# Patient Record
Sex: MALE | Race: WHITE | NOT HISPANIC OR LATINO | URBAN - METROPOLITAN AREA
[De-identification: names, ages, dates, MRNs, and addresses within clinical notes are randomized per-mention and may not be internally consistent; named-entity substitution may affect disease eponyms.]

---

## 2014-10-28 RX ORDER — INSULIN GLARGINE 100 [IU]/ML
25 INJECTION, SOLUTION SUBCUTANEOUS
Qty: 0 | Refills: 0 | COMMUNITY
Start: 2014-10-28

## 2014-10-28 RX ORDER — INSULIN GLARGINE 100 [IU]/ML
35 INJECTION, SOLUTION SUBCUTANEOUS
Qty: 0 | Refills: 0 | COMMUNITY
Start: 2014-10-28

## 2017-01-09 ENCOUNTER — EMERGENCY (EMERGENCY)
Facility: HOSPITAL | Age: 77
LOS: 1 days | Discharge: HOME | End: 2017-01-09
Attending: EMERGENCY MEDICINE | Admitting: EMERGENCY MEDICINE
Payer: MEDICARE

## 2017-01-09 VITALS
DIASTOLIC BLOOD PRESSURE: 63 MMHG | TEMPERATURE: 98 F | SYSTOLIC BLOOD PRESSURE: 152 MMHG | OXYGEN SATURATION: 97 % | RESPIRATION RATE: 18 BRPM | HEART RATE: 74 BPM

## 2017-01-09 DIAGNOSIS — R07.2 PRECORDIAL PAIN: ICD-10-CM

## 2017-01-09 DIAGNOSIS — Z96.611 PRESENCE OF RIGHT ARTIFICIAL SHOULDER JOINT: Chronic | ICD-10-CM

## 2017-01-09 LAB
ALBUMIN SERPL ELPH-MCNC: 3.9 G/DL — SIGNIFICANT CHANGE UP (ref 3.3–5)
ALP SERPL-CCNC: 92 U/L — SIGNIFICANT CHANGE UP (ref 40–120)
ALT FLD-CCNC: 21 U/L RC — SIGNIFICANT CHANGE UP (ref 10–45)
ANION GAP SERPL CALC-SCNC: 12 MMOL/L — SIGNIFICANT CHANGE UP (ref 5–17)
APTT BLD: 31.5 SEC — SIGNIFICANT CHANGE UP (ref 27.5–37.4)
AST SERPL-CCNC: 16 U/L — SIGNIFICANT CHANGE UP (ref 10–40)
BASOPHILS # BLD AUTO: 0 K/UL — SIGNIFICANT CHANGE UP (ref 0–0.2)
BASOPHILS NFR BLD AUTO: 0.3 % — SIGNIFICANT CHANGE UP (ref 0–2)
BILIRUB SERPL-MCNC: 0.3 MG/DL — SIGNIFICANT CHANGE UP (ref 0.2–1.2)
BUN SERPL-MCNC: 28 MG/DL — HIGH (ref 7–23)
CALCIUM SERPL-MCNC: 9.5 MG/DL — SIGNIFICANT CHANGE UP (ref 8.4–10.5)
CHLORIDE SERPL-SCNC: 98 MMOL/L — SIGNIFICANT CHANGE UP (ref 96–108)
CK MB BLD-MCNC: 2.8 % — SIGNIFICANT CHANGE UP (ref 0–3.5)
CK MB CFR SERPL CALC: 3.1 NG/ML — SIGNIFICANT CHANGE UP (ref 0–6.7)
CK SERPL-CCNC: 110 U/L — SIGNIFICANT CHANGE UP (ref 30–200)
CO2 SERPL-SCNC: 26 MMOL/L — SIGNIFICANT CHANGE UP (ref 22–31)
CREAT SERPL-MCNC: 1.43 MG/DL — HIGH (ref 0.5–1.3)
EOSINOPHIL # BLD AUTO: 0.3 K/UL — SIGNIFICANT CHANGE UP (ref 0–0.5)
EOSINOPHIL NFR BLD AUTO: 4.4 % — SIGNIFICANT CHANGE UP (ref 0–6)
GLUCOSE SERPL-MCNC: 354 MG/DL — HIGH (ref 70–99)
HCT VFR BLD CALC: 34.5 % — LOW (ref 39–50)
HGB BLD-MCNC: 12.2 G/DL — LOW (ref 13–17)
INR BLD: 1.14 RATIO — SIGNIFICANT CHANGE UP (ref 0.88–1.16)
LYMPHOCYTES # BLD AUTO: 1.2 K/UL — SIGNIFICANT CHANGE UP (ref 1–3.3)
LYMPHOCYTES # BLD AUTO: 18.2 % — SIGNIFICANT CHANGE UP (ref 13–44)
MCHC RBC-ENTMCNC: 32.1 PG — SIGNIFICANT CHANGE UP (ref 27–34)
MCHC RBC-ENTMCNC: 35.3 GM/DL — SIGNIFICANT CHANGE UP (ref 32–36)
MCV RBC AUTO: 90.8 FL — SIGNIFICANT CHANGE UP (ref 80–100)
MONOCYTES # BLD AUTO: 0.6 K/UL — SIGNIFICANT CHANGE UP (ref 0–0.9)
MONOCYTES NFR BLD AUTO: 9 % — SIGNIFICANT CHANGE UP (ref 2–14)
NEUTROPHILS # BLD AUTO: 4.6 K/UL — SIGNIFICANT CHANGE UP (ref 1.8–7.4)
NEUTROPHILS NFR BLD AUTO: 68.1 % — SIGNIFICANT CHANGE UP (ref 43–77)
NT-PROBNP SERPL-SCNC: 421 PG/ML — HIGH (ref 0–300)
PLATELET # BLD AUTO: 129 K/UL — LOW (ref 150–400)
POTASSIUM SERPL-MCNC: 4.7 MMOL/L — SIGNIFICANT CHANGE UP (ref 3.5–5.3)
POTASSIUM SERPL-SCNC: 4.7 MMOL/L — SIGNIFICANT CHANGE UP (ref 3.5–5.3)
PROT SERPL-MCNC: 6.7 G/DL — SIGNIFICANT CHANGE UP (ref 6–8.3)
PROTHROM AB SERPL-ACNC: 12.3 SEC — SIGNIFICANT CHANGE UP (ref 10–13.1)
RBC # BLD: 3.8 M/UL — LOW (ref 4.2–5.8)
RBC # FLD: 12.8 % — SIGNIFICANT CHANGE UP (ref 10.3–14.5)
SODIUM SERPL-SCNC: 136 MMOL/L — SIGNIFICANT CHANGE UP (ref 135–145)
TROPONIN T SERPL-MCNC: <0.01 NG/ML — SIGNIFICANT CHANGE UP (ref 0–0.06)
WBC # BLD: 6.8 K/UL — SIGNIFICANT CHANGE UP (ref 3.8–10.5)
WBC # FLD AUTO: 6.8 K/UL — SIGNIFICANT CHANGE UP (ref 3.8–10.5)

## 2017-01-09 RX ORDER — SODIUM CHLORIDE 9 MG/ML
3 INJECTION INTRAMUSCULAR; INTRAVENOUS; SUBCUTANEOUS ONCE
Qty: 0 | Refills: 0 | Status: COMPLETED | OUTPATIENT
Start: 2017-01-09 | End: 2017-01-09

## 2017-01-09 RX ORDER — ASPIRIN/CALCIUM CARB/MAGNESIUM 324 MG
81 TABLET ORAL ONCE
Qty: 0 | Refills: 0 | Status: COMPLETED | OUTPATIENT
Start: 2017-01-09 | End: 2017-01-09

## 2017-01-09 RX ORDER — SODIUM CHLORIDE 9 MG/ML
1000 INJECTION INTRAMUSCULAR; INTRAVENOUS; SUBCUTANEOUS ONCE
Qty: 0 | Refills: 0 | Status: COMPLETED | OUTPATIENT
Start: 2017-01-09 | End: 2017-01-09

## 2017-01-09 RX ADMIN — SODIUM CHLORIDE 1000 MILLILITER(S): 9 INJECTION INTRAMUSCULAR; INTRAVENOUS; SUBCUTANEOUS at 22:38

## 2017-01-09 RX ADMIN — SODIUM CHLORIDE 3 MILLILITER(S): 9 INJECTION INTRAMUSCULAR; INTRAVENOUS; SUBCUTANEOUS at 22:38

## 2017-01-09 NOTE — ED ADULT NURSE NOTE - PMH
Angina pectoris associated with type 2 diabetes mellitus    Anxiety    Arthritis    CAD (Coronary Artery Disease)    CHF (congestive heart failure)  as per medical records Pt denies t PST 08/23/2016  Chronic Gout    CKD (chronic kidney disease) stage 3, GFR 30-59 ml/min    Depression    Diverticula, Colon    Erectile dysfunction    HTN (Hypertension)    Hypercholesteremia    MI (myocardial infarction)  x2- 2013/2014  Renal Stone    Type 2 diabetes, uncontrolled, with renal manifestation

## 2017-01-09 NOTE — ED PROVIDER NOTE - MEDICAL DECISION MAKING DETAILS
Germán: Patient with chest pain radiating to the arm, history of cad and stents. will get labs, ekg, cxr, admit for acs eval.

## 2017-01-09 NOTE — ED ADULT NURSE NOTE - CHPI ED SYMPTOMS NEG
no left arm pain/no nausea/no vomiting/no cough/no syncope/no shortness of breath/no palpitations/no chills/no weakness/no fever
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
## 2017-01-09 NOTE — ED PROVIDER NOTE - OBJECTIVE STATEMENT
76yoM pmh CABG, CAD (stent x 17, plavix), CKD, CHF, HTN, HLD, DM2, gout comes ED c/o of worsening 10/10 sharp substernal cp radaiting to r arm at rest, and worse with excetion. Pt reports no sob/palp, no f/c, no n/v/d, took asa 325 x3 this am.  In ED pt reports continued cp (7/10).     last stent ~6 moths ago  pmd/cards - ismael garcia

## 2017-01-10 DIAGNOSIS — R07.9 CHEST PAIN, UNSPECIFIED: ICD-10-CM

## 2017-01-10 DIAGNOSIS — Z29.9 ENCOUNTER FOR PROPHYLACTIC MEASURES, UNSPECIFIED: ICD-10-CM

## 2017-01-10 DIAGNOSIS — E11.22 TYPE 2 DIABETES MELLITUS WITH DIABETIC CHRONIC KIDNEY DISEASE: ICD-10-CM

## 2017-01-10 DIAGNOSIS — N18.3 CHRONIC KIDNEY DISEASE, STAGE 3 (MODERATE): ICD-10-CM

## 2017-01-10 DIAGNOSIS — E78.00 PURE HYPERCHOLESTEROLEMIA, UNSPECIFIED: ICD-10-CM

## 2017-01-10 DIAGNOSIS — I10 ESSENTIAL (PRIMARY) HYPERTENSION: ICD-10-CM

## 2017-01-10 LAB — TROPONIN T SERPL-MCNC: <0.01 NG/ML — SIGNIFICANT CHANGE UP (ref 0–0.06)

## 2017-01-10 RX ORDER — DEXTROSE 50 % IN WATER 50 %
12.5 SYRINGE (ML) INTRAVENOUS ONCE
Qty: 0 | Refills: 0 | Status: DISCONTINUED | OUTPATIENT
Start: 2017-01-10 | End: 2017-01-11

## 2017-01-10 RX ORDER — INSULIN GLARGINE 100 [IU]/ML
26 INJECTION, SOLUTION SUBCUTANEOUS AT BEDTIME
Qty: 0 | Refills: 0 | Status: DISCONTINUED | OUTPATIENT
Start: 2017-01-10 | End: 2017-01-11

## 2017-01-10 RX ORDER — ASPIRIN/CALCIUM CARB/MAGNESIUM 324 MG
325 TABLET ORAL DAILY
Qty: 0 | Refills: 0 | Status: DISCONTINUED | OUTPATIENT
Start: 2017-01-10 | End: 2017-01-11

## 2017-01-10 RX ORDER — ATORVASTATIN CALCIUM 80 MG/1
80 TABLET, FILM COATED ORAL AT BEDTIME
Qty: 0 | Refills: 0 | Status: DISCONTINUED | OUTPATIENT
Start: 2017-01-10 | End: 2017-01-11

## 2017-01-10 RX ORDER — CLOPIDOGREL BISULFATE 75 MG/1
75 TABLET, FILM COATED ORAL DAILY
Qty: 0 | Refills: 0 | Status: DISCONTINUED | OUTPATIENT
Start: 2017-01-10 | End: 2017-01-11

## 2017-01-10 RX ORDER — DOCUSATE SODIUM 100 MG
100 CAPSULE ORAL THREE TIMES A DAY
Qty: 0 | Refills: 0 | Status: DISCONTINUED | OUTPATIENT
Start: 2017-01-10 | End: 2017-01-11

## 2017-01-10 RX ORDER — DEXTROSE 50 % IN WATER 50 %
1 SYRINGE (ML) INTRAVENOUS ONCE
Qty: 0 | Refills: 0 | Status: DISCONTINUED | OUTPATIENT
Start: 2017-01-10 | End: 2017-01-11

## 2017-01-10 RX ORDER — SODIUM CHLORIDE 9 MG/ML
1000 INJECTION, SOLUTION INTRAVENOUS
Qty: 0 | Refills: 0 | Status: DISCONTINUED | OUTPATIENT
Start: 2017-01-10 | End: 2017-01-11

## 2017-01-10 RX ORDER — AMLODIPINE BESYLATE 2.5 MG/1
10 TABLET ORAL DAILY
Qty: 0 | Refills: 0 | Status: DISCONTINUED | OUTPATIENT
Start: 2017-01-10 | End: 2017-01-11

## 2017-01-10 RX ORDER — GLUCAGON INJECTION, SOLUTION 0.5 MG/.1ML
1 INJECTION, SOLUTION SUBCUTANEOUS ONCE
Qty: 0 | Refills: 0 | Status: DISCONTINUED | OUTPATIENT
Start: 2017-01-10 | End: 2017-01-11

## 2017-01-10 RX ORDER — ALLOPURINOL 300 MG
300 TABLET ORAL DAILY
Qty: 0 | Refills: 0 | Status: DISCONTINUED | OUTPATIENT
Start: 2017-01-10 | End: 2017-01-11

## 2017-01-10 RX ORDER — DEXTROSE 50 % IN WATER 50 %
25 SYRINGE (ML) INTRAVENOUS ONCE
Qty: 0 | Refills: 0 | Status: DISCONTINUED | OUTPATIENT
Start: 2017-01-10 | End: 2017-01-11

## 2017-01-10 RX ORDER — VALSARTAN 80 MG/1
80 TABLET ORAL DAILY
Qty: 0 | Refills: 0 | Status: DISCONTINUED | OUTPATIENT
Start: 2017-01-10 | End: 2017-01-11

## 2017-01-10 RX ORDER — ONDANSETRON 8 MG/1
4 TABLET, FILM COATED ORAL EVERY 6 HOURS
Qty: 0 | Refills: 0 | Status: DISCONTINUED | OUTPATIENT
Start: 2017-01-10 | End: 2017-01-11

## 2017-01-10 RX ORDER — CARVEDILOL PHOSPHATE 80 MG/1
25 CAPSULE, EXTENDED RELEASE ORAL EVERY 12 HOURS
Qty: 0 | Refills: 0 | Status: DISCONTINUED | OUTPATIENT
Start: 2017-01-10 | End: 2017-01-11

## 2017-01-10 RX ORDER — RANOLAZINE 500 MG/1
500 TABLET, FILM COATED, EXTENDED RELEASE ORAL
Qty: 0 | Refills: 0 | Status: DISCONTINUED | OUTPATIENT
Start: 2017-01-10 | End: 2017-01-11

## 2017-01-10 RX ORDER — HEPARIN SODIUM 5000 [USP'U]/ML
5000 INJECTION INTRAVENOUS; SUBCUTANEOUS EVERY 8 HOURS
Qty: 0 | Refills: 0 | Status: DISCONTINUED | OUTPATIENT
Start: 2017-01-10 | End: 2017-01-11

## 2017-01-10 RX ORDER — INSULIN LISPRO 100/ML
VIAL (ML) SUBCUTANEOUS AT BEDTIME
Qty: 0 | Refills: 0 | Status: DISCONTINUED | OUTPATIENT
Start: 2017-01-10 | End: 2017-01-11

## 2017-01-10 RX ORDER — INSULIN LISPRO 100/ML
VIAL (ML) SUBCUTANEOUS
Qty: 0 | Refills: 0 | Status: DISCONTINUED | OUTPATIENT
Start: 2017-01-10 | End: 2017-01-11

## 2017-01-10 RX ADMIN — Medication: at 13:45

## 2017-01-10 RX ADMIN — Medication 1: at 07:05

## 2017-01-10 RX ADMIN — AMLODIPINE BESYLATE 10 MILLIGRAM(S): 2.5 TABLET ORAL at 11:40

## 2017-01-10 RX ADMIN — RANOLAZINE 500 MILLIGRAM(S): 500 TABLET, FILM COATED, EXTENDED RELEASE ORAL at 22:33

## 2017-01-10 RX ADMIN — Medication 100 MILLIGRAM(S): at 11:40

## 2017-01-10 RX ADMIN — INSULIN GLARGINE 26 UNIT(S): 100 INJECTION, SOLUTION SUBCUTANEOUS at 23:09

## 2017-01-10 RX ADMIN — Medication 325 MILLIGRAM(S): at 11:40

## 2017-01-10 RX ADMIN — HEPARIN SODIUM 5000 UNIT(S): 5000 INJECTION INTRAVENOUS; SUBCUTANEOUS at 13:44

## 2017-01-10 RX ADMIN — CLOPIDOGREL BISULFATE 75 MILLIGRAM(S): 75 TABLET, FILM COATED ORAL at 11:40

## 2017-01-10 RX ADMIN — ATORVASTATIN CALCIUM 80 MILLIGRAM(S): 80 TABLET, FILM COATED ORAL at 22:33

## 2017-01-10 RX ADMIN — CARVEDILOL PHOSPHATE 25 MILLIGRAM(S): 80 CAPSULE, EXTENDED RELEASE ORAL at 22:33

## 2017-01-10 RX ADMIN — Medication 300 MILLIGRAM(S): at 11:40

## 2017-01-10 RX ADMIN — HEPARIN SODIUM 5000 UNIT(S): 5000 INJECTION INTRAVENOUS; SUBCUTANEOUS at 22:32

## 2017-01-10 RX ADMIN — Medication 1: at 22:32

## 2017-01-10 NOTE — ED ADULT NURSE REASSESSMENT NOTE - NS ED NURSE REASSESS COMMENT FT1
Pt. in no acute distress, breathing unlabored on RA. NSR on CM. Pt. sitting up comfortably in stretcher. States the chest pain "comes and goes." Pending dispo.

## 2017-01-10 NOTE — H&P ADULT. - LYMPHATIC
supraclavicular R/posterior cervical L/posterior cervical R/anterior cervical R/supraclavicular L/anterior cervical L

## 2017-01-10 NOTE — H&P ADULT. - PROBLEM SELECTOR PROBLEM 2
Uncontrolled type 2 diabetes mellitus with stage 3 chronic kidney disease, with long-term current use of insulin

## 2017-01-10 NOTE — H&P ADULT. - HISTORY OF PRESENT ILLNESS
76 M w/ PMH CAD, 4v CABG 1994, CKD 3, CHF, DM2, HTN, HLD, Gout, p/w c/o left sided/substernal chest pain that started yesterday after he went out for a walk with his dog.  He had to carry his dog and walk back when he noticed the pain starting.  He went home and tried to rest w/out relief.  He then took 2 more ASA 325s (had his regular in the AM), w/out relief.  Chest pain was "squeezing" in quality, 8/10 at its worst, radiating to his Left arm w/ tingling in his fingers.  Not associated w/ sob, diaphoresis, palpitations, n/v.  Exacerbated w/ exertion, no alleviating factors. Pt states pain is "there", but appears comfortable.  He has h/o 17 stents, last in 4/16, and states "I'm due for one".

## 2017-01-10 NOTE — H&P ADULT. - FAMILY HISTORY
Father  Still living? No  Family history of CABG, Age at diagnosis: Age Unknown     Mother  Still living? Unknown  Family history of diabetes mellitus, Age at diagnosis: Age Unknown

## 2017-01-10 NOTE — H&P ADULT. - ASSESSMENT
76 M w/ PMH CAD, s/p 17 stents, 4v CABG 1994, CKD 3, CHF, DM2, HTN, HLD, Gout, p/w c/o left sided/substernal chest pain.

## 2017-01-10 NOTE — ED ADULT NURSE REASSESSMENT NOTE - NS ED NURSE REASSESS COMMENT FT1
pt resting comfortably in bed awaiting bed on floor. Pt denies pain at this time. vitals stable. will continue to reassess.

## 2017-01-10 NOTE — H&P ADULT. - NEUROLOGICAL DETAILS
responds to pain/alert and oriented x 3/responds to verbal commands/normal strength/cranial nerves intact/sensation intact

## 2017-01-10 NOTE — ED ADULT NURSE REASSESSMENT NOTE - NS ED NURSE REASSESS COMMENT FT1
Pt. in no acute distress. Breathing unlabored on RA. Skin warm pink and dry. NSR on CM. States "the chest pain is the same as before." Pt. admitted to tele, awaiting bed. Repeat lab results pending. Comfort measures in place. No N/V, SOB, back pain, arm pain.
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
## 2017-01-10 NOTE — H&P ADULT. - PROBLEM SELECTOR PLAN 1
Likely anginal, 2x trops neg  cardio cs - plan for cath  NTG prn chest pain  c/w asa, plavix, bb, statin

## 2017-01-11 ENCOUNTER — TRANSCRIPTION ENCOUNTER (OUTPATIENT)
Age: 77
End: 2017-01-11

## 2017-01-11 ENCOUNTER — MEDICATION RENEWAL (OUTPATIENT)
Age: 77
End: 2017-01-11

## 2017-01-11 VITALS — WEIGHT: 199.3 LBS

## 2017-01-11 LAB
ANION GAP SERPL CALC-SCNC: 13 MMOL/L — SIGNIFICANT CHANGE UP (ref 5–17)
BUN SERPL-MCNC: 22 MG/DL — SIGNIFICANT CHANGE UP (ref 7–23)
CALCIUM SERPL-MCNC: 9.7 MG/DL — SIGNIFICANT CHANGE UP (ref 8.4–10.5)
CHLORIDE SERPL-SCNC: 103 MMOL/L — SIGNIFICANT CHANGE UP (ref 96–108)
CO2 SERPL-SCNC: 24 MMOL/L — SIGNIFICANT CHANGE UP (ref 22–31)
CREAT ?TM UR-MCNC: 86 MG/DL — SIGNIFICANT CHANGE UP
CREAT SERPL-MCNC: 1.18 MG/DL — SIGNIFICANT CHANGE UP (ref 0.5–1.3)
GLUCOSE SERPL-MCNC: 213 MG/DL — HIGH (ref 70–99)
HCT VFR BLD CALC: 36.3 % — LOW (ref 39–50)
HGB BLD-MCNC: 12.7 G/DL — LOW (ref 13–17)
MCHC RBC-ENTMCNC: 32.1 PG — SIGNIFICANT CHANGE UP (ref 27–34)
MCHC RBC-ENTMCNC: 34.8 GM/DL — SIGNIFICANT CHANGE UP (ref 32–36)
MCV RBC AUTO: 92.3 FL — SIGNIFICANT CHANGE UP (ref 80–100)
PLATELET # BLD AUTO: 131 K/UL — LOW (ref 150–400)
POTASSIUM SERPL-MCNC: 3.9 MMOL/L — SIGNIFICANT CHANGE UP (ref 3.5–5.3)
POTASSIUM SERPL-SCNC: 3.9 MMOL/L — SIGNIFICANT CHANGE UP (ref 3.5–5.3)
PROT ?TM UR-MCNC: 115 MG/DL — HIGH (ref 0–12)
PROT/CREAT UR-RTO: 1.3 RATIO — HIGH (ref 0–0.2)
RBC # BLD: 3.94 M/UL — LOW (ref 4.2–5.8)
RBC # FLD: 13.3 % — SIGNIFICANT CHANGE UP (ref 10.3–14.5)
SODIUM SERPL-SCNC: 140 MMOL/L — SIGNIFICANT CHANGE UP (ref 135–145)
WBC # BLD: 7.5 K/UL — SIGNIFICANT CHANGE UP (ref 3.8–10.5)
WBC # FLD AUTO: 7.5 K/UL — SIGNIFICANT CHANGE UP (ref 3.8–10.5)

## 2017-01-11 PROCEDURE — 84156 ASSAY OF PROTEIN URINE: CPT

## 2017-01-11 PROCEDURE — C1769: CPT

## 2017-01-11 PROCEDURE — C1887: CPT

## 2017-01-11 PROCEDURE — C1894: CPT

## 2017-01-11 PROCEDURE — 82553 CREATINE MB FRACTION: CPT

## 2017-01-11 PROCEDURE — 93005 ELECTROCARDIOGRAM TRACING: CPT

## 2017-01-11 PROCEDURE — 85610 PROTHROMBIN TIME: CPT

## 2017-01-11 PROCEDURE — 80053 COMPREHEN METABOLIC PANEL: CPT

## 2017-01-11 PROCEDURE — 83880 ASSAY OF NATRIURETIC PEPTIDE: CPT

## 2017-01-11 PROCEDURE — 80048 BASIC METABOLIC PNL TOTAL CA: CPT

## 2017-01-11 PROCEDURE — 71046 X-RAY EXAM CHEST 2 VIEWS: CPT

## 2017-01-11 PROCEDURE — 84484 ASSAY OF TROPONIN QUANT: CPT

## 2017-01-11 PROCEDURE — 85730 THROMBOPLASTIN TIME PARTIAL: CPT

## 2017-01-11 PROCEDURE — 93010 ELECTROCARDIOGRAM REPORT: CPT

## 2017-01-11 PROCEDURE — 85027 COMPLETE CBC AUTOMATED: CPT

## 2017-01-11 PROCEDURE — 93459 L HRT ART/GRFT ANGIO: CPT

## 2017-01-11 PROCEDURE — 82550 ASSAY OF CK (CPK): CPT

## 2017-01-11 PROCEDURE — 99285 EMERGENCY DEPT VISIT HI MDM: CPT | Mod: 25

## 2017-01-11 RX ORDER — DOCUSATE SODIUM 100 MG
1 CAPSULE ORAL
Qty: 90 | Refills: 0 | OUTPATIENT
Start: 2017-01-11 | End: 2017-02-10

## 2017-01-11 RX ADMIN — CLOPIDOGREL BISULFATE 75 MILLIGRAM(S): 75 TABLET, FILM COATED ORAL at 11:58

## 2017-01-11 RX ADMIN — Medication 2: at 11:58

## 2017-01-11 RX ADMIN — AMLODIPINE BESYLATE 10 MILLIGRAM(S): 2.5 TABLET ORAL at 05:53

## 2017-01-11 RX ADMIN — Medication 325 MILLIGRAM(S): at 11:59

## 2017-01-11 RX ADMIN — CARVEDILOL PHOSPHATE 25 MILLIGRAM(S): 80 CAPSULE, EXTENDED RELEASE ORAL at 05:53

## 2017-01-11 RX ADMIN — Medication 300 MILLIGRAM(S): at 11:58

## 2017-01-11 RX ADMIN — VALSARTAN 80 MILLIGRAM(S): 80 TABLET ORAL at 05:53

## 2017-01-11 RX ADMIN — RANOLAZINE 500 MILLIGRAM(S): 500 TABLET, FILM COATED, EXTENDED RELEASE ORAL at 05:53

## 2017-01-11 RX ADMIN — Medication 2: at 07:58

## 2017-01-11 RX ADMIN — HEPARIN SODIUM 5000 UNIT(S): 5000 INJECTION INTRAVENOUS; SUBCUTANEOUS at 05:53

## 2017-01-11 NOTE — DISCHARGE NOTE ADULT - MEDICATION SUMMARY - MEDICATIONS TO STOP TAKING
I will STOP taking the medications listed below when I get home from the hospital:    NovoLOG 100 units/mL subcutaneous solution  -- 7 unit(s) subcutaneous once a day before dinner    potassium citrate  -- 20 milliequivalent(s) by mouth 2 times a day

## 2017-01-11 NOTE — DISCHARGE NOTE ADULT - PATIENT PORTAL LINK FT
“You can access the [employer] Patient Portal, offered by [facility], by registering with the following website: [email or web address]”

## 2017-01-11 NOTE — DISCHARGE NOTE ADULT - PLAN OF CARE
optimal cardiac medication management Coronary artery disease is a condition where the arteries the supply the heart muscle get clogges with fatty deposits & puts you at risk for a heart attack  Call your doctor if you have any new pain, pressure, or discomfort in the center of your chest, pain, tingling or discomfort in arms, back, neck, jaw, or stomach, shortness of breath, nausea, vomiting, burping or heartburn, sweating, cold and clammy skin, racing or abnormal heartbeat for more than 10 minutes or if they keep coming & going.  Call 911 and do not tr to get to hospital by care  You can help yourself with lefestyle changes (quitting smoking if you smoke), eat lots of fruits & vegetables & low fat dairy products, not a lot of meat & fatty foods, walk or some form of physical activity most days of the week, lose weight if you are overweight  Take your cardiac medication as prescribed to lower cholesterol, to lower blood pressure, aspirin to prevent blood clots, and diabetes control  Make sure to keep appointments with doctor for cardiac follow up care  follow up with primary care physician/cardiology within one week after discharge  take cardiac medication as directed - Ranexa has been added to regimen HgA1C this admission was not done - please follow up  Make sure you get your HgA1c checked every three months.  If you take insulin, check your blood glucose before meals and at bedtime.  It's important not to skip any meals.  Keep a log of your blood glucose results and always take it with you to your doctor appointments.  Keep a list of your current medications including injectables and over the counter medications and bring this medication list with you to all your doctor appointments.  If you have not seen your opthalmologist this year call for appointment.  Check your feet daily for redness, sores, or openings. Do not self treat. If no improvement in two days call your primary care physician for an appointment.  Low blood sugar (hypoglycemia) is a blood sugar below 70mg/dl. Check your blood sugar if you feel signs/symptoms of hypoglycemia. If your blood sugar is below 70 take 15 grams of carbohydrates (ex 4 oz of apple juice, 3-4 glucosr tablets, or 4-6 oz of regular soda) wait 15 minutes and repeat blood sugar to make sure it comes up above 70.  If your blood sugar is above 70 and you are due for a meal, have a meal.  If you are not due for a meal have a snack.  This snack helps keeps your blood sugar at a safe range.  take your Lantus as ordered  speak to your doctor about premeal humalog since it is not normal to only take before dinne Avoid taking (NSAIDs) - (ex: Ibuprofen, Advil, Celebrex, Naprosyn)  Avoid taking any nephrotoxic agents (can harm kidneys) - Intravenous contrast for diagnostic testing, combination cold medications.  Have all medications adjusted for your renal function by your Health Care Provider.  Blood pressure control is important.  Take all medication as prescribed.  your last creatinine was normal at 1.1  follow up with urology for kidney stone evaluation take Allopurinal as directed Follow up with your medical doctor to establish long term blood pressure treatment goals.  follow up blood pressure monitoring and doctor visits as directed

## 2017-01-11 NOTE — DISCHARGE NOTE ADULT - CARE PROVIDER_API CALL
Stuart Sarkar), Cardiovascular Disease; Internal Medicine  [address] [place] [address]  Phone: [phone]  Fax: [phone]    Jaxon Feldman), Urology  [address] [address]  Phone: [phone]  Fax: [phone]

## 2017-01-11 NOTE — DISCHARGE NOTE ADULT - MEDICATION SUMMARY - MEDICATIONS TO TAKE
I will START or STAY ON the medications listed below when I get home from the hospital:    aspirin 325 mg oral delayed release tablet  -- 1 tab(s) by mouth once a day  -- Indication: For Coronary artery disease    valsartan 80 mg oral capsule  -- 1 tab(s) by mouth once a day  in am  -- Indication: For Hypertension    ranolazine 500 mg oral tablet, extended release  -- 1 tab(s) by mouth 2 times a day  -- Indication: For Coronary artery disease    insulin glargine 100 units/mL subcutaneous solution  -- 26 unit(s) subcutaneous once a day in pm  -- 1/6/2016 dose reduced pre cath to 15 units  -- Indication: For diabetes    allopurinol 300 mg oral tablet  -- 1 tab(s) by mouth once a day in am  -- Indication: For gout    Lipitor 80 mg oral tablet  -- 1 tab(s) by mouth once a day in am  -- Indication: For Hypercholesteremia    clopidogrel 75 mg oral tablet  -- 1 tab(s) by mouth once a day  -- Indication: For Coronary artery disease    carvedilol 25 mg oral tablet  -- 1 tab(s) by mouth 2 times a day  -- Indication: For Coronary artery disease    amLODIPine 10 mg oral tablet  -- 1 tab(s) by mouth once a day in am  -- Indication: For Hypertension    docusate sodium 100 mg oral capsule  -- 1 cap(s) by mouth 3 times a day  -- Indication: For Constipation I will START or STAY ON the medications listed below when I get home from the hospital:    aspirin 325 mg oral delayed release tablet  -- 1 tab(s) by mouth once a day  -- Indication: For Coronary artery disease    valsartan 80 mg oral capsule  -- 1 tab(s) by mouth once a day  in am  -- Indication: For Hypertension    ranolazine 500 mg oral tablet, extended release  -- 1 tab(s) by mouth 2 times a day  -- Indication: For Coronary artery disease    insulin glargine 100 units/mL subcutaneous solution  -- 26 unit(s) subcutaneous once a day in pm  -- 1/6/2016 dose reduced pre cath to 15 units  -- Indication: For Diabetes    allopurinol 300 mg oral tablet  -- 1 tab(s) by mouth once a day in am  -- Indication: For gout    Lipitor 80 mg oral tablet  -- 1 tab(s) by mouth once a day in am  -- Indication: For Hypercholesteremia    clopidogrel 75 mg oral tablet  -- 1 tab(s) by mouth once a day  -- Indication: For Coronary artery disease    carvedilol 25 mg oral tablet  -- 1 tab(s) by mouth 2 times a day  -- Indication: For Coronary artery disease    amLODIPine 10 mg oral tablet  -- 1 tab(s) by mouth once a day in am  -- Indication: For Hypert ensifon    docusate sodium 100 mg oral capsule  -- 1 cap(s) by mouth 3 times a day  -- Indication: For Constipation

## 2017-01-11 NOTE — DISCHARGE NOTE ADULT - ADDITIONAL INSTRUCTIONS
follow up with primary care physician / cardiology Dr. Sarkar within one week after discharge  follow up with urology Dr. Torres for renal stone evaluation since creatinine 1.4 on admisssion

## 2017-01-11 NOTE — DISCHARGE NOTE ADULT - HOSPITAL COURSE
to be completed by attending physician on record pt admitted with chest pain  seen by cards  had cardiac cath    1/10 cath showed Patent graft LIMA to LAD, SVG to RCA, SVG to diag and SVG to OM 50% disease, severe distal LAD disease, medical management recommended. Ranexa 500mg bid to added.                dc home with oupt f/u

## 2017-01-11 NOTE — DISCHARGE NOTE ADULT - REASON FOR ADMISSION
chest pain - had cardiac catheterization done 1/0 & has severe distal LAD disease requiring close medication management - Ranexa added to regimen

## 2017-01-11 NOTE — DISCHARGE NOTE ADULT - CARE PROVIDERS DIRECT ADDRESSES
[email or web address]

## 2017-01-11 NOTE — DISCHARGE NOTE ADULT - CARE PLAN
Principal Discharge DX:	Angina pectoris associated with type 2 diabetes mellitus  Goal:	optimal cardiac medication management  Instructions for follow-up, activity and diet:	Coronary artery disease is a condition where the arteries the supply the heart muscle get clogges with fatty deposits & puts you at risk for a heart attack  Call your doctor if you have any new pain, pressure, or discomfort in the center of your chest, pain, tingling or discomfort in arms, back, neck, jaw, or stomach, shortness of breath, nausea, vomiting, burping or heartburn, sweating, cold and clammy skin, racing or abnormal heartbeat for more than 10 minutes or if they keep coming & going.  Call 911 and do not tr to get to hospital by care  You can help yourself with lefestyle changes (quitting smoking if you smoke), eat lots of fruits & vegetables & low fat dairy products, not a lot of meat & fatty foods, walk or some form of physical activity most days of the week, lose weight if you are overweight  Take your cardiac medication as prescribed to lower cholesterol, to lower blood pressure, aspirin to prevent blood clots, and diabetes control  Make sure to keep appointments with doctor for cardiac follow up care  follow up with primary care physician/cardiology within one week after discharge  take cardiac medication as directed - Ranexa has been added to regimen  Secondary Diagnosis:	Diabetes  Instructions for follow-up, activity and diet:	HgA1C this admission was not done - please follow up  Make sure you get your HgA1c checked every three months.  If you take insulin, check your blood glucose before meals and at bedtime.  It's important not to skip any meals.  Keep a log of your blood glucose results and always take it with you to your doctor appointments.  Keep a list of your current medications including injectables and over the counter medications and bring this medication list with you to all your doctor appointments.  If you have not seen your opthalmologist this year call for appointment.  Check your feet daily for redness, sores, or openings. Do not self treat. If no improvement in two days call your primary care physician for an appointment.  Low blood sugar (hypoglycemia) is a blood sugar below 70mg/dl. Check your blood sugar if you feel signs/symptoms of hypoglycemia. If your blood sugar is below 70 take 15 grams of carbohydrates (ex 4 oz of apple juice, 3-4 glucosr tablets, or 4-6 oz of regular soda) wait 15 minutes and repeat blood sugar to make sure it comes up above 70.  If your blood sugar is above 70 and you are due for a meal, have a meal.  If you are not due for a meal have a snack.  This snack helps keeps your blood sugar at a safe range.  take your Lantus as ordered  speak to your doctor about premeal humalog since it is not normal to only take before dinne  Secondary Diagnosis:	RUFINA (acute kidney injury)  Instructions for follow-up, activity and diet:	Avoid taking (NSAIDs) - (ex: Ibuprofen, Advil, Celebrex, Naprosyn)  Avoid taking any nephrotoxic agents (can harm kidneys) - Intravenous contrast for diagnostic testing, combination cold medications.  Have all medications adjusted for your renal function by your Health Care Provider.  Blood pressure control is important.  Take all medication as prescribed.  your last creatinine was normal at 1.1  follow up with urology for kidney stone evaluation  Secondary Diagnosis:	Chronic gout  Instructions for follow-up, activity and diet:	take Allopurinal as directed  Secondary Diagnosis:	HTN (hypertension)  Instructions for follow-up, activity and diet:	Follow up with your medical doctor to establish long term blood pressure treatment goals.  follow up blood pressure monitoring and doctor visits as directed

## 2017-01-17 ENCOUNTER — APPOINTMENT (OUTPATIENT)
Dept: CARDIOLOGY | Facility: CLINIC | Age: 77
End: 2017-01-17

## 2017-01-17 ENCOUNTER — NON-APPOINTMENT (OUTPATIENT)
Age: 77
End: 2017-01-17

## 2017-01-17 VITALS
WEIGHT: 200 LBS | BODY MASS INDEX: 28.63 KG/M2 | OXYGEN SATURATION: 97 % | DIASTOLIC BLOOD PRESSURE: 64 MMHG | HEIGHT: 70 IN | HEART RATE: 69 BPM | SYSTOLIC BLOOD PRESSURE: 110 MMHG

## 2017-01-17 DIAGNOSIS — N52.9 MALE ERECTILE DYSFUNCTION, UNSPECIFIED: ICD-10-CM

## 2017-01-17 RX ORDER — SUCRALFATE 1 G/1
1 TABLET ORAL
Qty: 90 | Refills: 0 | Status: COMPLETED | COMMUNITY
Start: 2016-12-20

## 2017-03-01 ENCOUNTER — APPOINTMENT (OUTPATIENT)
Dept: ORTHOPEDIC SURGERY | Facility: CLINIC | Age: 77
End: 2017-03-01

## 2017-03-01 DIAGNOSIS — M19.011 PRIMARY OSTEOARTHRITIS, RIGHT SHOULDER: ICD-10-CM

## 2017-05-09 ENCOUNTER — APPOINTMENT (OUTPATIENT)
Dept: CARDIOLOGY | Facility: CLINIC | Age: 77
End: 2017-05-09

## 2017-05-09 ENCOUNTER — NON-APPOINTMENT (OUTPATIENT)
Age: 77
End: 2017-05-09

## 2017-05-09 VITALS
HEART RATE: 70 BPM | WEIGHT: 195 LBS | DIASTOLIC BLOOD PRESSURE: 70 MMHG | OXYGEN SATURATION: 96 % | BODY MASS INDEX: 27.92 KG/M2 | HEIGHT: 70 IN | SYSTOLIC BLOOD PRESSURE: 116 MMHG

## 2017-05-09 DIAGNOSIS — S49.91XA UNSPECIFIED INJURY OF RIGHT SHOULDER AND UPPER ARM, INITIAL ENCOUNTER: ICD-10-CM

## 2017-05-29 ENCOUNTER — RX RENEWAL (OUTPATIENT)
Age: 77
End: 2017-05-29

## 2017-09-01 ENCOUNTER — APPOINTMENT (OUTPATIENT)
Dept: ORTHOPEDIC SURGERY | Facility: CLINIC | Age: 77
End: 2017-09-01
Payer: COMMERCIAL

## 2017-09-01 DIAGNOSIS — M25.611 STIFFNESS OF RIGHT SHOULDER, NOT ELSEWHERE CLASSIFIED: ICD-10-CM

## 2017-09-01 PROCEDURE — 73030 X-RAY EXAM OF SHOULDER: CPT | Mod: RT

## 2017-09-01 PROCEDURE — 99213 OFFICE O/P EST LOW 20 MIN: CPT

## 2017-10-11 ENCOUNTER — NON-APPOINTMENT (OUTPATIENT)
Age: 77
End: 2017-10-11

## 2017-10-11 ENCOUNTER — APPOINTMENT (OUTPATIENT)
Dept: CARDIOLOGY | Facility: CLINIC | Age: 77
End: 2017-10-11
Payer: MEDICARE

## 2017-10-11 ENCOUNTER — LABORATORY RESULT (OUTPATIENT)
Age: 77
End: 2017-10-11

## 2017-10-11 VITALS
SYSTOLIC BLOOD PRESSURE: 108 MMHG | WEIGHT: 203 LBS | BODY MASS INDEX: 29.06 KG/M2 | HEART RATE: 70 BPM | OXYGEN SATURATION: 97 % | HEIGHT: 70 IN | DIASTOLIC BLOOD PRESSURE: 65 MMHG | TEMPERATURE: 98.4 F

## 2017-10-11 DIAGNOSIS — M79.89 OTHER SPECIFIED SOFT TISSUE DISORDERS: ICD-10-CM

## 2017-10-11 PROCEDURE — 99215 OFFICE O/P EST HI 40 MIN: CPT

## 2017-10-11 PROCEDURE — 93000 ELECTROCARDIOGRAM COMPLETE: CPT

## 2017-10-11 PROCEDURE — G0008: CPT

## 2017-10-11 PROCEDURE — 90662 IIV NO PRSV INCREASED AG IM: CPT

## 2017-10-16 LAB
ALBUMIN SERPL ELPH-MCNC: 3.6 G/DL
ALP BLD-CCNC: 90 U/L
ALT SERPL-CCNC: 24 U/L
ANION GAP SERPL CALC-SCNC: 13 MMOL/L
AST SERPL-CCNC: 17 U/L
BASOPHILS # BLD AUTO: 0.02 K/UL
BASOPHILS NFR BLD AUTO: 0.3 %
BILIRUB SERPL-MCNC: 0.6 MG/DL
BUN SERPL-MCNC: 32 MG/DL
CALCIUM SERPL-MCNC: 9.7 MG/DL
CHLORIDE SERPL-SCNC: 101 MMOL/L
CHOLEST SERPL-MCNC: 126 MG/DL
CHOLEST/HDLC SERPL: 3.9 RATIO
CO2 SERPL-SCNC: 26 MMOL/L
CREAT SERPL-MCNC: 1.58 MG/DL
EOSINOPHIL # BLD AUTO: 0.22 K/UL
EOSINOPHIL NFR BLD AUTO: 3.2 %
FT4I SERPL CALC-MCNC: 7.9 INDEX
GLUCOSE SERPL-MCNC: 286 MG/DL
HBA1C MFR BLD HPLC: 9.2 %
HCT VFR BLD CALC: 34.4 %
HDLC SERPL-MCNC: 32 MG/DL
HGB BLD-MCNC: 11.7 G/DL
IMM GRANULOCYTES NFR BLD AUTO: 0.1 %
LDLC SERPL CALC-MCNC: 55 MG/DL
LYMPHOCYTES # BLD AUTO: 1.06 K/UL
LYMPHOCYTES NFR BLD AUTO: 15.6 %
MAN DIFF?: NORMAL
MCHC RBC-ENTMCNC: 32.7 PG
MCHC RBC-ENTMCNC: 34 GM/DL
MCV RBC AUTO: 96.1 FL
MONOCYTES # BLD AUTO: 0.49 K/UL
MONOCYTES NFR BLD AUTO: 7.2 %
NEUTROPHILS # BLD AUTO: 5 K/UL
NEUTROPHILS NFR BLD AUTO: 73.6 %
PLATELET # BLD AUTO: 154 K/UL
POTASSIUM SERPL-SCNC: 5.3 MMOL/L
PROT SERPL-MCNC: 6.2 G/DL
PSA FREE FLD-MCNC: 57.8
PSA FREE SERPL-MCNC: 0.59 NG/ML
PSA SERPL-MCNC: 1.02 NG/ML
RBC # BLD: 3.58 M/UL
RBC # FLD: 13.9 %
SODIUM SERPL-SCNC: 140 MMOL/L
T3RU NFR SERPL: 0.9 INDEX
T4 FREE SERPL-MCNC: 1.4 NG/DL
T4 SERPL-MCNC: 7.1 UG/DL
TRIGL SERPL-MCNC: 193 MG/DL
TSH SERPL-ACNC: 1.96 UIU/ML
WBC # FLD AUTO: 6.8 K/UL

## 2017-11-13 ENCOUNTER — INPATIENT (INPATIENT)
Facility: HOSPITAL | Age: 77
LOS: 2 days | Discharge: HOME | DRG: 274 | End: 2017-11-16
Attending: INTERNAL MEDICINE | Admitting: INTERNAL MEDICINE
Payer: MEDICARE

## 2017-11-13 VITALS
DIASTOLIC BLOOD PRESSURE: 85 MMHG | HEART RATE: 150 BPM | RESPIRATION RATE: 19 BRPM | OXYGEN SATURATION: 96 % | SYSTOLIC BLOOD PRESSURE: 125 MMHG

## 2017-11-13 DIAGNOSIS — Z96.611 PRESENCE OF RIGHT ARTIFICIAL SHOULDER JOINT: Chronic | ICD-10-CM

## 2017-11-13 DIAGNOSIS — I20.0 UNSTABLE ANGINA: ICD-10-CM

## 2017-11-13 LAB
ALBUMIN SERPL ELPH-MCNC: 4.1 G/DL — SIGNIFICANT CHANGE UP (ref 3.3–5)
ALP SERPL-CCNC: 103 U/L — SIGNIFICANT CHANGE UP (ref 40–120)
ALT FLD-CCNC: 35 U/L RC — SIGNIFICANT CHANGE UP (ref 10–45)
ANION GAP SERPL CALC-SCNC: 14 MMOL/L — SIGNIFICANT CHANGE UP (ref 5–17)
APTT BLD: 31.4 SEC — SIGNIFICANT CHANGE UP (ref 27.5–37.4)
AST SERPL-CCNC: 24 U/L — SIGNIFICANT CHANGE UP (ref 10–40)
BASE EXCESS BLDV CALC-SCNC: 2.3 MMOL/L — HIGH (ref -2–2)
BASOPHILS # BLD AUTO: 0 K/UL — SIGNIFICANT CHANGE UP (ref 0–0.2)
BASOPHILS NFR BLD AUTO: 0.2 % — SIGNIFICANT CHANGE UP (ref 0–2)
BILIRUB SERPL-MCNC: 0.7 MG/DL — SIGNIFICANT CHANGE UP (ref 0.2–1.2)
BUN SERPL-MCNC: 34 MG/DL — HIGH (ref 7–23)
CA-I SERPL-SCNC: 1.18 MMOL/L — SIGNIFICANT CHANGE UP (ref 1.12–1.3)
CALCIUM SERPL-MCNC: 9.4 MG/DL — SIGNIFICANT CHANGE UP (ref 8.4–10.5)
CHLORIDE BLDV-SCNC: 107 MMOL/L — SIGNIFICANT CHANGE UP (ref 96–108)
CHLORIDE SERPL-SCNC: 103 MMOL/L — SIGNIFICANT CHANGE UP (ref 96–108)
CK MB BLD-MCNC: 3.3 % — SIGNIFICANT CHANGE UP (ref 0–3.5)
CK MB CFR SERPL CALC: 4 NG/ML — SIGNIFICANT CHANGE UP (ref 0–6.7)
CK SERPL-CCNC: 121 U/L — SIGNIFICANT CHANGE UP (ref 30–200)
CO2 BLDV-SCNC: 27 MMOL/L — SIGNIFICANT CHANGE UP (ref 22–30)
CO2 SERPL-SCNC: 21 MMOL/L — LOW (ref 22–31)
CREAT SERPL-MCNC: 1.62 MG/DL — HIGH (ref 0.5–1.3)
EOSINOPHIL # BLD AUTO: 0.2 K/UL — SIGNIFICANT CHANGE UP (ref 0–0.5)
EOSINOPHIL NFR BLD AUTO: 3 % — SIGNIFICANT CHANGE UP (ref 0–6)
GAS PNL BLDV: 134 MMOL/L — LOW (ref 136–145)
GAS PNL BLDV: SIGNIFICANT CHANGE UP
GAS PNL BLDV: SIGNIFICANT CHANGE UP
GLUCOSE BLDC GLUCOMTR-MCNC: 236 MG/DL — HIGH (ref 70–99)
GLUCOSE BLDC GLUCOMTR-MCNC: 356 MG/DL — HIGH (ref 70–99)
GLUCOSE BLDV-MCNC: 298 MG/DL — HIGH (ref 70–99)
GLUCOSE SERPL-MCNC: 302 MG/DL — HIGH (ref 70–99)
HCO3 BLDV-SCNC: 26 MMOL/L — SIGNIFICANT CHANGE UP (ref 21–29)
HCT VFR BLD CALC: 37.5 % — LOW (ref 39–50)
HCT VFR BLDA CALC: 41 % — SIGNIFICANT CHANGE UP (ref 39–50)
HGB BLD CALC-MCNC: 13.2 G/DL — SIGNIFICANT CHANGE UP (ref 13–17)
HGB BLD-MCNC: 12.9 G/DL — LOW (ref 13–17)
INR BLD: 1.16 RATIO — SIGNIFICANT CHANGE UP (ref 0.88–1.16)
LACTATE BLDV-MCNC: 1.9 MMOL/L — SIGNIFICANT CHANGE UP (ref 0.7–2)
LYMPHOCYTES # BLD AUTO: 1.2 K/UL — SIGNIFICANT CHANGE UP (ref 1–3.3)
LYMPHOCYTES # BLD AUTO: 15.8 % — SIGNIFICANT CHANGE UP (ref 13–44)
MAGNESIUM SERPL-MCNC: 1.6 MG/DL — SIGNIFICANT CHANGE UP (ref 1.6–2.6)
MCHC RBC-ENTMCNC: 33.6 PG — SIGNIFICANT CHANGE UP (ref 27–34)
MCHC RBC-ENTMCNC: 34.4 GM/DL — SIGNIFICANT CHANGE UP (ref 32–36)
MCV RBC AUTO: 97.4 FL — SIGNIFICANT CHANGE UP (ref 80–100)
MONOCYTES # BLD AUTO: 0.7 K/UL — SIGNIFICANT CHANGE UP (ref 0–0.9)
MONOCYTES NFR BLD AUTO: 8.6 % — SIGNIFICANT CHANGE UP (ref 2–14)
NEUTROPHILS # BLD AUTO: 5.6 K/UL — SIGNIFICANT CHANGE UP (ref 1.8–7.4)
NEUTROPHILS NFR BLD AUTO: 72.4 % — SIGNIFICANT CHANGE UP (ref 43–77)
NT-PROBNP SERPL-SCNC: 448 PG/ML — HIGH (ref 0–300)
PCO2 BLDV: 37 MMHG — SIGNIFICANT CHANGE UP (ref 35–50)
PH BLDV: 7.46 — HIGH (ref 7.35–7.45)
PHOSPHATE SERPL-MCNC: 2.1 MG/DL — LOW (ref 2.5–4.5)
PLATELET # BLD AUTO: 132 K/UL — LOW (ref 150–400)
PO2 BLDV: 24 MMHG — LOW (ref 25–45)
POTASSIUM BLDV-SCNC: 4.5 MMOL/L — SIGNIFICANT CHANGE UP (ref 3.5–5)
POTASSIUM SERPL-MCNC: 4.6 MMOL/L — SIGNIFICANT CHANGE UP (ref 3.5–5.3)
POTASSIUM SERPL-SCNC: 4.6 MMOL/L — SIGNIFICANT CHANGE UP (ref 3.5–5.3)
PROT SERPL-MCNC: 6.7 G/DL — SIGNIFICANT CHANGE UP (ref 6–8.3)
PROTHROM AB SERPL-ACNC: 12.6 SEC — SIGNIFICANT CHANGE UP (ref 9.8–12.7)
RBC # BLD: 3.85 M/UL — LOW (ref 4.2–5.8)
RBC # FLD: 11.9 % — SIGNIFICANT CHANGE UP (ref 10.3–14.5)
SAO2 % BLDV: 38 % — LOW (ref 67–88)
SODIUM SERPL-SCNC: 138 MMOL/L — SIGNIFICANT CHANGE UP (ref 135–145)
TROPONIN T SERPL-MCNC: <0.01 NG/ML — SIGNIFICANT CHANGE UP (ref 0–0.06)
WBC # BLD: 7.7 K/UL — SIGNIFICANT CHANGE UP (ref 3.8–10.5)
WBC # FLD AUTO: 7.7 K/UL — SIGNIFICANT CHANGE UP (ref 3.8–10.5)

## 2017-11-13 PROCEDURE — 99291 CRITICAL CARE FIRST HOUR: CPT | Mod: 25,GC

## 2017-11-13 PROCEDURE — 93010 ELECTROCARDIOGRAM REPORT: CPT

## 2017-11-13 PROCEDURE — 71010: CPT | Mod: 26

## 2017-11-13 RX ORDER — CLOPIDOGREL BISULFATE 75 MG/1
75 TABLET, FILM COATED ORAL DAILY
Qty: 0 | Refills: 0 | Status: DISCONTINUED | OUTPATIENT
Start: 2017-11-13 | End: 2017-11-16

## 2017-11-13 RX ORDER — DEXTROSE 50 % IN WATER 50 %
1 SYRINGE (ML) INTRAVENOUS ONCE
Qty: 0 | Refills: 0 | Status: DISCONTINUED | OUTPATIENT
Start: 2017-11-13 | End: 2017-11-16

## 2017-11-13 RX ORDER — DEXTROSE 50 % IN WATER 50 %
25 SYRINGE (ML) INTRAVENOUS ONCE
Qty: 0 | Refills: 0 | Status: DISCONTINUED | OUTPATIENT
Start: 2017-11-13 | End: 2017-11-16

## 2017-11-13 RX ORDER — INSULIN LISPRO 100/ML
VIAL (ML) SUBCUTANEOUS
Qty: 0 | Refills: 0 | Status: DISCONTINUED | OUTPATIENT
Start: 2017-11-13 | End: 2017-11-16

## 2017-11-13 RX ORDER — VALSARTAN 80 MG/1
80 TABLET ORAL DAILY
Qty: 0 | Refills: 0 | Status: DISCONTINUED | OUTPATIENT
Start: 2017-11-13 | End: 2017-11-16

## 2017-11-13 RX ORDER — INSULIN LISPRO 100/ML
VIAL (ML) SUBCUTANEOUS AT BEDTIME
Qty: 0 | Refills: 0 | Status: DISCONTINUED | OUTPATIENT
Start: 2017-11-13 | End: 2017-11-16

## 2017-11-13 RX ORDER — SODIUM CHLORIDE 9 MG/ML
3 INJECTION INTRAMUSCULAR; INTRAVENOUS; SUBCUTANEOUS ONCE
Qty: 0 | Refills: 0 | Status: COMPLETED | OUTPATIENT
Start: 2017-11-13 | End: 2017-11-13

## 2017-11-13 RX ORDER — ISOSORBIDE MONONITRATE 60 MG/1
30 TABLET, EXTENDED RELEASE ORAL DAILY
Qty: 0 | Refills: 0 | Status: DISCONTINUED | OUTPATIENT
Start: 2017-11-13 | End: 2017-11-16

## 2017-11-13 RX ORDER — INSULIN LISPRO 100/ML
5 VIAL (ML) SUBCUTANEOUS
Qty: 0 | Refills: 0 | Status: DISCONTINUED | OUTPATIENT
Start: 2017-11-13 | End: 2017-11-16

## 2017-11-13 RX ORDER — CARVEDILOL PHOSPHATE 80 MG/1
25 CAPSULE, EXTENDED RELEASE ORAL EVERY 12 HOURS
Qty: 0 | Refills: 0 | Status: DISCONTINUED | OUTPATIENT
Start: 2017-11-13 | End: 2017-11-14

## 2017-11-13 RX ORDER — ASPIRIN/CALCIUM CARB/MAGNESIUM 324 MG
162 TABLET ORAL ONCE
Qty: 0 | Refills: 0 | Status: COMPLETED | OUTPATIENT
Start: 2017-11-13 | End: 2017-11-13

## 2017-11-13 RX ORDER — ADENOSINE 3 MG/ML
6 INJECTION INTRAVENOUS ONCE
Qty: 0 | Refills: 0 | Status: COMPLETED | OUTPATIENT
Start: 2017-11-13 | End: 2017-11-13

## 2017-11-13 RX ORDER — ASPIRIN/CALCIUM CARB/MAGNESIUM 324 MG
325 TABLET ORAL DAILY
Qty: 0 | Refills: 0 | Status: DISCONTINUED | OUTPATIENT
Start: 2017-11-13 | End: 2017-11-16

## 2017-11-13 RX ORDER — SODIUM CHLORIDE 9 MG/ML
1000 INJECTION, SOLUTION INTRAVENOUS
Qty: 0 | Refills: 0 | Status: DISCONTINUED | OUTPATIENT
Start: 2017-11-13 | End: 2017-11-16

## 2017-11-13 RX ORDER — RANOLAZINE 500 MG/1
500 TABLET, FILM COATED, EXTENDED RELEASE ORAL
Qty: 0 | Refills: 0 | Status: DISCONTINUED | OUTPATIENT
Start: 2017-11-13 | End: 2017-11-16

## 2017-11-13 RX ORDER — MORPHINE SULFATE 50 MG/1
4 CAPSULE, EXTENDED RELEASE ORAL ONCE
Qty: 0 | Refills: 0 | Status: DISCONTINUED | OUTPATIENT
Start: 2017-11-13 | End: 2017-11-13

## 2017-11-13 RX ORDER — ATORVASTATIN CALCIUM 80 MG/1
80 TABLET, FILM COATED ORAL AT BEDTIME
Qty: 0 | Refills: 0 | Status: DISCONTINUED | OUTPATIENT
Start: 2017-11-13 | End: 2017-11-16

## 2017-11-13 RX ORDER — INSULIN GLARGINE 100 [IU]/ML
25 INJECTION, SOLUTION SUBCUTANEOUS AT BEDTIME
Qty: 0 | Refills: 0 | Status: DISCONTINUED | OUTPATIENT
Start: 2017-11-13 | End: 2017-11-16

## 2017-11-13 RX ORDER — SODIUM CHLORIDE 9 MG/ML
1000 INJECTION INTRAMUSCULAR; INTRAVENOUS; SUBCUTANEOUS ONCE
Qty: 0 | Refills: 0 | Status: COMPLETED | OUTPATIENT
Start: 2017-11-13 | End: 2017-11-13

## 2017-11-13 RX ORDER — GLUCAGON INJECTION, SOLUTION 0.5 MG/.1ML
1 INJECTION, SOLUTION SUBCUTANEOUS ONCE
Qty: 0 | Refills: 0 | Status: DISCONTINUED | OUTPATIENT
Start: 2017-11-13 | End: 2017-11-16

## 2017-11-13 RX ORDER — DEXTROSE 50 % IN WATER 50 %
12.5 SYRINGE (ML) INTRAVENOUS ONCE
Qty: 0 | Refills: 0 | Status: DISCONTINUED | OUTPATIENT
Start: 2017-11-13 | End: 2017-11-16

## 2017-11-13 RX ORDER — HEPARIN SODIUM 5000 [USP'U]/ML
5000 INJECTION INTRAVENOUS; SUBCUTANEOUS EVERY 8 HOURS
Qty: 0 | Refills: 0 | Status: DISCONTINUED | OUTPATIENT
Start: 2017-11-13 | End: 2017-11-14

## 2017-11-13 RX ORDER — ALLOPURINOL 300 MG
300 TABLET ORAL DAILY
Qty: 0 | Refills: 0 | Status: DISCONTINUED | OUTPATIENT
Start: 2017-11-13 | End: 2017-11-16

## 2017-11-13 RX ORDER — AMLODIPINE BESYLATE 2.5 MG/1
10 TABLET ORAL DAILY
Qty: 0 | Refills: 0 | Status: DISCONTINUED | OUTPATIENT
Start: 2017-11-13 | End: 2017-11-13

## 2017-11-13 RX ADMIN — SODIUM CHLORIDE 1000 MILLILITER(S): 9 INJECTION INTRAMUSCULAR; INTRAVENOUS; SUBCUTANEOUS at 12:50

## 2017-11-13 RX ADMIN — ATORVASTATIN CALCIUM 80 MILLIGRAM(S): 80 TABLET, FILM COATED ORAL at 22:23

## 2017-11-13 RX ADMIN — ADENOSINE 6 MILLIGRAM(S): 3 INJECTION INTRAVENOUS at 12:52

## 2017-11-13 RX ADMIN — Medication 162 MILLIGRAM(S): at 14:26

## 2017-11-13 RX ADMIN — SODIUM CHLORIDE 3 MILLILITER(S): 9 INJECTION INTRAMUSCULAR; INTRAVENOUS; SUBCUTANEOUS at 14:28

## 2017-11-13 RX ADMIN — INSULIN GLARGINE 25 UNIT(S): 100 INJECTION, SOLUTION SUBCUTANEOUS at 23:07

## 2017-11-13 RX ADMIN — HEPARIN SODIUM 5000 UNIT(S): 5000 INJECTION INTRAVENOUS; SUBCUTANEOUS at 22:22

## 2017-11-13 NOTE — ED PROVIDER NOTE - CARE PLAN
Principal Discharge DX:	Unstable angina pectoris  Secondary Diagnosis:	SVT (supraventricular tachycardia)

## 2017-11-13 NOTE — H&P ADULT - ASSESSMENT
78 yo male h/o cad s/p pci and cabg, htn, dm, ckd, a/w SVT, chest pain, presyncope    pt s/p adenosine in ER    cards eval pending  tele monitor  r/o ACS  CE x3 q8  cont coreg 25 bid   echo  consider EP eval in AM    DM  check a1c  insulin as ordered    CKD  slighlty elevated from baseline(1.4)  gentle iv hydration  avoid nephrotoxins  monitor    htn  cont home bp meds  norvasc was stopped 2 weeks ago as per pt by cardiologist    dvt ppx  PT

## 2017-11-13 NOTE — ED PROVIDER NOTE - MEDICAL DECISION MAKING DETAILS
concerning for ACS with significant history, stable SVT responded to 6mg of adenosine, no ischemic EKG changes on repeat EKG. will speak to cardiologist regarding admission for further w/u. concerning for ACS with significant history, stable SVT responded to 6mg of adenosine, no ischemic EKG changes on repeat EKG. will speak to cardiologist regarding admission for further w/u.  Germán: Patient with SVT with cp. will get labs, ekg, adenosine given, patient in nsr. will admit for further eval given complicated cardiac history.

## 2017-11-13 NOTE — ED ADULT NURSE REASSESSMENT NOTE - NS ED NURSE REASSESS COMMENT FT1
Patient appears to be resting comfortably in stretcher; Patient denies chest pain, dizziness, n/v/d, SOB, numbness, tingling; a&ox3; safety and comfort measures provided; awaiting disposition; family at bedside

## 2017-11-13 NOTE — H&P ADULT - NSHPREVIEWOFSYSTEMS_GEN_ALL_CORE
Constitutional: No fever, fatigue or weight loss.  Skin: No rash.  Eyes: No recent vision problems or eye pain.  ENT: No congestion, ear pain, or sore throat.  Endocrine: No thyroid problems.  Cardiovascular: as per hpi  Respiratory: No cough, shortness of breath, congestion, or wheezing.  Gastrointestinal: No abdominal pain, nausea, vomiting, or diarrhea.  Genitourinary: No dysuria.  Musculoskeletal: No joint swelling.  Neurologic: No headache.
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
## 2017-11-13 NOTE — H&P ADULT - NSHPPHYSICALEXAM_GEN_ALL_CORE
Vital Signs Last 24 Hrs  T(C): 36.8 (13 Nov 2017 19:17), Max: 36.8 (13 Nov 2017 19:17)  T(F): 98.3 (13 Nov 2017 19:17), Max: 98.3 (13 Nov 2017 19:17)  HR: 72 (13 Nov 2017 19:17) (72 - 155)  BP: 122/70 (13 Nov 2017 19:17) (104/85 - 136/79)  BP(mean): --  RR: 19 (13 Nov 2017 19:17) (18 - 24)  SpO2: 98% (13 Nov 2017 19:17) (96% - 99%)    PHYSICAL EXAM:  GENERAL: NAD, well-developed  HEAD:  Atraumatic, Normocephalic  EYES: EOMI, PERRLA, conjunctiva and sclera clear  NECK: Supple, No JVD  CHEST/LUNG: Clear to auscultation bilaterally; No wheeze  HEART: Regular rate and rhythm; No murmurs, rubs, or gallops  ABDOMEN: Soft, Nontender, Nondistended; Bowel sounds present  EXTREMITIES:  2+ Peripheral Pulses, No clubbing, cyanosis, or edema  PSYCH: AAOx3  NEUROLOGY: non-focal  SKIN: No rashes or lesions

## 2017-11-13 NOTE — ED PROVIDER NOTE - FAMILY HISTORY
Father  Still living? Unknown  Family history of CABG, Age at diagnosis: Age Unknown  Family history of diabetes mellitus, Age at diagnosis: Age Unknown

## 2017-11-13 NOTE — ED ADULT NURSE REASSESSMENT NOTE - NS ED NURSE REASSESS COMMENT FT1
Patient appears to be resting comfortably in stretcher; Patient denies pain, dizziness, numbness, tingling, SOB; a&ox3; safety and comfort measures provided; awaiting admission per ED MD Dunlap

## 2017-11-13 NOTE — ED PROVIDER NOTE - PHYSICAL EXAMINATION
Gen: NAD, AOx3  Head: NCAT  HEENT: PERRL, oral mucosa moist, normal conjunctiva, neck supple  Lung: CTAB, no respiratory distress  CV: rrr, +systolic murmur, Normal perfusion  Abd: soft, NTND, obese  MSK: No edema, no visible deformities  Neuro: No focal neurologic deficits  Skin: No rash   Psych: normal affect

## 2017-11-13 NOTE — ED ADULT NURSE NOTE - OBJECTIVE STATEMENT
76 y/o male aox3 [place] [facility] after c/o sudden onset of chest pain. Pt took 2 81 mg asa and 2 sublingual nitroglycerin today.+extensive cardiac history. On arrival heart rate was 155 bpm. Cardiac monitor showed SVT. EKG performed. Blood pressure stable. No fever. No shortness of breath. No nausea, vomiting or dizziness. 18G iv in right ac patent, no signs of infiltration. +20g left hand placed by EMS. 76 y/o male aox3 [place] after c/o sudden onset of chest pain. Pt took 2 81 mg asa and 2 sublingual nitroglycerin today.+extensive cardiac history. On arrival heart rate was 155 bpm. Cardiac monitor showed SVT. EKG performed. Blood pressure stable. No fever. No shortness of breath. No nausea, vomiting or dizziness. + abdominal distention due to hernia, no abdominal discoloration. 18G iv in right ac patent, no signs of infiltration. +20g left hand placed by EMS. No lower leg edema. Pt is ambulatory, however at this time pt is a fall risk for safety.

## 2017-11-13 NOTE — ED PROVIDER NOTE - NS ED ROS FT
+no CP/SOB. no cough. no fever. no n/v/d/c. no abd pain. no rash. no bleeding. no urinary complaints. no weakness. no vision changes. no HA. no neck/back pain. no extremity swelling/deformity. No change in mental status.

## 2017-11-14 DIAGNOSIS — N18.3 CHRONIC KIDNEY DISEASE, STAGE 3 (MODERATE): ICD-10-CM

## 2017-11-14 DIAGNOSIS — E11.29 TYPE 2 DIABETES MELLITUS WITH OTHER DIABETIC KIDNEY COMPLICATION: ICD-10-CM

## 2017-11-14 DIAGNOSIS — I25.10 ATHEROSCLEROTIC HEART DISEASE OF NATIVE CORONARY ARTERY WITHOUT ANGINA PECTORIS: ICD-10-CM

## 2017-11-14 DIAGNOSIS — I10 ESSENTIAL (PRIMARY) HYPERTENSION: ICD-10-CM

## 2017-11-14 LAB
ALBUMIN SERPL ELPH-MCNC: 3.9 G/DL — SIGNIFICANT CHANGE UP (ref 3.3–5)
ALP SERPL-CCNC: 96 U/L — SIGNIFICANT CHANGE UP (ref 40–120)
ALT FLD-CCNC: 34 U/L RC — SIGNIFICANT CHANGE UP (ref 10–45)
ANION GAP SERPL CALC-SCNC: 13 MMOL/L — SIGNIFICANT CHANGE UP (ref 5–17)
APTT BLD: 71.6 SEC — HIGH (ref 27.5–37.4)
AST SERPL-CCNC: 34 U/L — SIGNIFICANT CHANGE UP (ref 10–40)
BILIRUB SERPL-MCNC: 0.6 MG/DL — SIGNIFICANT CHANGE UP (ref 0.2–1.2)
BUN SERPL-MCNC: 23 MG/DL — SIGNIFICANT CHANGE UP (ref 7–23)
CALCIUM SERPL-MCNC: 9.2 MG/DL — SIGNIFICANT CHANGE UP (ref 8.4–10.5)
CHLORIDE SERPL-SCNC: 107 MMOL/L — SIGNIFICANT CHANGE UP (ref 96–108)
CK MB BLD-MCNC: 5.5 % — HIGH (ref 0–3.5)
CK MB BLD-MCNC: 7 % — HIGH (ref 0–3.5)
CK MB CFR SERPL CALC: 16.3 NG/ML — HIGH (ref 0–6.7)
CK MB CFR SERPL CALC: 18.6 NG/ML — HIGH (ref 0–6.7)
CK SERPL-CCNC: 265 U/L — HIGH (ref 30–200)
CK SERPL-CCNC: 294 U/L — HIGH (ref 30–200)
CO2 SERPL-SCNC: 23 MMOL/L — SIGNIFICANT CHANGE UP (ref 22–31)
CREAT SERPL-MCNC: 1.27 MG/DL — SIGNIFICANT CHANGE UP (ref 0.5–1.3)
GLUCOSE BLDC GLUCOMTR-MCNC: 130 MG/DL — HIGH (ref 70–99)
GLUCOSE BLDC GLUCOMTR-MCNC: 153 MG/DL — HIGH (ref 70–99)
GLUCOSE BLDC GLUCOMTR-MCNC: 155 MG/DL — HIGH (ref 70–99)
GLUCOSE BLDC GLUCOMTR-MCNC: 168 MG/DL — HIGH (ref 70–99)
GLUCOSE SERPL-MCNC: 129 MG/DL — HIGH (ref 70–99)
HBA1C BLD-MCNC: 9.3 % — HIGH (ref 4–5.6)
HCT VFR BLD CALC: 37.3 % — LOW (ref 39–50)
HCT VFR BLD CALC: 37.9 % — LOW (ref 39–50)
HGB BLD-MCNC: 12.6 G/DL — LOW (ref 13–17)
HGB BLD-MCNC: 12.9 G/DL — LOW (ref 13–17)
MAGNESIUM SERPL-MCNC: 1.8 MG/DL — SIGNIFICANT CHANGE UP (ref 1.6–2.6)
MCHC RBC-ENTMCNC: 32.8 PG — SIGNIFICANT CHANGE UP (ref 27–34)
MCHC RBC-ENTMCNC: 33.2 GM/DL — SIGNIFICANT CHANGE UP (ref 32–36)
MCHC RBC-ENTMCNC: 34.1 PG — HIGH (ref 27–34)
MCHC RBC-ENTMCNC: 34.6 GM/DL — SIGNIFICANT CHANGE UP (ref 32–36)
MCV RBC AUTO: 98.6 FL — SIGNIFICANT CHANGE UP (ref 80–100)
MCV RBC AUTO: 98.7 FL — SIGNIFICANT CHANGE UP (ref 80–100)
PHOSPHATE SERPL-MCNC: 3.2 MG/DL — SIGNIFICANT CHANGE UP (ref 2.5–4.5)
PLATELET # BLD AUTO: 114 K/UL — LOW (ref 150–400)
PLATELET # BLD AUTO: 117 K/UL — LOW (ref 150–400)
POTASSIUM SERPL-MCNC: 3.9 MMOL/L — SIGNIFICANT CHANGE UP (ref 3.5–5.3)
POTASSIUM SERPL-SCNC: 3.9 MMOL/L — SIGNIFICANT CHANGE UP (ref 3.5–5.3)
PROT SERPL-MCNC: 6.3 G/DL — SIGNIFICANT CHANGE UP (ref 6–8.3)
RBC # BLD: 3.78 M/UL — LOW (ref 4.2–5.8)
RBC # BLD: 3.84 M/UL — LOW (ref 4.2–5.8)
RBC # FLD: 11.9 % — SIGNIFICANT CHANGE UP (ref 10.3–14.5)
RBC # FLD: 11.9 % — SIGNIFICANT CHANGE UP (ref 10.3–14.5)
SODIUM SERPL-SCNC: 143 MMOL/L — SIGNIFICANT CHANGE UP (ref 135–145)
TROPONIN T SERPL-MCNC: 0.24 NG/ML — HIGH (ref 0–0.06)
TROPONIN T SERPL-MCNC: 0.27 NG/ML — HIGH (ref 0–0.06)
WBC # BLD: 5.6 K/UL — SIGNIFICANT CHANGE UP (ref 3.8–10.5)
WBC # BLD: 5.8 K/UL — SIGNIFICANT CHANGE UP (ref 3.8–10.5)
WBC # FLD AUTO: 5.6 K/UL — SIGNIFICANT CHANGE UP (ref 3.8–10.5)
WBC # FLD AUTO: 5.8 K/UL — SIGNIFICANT CHANGE UP (ref 3.8–10.5)

## 2017-11-14 PROCEDURE — 93010 ELECTROCARDIOGRAM REPORT: CPT

## 2017-11-14 PROCEDURE — 93455 CORONARY ART/GRFT ANGIO S&I: CPT | Mod: 26

## 2017-11-14 PROCEDURE — 99223 1ST HOSP IP/OBS HIGH 75: CPT

## 2017-11-14 PROCEDURE — 99223 1ST HOSP IP/OBS HIGH 75: CPT | Mod: GC

## 2017-11-14 PROCEDURE — 93306 TTE W/DOPPLER COMPLETE: CPT | Mod: 26

## 2017-11-14 RX ORDER — HEPARIN SODIUM 5000 [USP'U]/ML
5000 INJECTION INTRAVENOUS; SUBCUTANEOUS EVERY 8 HOURS
Qty: 0 | Refills: 0 | Status: DISCONTINUED | OUTPATIENT
Start: 2017-11-14 | End: 2017-11-16

## 2017-11-14 RX ORDER — HEPARIN SODIUM 5000 [USP'U]/ML
5600 INJECTION INTRAVENOUS; SUBCUTANEOUS EVERY 6 HOURS
Qty: 0 | Refills: 0 | Status: DISCONTINUED | OUTPATIENT
Start: 2017-11-14 | End: 2017-11-14

## 2017-11-14 RX ORDER — HEPARIN SODIUM 5000 [USP'U]/ML
INJECTION INTRAVENOUS; SUBCUTANEOUS
Qty: 25000 | Refills: 0 | Status: DISCONTINUED | OUTPATIENT
Start: 2017-11-14 | End: 2017-11-14

## 2017-11-14 RX ORDER — CARVEDILOL PHOSPHATE 80 MG/1
25 CAPSULE, EXTENDED RELEASE ORAL ONCE
Qty: 0 | Refills: 0 | Status: COMPLETED | OUTPATIENT
Start: 2017-11-14 | End: 2017-11-14

## 2017-11-14 RX ADMIN — HEPARIN SODIUM 1000 UNIT(S)/HR: 5000 INJECTION INTRAVENOUS; SUBCUTANEOUS at 01:56

## 2017-11-14 RX ADMIN — Medication 5 UNIT(S): at 09:11

## 2017-11-14 RX ADMIN — HEPARIN SODIUM 1000 UNIT(S)/HR: 5000 INJECTION INTRAVENOUS; SUBCUTANEOUS at 10:09

## 2017-11-14 RX ADMIN — RANOLAZINE 500 MILLIGRAM(S): 500 TABLET, FILM COATED, EXTENDED RELEASE ORAL at 05:38

## 2017-11-14 RX ADMIN — ISOSORBIDE MONONITRATE 30 MILLIGRAM(S): 60 TABLET, EXTENDED RELEASE ORAL at 10:16

## 2017-11-14 RX ADMIN — RANOLAZINE 500 MILLIGRAM(S): 500 TABLET, FILM COATED, EXTENDED RELEASE ORAL at 17:47

## 2017-11-14 RX ADMIN — VALSARTAN 80 MILLIGRAM(S): 80 TABLET ORAL at 05:38

## 2017-11-14 RX ADMIN — CLOPIDOGREL BISULFATE 75 MILLIGRAM(S): 75 TABLET, FILM COATED ORAL at 10:15

## 2017-11-14 RX ADMIN — CARVEDILOL PHOSPHATE 25 MILLIGRAM(S): 80 CAPSULE, EXTENDED RELEASE ORAL at 05:38

## 2017-11-14 RX ADMIN — CARVEDILOL PHOSPHATE 25 MILLIGRAM(S): 80 CAPSULE, EXTENDED RELEASE ORAL at 17:46

## 2017-11-14 RX ADMIN — INSULIN GLARGINE 25 UNIT(S): 100 INJECTION, SOLUTION SUBCUTANEOUS at 21:45

## 2017-11-14 RX ADMIN — ATORVASTATIN CALCIUM 80 MILLIGRAM(S): 80 TABLET, FILM COATED ORAL at 21:45

## 2017-11-14 RX ADMIN — Medication 1: at 18:12

## 2017-11-14 RX ADMIN — Medication 5 UNIT(S): at 18:12

## 2017-11-14 RX ADMIN — Medication 5 UNIT(S): at 13:42

## 2017-11-14 RX ADMIN — HEPARIN SODIUM 5000 UNIT(S): 5000 INJECTION INTRAVENOUS; SUBCUTANEOUS at 21:45

## 2017-11-14 RX ADMIN — Medication 1: at 09:10

## 2017-11-14 RX ADMIN — Medication 300 MILLIGRAM(S): at 10:17

## 2017-11-14 RX ADMIN — Medication 1: at 13:44

## 2017-11-14 RX ADMIN — Medication 325 MILLIGRAM(S): at 10:16

## 2017-11-14 NOTE — PROGRESS NOTE ADULT - ASSESSMENT
76 yo male h/o cad s/p pci and cabg, htn, dm, ckd, a/w SVT, chest pain, presyncope  pt s/p adenosine in ER    pt with NSTEMI given +CE  cards eval noted  s/p cath with no intervention  EP f/u  possible ablation in am  echo pending  npo after midnight  tele monitor    DM  uncontrolled a1c 9.3  insulin as ordered  endo f/u    CKD  slighlty elevated from baseline(1.4)  gentle iv hydration  avoid nephrotoxins  monitor    htn  cont home bp meds  norvasc was stopped 2 weeks ago as per pt by cardiologist    dvt ppx  PT
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
## 2017-11-14 NOTE — CONSULT NOTE ADULT - ASSESSMENT
77 year-old gentleman with known CAD status post CABG, multiple PCI, now presents with sudden onset chest pain and presyncope in the setting of SVT (short RP tachycardia) that responded to adenosine.  Positive cardiac biomarkers overnight consistent with NSTEMI.    Plan for left heart cath today.  Check TTE.  EP evaluation requested.    Continue heparin gtt for ACS.  Continue DAPT, high intensity statin, beta-blocker, ARB
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
## 2017-11-14 NOTE — CONSULT NOTE ADULT - ASSESSMENT
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    -- proceed with [facility]  -- TTE  -- continue coreg  -- continue tele  -- replete K>4, Mg>2  -- ACS treatment as per cardiology    Will discuss with Dr. 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    -- proceed with [facility]  -- TTE  -- continue coreg  -- continue tele  -- replete K>4, Mg>2  -- ACS treatment as per cardiology    Will discuss with Dr. 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    -- NPO p MN tonight  -- hold coreg tomorrow  -- proceed with [facility]  -- TTE  -- continue tele  -- replete K>4, Mg>2  -- ACS treatment as per cardiology    Will discuss with Dr. Malcolm Sutton MD
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
## 2017-11-14 NOTE — CONSULT NOTE ADULT - ASSESSMENT
Assessment  DMT2: 77y Male with DM T2 with hyperglycemia on insulin, blood sugars improving , no hypoglycemia,  eating meals,  non compliant with low carb diet.  HTN: Controlled, On med.  HLD:  On Tx.  CKD: Stable  CAD: On medications, monitored.

## 2017-11-15 ENCOUNTER — TRANSCRIPTION ENCOUNTER (OUTPATIENT)
Age: 77
End: 2017-11-15

## 2017-11-15 LAB
ANION GAP SERPL CALC-SCNC: 12 MMOL/L — SIGNIFICANT CHANGE UP (ref 5–17)
BUN SERPL-MCNC: 22 MG/DL — SIGNIFICANT CHANGE UP (ref 7–23)
CALCIUM SERPL-MCNC: 9.4 MG/DL — SIGNIFICANT CHANGE UP (ref 8.4–10.5)
CHLORIDE SERPL-SCNC: 108 MMOL/L — SIGNIFICANT CHANGE UP (ref 96–108)
CO2 SERPL-SCNC: 23 MMOL/L — SIGNIFICANT CHANGE UP (ref 22–31)
CREAT SERPL-MCNC: 1.21 MG/DL — SIGNIFICANT CHANGE UP (ref 0.5–1.3)
GLUCOSE BLDC GLUCOMTR-MCNC: 104 MG/DL — HIGH (ref 70–99)
GLUCOSE BLDC GLUCOMTR-MCNC: 125 MG/DL — HIGH (ref 70–99)
GLUCOSE BLDC GLUCOMTR-MCNC: 127 MG/DL — HIGH (ref 70–99)
GLUCOSE BLDC GLUCOMTR-MCNC: 151 MG/DL — HIGH (ref 70–99)
GLUCOSE SERPL-MCNC: 98 MG/DL — SIGNIFICANT CHANGE UP (ref 70–99)
HCT VFR BLD CALC: 37 % — LOW (ref 39–50)
HGB BLD-MCNC: 13 G/DL — SIGNIFICANT CHANGE UP (ref 13–17)
MCHC RBC-ENTMCNC: 34.5 PG — HIGH (ref 27–34)
MCHC RBC-ENTMCNC: 35.2 GM/DL — SIGNIFICANT CHANGE UP (ref 32–36)
MCV RBC AUTO: 98.1 FL — SIGNIFICANT CHANGE UP (ref 80–100)
PLATELET # BLD AUTO: 117 K/UL — LOW (ref 150–400)
POTASSIUM SERPL-MCNC: 3.6 MMOL/L — SIGNIFICANT CHANGE UP (ref 3.5–5.3)
POTASSIUM SERPL-SCNC: 3.6 MMOL/L — SIGNIFICANT CHANGE UP (ref 3.5–5.3)
RBC # BLD: 3.77 M/UL — LOW (ref 4.2–5.8)
RBC # FLD: 11.8 % — SIGNIFICANT CHANGE UP (ref 10.3–14.5)
SODIUM SERPL-SCNC: 143 MMOL/L — SIGNIFICANT CHANGE UP (ref 135–145)
WBC # BLD: 5.8 K/UL — SIGNIFICANT CHANGE UP (ref 3.8–10.5)
WBC # FLD AUTO: 5.8 K/UL — SIGNIFICANT CHANGE UP (ref 3.8–10.5)

## 2017-11-15 PROCEDURE — 93653 COMPRE EP EVAL TX SVT: CPT

## 2017-11-15 PROCEDURE — 99233 SBSQ HOSP IP/OBS HIGH 50: CPT

## 2017-11-15 PROCEDURE — 93623 PRGRMD STIMJ&PACG IV RX NFS: CPT | Mod: 26

## 2017-11-15 PROCEDURE — 93621 COMP EP EVL L PAC&REC C SINS: CPT | Mod: 26

## 2017-11-15 PROCEDURE — 93613 INTRACARDIAC EPHYS 3D MAPG: CPT

## 2017-11-15 RX ORDER — POTASSIUM CHLORIDE 20 MEQ
20 PACKET (EA) ORAL ONCE
Qty: 0 | Refills: 0 | Status: COMPLETED | OUTPATIENT
Start: 2017-11-15 | End: 2017-11-15

## 2017-11-15 RX ADMIN — CLOPIDOGREL BISULFATE 75 MILLIGRAM(S): 75 TABLET, FILM COATED ORAL at 09:56

## 2017-11-15 RX ADMIN — Medication 20 MILLIEQUIVALENT(S): at 09:56

## 2017-11-15 RX ADMIN — HEPARIN SODIUM 5000 UNIT(S): 5000 INJECTION INTRAVENOUS; SUBCUTANEOUS at 21:57

## 2017-11-15 RX ADMIN — RANOLAZINE 500 MILLIGRAM(S): 500 TABLET, FILM COATED, EXTENDED RELEASE ORAL at 21:58

## 2017-11-15 RX ADMIN — ATORVASTATIN CALCIUM 80 MILLIGRAM(S): 80 TABLET, FILM COATED ORAL at 21:57

## 2017-11-15 RX ADMIN — HEPARIN SODIUM 5000 UNIT(S): 5000 INJECTION INTRAVENOUS; SUBCUTANEOUS at 05:58

## 2017-11-15 RX ADMIN — Medication 325 MILLIGRAM(S): at 09:56

## 2017-11-15 RX ADMIN — Medication 300 MILLIGRAM(S): at 09:56

## 2017-11-15 RX ADMIN — ISOSORBIDE MONONITRATE 30 MILLIGRAM(S): 60 TABLET, EXTENDED RELEASE ORAL at 09:56

## 2017-11-15 RX ADMIN — INSULIN GLARGINE 25 UNIT(S): 100 INJECTION, SOLUTION SUBCUTANEOUS at 21:57

## 2017-11-15 RX ADMIN — RANOLAZINE 500 MILLIGRAM(S): 500 TABLET, FILM COATED, EXTENDED RELEASE ORAL at 05:58

## 2017-11-15 RX ADMIN — VALSARTAN 80 MILLIGRAM(S): 80 TABLET ORAL at 05:57

## 2017-11-15 NOTE — PROGRESS NOTE ADULT - ASSESSMENT
77 year-old gentleman with known CAD status post CABG, multiple PCI, now presents with sudden onset chest pain and presyncope in the setting of SVT (short RP tachycardia) that responded to adenosine.  Positive cardiac biomarkers overnight consistent with NSTEMI.  Left heart cath without new coronary disease to explain arrhythmia.    EPS and ablation today.    Continue DAPT, high intensity statin, beta-blocker, ARB

## 2017-11-15 NOTE — PROGRESS NOTE ADULT - ASSESSMENT
78 yo male h/o cad s/p pci and cabg, htn, dm, ckd, a/w SVT, chest pain, presyncope  pt s/p adenosine in ER    pt with NSTEMI given +CE  cards f/u  s/p cath with no intervention  EP f/u  EPS/ablation today  echo noted  npo after midnight  tele monitor    DM  uncontrolled a1c 9.3  insulin as ordered  endo f/u    CKD  slighlty elevated from baseline(1.4)  gentle iv hydration  avoid nephrotoxins  monitor    htn  cont home bp meds  norvasc was stopped 2 weeks ago as per pt by cardiologist    dvt ppx  PT 78 yo male h/o cad s/p pci and cabg, htn, dm, ckd, a/w SVT, chest pain, presyncope  pt s/p adenosine in ER    pt with NSTEMI given +CE  cards f/u  s/p cath with no intervention  EP f/u  EPS/ablation today  echo noted  tele monitor    DM  uncontrolled a1c 9.3  insulin as ordered  endo f/u    CKD  slighlty elevated from baseline(1.4)  gentle iv hydration  avoid nephrotoxins  monitor    htn  cont home bp meds  norvasc was stopped 2 weeks ago as per pt by cardiologist    dvt ppx  PT

## 2017-11-15 NOTE — DISCHARGE NOTE ADULT - HOSPITAL COURSE
76 yo male h/o cad s/p pci and cabg, htn, dm, ckd, admited for CP and presyncope.  Pt was found to be in SVT in the EE responded well to adenosine. Ep consulted , recommended Ablation.  Pt had cardaic cath done on 11/14 minor luminal irregularities. 76 yo male h/o cad s/p pci and cabg, htn, dm, ckd, admited for CP and presyncope.  Pt was found to be in SVT in the EE responded well to adenosine. Ep consulted , recommended Ablation.  Pt underwent cardiac cath on 11/14 revealing minor luminal irregularities. 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 Cleared by EP, resumed home coreg, repleted electrolytes, and clinically stable to discharge home with Cardiology follow-up with Dr. Sarkar within the next 1-2 weeks.

## 2017-11-15 NOTE — DISCHARGE NOTE ADULT - MEDICATION SUMMARY - MEDICATIONS TO TAKE
I will START or STAY ON the medications listed below when I get home from the hospital:    tylenol pm  -- 2 tab(s) by mouth once a day (at bedtime)  -- Indication: For CAD (Coronary Artery Disease)    aspirin 325 mg oral delayed release tablet  -- 1 tab(s) by mouth once a day  -- Indication: For CAD (Coronary Artery Disease)    valsartan 80 mg oral tablet  -- 1 tab(s) by mouth once a day  -- Indication: For HTN (Hypertension)    ranolazine 500 mg oral tablet, extended release  -- 1 tab(s) by mouth 2 times a day  -- Indication: For Unstable angina pectoris    nitroglycerin 0.4 mg sublingual tablet  -- 1 tab(s) under tongue every 5 minutes, As Needed  -- Indication: For Unstable angina pectoris    isosorbide mononitrate 30 mg oral tablet, extended release  -- 1 tab(s) by mouth once a day  -- Indication: For Unstable angina pectoris    Onglyza 5 mg oral tablet  -- 1 tab(s) by mouth once a day  -- Indication: For Type 2 diabetes, uncontrolled, with renal manifestation    NovoLOG 100 units/mL subcutaneous solution  -- 10 unit(s) subcutaneous once a day  -- Indication: For Type 2 diabetes, uncontrolled, with renal manifestation    insulin glargine 100 units/mL subcutaneous solution  -- 35 unit(s) subcutaneous once a day (in the evening)  -- 1/6/2016 dose reduced pre cath to 15 units  -- Indication: For Type 2 diabetes, uncontrolled, with renal manifestation    allopurinol 300 mg oral tablet  -- 1 tab(s) by mouth once a day  -- Indication: For Gout    Lipitor 80 mg oral tablet  -- 1 tab(s) by mouth once a day  -- Indication: For CAD (Coronary Artery Disease)    clopidogrel 75 mg oral tablet  -- 1 tab(s) by mouth once a day  -- Indication: For CAD (Coronary Artery Disease)    carvedilol 25 mg oral tablet  -- 1 tab(s) by mouth 2 times a day  -- Indication: For SVT (supraventricular tachycardia)    potassium citrate 10 mEq oral tablet, extended release  -- 2 tab(s) by mouth 2 times a day  -- Indication: For Prophylactic measure    Centrum Silver Men's oral tablet  -- 1 tab(s) by mouth once a day  -- Indication: For Prophylactic measure

## 2017-11-15 NOTE — DISCHARGE NOTE ADULT - PATIENT PORTAL LINK FT
“You can access the [facility] Patient Portal, offered by [facility], by registering with the following website: [email or web address]”

## 2017-11-15 NOTE — DISCHARGE NOTE ADULT - SECONDARY DIAGNOSIS.
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

## 2017-11-15 NOTE — DISCHARGE NOTE ADULT - CARE PROVIDER_API CALL
Stuart Sarkar), Cardiovascular Disease; Internal Medicine  [address] [address]  Phone: [phone]  Fax: [phone]

## 2017-11-15 NOTE — DISCHARGE NOTE ADULT - PLAN OF CARE
To prevent recurrence -May resume your rate control medication Coreg  -Follow-up with your Cardiologist-Dr. Sarkar within 1 week HOME CARE INSTRUCTIONS  Only take over-the-counter and prescription medicines as directed by your caregiver.   Stay active or increase your exercise as directed by your caregiver.   Limit strenuous activity as directed by your caregiver.   Limit heavy lifting as directed by your caregiver.  Maintain a healthy weight.  Learn about and eat heart-healthy foods.   Do not smoke.   SEEK IMMEDIATE MEDICAL CARE IF:  You experience the following symptoms:  Chest, neck, deep shoulder, or arm pain or discomfort that lasts more than a few minutes.   Chest, neck, deep shoulder, or arm pain or discomfort that goes away and comes back, repeatedly.   Heavy sweating with discomfort, without a noticeable cause.   Shortness of breath or difficulty breathing.  Angina that does not get better after a few minutes of rest or after taking sublingual nitroglycerin.  These can all be symptoms of a heart attack, which is a medical emergency! Get medical help at once. Call your local emergency service 911 immediately. Do not  drive yourself to the hospital and do not  wait to for your symptoms to go away. HgA1C this admission is 9.3  Make sure you get your HgA1c checked every three months.  Check your blood glucose before meals and at bedtime.  It's important not to skip any meals.  Keep a log of your blood glucose results and always take it with you to your doctor appointments.  Keep a list of your current medications including injectables and over the counter medications and bring this medication list with you to all your doctor appointments.  If you have not seen your ophthalmologist this year call for appointment.  Check your feet daily for redness, sores, or openings. Do not self treat. If no improvement in two days call your primary care physician for an appointment.  Low blood sugar (hypoglycemia) is a blood sugar below 70mg/dl. Check your blood sugar if you feel signs/symptoms of hypoglycemia. If your blood sugar is below 70 take 15 grams of carbohydrates (ex 4 oz of apple juice, 3-4 glucose tablets, or 4-6 oz of regular soda) wait 15 minutes and repeat blood sugar to make sure it comes up above 70.  If your blood sugar is above 70 and you are due for a meal, have a meal.  If you are not due for a meal have a snack.  This snack helps keeps your blood sugar at a safe range. -Continue HOLDING Norvasc as directed by Cardiologist 2 weeks ago. Otherwise continue other medications as directed  Low salt diet  Activity as tolerated.  Take all medication as prescribed.  Follow up with your medical doctor for routine blood pressure monitoring at your next visit.  Notify your doctor if you have any of the following symptoms:   Dizziness, Lightheadedness, Blurry vision, Headache, Chest pain, Shortness of breath Avoid taking (NSAIDs) - (ex: Ibuprofen, Advil, Celebrex, Naprosyn)  Avoid taking any nephrotoxic agents (can harm kidneys) - Intravenous contrast for diagnostic testing, combination cold medications.  Have all medications adjusted for your renal function by your Health Care Provider.  Blood pressure control is important.  Take all medication as prescribed. -Continue your Allopurinol as directed Repleted. Continue your potassium supplements and consume diet high in potassium and magnesium

## 2017-11-15 NOTE — DISCHARGE NOTE ADULT - CARE PLAN
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 Sarkar within 1 week  Secondary Diagnosis:	Unstable angina pectoris  Instructions for follow-up, activity and diet:	HOME CARE INSTRUCTIONS  Only take over-the-counter and prescription medicines as directed by your caregiver.   Stay active or increase your exercise as directed by your caregiver.   Limit strenuous activity as directed by your caregiver.   Limit heavy lifting as directed by your caregiver.  Maintain a healthy weight.  Learn about and eat heart-healthy foods.   Do not smoke.   SEEK IMMEDIATE MEDICAL CARE IF:  You experience the following symptoms:  Chest, neck, deep shoulder, or arm pain or discomfort that lasts more than a few minutes.   Chest, neck, deep shoulder, or arm pain or discomfort that goes away and comes back, repeatedly.   Heavy sweating with discomfort, without a noticeable cause.   Shortness of breath or difficulty breathing.  Angina that does not get better after a few minutes of rest or after taking sublingual nitroglycerin.  These can all be symptoms of a heart attack, which is a medical emergency! Get medical help at once. Call your local emergency service 911 immediately. Do not  drive yourself to the hospital and do not  wait to for your symptoms to go away.  Secondary Diagnosis:	Uncontrolled type 2 diabetes mellitus with chronic kidney disease, with long-term current use of insulin, unspecified CKD stage  Instructions for follow-up, activity and diet:	HgA1C this admission is 9.3  Make sure you get your HgA1c checked every three months.  Check your blood glucose before meals and at bedtime.  It's important not to skip any meals.  Keep a log of your blood glucose results and always take it with you to your doctor appointments.  Keep a list of your current medications including injectables and over the counter medications and bring this medication list with you to all your doctor appointments.  If you have not seen your ophthalmologist this year call for appointment.  Check your feet daily for redness, sores, or openings. Do not self treat. If no improvement in two days call your primary care physician for an appointment.  Low blood sugar (hypoglycemia) is a blood sugar below 70mg/dl. Check your blood sugar if you feel signs/symptoms of hypoglycemia. If your blood sugar is below 70 take 15 grams of carbohydrates (ex 4 oz of apple juice, 3-4 glucose tablets, or 4-6 oz of regular soda) wait 15 minutes and repeat blood sugar to make sure it comes up above 70.  If your blood sugar is above 70 and you are due for a meal, have a meal.  If you are not due for a meal have a snack.  This snack helps keeps your blood sugar at a safe range.  Secondary Diagnosis:	Essential hypertension  Instructions for follow-up, activity and diet:	-Continue HOLDING Norvasc as directed by Cardiologist 2 weeks ago. Otherwise continue other medications as directed  Low salt diet  Activity as tolerated.  Take all medication as prescribed.  Follow up with your medical doctor for routine blood pressure monitoring at your next visit.  Notify your doctor if you have any of the following symptoms:   Dizziness, Lightheadedness, Blurry vision, Headache, Chest pain, Shortness of breath  Secondary Diagnosis:	CKD (chronic kidney disease) stage 3, GFR 30-59 ml/min  Instructions for follow-up, activity and diet:	Avoid taking (NSAIDs) - (ex: Ibuprofen, Advil, Celebrex, Naprosyn)  Avoid taking any nephrotoxic agents (can harm kidneys) - Intravenous contrast for diagnostic testing, combination cold medications.  Have all medications adjusted for your renal function by your Health Care Provider.  Blood pressure control is important.  Take all medication as prescribed.  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 Continue your potassium supplements and consume diet high in potassium and magnesium

## 2017-11-16 VITALS
TEMPERATURE: 99 F | SYSTOLIC BLOOD PRESSURE: 121 MMHG | DIASTOLIC BLOOD PRESSURE: 75 MMHG | RESPIRATION RATE: 19 BRPM | HEART RATE: 64 BPM | OXYGEN SATURATION: 95 %

## 2017-11-16 LAB
ANION GAP SERPL CALC-SCNC: 14 MMOL/L — SIGNIFICANT CHANGE UP (ref 5–17)
ANION GAP SERPL CALC-SCNC: 16 MMOL/L — SIGNIFICANT CHANGE UP (ref 5–17)
BUN SERPL-MCNC: 17 MG/DL — SIGNIFICANT CHANGE UP (ref 7–23)
BUN SERPL-MCNC: 18 MG/DL — SIGNIFICANT CHANGE UP (ref 7–23)
CALCIUM SERPL-MCNC: 8.9 MG/DL — SIGNIFICANT CHANGE UP (ref 8.4–10.5)
CALCIUM SERPL-MCNC: 9.1 MG/DL — SIGNIFICANT CHANGE UP (ref 8.4–10.5)
CHLORIDE SERPL-SCNC: 106 MMOL/L — SIGNIFICANT CHANGE UP (ref 96–108)
CHLORIDE SERPL-SCNC: 107 MMOL/L — SIGNIFICANT CHANGE UP (ref 96–108)
CO2 SERPL-SCNC: 18 MMOL/L — LOW (ref 22–31)
CO2 SERPL-SCNC: 21 MMOL/L — LOW (ref 22–31)
CREAT SERPL-MCNC: 1.22 MG/DL — SIGNIFICANT CHANGE UP (ref 0.5–1.3)
CREAT SERPL-MCNC: 1.22 MG/DL — SIGNIFICANT CHANGE UP (ref 0.5–1.3)
GLUCOSE BLDC GLUCOMTR-MCNC: 127 MG/DL — HIGH (ref 70–99)
GLUCOSE BLDC GLUCOMTR-MCNC: 130 MG/DL — HIGH (ref 70–99)
GLUCOSE SERPL-MCNC: 140 MG/DL — HIGH (ref 70–99)
GLUCOSE SERPL-MCNC: 199 MG/DL — HIGH (ref 70–99)
HCT VFR BLD CALC: 36.9 % — LOW (ref 39–50)
HGB BLD-MCNC: 13 G/DL — SIGNIFICANT CHANGE UP (ref 13–17)
MAGNESIUM SERPL-MCNC: 1.5 MG/DL — LOW (ref 1.6–2.6)
MAGNESIUM SERPL-MCNC: 2 MG/DL — SIGNIFICANT CHANGE UP (ref 1.6–2.6)
MCHC RBC-ENTMCNC: 34.5 PG — HIGH (ref 27–34)
MCHC RBC-ENTMCNC: 35.2 GM/DL — SIGNIFICANT CHANGE UP (ref 32–36)
MCV RBC AUTO: 98.1 FL — SIGNIFICANT CHANGE UP (ref 80–100)
PLATELET # BLD AUTO: 109 K/UL — LOW (ref 150–400)
POTASSIUM SERPL-MCNC: 3.8 MMOL/L — SIGNIFICANT CHANGE UP (ref 3.5–5.3)
POTASSIUM SERPL-MCNC: 4.1 MMOL/L — SIGNIFICANT CHANGE UP (ref 3.5–5.3)
POTASSIUM SERPL-SCNC: 3.8 MMOL/L — SIGNIFICANT CHANGE UP (ref 3.5–5.3)
POTASSIUM SERPL-SCNC: 4.1 MMOL/L — SIGNIFICANT CHANGE UP (ref 3.5–5.3)
RBC # BLD: 3.76 M/UL — LOW (ref 4.2–5.8)
RBC # FLD: 11.8 % — SIGNIFICANT CHANGE UP (ref 10.3–14.5)
SODIUM SERPL-SCNC: 140 MMOL/L — SIGNIFICANT CHANGE UP (ref 135–145)
SODIUM SERPL-SCNC: 142 MMOL/L — SIGNIFICANT CHANGE UP (ref 135–145)
WBC # BLD: 6.3 K/UL — SIGNIFICANT CHANGE UP (ref 3.8–10.5)
WBC # FLD AUTO: 6.3 K/UL — SIGNIFICANT CHANGE UP (ref 3.8–10.5)

## 2017-11-16 PROCEDURE — 99233 SBSQ HOSP IP/OBS HIGH 50: CPT

## 2017-11-16 PROCEDURE — 93010 ELECTROCARDIOGRAM REPORT: CPT

## 2017-11-16 RX ORDER — POTASSIUM CHLORIDE 20 MEQ
40 PACKET (EA) ORAL ONCE
Qty: 0 | Refills: 0 | Status: COMPLETED | OUTPATIENT
Start: 2017-11-16 | End: 2017-11-16

## 2017-11-16 RX ORDER — CARVEDILOL PHOSPHATE 80 MG/1
25 CAPSULE, EXTENDED RELEASE ORAL EVERY 12 HOURS
Qty: 0 | Refills: 0 | Status: DISCONTINUED | OUTPATIENT
Start: 2017-11-16 | End: 2017-11-16

## 2017-11-16 RX ORDER — MAGNESIUM SULFATE 500 MG/ML
2 VIAL (ML) INJECTION EVERY 4 HOURS
Qty: 0 | Refills: 0 | Status: DISCONTINUED | OUTPATIENT
Start: 2017-11-16 | End: 2017-11-16

## 2017-11-16 RX ADMIN — HEPARIN SODIUM 5000 UNIT(S): 5000 INJECTION INTRAVENOUS; SUBCUTANEOUS at 13:20

## 2017-11-16 RX ADMIN — Medication 300 MILLIGRAM(S): at 09:17

## 2017-11-16 RX ADMIN — HEPARIN SODIUM 5000 UNIT(S): 5000 INJECTION INTRAVENOUS; SUBCUTANEOUS at 05:51

## 2017-11-16 RX ADMIN — ISOSORBIDE MONONITRATE 30 MILLIGRAM(S): 60 TABLET, EXTENDED RELEASE ORAL at 09:17

## 2017-11-16 RX ADMIN — Medication 325 MILLIGRAM(S): at 09:17

## 2017-11-16 RX ADMIN — Medication 40 MILLIEQUIVALENT(S): at 11:38

## 2017-11-16 RX ADMIN — Medication 50 GRAM(S): at 11:38

## 2017-11-16 RX ADMIN — Medication 5 UNIT(S): at 09:18

## 2017-11-16 RX ADMIN — CLOPIDOGREL BISULFATE 75 MILLIGRAM(S): 75 TABLET, FILM COATED ORAL at 09:17

## 2017-11-16 RX ADMIN — VALSARTAN 80 MILLIGRAM(S): 80 TABLET ORAL at 05:51

## 2017-11-16 RX ADMIN — CARVEDILOL PHOSPHATE 25 MILLIGRAM(S): 80 CAPSULE, EXTENDED RELEASE ORAL at 11:38

## 2017-11-16 RX ADMIN — RANOLAZINE 500 MILLIGRAM(S): 500 TABLET, FILM COATED, EXTENDED RELEASE ORAL at 05:51

## 2017-11-16 RX ADMIN — Medication 5 UNIT(S): at 13:20

## 2017-11-16 NOTE — PROGRESS NOTE ADULT - PROVIDER SPECIALTY LIST ADULT
Cardiology
Cardiology
Electrophysiology
Internal Medicine
Endocrinology

## 2017-11-16 NOTE — PROGRESS NOTE ADULT - ASSESSMENT
76 yo male h/o cad s/p pci and cabg, htn, dm, ckd, a/w SVT, chest pain, presyncope  pt s/p adenosine in ER    pt with NSTEMI given +CE  cards f/u  s/p cath with no intervention  EP f/u  s/p EP ablation yesterday  echo noted  tele monitor  keep k>4, Mg>2    DM  uncontrolled a1c 9.3  insulin as ordered  endo f/u    CKD  slighlty elevated from baseline(1.4)  gentle iv hydration  avoid nephrotoxins  monitor    htn  cont home bp meds  norvasc was stopped 2 weeks ago as per pt by cardiologist    dvt ppx  PT  dc planning

## 2017-11-16 NOTE — PROGRESS NOTE ADULT - ASSESSMENT
77 year-old gentleman with known CAD status post CABG, multiple PCI, now presents with sudden onset chest pain and presyncope in the setting of SVT (short RP tachycardia) that responded to adenosine.  Positive cardiac biomarkers overnight consistent with NSTEMI.  Left heart cath without new coronary disease to explain arrhythmia.  EPS and ablation yesterday.    Continue DAPT, high intensity statin, beta-blocker, ARB  Discharge planning per primary team.  Outpatient follow-up with Stuart Sarkar MD.

## 2017-11-16 NOTE — PROGRESS NOTE ADULT - ASSESSMENT
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 Given poorly tolerated AVNRT, now s/p ablation    -- can resume home coreg  -- replete K>4, Mg>2  -- no further EP needs    Will discuss with Dr. Malcolm Sutton MD
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
## 2017-11-20 ENCOUNTER — MEDICATION RENEWAL (OUTPATIENT)
Age: 77
End: 2017-11-20

## 2017-12-05 ENCOUNTER — APPOINTMENT (OUTPATIENT)
Dept: CARDIOLOGY | Facility: CLINIC | Age: 77
End: 2017-12-05
Payer: MEDICARE

## 2017-12-05 ENCOUNTER — NON-APPOINTMENT (OUTPATIENT)
Age: 77
End: 2017-12-05

## 2017-12-05 VITALS
WEIGHT: 206 LBS | SYSTOLIC BLOOD PRESSURE: 150 MMHG | OXYGEN SATURATION: 96 % | DIASTOLIC BLOOD PRESSURE: 83 MMHG | HEART RATE: 81 BPM | HEIGHT: 70 IN | BODY MASS INDEX: 29.49 KG/M2

## 2017-12-05 PROCEDURE — 99215 OFFICE O/P EST HI 40 MIN: CPT

## 2017-12-05 PROCEDURE — 93000 ELECTROCARDIOGRAM COMPLETE: CPT

## 2017-12-06 ENCOUNTER — APPOINTMENT (OUTPATIENT)
Dept: ELECTROPHYSIOLOGY | Facility: CLINIC | Age: 77
End: 2017-12-06

## 2017-12-14 ENCOUNTER — APPOINTMENT (OUTPATIENT)
Dept: CARDIOLOGY | Facility: CLINIC | Age: 77
End: 2017-12-14

## 2017-12-18 ENCOUNTER — MEDICATION RENEWAL (OUTPATIENT)
Age: 77
End: 2017-12-18

## 2017-12-19 PROCEDURE — 71045 X-RAY EXAM CHEST 1 VIEW: CPT

## 2017-12-19 PROCEDURE — 84295 ASSAY OF SERUM SODIUM: CPT

## 2017-12-19 PROCEDURE — 93455 CORONARY ART/GRFT ANGIO S&I: CPT

## 2017-12-19 PROCEDURE — 85610 PROTHROMBIN TIME: CPT

## 2017-12-19 PROCEDURE — 82435 ASSAY OF BLOOD CHLORIDE: CPT

## 2017-12-19 PROCEDURE — 83735 ASSAY OF MAGNESIUM: CPT

## 2017-12-19 PROCEDURE — 82330 ASSAY OF CALCIUM: CPT

## 2017-12-19 PROCEDURE — 93621 COMP EP EVL L PAC&REC C SINS: CPT

## 2017-12-19 PROCEDURE — 96374 THER/PROPH/DIAG INJ IV PUSH: CPT

## 2017-12-19 PROCEDURE — 80048 BASIC METABOLIC PNL TOTAL CA: CPT

## 2017-12-19 PROCEDURE — C1769: CPT

## 2017-12-19 PROCEDURE — 84132 ASSAY OF SERUM POTASSIUM: CPT

## 2017-12-19 PROCEDURE — C1894: CPT

## 2017-12-19 PROCEDURE — 83036 HEMOGLOBIN GLYCOSYLATED A1C: CPT

## 2017-12-19 PROCEDURE — C1893: CPT

## 2017-12-19 PROCEDURE — C1887: CPT

## 2017-12-19 PROCEDURE — 85027 COMPLETE CBC AUTOMATED: CPT

## 2017-12-19 PROCEDURE — 80053 COMPREHEN METABOLIC PANEL: CPT

## 2017-12-19 PROCEDURE — 93306 TTE W/DOPPLER COMPLETE: CPT

## 2017-12-19 PROCEDURE — 84484 ASSAY OF TROPONIN QUANT: CPT

## 2017-12-19 PROCEDURE — 82803 BLOOD GASES ANY COMBINATION: CPT

## 2017-12-19 PROCEDURE — 93653 COMPRE EP EVAL TX SVT: CPT

## 2017-12-19 PROCEDURE — 85730 THROMBOPLASTIN TIME PARTIAL: CPT

## 2017-12-19 PROCEDURE — 93623 PRGRMD STIMJ&PACG IV RX NFS: CPT

## 2017-12-19 PROCEDURE — 84100 ASSAY OF PHOSPHORUS: CPT

## 2017-12-19 PROCEDURE — 82550 ASSAY OF CK (CPK): CPT

## 2017-12-19 PROCEDURE — 85014 HEMATOCRIT: CPT

## 2017-12-19 PROCEDURE — 93005 ELECTROCARDIOGRAM TRACING: CPT

## 2017-12-19 PROCEDURE — 82947 ASSAY GLUCOSE BLOOD QUANT: CPT

## 2017-12-19 PROCEDURE — 83880 ASSAY OF NATRIURETIC PEPTIDE: CPT

## 2017-12-19 PROCEDURE — 82553 CREATINE MB FRACTION: CPT

## 2017-12-19 PROCEDURE — 93613 INTRACARDIAC EPHYS 3D MAPG: CPT

## 2017-12-19 PROCEDURE — 83605 ASSAY OF LACTIC ACID: CPT

## 2017-12-19 PROCEDURE — 82962 GLUCOSE BLOOD TEST: CPT

## 2017-12-19 PROCEDURE — C1730: CPT

## 2017-12-19 PROCEDURE — 99291 CRITICAL CARE FIRST HOUR: CPT | Mod: 25

## 2017-12-21 ENCOUNTER — MEDICATION RENEWAL (OUTPATIENT)
Age: 77
End: 2017-12-21

## 2017-12-27 ENCOUNTER — TRANSCRIPTION ENCOUNTER (OUTPATIENT)
Age: 77
End: 2017-12-27

## 2017-12-27 ENCOUNTER — RX RENEWAL (OUTPATIENT)
Age: 77
End: 2017-12-27

## 2018-01-05 ENCOUNTER — APPOINTMENT (OUTPATIENT)
Dept: ORTHOPEDIC SURGERY | Facility: CLINIC | Age: 78
End: 2018-01-05

## 2018-01-24 ENCOUNTER — RX RENEWAL (OUTPATIENT)
Age: 78
End: 2018-01-24

## 2018-01-31 ENCOUNTER — RX RENEWAL (OUTPATIENT)
Age: 78
End: 2018-01-31

## 2018-02-25 ENCOUNTER — RX RENEWAL (OUTPATIENT)
Age: 78
End: 2018-02-25

## 2018-02-26 ENCOUNTER — TRANSCRIPTION ENCOUNTER (OUTPATIENT)
Age: 78
End: 2018-02-26

## 2018-06-18 ENCOUNTER — RX RENEWAL (OUTPATIENT)
Age: 78
End: 2018-06-18

## 2018-07-05 ENCOUNTER — APPOINTMENT (OUTPATIENT)
Dept: CARDIOLOGY | Facility: CLINIC | Age: 78
End: 2018-07-05
Payer: MEDICARE

## 2018-07-05 ENCOUNTER — MEDICATION RENEWAL (OUTPATIENT)
Age: 78
End: 2018-07-05

## 2018-07-05 ENCOUNTER — NON-APPOINTMENT (OUTPATIENT)
Age: 78
End: 2018-07-05

## 2018-07-05 VITALS
WEIGHT: 206 LBS | HEIGHT: 70 IN | OXYGEN SATURATION: 97 % | SYSTOLIC BLOOD PRESSURE: 130 MMHG | DIASTOLIC BLOOD PRESSURE: 70 MMHG | HEART RATE: 83 BPM | BODY MASS INDEX: 29.49 KG/M2

## 2018-07-05 PROCEDURE — 99215 OFFICE O/P EST HI 40 MIN: CPT

## 2018-07-05 PROCEDURE — 93000 ELECTROCARDIOGRAM COMPLETE: CPT

## 2018-07-07 LAB
ALBUMIN SERPL ELPH-MCNC: 4 G/DL
ALP BLD-CCNC: 87 U/L
ALT SERPL-CCNC: 28 U/L
ANION GAP SERPL CALC-SCNC: 15 MMOL/L
AST SERPL-CCNC: 21 U/L
BASOPHILS # BLD AUTO: 0.02 K/UL
BASOPHILS NFR BLD AUTO: 0.3 %
BILIRUB SERPL-MCNC: 0.5 MG/DL
BUN SERPL-MCNC: 29 MG/DL
CALCIUM SERPL-MCNC: 9.9 MG/DL
CHLORIDE SERPL-SCNC: 102 MMOL/L
CHOLEST SERPL-MCNC: 134 MG/DL
CHOLEST/HDLC SERPL: 4.3 RATIO
CO2 SERPL-SCNC: 26 MMOL/L
CREAT SERPL-MCNC: 1.54 MG/DL
EOSINOPHIL # BLD AUTO: 0.26 K/UL
EOSINOPHIL NFR BLD AUTO: 3.8 %
GLUCOSE SERPL-MCNC: 282 MG/DL
HBA1C MFR BLD HPLC: 11.4 %
HCT VFR BLD CALC: 39.4 %
HDLC SERPL-MCNC: 31 MG/DL
HGB BLD-MCNC: 12.6 G/DL
IMM GRANULOCYTES NFR BLD AUTO: 0.4 %
LDLC SERPL CALC-MCNC: 62 MG/DL
LYMPHOCYTES # BLD AUTO: 1.4 K/UL
LYMPHOCYTES NFR BLD AUTO: 20.2 %
MAN DIFF?: NORMAL
MCHC RBC-ENTMCNC: 30.6 PG
MCHC RBC-ENTMCNC: 32 GM/DL
MCV RBC AUTO: 95.6 FL
MONOCYTES # BLD AUTO: 0.67 K/UL
MONOCYTES NFR BLD AUTO: 9.7 %
NEUTROPHILS # BLD AUTO: 4.55 K/UL
NEUTROPHILS NFR BLD AUTO: 65.6 %
PLATELET # BLD AUTO: 164 K/UL
POTASSIUM SERPL-SCNC: 5.2 MMOL/L
PROT SERPL-MCNC: 6.6 G/DL
RBC # BLD: 4.12 M/UL
RBC # FLD: 13.5 %
SODIUM SERPL-SCNC: 143 MMOL/L
T4 FREE SERPL-MCNC: 1.4 NG/DL
TRIGL SERPL-MCNC: 203 MG/DL
TSH SERPL-ACNC: 1.42 UIU/ML
WBC # FLD AUTO: 6.93 K/UL

## 2018-07-13 ENCOUNTER — MEDICATION RENEWAL (OUTPATIENT)
Age: 78
End: 2018-07-13

## 2018-07-16 ENCOUNTER — MEDICATION RENEWAL (OUTPATIENT)
Age: 78
End: 2018-07-16

## 2018-09-05 ENCOUNTER — APPOINTMENT (OUTPATIENT)
Dept: ORTHOPEDIC SURGERY | Facility: CLINIC | Age: 78
End: 2018-09-05
Payer: COMMERCIAL

## 2018-09-05 VITALS
SYSTOLIC BLOOD PRESSURE: 118 MMHG | HEART RATE: 79 BPM | WEIGHT: 205 LBS | DIASTOLIC BLOOD PRESSURE: 66 MMHG | BODY MASS INDEX: 29.35 KG/M2 | HEIGHT: 70 IN

## 2018-09-05 DIAGNOSIS — Z96.611 PRESENCE OF RIGHT ARTIFICIAL SHOULDER JOINT: ICD-10-CM

## 2018-09-05 PROCEDURE — 99213 OFFICE O/P EST LOW 20 MIN: CPT

## 2018-09-05 PROCEDURE — 73030 X-RAY EXAM OF SHOULDER: CPT | Mod: RT

## 2018-10-29 ENCOUNTER — RX RENEWAL (OUTPATIENT)
Age: 78
End: 2018-10-29

## 2018-12-28 ENCOUNTER — MEDICATION RENEWAL (OUTPATIENT)
Age: 78
End: 2018-12-28

## 2019-01-02 ENCOUNTER — TRANSCRIPTION ENCOUNTER (OUTPATIENT)
Age: 79
End: 2019-01-02

## 2019-01-07 ENCOUNTER — APPOINTMENT (OUTPATIENT)
Dept: CARDIOLOGY | Facility: CLINIC | Age: 79
End: 2019-01-07
Payer: MEDICARE

## 2019-01-07 ENCOUNTER — NON-APPOINTMENT (OUTPATIENT)
Age: 79
End: 2019-01-07

## 2019-01-07 VITALS
BODY MASS INDEX: 29.35 KG/M2 | SYSTOLIC BLOOD PRESSURE: 110 MMHG | HEIGHT: 70 IN | HEART RATE: 81 BPM | DIASTOLIC BLOOD PRESSURE: 60 MMHG | OXYGEN SATURATION: 97 % | WEIGHT: 205 LBS

## 2019-01-07 PROCEDURE — 93000 ELECTROCARDIOGRAM COMPLETE: CPT

## 2019-01-07 PROCEDURE — 99215 OFFICE O/P EST HI 40 MIN: CPT

## 2019-01-08 LAB
ALBUMIN SERPL ELPH-MCNC: 3.9 G/DL
ALP BLD-CCNC: 96 U/L
ALT SERPL-CCNC: 26 U/L
ANION GAP SERPL CALC-SCNC: 11 MMOL/L
AST SERPL-CCNC: 18 U/L
BASOPHILS # BLD AUTO: 0.01 K/UL
BASOPHILS NFR BLD AUTO: 0.2 %
BILIRUB SERPL-MCNC: 0.6 MG/DL
BUN SERPL-MCNC: 25 MG/DL
CALCIUM SERPL-MCNC: 9.6 MG/DL
CHLORIDE SERPL-SCNC: 96 MMOL/L
CHOLEST SERPL-MCNC: 143 MG/DL
CHOLEST/HDLC SERPL: 4.6 RATIO
CO2 SERPL-SCNC: 29 MMOL/L
CREAT SERPL-MCNC: 1.49 MG/DL
EOSINOPHIL # BLD AUTO: 0.25 K/UL
EOSINOPHIL NFR BLD AUTO: 4.1 %
GLUCOSE SERPL-MCNC: 438 MG/DL
HBA1C MFR BLD HPLC: 11.7 %
HCT VFR BLD CALC: 40.4 %
HDLC SERPL-MCNC: 31 MG/DL
HGB BLD-MCNC: 13.1 G/DL
IMM GRANULOCYTES NFR BLD AUTO: 0.5 %
LDLC SERPL CALC-MCNC: 62 MG/DL
LYMPHOCYTES # BLD AUTO: 1.19 K/UL
LYMPHOCYTES NFR BLD AUTO: 19.5 %
MAN DIFF?: NORMAL
MCHC RBC-ENTMCNC: 31.2 PG
MCHC RBC-ENTMCNC: 32.4 GM/DL
MCV RBC AUTO: 96.2 FL
MONOCYTES # BLD AUTO: 0.49 K/UL
MONOCYTES NFR BLD AUTO: 8 %
NEUTROPHILS # BLD AUTO: 4.14 K/UL
NEUTROPHILS NFR BLD AUTO: 67.7 %
PLATELET # BLD AUTO: 174 K/UL
POTASSIUM SERPL-SCNC: 5.4 MMOL/L
PROT SERPL-MCNC: 6.6 G/DL
PSA FREE FLD-MCNC: 52 %
PSA FREE SERPL-MCNC: 0.53 NG/ML
PSA SERPL-MCNC: 1.01 NG/ML
RBC # BLD: 4.2 M/UL
RBC # FLD: 13.7 %
SODIUM SERPL-SCNC: 136 MMOL/L
T4 FREE SERPL-MCNC: 1.4 NG/DL
TRIGL SERPL-MCNC: 251 MG/DL
TSH SERPL-ACNC: 2.24 UIU/ML
WBC # FLD AUTO: 6.11 K/UL

## 2019-01-11 ENCOUNTER — APPOINTMENT (OUTPATIENT)
Dept: ORTHOPEDIC SURGERY | Facility: CLINIC | Age: 79
End: 2019-01-11
Payer: COMMERCIAL

## 2019-01-11 VITALS
SYSTOLIC BLOOD PRESSURE: 112 MMHG | DIASTOLIC BLOOD PRESSURE: 68 MMHG | HEIGHT: 70 IN | BODY MASS INDEX: 29.35 KG/M2 | HEART RATE: 74 BPM | WEIGHT: 205 LBS

## 2019-01-11 DIAGNOSIS — M75.81 OTHER SHOULDER LESIONS, RIGHT SHOULDER: ICD-10-CM

## 2019-01-11 DIAGNOSIS — Z00.00 ENCOUNTER FOR GENERAL ADULT MEDICAL EXAMINATION W/OUT ABNORMAL FINDINGS: ICD-10-CM

## 2019-01-11 PROCEDURE — 73030 X-RAY EXAM OF SHOULDER: CPT | Mod: RT

## 2019-01-11 PROCEDURE — 99213 OFFICE O/P EST LOW 20 MIN: CPT

## 2019-01-11 NOTE — PHYSICAL EXAM
[Normal] : normal [FreeTextEntry2] : Pleasant male. Right shoulder: Healed surgical scar. No warmth or swelling.  Mild tenderness anteriorly. Right shoulder active forward elevation 100°. Abduction 90°. Internal rotation to posterior waistline. Right shoulder  forward elevation and rotation strength 3+/5 .Weakness with belly press. Sensation intact to light touch right upper extremity. 2+ radial pulse. Left shoulder: Skin intact. No warmth or swelling. Active motion within normal range. Intact forward elevation and rotation strength with manual muscle testing. [de-identified] : X-rays right shoulder AP, outlet and axillary views reveal a total shoulder replacement. No fractures. No dislocation.

## 2019-01-11 NOTE — HISTORY OF PRESENT ILLNESS
[All Other ROS Normal] : All other review of systems are negative except as noted [Past Hx] : past medical [All] : medication and allergy [Chills] : no chills [Fever] : no fever [FreeTextEntry1] : checkup right shoulder [FreeTextEntry2] : 78 year old male Presents today with complaints of new onset right shoulder pain past 4 weeks. No history of injury. History of a right shoulder replacement.

## 2019-01-11 NOTE — DISCUSSION/SUMMARY
[de-identified] : Recommend treatment for tendinitis with NSAIDs, rest and then some home exercises. Continue to monitor with followup in 6 weeks.

## 2019-01-16 ENCOUNTER — MEDICATION RENEWAL (OUTPATIENT)
Age: 79
End: 2019-01-16

## 2019-02-06 ENCOUNTER — MEDICATION RENEWAL (OUTPATIENT)
Age: 79
End: 2019-02-06

## 2019-02-11 ENCOUNTER — RX RENEWAL (OUTPATIENT)
Age: 79
End: 2019-02-11

## 2019-03-01 ENCOUNTER — APPOINTMENT (OUTPATIENT)
Dept: ORTHOPEDIC SURGERY | Facility: CLINIC | Age: 79
End: 2019-03-01
Payer: COMMERCIAL

## 2019-03-01 VITALS
HEART RATE: 73 BPM | HEIGHT: 70 IN | DIASTOLIC BLOOD PRESSURE: 73 MMHG | BODY MASS INDEX: 29.35 KG/M2 | WEIGHT: 205 LBS | SYSTOLIC BLOOD PRESSURE: 138 MMHG

## 2019-03-01 PROCEDURE — 99213 OFFICE O/P EST LOW 20 MIN: CPT

## 2019-03-25 ENCOUNTER — RX RENEWAL (OUTPATIENT)
Age: 79
End: 2019-03-25

## 2019-03-29 ENCOUNTER — RX RENEWAL (OUTPATIENT)
Age: 79
End: 2019-03-29

## 2019-04-16 ENCOUNTER — MEDICATION RENEWAL (OUTPATIENT)
Age: 79
End: 2019-04-16

## 2019-05-06 ENCOUNTER — INPATIENT (INPATIENT)
Facility: HOSPITAL | Age: 79
LOS: 0 days | Discharge: HOME | DRG: 247 | End: 2019-05-07
Attending: INTERNAL MEDICINE | Admitting: INTERNAL MEDICINE
Payer: MEDICARE

## 2019-05-06 ENCOUNTER — NON-APPOINTMENT (OUTPATIENT)
Age: 79
End: 2019-05-06

## 2019-05-06 ENCOUNTER — APPOINTMENT (OUTPATIENT)
Dept: CARDIOLOGY | Facility: CLINIC | Age: 79
End: 2019-05-06
Payer: MEDICARE

## 2019-05-06 ENCOUNTER — TRANSCRIPTION ENCOUNTER (OUTPATIENT)
Age: 79
End: 2019-05-06

## 2019-05-06 VITALS
HEIGHT: 70 IN | SYSTOLIC BLOOD PRESSURE: 120 MMHG | HEART RATE: 91 BPM | DIASTOLIC BLOOD PRESSURE: 66 MMHG | WEIGHT: 203 LBS | BODY MASS INDEX: 29.06 KG/M2 | OXYGEN SATURATION: 96 %

## 2019-05-06 VITALS
HEIGHT: 70 IN | WEIGHT: 203.05 LBS | TEMPERATURE: 98 F | RESPIRATION RATE: 16 BRPM | DIASTOLIC BLOOD PRESSURE: 63 MMHG | SYSTOLIC BLOOD PRESSURE: 131 MMHG | OXYGEN SATURATION: 97 % | HEART RATE: 71 BPM

## 2019-05-06 DIAGNOSIS — Z96.611 PRESENCE OF RIGHT ARTIFICIAL SHOULDER JOINT: Chronic | ICD-10-CM

## 2019-05-06 DIAGNOSIS — R07.9 CHEST PAIN, UNSPECIFIED: ICD-10-CM

## 2019-05-06 LAB
ALBUMIN SERPL ELPH-MCNC: 3.9 G/DL — SIGNIFICANT CHANGE UP (ref 3.3–5)
ALP SERPL-CCNC: 92 U/L — SIGNIFICANT CHANGE UP (ref 40–120)
ALT FLD-CCNC: 26 U/L — SIGNIFICANT CHANGE UP (ref 10–45)
ANION GAP SERPL CALC-SCNC: 12 MMOL/L — SIGNIFICANT CHANGE UP (ref 5–17)
APTT BLD: 31.7 SEC — SIGNIFICANT CHANGE UP (ref 27.5–36.3)
AST SERPL-CCNC: 17 U/L — SIGNIFICANT CHANGE UP (ref 10–40)
BASOPHILS # BLD AUTO: 0 K/UL — SIGNIFICANT CHANGE UP (ref 0–0.2)
BASOPHILS NFR BLD AUTO: 0.5 % — SIGNIFICANT CHANGE UP (ref 0–2)
BILIRUB SERPL-MCNC: 0.6 MG/DL — SIGNIFICANT CHANGE UP (ref 0.2–1.2)
BUN SERPL-MCNC: 27 MG/DL — HIGH (ref 7–23)
CALCIUM SERPL-MCNC: 9.6 MG/DL — SIGNIFICANT CHANGE UP (ref 8.4–10.5)
CHLORIDE SERPL-SCNC: 100 MMOL/L — SIGNIFICANT CHANGE UP (ref 96–108)
CO2 SERPL-SCNC: 25 MMOL/L — SIGNIFICANT CHANGE UP (ref 22–31)
CREAT SERPL-MCNC: 1.28 MG/DL — SIGNIFICANT CHANGE UP (ref 0.5–1.3)
EOSINOPHIL # BLD AUTO: 0.2 K/UL — SIGNIFICANT CHANGE UP (ref 0–0.5)
EOSINOPHIL NFR BLD AUTO: 4 % — SIGNIFICANT CHANGE UP (ref 0–6)
GLUCOSE BLDC GLUCOMTR-MCNC: 208 MG/DL — HIGH (ref 70–99)
GLUCOSE BLDC GLUCOMTR-MCNC: 222 MG/DL — HIGH (ref 70–99)
GLUCOSE BLDC GLUCOMTR-MCNC: 301 MG/DL — HIGH (ref 70–99)
GLUCOSE SERPL-MCNC: 368 MG/DL — HIGH (ref 70–99)
HCT VFR BLD CALC: 37.6 % — LOW (ref 39–50)
HGB BLD-MCNC: 12.5 G/DL — LOW (ref 13–17)
INR BLD: 1.11 RATIO — SIGNIFICANT CHANGE UP (ref 0.88–1.16)
LYMPHOCYTES # BLD AUTO: 1.1 K/UL — SIGNIFICANT CHANGE UP (ref 1–3.3)
LYMPHOCYTES # BLD AUTO: 18.1 % — SIGNIFICANT CHANGE UP (ref 13–44)
MAGNESIUM SERPL-MCNC: 1.6 MG/DL — SIGNIFICANT CHANGE UP (ref 1.6–2.6)
MCHC RBC-ENTMCNC: 31.5 PG — SIGNIFICANT CHANGE UP (ref 27–34)
MCHC RBC-ENTMCNC: 33.2 GM/DL — SIGNIFICANT CHANGE UP (ref 32–36)
MCV RBC AUTO: 95 FL — SIGNIFICANT CHANGE UP (ref 80–100)
MONOCYTES # BLD AUTO: 0.5 K/UL — SIGNIFICANT CHANGE UP (ref 0–0.9)
MONOCYTES NFR BLD AUTO: 7.6 % — SIGNIFICANT CHANGE UP (ref 2–14)
NEUTROPHILS # BLD AUTO: 4.2 K/UL — SIGNIFICANT CHANGE UP (ref 1.8–7.4)
NEUTROPHILS NFR BLD AUTO: 69.8 % — SIGNIFICANT CHANGE UP (ref 43–77)
NT-PROBNP SERPL-SCNC: 732 PG/ML — HIGH (ref 0–300)
PHOSPHATE SERPL-MCNC: 2.8 MG/DL — SIGNIFICANT CHANGE UP (ref 2.5–4.5)
PLATELET # BLD AUTO: 136 K/UL — LOW (ref 150–400)
POTASSIUM SERPL-MCNC: 4.9 MMOL/L — SIGNIFICANT CHANGE UP (ref 3.5–5.3)
POTASSIUM SERPL-SCNC: 4.9 MMOL/L — SIGNIFICANT CHANGE UP (ref 3.5–5.3)
PROT SERPL-MCNC: 6.8 G/DL — SIGNIFICANT CHANGE UP (ref 6–8.3)
PROTHROM AB SERPL-ACNC: 12.8 SEC — SIGNIFICANT CHANGE UP (ref 10–12.9)
RBC # BLD: 3.96 M/UL — LOW (ref 4.2–5.8)
RBC # FLD: 12.5 % — SIGNIFICANT CHANGE UP (ref 10.3–14.5)
SODIUM SERPL-SCNC: 137 MMOL/L — SIGNIFICANT CHANGE UP (ref 135–145)
TROPONIN T, HIGH SENSITIVITY RESULT: 28 NG/L — SIGNIFICANT CHANGE UP (ref 0–51)
WBC # BLD: 5.9 K/UL — SIGNIFICANT CHANGE UP (ref 3.8–10.5)
WBC # FLD AUTO: 5.9 K/UL — SIGNIFICANT CHANGE UP (ref 3.8–10.5)

## 2019-05-06 PROCEDURE — 71046 X-RAY EXAM CHEST 2 VIEWS: CPT | Mod: 26

## 2019-05-06 PROCEDURE — 93000 ELECTROCARDIOGRAM COMPLETE: CPT | Mod: PD

## 2019-05-06 PROCEDURE — 93010 ELECTROCARDIOGRAM REPORT: CPT

## 2019-05-06 PROCEDURE — 99285 EMERGENCY DEPT VISIT HI MDM: CPT | Mod: GC,25

## 2019-05-06 PROCEDURE — 99152 MOD SED SAME PHYS/QHP 5/>YRS: CPT | Mod: GC

## 2019-05-06 PROCEDURE — 93459 L HRT ART/GRFT ANGIO: CPT | Mod: 26,59,GC

## 2019-05-06 PROCEDURE — 99215 OFFICE O/P EST HI 40 MIN: CPT | Mod: PD

## 2019-05-06 PROCEDURE — 92937 PRQ TRLUML REVSC CAB GRF 1: CPT | Mod: LD,GC

## 2019-05-06 RX ORDER — INSULIN LISPRO 100/ML
8 VIAL (ML) SUBCUTANEOUS
Qty: 0 | Refills: 0 | Status: DISCONTINUED | OUTPATIENT
Start: 2019-05-06 | End: 2019-05-07

## 2019-05-06 RX ORDER — GLUCAGON INJECTION, SOLUTION 0.5 MG/.1ML
1 INJECTION, SOLUTION SUBCUTANEOUS ONCE
Qty: 0 | Refills: 0 | Status: DISCONTINUED | OUTPATIENT
Start: 2019-05-06 | End: 2019-05-07

## 2019-05-06 RX ORDER — POTASSIUM CITRATE MONOHYDRATE 100 %
2 POWDER (GRAM) MISCELLANEOUS
Qty: 0 | Refills: 0 | COMMUNITY

## 2019-05-06 RX ORDER — CARVEDILOL PHOSPHATE 80 MG/1
25 CAPSULE, EXTENDED RELEASE ORAL EVERY 12 HOURS
Qty: 0 | Refills: 0 | Status: DISCONTINUED | OUTPATIENT
Start: 2019-05-06 | End: 2019-05-07

## 2019-05-06 RX ORDER — DEXTROSE 50 % IN WATER 50 %
25 SYRINGE (ML) INTRAVENOUS ONCE
Qty: 0 | Refills: 0 | Status: DISCONTINUED | OUTPATIENT
Start: 2019-05-06 | End: 2019-05-07

## 2019-05-06 RX ORDER — ATORVASTATIN CALCIUM 80 MG/1
80 TABLET, FILM COATED ORAL AT BEDTIME
Qty: 0 | Refills: 0 | Status: DISCONTINUED | OUTPATIENT
Start: 2019-05-06 | End: 2019-05-07

## 2019-05-06 RX ORDER — ISOSORBIDE MONONITRATE 60 MG/1
30 TABLET, EXTENDED RELEASE ORAL DAILY
Qty: 0 | Refills: 0 | Status: DISCONTINUED | OUTPATIENT
Start: 2019-05-06 | End: 2019-05-07

## 2019-05-06 RX ORDER — VALSARTAN 80 MG/1
80 TABLET ORAL DAILY
Qty: 0 | Refills: 0 | Status: DISCONTINUED | OUTPATIENT
Start: 2019-05-06 | End: 2019-05-07

## 2019-05-06 RX ORDER — INSULIN GLARGINE 100 [IU]/ML
28 INJECTION, SOLUTION SUBCUTANEOUS AT BEDTIME
Qty: 0 | Refills: 0 | Status: DISCONTINUED | OUTPATIENT
Start: 2019-05-06 | End: 2019-05-07

## 2019-05-06 RX ORDER — RANOLAZINE 500 MG/1
500 TABLET, FILM COATED, EXTENDED RELEASE ORAL
Qty: 0 | Refills: 0 | Status: DISCONTINUED | OUTPATIENT
Start: 2019-05-06 | End: 2019-05-07

## 2019-05-06 RX ORDER — ASPIRIN/CALCIUM CARB/MAGNESIUM 324 MG
81 TABLET ORAL DAILY
Qty: 0 | Refills: 0 | Status: DISCONTINUED | OUTPATIENT
Start: 2019-05-06 | End: 2019-05-07

## 2019-05-06 RX ORDER — CLOPIDOGREL BISULFATE 75 MG/1
1 TABLET, FILM COATED ORAL
Qty: 0 | Refills: 0 | COMMUNITY

## 2019-05-06 RX ORDER — ALLOPURINOL 300 MG
300 TABLET ORAL DAILY
Qty: 0 | Refills: 0 | Status: DISCONTINUED | OUTPATIENT
Start: 2019-05-06 | End: 2019-05-07

## 2019-05-06 RX ORDER — DEXTROSE 50 % IN WATER 50 %
12.5 SYRINGE (ML) INTRAVENOUS ONCE
Qty: 0 | Refills: 0 | Status: DISCONTINUED | OUTPATIENT
Start: 2019-05-06 | End: 2019-05-07

## 2019-05-06 RX ORDER — SODIUM CHLORIDE 9 MG/ML
1000 INJECTION, SOLUTION INTRAVENOUS
Qty: 0 | Refills: 0 | Status: DISCONTINUED | OUTPATIENT
Start: 2019-05-06 | End: 2019-05-07

## 2019-05-06 RX ORDER — INSULIN LISPRO 100/ML
VIAL (ML) SUBCUTANEOUS
Qty: 0 | Refills: 0 | Status: DISCONTINUED | OUTPATIENT
Start: 2019-05-06 | End: 2019-05-07

## 2019-05-06 RX ORDER — ATORVASTATIN CALCIUM 80 MG/1
1 TABLET, FILM COATED ORAL
Qty: 0 | Refills: 0 | COMMUNITY

## 2019-05-06 RX ORDER — CARVEDILOL PHOSPHATE 80 MG/1
1 CAPSULE, EXTENDED RELEASE ORAL
Qty: 0 | Refills: 0 | COMMUNITY

## 2019-05-06 RX ORDER — DEXTROSE 50 % IN WATER 50 %
15 SYRINGE (ML) INTRAVENOUS ONCE
Qty: 0 | Refills: 0 | Status: DISCONTINUED | OUTPATIENT
Start: 2019-05-06 | End: 2019-05-07

## 2019-05-06 RX ORDER — INSULIN LISPRO 100/ML
VIAL (ML) SUBCUTANEOUS AT BEDTIME
Qty: 0 | Refills: 0 | Status: DISCONTINUED | OUTPATIENT
Start: 2019-05-06 | End: 2019-05-07

## 2019-05-06 RX ORDER — CLOPIDOGREL BISULFATE 75 MG/1
75 TABLET, FILM COATED ORAL DAILY
Qty: 0 | Refills: 0 | Status: DISCONTINUED | OUTPATIENT
Start: 2019-05-06 | End: 2019-05-07

## 2019-05-06 RX ADMIN — ISOSORBIDE MONONITRATE 30 MILLIGRAM(S): 60 TABLET, EXTENDED RELEASE ORAL at 18:29

## 2019-05-06 RX ADMIN — RANOLAZINE 500 MILLIGRAM(S): 500 TABLET, FILM COATED, EXTENDED RELEASE ORAL at 21:29

## 2019-05-06 RX ADMIN — ATORVASTATIN CALCIUM 80 MILLIGRAM(S): 80 TABLET, FILM COATED ORAL at 21:29

## 2019-05-06 RX ADMIN — Medication 4: at 18:26

## 2019-05-06 RX ADMIN — Medication 8 UNIT(S): at 18:26

## 2019-05-06 RX ADMIN — INSULIN GLARGINE 28 UNIT(S): 100 INJECTION, SOLUTION SUBCUTANEOUS at 21:29

## 2019-05-06 RX ADMIN — Medication 300 MILLIGRAM(S): at 18:29

## 2019-05-06 RX ADMIN — CARVEDILOL PHOSPHATE 25 MILLIGRAM(S): 80 CAPSULE, EXTENDED RELEASE ORAL at 18:30

## 2019-05-06 NOTE — CHART NOTE - NSCHARTNOTEFT_GEN_A_CORE
Removal of Femoral Sheath  Pulses in the right  lower extremity are palpable. The patient was placed in the supine position. The insertion site was identified and the sutures were removed per protocol.  The 6 Japanese femoral sheath was then removed. Direct pressure was applied for  20 minutes.   Monitoring of the right groin and both lower extremities including neuro-vascular checks and vital signs every 15 minutes x 4, then every 30 minutes x 2, then every 1 hour was ordered.  Complications: None

## 2019-05-06 NOTE — H&P CARDIOLOGY - HISTORY OF PRESENT ILLNESS
79yom w/ extensive cardiac history has been having intermittent chest pain and shortness of breath for the past weeks, described as a tight/squeezing pain, sometimes at rest and sometimes with exertion. No fevers, chills, coughing. No symptoms currently. Last PCI in 2017. No leg swelling or calf pain. Denies hx of VTE. Took aspirin this morning. 78 yo  male PMH CAD with stent (mLAD x1 SHAISTA), DM (A1c 11.7 Jan 2019) HLD, anxiety, depression presents today [facility] ER on 5/6/19, sent by PMD Stuart Sarkar for progressive chest pain and shortness of breath for the past weeks, described as a tight/squeezing pain, sometimes at rest and sometimes with exertion. No fevers, chills, coughing. No symptoms currently. He denies leg swelling or calf pain. Denies hx of VTE. Took aspirin this morning. Patient transferred to [facility] for cardiac angiogram. 78 yo  male PMH CAD with stent (mLAD x1 SHAISTA), DM (A1c 11.7 Jan 2019, Dr. Sundeep Gomes) HLD, anxiety, depression presents today [facility] ER on 5/6/19, sent by PMD Stuart Sarkar for progressive chest pain and shortness of breath for the past weeks, described as a tight/squeezing pain, sometimes at rest and sometimes with exertion. No fevers, chills, coughing. No symptoms currently. He denies leg swelling or calf pain. Denies hx of VTE. Took aspirin this morning. Patient transferred to [facility] for cardiac angiogram. 78 yo  male PMH CAD with stent (mLAD x1 SHAISTA), CABG 4V  ([place]) DM (A1c 11.7 Jan 2019, Dr. Sundeep Gomes- poorly controlled) HLD, anxiety, depression presents today [facility] ER on 5/6/19, sent by PMD Stuart Sarkar for progressive chest pain and shortness of breath for the past weeks, described as a tight/squeezing pain, sometimes at rest and sometimes with exertion. No fevers, chills, coughing. No symptoms currently. He denies leg swelling or calf pain. Denies hx of VTE. Took aspirin this morning. Patient transferred to [facility] for cardiac angiogram.

## 2019-05-06 NOTE — ED PROVIDER NOTE - CLINICAL SUMMARY MEDICAL DECISION MAKING FREE TEXT BOX
Intermittent chest pain concerning for unstable angina, asymptomatic currently with no acute ECG changes. Plan for cardiac cath w/ Dr. Mondragon.

## 2019-05-06 NOTE — H&P CARDIOLOGY - FAMILY HISTORY
Family history of diabetes mellitus     Father  Still living? Unknown  Family history of CABG, Age at diagnosis: Age Unknown
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
## 2019-05-06 NOTE — ED ADULT NURSE NOTE - OBJECTIVE STATEMENT
sent from PMD office today c/o several weeks of worsening chest pain/shortness of breath on exertion. Denies any cough, fever or chills. Patient reports he is unable to ambulate from bed to the door without becoming very short of breath. Patient states he takes SL NTG when he has the pain which helps the pain but the pain does return. Denies any swelling or edema. Denies any nausea or vomiting, denies any abdominal pain. Reports he has multiple episodes of loose stools over the past weeks as well. Patient reports he has been eating and drinking well at home. At rest, patient is not in any respiratory distress. Noted that sao2 is 91%-93% on room air. Patient denies needing oxygen at home. 2LC placed on patient. EKG was done in triage and patient placed on telemetry monitoring. NSR with PVC's noted on monitor. Skin is warm dry and intact.

## 2019-05-06 NOTE — ED PROVIDER NOTE - OBJECTIVE STATEMENT
79yom w/ extensive cardiac history has been having intermittent chest pain and shortness of breath for the past weeks, described as a tight/squeezing pain, sometimes at rest and sometimes with exertion. No fevers, chills, coughing. No symptoms currently. Last PCI in 2017. No leg swelling or calf pain. Denies hx of VTE. Took aspirin this morning.

## 2019-05-06 NOTE — ED PROVIDER NOTE - ATTENDING CONTRIBUTION TO CARE
Private Physician Stuart Sarkar Cards  79y male pmh CAD, MI, PTCA#17 stents last several y ago. CHF, Gout, CKD, HTN,HLD,Renal stones. DMT2, arthritis. Pt comes to ed complains of Chest pain, past several weeks, None currenlty, "Feels like someone grabbing my heart' Worse walking across room. improves with ntg/rest. Associated w shortness of breath,dizziness,rad to left arm. No palp,abd pain,nvdc,diaphroesis, Seen by pmd today and referred to ed. Private Physician Stuart Sarkar Cards  79y male pmh CAD, MI, PTCA#17 stents last several y ago. CHF, Gout, CKD, HTN,HLD,Renal stones. DMT2, arthritis. Pt comes to ed complains of Chest pain, past several weeks, None currenlty, "Feels like someone grabbing my heart' Worse walking across room. improves with ntg/rest. Associated w shortness of breath,dizziness,rad to left arm. No palp,abd pain,nvdc,diaphroesis, Seen by pmd today and referred to ed. PE WDWN male awake alert nad, normocephalic atraumatic neck supple chest clear anterior & posterior abd soft +bs no mass guarding, cv no rubs, gallops or murmurs neruo no focal defects  Tate Corbett MD, Facep

## 2019-05-06 NOTE — ED PROVIDER NOTE - PROGRESS NOTE DETAILS
Discussed with Dr Mello Mondragon for Sara Corbett MD, Facep Discussed with Dr Alanna Corbett MD, Facep

## 2019-05-07 ENCOUNTER — TRANSCRIPTION ENCOUNTER (OUTPATIENT)
Age: 79
End: 2019-05-07

## 2019-05-07 ENCOUNTER — INBOUND DOCUMENT (OUTPATIENT)
Age: 79
End: 2019-05-07

## 2019-05-07 VITALS
DIASTOLIC BLOOD PRESSURE: 72 MMHG | OXYGEN SATURATION: 99 % | RESPIRATION RATE: 18 BRPM | HEART RATE: 75 BPM | SYSTOLIC BLOOD PRESSURE: 137 MMHG | TEMPERATURE: 98 F

## 2019-05-07 LAB
ANION GAP SERPL CALC-SCNC: 13 MMOL/L — SIGNIFICANT CHANGE UP (ref 5–17)
BUN SERPL-MCNC: 23 MG/DL — SIGNIFICANT CHANGE UP (ref 7–23)
CALCIUM SERPL-MCNC: 9.3 MG/DL — SIGNIFICANT CHANGE UP (ref 8.4–10.5)
CHLORIDE SERPL-SCNC: 102 MMOL/L — SIGNIFICANT CHANGE UP (ref 96–108)
CO2 SERPL-SCNC: 23 MMOL/L — SIGNIFICANT CHANGE UP (ref 22–31)
CREAT SERPL-MCNC: 1.21 MG/DL — SIGNIFICANT CHANGE UP (ref 0.5–1.3)
GLUCOSE BLDC GLUCOMTR-MCNC: 185 MG/DL — HIGH (ref 70–99)
GLUCOSE SERPL-MCNC: 166 MG/DL — HIGH (ref 70–99)
HBA1C BLD-MCNC: 10.8 % — HIGH (ref 4–5.6)
HCT VFR BLD CALC: 35 % — LOW (ref 39–50)
HGB BLD-MCNC: 12.4 G/DL — LOW (ref 13–17)
MAGNESIUM SERPL-MCNC: 1.6 MG/DL — SIGNIFICANT CHANGE UP (ref 1.6–2.6)
MCHC RBC-ENTMCNC: 33.6 PG — SIGNIFICANT CHANGE UP (ref 27–34)
MCHC RBC-ENTMCNC: 35.3 GM/DL — SIGNIFICANT CHANGE UP (ref 32–36)
MCV RBC AUTO: 94.9 FL — SIGNIFICANT CHANGE UP (ref 80–100)
PLATELET # BLD AUTO: 135 K/UL — LOW (ref 150–400)
POTASSIUM SERPL-MCNC: 3.7 MMOL/L — SIGNIFICANT CHANGE UP (ref 3.5–5.3)
POTASSIUM SERPL-SCNC: 3.7 MMOL/L — SIGNIFICANT CHANGE UP (ref 3.5–5.3)
RBC # BLD: 3.69 M/UL — LOW (ref 4.2–5.8)
RBC # FLD: 12.4 % — SIGNIFICANT CHANGE UP (ref 10.3–14.5)
SODIUM SERPL-SCNC: 138 MMOL/L — SIGNIFICANT CHANGE UP (ref 135–145)
WBC # BLD: 6.2 K/UL — SIGNIFICANT CHANGE UP (ref 3.8–10.5)
WBC # FLD AUTO: 6.2 K/UL — SIGNIFICANT CHANGE UP (ref 3.8–10.5)

## 2019-05-07 PROCEDURE — 99238 HOSP IP/OBS DSCHRG MGMT 30/<: CPT

## 2019-05-07 PROCEDURE — C9604: CPT | Mod: LD

## 2019-05-07 PROCEDURE — 85730 THROMBOPLASTIN TIME PARTIAL: CPT

## 2019-05-07 PROCEDURE — 85027 COMPLETE CBC AUTOMATED: CPT

## 2019-05-07 PROCEDURE — C1874: CPT

## 2019-05-07 PROCEDURE — C1894: CPT

## 2019-05-07 PROCEDURE — 71046 X-RAY EXAM CHEST 2 VIEWS: CPT

## 2019-05-07 PROCEDURE — C1887: CPT

## 2019-05-07 PROCEDURE — 99153 MOD SED SAME PHYS/QHP EA: CPT

## 2019-05-07 PROCEDURE — 83036 HEMOGLOBIN GLYCOSYLATED A1C: CPT

## 2019-05-07 PROCEDURE — 84484 ASSAY OF TROPONIN QUANT: CPT

## 2019-05-07 PROCEDURE — 99285 EMERGENCY DEPT VISIT HI MDM: CPT

## 2019-05-07 PROCEDURE — 83735 ASSAY OF MAGNESIUM: CPT

## 2019-05-07 PROCEDURE — 82962 GLUCOSE BLOOD TEST: CPT

## 2019-05-07 PROCEDURE — 99152 MOD SED SAME PHYS/QHP 5/>YRS: CPT

## 2019-05-07 PROCEDURE — 83880 ASSAY OF NATRIURETIC PEPTIDE: CPT

## 2019-05-07 PROCEDURE — 80048 BASIC METABOLIC PNL TOTAL CA: CPT

## 2019-05-07 PROCEDURE — 80053 COMPREHEN METABOLIC PANEL: CPT

## 2019-05-07 PROCEDURE — C1769: CPT

## 2019-05-07 PROCEDURE — 93459 L HRT ART/GRFT ANGIO: CPT | Mod: 59

## 2019-05-07 PROCEDURE — 85610 PROTHROMBIN TIME: CPT

## 2019-05-07 PROCEDURE — 84100 ASSAY OF PHOSPHORUS: CPT

## 2019-05-07 PROCEDURE — 93005 ELECTROCARDIOGRAM TRACING: CPT

## 2019-05-07 PROCEDURE — C1725: CPT

## 2019-05-07 RX ORDER — NITROGLYCERIN 6.5 MG
1 CAPSULE, EXTENDED RELEASE ORAL
Qty: 0 | Refills: 0 | COMMUNITY

## 2019-05-07 RX ADMIN — VALSARTAN 80 MILLIGRAM(S): 80 TABLET ORAL at 05:42

## 2019-05-07 RX ADMIN — RANOLAZINE 500 MILLIGRAM(S): 500 TABLET, FILM COATED, EXTENDED RELEASE ORAL at 05:42

## 2019-05-07 RX ADMIN — Medication 2: at 07:52

## 2019-05-07 RX ADMIN — CARVEDILOL PHOSPHATE 25 MILLIGRAM(S): 80 CAPSULE, EXTENDED RELEASE ORAL at 05:41

## 2019-05-07 RX ADMIN — Medication 8 UNIT(S): at 07:54

## 2019-05-07 RX ADMIN — Medication 81 MILLIGRAM(S): at 05:41

## 2019-05-07 RX ADMIN — CLOPIDOGREL BISULFATE 75 MILLIGRAM(S): 75 TABLET, FILM COATED ORAL at 05:42

## 2019-05-07 NOTE — DISCHARGE NOTE PROVIDER - CARE PROVIDERS DIRECT ADDRESSES
,[email or web address]_Unknown

## 2019-05-07 NOTE — DISCHARGE NOTE PROVIDER - NSDCCPTREATMENT_GEN_ALL_CORE_FT
PRINCIPAL PROCEDURE  Procedure: Catheterization, heart, left  Findings and Treatment: SHAISTA x 2 SVG-diag via RFA

## 2019-05-07 NOTE — DISCHARGE NOTE NURSING/CASE MANAGEMENT/SOCIAL WORK - NSDCDPATPORTLINK_GEN_ALL_CORE
You can access the [facility] Patient Portal, offered by [facility], by registering with the following website: [email or web address]

## 2019-05-07 NOTE — DISCHARGE NOTE PROVIDER - NSDCCPCAREPLAN_GEN_ALL_CORE_FT
PRINCIPAL DISCHARGE DIAGNOSIS  Diagnosis: CAD (coronary artery disease)  Assessment and Plan of Treatment: Pt remains chest pain free and understands post cath discharge instructions   No heavy lifting, strenuous activity, bending, straining or unnecessary stair climbing  for 2 weeks. No sex for 1 week.  No driving for 2 days. You may shower 24 hours following procedure but avoid baths and swimming for 1 week. Check groin site for bleeding and/or swelling daily following procedure. Call your doctor/cardiologist immediately should it occur or if you have increased/persistent pain at the site. Follow up with your cardiologist in 1- 2 weeks. You may call [facility] at [phone] or [phone] after office hours and weekends  with any questions or concerns following your procedure. Take medications as prescribed.      SECONDARY DISCHARGE DIAGNOSES  Diagnosis: Diabetes mellitus  Assessment and Plan of Treatment: Your hemoglobin A1C will be between 7-8   Continue to follow with your primary care MD or your endocrinologist.  Follow a heart healthy diabetic diet. If you check your fingerstick glucose at home, call your MD if it is greater than 250mg/dL on 2 occasions or less than 100mg/dL on 2 occasions. Know signs of low blood sugar, such as: dizziness, shakiness, sweating, confusion, hunger, nervousness-drink 4 ounces apple juice if occurs and call your doctor. Know early signs of high blood sugar, such as: frequent urination, increased thirst, blurry vision, fatigue, headache - call your doctor if this occurs. Follow with other practitioners to care for your diabetes, such as ophthamologist and podiatrist.    Diagnosis: HLD (hyperlipidemia)  Assessment and Plan of Treatment: Your LDL cholesterol will be less than 70mg/dL   Continue with your cholesterol medications. Eat a heart healthy diet that is low in saturated fats and salt, and includes whole grains, fruits, vegetables and lean protein; exercise regularly (consult with your physician or cardiologist first); maintain a heart healthy weight. Continue to follow with your primary physician or cardiologist for treatment goals, continue medication, have liver function testing every 3 months as anti lipid medications can cause liver irritation. If you smoke - quit (A resource to help you stop smoking is the [facility] – phone number [phone].).    Diagnosis: HTN (hypertension)  Assessment and Plan of Treatment: Your blood pressure will be controlled.   Continue with your blood pressure medications; eat a heart healthy diet with low salt diet; exercise regularly (consult with your physician or cardiologist first); maintain a heart healthy weight; if you smoke - quit (A resource to help you stop smoking is the [facility] – phone number [phone].); include healthy ways to manage stress. Continue to follow with your primary care physician or cardiologist.

## 2019-05-07 NOTE — PROGRESS NOTE ADULT - SUBJECTIVE AND OBJECTIVE BOX
Patient is a 79y old  Male who presents with a chief complaint of CAD (07 May 2019 02:07) now s/p MAIA SHAISTA x 2 SVG to diag via right femoral artery access           Allergies    No Known Allergies    Intolerances        Medications:  allopurinol 300 milliGRAM(s) Oral daily  aspirin enteric coated 81 milliGRAM(s) Oral daily  atorvastatin 80 milliGRAM(s) Oral at bedtime  carvedilol 25 milliGRAM(s) Oral every 12 hours  clopidogrel Tablet 75 milliGRAM(s) Oral daily  dextrose 40% Gel 15 Gram(s) Oral once PRN  dextrose 5%. 1000 milliLiter(s) IV Continuous <Continuous>  dextrose 50% Injectable 12.5 Gram(s) IV Push once  dextrose 50% Injectable 25 Gram(s) IV Push once  dextrose 50% Injectable 25 Gram(s) IV Push once  glucagon  Injectable 1 milliGRAM(s) IntraMuscular once PRN  insulin glargine Injectable (LANTUS) 28 Unit(s) SubCutaneous at bedtime  insulin lispro (HumaLOG) corrective regimen sliding scale   SubCutaneous three times a day before meals  insulin lispro (HumaLOG) corrective regimen sliding scale   SubCutaneous at bedtime  insulin lispro Injectable (HumaLOG) 8 Unit(s) SubCutaneous three times a day before meals  isosorbide   mononitrate ER Tablet (IMDUR) 30 milliGRAM(s) Oral daily  ranolazine 500 milliGRAM(s) Oral two times a day  valsartan 80 milliGRAM(s) Oral daily      Vitals:  T(C): 36.4 (19 @ 20:55), Max: 36.7 (19 @ 13:58)  HR: 77 (19 @ 02:10) (59 - 78)  BP: 141/67 (19 @ 02:10) (118/75 - 164/80)  BP(mean): 89 (19 @ 13:04) (89 - 89)  RR: 18 (19 @ 02:10) (14 - 18)  SpO2: 98% (19 @ 02:10) (91% - 98%)  Wt(kg): --  Daily Height in cm: 177.8 (06 May 2019 13:58)    Daily Weight in k.1 (06 May 2019 13:04)  I&O's Summary    06 May 2019 07:01  -  07 May 2019 04:19  --------------------------------------------------------  IN: 0 mL / OUT: 750 mL / NET: -750 mL          Physical Exam:  Procedural Access Site: RFA access No hematoma, Non-tender to palpation, 2+ pulse, No bruit, No Ecchymosis  Respiratory: Clear to auscultation bilaterally  Skin: No rashes, No ecchymoses, No cyanosis        138  |  102  |  23  ----------------------------<  166<H>  3.7   |  23  |  1.21    Ca    9.3      07 May 2019 00:19  Phos  2.8       Mg     1.6         TPro  6.8  /  Alb  3.9  /  TBili  0.6  /  DBili  x   /  AST  17  /  ALT  26  /  AlkPhos  92      PT/INR - ( 06 May 2019 12:11 )   PT: 12.8 sec;   INR: 1.11 ratio         PTT - ( 06 May 2019 12:11 )  PTT:31.7 sec      Serum Pro-Brain Natriuretic Peptide: 732 pg/mL ( @ 12:11)      Interpretation of Telemetry:

## 2019-05-07 NOTE — PROGRESS NOTE ADULT - ASSESSMENT
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 Condition: established, stable. Plan: Discussed diagnosis in detail with patient. No treatment is required at this time. Will continue to observe condition and or symptoms. Reassured patient of current condition and treatment.
Patient is a 79y old  Male who presents with a chief complaint of CAD (07 May 2019 02:07) now s/p MAIA SHAISTA x 2 SVG to diag via right femoral artery access.  Pt tolerated the procedure well, site benign. Post-procedure discharge instructions discussed and questions addressed

## 2019-05-07 NOTE — CHART NOTE - NSCHARTNOTEFT_GEN_A_CORE
uncontrolled DMT2 with today's A1C 10.8 from 11.5 (1/2019).   Spoke to Dr. Sundeep Gomes on the phone [phone]; Pt is long standing uncontrolled DM, not able to bring A1C down to near 9 due to non compliance issues diet/medication.   Plan to discharge home with current medication (Humalog 10units before dinner with Onglyza 5mg daily, even though Dr. Gomes ordered Lantus 36 units Qhs, pt did not f/u Rx).   Pt will f/u with Dr. Gomes in 2 weeks and Dr. Sarkar for CAD. uncontrolled DMT2 with today's A1C 10.8 from 11.5 (1/2019).   Spoke to Dr. Sundeep Gomes on the phone [phone]; Pt is long standing uncontrolled DM, not able to bring A1C down to near 9 due to non compliance issues diet/medication.   Plan to discharge home with current medication (Humalog 10units before dinner with Onglyza 5mg daily, even though Dr. Gomes ordered Lantus 36 units Qhs, pt did not f/u Rx).   Pt is now agreed to take Lantus 36 units as Dr. Gomes's order.   Pt will f/u with Dr. Gomes in 2 weeks and Dr. Sarkar for CAD.

## 2019-05-07 NOTE — DISCHARGE NOTE PROVIDER - NSDCACTIVITY_GEN_ALL_CORE
Walking - Indoors allowed/No heavy lifting/straining/Walking - Outdoors allowed Stairs allowed/Walking - Indoors allowed/No heavy lifting/straining/Showering allowed/Walking - Outdoors allowed

## 2019-05-07 NOTE — DISCHARGE NOTE PROVIDER - CARE PROVIDER_API CALL
Stuart Sarkar (MD)  Cardiovascular Disease; Internal Medicine  [address]  [place] [address]  Phone: [phone]  Fax: [phone]  Follow Up Time: Stuart Sarkar)  Cardiovascular Disease; Internal Medicine  [address]  [place] [address]  Phone: [phone]  Fax: [phone]  Follow Up Time:     Sundeep Gomes)  EndocrinologyMetabDiabetes; Internal Medicine  Phone: [phone]  Fax: [phone]  Follow Up Time:

## 2019-05-07 NOTE — DISCHARGE NOTE PROVIDER - HOSPITAL COURSE
78 yo  male PMH CAD with stent (mLAD x1 SHAISTA), CABG 4V  ([place]) DM (A1c 11.7 Jan 2019, Dr. Sundeep Gomes- poorly controlled) HLD, anxiety, depression presents today [facility] ER on 5/6/19, sent by PMD Stuart Sarkar for progressive chest pain and shortness of breath for the past weeks, described as a tight/squeezing pain, sometimes at rest and sometimes with exertion. No fevers, chills, coughing. No symptoms currently. He denies leg swelling or calf pain. Denies hx of VTE. Took aspirin this morning. Patient transferred to [facility] for cardiac angiogram. Pt is now s/p C SHAISTA x 2 SVG-diag via RFA access. Pt tolerated the procedure well, site benign. Post-procedure discharge instructions discussed and questions addressed

## 2019-05-21 ENCOUNTER — APPOINTMENT (OUTPATIENT)
Dept: CARDIOLOGY | Facility: CLINIC | Age: 79
End: 2019-05-21

## 2019-05-21 VITALS
SYSTOLIC BLOOD PRESSURE: 120 MMHG | DIASTOLIC BLOOD PRESSURE: 80 MMHG | HEART RATE: 85 BPM | HEIGHT: 70 IN | WEIGHT: 203 LBS | BODY MASS INDEX: 29.06 KG/M2 | OXYGEN SATURATION: 95 %

## 2019-06-16 NOTE — H&P CARDIOLOGY - RESPIRATORY
Breath Sounds equal & clear to percussion & auscultation, no accessory muscle use
Principal Discharge DX:	Back pain  Assessment and plan of treatment:	Advance activity as tolerated.  Continue all previously prescribed medications as directed unless otherwise instructed.  Take Motrin (also sold as Advil or Ibuprofen) 400-600 mg (two or three 200 mg over the counter pills) every 8 hours as needed for moderate pain or fevers-- take with food. Take Tylenol 650mg (Two 325 mg pills) every 4-6 hours as needed for pain or fevers. Take Flexeril 5 mg every 8 hours as needed for muscle spasm -- causes drowsiness; no drinking alcohol or driving with this medication.  Follow up with your primary care physician and orthopedics (referral list provided) in 48-72 hours- bring copies of your results.  Return to the ER for worsening or persistent symptoms, including but not limited to worsening/persistent pain, numbness, weakness, lightheadedness, shortness of breath and/or ANY NEW OR CONCERNING SYMPTOMS. If you have issues obtaining follow up, please call: [phone] to obtain a doctor or specialist who takes your insurance in your area.  You may call [phone] to make an appointment with the internal medicine clinic.

## 2019-06-26 ENCOUNTER — MEDICATION RENEWAL (OUTPATIENT)
Age: 79
End: 2019-06-26

## 2019-06-26 ENCOUNTER — APPOINTMENT (OUTPATIENT)
Dept: CARDIOLOGY | Facility: CLINIC | Age: 79
End: 2019-06-26
Payer: MEDICARE

## 2019-06-26 ENCOUNTER — NON-APPOINTMENT (OUTPATIENT)
Age: 79
End: 2019-06-26

## 2019-06-26 VITALS
HEIGHT: 70 IN | DIASTOLIC BLOOD PRESSURE: 66 MMHG | SYSTOLIC BLOOD PRESSURE: 102 MMHG | OXYGEN SATURATION: 95 % | BODY MASS INDEX: 29.35 KG/M2 | HEART RATE: 80 BPM | WEIGHT: 205 LBS

## 2019-06-26 DIAGNOSIS — I20.9 ANGINA PECTORIS, UNSPECIFIED: ICD-10-CM

## 2019-06-26 PROCEDURE — 99214 OFFICE O/P EST MOD 30 MIN: CPT

## 2019-06-26 PROCEDURE — 93000 ELECTROCARDIOGRAM COMPLETE: CPT

## 2019-08-02 NOTE — H&P CARDIOLOGY - CONSTITUTIONAL
Well-developed, well nourished [Recent Wt Loss (___ Lbs)] : recent [unfilled] ~Ulb weight loss [Eye Irritation] : ~T irritation of the eyes [Postnasal Drip] : postnasal drip [Sputum] : sputum  [Dyspnea] : dyspnea [As Noted in HPI] : as noted in HPI [Snoring] : snoring [Negative] : Pulmonary Hypertension [Wheezing] : no wheezing

## 2019-08-08 ENCOUNTER — EMERGENCY (EMERGENCY)
Facility: HOSPITAL | Age: 79
LOS: 1 days | Discharge: HOME | End: 2019-08-08
Attending: EMERGENCY MEDICINE
Payer: MEDICARE

## 2019-08-08 VITALS
OXYGEN SATURATION: 97 % | SYSTOLIC BLOOD PRESSURE: 130 MMHG | HEART RATE: 83 BPM | DIASTOLIC BLOOD PRESSURE: 68 MMHG | RESPIRATION RATE: 20 BRPM

## 2019-08-08 VITALS
RESPIRATION RATE: 18 BRPM | OXYGEN SATURATION: 99 % | SYSTOLIC BLOOD PRESSURE: 120 MMHG | HEART RATE: 84 BPM | TEMPERATURE: 98 F | DIASTOLIC BLOOD PRESSURE: 70 MMHG

## 2019-08-08 DIAGNOSIS — Z96.611 PRESENCE OF RIGHT ARTIFICIAL SHOULDER JOINT: Chronic | ICD-10-CM

## 2019-08-08 PROCEDURE — 93005 ELECTROCARDIOGRAM TRACING: CPT

## 2019-08-08 PROCEDURE — 99285 EMERGENCY DEPT VISIT HI MDM: CPT

## 2019-08-08 PROCEDURE — 82962 GLUCOSE BLOOD TEST: CPT

## 2019-08-08 PROCEDURE — 99283 EMERGENCY DEPT VISIT LOW MDM: CPT

## 2019-08-08 PROCEDURE — 93010 ELECTROCARDIOGRAM REPORT: CPT

## 2019-08-08 RX ORDER — SODIUM CHLORIDE 9 MG/ML
1000 INJECTION INTRAMUSCULAR; INTRAVENOUS; SUBCUTANEOUS ONCE
Refills: 0 | Status: COMPLETED | OUTPATIENT
Start: 2019-08-08 | End: 2019-08-08

## 2019-08-08 NOTE — ED ADULT NURSE NOTE - OBJECTIVE STATEMENT
pt came in the er with c/o hyperglycemia.aaox4.no acute distress .pt refused  all blood work and cat scan dr hoover made aware .

## 2019-08-08 NOTE — ED PROVIDER NOTE - CLINICAL SUMMARY MEDICAL DECISION MAKING FREE TEXT BOX
Pt with episode of AMS, spontaneously resolved, hyperglycemia improving on triage blood glucose check. Advised CT head, labs, EKG, UA, IV fluids, and close observation. EKG showed T-wave inversion precordial leads. Pt with normal mental status and is refusing all other testing. He was informed clearly of significant risks of refusal, including but not limited to possible heat stroke, ICH, MI, serious infection, and death. Pt demonstrates appropriate decision making capacity and is deciding to sign out with family AMA.

## 2019-08-08 NOTE — ED ADULT NURSE NOTE - NSIMPLEMENTINTERV_GEN_ALL_ED
Implemented All Universal Safety Interventions:  [place] to call system. Call bell, personal items and telephone within reach. Instruct patient to call for assistance. Room bathroom lighting operational. Non-slip footwear when patient is off stretcher. Physically safe environment: no spills, clutter or unnecessary equipment. Stretcher in lowest position, wheels locked, appropriate side rails in place.

## 2019-08-08 NOTE — ED PROVIDER NOTE - OBJECTIVE STATEMENT
78 y/o M with past hx of DM (on insulin), HTN, CHF, CAD with stents, presents to the ED BIBA with family at bedside after an episode of AMS while waiting in a hot car for his family. He was sitting in the car from approximately 15:30 to 17:00. They found him with decreased responsiveness, sweating, and EMTs were called. They found hi, to have glucose of 495 as per family. Pt gradually improved and AMS normalized spontaneously over the course of about 30 minutes. Pt and his family denies any weakness, numbness, fever, nausea, vomiting, SOB, slurred speech. Pt and family deny any alcohol or drug usage.

## 2019-08-08 NOTE — ED PROVIDER NOTE - CARE PLAN
Principal Discharge DX:	Altered mental status, unspecified altered mental status type  Secondary Diagnosis:	Hyperglycemia  Secondary Diagnosis:	Abnormal EKG

## 2019-09-06 ENCOUNTER — APPOINTMENT (OUTPATIENT)
Dept: ORTHOPEDIC SURGERY | Facility: CLINIC | Age: 79
End: 2019-09-06
Payer: COMMERCIAL

## 2019-09-06 VITALS
HEIGHT: 70 IN | BODY MASS INDEX: 29.35 KG/M2 | SYSTOLIC BLOOD PRESSURE: 119 MMHG | DIASTOLIC BLOOD PRESSURE: 68 MMHG | WEIGHT: 205 LBS | HEART RATE: 77 BPM

## 2019-09-06 DIAGNOSIS — Z96.611 PRESENCE OF RIGHT ARTIFICIAL SHOULDER JOINT: ICD-10-CM

## 2019-09-06 DIAGNOSIS — M67.911 UNSPECIFIED DISORDER OF SYNOVIUM AND TENDON, RIGHT SHOULDER: ICD-10-CM

## 2019-09-06 DIAGNOSIS — R29.898 OTHER SYMPTOMS AND SIGNS INVOLVING THE MUSCULOSKELETAL SYSTEM: ICD-10-CM

## 2019-09-06 PROCEDURE — 73030 X-RAY EXAM OF SHOULDER: CPT

## 2019-09-06 PROCEDURE — 99213 OFFICE O/P EST LOW 20 MIN: CPT

## 2019-09-16 ENCOUNTER — NON-APPOINTMENT (OUTPATIENT)
Age: 79
End: 2019-09-16

## 2019-09-16 ENCOUNTER — APPOINTMENT (OUTPATIENT)
Dept: CARDIOLOGY | Facility: CLINIC | Age: 79
End: 2019-09-16
Payer: MEDICARE

## 2019-09-16 VITALS
SYSTOLIC BLOOD PRESSURE: 109 MMHG | HEIGHT: 70 IN | BODY MASS INDEX: 29.35 KG/M2 | WEIGHT: 205 LBS | DIASTOLIC BLOOD PRESSURE: 71 MMHG | HEART RATE: 86 BPM | OXYGEN SATURATION: 96 %

## 2019-09-16 DIAGNOSIS — N20.0 CALCULUS OF KIDNEY: ICD-10-CM

## 2019-09-16 DIAGNOSIS — Z87.09 PERSONAL HISTORY OF OTHER DISEASES OF THE RESPIRATORY SYSTEM: ICD-10-CM

## 2019-09-16 PROCEDURE — 99214 OFFICE O/P EST MOD 30 MIN: CPT

## 2019-09-16 PROCEDURE — 93000 ELECTROCARDIOGRAM COMPLETE: CPT

## 2019-09-25 ENCOUNTER — APPOINTMENT (OUTPATIENT)
Dept: ORTHOPEDIC SURGERY | Facility: CLINIC | Age: 79
End: 2019-09-25
Payer: MEDICARE

## 2019-09-25 VITALS
WEIGHT: 205 LBS | SYSTOLIC BLOOD PRESSURE: 113 MMHG | BODY MASS INDEX: 29.35 KG/M2 | DIASTOLIC BLOOD PRESSURE: 68 MMHG | HEART RATE: 80 BPM | HEIGHT: 70 IN

## 2019-09-25 DIAGNOSIS — S70.01XA CONTUSION OF RIGHT HIP, INITIAL ENCOUNTER: ICD-10-CM

## 2019-09-25 DIAGNOSIS — M16.11 UNILATERAL PRIMARY OSTEOARTHRITIS, RIGHT HIP: ICD-10-CM

## 2019-09-25 DIAGNOSIS — M17.11 UNILATERAL PRIMARY OSTEOARTHRITIS, RIGHT KNEE: ICD-10-CM

## 2019-09-25 PROCEDURE — 73562 X-RAY EXAM OF KNEE 3: CPT

## 2019-09-25 PROCEDURE — 72170 X-RAY EXAM OF PELVIS: CPT | Mod: RT

## 2019-09-25 PROCEDURE — 73501 X-RAY EXAM HIP UNI 1 VIEW: CPT

## 2019-09-25 PROCEDURE — 99213 OFFICE O/P EST LOW 20 MIN: CPT

## 2019-11-06 ENCOUNTER — INPATIENT (INPATIENT)
Facility: HOSPITAL | Age: 79
LOS: 1 days | Discharge: HOME | DRG: 282 | End: 2019-11-08
Attending: INTERNAL MEDICINE | Admitting: INTERNAL MEDICINE
Payer: MEDICARE

## 2019-11-06 VITALS
DIASTOLIC BLOOD PRESSURE: 84 MMHG | HEART RATE: 80 BPM | SYSTOLIC BLOOD PRESSURE: 137 MMHG | OXYGEN SATURATION: 95 % | RESPIRATION RATE: 18 BRPM

## 2019-11-06 DIAGNOSIS — N18.3 CHRONIC KIDNEY DISEASE, STAGE 3 (MODERATE): ICD-10-CM

## 2019-11-06 DIAGNOSIS — I21.4 NON-ST ELEVATION (NSTEMI) MYOCARDIAL INFARCTION: ICD-10-CM

## 2019-11-06 DIAGNOSIS — E11.29 TYPE 2 DIABETES MELLITUS WITH OTHER DIABETIC KIDNEY COMPLICATION: ICD-10-CM

## 2019-11-06 DIAGNOSIS — Z96.611 PRESENCE OF RIGHT ARTIFICIAL SHOULDER JOINT: Chronic | ICD-10-CM

## 2019-11-06 DIAGNOSIS — I10 ESSENTIAL (PRIMARY) HYPERTENSION: ICD-10-CM

## 2019-11-06 LAB
ALBUMIN SERPL ELPH-MCNC: 3.8 G/DL — SIGNIFICANT CHANGE UP (ref 3.3–5)
ALP SERPL-CCNC: 86 U/L — SIGNIFICANT CHANGE UP (ref 40–120)
ALT FLD-CCNC: 23 U/L — SIGNIFICANT CHANGE UP (ref 10–45)
ANION GAP SERPL CALC-SCNC: 13 MMOL/L — SIGNIFICANT CHANGE UP (ref 5–17)
APPEARANCE UR: CLEAR — SIGNIFICANT CHANGE UP
APTT BLD: 31.2 SEC — SIGNIFICANT CHANGE UP (ref 27.5–36.3)
APTT BLD: 52.9 SEC — HIGH (ref 27.5–36.3)
AST SERPL-CCNC: 17 U/L — SIGNIFICANT CHANGE UP (ref 10–40)
BASE EXCESS BLDV CALC-SCNC: 2.9 MMOL/L — HIGH (ref -2–2)
BASOPHILS # BLD AUTO: 0.02 K/UL — SIGNIFICANT CHANGE UP (ref 0–0.2)
BASOPHILS NFR BLD AUTO: 0.2 % — SIGNIFICANT CHANGE UP (ref 0–2)
BILIRUB SERPL-MCNC: 0.7 MG/DL — SIGNIFICANT CHANGE UP (ref 0.2–1.2)
BILIRUB UR-MCNC: NEGATIVE — SIGNIFICANT CHANGE UP
BUN SERPL-MCNC: 23 MG/DL — SIGNIFICANT CHANGE UP (ref 7–23)
CA-I SERPL-SCNC: 1.2 MMOL/L — SIGNIFICANT CHANGE UP (ref 1.12–1.3)
CALCIUM SERPL-MCNC: 9.7 MG/DL — SIGNIFICANT CHANGE UP (ref 8.4–10.5)
CHLORIDE BLDV-SCNC: 102 MMOL/L — SIGNIFICANT CHANGE UP (ref 96–108)
CHLORIDE SERPL-SCNC: 100 MMOL/L — SIGNIFICANT CHANGE UP (ref 96–108)
CK MB BLD-MCNC: 4.9 % — HIGH (ref 0–3.5)
CK MB CFR SERPL CALC: 6 NG/ML — SIGNIFICANT CHANGE UP (ref 0–6.7)
CK SERPL-CCNC: 123 U/L — SIGNIFICANT CHANGE UP (ref 30–200)
CO2 BLDV-SCNC: 29 MMOL/L — SIGNIFICANT CHANGE UP (ref 22–30)
CO2 SERPL-SCNC: 25 MMOL/L — SIGNIFICANT CHANGE UP (ref 22–31)
COLOR SPEC: SIGNIFICANT CHANGE UP
CREAT SERPL-MCNC: 1.36 MG/DL — HIGH (ref 0.5–1.3)
DIFF PNL FLD: NEGATIVE — SIGNIFICANT CHANGE UP
EOSINOPHIL # BLD AUTO: 0.16 K/UL — SIGNIFICANT CHANGE UP (ref 0–0.5)
EOSINOPHIL NFR BLD AUTO: 1.5 % — SIGNIFICANT CHANGE UP (ref 0–6)
GAS PNL BLDV: 138 MMOL/L — SIGNIFICANT CHANGE UP (ref 135–145)
GAS PNL BLDV: SIGNIFICANT CHANGE UP
GAS PNL BLDV: SIGNIFICANT CHANGE UP
GLUCOSE BLDC GLUCOMTR-MCNC: 238 MG/DL — HIGH (ref 70–99)
GLUCOSE BLDC GLUCOMTR-MCNC: 250 MG/DL — HIGH (ref 70–99)
GLUCOSE BLDV-MCNC: 320 MG/DL — HIGH (ref 70–99)
GLUCOSE SERPL-MCNC: 359 MG/DL — HIGH (ref 70–99)
GLUCOSE UR QL: ABNORMAL
HBA1C BLD-MCNC: 11.5 % — HIGH (ref 4–5.6)
HCO3 BLDV-SCNC: 28 MMOL/L — SIGNIFICANT CHANGE UP (ref 21–29)
HCT VFR BLD CALC: 34.7 % — LOW (ref 39–50)
HCT VFR BLD CALC: 36.9 % — LOW (ref 39–50)
HCT VFR BLDA CALC: 42 % — SIGNIFICANT CHANGE UP (ref 39–50)
HGB BLD CALC-MCNC: 13.5 G/DL — SIGNIFICANT CHANGE UP (ref 13–17)
HGB BLD-MCNC: 11.6 G/DL — LOW (ref 13–17)
HGB BLD-MCNC: 13 G/DL — SIGNIFICANT CHANGE UP (ref 13–17)
HOROWITZ INDEX BLDV+IHG-RTO: SIGNIFICANT CHANGE UP
IMM GRANULOCYTES NFR BLD AUTO: 0.4 % — SIGNIFICANT CHANGE UP (ref 0–1.5)
INR BLD: 1.06 RATIO — SIGNIFICANT CHANGE UP (ref 0.88–1.16)
KETONES UR-MCNC: NEGATIVE — SIGNIFICANT CHANGE UP
LACTATE BLDV-MCNC: 2.1 MMOL/L — HIGH (ref 0.7–2)
LEUKOCYTE ESTERASE UR-ACNC: NEGATIVE — SIGNIFICANT CHANGE UP
LIDOCAIN IGE QN: 51 U/L — SIGNIFICANT CHANGE UP (ref 7–60)
LYMPHOCYTES # BLD AUTO: 1.12 K/UL — SIGNIFICANT CHANGE UP (ref 1–3.3)
LYMPHOCYTES # BLD AUTO: 10.7 % — LOW (ref 13–44)
MAGNESIUM SERPL-MCNC: 1.6 MG/DL — SIGNIFICANT CHANGE UP (ref 1.6–2.6)
MCHC RBC-ENTMCNC: 31.4 PG — SIGNIFICANT CHANGE UP (ref 27–34)
MCHC RBC-ENTMCNC: 33.1 PG — SIGNIFICANT CHANGE UP (ref 27–34)
MCHC RBC-ENTMCNC: 33.4 GM/DL — SIGNIFICANT CHANGE UP (ref 32–36)
MCHC RBC-ENTMCNC: 35.2 GM/DL — SIGNIFICANT CHANGE UP (ref 32–36)
MCV RBC AUTO: 93.9 FL — SIGNIFICANT CHANGE UP (ref 80–100)
MCV RBC AUTO: 94 FL — SIGNIFICANT CHANGE UP (ref 80–100)
MONOCYTES # BLD AUTO: 0.78 K/UL — SIGNIFICANT CHANGE UP (ref 0–0.9)
MONOCYTES NFR BLD AUTO: 7.5 % — SIGNIFICANT CHANGE UP (ref 2–14)
NEUTROPHILS # BLD AUTO: 8.33 K/UL — HIGH (ref 1.8–7.4)
NEUTROPHILS NFR BLD AUTO: 79.7 % — HIGH (ref 43–77)
NITRITE UR-MCNC: NEGATIVE — SIGNIFICANT CHANGE UP
NRBC # BLD: 0 /100 WBCS — SIGNIFICANT CHANGE UP (ref 0–0)
NRBC # BLD: 0 /100 WBCS — SIGNIFICANT CHANGE UP (ref 0–0)
NT-PROBNP SERPL-SCNC: 2779 PG/ML — HIGH (ref 0–300)
PCO2 BLDV: 46 MMHG — SIGNIFICANT CHANGE UP (ref 35–50)
PH BLDV: 7.4 — SIGNIFICANT CHANGE UP (ref 7.35–7.45)
PH UR: 7 — SIGNIFICANT CHANGE UP (ref 5–8)
PLATELET # BLD AUTO: 125 K/UL — LOW (ref 150–400)
PLATELET # BLD AUTO: 126 K/UL — LOW (ref 150–400)
PO2 BLDV: 23 MMHG — LOW (ref 25–45)
POTASSIUM BLDV-SCNC: 4.6 MMOL/L — SIGNIFICANT CHANGE UP (ref 3.5–5.3)
POTASSIUM SERPL-MCNC: 5 MMOL/L — SIGNIFICANT CHANGE UP (ref 3.5–5.3)
POTASSIUM SERPL-SCNC: 5 MMOL/L — SIGNIFICANT CHANGE UP (ref 3.5–5.3)
PROT SERPL-MCNC: 6.6 G/DL — SIGNIFICANT CHANGE UP (ref 6–8.3)
PROT UR-MCNC: 100 — SIGNIFICANT CHANGE UP
PROTHROM AB SERPL-ACNC: 12.2 SEC — SIGNIFICANT CHANGE UP (ref 10–12.9)
RBC # BLD: 3.69 M/UL — LOW (ref 4.2–5.8)
RBC # BLD: 3.93 M/UL — LOW (ref 4.2–5.8)
RBC # FLD: 13.3 % — SIGNIFICANT CHANGE UP (ref 10.3–14.5)
RBC # FLD: 13.4 % — SIGNIFICANT CHANGE UP (ref 10.3–14.5)
SAO2 % BLDV: 32 % — LOW (ref 67–88)
SODIUM SERPL-SCNC: 138 MMOL/L — SIGNIFICANT CHANGE UP (ref 135–145)
SP GR SPEC: 1.02 — SIGNIFICANT CHANGE UP (ref 1.01–1.02)
TROPONIN T, HIGH SENSITIVITY RESULT: 216 NG/L — HIGH (ref 0–51)
TROPONIN T, HIGH SENSITIVITY RESULT: 225 NG/L — HIGH (ref 0–51)
TROPONIN T, HIGH SENSITIVITY RESULT: 85 NG/L — HIGH (ref 0–51)
UROBILINOGEN FLD QL: NEGATIVE — SIGNIFICANT CHANGE UP
WBC # BLD: 10.45 K/UL — SIGNIFICANT CHANGE UP (ref 3.8–10.5)
WBC # BLD: 6.9 K/UL — SIGNIFICANT CHANGE UP (ref 3.8–10.5)
WBC # FLD AUTO: 10.45 K/UL — SIGNIFICANT CHANGE UP (ref 3.8–10.5)
WBC # FLD AUTO: 6.9 K/UL — SIGNIFICANT CHANGE UP (ref 3.8–10.5)

## 2019-11-06 PROCEDURE — 93010 ELECTROCARDIOGRAM REPORT: CPT

## 2019-11-06 PROCEDURE — 71045 X-RAY EXAM CHEST 1 VIEW: CPT | Mod: 26

## 2019-11-06 PROCEDURE — 99291 CRITICAL CARE FIRST HOUR: CPT

## 2019-11-06 PROCEDURE — 99223 1ST HOSP IP/OBS HIGH 75: CPT | Mod: GC

## 2019-11-06 RX ORDER — INSULIN GLARGINE 100 [IU]/ML
25 INJECTION, SOLUTION SUBCUTANEOUS AT BEDTIME
Refills: 0 | Status: DISCONTINUED | OUTPATIENT
Start: 2019-11-06 | End: 2019-11-07

## 2019-11-06 RX ORDER — INSULIN LISPRO 100/ML
VIAL (ML) SUBCUTANEOUS AT BEDTIME
Refills: 0 | Status: DISCONTINUED | OUTPATIENT
Start: 2019-11-06 | End: 2019-11-08

## 2019-11-06 RX ORDER — INSULIN LISPRO 100/ML
VIAL (ML) SUBCUTANEOUS
Refills: 0 | Status: DISCONTINUED | OUTPATIENT
Start: 2019-11-06 | End: 2019-11-08

## 2019-11-06 RX ORDER — DEXTROSE 50 % IN WATER 50 %
25 SYRINGE (ML) INTRAVENOUS ONCE
Refills: 0 | Status: DISCONTINUED | OUTPATIENT
Start: 2019-11-06 | End: 2019-11-08

## 2019-11-06 RX ORDER — DEXTROSE 50 % IN WATER 50 %
12.5 SYRINGE (ML) INTRAVENOUS ONCE
Refills: 0 | Status: DISCONTINUED | OUTPATIENT
Start: 2019-11-06 | End: 2019-11-08

## 2019-11-06 RX ORDER — ISOSORBIDE MONONITRATE 60 MG/1
30 TABLET, EXTENDED RELEASE ORAL DAILY
Refills: 0 | Status: DISCONTINUED | OUTPATIENT
Start: 2019-11-06 | End: 2019-11-08

## 2019-11-06 RX ORDER — INSULIN LISPRO 100/ML
8 VIAL (ML) SUBCUTANEOUS
Refills: 0 | Status: DISCONTINUED | OUTPATIENT
Start: 2019-11-06 | End: 2019-11-07

## 2019-11-06 RX ORDER — INSULIN GLARGINE 100 [IU]/ML
36 INJECTION, SOLUTION SUBCUTANEOUS
Qty: 0 | Refills: 0 | DISCHARGE

## 2019-11-06 RX ORDER — HEPARIN SODIUM 5000 [USP'U]/ML
INJECTION INTRAVENOUS; SUBCUTANEOUS
Qty: 25000 | Refills: 0 | Status: DISCONTINUED | OUTPATIENT
Start: 2019-11-06 | End: 2019-11-07

## 2019-11-06 RX ORDER — CLOPIDOGREL BISULFATE 75 MG/1
75 TABLET, FILM COATED ORAL DAILY
Refills: 0 | Status: DISCONTINUED | OUTPATIENT
Start: 2019-11-07 | End: 2019-11-08

## 2019-11-06 RX ORDER — FAMOTIDINE 10 MG/ML
20 INJECTION INTRAVENOUS ONCE
Refills: 0 | Status: COMPLETED | OUTPATIENT
Start: 2019-11-06 | End: 2019-11-06

## 2019-11-06 RX ORDER — CLOPIDOGREL BISULFATE 75 MG/1
300 TABLET, FILM COATED ORAL ONCE
Refills: 0 | Status: COMPLETED | OUTPATIENT
Start: 2019-11-06 | End: 2019-11-06

## 2019-11-06 RX ORDER — HEPARIN SODIUM 5000 [USP'U]/ML
5000 INJECTION INTRAVENOUS; SUBCUTANEOUS ONCE
Refills: 0 | Status: COMPLETED | OUTPATIENT
Start: 2019-11-06 | End: 2019-11-06

## 2019-11-06 RX ORDER — ATORVASTATIN CALCIUM 80 MG/1
80 TABLET, FILM COATED ORAL AT BEDTIME
Refills: 0 | Status: DISCONTINUED | OUTPATIENT
Start: 2019-11-06 | End: 2019-11-08

## 2019-11-06 RX ORDER — GLUCAGON INJECTION, SOLUTION 0.5 MG/.1ML
1 INJECTION, SOLUTION SUBCUTANEOUS ONCE
Refills: 0 | Status: DISCONTINUED | OUTPATIENT
Start: 2019-11-06 | End: 2019-11-08

## 2019-11-06 RX ORDER — ACETAMINOPHEN 500 MG
650 TABLET ORAL ONCE
Refills: 0 | Status: COMPLETED | OUTPATIENT
Start: 2019-11-06 | End: 2019-11-06

## 2019-11-06 RX ORDER — HEPARIN SODIUM 5000 [USP'U]/ML
6000 INJECTION INTRAVENOUS; SUBCUTANEOUS EVERY 6 HOURS
Refills: 0 | Status: DISCONTINUED | OUTPATIENT
Start: 2019-11-06 | End: 2019-11-07

## 2019-11-06 RX ORDER — POTASSIUM CHLORIDE 20 MEQ
2 PACKET (EA) ORAL
Qty: 0 | Refills: 0 | DISCHARGE

## 2019-11-06 RX ORDER — DEXTROSE 50 % IN WATER 50 %
15 SYRINGE (ML) INTRAVENOUS ONCE
Refills: 0 | Status: DISCONTINUED | OUTPATIENT
Start: 2019-11-06 | End: 2019-11-08

## 2019-11-06 RX ORDER — ASPIRIN/CALCIUM CARB/MAGNESIUM 324 MG
324 TABLET ORAL ONCE
Refills: 0 | Status: DISCONTINUED | OUTPATIENT
Start: 2019-11-06 | End: 2019-11-06

## 2019-11-06 RX ORDER — RANOLAZINE 500 MG/1
500 TABLET, FILM COATED, EXTENDED RELEASE ORAL
Refills: 0 | Status: DISCONTINUED | OUTPATIENT
Start: 2019-11-06 | End: 2019-11-08

## 2019-11-06 RX ORDER — SODIUM CHLORIDE 9 MG/ML
1000 INJECTION, SOLUTION INTRAVENOUS
Refills: 0 | Status: DISCONTINUED | OUTPATIENT
Start: 2019-11-06 | End: 2019-11-08

## 2019-11-06 RX ORDER — ALLOPURINOL 300 MG
300 TABLET ORAL DAILY
Refills: 0 | Status: DISCONTINUED | OUTPATIENT
Start: 2019-11-06 | End: 2019-11-08

## 2019-11-06 RX ORDER — ASPIRIN/CALCIUM CARB/MAGNESIUM 324 MG
325 TABLET ORAL DAILY
Refills: 0 | Status: DISCONTINUED | OUTPATIENT
Start: 2019-11-06 | End: 2019-11-08

## 2019-11-06 RX ORDER — CARVEDILOL PHOSPHATE 80 MG/1
25 CAPSULE, EXTENDED RELEASE ORAL EVERY 12 HOURS
Refills: 0 | Status: DISCONTINUED | OUTPATIENT
Start: 2019-11-06 | End: 2019-11-08

## 2019-11-06 RX ADMIN — HEPARIN SODIUM 1000 UNIT(S)/HR: 5000 INJECTION INTRAVENOUS; SUBCUTANEOUS at 20:00

## 2019-11-06 RX ADMIN — Medication 30 MILLILITER(S): at 10:57

## 2019-11-06 RX ADMIN — FAMOTIDINE 20 MILLIGRAM(S): 10 INJECTION INTRAVENOUS at 10:57

## 2019-11-06 RX ADMIN — HEPARIN SODIUM 5000 UNIT(S): 5000 INJECTION INTRAVENOUS; SUBCUTANEOUS at 12:17

## 2019-11-06 RX ADMIN — ATORVASTATIN CALCIUM 80 MILLIGRAM(S): 80 TABLET, FILM COATED ORAL at 22:21

## 2019-11-06 RX ADMIN — Medication 8 UNIT(S): at 17:15

## 2019-11-06 RX ADMIN — Medication 650 MILLIGRAM(S): at 23:25

## 2019-11-06 RX ADMIN — INSULIN GLARGINE 25 UNIT(S): 100 INJECTION, SOLUTION SUBCUTANEOUS at 22:20

## 2019-11-06 RX ADMIN — CLOPIDOGREL BISULFATE 300 MILLIGRAM(S): 75 TABLET, FILM COATED ORAL at 15:00

## 2019-11-06 RX ADMIN — RANOLAZINE 500 MILLIGRAM(S): 500 TABLET, FILM COATED, EXTENDED RELEASE ORAL at 16:54

## 2019-11-06 RX ADMIN — CARVEDILOL PHOSPHATE 25 MILLIGRAM(S): 80 CAPSULE, EXTENDED RELEASE ORAL at 16:54

## 2019-11-06 RX ADMIN — Medication 2: at 17:15

## 2019-11-06 RX ADMIN — HEPARIN SODIUM 1000 UNIT(S)/HR: 5000 INJECTION INTRAVENOUS; SUBCUTANEOUS at 12:17

## 2019-11-06 NOTE — ED PROVIDER NOTE - ST/T WAVE
submillimeter ST depression and TWI V4-V6, largely unchanged from priors stable appearing submillimeter ST depressions V4-V6, largely unchanged from 1030 ECG

## 2019-11-06 NOTE — ED ADULT NURSE REASSESSMENT NOTE - NS ED NURSE REASSESS COMMENT FT1
heparin initiated per acs nomogram. actual weight acquired. Heparin infusion via 18g to left wrist. no acute distress. vss. resting comfortably in bed

## 2019-11-06 NOTE — ED PROVIDER NOTE - CLINICAL SUMMARY MEDICAL DECISION MAKING FREE TEXT BOX
Attending MD Jim: 78 y/o M with past hx of DM (on insulin), HTN, CHF, CAD presenting with 1 day of epigastric and chest "knot." Examination with mild epigastric ttp, otherwise non-localizing, EKG nondiagnostic for acute ischemic changes, however given extensive CAD history must consider atypical ACS. Patient already loaded with ASA with EMS, given SL nitro without improvement. Plan for serial EKGs and cardiac biomarkers, admission for high risk chest pain. Doubt biliary or acute bowel pathology in this patient as epigastric ttp is distractible but will reassess abdominal exam

## 2019-11-06 NOTE — CHART NOTE - NSCHARTNOTEFT_GEN_A_CORE
Received pt from ED. + NSTEMI. On heparin drip, aspirin and Plavix ( loaded ). Pt c/o slight chest discomfort , states feeling much better than AM and looking comfortable;   Troponin T, High Sensitivity Result: 216: @ 15:45. Discussed with cardiology attending.   Plan  Trend CE  [facility] in AM  continue current management: Heparin, aspirin and plavix  Discussed with attending

## 2019-11-06 NOTE — CONSULT NOTE ADULT - PROBLEM SELECTOR RECOMMENDATION 9
Will start patient on basal bolus insulin: Lantus 25u at bedtime, Humalog 8u before each meal, as well as low-scale coverage. Will continue monitoring FS, log, and FU.  Patient counseled for compliance with consistent low carb diet and exercise as tolerated outpatient.

## 2019-11-06 NOTE — H&P ADULT - NSHPPHYSICALEXAM_GEN_ALL_CORE
Vital Signs Last 24 Hrs  T(C): --  T(F): --  HR: 80 (06 Nov 2019 15:45) (80 - 89)  BP: 128/99 (06 Nov 2019 15:45) (115/74 - 137/84)  BP(mean): --  RR: 16 (06 Nov 2019 15:45) (16 - 18)  SpO2: 96% (06 Nov 2019 15:45) (95% - 97%)      PHYSICAL EXAM:  GENERAL: NAD, well-developed  HEAD:  Atraumatic, Normocephalic  EYES: EOMI, PERRLA, conjunctiva and sclera clear  NECK: Supple, No JVD  CHEST/LUNG: Clear to auscultation bilaterally; No wheeze  HEART: Regular rate and rhythm; No murmurs, rubs, or gallops  ABDOMEN: Soft, Nontender, Nondistended; Bowel sounds present  EXTREMITIES:  2+ Peripheral Pulses, No clubbing, cyanosis, or edema  PSYCH: AAOx3  NEUROLOGY: non-focal  SKIN: No rashes or lesions

## 2019-11-06 NOTE — ED PROVIDER NOTE - NS ED ROS FT
Constitutional: No fever or chills  Eyes: No visual changes, eye pain or redness  HEENT: No throat pain, ear pain, nasal pain. No nose bleeding.  CV: +chest pain -lower extremity edema  Resp: +SOB no cough  GI: +abd pain. No nausea or vomiting. No diarrhea. No constipation.   : No dysuria, hematuria.   MSK: No musculoskeletal pain  Skin: No rash  Neuro: No headache. No numbness or tingling. No weakness.

## 2019-11-06 NOTE — ED PROVIDER NOTE - PROGRESS NOTE DETAILS
Attending MD Jim: case discussed with Dr. Sarkar, recommends admission to medicine telemetry with plan for likely cardiac cath today. Will start heparin gtt now. Pain unchanged 4/10. Will repeat ECG to see if any evolution of change given active chest pain

## 2019-11-06 NOTE — H&P ADULT - HISTORY OF PRESENT ILLNESS
80 y/o M w/ hx of CAD with stent (mLAD x1 SHAISTA), CABG 4V  ([place]) DM (A1c 11.7 Jan 2019, recent NSTEMI 5/2019 s/p PCI prox SVG to D1, HLD, anxiety here for chest pain for 1 day.  PT states he was in his USOH when he developed acute onset of epigastric burning CP last night while sitting on couch.  Pt states he took an addl baby ASA and nitro SL at this time which reduced the pain significantly but did not going away.  Pt woke up in the middle of the night with addl episode of epigastric CP which he said recurred, requiring an additional nitro SL which did not help as much.  Pt called EMS and received ASA load, brought in to ED.  Pt reports similar CP on prior PCI in 5/2019.  States is only on ASA (unclear when stopped taking Plavix). Denies palpitations, orthopnea, worsening edema.    seen by cards.  for cath today
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
## 2019-11-06 NOTE — H&P ADULT - NSICDXPASTMEDICALHX_GEN_ALL_CORE_FT
PAST MEDICAL HISTORY:  Angina pectoris associated with type 2 diabetes mellitus     Anxiety     Arthritis     CAD (Coronary Artery Disease)     CHF (congestive heart failure) as per medical records Pt denies t PST 08/23/2016    Chronic Gout     CKD (chronic kidney disease) stage 3, GFR 30-59 ml/min     Depression     Diverticula, Colon     Erectile dysfunction     HTN (Hypertension)     Hypercholesteremia     MI (myocardial infarction) x2- 2013/2014    Renal Stone     Type 2 diabetes, uncontrolled, with renal manifestation

## 2019-11-06 NOTE — ED PROVIDER NOTE - CRITICAL CARE PROVIDED
documentation/additional history taking/consultation with other physicians/consult w/ pt's family directly relating to pts condition/interpretation of diagnostic studies/direct patient care (not related to procedure)

## 2019-11-06 NOTE — CONSULT NOTE ADULT - ASSESSMENT
78 y/o M w/ hx of CAD with stent (mLAD x1 SHAISTA), CABG 4V  ([place]) uncontrolled DM  recent NSTEMI 5/2019 s/p PCI prox SVG to D1, HLD, anxiety here for chest pain for 1 day. with NSTEMI now for coronary angiogram       1- CKD III   2- NSTEMI  3- pulm edema clinically  4- htn   5- proteinuria      cr still seems baseline range to slightly higher.   will stand at risk for worsening renal function with coronary angio given abn cr proteinuria w  consider iv lasix  if clinically improves with pulm edema to have gentle ivf hydration   cont coreg  cont O2  to have urine pro/cr ratio

## 2019-11-06 NOTE — PROVIDER CONTACT NOTE (OTHER) - ASSESSMENT
Patient's Lab Troponin T, High Sensitivity Result is 216. Patient is asymptomatic. Patient is Alert and Oriented times Four. Patient denies chest pain, discomfort, palpitations, shortness of breath, dyspnea on exertion, lightheadedness, dizziness, nausea and vomiting. Patient's Heart Rhythm is Normal Sinus Rhythm with some Premature Ventricular Contractions on the cardiac monitor. Patient's Vital Signs: Temperature 97.4 F Oral, Heart Rate 74, Blood Pressure 135/79, Respiratory Rate 18 and O2 Saturation 95% Room Air. Patient has an active order for Heparin Infusion Titration Use ACS Nomogram and Heparin Infusion infusing at 10 mL/Hr as ordered.
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
## 2019-11-06 NOTE — PROVIDER CONTACT NOTE (OTHER) - BACKGROUND
Patient admitted for Non-ST Elevation Myocardial Infarction, Chronic Kidney Disease, Hypertension, Diabetes Mellitus.

## 2019-11-06 NOTE — ED ADULT NURSE NOTE - NSIMPLEMENTINTERV_GEN_ALL_ED
Implemented All Universal Safety Interventions:  [place] to call system. Call bell, personal items and telephone within reach. Instruct patient to call for assistance. Room bathroom lighting operational. Non-slip footwear when patient is off stretcher. Physically safe environment: no spills, clutter or unnecessary equipment. Stretcher in lowest position, wheels locked, appropriate side rails in place.
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
## 2019-11-06 NOTE — PROVIDER CONTACT NOTE (OTHER) - ACTION/TREATMENT ORDERED:
NP aware & assessed patient & reviewed all orders, labs, history & plan. Cardiology consulted. NP aware & assessed patient & reviewed all orders, labs, history & plan. Cardiology consulted & at bedside assessing patient. NP ordered repeat Labs Cardiac Enzymes.

## 2019-11-06 NOTE — ED PROVIDER NOTE - OBJECTIVE STATEMENT
79yom pmhx of HTN HLD DM on insulin hx pf cholecystectomy hx of CAD s/p 18 stents BIB EMS for epigastric/chest knot like pain, taking asa and nitro with no relief. similar symx last time needing stents. No fever or chills. No nausea or vomiting.

## 2019-11-06 NOTE — ED PROVIDER NOTE - ATTENDING CONTRIBUTION TO CARE
Attending MD Jim:   I personally have seen and examined this patient.  Physician assistant note reviewed and agree on plan of care and except where noted.  See HPI, PE, and MDM for details.

## 2019-11-06 NOTE — H&P ADULT - ASSESSMENT
78 yo male h/o cad s/p pci, s/p cabg, DM, htn, chol, chf, ckd, a/w chest pain, r/o acs    chest pain  seen by cards  trend CE  plan for cath today  hep gtt  asa/plaivx  echo  tele    DM  insulin as per endo  check a1c    joaquin  renal consulted  hold ARB    chol  cont statin    cont other home meds    PT          Advanced care planning was discussed with patient and family.  Advanced care planning forms were reviewed and discussed.  Differential diagnosis and plan of care discussed with patient after the evaluation.   Pain assessed and judicious use of narcotics when appropriate was discussed.  Importance of Fall prevention discussed.  Counseling on Smoking and Alcohol cessation was offered when appropriate.  Counseling on Diet, exercise, and medication compliance was done. 80 yo male h/o cad s/p pci, s/p cabg, DM, htn, chol, chf, ckd, a/w chest pain, r/o acs    chest pain  NSTEMI  seen by cards  trend CE  plan for cath today  hep gtt  asa/plaivx  echo  tele    DM  insulin as per endo  check a1c    joaquin  renal consulted  hold ARB    chol  cont statin    cont other home meds    PT          Advanced care planning was discussed with patient and family.  Advanced care planning forms were reviewed and discussed.  Differential diagnosis and plan of care discussed with patient after the evaluation.   Pain assessed and judicious use of narcotics when appropriate was discussed.  Importance of Fall prevention discussed.  Counseling on Smoking and Alcohol cessation was offered when appropriate.  Counseling on Diet, exercise, and medication compliance was done.
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
## 2019-11-06 NOTE — CONSULT NOTE ADULT - ASSESSMENT
Assessment  DMT2: 79y Male with DM T2 with hyperglycemia, A1C pending, was on insulin at home, blood sugars running high, not at target, no hypoglycemic episode, patient states he has not eaten today, c/o chest pain.  CAD: NSTEMI, cardiac workup in progress, on medications, +chest pain, stable, monitored.  HTN: Controlled,  on antihypertensive medications.  CKD: Monitor labs/BMP.          Vincenzo Lujan MD  Cell: [phone]  Office: [phone]

## 2019-11-06 NOTE — ED ADULT NURSE NOTE - OBJECTIVE STATEMENT
79 year old, a+ox3 , presents with nonradiating sternal chest pain that started last night while he was watching TV. Pt took a NTG SL tab with no relief last night and took 2 NTG SL tablets this morning with no relief, called his doctor and then was advised to come to the ER. Pt is a retired [employer] and surrendered his firearm to the local police and EMS brought him here. Pt has an extensive cardiac hx. Arrives with resolved dizziness and a slight headache that is resolving. No nausea or vomiting. reports sob on exursion. Pt stated he took 325mg ASA this morning and EMS administered another 325mg ASA en route today. Lungs cta unlabored . abd soft nontender, Moves all extremities x4. no swelling in feet. skin w/d /I

## 2019-11-06 NOTE — CONSULT NOTE ADULT - ASSESSMENT
80 y/o M w/ hx of CAD with stent (mLAD x1 SHAISTA), CABG 4V  ([place]) DM (A1c 11.7 Jan 2019, recent NSTEMI 5/2019 s/p PCI prox SVG to D1, HLD, anxiety here for chest pain for 1 day, found to have NSTEMI.    #NSTEMI  -HDS, STD on EKG, trop 85  -mild hypervolemia on exam  -Harika 166  -currently CP free but uncomfortable appearing  -load plavix 600mg  -c/w asa, hep gtt  -will plan for LHC today  -post-cath TTE  -c/w high intensity statin, coreg, Imdur 30, ranolazine 500mg  -hold Valsartan given RUFINA    Will discuss w/ attending  Vamsi Boston MD  Cardiology Fellow - PGY 4  Text or Call: [phone]  For all New Consults and Questions:  [email or web address]   Login: [email or web address] 78 y/o M w/ hx of CAD with stent (mLAD x1 SHAISTA), CABG 4V  ([place]) DM (A1c 11.7 Jan 2019, recent NSTEMI 5/2019 s/p PCI prox SVG to D1, HLD, anxiety here for chest pain for 1 day, found to have NSTEMI.    #NSTEMI  -HDS, STD on EKG, trop 85  -mild hypervolemia on exam  -Harika 166  -currently CP free but uncomfortable appearing  -load plavix 600mg  -c/w asa, hep gtt  -will plan for LHC  -post-cath TTE  -c/w high intensity statin, coreg, Imdur 30, ranolazine 500mg  -hold Valsartan given RUFINA    Will discuss w/ attending  Vamsi Boston MD  Cardiology Fellow - PGY 4  Text or Call: [phone]  For all New Consults and Questions:  [email or web address]   Login: [email or web address] 80 y/o M w/ hx of CAD with stent (mLAD x1 SHAISTA), CABG 4V  ([place]) DM (A1c 11.7 Jan 2019, NSTEMI 5/2019 s/p PCI prox SVG to D1, HLD, anxiety.  Here for chest pain for 1 day, found to have NSTEMI.      REC:  #1.  CAD, N-STEMI  -Hemodynamically stable.    -mild hypervolemia on exam  -Harika score 166  -currently CP free  -load Plavix 600mg  -c/w asa, hep gtt  -will plan for LHC  -post-cath TTE  -c/w high intensity statin, coreg, Imdur 30, ranolazine 500mg  -hold Valsartan given RUFINA    #2.  HLD  - continue statin    #3.  HTN  - continue Coreg    #4.  DM  - continue usual diabetic meds.    Will discuss w/ attending    Vamsi Boston MD  Cardiology Fellow - PGY 4  Text or Call: [phone]    Tyrone Xie M.D.  Cardiology Attending, Consult Service  [phone] or beeper     For all Cardiology service contact information, go to [email or web address] and use "[email or web address]" to login.

## 2019-11-06 NOTE — ED PROVIDER NOTE - PHYSICAL EXAMINATION
Gen: AAO x 3, NAD  Skin: No rashes or lesions  HEENT: NC/AT, PERRLA, EOMI, MMM  Resp: unlabored CTAB  Cardiac: rrr s1s2, no murmurs, rubs or gallops  GI: ND, +BS, Soft, mild epigastric tenderness.   Ext: no pedal edema, FROM in all extremities  Neuro: no focal deficits

## 2019-11-07 LAB
ANION GAP SERPL CALC-SCNC: 12 MMOL/L — SIGNIFICANT CHANGE UP (ref 5–17)
APTT BLD: 48 SEC — HIGH (ref 27.5–36.3)
APTT BLD: 48.8 SEC — HIGH (ref 27.5–36.3)
BUN SERPL-MCNC: 23 MG/DL — SIGNIFICANT CHANGE UP (ref 7–23)
CALCIUM SERPL-MCNC: 9 MG/DL — SIGNIFICANT CHANGE UP (ref 8.4–10.5)
CHLORIDE SERPL-SCNC: 104 MMOL/L — SIGNIFICANT CHANGE UP (ref 96–108)
CK MB CFR SERPL CALC: 4.3 NG/ML — SIGNIFICANT CHANGE UP (ref 0–6.7)
CK SERPL-CCNC: 99 U/L — SIGNIFICANT CHANGE UP (ref 30–200)
CO2 SERPL-SCNC: 23 MMOL/L — SIGNIFICANT CHANGE UP (ref 22–31)
CREAT ?TM UR-MCNC: 97 MG/DL — SIGNIFICANT CHANGE UP
CREAT SERPL-MCNC: 1.28 MG/DL — SIGNIFICANT CHANGE UP (ref 0.5–1.3)
GLUCOSE BLDC GLUCOMTR-MCNC: 131 MG/DL — HIGH (ref 70–99)
GLUCOSE BLDC GLUCOMTR-MCNC: 185 MG/DL — HIGH (ref 70–99)
GLUCOSE BLDC GLUCOMTR-MCNC: 187 MG/DL — HIGH (ref 70–99)
GLUCOSE BLDC GLUCOMTR-MCNC: 189 MG/DL — HIGH (ref 70–99)
GLUCOSE SERPL-MCNC: 174 MG/DL — HIGH (ref 70–99)
HCT VFR BLD CALC: 34.8 % — LOW (ref 39–50)
HGB BLD-MCNC: 11.7 G/DL — LOW (ref 13–17)
MAGNESIUM SERPL-MCNC: 1.7 MG/DL — SIGNIFICANT CHANGE UP (ref 1.6–2.6)
MCHC RBC-ENTMCNC: 31.3 PG — SIGNIFICANT CHANGE UP (ref 27–34)
MCHC RBC-ENTMCNC: 33.6 GM/DL — SIGNIFICANT CHANGE UP (ref 32–36)
MCV RBC AUTO: 93 FL — SIGNIFICANT CHANGE UP (ref 80–100)
NRBC # BLD: 0 /100 WBCS — SIGNIFICANT CHANGE UP (ref 0–0)
PHOSPHATE SERPL-MCNC: 2.9 MG/DL — SIGNIFICANT CHANGE UP (ref 2.5–4.5)
PLATELET # BLD AUTO: 121 K/UL — LOW (ref 150–400)
POTASSIUM SERPL-MCNC: 3.8 MMOL/L — SIGNIFICANT CHANGE UP (ref 3.5–5.3)
POTASSIUM SERPL-SCNC: 3.8 MMOL/L — SIGNIFICANT CHANGE UP (ref 3.5–5.3)
PROT ?TM UR-MCNC: 187 MG/DL — HIGH (ref 0–12)
PROT/CREAT UR-RTO: 1.9 RATIO — HIGH (ref 0–0.2)
RBC # BLD: 3.74 M/UL — LOW (ref 4.2–5.8)
RBC # FLD: 13.2 % — SIGNIFICANT CHANGE UP (ref 10.3–14.5)
SODIUM SERPL-SCNC: 139 MMOL/L — SIGNIFICANT CHANGE UP (ref 135–145)
TROPONIN T, HIGH SENSITIVITY RESULT: 177 NG/L — HIGH (ref 0–51)
TROPONIN T, HIGH SENSITIVITY RESULT: 202 NG/L — HIGH (ref 0–51)
WBC # BLD: 6.9 K/UL — SIGNIFICANT CHANGE UP (ref 3.8–10.5)
WBC # FLD AUTO: 6.9 K/UL — SIGNIFICANT CHANGE UP (ref 3.8–10.5)

## 2019-11-07 PROCEDURE — 93306 TTE W/DOPPLER COMPLETE: CPT | Mod: 26

## 2019-11-07 PROCEDURE — 99152 MOD SED SAME PHYS/QHP 5/>YRS: CPT

## 2019-11-07 PROCEDURE — 93459 L HRT ART/GRFT ANGIO: CPT | Mod: 26

## 2019-11-07 RX ORDER — MAGNESIUM SULFATE 500 MG/ML
1 VIAL (ML) INJECTION ONCE
Refills: 0 | Status: COMPLETED | OUTPATIENT
Start: 2019-11-07 | End: 2019-11-07

## 2019-11-07 RX ORDER — POTASSIUM CHLORIDE 20 MEQ
20 PACKET (EA) ORAL ONCE
Refills: 0 | Status: COMPLETED | OUTPATIENT
Start: 2019-11-07 | End: 2019-11-07

## 2019-11-07 RX ORDER — INSULIN GLARGINE 100 [IU]/ML
30 INJECTION, SOLUTION SUBCUTANEOUS AT BEDTIME
Refills: 0 | Status: DISCONTINUED | OUTPATIENT
Start: 2019-11-07 | End: 2019-11-08

## 2019-11-07 RX ORDER — INSULIN LISPRO 100/ML
10 VIAL (ML) SUBCUTANEOUS
Refills: 0 | Status: DISCONTINUED | OUTPATIENT
Start: 2019-11-07 | End: 2019-11-08

## 2019-11-07 RX ORDER — ACETAMINOPHEN 500 MG
650 TABLET ORAL ONCE
Refills: 0 | Status: COMPLETED | OUTPATIENT
Start: 2019-11-07 | End: 2019-11-07

## 2019-11-07 RX ADMIN — Medication 650 MILLIGRAM(S): at 17:30

## 2019-11-07 RX ADMIN — Medication 650 MILLIGRAM(S): at 03:06

## 2019-11-07 RX ADMIN — Medication 100 GRAM(S): at 07:08

## 2019-11-07 RX ADMIN — ATORVASTATIN CALCIUM 80 MILLIGRAM(S): 80 TABLET, FILM COATED ORAL at 21:20

## 2019-11-07 RX ADMIN — CARVEDILOL PHOSPHATE 25 MILLIGRAM(S): 80 CAPSULE, EXTENDED RELEASE ORAL at 05:18

## 2019-11-07 RX ADMIN — CLOPIDOGREL BISULFATE 75 MILLIGRAM(S): 75 TABLET, FILM COATED ORAL at 12:09

## 2019-11-07 RX ADMIN — Medication 8 UNIT(S): at 08:56

## 2019-11-07 RX ADMIN — Medication 1: at 12:54

## 2019-11-07 RX ADMIN — RANOLAZINE 500 MILLIGRAM(S): 500 TABLET, FILM COATED, EXTENDED RELEASE ORAL at 16:39

## 2019-11-07 RX ADMIN — Medication 1: at 08:55

## 2019-11-07 RX ADMIN — INSULIN GLARGINE 30 UNIT(S): 100 INJECTION, SOLUTION SUBCUTANEOUS at 21:21

## 2019-11-07 RX ADMIN — Medication 10 UNIT(S): at 16:42

## 2019-11-07 RX ADMIN — Medication 20 MILLIEQUIVALENT(S): at 07:07

## 2019-11-07 RX ADMIN — ISOSORBIDE MONONITRATE 30 MILLIGRAM(S): 60 TABLET, EXTENDED RELEASE ORAL at 12:09

## 2019-11-07 RX ADMIN — Medication 300 MILLIGRAM(S): at 12:09

## 2019-11-07 RX ADMIN — HEPARIN SODIUM 1200 UNIT(S)/HR: 5000 INJECTION INTRAVENOUS; SUBCUTANEOUS at 06:05

## 2019-11-07 RX ADMIN — Medication 325 MILLIGRAM(S): at 12:09

## 2019-11-07 RX ADMIN — RANOLAZINE 500 MILLIGRAM(S): 500 TABLET, FILM COATED, EXTENDED RELEASE ORAL at 05:18

## 2019-11-07 RX ADMIN — CARVEDILOL PHOSPHATE 25 MILLIGRAM(S): 80 CAPSULE, EXTENDED RELEASE ORAL at 16:39

## 2019-11-07 RX ADMIN — HEPARIN SODIUM 1400 UNIT(S)/HR: 5000 INJECTION INTRAVENOUS; SUBCUTANEOUS at 12:51

## 2019-11-07 RX ADMIN — Medication 1: at 16:42

## 2019-11-07 RX ADMIN — Medication 650 MILLIGRAM(S): at 16:39

## 2019-11-07 NOTE — PROGRESS NOTE ADULT - ASSESSMENT
80 yo male h/o cad s/p pci, s/p cabg, DM, htn, chol, chf, ckd, a/w chest pain, r/o acs    chest pain  NSTEMI  seen by cards  s/p cath. no intervention. f/u official report  asa/plaivx  echo report noted with dec EF.  to obtain prior echo  may need ep eval for aicd  tele monitor    DM  insulin as per endo  a1c 11. uncontrolled     joaquin  renal consulted  hold ARB    chol  cont statin    cont other home meds    PT          Advanced care planning was discussed with patient and family.  Advanced care planning forms were reviewed and discussed.  Differential diagnosis and plan of care discussed with patient after the evaluation.   Pain assessed and judicious use of narcotics when appropriate was discussed.  Importance of Fall prevention discussed.  Counseling on Smoking and Alcohol cessation was offered when appropriate.  Counseling on Diet, exercise, and medication compliance was done.

## 2019-11-07 NOTE — CHART NOTE - NSCHARTNOTEFT_GEN_A_CORE
MEDICINE PA     EVENT SUMMARY   Notified by RN for 5 beats of WCT seen on tele. Pt seen at the  bedside. Pt asymptomatic; resting comfortably in bed at the time of the event. Pt denies CP, SOB, dyspnea, numbness, tingling, headache, palpitations, dizziness. Pt received AM dose of coreg. On tele pt is sinus 70s.    MEDICATIONS  (STANDING):  allopurinol 300 milliGRAM(s) Oral daily  aspirin enteric coated 325 milliGRAM(s) Oral daily  atorvastatin 80 milliGRAM(s) Oral at bedtime  carvedilol 25 milliGRAM(s) Oral every 12 hours  clopidogrel Tablet 75 milliGRAM(s) Oral daily  dextrose 5%. 1000 milliLiter(s) (50 mL/Hr) IV Continuous <Continuous>  dextrose 50% Injectable 12.5 Gram(s) IV Push once  dextrose 50% Injectable 25 Gram(s) IV Push once  dextrose 50% Injectable 25 Gram(s) IV Push once  heparin  Infusion.  Unit(s)/Hr (10 mL/Hr) IV Continuous <Continuous>  insulin glargine Injectable (LANTUS) 25 Unit(s) SubCutaneous at bedtime  insulin lispro (HumaLOG) corrective regimen sliding scale   SubCutaneous three times a day before meals  insulin lispro (HumaLOG) corrective regimen sliding scale   SubCutaneous at bedtime  insulin lispro Injectable (HumaLOG) 8 Unit(s) SubCutaneous three times a day before meals  isosorbide   mononitrate ER Tablet (IMDUR) 30 milliGRAM(s) Oral daily  ranolazine 500 milliGRAM(s) Oral two times a day    MEDICATIONS  (PRN):  dextrose 40% Gel 15 Gram(s) Oral once PRN Blood Glucose LESS THAN 70 milliGRAM(s)/deciLiter  glucagon  Injectable 1 milliGRAM(s) IntraMuscular once PRN Glucose <70 milliGRAM(s)/deciLiter  heparin  Injectable 6000 Unit(s) IV Push every 6 hours PRN For aPTT less than 40    ICU Vital Signs Last 24 Hrs  T(C): 36.9 (07 Nov 2019 05:02), Max: 37.1 (06 Nov 2019 20:28)  T(F): 98.5 (07 Nov 2019 05:02), Max: 98.7 (06 Nov 2019 20:28)  HR: 90 (07 Nov 2019 05:02) (74 - 90)  BP: 149/79 (07 Nov 2019 05:02) (115/74 - 149/79)  BP(mean): --  ABP: --  ABP(mean): --  RR: 18 (07 Nov 2019 05:02) (16 - 18)  SpO2: 94% (07 Nov 2019 05:02) (93% - 97%)    Comprehensive Metabolic Panel (11.06.19 @ 10:45)    Sodium, Serum: 138 mmol/L    Potassium, Serum: 5.0 mmol/L    Chloride, Serum: 100 mmol/L    Carbon Dioxide, Serum: 25 mmol/L    Anion Gap, Serum: 13 mmol/L    Blood Urea Nitrogen, Serum: 23 mg/dL    Creatinine, Serum: 1.36 mg/dL    Glucose, Serum: 359 mg/dL    Calcium, Total Serum: 9.7 mg/dL    Protein Total, Serum: 6.6 g/dL    Albumin, Serum: 3.8 g/dL    Bilirubin Total, Serum: 0.7 mg/dL    Alkaline Phosphatase, Serum: 86 U/L    Aspartate Aminotransferase (AST/SGOT): 17 U/L    Alanine Aminotransferase (ALT/SGPT): 23 U/L  Magnesium, Serum (11.06.19 @ 10:45)    Magnesium, Serum: 1.6 mg/dL    A&P  80 yo male h/o cad s/p pci, s/p cabg, DM, htn, chol, chf, ckd, a/w chest pain, r/o acs    NSVT  - c/w tele monitoring  - F/u BMP, Mg and phos sent this AM  - Keep K> 4 and Mg > 2  - c/w current regimen  - f/u with cards in AM  Will continue to monitor.    Melanie DAMON  #[id number]    ADDENDUM @ 7:30  Potassium, Serum: 3.8 mmol/L (11.07.19 @ 04:50)  Magnesium, Serum (11.07.19 @ 04:50)    Magnesium, Serum: 1.7 mg/dL    MgSo4 1 g x 1and KCL po 20 x 1 ordered. Will continue to monitor.    Melanie DAMON  #[id number]
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
## 2019-11-07 NOTE — PROGRESS NOTE ADULT - SUBJECTIVE AND OBJECTIVE BOX
Chief complaint  Patient is a 79y old  Male who presents with a chief complaint of chest pain (06 Nov 2019 22:45)   Review of systems  Patient in bed, looks comfortable, no fever, no hypoglycemia.    Labs and Fingersticks  CAPILLARY BLOOD GLUCOSE      POCT Blood Glucose.: 189 mg/dL (07 Nov 2019 12:51)  POCT Blood Glucose.: 185 mg/dL (07 Nov 2019 08:44)  POCT Blood Glucose.: 238 mg/dL (06 Nov 2019 22:19)  POCT Blood Glucose.: 250 mg/dL (06 Nov 2019 17:12)      Anion Gap, Serum: 12 (11-07 @ 04:50)  Anion Gap, Serum: 13 (11-06 @ 10:45)    Hemoglobin A1C, Whole Blood: 11.5 <H> (11-06 @ 18:16)    Calcium, Total Serum: 9.0 (11-07 @ 04:50)  Calcium, Total Serum: 9.7 (11-06 @ 10:45)  Albumin, Serum: 3.8 (11-06 @ 10:45)    Alanine Aminotransferase (ALT/SGPT): 23 (11-06 @ 10:45)  Alkaline Phosphatase, Serum: 86 (11-06 @ 10:45)  Aspartate Aminotransferase (AST/SGOT): 17 (11-06 @ 10:45)        11-07    139  |  104  |  23  ----------------------------<  174<H>  3.8   |  23  |  1.28    Ca    9.0      07 Nov 2019 04:50  Phos  2.9     11-07  Mg     1.7     11-07    TPro  6.6  /  Alb  3.8  /  TBili  0.7  /  DBili  x   /  AST  17  /  ALT  23  /  AlkPhos  86  11-06                        11.7   6.90  )-----------( 121      ( 07 Nov 2019 04:50 )             34.8     Medications  MEDICATIONS  (STANDING):  allopurinol 300 milliGRAM(s) Oral daily  aspirin enteric coated 325 milliGRAM(s) Oral daily  atorvastatin 80 milliGRAM(s) Oral at bedtime  carvedilol 25 milliGRAM(s) Oral every 12 hours  clopidogrel Tablet 75 milliGRAM(s) Oral daily  dextrose 5%. 1000 milliLiter(s) (50 mL/Hr) IV Continuous <Continuous>  dextrose 50% Injectable 12.5 Gram(s) IV Push once  dextrose 50% Injectable 25 Gram(s) IV Push once  dextrose 50% Injectable 25 Gram(s) IV Push once  heparin  Infusion.  Unit(s)/Hr (10 mL/Hr) IV Continuous <Continuous>  insulin glargine Injectable (LANTUS) 30 Unit(s) SubCutaneous at bedtime  insulin lispro (HumaLOG) corrective regimen sliding scale   SubCutaneous three times a day before meals  insulin lispro (HumaLOG) corrective regimen sliding scale   SubCutaneous at bedtime  insulin lispro Injectable (HumaLOG) 10 Unit(s) SubCutaneous three times a day before meals  isosorbide   mononitrate ER Tablet (IMDUR) 30 milliGRAM(s) Oral daily  ranolazine 500 milliGRAM(s) Oral two times a day      Physical Exam  General: Patient comfortable in bed  Vital Signs Last 12 Hrs  T(F): 97.8 (11-07-19 @ 12:06), Max: 98.5 (11-07-19 @ 05:02)  HR: 83 (11-07-19 @ 12:06) (83 - 90)  BP: 136/83 (11-07-19 @ 12:06) (136/83 - 149/79)  BP(mean): --  RR: 18 (11-07-19 @ 12:06) (18 - 18)  SpO2: 98% (11-07-19 @ 12:06) (94% - 98%)  Neck: No palpable thyroid nodules.  CVS: S1S2, No murmurs  Respiratory: No wheezing, no crepitations  GI: Abdomen soft, bowel sounds positive  Musculoskeletal:  edema lower extremities.   Skin: No skin rashes, no ecchymosis    Diagnostics    Hemoglobin A1C, Whole Blood: Routine (11-06 @ 15:01) Chief complaint  Patient is a 79y old  Male who presents with a chief complaint of chest pain (06 Nov 2019 22:45)   Review of systems  Patient in bed, looks comfortable, no fever,  no hypoglycemia.    Labs and Fingersticks  CAPILLARY BLOOD GLUCOSE      POCT Blood Glucose.: 189 mg/dL (07 Nov 2019 12:51)  POCT Blood Glucose.: 185 mg/dL (07 Nov 2019 08:44)  POCT Blood Glucose.: 238 mg/dL (06 Nov 2019 22:19)  POCT Blood Glucose.: 250 mg/dL (06 Nov 2019 17:12)      Anion Gap, Serum: 12 (11-07 @ 04:50)  Anion Gap, Serum: 13 (11-06 @ 10:45)    Hemoglobin A1C, Whole Blood: 11.5 <H> (11-06 @ 18:16)    Calcium, Total Serum: 9.0 (11-07 @ 04:50)  Calcium, Total Serum: 9.7 (11-06 @ 10:45)  Albumin, Serum: 3.8 (11-06 @ 10:45)    Alanine Aminotransferase (ALT/SGPT): 23 (11-06 @ 10:45)  Alkaline Phosphatase, Serum: 86 (11-06 @ 10:45)  Aspartate Aminotransferase (AST/SGOT): 17 (11-06 @ 10:45)        11-07    139  |  104  |  23  ----------------------------<  174<H>  3.8   |  23  |  1.28    Ca    9.0      07 Nov 2019 04:50  Phos  2.9     11-07  Mg     1.7     11-07    TPro  6.6  /  Alb  3.8  /  TBili  0.7  /  DBili  x   /  AST  17  /  ALT  23  /  AlkPhos  86  11-06                        11.7   6.90  )-----------( 121      ( 07 Nov 2019 04:50 )             34.8     Medications  MEDICATIONS  (STANDING):  allopurinol 300 milliGRAM(s) Oral daily  aspirin enteric coated 325 milliGRAM(s) Oral daily  atorvastatin 80 milliGRAM(s) Oral at bedtime  carvedilol 25 milliGRAM(s) Oral every 12 hours  clopidogrel Tablet 75 milliGRAM(s) Oral daily  dextrose 5%. 1000 milliLiter(s) (50 mL/Hr) IV Continuous <Continuous>  dextrose 50% Injectable 12.5 Gram(s) IV Push once  dextrose 50% Injectable 25 Gram(s) IV Push once  dextrose 50% Injectable 25 Gram(s) IV Push once  heparin  Infusion.  Unit(s)/Hr (10 mL/Hr) IV Continuous <Continuous>  insulin glargine Injectable (LANTUS) 30 Unit(s) SubCutaneous at bedtime  insulin lispro (HumaLOG) corrective regimen sliding scale   SubCutaneous three times a day before meals  insulin lispro (HumaLOG) corrective regimen sliding scale   SubCutaneous at bedtime  insulin lispro Injectable (HumaLOG) 10 Unit(s) SubCutaneous three times a day before meals  isosorbide   mononitrate ER Tablet (IMDUR) 30 milliGRAM(s) Oral daily  ranolazine 500 milliGRAM(s) Oral two times a day      Physical Exam  General: Patient comfortable in bed  Vital Signs Last 12 Hrs  T(F): 97.8 (11-07-19 @ 12:06), Max: 98.5 (11-07-19 @ 05:02)  HR: 83 (11-07-19 @ 12:06) (83 - 90)  BP: 136/83 (11-07-19 @ 12:06) (136/83 - 149/79)  BP(mean): --  RR: 18 (11-07-19 @ 12:06) (18 - 18)  SpO2: 98% (11-07-19 @ 12:06) (94% - 98%)  Neck: No palpable thyroid nodules.  CVS: S1S2, No murmurs  Respiratory: No wheezing, no crepitations  GI: Abdomen soft, bowel sounds positive  Musculoskeletal:  edema lower extremities.   Skin: No skin rashes, no ecchymosis    Diagnostics    Hemoglobin A1C, Whole Blood: Routine (11-06 @ 15:01)

## 2019-11-07 NOTE — PROGRESS NOTE ADULT - ASSESSMENT
80 y/o M w/ hx of CAD with stent (mLAD x1 SHAISTA), CABG 4V  ([facility]) uncontrolled DM  recent NSTEMI 5/2019 s/p PCI prox SVG to D1, HLD, anxiety here for chest pain for 1 day. with NSTEMI now for coronary angiogram       1- CKD II/III  2- NSTEMI  3-  proteinuria   4- htn         cr steady chceck cr in am    will stand at risk for worsening renal function with coronary angio given abn cr proteinuria w  sukh procedure gentle ivf hydration   cont coreg  ace I once cr steady post procedure   cont O2

## 2019-11-07 NOTE — PROGRESS NOTE ADULT - SUBJECTIVE AND OBJECTIVE BOX
[facility]   [phone]  RENAL FOLLOW UP NOTE  --------------------------------------------------------------------------------  Chief Complaint:    24 hour events/subjective:    seen earlier. no sob no cp     PAST HISTORY  --------------------------------------------------------------------------------  No significant changes to PMH, PSH, FHx, SHx, unless otherwise noted    ALLERGIES & MEDICATIONS  --------------------------------------------------------------------------------  Allergies    No Known Allergies    Intolerances      Standing Inpatient Medications  allopurinol 300 milliGRAM(s) Oral daily  aspirin enteric coated 325 milliGRAM(s) Oral daily  atorvastatin 80 milliGRAM(s) Oral at bedtime  carvedilol 25 milliGRAM(s) Oral every 12 hours  clopidogrel Tablet 75 milliGRAM(s) Oral daily  dextrose 5%. 1000 milliLiter(s) IV Continuous <Continuous>  dextrose 50% Injectable 12.5 Gram(s) IV Push once  dextrose 50% Injectable 25 Gram(s) IV Push once  dextrose 50% Injectable 25 Gram(s) IV Push once  insulin glargine Injectable (LANTUS) 30 Unit(s) SubCutaneous at bedtime  insulin lispro (HumaLOG) corrective regimen sliding scale   SubCutaneous three times a day before meals  insulin lispro (HumaLOG) corrective regimen sliding scale   SubCutaneous at bedtime  insulin lispro Injectable (HumaLOG) 10 Unit(s) SubCutaneous three times a day before meals  isosorbide   mononitrate ER Tablet (IMDUR) 30 milliGRAM(s) Oral daily  ranolazine 500 milliGRAM(s) Oral two times a day    PRN Inpatient Medications  dextrose 40% Gel 15 Gram(s) Oral once PRN  glucagon  Injectable 1 milliGRAM(s) IntraMuscular once PRN      REVIEW OF SYSTEMS  --------------------------------------------------------------------------------    Gen: denies fevers/chills,  CVS: denies chest pain/palpitations  Resp: denies SOB/Cough  GI: Denies N/V/Abd pain  : Denies dysuria/oliguria/hematuria    All other systems were reviewed and are negative, except as noted.    VITALS/PHYSICAL EXAM  --------------------------------------------------------------------------------  T(C): 36.7 (11-07-19 @ 16:15), Max: 37.1 (11-06-19 @ 20:28)  HR: 78 (11-07-19 @ 18:45) (69 - 90)  BP: 139/77 (11-07-19 @ 18:45) (133/84 - 150/84)  RR: 18 (11-07-19 @ 18:45) (18 - 18)  SpO2: 100% (11-07-19 @ 18:45) (93% - 100%)  Wt(kg): --    Weight (kg): 95.7 (11-06-19 @ 12:04)      11-06-19 @ 07:01  -  11-07-19 @ 07:00  --------------------------------------------------------  IN: 445 mL / OUT: 650 mL / NET: -205 mL    11-07-19 @ 07:01  -  11-07-19 @ 19:26  --------------------------------------------------------  IN: 840 mL / OUT: 1650 mL / NET: -810 mL      Physical Exam:  	  	Gen: Non toxic comfortable appearing   	no jvd ,   	Pulm: + crackles b/l bases no  ronchi or wheezing  	CV: RRR, S1S2; no rub  	Abd: +BS, soft, nontender/nondistended  	: No suprapubic tenderness  	UE: Warm, no cyanosis  no clubbing,  no edema  	LE: Warm, no cyanosis  no clubbing, no edema  	Neuro: alert and oriented. speech coherent   	Skin: Warm, no decrease skin turgor   	        LABS/STUDIES  --------------------------------------------------------------------------------              11.7   6.90  >-----------<  121      [11-07-19 @ 04:50]              34.8     139  |  104  |  23  ----------------------------<  174      [11-07-19 @ 04:50]  3.8   |  23  |  1.28        Ca     9.0     [11-07-19 @ 04:50]      Mg     1.7     [11-07-19 @ 04:50]      Phos  2.9     [11-07-19 @ 04:50]    TPro  6.6  /  Alb  3.8  /  TBili  0.7  /  DBili  x   /  AST  17  /  ALT  23  /  AlkPhos  86  [11-06-19 @ 10:45]    PT/INR: PT 12.2 , INR 1.06       [11-06-19 @ 10:45]  PTT: 48.8       [11-07-19 @ 12:23]    CK 99      [11-07-19 @ 00:59]    Creatinine Trend:  SCr 1.28 [11-07 @ 04:50]  SCr 1.36 [11-06 @ 10:45]              Urinalysis - [11-06-19 @ 19:11]      Color Light Yellow / Appearance Clear / SG 1.024 / pH 7.0      Gluc 1000 mg/dL / Ketone Negative  / Bili Negative / Urobili Negative       Blood Negative / Protein 100 / Leuk Est Negative / Nitrite Negative      RBC  / WBC  / Hyaline  / Gran  / Sq Epi  / Non Sq Epi  / Bacteria     Urine Creatinine 97      [11-07-19 @ 04:50]  Urine Protein 187      [11-07-19 @ 04:50]    HbA1c 11.5      [11-06-19 @ 18:16]

## 2019-11-07 NOTE — PROVIDER CONTACT NOTE (OTHER) - ASSESSMENT
Patient complaining of Head Pain rating the pain a 10/10 and describing the pain as aching and patient states that he has had this pain at home. Patient is Alert and Oriented times Four. Patient's Neurological Assessment is Within Normal Limits. No Neuro Deficits noted. Patient denies chest pain, shortness of breath, dizziness and lightheadedness. Patient's Heart Rhythm is Normal Sinus Rhythm on the cardiac monitor. Patient's Heart Rate 84, Blood Pressure 144/78, Respiratory Rate 18 and O2 Saturation 99% Room Air. Patient requesting PO Tylenol.

## 2019-11-07 NOTE — PROGRESS NOTE ADULT - ATTENDING COMMENTS
Will increase Lantus to 30u at bedtime, increase Humalog to 10u before each meal, and continue Humalog correction scale coverage.

## 2019-11-07 NOTE — PROGRESS NOTE ADULT - ASSESSMENT
Assessment  DMT2: 79y Male with DM T2 with hyperglycemia, A1C 11.5%, was on insulin at home, now on basal bolus insulin, blood sugars improving though not at target, no hypoglycemic episode, eating meals, appears comfortable.  CAD: echo today, cardiac workup in progress, on medications, +chest pain, stable, monitored.  HTN: Controlled,  on antihypertensive medications.  CKD: Monitor labs/BMP.          Vincenzo Lujan MD  Cell: [phone]  Office: [phone] Assessment  DMT2: 79y Male with DM T2 with hyperglycemia, A1C 11.5%, was on insulin at home, now on basal bolus insulin, blood sugars improving though not at target, no hypoglycemic episode,  eating meals, appears comfortable.  CAD: echo today, cardiac workup in progress, on medications, +chest pain, stable, monitored.  HTN: Controlled,  on antihypertensive medications.  CKD: Monitor labs/BMP.          Vincenzo Lujan MD  Cell: [phone]  Office: [phone]

## 2019-11-07 NOTE — PROGRESS NOTE ADULT - PROBLEM SELECTOR PLAN 1
Will increase Lantus to 30u at bedtime, increase Humalog to 10u before each meal, and continue Humalog correction scale coverage. Will continue monitoring FS, log, and FU.  Patient counseled for compliance with consistent low carb diet and exercise as tolerated outpatient. Will continue monitoring FS, log, and FU.  Patient counseled for compliance with consistent low carb diet and exercise as tolerated outpatient.

## 2019-11-07 NOTE — PROVIDER CONTACT NOTE (OTHER) - ACTION/TREATMENT ORDERED:
NP aware & assessed patient & reviewed all orders, labs, history & plan. NP ordered PO Tylenol. Patient states that his Head Pain resolved post PO Tylenol.

## 2019-11-08 ENCOUNTER — INBOUND DOCUMENT (OUTPATIENT)
Age: 79
End: 2019-11-08

## 2019-11-08 ENCOUNTER — TRANSCRIPTION ENCOUNTER (OUTPATIENT)
Age: 79
End: 2019-11-08

## 2019-11-08 VITALS
RESPIRATION RATE: 18 BRPM | DIASTOLIC BLOOD PRESSURE: 96 MMHG | SYSTOLIC BLOOD PRESSURE: 152 MMHG | OXYGEN SATURATION: 96 % | HEART RATE: 83 BPM | TEMPERATURE: 98 F

## 2019-11-08 LAB
ANION GAP SERPL CALC-SCNC: 13 MMOL/L — SIGNIFICANT CHANGE UP (ref 5–17)
BUN SERPL-MCNC: 22 MG/DL — SIGNIFICANT CHANGE UP (ref 7–23)
CALCIUM SERPL-MCNC: 9.2 MG/DL — SIGNIFICANT CHANGE UP (ref 8.4–10.5)
CHLORIDE SERPL-SCNC: 102 MMOL/L — SIGNIFICANT CHANGE UP (ref 96–108)
CO2 SERPL-SCNC: 23 MMOL/L — SIGNIFICANT CHANGE UP (ref 22–31)
CREAT SERPL-MCNC: 1.37 MG/DL — HIGH (ref 0.5–1.3)
GLUCOSE BLDC GLUCOMTR-MCNC: 123 MG/DL — HIGH (ref 70–99)
GLUCOSE SERPL-MCNC: 125 MG/DL — HIGH (ref 70–99)
HCT VFR BLD CALC: 35 % — LOW (ref 39–50)
HGB BLD-MCNC: 11.7 G/DL — LOW (ref 13–17)
MAGNESIUM SERPL-MCNC: 1.7 MG/DL — SIGNIFICANT CHANGE UP (ref 1.6–2.6)
MCHC RBC-ENTMCNC: 31.4 PG — SIGNIFICANT CHANGE UP (ref 27–34)
MCHC RBC-ENTMCNC: 33.4 GM/DL — SIGNIFICANT CHANGE UP (ref 32–36)
MCV RBC AUTO: 93.8 FL — SIGNIFICANT CHANGE UP (ref 80–100)
NRBC # BLD: 0 /100 WBCS — SIGNIFICANT CHANGE UP (ref 0–0)
PHOSPHATE SERPL-MCNC: 3.5 MG/DL — SIGNIFICANT CHANGE UP (ref 2.5–4.5)
PLATELET # BLD AUTO: 137 K/UL — LOW (ref 150–400)
POTASSIUM SERPL-MCNC: 3.9 MMOL/L — SIGNIFICANT CHANGE UP (ref 3.5–5.3)
POTASSIUM SERPL-SCNC: 3.9 MMOL/L — SIGNIFICANT CHANGE UP (ref 3.5–5.3)
RBC # BLD: 3.73 M/UL — LOW (ref 4.2–5.8)
RBC # FLD: 13.3 % — SIGNIFICANT CHANGE UP (ref 10.3–14.5)
SODIUM SERPL-SCNC: 138 MMOL/L — SIGNIFICANT CHANGE UP (ref 135–145)
WBC # BLD: 7.9 K/UL — SIGNIFICANT CHANGE UP (ref 3.8–10.5)
WBC # FLD AUTO: 7.9 K/UL — SIGNIFICANT CHANGE UP (ref 3.8–10.5)

## 2019-11-08 PROCEDURE — 99233 SBSQ HOSP IP/OBS HIGH 50: CPT

## 2019-11-08 RX ORDER — CLOPIDOGREL BISULFATE 75 MG/1
1 TABLET, FILM COATED ORAL
Qty: 30 | Refills: 11

## 2019-11-08 RX ORDER — SACUBITRIL AND VALSARTAN 24; 26 MG/1; MG/1
1 TABLET, FILM COATED ORAL
Qty: 60 | Refills: 0
Start: 2019-11-08 | End: 2019-12-07

## 2019-11-08 RX ORDER — VALSARTAN 80 MG/1
1 TABLET ORAL
Qty: 0 | Refills: 0 | DISCHARGE

## 2019-11-08 RX ADMIN — CARVEDILOL PHOSPHATE 25 MILLIGRAM(S): 80 CAPSULE, EXTENDED RELEASE ORAL at 05:31

## 2019-11-08 RX ADMIN — Medication 325 MILLIGRAM(S): at 11:20

## 2019-11-08 RX ADMIN — Medication 10 UNIT(S): at 08:48

## 2019-11-08 RX ADMIN — CLOPIDOGREL BISULFATE 75 MILLIGRAM(S): 75 TABLET, FILM COATED ORAL at 11:20

## 2019-11-08 RX ADMIN — RANOLAZINE 500 MILLIGRAM(S): 500 TABLET, FILM COATED, EXTENDED RELEASE ORAL at 05:31

## 2019-11-08 RX ADMIN — ISOSORBIDE MONONITRATE 30 MILLIGRAM(S): 60 TABLET, EXTENDED RELEASE ORAL at 11:20

## 2019-11-08 RX ADMIN — Medication 300 MILLIGRAM(S): at 11:20

## 2019-11-08 NOTE — PROGRESS NOTE ADULT - ASSESSMENT
Assessment  DMT2: 79y Male with DM T2 with hyperglycemia, A1C 11.5%, was on insulin at home, now on basal bolus insulin, increased dose yesterday, blood sugars improved and trending within acceptable range, no hypoglycemic episode, eating meals, appears comfortable.  CAD: cardiac workup in progress, on medications, +chest pain, stable, monitored.  HTN: Controlled,  on antihypertensive medications.  CKD: Monitor labs/BMP.          Vincenzo Lujan MD  Cell: [phone]  Office: [phone]

## 2019-11-08 NOTE — DISCHARGE NOTE PROVIDER - CARE PROVIDER_API CALL
Stuart Sarkar (MD)  Cardiovascular Disease; Internal Medicine  [address] [facility] [address]  [place] [address]  Phone: [phone]  Fax: [phone]  Follow Up Time: 2 weeks

## 2019-11-08 NOTE — DISCHARGE NOTE NURSING/CASE MANAGEMENT/SOCIAL WORK - PATIENT PORTAL LINK FT
You can access the FollowMyHealth Patient Portal offered by [facility] by registering at the following website: [email or web address]. By joining [facility]’s FollowMyHealth portal, you will also be able to view your health information using other applications (apps) compatible with our system.

## 2019-11-08 NOTE — PROGRESS NOTE ADULT - PROBLEM SELECTOR PLAN 1
Will continue current insulin regimen for now. Will continue monitoring FS, log, and FU.  Patient counseled for compliance with consistent low carb diet and exercise as tolerated outpatient.

## 2019-11-08 NOTE — CHART NOTE - NSCHARTNOTEFT_GEN_A_CORE
Patient is a 79y old  Male who presents with a chief complaint of chest pain (08 Nov 2019 12:49)  Pt seen by Cardiology, Dr Sarkar this morning.  No complaints at present. Pt anxious to be discharged.      Vital Signs Last 24 Hrs  T(C): 36.7 (08 Nov 2019 12:17), Max: 37.1 (07 Nov 2019 20:00)  T(F): 98.1 (08 Nov 2019 12:17), Max: 98.7 (07 Nov 2019 20:00)  HR: 83 (08 Nov 2019 12:17) (39 - 86)  BP: 152/96 (08 Nov 2019 12:17) (125/77 - 152/96)  BP(mean): --  RR: 18 (08 Nov 2019 12:17) (18 - 18)  SpO2: 96% (08 Nov 2019 12:17) (94% - 100%)      Labs:                          11.7   7.90  )-----------( 137      ( 08 Nov 2019 06:49 )             35.0     11-08    138  |  102  |  22  ----------------------------<  125<H>  3.9   |  23  |  1.37<H>    Ca    9.2      08 Nov 2019 06:49  Phos  3.5     11-08  Mg     1.7     11-08      CARDIAC MARKERS ( 07 Nov 2019 00:59 )  x     / x     / 99 U/L / x     / 4.3 ng/mL  CARDIAC MARKERS ( 06 Nov 2019 19:06 )  x     / x     / 123 U/L / x     / 6.0 ng/mL          Radiology:    Physical Exam:  General: WN/WD NAD  Neurology: A&Ox3, nonfocal, PERRIN x 4  Head:  Normocephalic, atraumatic  Respiratory: CTA B/L  CV: RRR, S1S2, no murmur  Abdominal: Soft, NT, ND no palpable mass  MSK: No edema, + peripheral pulses, FROM all 4 extremity    Discharge Note and Plan:  >Follow up with Dr. Sarkar in 2 weeks.   Reviewed medication reconciliation with Medical Attending Dr. Paz. Entresto is not covered by pt's insurance.      Itzel Khalil ANP-[facility] #[id number] Patient is a 79y old  Male who presents with a chief complaint of chest pain (08 Nov 2019 12:49)  Pt seen by Cardiology, Dr Sarkar this morning.  No complaints at present. Pt anxious to be discharged.      Vital Signs Last 24 Hrs  T(C): 36.7 (08 Nov 2019 12:17), Max: 37.1 (07 Nov 2019 20:00)  T(F): 98.1 (08 Nov 2019 12:17), Max: 98.7 (07 Nov 2019 20:00)  HR: 83 (08 Nov 2019 12:17) (39 - 86)  BP: 152/96 (08 Nov 2019 12:17) (125/77 - 152/96)  BP(mean): --  RR: 18 (08 Nov 2019 12:17) (18 - 18)  SpO2: 96% (08 Nov 2019 12:17) (94% - 100%)      Labs:                          11.7   7.90  )-----------( 137      ( 08 Nov 2019 06:49 )             35.0     11-08    138  |  102  |  22  ----------------------------<  125<H>  3.9   |  23  |  1.37<H>    Ca    9.2      08 Nov 2019 06:49  Phos  3.5     11-08  Mg     1.7     11-08      Physical Exam:  General: WN/WD NAD  Neurology: A&Ox3, nonfocal, PERRIN x 4  Head:  Normocephalic, atraumatic  Respiratory: CTA B/L  CV: RRR, S1S2, no murmur  Abdominal: Soft, NT, ND no palpable mass  MSK: No edema, + peripheral pulses, FROM all 4 extremity    Discharge Note and Plan:  >Follow up with Dr. Sarkar in 2 weeks.   Reviewed medication reconciliation with Medical Attending Dr. Paz. Entresto is not covered by pt's insurance need pre-authorization.   Spoke with Dr. Gruber covering for Dr. Sarkar.  Pt's creatinine remains elevated at 1.37.  Do not give Entresto at this time.  Pt is to follow up next week to have  repeat BMP drawn.  Continue with present medication regimen (Coreg, Imdur ER, Ranexa and hold Valsartan).   Discussed findings with Dr. Paz.  Pt verbalize understanding.     Itzel Khalil DNP.[facility]  [facility] #[phone]

## 2019-11-08 NOTE — DISCHARGE NOTE PROVIDER - NSDCCPCAREPLAN_GEN_ALL_CORE_FT
PRINCIPAL DISCHARGE DIAGNOSIS  Diagnosis: NSTEMI (non-ST elevated myocardial infarction)  Assessment and Plan of Treatment:   HOME CARE INSTRUCTIONS  For the next few days, avoid physical activities that bring on chest pain. Continue physical activities as directed.  Do not smoke.  Avoid drinking alcohol.   Only take over-the-counter or prescription medicine for pain, discomfort, or fever as directed by your caregiver.  Follow your caregiver's suggestions for further testing if your chest pain does not go away.  Keep any follow-up appointments you made. If you do not go to an appointment, you could develop lasting (chronic) problems with pain. If there is any problem keeping an appointment, you must call to reschedule.   SEEK MEDICAL CARE IF:  You think you are having problems from the medicine you are taking. Read your medicine instructions carefully.  Your chest pain does not go away, even after treatment.  You develop a rash with blisters on your chest.  SEEK IMMEDIATE MEDICAL CARE IF:  You have increased chest pain or pain that spreads to your arm, neck, jaw, back, or abdomen.   You develop shortness of breath, an increasing cough, or you are coughing up blood.  You have severe back or abdominal pain, feel nauseous, or vomit.  You develop severe weakness, fainting, or chills.  You have a fever.  THIS IS AN EMERGENCY. Do not wait to see if the pain will go away. Get medical help at once. Call your local emergency services (_____________________). Do not drive yourself to the hospital..      SECONDARY DISCHARGE DIAGNOSES  Diagnosis: Type 2 diabetes, uncontrolled, with renal manifestation  Assessment and Plan of Treatment: Type 2 diabetes, uncontrolled, with renal manifestation  HgA1C this admission.  Make sure you get your HgA1c checked every three months.  If you take oral diabetes medications, check your blood glucose two times a day.  If you take insulin, check your blood glucose before meals and at bedtime.  It's important not to skip any meals.  Keep a log of your blood glucose results and always take it with you to your doctor appointments.  Keep a list of your current medications including injectables and over the counter medications and bring this medication list with you to all your doctor appointments.  If you have not seen your ophthalmologist this year call for appointment.  Check your feet daily for redness, sores, or openings. Do not self treat. If no improvement in two days call your primary care physician for an appointment.  Low blood sugar (hypoglycemia) is a blood sugar below 70mg/dl. Check your blood sugar if you feel signs/symptoms of hypoglycemia. If your blood sugar is below 70 take 15 grams of carbohydrates (ex 4 oz of apple juice, 3-4 glucose tablets, or 4-6 oz of regular soda) wait 15 minutes and repeat blood sugar to make sure it comes up above 70.  If your blood sugar is above 70 and you are due for a meal, have a meal.  If you are not due for a meal have a snack.  This snack helps keeps your blood sugar at a safe range.      Diagnosis: CKD (chronic kidney disease) stage 3, GFR 30-59 ml/min  Assessment and Plan of Treatment: CKD (chronic kidney disease) stage 3, GFR 30-59 ml/min  Avoid taking (NSAIDs) - (ex: Ibuprofen, Advil, Celebrex, Naprosyn)  Avoid taking any nephrotoxic agents (can harm kidneys) - Intravenous contrast for diagnostic testing, combination cold medications.  Have all medications adjusted for your renal function by your Health Care Provider.  Blood pressure control is important.  Take all medication as prescribed.

## 2019-11-08 NOTE — DISCHARGE NOTE PROVIDER - NSDCMRMEDTOKEN_GEN_ALL_CORE_FT
allopurinol 300 mg oral tablet: 1 tab(s) orally once a day  Aspirin Enteric Coated 81 mg oral delayed release tablet: 1 tab(s) orally once a day MDD:1  Brilinta (ticagrelor) 90 mg oral tablet: 1 tab(s) orally 2 times a day MDD:1  carvedilol 25 mg oral tablet: 1 tab(s) orally 2 times a day  Centrum Silver Men&#x27;s oral tablet: 1 tab(s) orally once a day  Entresto 24 mg-26 mg oral tablet: 1 tab(s) orally 2 times a day MDD:2  insulin glargine 100 units/mL subcutaneous solution: 30 unit(s) subcutaneous once a day (in the evening)  isosorbide mononitrate 30 mg oral tablet, extended release: 1 tab(s) orally once a day  Lipitor 80 mg oral tablet: 1 tab(s) orally once a day  NovoLOG 100 units/mL subcutaneous solution: 10 unit(s) subcutaneous once a day  Onglyza 5 mg oral tablet: 1 tab(s) orally once a day  potassium citrate 10 mEq oral tablet, extended release: 2 tab(s) orally 2 times a day  ranolazine 500 mg oral tablet, extended release: 1 tab(s) orally 2 times a day

## 2019-11-08 NOTE — PROGRESS NOTE ADULT - SUBJECTIVE AND OBJECTIVE BOX
cardiac catheterization results reviewed with Dr. Mondragon. The patient does have worsening of a distal LAD lesion which may be the culprit vessel of the recent NSTEMI but does  not merit intervention. The results of the echocardiogram are somewhat unexpected and disturbing. The patient's ejection fraction is now 20% And he does have rales on exam although he is completely asymptomatic and otherwise fully active. I question whether the left ventricular dysfunction is a manifestation of hibernating myocardium.       Vital Signs Last 24 Hrs  T(C): 37.1 (2019 04:55), Max: 37.1 (2019 20:00)  T(F): 98.7 (2019 04:55), Max: 98.7 (2019 20:00)  HR: 74 (2019 04:55) (39 - 86)  BP: 127/65 (2019 04:55) (125/77 - 150/84)  BP(mean): --  RR: 18 (2019 04:55) (18 - 18)  SpO2: 94% (2019 04:55) (94% - 100%)  Daily     Daily Weight in k.3 (2019 04:55)  I&O's Summary    2019 07:01  -  2019 07:00  --------------------------------------------------------  IN: 960 mL / OUT: 1850 mL / NET: -890 mL        Neck: no bruits, JVD, adenopathy  Chest: crackles 1/4 up bilaterally  Cor: [id number], RR, normal S1S2 with no mrght  Abd: soft, nontender, BS+ and no HSM  Ext: w/o CCE  Pulses:2+->dp bilaterally    MEDICATIONS  (STANDING):  allopurinol 300 milliGRAM(s) Oral daily  aspirin enteric coated 325 milliGRAM(s) Oral daily  atorvastatin 80 milliGRAM(s) Oral at bedtime  carvedilol 25 milliGRAM(s) Oral every 12 hours  clopidogrel Tablet 75 milliGRAM(s) Oral daily  dextrose 5%. 1000 milliLiter(s) (50 mL/Hr) IV Continuous <Continuous>  dextrose 50% Injectable 12.5 Gram(s) IV Push once  dextrose 50% Injectable 25 Gram(s) IV Push once  dextrose 50% Injectable 25 Gram(s) IV Push once  insulin glargine Injectable (LANTUS) 30 Unit(s) SubCutaneous at bedtime  insulin lispro (HumaLOG) corrective regimen sliding scale   SubCutaneous three times a day before meals  insulin lispro (HumaLOG) corrective regimen sliding scale   SubCutaneous at bedtime  insulin lispro Injectable (HumaLOG) 10 Unit(s) SubCutaneous three times a day before meals  isosorbide   mononitrate ER Tablet (IMDUR) 30 milliGRAM(s) Oral daily  ranolazine 500 milliGRAM(s) Oral two times a day    MEDICATIONS  (PRN):  dextrose 40% Gel 15 Gram(s) Oral once PRN Blood Glucose LESS THAN 70 milliGRAM(s)/deciLiter  glucagon  Injectable 1 milliGRAM(s) IntraMuscular once PRN Glucose <70 milliGRAM(s)/deciLiter          138  |  102  |  22  ----------------------------<  125<H>  3.9   |  23  |  1.37<H>    Ca    9.2      2019 06:49  Phos  3.5       Mg     1.7         TPro  6.6  /  Alb  3.8  /  TBili  0.7  /  DBili  x   /  AST  17  /  ALT  23  /  AlkPhos  86  -                          11.7   7.90  )-----------( 137      ( 2019 06:49 )             35.0     PT/INR - ( 2019 10:45 )   PT: 12.2 sec;   INR: 1.06 ratio         PTT - ( 2019 12:23 )  PTT:48.8 sec  Urinalysis Basic - ( 2019 19:11 )    Color: Light Yellow / Appearance: Clear / S.024 / pH: x  Gluc: x / Ketone: Negative  / Bili: Negative / Urobili: Negative   Blood: x / Protein: 100 / Nitrite: Negative   Leuk Esterase: Negative / RBC: x / WBC x   Sq Epi: x / Non Sq Epi: x / Bacteria: x      HEALTH ISSUES - PROBLEM Dx:  ASHD/S/P NSTEMI-insofar as the patient's coronary disease does seem to be progressing would switch from Plavix to Brilinta and change aspirin to81 mg daily.  Left ventricular dysfunction-Add Entresto 24/26 po bid with F/U electrolytres in 1-2 weeks.  Follow up echo in 40 days.  If LVEF is still <30, will schedule patient for ICD.  HTN (Hypertension): well-controlled  Type 2 diabetes, uncontrolled, with renal manifestation: continue treatment  No objection to discharge home today.  Return visit my office 2 weeks.

## 2019-11-08 NOTE — PROGRESS NOTE ADULT - SUBJECTIVE AND OBJECTIVE BOX
Chief complaint  Patient is a 79y old  Male who presents with a chief complaint of chest pain (08 Nov 2019 11:16)   Review of systems  Patient in bed, looks comfortable, no fever, no hypoglycemia.    Labs and Fingersticks  CAPILLARY BLOOD GLUCOSE      POCT Blood Glucose.: 123 mg/dL (08 Nov 2019 08:38)  POCT Blood Glucose.: 131 mg/dL (07 Nov 2019 21:18)  POCT Blood Glucose.: 187 mg/dL (07 Nov 2019 16:32)  POCT Blood Glucose.: 189 mg/dL (07 Nov 2019 12:51)      Anion Gap, Serum: 13 (11-08 @ 06:49)  Anion Gap, Serum: 12 (11-07 @ 04:50)    Hemoglobin A1C, Whole Blood: 11.5 <H> (11-06 @ 18:16)    Calcium, Total Serum: 9.2 (11-08 @ 06:49)  Calcium, Total Serum: 9.0 (11-07 @ 04:50)          11-08    138  |  102  |  22  ----------------------------<  125<H>  3.9   |  23  |  1.37<H>    Ca    9.2      08 Nov 2019 06:49  Phos  3.5     11-08  Mg     1.7     11-08                          11.7   7.90  )-----------( 137      ( 08 Nov 2019 06:49 )             35.0     Medications  MEDICATIONS  (STANDING):  allopurinol 300 milliGRAM(s) Oral daily  aspirin enteric coated 325 milliGRAM(s) Oral daily  atorvastatin 80 milliGRAM(s) Oral at bedtime  carvedilol 25 milliGRAM(s) Oral every 12 hours  clopidogrel Tablet 75 milliGRAM(s) Oral daily  dextrose 5%. 1000 milliLiter(s) (50 mL/Hr) IV Continuous <Continuous>  dextrose 50% Injectable 12.5 Gram(s) IV Push once  dextrose 50% Injectable 25 Gram(s) IV Push once  dextrose 50% Injectable 25 Gram(s) IV Push once  insulin glargine Injectable (LANTUS) 30 Unit(s) SubCutaneous at bedtime  insulin lispro (HumaLOG) corrective regimen sliding scale   SubCutaneous three times a day before meals  insulin lispro (HumaLOG) corrective regimen sliding scale   SubCutaneous at bedtime  insulin lispro Injectable (HumaLOG) 10 Unit(s) SubCutaneous three times a day before meals  isosorbide   mononitrate ER Tablet (IMDUR) 30 milliGRAM(s) Oral daily  ranolazine 500 milliGRAM(s) Oral two times a day      Physical Exam  General: Patient comfortable in bed  Vital Signs Last 12 Hrs  T(F): 98.1 (11-08-19 @ 12:17), Max: 98.7 (11-08-19 @ 04:55)  HR: 83 (11-08-19 @ 12:17) (74 - 83)  BP: 152/96 (11-08-19 @ 12:17) (127/65 - 152/96)  BP(mean): --  RR: 18 (11-08-19 @ 12:17) (18 - 18)  SpO2: 96% (11-08-19 @ 12:17) (94% - 96%)  Neck: No palpable thyroid nodules.  CVS: S1S2, No murmurs  Respiratory: No wheezing, no crepitations  GI: Abdomen soft, bowel sounds positive  Musculoskeletal:  edema lower extremities.   Skin: No skin rashes, no ecchymosis    Diagnostics    Hemoglobin A1C, Whole Blood: Routine (11-06 @ 15:01)

## 2019-11-11 ENCOUNTER — INBOUND DOCUMENT (OUTPATIENT)
Age: 79
End: 2019-11-11

## 2019-11-12 PROCEDURE — 83036 HEMOGLOBIN GLYCOSYLATED A1C: CPT

## 2019-11-12 PROCEDURE — 80048 BASIC METABOLIC PNL TOTAL CA: CPT

## 2019-11-12 PROCEDURE — 82330 ASSAY OF CALCIUM: CPT

## 2019-11-12 PROCEDURE — C1887: CPT

## 2019-11-12 PROCEDURE — 99152 MOD SED SAME PHYS/QHP 5/>YRS: CPT

## 2019-11-12 PROCEDURE — 82565 ASSAY OF CREATININE: CPT

## 2019-11-12 PROCEDURE — 82435 ASSAY OF BLOOD CHLORIDE: CPT

## 2019-11-12 PROCEDURE — 82570 ASSAY OF URINE CREATININE: CPT

## 2019-11-12 PROCEDURE — 84156 ASSAY OF PROTEIN URINE: CPT

## 2019-11-12 PROCEDURE — C8929: CPT

## 2019-11-12 PROCEDURE — C1769: CPT

## 2019-11-12 PROCEDURE — 83880 ASSAY OF NATRIURETIC PEPTIDE: CPT

## 2019-11-12 PROCEDURE — 82550 ASSAY OF CK (CPK): CPT

## 2019-11-12 PROCEDURE — 83735 ASSAY OF MAGNESIUM: CPT

## 2019-11-12 PROCEDURE — 84295 ASSAY OF SERUM SODIUM: CPT

## 2019-11-12 PROCEDURE — 82962 GLUCOSE BLOOD TEST: CPT

## 2019-11-12 PROCEDURE — 93459 L HRT ART/GRFT ANGIO: CPT

## 2019-11-12 PROCEDURE — 80053 COMPREHEN METABOLIC PANEL: CPT

## 2019-11-12 PROCEDURE — 96374 THER/PROPH/DIAG INJ IV PUSH: CPT

## 2019-11-12 PROCEDURE — 96375 TX/PRO/DX INJ NEW DRUG ADDON: CPT

## 2019-11-12 PROCEDURE — C1894: CPT

## 2019-11-12 PROCEDURE — 85014 HEMATOCRIT: CPT

## 2019-11-12 PROCEDURE — 83690 ASSAY OF LIPASE: CPT

## 2019-11-12 PROCEDURE — 85027 COMPLETE CBC AUTOMATED: CPT

## 2019-11-12 PROCEDURE — 84100 ASSAY OF PHOSPHORUS: CPT

## 2019-11-12 PROCEDURE — 82947 ASSAY GLUCOSE BLOOD QUANT: CPT

## 2019-11-12 PROCEDURE — 82553 CREATINE MB FRACTION: CPT

## 2019-11-12 PROCEDURE — 82803 BLOOD GASES ANY COMBINATION: CPT

## 2019-11-12 PROCEDURE — 71045 X-RAY EXAM CHEST 1 VIEW: CPT

## 2019-11-12 PROCEDURE — 93005 ELECTROCARDIOGRAM TRACING: CPT

## 2019-11-12 PROCEDURE — 84484 ASSAY OF TROPONIN QUANT: CPT

## 2019-11-12 PROCEDURE — 83605 ASSAY OF LACTIC ACID: CPT

## 2019-11-12 PROCEDURE — 84132 ASSAY OF SERUM POTASSIUM: CPT

## 2019-11-12 PROCEDURE — 85610 PROTHROMBIN TIME: CPT

## 2019-11-12 PROCEDURE — 85730 THROMBOPLASTIN TIME PARTIAL: CPT

## 2019-11-12 PROCEDURE — 99285 EMERGENCY DEPT VISIT HI MDM: CPT | Mod: 25

## 2019-11-13 ENCOUNTER — NON-APPOINTMENT (OUTPATIENT)
Age: 79
End: 2019-11-13

## 2019-11-13 ENCOUNTER — APPOINTMENT (OUTPATIENT)
Dept: CARDIOLOGY | Facility: CLINIC | Age: 79
End: 2019-11-13
Payer: MEDICARE

## 2019-11-13 VITALS
OXYGEN SATURATION: 98 % | SYSTOLIC BLOOD PRESSURE: 150 MMHG | HEIGHT: 69 IN | DIASTOLIC BLOOD PRESSURE: 81 MMHG | WEIGHT: 205 LBS | HEART RATE: 85 BPM | BODY MASS INDEX: 30.36 KG/M2

## 2019-11-13 DIAGNOSIS — I22.2 SUBSEQUENT NON-ST ELEVATION (NSTEMI) MYOCARDIAL INFARCTION: ICD-10-CM

## 2019-11-13 PROCEDURE — 99215 OFFICE O/P EST HI 40 MIN: CPT

## 2019-11-13 PROCEDURE — 93000 ELECTROCARDIOGRAM COMPLETE: CPT

## 2019-11-13 NOTE — PHYSICAL EXAM
[Normal Appearance] : normal appearance [General Appearance - Well Developed] : well developed [Well Groomed] : well groomed [General Appearance - Well Nourished] : well nourished [No Deformities] : no deformities [Normal Conjunctiva] : the conjunctiva exhibited no abnormalities [General Appearance - In No Acute Distress] : no acute distress [Eyelids - No Xanthelasma] : the eyelids demonstrated no xanthelasmas [Normal Oral Mucosa] : normal oral mucosa [No Oral Pallor] : no oral pallor [Normal Jugular Venous V Waves Present] : normal jugular venous V waves present [No Oral Cyanosis] : no oral cyanosis [Normal Jugular Venous A Waves Present] : normal jugular venous A waves present [No Jugular Venous Ruby A Waves] : no jugular venous ruby A waves [Auscultation Breath Sounds / Voice Sounds] : lungs were clear to auscultation bilaterally [Exaggerated Use Of Accessory Muscles For Inspiration] : no accessory muscle use [Respiration, Rhythm And Depth] : normal respiratory rhythm and effort [Heart Sounds] : normal S1 and S2 [Heart Rate And Rhythm] : heart rate and rhythm were normal [Abdomen Soft] : soft [Murmurs] : no murmurs present [Abdomen Tenderness] : non-tender [Abdomen Mass (___ Cm)] : no abdominal mass palpated [Abnormal Walk] : normal gait [Nail Clubbing] : no clubbing of the fingernails [Gait - Sufficient For Exercise Testing] : the gait was sufficient for exercise testing [Cyanosis, Localized] : no localized cyanosis [Petechial Hemorrhages (___cm)] : no petechial hemorrhages [Skin Color & Pigmentation] : normal skin color and pigmentation [] : no rash [No Venous Stasis] : no venous stasis [Skin Lesions] : no skin lesions [No Xanthoma] : no  xanthoma was observed [Oriented To Time, Place, And Person] : oriented to person, place, and time [No Skin Ulcers] : no skin ulcer [No Anxiety] : not feeling anxious [Affect] : the affect was normal [Mood] : the mood was normal

## 2019-11-13 NOTE — REASON FOR VISIT
[FreeTextEntry1] : This is the first office visit since the patient was hospitalized at [facility] with a non-ST elevation myocardial infarction. He underwent cardiac catheterization which showed distal disease and no further stenting or angioplasty was attempted. His ejection fraction was 25%. He was advised to go home on Entresto, but his insurance would not pay for it. He has not had chest discomfort since leaving the hospital but he does get some shortness of breath. He now comes for followup.

## 2019-11-13 NOTE — REVIEW OF SYSTEMS
[see HPI] : see HPI [Dyspnea on exertion] : dyspnea during exertion [Shortness Of Breath] : shortness of breath [Negative] : Heme/Lymph [Chest Pain] : no chest pain [Palpitations] : no palpitations

## 2019-11-13 NOTE — DISCUSSION/SUMMARY
[FreeTextEntry1] : The patient was examined. His blood pressure was 150/81, and his pulse was 85. His lungs were clear to auscultation. Cardiac exam is negative for murmurs rubs or gallops. His EKG showed normal sinus rhythm, one VPC, and nonspecific ST and T-wave changes. We will try to get prior approval for the patient's Entresto. He was told in no uncertain terms to avoid salt. He was informed that he might be eligible for cardiac rehabilitation because of his recent myocardial infarction. He will return to Dr. Sarkar's care in one month or earlier if needed. He'll continue all his other medication. I renewed the patient's generic Xanax after consulting with the [place] prescription monitoring program registry .

## 2019-11-18 ENCOUNTER — INBOUND DOCUMENT (OUTPATIENT)
Age: 79
End: 2019-11-18

## 2019-11-19 ENCOUNTER — APPOINTMENT (OUTPATIENT)
Dept: CARDIOLOGY | Facility: CLINIC | Age: 79
End: 2019-11-19
Payer: MEDICARE

## 2019-11-19 ENCOUNTER — NON-APPOINTMENT (OUTPATIENT)
Age: 79
End: 2019-11-19

## 2019-11-19 VITALS
SYSTOLIC BLOOD PRESSURE: 116 MMHG | WEIGHT: 202 LBS | DIASTOLIC BLOOD PRESSURE: 62 MMHG | OXYGEN SATURATION: 95 % | HEART RATE: 92 BPM | HEIGHT: 69 IN | BODY MASS INDEX: 29.92 KG/M2

## 2019-11-19 VITALS — SYSTOLIC BLOOD PRESSURE: 110 MMHG | DIASTOLIC BLOOD PRESSURE: 50 MMHG

## 2019-11-19 VITALS — DIASTOLIC BLOOD PRESSURE: 56 MMHG | SYSTOLIC BLOOD PRESSURE: 112 MMHG

## 2019-11-19 VITALS — SYSTOLIC BLOOD PRESSURE: 90 MMHG | DIASTOLIC BLOOD PRESSURE: 64 MMHG

## 2019-11-19 DIAGNOSIS — R55 SYNCOPE AND COLLAPSE: ICD-10-CM

## 2019-11-19 PROCEDURE — 99215 OFFICE O/P EST HI 40 MIN: CPT

## 2019-11-19 PROCEDURE — 93000 ELECTROCARDIOGRAM COMPLETE: CPT

## 2019-11-19 NOTE — DISCUSSION/SUMMARY
[FreeTextEntry1] : He appears orthostatic today and this is likely the source of his symptoms, however, given his poor EF and near syncope/syncope i have outfitted him with a 24 hour holter and advised him to be evaluated for an ICD.\par Stop Entresto for now.\par Keep hydrated and avoid SLNTG.\par EP called.\par

## 2019-11-19 NOTE — PHYSICAL EXAM
[General Appearance - Well Developed] : well developed [Normal Appearance] : normal appearance [Well Groomed] : well groomed [General Appearance - Well Nourished] : well nourished [No Deformities] : no deformities [General Appearance - In No Acute Distress] : no acute distress [Normal Conjunctiva] : the conjunctiva exhibited no abnormalities [Eyelids - No Xanthelasma] : the eyelids demonstrated no xanthelasmas [Normal Oral Mucosa] : normal oral mucosa [No Oral Pallor] : no oral pallor [No Oral Cyanosis] : no oral cyanosis [Normal Jugular Venous A Waves Present] : normal jugular venous A waves present [Normal Jugular Venous V Waves Present] : normal jugular venous V waves present [No Jugular Venous Ruby A Waves] : no jugular venous ruby A waves [Respiration, Rhythm And Depth] : normal respiratory rhythm and effort [Exaggerated Use Of Accessory Muscles For Inspiration] : no accessory muscle use [Auscultation Breath Sounds / Voice Sounds] : lungs were clear to auscultation bilaterally [Heart Rate And Rhythm] : heart rate and rhythm were normal [Murmurs] : no murmurs present [Heart Sounds] : normal S1 and S2 [Abdomen Soft] : soft [Abdomen Tenderness] : non-tender [Abdomen Mass (___ Cm)] : no abdominal mass palpated [Abnormal Walk] : normal gait [Gait - Sufficient For Exercise Testing] : the gait was sufficient for exercise testing [Nail Clubbing] : no clubbing of the fingernails [Petechial Hemorrhages (___cm)] : no petechial hemorrhages [Cyanosis, Localized] : no localized cyanosis [Skin Color & Pigmentation] : normal skin color and pigmentation [No Venous Stasis] : no venous stasis [] : no rash [Skin Lesions] : no skin lesions [No Skin Ulcers] : no skin ulcer [No Xanthoma] : no  xanthoma was observed [Oriented To Time, Place, And Person] : oriented to person, place, and time [Affect] : the affect was normal [Mood] : the mood was normal [No Anxiety] : not feeling anxious

## 2019-11-19 NOTE — REASON FOR VISIT
[FreeTextEntry1] : Since starting entresto he had an episode of syncope from a seated position.\par Another 2 episodes of near syncope. Stopped entresto and feels better. One brief episode of lightheadedness but no falling.\par He is taking his other meds as before. No angina.\par \par \par \par Prior:\par This is the first office visit since the patient was hospitalized at [facility] with a non-ST elevation myocardial infarction. He underwent cardiac catheterization which showed distal disease and no further stenting or angioplasty was attempted. His ejection fraction was 25%. He was advised to go home on Entresto, but his insurance would not pay for it. He has not had chest discomfort since leaving the hospital but he does get some shortness of breath. He now comes for followup.

## 2019-11-20 ENCOUNTER — APPOINTMENT (OUTPATIENT)
Dept: ELECTROPHYSIOLOGY | Facility: CLINIC | Age: 79
End: 2019-11-20
Payer: MEDICARE

## 2019-11-20 VITALS
BODY MASS INDEX: 29.77 KG/M2 | OXYGEN SATURATION: 99 % | WEIGHT: 201 LBS | SYSTOLIC BLOOD PRESSURE: 121 MMHG | HEIGHT: 69 IN | DIASTOLIC BLOOD PRESSURE: 71 MMHG | HEART RATE: 83 BPM

## 2019-11-20 PROCEDURE — 99212 OFFICE O/P EST SF 10 MIN: CPT

## 2019-11-25 ENCOUNTER — OUTPATIENT (OUTPATIENT)
Dept: OUTPATIENT SERVICES | Facility: HOSPITAL | Age: 79
LOS: 1 days | End: 2019-11-25
Payer: MEDICARE

## 2019-11-25 VITALS
OXYGEN SATURATION: 98 % | TEMPERATURE: 98 F | SYSTOLIC BLOOD PRESSURE: 97 MMHG | RESPIRATION RATE: 16 BRPM | HEIGHT: 70 IN | DIASTOLIC BLOOD PRESSURE: 63 MMHG | WEIGHT: 203.93 LBS | HEART RATE: 74 BPM

## 2019-11-25 DIAGNOSIS — Z01.818 ENCOUNTER FOR OTHER PREPROCEDURAL EXAMINATION: ICD-10-CM

## 2019-11-25 DIAGNOSIS — Z96.611 PRESENCE OF RIGHT ARTIFICIAL SHOULDER JOINT: Chronic | ICD-10-CM

## 2019-11-25 DIAGNOSIS — I50.9 HEART FAILURE, UNSPECIFIED: ICD-10-CM

## 2019-11-25 LAB
ALBUMIN SERPL ELPH-MCNC: 4 G/DL — SIGNIFICANT CHANGE UP (ref 3.3–5)
ALP SERPL-CCNC: 82 U/L — SIGNIFICANT CHANGE UP (ref 40–120)
ALT FLD-CCNC: 25 U/L — SIGNIFICANT CHANGE UP (ref 10–45)
ANION GAP SERPL CALC-SCNC: 15 MMOL/L — SIGNIFICANT CHANGE UP (ref 5–17)
AST SERPL-CCNC: 17 U/L — SIGNIFICANT CHANGE UP (ref 10–40)
BILIRUB SERPL-MCNC: 0.6 MG/DL — SIGNIFICANT CHANGE UP (ref 0.2–1.2)
BLD GP AB SCN SERPL QL: NEGATIVE — SIGNIFICANT CHANGE UP
BUN SERPL-MCNC: 37 MG/DL — HIGH (ref 7–23)
CALCIUM SERPL-MCNC: 9.7 MG/DL — SIGNIFICANT CHANGE UP (ref 8.4–10.5)
CHLORIDE SERPL-SCNC: 98 MMOL/L — SIGNIFICANT CHANGE UP (ref 96–108)
CO2 SERPL-SCNC: 23 MMOL/L — SIGNIFICANT CHANGE UP (ref 22–31)
CREAT SERPL-MCNC: 1.54 MG/DL — HIGH (ref 0.5–1.3)
GLUCOSE SERPL-MCNC: 253 MG/DL — HIGH (ref 70–99)
HCT VFR BLD CALC: 36.8 % — LOW (ref 39–50)
HGB BLD-MCNC: 12.8 G/DL — LOW (ref 13–17)
INR BLD: 1.12 RATIO — SIGNIFICANT CHANGE UP (ref 0.88–1.16)
MCHC RBC-ENTMCNC: 31.9 PG — SIGNIFICANT CHANGE UP (ref 27–34)
MCHC RBC-ENTMCNC: 34.8 GM/DL — SIGNIFICANT CHANGE UP (ref 32–36)
MCV RBC AUTO: 91.8 FL — SIGNIFICANT CHANGE UP (ref 80–100)
NRBC # BLD: 0 /100 WBCS — SIGNIFICANT CHANGE UP (ref 0–0)
PLATELET # BLD AUTO: 145 K/UL — LOW (ref 150–400)
POTASSIUM SERPL-MCNC: 4.7 MMOL/L — SIGNIFICANT CHANGE UP (ref 3.5–5.3)
POTASSIUM SERPL-SCNC: 4.7 MMOL/L — SIGNIFICANT CHANGE UP (ref 3.5–5.3)
PROT SERPL-MCNC: 7 G/DL — SIGNIFICANT CHANGE UP (ref 6–8.3)
PROTHROM AB SERPL-ACNC: 12.8 SEC — SIGNIFICANT CHANGE UP (ref 10–12.9)
RBC # BLD: 4.01 M/UL — LOW (ref 4.2–5.8)
RBC # FLD: 13.1 % — SIGNIFICANT CHANGE UP (ref 10.3–14.5)
RH IG SCN BLD-IMP: POSITIVE — SIGNIFICANT CHANGE UP
SODIUM SERPL-SCNC: 136 MMOL/L — SIGNIFICANT CHANGE UP (ref 135–145)
WBC # BLD: 6.75 K/UL — SIGNIFICANT CHANGE UP (ref 3.8–10.5)
WBC # FLD AUTO: 6.75 K/UL — SIGNIFICANT CHANGE UP (ref 3.8–10.5)

## 2019-11-25 PROCEDURE — 93010 ELECTROCARDIOGRAM REPORT: CPT

## 2019-11-25 RX ORDER — INSULIN GLARGINE 100 [IU]/ML
30 INJECTION, SOLUTION SUBCUTANEOUS
Qty: 0 | Refills: 0 | DISCHARGE

## 2019-11-25 RX ORDER — INSULIN ASPART 100 [IU]/ML
10 INJECTION, SOLUTION SUBCUTANEOUS
Qty: 0 | Refills: 0 | DISCHARGE

## 2019-11-25 RX ORDER — ISOSORBIDE MONONITRATE 60 MG/1
1 TABLET, EXTENDED RELEASE ORAL
Qty: 0 | Refills: 0 | DISCHARGE

## 2019-11-25 RX ORDER — SAXAGLIPTIN 5 MG/1
1 TABLET, FILM COATED ORAL
Qty: 0 | Refills: 0 | DISCHARGE

## 2019-11-25 RX ORDER — VALSARTAN 80 MG/1
1 TABLET ORAL
Qty: 0 | Refills: 0 | DISCHARGE

## 2019-11-25 RX ORDER — INSULIN ASPART 100 [IU]/ML
8 INJECTION, SOLUTION SUBCUTANEOUS
Qty: 0 | Refills: 0 | DISCHARGE

## 2019-11-25 RX ORDER — CARVEDILOL PHOSPHATE 80 MG/1
1 CAPSULE, EXTENDED RELEASE ORAL
Qty: 0 | Refills: 0 | DISCHARGE

## 2019-11-25 RX ORDER — CLOPIDOGREL BISULFATE 75 MG/1
1 TABLET, FILM COATED ORAL
Qty: 0 | Refills: 0 | DISCHARGE

## 2019-11-25 NOTE — H&P CARDIOLOGY - FAMILY HISTORY
Family history of diabetes mellitus     Father  Still living? No  Family history of CABG, Age at diagnosis: Age Unknown

## 2019-11-25 NOTE — H&P CARDIOLOGY - ATTENDING COMMENTS
Patient is here for implantation of a single chamber ICD for ischemic cardiomyopathy with severe persistent LV dysfunction and syncope.

## 2019-11-25 NOTE — H&P CARDIOLOGY - HISTORY OF PRESENT ILLNESS
This is a  80 y/o  M w/ PMHX  of CAD with stent (mLAD x1 SHAISTA), CABG 4V  ([place]) DM (A1c 11.7 Jan 2019, recent NSTEMI 5/2019 s/p PCI prox SVG to D1, HLD, anxiety     < from: Cardiac Cath Lab - Adult (11.07.19 @ 14:09) >  VENTRICLES: No left ventriculogram was performed.  CORONARY VESSELS: The coronary circulation is right dominant.  LM:   --  Distal left main: There was a 99 % stenosis.  LAD:   --  Proximal LAD: There was a 100 % stenosis.  --  Mid LAD: There was a tubular 10 % stenosis at the site of a prior  stent, in-stent.  --  Distal LAD: The vessel was very small sized. Angiography showed severe  atherosclerosis.  CX:   --  Proximal circumflex: There was a 100 % stenosis.  RCA:   --  Proximal RCA: There was a 100 % stenosis.  GRAFTS:   --  Graft to the mid LAD: The graft was a saphenous vein graft  from the aorta. It was normal.  --  Graft to the 2nd diagonal: The graft was a saphenousvein graft from  the aorta. There was a discrete 30 % stenosis at the proximal anastomosis,  at the site of a prior stent, within the stented segment. There was a  tubular 20 % stenosis in the middle third of the graft, at the site of a  prior stent,within the stented segment. There was a discrete 50 %  stenosis at the distal anastomosis.  --  Graft to the 1st obtuse marginal: The graft was a saphenous vein graft  from RPDA. There was a tubular 40 % stenosis in the proximal third of the  graft, at the site of a prior stent, within the stented segment.  --  Graft to the RPDA: The graft was a saphenous vein graft from the aorta.  Graft angiography showed minor luminal irregularities.  COMPLICATIONS: There were no complications.  DIAGNOSTIC IMPRESSIONS: Patent LIMA-LAD. Patent mid LAD stent. Severe  diffuse distal LAD disease. Patent SVG to Diag, moderate anastomosis  disease. Patent [id number] with mild restenosis in the prior stent  located between RPDA and OM1  DIAGNOSTIC RECOMMENDATIONS: Medical therapy.  Prepared and signed by  Adrian Mondragon M.D.  Signed 11/10/2019 19:45:50    < end of copied text >  < from: TTE with Doppler (w/Cont) (11.07.19 @ 10:14) >  Conclusions:  1. Calcified trileaflet aortic valve with decreased  opening. Peak transaortic valve gradient equals 14 mm Hg,  mean transaortic valve gradient equals 9 mm Hg, estimated  aortic valve area equals 1.3 sqcm (by continuity equation),  aortic valve velocity time integral equals 40 cm,  consistent with moderate aortic stenosis.  2. Mildly dilated left atrium.  LA volume index = 38 cc/m2.  3. Normalleft ventricular internal dimensions and wall  thicknesses.  4. Severe left ventricular systolic dysfunction.  Endocardial visualization enhanced with intravenous  injection of Ultrasonic Enhancing Agent (Definity). Peak  left ventricular outflow tract gradient equals 1 mm Hg,  mean gradient is equal to 1 mm Hg, LVOT velocity time  integral equals 10 cm. No left ventricular thrombus.  ------------------------------------------------------------------------  Confirmed on  11/7/2019 - 12:36:53 by Prasanna Tuttle M.D.  ------------------------------------------------------------------------    < end of copied text > This is a  80 y/o  M w/ PMHX  of CAD with stent (mLAD x1 SHAISTA), CABG 4V  ([place]) DM (A1c 11.7 Jan 2019, recent NSTEMI 5/2019 s/p PCI prox SVG to D1, HLD, and anxiety. Pt now presents for ICD implant on 11/26/19 with Dr. Dmitry Mitchell .      < from: Cardiac Cath Lab - Adult (11.07.19 @ 14:09) >  VENTRICLES: No left ventriculogram was performed.  CORONARY VESSELS: The coronary circulation is right dominant.  LM:   --  Distal left main: There was a 99 % stenosis.  LAD:   --  Proximal LAD: There was a 100 % stenosis.  --  Mid LAD: There was a tubular 10 % stenosis at the site of a prior  stent, in-stent.  --  Distal LAD: The vessel was very small sized. Angiography showed severe  atherosclerosis.  CX:   --  Proximal circumflex: There was a 100 % stenosis.  RCA:   --  Proximal RCA: There was a 100 % stenosis.  GRAFTS:   --  Graft to the mid LAD: The graft was a saphenous vein graft  from the aorta. It was normal.  --  Graft to the 2nd diagonal: The graft was a saphenousvein graft from  the aorta. There was a discrete 30 % stenosis at the proximal anastomosis,  at the site of a prior stent, within the stented segment. There was a  tubular 20 % stenosis in the middle third of the graft, at the site of a  prior stent,within the stented segment. There was a discrete 50 %  stenosis at the distal anastomosis.  --  Graft to the 1st obtuse marginal: The graft was a saphenous vein graft  from RPDA. There was a tubular 40 % stenosis in the proximal third of the  graft, at the site of a prior stent, within the stented segment.  --  Graft to the RPDA: The graft was a saphenous vein graft from the aorta.  Graft angiography showed minor luminal irregularities.  COMPLICATIONS: There were no complications.  DIAGNOSTIC IMPRESSIONS: Patent LIMA-LAD. Patent mid LAD stent. Severe  diffuse distal LAD disease. Patent SVG to Diag, moderate anastomosis  disease. Patent [id number] with mild restenosis in the prior stent  located between RPDA and OM1  DIAGNOSTIC RECOMMENDATIONS: Medical therapy.  Prepared and signed by  Adrian Mondragon M.D.  Signed 11/10/2019 19:45:50    < end of copied text >  < from: TTE with Doppler (w/Cont) (11.07.19 @ 10:14) >  Conclusions:  1. Calcified trileaflet aortic valve with decreased  opening. Peak transaortic valve gradient equals 14 mm Hg,  mean transaortic valve gradient equals 9 mm Hg, estimated  aortic valve area equals 1.3 sqcm (by continuity equation),  aortic valve velocity time integral equals 40 cm,  consistent with moderate aortic stenosis.  2. Mildly dilated left atrium.  LA volume index = 38 cc/m2.  3. Normalleft ventricular internal dimensions and wall  thicknesses.  4. Severe left ventricular systolic dysfunction.  Endocardial visualization enhanced with intravenous  injection of Ultrasonic Enhancing Agent (Definity). Peak  left ventricular outflow tract gradient equals 1 mm Hg,  mean gradient is equal to 1 mm Hg, LVOT velocity time  integral equals 10 cm. No left ventricular thrombus.  ------------------------------------------------------------------------  Confirmed on  11/7/2019 - 12:36:53 by Prasanna Tuttle M.D.  ------------------------------------------------------------------------    < end of copied text > This is a  78 y/o  M w/ PMHX  of CAD with stent (mLAD x1 SHAISTA), CABG 4V  ([place]), CHF , Angina Pectoris, HTN, Hypercholesterolemia,  MI x 2 ,  hx Stents x 17 ,  DM2 uncontrolled ,  compliant with meds followed by Dr. Sundeep WALLIS,  (A1c 11.0 3 months ago), recent NSTEMI 5/2019 s/p PCI prox SVG to D1, HLD, and anxiety. PT went to his cardiologist OZ Shelby and now referred for AICD .  Pt now presents for ICD implant on 11/26/19 with Dr. Dmitry Mitchell .  Currently CP free no sob no palpitations no lightheadedness or dizziness noted.     < from: Cardiac Cath Lab - Adult (11.07.19 @ 14:09) >  VENTRICLES: No left ventriculogram was performed.  CORONARY VESSELS: The coronary circulation is right dominant.  LM:   --  Distal left main: There was a 99 % stenosis.  LAD:   --  Proximal LAD: There was a 100 % stenosis.  --  Mid LAD: There was a tubular 10 % stenosis at the site of a prior  stent, in-stent.  --  Distal LAD: The vessel was very small sized. Angiography showed severe  atherosclerosis.  CX:   --  Proximal circumflex: There was a 100 % stenosis.  RCA:   --  Proximal RCA: There was a 100 % stenosis.  GRAFTS:   --  Graft to the mid LAD: The graft was a saphenous vein graft  from the aorta. It was normal.  --  Graft to the 2nd diagonal: The graft was a saphenousvein graft from  the aorta. There was a discrete 30 % stenosis at the proximal anastomosis,  at the site of a prior stent, within the stented segment. There was a  tubular 20 % stenosis in the middle third of the graft, at the site of a  prior stent,within the stented segment. There was a discrete 50 %  stenosis at the distal anastomosis.  --  Graft to the 1st obtuse marginal: The graft was a saphenous vein graft  from RPDA. There was a tubular 40 % stenosis in the proximal third of the  graft, at the site of a prior stent, within the stented segment.  --  Graft to the RPDA: The graft was a saphenous vein graft from the aorta.  Graft angiography showed minor luminal irregularities.  COMPLICATIONS: There were no complications.  DIAGNOSTIC IMPRESSIONS: Patent LIMA-LAD. Patent mid LAD stent. Severe  diffuse distal LAD disease. Patent SVG to Diag, moderate anastomosis  disease. Patent [id number] with mild restenosis in the prior stent  located between RPDA and OM1  DIAGNOSTIC RECOMMENDATIONS: Medical therapy.  Prepared and signed by  Adrian Mondragon M.D.  Signed 11/10/2019 19:45:50    < end of copied text >  < from: TTE with Doppler (w/Cont) (11.07.19 @ 10:14) >  Conclusions:  1. Calcified trileaflet aortic valve with decreased  opening. Peak transaortic valve gradient equals 14 mm Hg,  mean transaortic valve gradient equals 9 mm Hg, estimated  aortic valve area equals 1.3 sqcm (by continuity equation),  aortic valve velocity time integral equals 40 cm,  consistent with moderate aortic stenosis.  2. Mildly dilated left atrium.  LA volume index = 38 cc/m2.  3. Normalleft ventricular internal dimensions and wall  thicknesses.  4. Severe left ventricular systolic dysfunction.  Endocardial visualization enhanced with intravenous  injection of Ultrasonic Enhancing Agent (Definity). Peak  left ventricular outflow tract gradient equals 1 mm Hg,  mean gradient is equal to 1 mm Hg, LVOT velocity time  integral equals 10 cm. No left ventricular thrombus.  ------------------------------------------------------------------------  Confirmed on  11/7/2019 - 12:36:53 by Prasanna Tuttle M.D.  ------------------------------------------------------------------------    < end of copied text > This is a  78 y/o  M w/ PMHX  of CAD with stent (mLAD x1 SHAISTA), CABG 4V  ([place]), CHF , Angina Pectoris, HTN, Hypercholesterolemia,  MI x 2 ,  hx Stents x 17 ,  DM2 uncontrolled ,  compliant with meds followed by Dr. Sundeep WALLIS,  (A1c 11.0 3 months ago), recent NSTEMI 5/2019 s/p PCI prox SVG to D1, HLD, and anxiety. PT went to his cardiologist OZ Shelby  with complaints of chest pain and sob over past several months, and now referred for AICD .  Pt now presents for ICD implant on 11/26/19 with Dr. Dmitry Mitchell .  Currently CP free no sob no palpitations no lightheadedness or dizziness noted.     < from: Cardiac Cath Lab - Adult (11.07.19 @ 14:09) >  VENTRICLES: No left ventriculogram was performed.  CORONARY VESSELS: The coronary circulation is right dominant.  LM:   --  Distal left main: There was a 99 % stenosis.  LAD:   --  Proximal LAD: There was a 100 % stenosis.  --  Mid LAD: There was a tubular 10 % stenosis at the site of a prior  stent, in-stent.  --  Distal LAD: The vessel was very small sized. Angiography showed severe  atherosclerosis.  CX:   --  Proximal circumflex: There was a 100 % stenosis.  RCA:   --  Proximal RCA: There was a 100 % stenosis.  GRAFTS:   --  Graft to the mid LAD: The graft was a saphenous vein graft  from the aorta. It was normal.  --  Graft to the 2nd diagonal: The graft was a saphenousvein graft from  the aorta. There was a discrete 30 % stenosis at the proximal anastomosis,  at the site of a prior stent, within the stented segment. There was a  tubular 20 % stenosis in the middle third of the graft, at the site of a  prior stent,within the stented segment. There was a discrete 50 %  stenosis at the distal anastomosis.  --  Graft to the 1st obtuse marginal: The graft was a saphenous vein graft  from RPDA. There was a tubular 40 % stenosis in the proximal third of the  graft, at the site of a prior stent, within the stented segment.  --  Graft to the RPDA: The graft was a saphenous vein graft from the aorta.  Graft angiography showed minor luminal irregularities.  COMPLICATIONS: There were no complications.  DIAGNOSTIC IMPRESSIONS: Patent LIMA-LAD. Patent mid LAD stent. Severe  diffuse distal LAD disease. Patent SVG to Diag, moderate anastomosis  disease. Patent [id number] with mild restenosis in the prior stent  located between RPDA and OM1  DIAGNOSTIC RECOMMENDATIONS: Medical therapy.  Prepared and signed by  Adrian Mondragon M.D.  Signed 11/10/2019 19:45:50    < end of copied text >  < from: TTE with Doppler (w/Cont) (11.07.19 @ 10:14) >  Conclusions:  1. Calcified trileaflet aortic valve with decreased  opening. Peak transaortic valve gradient equals 14 mm Hg,  mean transaortic valve gradient equals 9 mm Hg, estimated  aortic valve area equals 1.3 sqcm (by continuity equation),  aortic valve velocity time integral equals 40 cm,  consistent with moderate aortic stenosis.  2. Mildly dilated left atrium.  LA volume index = 38 cc/m2.  3. Normalleft ventricular internal dimensions and wall  thicknesses.  4. Severe left ventricular systolic dysfunction.  Endocardial visualization enhanced with intravenous  injection of Ultrasonic Enhancing Agent (Definity). Peak  left ventricular outflow tract gradient equals 1 mm Hg,  mean gradient is equal to 1 mm Hg, LVOT velocity time  integral equals 10 cm. No left ventricular thrombus.  ------------------------------------------------------------------------  Confirmed on  11/7/2019 - 12:36:53 by Prasanna Tuttle M.D.  ------------------------------------------------------------------------    < end of copied text > This is a  78 y/o  M w no known implantable devices PMHX  of CAD with stent (mLAD x1 HSAISTA), CABG 4V  ([place]), CHF , Angina Pectoris, HTN, Hypercholesterolemia,  MI x 2 ,  hx Stents x 17 , Syncope and Ischemic CM with severe LV dysfunction NYHA Class II EF 20-25% was started on Entresto and had near syncope and was stopped , DM2 uncontrolled ,  compliant with meds followed by Dr. Sundeep Gomes ENDO,  (A1c 11.0 3 months ago), recent NSTEMI 5/2019 s/p PCI prox SVG to D1, HLD, and anxiety. PT went to his cardiologist OZ Shelby  with complaints of chest pain and sob over past several months,and Syncope on Vasodilator medications and now referred for AICD .  Pt now presents for ICD implant on 11/26/19 with Dr. Dmitry Mitchell due to severe Ischemic CM and marked LV dysfunction .  Currently CP free no sob no palpitations no lightheadedness or dizziness noted.     Cardiology Dr. Lisker Dr. Gindea Ari   Endocrinology Dr. Sundeep Gomes   EPS Dr Dmitry Mitchell     < from: Cardiac Cath Lab - Adult (11.07.19 @ 14:09) >  VENTRICLES: No left ventriculogram was performed.  CORONARY VESSELS: The coronary circulation is right dominant.  LM:   --  Distal left main: There was a 99 % stenosis.  LAD:   --  Proximal LAD: There was a 100 % stenosis.  --  Mid LAD: There was a tubular 10 % stenosis at the site of a prior  stent, in-stent.  --  Distal LAD: The vessel was very small sized. Angiography showed severe  atherosclerosis.  CX:   --  Proximal circumflex: There was a 100 % stenosis.  RCA:   --  Proximal RCA: There was a 100 % stenosis.  GRAFTS:   --  Graft to the mid LAD: The graft was a saphenous vein graft  from the aorta. It was normal.  --  Graft to the 2nd diagonal: The graft was a saphenousvein graft from  the aorta. There was a discrete 30 % stenosis at the proximal anastomosis,  at the site of a prior stent, within the stented segment. There was a  tubular 20 % stenosis in the middle third of the graft, at the site of a  prior stent,within the stented segment. There was a discrete 50 %  stenosis at the distal anastomosis.  --  Graft to the 1st obtuse marginal: The graft was a saphenous vein graft  from RPDA. There was a tubular 40 % stenosis in the proximal third of the  graft, at the site of a prior stent, within the stented segment.  --  Graft to the RPDA: The graft was a saphenous vein graft from the aorta.  Graft angiography showed minor luminal irregularities.  COMPLICATIONS: There were no complications.  DIAGNOSTIC IMPRESSIONS: Patent LIMA-LAD. Patent mid LAD stent. Severe  diffuse distal LAD disease. Patent SVG to Diag, moderate anastomosis  disease. Patent [id number] with mild restenosis in the prior stent  located between RPDA and OM1  DIAGNOSTIC RECOMMENDATIONS: Medical therapy.  Prepared and signed by  Adrian Mondragon M.D.  Signed 11/10/2019 19:45:50    < end of copied text >  < from: TTE with Doppler (w/Cont) (11.07.19 @ 10:14) >  Conclusions:  1. Calcified trileaflet aortic valve with decreased  opening. Peak transaortic valve gradient equals 14 mm Hg,  mean transaortic valve gradient equals 9 mm Hg, estimated  aortic valve area equals 1.3 sqcm (by continuity equation),  aortic valve velocity time integral equals 40 cm,  consistent with moderate aortic stenosis.  2. Mildly dilated left atrium.  LA volume index = 38 cc/m2.  3. Normalleft ventricular internal dimensions and wall  thicknesses.  4. Severe left ventricular systolic dysfunction.  Endocardial visualization enhanced with intravenous  injection of Ultrasonic Enhancing Agent (Definity). Peak  left ventricular outflow tract gradient equals 1 mm Hg,  mean gradient is equal to 1 mm Hg, LVOT velocity time  integral equals 10 cm. No left ventricular thrombus.  ------------------------------------------------------------------------  Confirmed on  11/7/2019 - 12:36:53 by Prasanna Tuttle M.D.  ------------------------------------------------------------------------    < end of copied text >

## 2019-11-26 ENCOUNTER — INPATIENT (INPATIENT)
Facility: HOSPITAL | Age: 79
LOS: 0 days | Discharge: HOME | DRG: 227 | End: 2019-11-27
Attending: INTERNAL MEDICINE | Admitting: INTERNAL MEDICINE
Payer: MEDICARE

## 2019-11-26 ENCOUNTER — TRANSCRIPTION ENCOUNTER (OUTPATIENT)
Age: 79
End: 2019-11-26

## 2019-11-26 VITALS
HEART RATE: 80 BPM | WEIGHT: 203.05 LBS | DIASTOLIC BLOOD PRESSURE: 54 MMHG | SYSTOLIC BLOOD PRESSURE: 106 MMHG | OXYGEN SATURATION: 97 % | HEIGHT: 70 IN | RESPIRATION RATE: 17 BRPM

## 2019-11-26 DIAGNOSIS — Z96.611 PRESENCE OF RIGHT ARTIFICIAL SHOULDER JOINT: Chronic | ICD-10-CM

## 2019-11-26 DIAGNOSIS — I50.9 HEART FAILURE, UNSPECIFIED: ICD-10-CM

## 2019-11-26 LAB
GLUCOSE BLDC GLUCOMTR-MCNC: 210 MG/DL — HIGH (ref 70–99)
GLUCOSE BLDC GLUCOMTR-MCNC: 211 MG/DL — HIGH (ref 70–99)
GLUCOSE BLDC GLUCOMTR-MCNC: 252 MG/DL — HIGH (ref 70–99)
GLUCOSE BLDC GLUCOMTR-MCNC: 274 MG/DL — HIGH (ref 70–99)
GLUCOSE BLDC GLUCOMTR-MCNC: 296 MG/DL — HIGH (ref 70–99)

## 2019-11-26 PROCEDURE — 33249 INSJ/RPLCMT DEFIB W/LEAD(S): CPT

## 2019-11-26 PROCEDURE — 93010 ELECTROCARDIOGRAM REPORT: CPT

## 2019-11-26 PROCEDURE — 71045 X-RAY EXAM CHEST 1 VIEW: CPT | Mod: 26

## 2019-11-26 RX ORDER — GLUCAGON INJECTION, SOLUTION 0.5 MG/.1ML
1 INJECTION, SOLUTION SUBCUTANEOUS ONCE
Refills: 0 | Status: DISCONTINUED | OUTPATIENT
Start: 2019-11-26 | End: 2019-11-27

## 2019-11-26 RX ORDER — CARVEDILOL PHOSPHATE 80 MG/1
25 CAPSULE, EXTENDED RELEASE ORAL EVERY 12 HOURS
Refills: 0 | Status: DISCONTINUED | OUTPATIENT
Start: 2019-11-26 | End: 2019-11-27

## 2019-11-26 RX ORDER — INSULIN LISPRO 100/ML
VIAL (ML) SUBCUTANEOUS
Refills: 0 | Status: DISCONTINUED | OUTPATIENT
Start: 2019-11-26 | End: 2019-11-27

## 2019-11-26 RX ORDER — INSULIN LISPRO 100/ML
2 VIAL (ML) SUBCUTANEOUS
Refills: 0 | Status: DISCONTINUED | OUTPATIENT
Start: 2019-11-26 | End: 2019-11-27

## 2019-11-26 RX ORDER — DEXTROSE 50 % IN WATER 50 %
25 SYRINGE (ML) INTRAVENOUS ONCE
Refills: 0 | Status: DISCONTINUED | OUTPATIENT
Start: 2019-11-26 | End: 2019-11-27

## 2019-11-26 RX ORDER — RANOLAZINE 500 MG/1
500 TABLET, FILM COATED, EXTENDED RELEASE ORAL
Refills: 0 | Status: DISCONTINUED | OUTPATIENT
Start: 2019-11-26 | End: 2019-11-27

## 2019-11-26 RX ORDER — OXYCODONE AND ACETAMINOPHEN 5; 325 MG/1; MG/1
2 TABLET ORAL EVERY 6 HOURS
Refills: 0 | Status: DISCONTINUED | OUTPATIENT
Start: 2019-11-26 | End: 2019-11-27

## 2019-11-26 RX ORDER — INSULIN GLARGINE 100 [IU]/ML
28 INJECTION, SOLUTION SUBCUTANEOUS AT BEDTIME
Refills: 0 | Status: DISCONTINUED | OUTPATIENT
Start: 2019-11-26 | End: 2019-11-27

## 2019-11-26 RX ORDER — SODIUM CHLORIDE 9 MG/ML
1000 INJECTION, SOLUTION INTRAVENOUS
Refills: 0 | Status: DISCONTINUED | OUTPATIENT
Start: 2019-11-26 | End: 2019-11-27

## 2019-11-26 RX ORDER — ACETAMINOPHEN 500 MG
650 TABLET ORAL ONCE
Refills: 0 | Status: COMPLETED | OUTPATIENT
Start: 2019-11-26 | End: 2019-11-26

## 2019-11-26 RX ORDER — ASPIRIN/CALCIUM CARB/MAGNESIUM 324 MG
81 TABLET ORAL DAILY
Refills: 0 | Status: DISCONTINUED | OUTPATIENT
Start: 2019-11-26 | End: 2019-11-27

## 2019-11-26 RX ORDER — INFLUENZA VIRUS VACCINE 15; 15; 15; 15 UG/.5ML; UG/.5ML; UG/.5ML; UG/.5ML
0.5 SUSPENSION INTRAMUSCULAR ONCE
Refills: 0 | Status: COMPLETED | OUTPATIENT
Start: 2019-11-26 | End: 2019-11-26

## 2019-11-26 RX ORDER — DEXTROSE 50 % IN WATER 50 %
15 SYRINGE (ML) INTRAVENOUS ONCE
Refills: 0 | Status: DISCONTINUED | OUTPATIENT
Start: 2019-11-26 | End: 2019-11-27

## 2019-11-26 RX ORDER — ALLOPURINOL 300 MG
300 TABLET ORAL DAILY
Refills: 0 | Status: DISCONTINUED | OUTPATIENT
Start: 2019-11-26 | End: 2019-11-27

## 2019-11-26 RX ORDER — INSULIN LISPRO 100/ML
VIAL (ML) SUBCUTANEOUS AT BEDTIME
Refills: 0 | Status: DISCONTINUED | OUTPATIENT
Start: 2019-11-26 | End: 2019-11-27

## 2019-11-26 RX ORDER — ISOSORBIDE MONONITRATE 60 MG/1
60 TABLET, EXTENDED RELEASE ORAL DAILY
Refills: 0 | Status: DISCONTINUED | OUTPATIENT
Start: 2019-11-26 | End: 2019-11-27

## 2019-11-26 RX ORDER — DEXTROSE 50 % IN WATER 50 %
12.5 SYRINGE (ML) INTRAVENOUS ONCE
Refills: 0 | Status: DISCONTINUED | OUTPATIENT
Start: 2019-11-26 | End: 2019-11-27

## 2019-11-26 RX ORDER — TICAGRELOR 90 MG/1
90 TABLET ORAL EVERY 12 HOURS
Refills: 0 | Status: DISCONTINUED | OUTPATIENT
Start: 2019-11-27 | End: 2019-11-27

## 2019-11-26 RX ORDER — TICAGRELOR 90 MG/1
90 TABLET ORAL
Refills: 0 | Status: DISCONTINUED | OUTPATIENT
Start: 2019-11-26 | End: 2019-11-26

## 2019-11-26 RX ORDER — ATORVASTATIN CALCIUM 80 MG/1
80 TABLET, FILM COATED ORAL AT BEDTIME
Refills: 0 | Status: DISCONTINUED | OUTPATIENT
Start: 2019-11-26 | End: 2019-11-27

## 2019-11-26 RX ADMIN — Medication 2 UNIT(S): at 17:12

## 2019-11-26 RX ADMIN — ATORVASTATIN CALCIUM 80 MILLIGRAM(S): 80 TABLET, FILM COATED ORAL at 21:33

## 2019-11-26 RX ADMIN — OXYCODONE AND ACETAMINOPHEN 2 TABLET(S): 5; 325 TABLET ORAL at 11:45

## 2019-11-26 RX ADMIN — OXYCODONE AND ACETAMINOPHEN 2 TABLET(S): 5; 325 TABLET ORAL at 11:35

## 2019-11-26 RX ADMIN — INSULIN GLARGINE 28 UNIT(S): 100 INJECTION, SOLUTION SUBCUTANEOUS at 21:33

## 2019-11-26 RX ADMIN — Medication 650 MILLIGRAM(S): at 23:00

## 2019-11-26 RX ADMIN — Medication 3: at 17:12

## 2019-11-26 RX ADMIN — RANOLAZINE 500 MILLIGRAM(S): 500 TABLET, FILM COATED, EXTENDED RELEASE ORAL at 17:52

## 2019-11-26 RX ADMIN — CARVEDILOL PHOSPHATE 25 MILLIGRAM(S): 80 CAPSULE, EXTENDED RELEASE ORAL at 17:52

## 2019-11-26 RX ADMIN — Medication 3: at 10:58

## 2019-11-26 RX ADMIN — Medication 2 UNIT(S): at 10:59

## 2019-11-26 NOTE — CHART NOTE - NSCHARTNOTEFT_GEN_A_CORE
Received pt from PACU transferred into bed +hematoma to left anterior chest wall  and left pectoris chest wall   Lungs clear breath sounds noted /54 Hr 80 RR 16 O2 sats 97% MD Dmitry Mitchell made aware and present Dr. SHARON Mitchell re dressed the site pt sitting upright in bed Will resume Brillinta on 11/27/19   pt complained incisional pain 7/10 will give Percocet 2 tabs now . Will continue to monitor closely . MD Dmitry Mitchell aware Awaiting post CXR

## 2019-11-26 NOTE — DISCHARGE NOTE PROVIDER - HOSPITAL COURSE
This is a  78 y/o  M w no known implantable devices PMHX  of CAD with stent (mLAD x1 SHAISTA), CABG 4V  ([place]), CHF , Angina Pectoris, HTN, Hypercholesterolemia,  MI x 2 ,  hx Stents x 17 , Syncope and Ischemic CM with severe LV dysfunction NYHA Class II EF 20-25% was started on Entresto and had near syncope and was stopped , DM2 uncontrolled ,  compliant with meds followed by Dr. Sundeep WALLIS,  (A1c 11.0 3 months ago), recent NSTEMI 5/2019 s/p PCI prox SVG to D1, HLD, and anxiety. PT went to his cardiologist OZ Shelby  with complaints of chest pain and sob over past several months, and Syncope on Vasodilator medications and now referred for AICD .  Pt now presents for ICD implant on 11/26/19 with Dr. Dmitry Mitchell due to severe Ischemic CM and marked LV dysfunction. Currently CP free no sob no palpitations no lightheadedness or dizziness noted.         11/26/2019: s/p AICD VVI @ 40bpm LACW         Continue with ASA/Brilinta

## 2019-11-26 NOTE — DISCHARGE NOTE PROVIDER - NSDCMRMEDTOKEN_GEN_ALL_CORE_FT
allopurinol 300 mg oral tablet: 1 tab(s) orally once a day  Alogliptin 6.25 mg oral tablet: 1 tab(s) orally once a day  Aspirin Enteric Coated 81 mg oral delayed release tablet: 1 tab(s) orally once a day MDD:1  Brilinta (ticagrelor) 90 mg oral tablet: 1 tab(s) orally 2 times a day MDD:1  carvedilol 25 mg oral tablet: 1 tab(s) orally 2 times a day  Centrum Silver Men&#x27;s oral tablet: 1 tab(s) orally once a day  isosorbide mononitrate 30 mg oral tablet, extended release: 2 tab(s) orally once a day  Lantus 100 units/mL subcutaneous solution: 36 unit(s) subcutaneous once a day (at bedtime)  Lipitor 80 mg oral tablet: 1 tab(s) orally once a day  NovoLOG 100 units/mL subcutaneous solution: 10 unit(s) subcutaneous once a day (at bedtime)  potassium citrate 10 mEq oral tablet, extended release: 2 tab(s) orally 2 times a day  ranolazine 500 mg oral tablet, extended release: 1 tab(s) orally 2 times a day

## 2019-11-26 NOTE — DISCHARGE NOTE PROVIDER - NSDCFUADDINST_GEN_ALL_CORE_FT
Patient teaching done about DAPT  patient  is made aware to take  antiplatelet therapy as prescribed ( Statin and Aspirin and  Brilinta )- as they are needed to keep your stent/s OPEN  patient is aware to take them every day, do not miss or double up on any doses  these medications can only be discontinued by your cardiologist   TEACH BACK used to verify patient's understanding; pateint verbalizes understanding and gives positive feedback .   Post Device Teaching and packet given to patient   questions /answers discussed pt verbalizes understanding

## 2019-11-26 NOTE — DISCHARGE NOTE PROVIDER - CARE PROVIDER_API CALL
Dmitry Mitchell)  Cardiac Electrophysiology; Cardiovascular Disease; Internal Medicine  [address] [place] [address]  Phone: [phone]  Fax: [phone]  Follow Up Time:

## 2019-11-26 NOTE — DISCHARGE NOTE PROVIDER - NSDCFUSCHEDAPPT_GEN_ALL_CORE_FT
VERONICA DORMAN ; 12/09/2019 ; [facility] [address] VERONICA Sky ; 12/10/2019 ; [facility] [address] [facility]  VERONICA DORMAN ; 01/07/2020 ; [facility] [address] [facility]  VERONICA DORMAN ; 01/07/2020 ; [facility] [address] [facility] VERONICA DORMAN ; 12/09/2019 ; [facility] [address] VERONICA Sky ; 12/10/2019 ; [facility] [address] [facility]  VERONICA DORMAN ; 01/07/2020 ; [facility] [address] [facility]  VERONICA DORMAN ; 01/07/2020 ; [facility] [address] [facility]

## 2019-11-26 NOTE — PATIENT PROFILE ADULT - NSPROREFERSVCHOMEDIABETES_GEN_A_NUR
Called ans schedule appointment for pt on 11/01/17 with Dennys Cordova for therapy and possible psychiatry per Dr Wilkins.  
no

## 2019-11-26 NOTE — DISCHARGE NOTE PROVIDER - NSDCFUADDAPPT_GEN_ALL_CORE_FT
Follow up with PMD and Cardiologist in 1-2 weeks   12/9/19 follow up with EPS wound check at clinic at 1:00   12 /10/19 follow up with Dr. Sarkar  at 3:40

## 2019-11-26 NOTE — CHART NOTE - NSCHARTNOTEFT_GEN_A_CORE
Pt sitting upright in bed left anterior chest wall dsg intact still with Hematoma left pectoris chest wall +tenderness to touch offered Percocet pt refused at this time VSS B/P 136 /78 HR 81 RR 16 O2 sats 99%  MD SHARON Mitchell made aware no change from earlier today as per MD Mitchell no repeat CBC at this time will repeat in am . Will continue to monitor closely Report given to NP this evening ROSI Moreno . CXR PA /LAT in am Pt sitting upright in bed left anterior chest wall dsg intact still with Hematoma left pectoris chest wall +tenderness to touch  pain 2-3 out of 10 offered Percocet pt refused at this time CXR lungs clear no pneumothorax or effusion noted Lungs auscultated clear throughout no cough no sob no chest pain noted    VSS B/P 136 /78 HR 81 RR 16 O2 sats 99%  MD SHARON Mitchell made aware no change from earlier today as per MD Mitchell no repeat CBC at this time will repeat in am . Will continue to monitor closely Report given to NP this evening ROSI Moreno . CXR PA /LAT in am

## 2019-11-26 NOTE — DISCHARGE NOTE PROVIDER - NSDCCPTREATMENT_GEN_ALL_CORE_FT
PRINCIPAL PROCEDURE  Procedure: AICD implantation  Findings and Treatment: PRINCIPAL PROCEDURE  Procedure: AICD implantation  Findings and Treatment: via Left anterior chest wall VVI @40

## 2019-11-27 ENCOUNTER — TRANSCRIPTION ENCOUNTER (OUTPATIENT)
Age: 79
End: 2019-11-27

## 2019-11-27 ENCOUNTER — INBOUND DOCUMENT (OUTPATIENT)
Age: 79
End: 2019-11-27

## 2019-11-27 VITALS
DIASTOLIC BLOOD PRESSURE: 82 MMHG | RESPIRATION RATE: 17 BRPM | SYSTOLIC BLOOD PRESSURE: 138 MMHG | TEMPERATURE: 98 F | OXYGEN SATURATION: 96 % | HEART RATE: 80 BPM

## 2019-11-27 DIAGNOSIS — E78.5 HYPERLIPIDEMIA, UNSPECIFIED: ICD-10-CM

## 2019-11-27 DIAGNOSIS — I10 ESSENTIAL (PRIMARY) HYPERTENSION: ICD-10-CM

## 2019-11-27 DIAGNOSIS — E11.9 TYPE 2 DIABETES MELLITUS WITHOUT COMPLICATIONS: ICD-10-CM

## 2019-11-27 DIAGNOSIS — R55 SYNCOPE AND COLLAPSE: ICD-10-CM

## 2019-11-27 LAB
ANION GAP SERPL CALC-SCNC: 13 MMOL/L — SIGNIFICANT CHANGE UP (ref 5–17)
BUN SERPL-MCNC: 37 MG/DL — HIGH (ref 7–23)
CALCIUM SERPL-MCNC: 10.1 MG/DL — SIGNIFICANT CHANGE UP (ref 8.4–10.5)
CHLORIDE SERPL-SCNC: 101 MMOL/L — SIGNIFICANT CHANGE UP (ref 96–108)
CO2 SERPL-SCNC: 25 MMOL/L — SIGNIFICANT CHANGE UP (ref 22–31)
CREAT SERPL-MCNC: 1.57 MG/DL — HIGH (ref 0.5–1.3)
GLUCOSE BLDC GLUCOMTR-MCNC: 217 MG/DL — HIGH (ref 70–99)
GLUCOSE SERPL-MCNC: 163 MG/DL — HIGH (ref 70–99)
HCT VFR BLD CALC: 33.8 % — LOW (ref 39–50)
HGB BLD-MCNC: 11.5 G/DL — LOW (ref 13–17)
MAGNESIUM SERPL-MCNC: 1.7 MG/DL — SIGNIFICANT CHANGE UP (ref 1.6–2.6)
MCHC RBC-ENTMCNC: 32.3 PG — SIGNIFICANT CHANGE UP (ref 27–34)
MCHC RBC-ENTMCNC: 34 GM/DL — SIGNIFICANT CHANGE UP (ref 32–36)
MCV RBC AUTO: 94.9 FL — SIGNIFICANT CHANGE UP (ref 80–100)
NRBC # BLD: 0 /100 WBCS — SIGNIFICANT CHANGE UP (ref 0–0)
PLATELET # BLD AUTO: 134 K/UL — LOW (ref 150–400)
POTASSIUM SERPL-MCNC: 4.6 MMOL/L — SIGNIFICANT CHANGE UP (ref 3.5–5.3)
POTASSIUM SERPL-SCNC: 4.6 MMOL/L — SIGNIFICANT CHANGE UP (ref 3.5–5.3)
RBC # BLD: 3.56 M/UL — LOW (ref 4.2–5.8)
RBC # FLD: 13.1 % — SIGNIFICANT CHANGE UP (ref 10.3–14.5)
SODIUM SERPL-SCNC: 139 MMOL/L — SIGNIFICANT CHANGE UP (ref 135–145)
WBC # BLD: 8.32 K/UL — SIGNIFICANT CHANGE UP (ref 3.8–10.5)
WBC # FLD AUTO: 8.32 K/UL — SIGNIFICANT CHANGE UP (ref 3.8–10.5)

## 2019-11-27 PROCEDURE — C1722: CPT

## 2019-11-27 PROCEDURE — 33249 INSJ/RPLCMT DEFIB W/LEAD(S): CPT

## 2019-11-27 PROCEDURE — 85027 COMPLETE CBC AUTOMATED: CPT

## 2019-11-27 PROCEDURE — C1777: CPT

## 2019-11-27 PROCEDURE — G0463: CPT

## 2019-11-27 PROCEDURE — 83735 ASSAY OF MAGNESIUM: CPT

## 2019-11-27 PROCEDURE — 93010 ELECTROCARDIOGRAM REPORT: CPT

## 2019-11-27 PROCEDURE — C1892: CPT

## 2019-11-27 PROCEDURE — 82962 GLUCOSE BLOOD TEST: CPT

## 2019-11-27 PROCEDURE — 80048 BASIC METABOLIC PNL TOTAL CA: CPT

## 2019-11-27 PROCEDURE — 80053 COMPREHEN METABOLIC PANEL: CPT

## 2019-11-27 PROCEDURE — 71046 X-RAY EXAM CHEST 2 VIEWS: CPT | Mod: 26

## 2019-11-27 PROCEDURE — 71046 X-RAY EXAM CHEST 2 VIEWS: CPT

## 2019-11-27 PROCEDURE — 71045 X-RAY EXAM CHEST 1 VIEW: CPT

## 2019-11-27 PROCEDURE — 86900 BLOOD TYPING SEROLOGIC ABO: CPT

## 2019-11-27 PROCEDURE — 93005 ELECTROCARDIOGRAM TRACING: CPT

## 2019-11-27 PROCEDURE — 85610 PROTHROMBIN TIME: CPT

## 2019-11-27 PROCEDURE — 86901 BLOOD TYPING SEROLOGIC RH(D): CPT

## 2019-11-27 PROCEDURE — 86850 RBC ANTIBODY SCREEN: CPT

## 2019-11-27 RX ORDER — MAGNESIUM SULFATE 500 MG/ML
2 VIAL (ML) INJECTION ONCE
Refills: 0 | Status: COMPLETED | OUTPATIENT
Start: 2019-11-27 | End: 2019-11-27

## 2019-11-27 RX ADMIN — RANOLAZINE 500 MILLIGRAM(S): 500 TABLET, FILM COATED, EXTENDED RELEASE ORAL at 05:18

## 2019-11-27 RX ADMIN — Medication 2: at 07:54

## 2019-11-27 RX ADMIN — CARVEDILOL PHOSPHATE 25 MILLIGRAM(S): 80 CAPSULE, EXTENDED RELEASE ORAL at 05:18

## 2019-11-27 RX ADMIN — Medication 300 MILLIGRAM(S): at 11:53

## 2019-11-27 RX ADMIN — Medication 2 UNIT(S): at 07:54

## 2019-11-27 RX ADMIN — Medication 50 GRAM(S): at 06:08

## 2019-11-27 RX ADMIN — Medication 81 MILLIGRAM(S): at 05:18

## 2019-11-27 RX ADMIN — TICAGRELOR 90 MILLIGRAM(S): 90 TABLET ORAL at 05:18

## 2019-11-27 RX ADMIN — ISOSORBIDE MONONITRATE 60 MILLIGRAM(S): 60 TABLET, EXTENDED RELEASE ORAL at 11:53

## 2019-11-27 RX ADMIN — Medication 650 MILLIGRAM(S): at 00:00

## 2019-11-27 NOTE — PROGRESS NOTE ADULT - PROBLEM SELECTOR PLAN 4
Your LDL cholesterol will be less than 70mg/dL   Continue with your cholesterol medications. Eat a heart healthy diet that is low in saturated fats and salt, and includes whole grains, fruits, vegetables and lean protein; exercise regularly (consult with your physician or cardiologist first); maintain a heart healthy weight. Continue to follow with your primary physician or cardiologist for treatment goals, continue medication, have liver function testing every 3 months as anti lipid medications can cause liver irritation. If you smoke - quit (A resource to help you stop smoking is the [facility] Tobacco [facility] – phone number [phone].).

## 2019-11-27 NOTE — PROGRESS NOTE ADULT - ASSESSMENT
Patient is a 79y old  Male who presents with Syncope (26 Nov 2019 21:10) now s/p AICD via left anterior chest wall. Pt tolerated the procedure well, site bening. Post-procedure discharge instructions discussed and questions addressed       CXR pending 11/28/2019

## 2019-11-27 NOTE — PROGRESS NOTE ADULT - PROBLEM SELECTOR PLAN 2
Your hemoglobin A1C will be between 7-8   Continue to follow with your primary care MD or your endocrinologist.  Follow a heart healthy diabetic diet. If you check your fingerstick glucose at home, call your MD if it is greater than 250mg/dL on 2 occasions or less than 100mg/dL on 2 occasions. Know signs of low blood sugar, such as: dizziness, shakiness, sweating, confusion, hunger, nervousness-drink 4 ounces apple juice if occurs and call your doctor. Know early signs of high blood sugar, such as: frequent urination, increased thirst, blurry vision, fatigue, headache - call your doctor if this occurs. Follow with other practitioners to care for your diabetes, such as ophthalmologist and podiatrist.

## 2019-11-27 NOTE — PROGRESS NOTE ADULT - SUBJECTIVE AND OBJECTIVE BOX
Patient is a 79y old  Male who presents with Syncope (26 Nov 2019 21:10) now s/p AICD via left anterior chest wall           Allergies    No Known Allergies    Intolerances        Medications:  allopurinol 300 milliGRAM(s) Oral daily  aspirin enteric coated 81 milliGRAM(s) Oral daily  atorvastatin 80 milliGRAM(s) Oral at bedtime  carvedilol 25 milliGRAM(s) Oral every 12 hours  dextrose 40% Gel 15 Gram(s) Oral once PRN  dextrose 5%. 1000 milliLiter(s) IV Continuous <Continuous>  dextrose 50% Injectable 12.5 Gram(s) IV Push once  dextrose 50% Injectable 25 Gram(s) IV Push once  dextrose 50% Injectable 25 Gram(s) IV Push once  glucagon  Injectable 1 milliGRAM(s) IntraMuscular once PRN  insulin glargine Injectable (LANTUS) 28 Unit(s) SubCutaneous at bedtime  insulin lispro (HumaLOG) corrective regimen sliding scale   SubCutaneous three times a day before meals  insulin lispro (HumaLOG) corrective regimen sliding scale   SubCutaneous at bedtime  insulin lispro Injectable (HumaLOG) 2 Unit(s) SubCutaneous before breakfast  insulin lispro Injectable (HumaLOG) 2 Unit(s) SubCutaneous before lunch  insulin lispro Injectable (HumaLOG) 2 Unit(s) SubCutaneous before dinner  isosorbide   mononitrate ER Tablet (IMDUR) 60 milliGRAM(s) Oral daily  oxycodone    5 mG/acetaminophen 325 mG 2 Tablet(s) Oral every 6 hours PRN  ranolazine 500 milliGRAM(s) Oral two times a day  ticagrelor 90 milliGRAM(s) Oral every 12 hours      Vitals:  T(C): 36.7 (11-26-19 @ 20:26), Max: 36.7 (11-26-19 @ 20:26)  HR: 79 (11-26-19 @ 20:26) (76 - 86)  BP: 130/83 (11-26-19 @ 20:26) (104/73 - 136/78)  BP(mean): --  RR: 17 (11-26-19 @ 20:26) (16 - 17)  SpO2: 98% (11-26-19 @ 20:26) (97% - 99%)  Wt(kg): --  Daily Height in cm: 177.8 (26 Nov 2019 10:45)    Daily   I&O's Summary    26 Nov 2019 07:01  -  27 Nov 2019 01:28  --------------------------------------------------------  IN: 520 mL / OUT: 0 mL / NET: 520 mL          Physical Exam:  Appearance: Normal  Procedural Access Site: s/p AICD implantation via left anterior chest wall. No hematoma, Non-tender to palpation, 2+ pulse, No bruit, No Ecchymosis  Gastrointestinal: Soft, Non tender, Normal Bowel Sounds  Musculoskeletal: No clubbing, No joint deformity   Neurologic: Non-focal  Psychiatry: AAOx3, Mood & affect appropriate  Skin: No rashes, No ecchymoses, No cyanosis    11-25    136  |  98  |  37<H>  ----------------------------<  253<H>  4.7   |  23  |  1.54<H>    Ca    9.7      25 Nov 2019 10:11    TPro  7.0  /  Alb  4.0  /  TBili  0.6  /  DBili  x   /  AST  17  /  ALT  25  /  AlkPhos  82  11-25    PT/INR - ( 25 Nov 2019 10:11 )   PT: 12.8 sec;   INR: 1.12 ratio         Interpretation of Telemetry:

## 2019-11-27 NOTE — PROGRESS NOTE ADULT - PROBLEM SELECTOR PLAN 1
Your incision will be without infection. Your heartbeat will remain controlled.   Appointment: follow up with your electrophysiology (EP) doctor in 7-10 days after discharge. Please call the EP office ([phone]) to make appointment.   Incision care (where your cut was made): sugical glue will naturally fall off our skin.  Do not scrub, rub, or pick at the surgical glue. Call your doctor if you have any fever; chills; redness; swelling; increased soreness; warmth or drainage at the incision site.    ID Card: Do carry your ICD Identification (ID) card with you at all times.   Call your doctor immediately if you have hiccups for a long time, fainting, dizziness, lightheadedness, palpitations, chest pain or the same symptoms that you had before the procedure.   You should not have a MRI unless you have a MRI safe device.   IF YOU RECEIVE A SHOCK: • If you receive 1 shock, you must call our EP office. • If you receive 2 or more shocks, you should call 911 and go to ER for further evaluation or treatment by the EP team.

## 2019-11-27 NOTE — PROGRESS NOTE ADULT - PROBLEM SELECTOR PLAN 3
Your blood pressure will be controlled.   Continue with your blood pressure medications; eat a heart healthy diet with low salt diet; exercise regularly (consult with your physician or cardiologist first); maintain a heart healthy weight; if you smoke - quit (A resource to help you stop smoking is the [facility] – phone number [phone].); include healthy ways to manage stress. Continue to follow with your primary care physician or cardiologist.

## 2019-11-27 NOTE — DISCHARGE NOTE NURSING/CASE MANAGEMENT/SOCIAL WORK - PATIENT PORTAL LINK FT
You can access the FollowMyHealth Patient Portal offered by [facility] by registering at the following website: [email or web address]. By joining [facility]’s FollowMyHealth portal, you will also be able to view your health information using other applications (apps) compatible with our system.

## 2019-11-29 ENCOUNTER — NON-APPOINTMENT (OUTPATIENT)
Age: 79
End: 2019-11-29

## 2019-12-03 ENCOUNTER — NON-APPOINTMENT (OUTPATIENT)
Age: 79
End: 2019-12-03

## 2019-12-09 ENCOUNTER — APPOINTMENT (OUTPATIENT)
Dept: ELECTROPHYSIOLOGY | Facility: CLINIC | Age: 79
End: 2019-12-09
Payer: MEDICARE

## 2019-12-09 ENCOUNTER — NON-APPOINTMENT (OUTPATIENT)
Age: 79
End: 2019-12-09

## 2019-12-09 VITALS
HEIGHT: 69 IN | DIASTOLIC BLOOD PRESSURE: 70 MMHG | BODY MASS INDEX: 29.92 KG/M2 | OXYGEN SATURATION: 99 % | HEART RATE: 87 BPM | WEIGHT: 202 LBS | SYSTOLIC BLOOD PRESSURE: 116 MMHG

## 2019-12-09 PROCEDURE — 93282 PRGRMG EVAL IMPLANTABLE DFB: CPT

## 2019-12-09 PROCEDURE — 99024 POSTOP FOLLOW-UP VISIT: CPT

## 2019-12-10 ENCOUNTER — APPOINTMENT (OUTPATIENT)
Dept: CARDIOLOGY | Facility: CLINIC | Age: 79
End: 2019-12-10
Payer: MEDICARE

## 2019-12-10 VITALS
DIASTOLIC BLOOD PRESSURE: 70 MMHG | HEIGHT: 69 IN | OXYGEN SATURATION: 97 % | BODY MASS INDEX: 29.18 KG/M2 | SYSTOLIC BLOOD PRESSURE: 128 MMHG | WEIGHT: 197 LBS | HEART RATE: 88 BPM

## 2019-12-10 DIAGNOSIS — Z95.810 PRESENCE OF AUTOMATIC (IMPLANTABLE) CARDIAC DEFIBRILLATOR: ICD-10-CM

## 2019-12-10 PROCEDURE — 99214 OFFICE O/P EST MOD 30 MIN: CPT

## 2019-12-18 NOTE — DISCUSSION/SUMMARY
[FreeTextEntry1] : In conclusion, this 79 year old active man with severe ischemic cardiomyopathy and marked LV dysfunction has experienced syncope that was likely related to his vasodilator medications. However, given the severe LV dysfunction with no possibility of improvement based on revascularizable options, he is best protected with an ICD to prevent sudden death.\par \par We discussed the rationale of ICD implant, the procedure, its risks, benefits and alternatives. He has a full understanding of it and we will schedule this in the next week.\par

## 2019-12-18 NOTE — REVIEW OF SYSTEMS
[Dyspnea on exertion] : dyspnea during exertion [see HPI] : see HPI [Shortness Of Breath] : shortness of breath [Negative] : Heme/Lymph [Chest Pain] : no chest pain [Palpitations] : no palpitations

## 2019-12-18 NOTE — PHYSICAL EXAM
[General Appearance - Well Developed] : well developed [Normal Appearance] : normal appearance [Well Groomed] : well groomed [General Appearance - Well Nourished] : well nourished [General Appearance - In No Acute Distress] : no acute distress [No Deformities] : no deformities [Eyelids - No Xanthelasma] : the eyelids demonstrated no xanthelasmas [Normal Conjunctiva] : the conjunctiva exhibited no abnormalities [No Oral Pallor] : no oral pallor [Normal Oral Mucosa] : normal oral mucosa [No Oral Cyanosis] : no oral cyanosis [Normal Jugular Venous A Waves Present] : normal jugular venous A waves present [Normal Jugular Venous V Waves Present] : normal jugular venous V waves present [Respiration, Rhythm And Depth] : normal respiratory rhythm and effort [No Jugular Venous Ruby A Waves] : no jugular venous ruby A waves [Auscultation Breath Sounds / Voice Sounds] : lungs were clear to auscultation bilaterally [Exaggerated Use Of Accessory Muscles For Inspiration] : no accessory muscle use [Heart Sounds] : normal S1 and S2 [Heart Rate And Rhythm] : heart rate and rhythm were normal [Abdomen Soft] : soft [Murmurs] : no murmurs present [Abdomen Tenderness] : non-tender [Gait - Sufficient For Exercise Testing] : the gait was sufficient for exercise testing [Abnormal Walk] : normal gait [Abdomen Mass (___ Cm)] : no abdominal mass palpated [Nail Clubbing] : no clubbing of the fingernails [Petechial Hemorrhages (___cm)] : no petechial hemorrhages [Cyanosis, Localized] : no localized cyanosis [Skin Color & Pigmentation] : normal skin color and pigmentation [No Venous Stasis] : no venous stasis [Skin Lesions] : no skin lesions [] : no rash [No Skin Ulcers] : no skin ulcer [No Xanthoma] : no  xanthoma was observed [Mood] : the mood was normal [Oriented To Time, Place, And Person] : oriented to person, place, and time [Affect] : the affect was normal [No Anxiety] : not feeling anxious [FreeTextEntry1] : No edema. SR with normal pulsees. JVP normal.

## 2019-12-18 NOTE — REASON FOR VISIT
[Consultation] : a consultation regarding [FreeTextEntry1] : Referring MD: Jay Lisker\par \par Syncope\par Ischemic cardiomyopathy with severe LV systolic dysfunction

## 2019-12-18 NOTE — HISTORY OF PRESENT ILLNESS
[FreeTextEntry1] : This 79 year old retired  has a history of coronary artery disease dating back to 1993 when he underwent coronary bypass surgery. Since then, he has had multiple hospitalizations with chest pains and percutaneous revascularization with stents. Last PTCi, he tells me, was in May of 2019.  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 He felt the "lights closing in" and then lost consciousness with gradual recovery. he did not injure himself. \par \par His echocardiogram on November 7, 2019 showed severe LV dysfunction with LVEF of 20-25% with multiple wall motion abnormalities. I reviewed the only previous echo from 2017 and although overall LVEF was 40%, he had inferior lateral LV wall motion abnormalities. \par \par His ECG shows sinus rhythm with WA interval of 150 msec and QRS duration of 106 msec. \par \par \par

## 2020-01-01 ENCOUNTER — OUTPATIENT (OUTPATIENT)
Dept: OUTPATIENT SERVICES | Facility: HOSPITAL | Age: 80
LOS: 1 days | End: 2020-01-01
Payer: MEDICARE

## 2020-01-01 ENCOUNTER — APPOINTMENT (OUTPATIENT)
Dept: CARDIOLOGY | Facility: CLINIC | Age: 80
End: 2020-01-01
Payer: MEDICARE

## 2020-01-01 ENCOUNTER — TRANSCRIPTION ENCOUNTER (OUTPATIENT)
Age: 80
End: 2020-01-01

## 2020-01-01 ENCOUNTER — APPOINTMENT (OUTPATIENT)
Dept: ELECTROPHYSIOLOGY | Facility: CLINIC | Age: 80
End: 2020-01-01
Payer: MEDICARE

## 2020-01-01 ENCOUNTER — RX RENEWAL (OUTPATIENT)
Age: 80
End: 2020-01-01

## 2020-01-01 ENCOUNTER — APPOINTMENT (OUTPATIENT)
Dept: CARDIOLOGY | Facility: CLINIC | Age: 80
End: 2020-01-01

## 2020-01-01 ENCOUNTER — APPOINTMENT (OUTPATIENT)
Dept: CARE COORDINATION | Facility: HOME HEALTH | Age: 80
End: 2020-01-01
Payer: MEDICARE

## 2020-01-01 ENCOUNTER — NON-APPOINTMENT (OUTPATIENT)
Age: 80
End: 2020-01-01

## 2020-01-01 ENCOUNTER — APPOINTMENT (OUTPATIENT)
Dept: CARDIOTHORACIC SURGERY | Facility: CLINIC | Age: 80
End: 2020-01-01
Payer: MEDICARE

## 2020-01-01 ENCOUNTER — INPATIENT (INPATIENT)
Facility: HOSPITAL | Age: 80
LOS: 9 days | Discharge: HOME | DRG: 291 | End: 2020-10-23
Attending: INTERNAL MEDICINE | Admitting: INTERNAL MEDICINE
Payer: MEDICARE

## 2020-01-01 ENCOUNTER — EMERGENCY (EMERGENCY)
Age: 80
LOS: 1 days | Discharge: ANOTHER HOSPITAL | End: 2020-01-01
Attending: EMERGENCY MEDICINE
Payer: MEDICARE

## 2020-01-01 ENCOUNTER — APPOINTMENT (OUTPATIENT)
Dept: HEART FAILURE | Facility: CLINIC | Age: 80
End: 2020-01-01
Payer: MEDICARE

## 2020-01-01 ENCOUNTER — INPATIENT (INPATIENT)
Facility: HOSPITAL | Age: 80
LOS: 1 days | Discharge: HOME | DRG: 206 | End: 2020-08-17
Attending: INTERNAL MEDICINE | Admitting: INTERNAL MEDICINE
Payer: MEDICARE

## 2020-01-01 ENCOUNTER — INPATIENT (INPATIENT)
Facility: HOSPITAL | Age: 80
LOS: 3 days | Discharge: HOME | DRG: 314 | End: 2020-08-31
Attending: INTERNAL MEDICINE | Admitting: INTERNAL MEDICINE
Payer: MEDICARE

## 2020-01-01 ENCOUNTER — APPOINTMENT (OUTPATIENT)
Dept: CARDIOTHORACIC SURGERY | Facility: CLINIC | Age: 80
End: 2020-01-01

## 2020-01-01 ENCOUNTER — INPATIENT (INPATIENT)
Facility: HOSPITAL | Age: 80
LOS: 2 days | Discharge: HOME | DRG: 291 | End: 2020-08-26
Attending: INTERNAL MEDICINE | Admitting: INTERNAL MEDICINE
Payer: MEDICARE

## 2020-01-01 ENCOUNTER — INPATIENT (INPATIENT)
Facility: HOSPITAL | Age: 80
LOS: 1 days | Discharge: HOME | DRG: 308 | End: 2020-11-15
Attending: INTERNAL MEDICINE | Admitting: INTERNAL MEDICINE
Payer: MEDICARE

## 2020-01-01 ENCOUNTER — INPATIENT (INPATIENT)
Facility: HOSPITAL | Age: 80
LOS: 2 days | Discharge: HOME | DRG: 291 | End: 2020-02-26
Attending: INTERNAL MEDICINE | Admitting: INTERNAL MEDICINE
Payer: MEDICARE

## 2020-01-01 ENCOUNTER — INPATIENT (INPATIENT)
Facility: HOSPITAL | Age: 80
LOS: 0 days | Discharge: HOME | DRG: 683 | End: 2020-07-16
Attending: INTERNAL MEDICINE | Admitting: INTERNAL MEDICINE
Payer: COMMERCIAL

## 2020-01-01 ENCOUNTER — INPATIENT (INPATIENT)
Facility: HOSPITAL | Age: 80
LOS: 2 days | Discharge: HOME | DRG: 291 | End: 2020-08-22
Attending: STUDENT IN AN ORGANIZED HEALTH CARE EDUCATION/TRAINING PROGRAM | Admitting: STUDENT IN AN ORGANIZED HEALTH CARE EDUCATION/TRAINING PROGRAM
Payer: MEDICARE

## 2020-01-01 ENCOUNTER — INPATIENT (INPATIENT)
Facility: HOSPITAL | Age: 80
LOS: 0 days | Discharge: HOME | DRG: 313 | End: 2020-02-14
Attending: INTERNAL MEDICINE | Admitting: INTERNAL MEDICINE
Payer: COMMERCIAL

## 2020-01-01 ENCOUNTER — APPOINTMENT (OUTPATIENT)
Dept: ELECTROPHYSIOLOGY | Facility: CLINIC | Age: 80
End: 2020-01-01

## 2020-01-01 ENCOUNTER — INPATIENT (INPATIENT)
Facility: HOSPITAL | Age: 80
LOS: 15 days | Discharge: HOME | DRG: 264 | End: 2020-09-29
Attending: INTERNAL MEDICINE | Admitting: INTERNAL MEDICINE
Payer: MEDICARE

## 2020-01-01 VITALS
TEMPERATURE: 97.5 F | SYSTOLIC BLOOD PRESSURE: 112 MMHG | HEIGHT: 69 IN | OXYGEN SATURATION: 96 % | BODY MASS INDEX: 27.7 KG/M2 | HEART RATE: 90 BPM | WEIGHT: 187 LBS | DIASTOLIC BLOOD PRESSURE: 70 MMHG

## 2020-01-01 VITALS
OXYGEN SATURATION: 96 % | SYSTOLIC BLOOD PRESSURE: 95 MMHG | HEIGHT: 68 IN | TEMPERATURE: 98 F | RESPIRATION RATE: 16 BRPM | DIASTOLIC BLOOD PRESSURE: 72 MMHG | HEART RATE: 89 BPM

## 2020-01-01 VITALS
DIASTOLIC BLOOD PRESSURE: 68 MMHG | RESPIRATION RATE: 19 BRPM | TEMPERATURE: 98 F | HEART RATE: 74 BPM | OXYGEN SATURATION: 98 % | SYSTOLIC BLOOD PRESSURE: 99 MMHG

## 2020-01-01 VITALS
HEIGHT: 70 IN | RESPIRATION RATE: 12 BRPM | OXYGEN SATURATION: 95 % | BODY MASS INDEX: 23.77 KG/M2 | DIASTOLIC BLOOD PRESSURE: 58 MMHG | SYSTOLIC BLOOD PRESSURE: 89 MMHG | TEMPERATURE: 97.8 F | HEART RATE: 90 BPM | WEIGHT: 166 LBS

## 2020-01-01 VITALS
OXYGEN SATURATION: 96 % | HEART RATE: 73 BPM | RESPIRATION RATE: 13 BRPM | TEMPERATURE: 97.6 F | DIASTOLIC BLOOD PRESSURE: 46 MMHG | RESPIRATION RATE: 18 BRPM | HEART RATE: 83 BPM | DIASTOLIC BLOOD PRESSURE: 64 MMHG | SYSTOLIC BLOOD PRESSURE: 78 MMHG | OXYGEN SATURATION: 94 % | TEMPERATURE: 98 F | SYSTOLIC BLOOD PRESSURE: 122 MMHG

## 2020-01-01 VITALS
BODY MASS INDEX: 25.11 KG/M2 | WEIGHT: 175 LBS | OXYGEN SATURATION: 97 % | HEART RATE: 93 BPM | TEMPERATURE: 97.5 F | SYSTOLIC BLOOD PRESSURE: 96 MMHG | DIASTOLIC BLOOD PRESSURE: 60 MMHG

## 2020-01-01 VITALS
WEIGHT: 184.97 LBS | TEMPERATURE: 98 F | HEART RATE: 83 BPM | RESPIRATION RATE: 20 BRPM | SYSTOLIC BLOOD PRESSURE: 114 MMHG | OXYGEN SATURATION: 99 % | DIASTOLIC BLOOD PRESSURE: 76 MMHG | HEIGHT: 70 IN

## 2020-01-01 VITALS
TEMPERATURE: 97.7 F | BODY MASS INDEX: 27.63 KG/M2 | RESPIRATION RATE: 12 BRPM | WEIGHT: 193 LBS | SYSTOLIC BLOOD PRESSURE: 91 MMHG | HEIGHT: 70 IN | OXYGEN SATURATION: 97 % | DIASTOLIC BLOOD PRESSURE: 52 MMHG | HEART RATE: 96 BPM

## 2020-01-01 VITALS
SYSTOLIC BLOOD PRESSURE: 120 MMHG | TEMPERATURE: 98 F | WEIGHT: 192.02 LBS | OXYGEN SATURATION: 95 % | HEART RATE: 94 BPM | HEIGHT: 68 IN | RESPIRATION RATE: 22 BRPM | DIASTOLIC BLOOD PRESSURE: 81 MMHG

## 2020-01-01 VITALS
BODY MASS INDEX: 28.29 KG/M2 | DIASTOLIC BLOOD PRESSURE: 70 MMHG | OXYGEN SATURATION: 97 % | SYSTOLIC BLOOD PRESSURE: 118 MMHG | HEART RATE: 88 BPM | WEIGHT: 191 LBS | HEIGHT: 69 IN

## 2020-01-01 VITALS
TEMPERATURE: 98 F | DIASTOLIC BLOOD PRESSURE: 65 MMHG | OXYGEN SATURATION: 97 % | RESPIRATION RATE: 18 BRPM | SYSTOLIC BLOOD PRESSURE: 106 MMHG | HEART RATE: 82 BPM

## 2020-01-01 VITALS
OXYGEN SATURATION: 96 % | TEMPERATURE: 98 F | HEART RATE: 107 BPM | RESPIRATION RATE: 20 BRPM | HEIGHT: 70 IN | SYSTOLIC BLOOD PRESSURE: 112 MMHG | WEIGHT: 195.99 LBS | DIASTOLIC BLOOD PRESSURE: 73 MMHG

## 2020-01-01 VITALS
HEART RATE: 98 BPM | RESPIRATION RATE: 18 BRPM | SYSTOLIC BLOOD PRESSURE: 98 MMHG | DIASTOLIC BLOOD PRESSURE: 64 MMHG | HEIGHT: 68 IN | WEIGHT: 169.98 LBS | TEMPERATURE: 98 F | OXYGEN SATURATION: 98 %

## 2020-01-01 VITALS
HEART RATE: 88 BPM | OXYGEN SATURATION: 98 % | DIASTOLIC BLOOD PRESSURE: 50 MMHG | SYSTOLIC BLOOD PRESSURE: 98 MMHG | TEMPERATURE: 97.7 F | RESPIRATION RATE: 16 BRPM

## 2020-01-01 VITALS
RESPIRATION RATE: 18 BRPM | OXYGEN SATURATION: 95 % | DIASTOLIC BLOOD PRESSURE: 66 MMHG | HEART RATE: 76 BPM | TEMPERATURE: 98 F | SYSTOLIC BLOOD PRESSURE: 104 MMHG

## 2020-01-01 VITALS
HEART RATE: 91 BPM | TEMPERATURE: 98 F | OXYGEN SATURATION: 93 % | DIASTOLIC BLOOD PRESSURE: 63 MMHG | SYSTOLIC BLOOD PRESSURE: 96 MMHG | RESPIRATION RATE: 18 BRPM

## 2020-01-01 VITALS
OXYGEN SATURATION: 98 % | BODY MASS INDEX: 24.34 KG/M2 | SYSTOLIC BLOOD PRESSURE: 92 MMHG | RESPIRATION RATE: 12 BRPM | DIASTOLIC BLOOD PRESSURE: 57 MMHG | HEART RATE: 92 BPM | HEIGHT: 70 IN | TEMPERATURE: 97.7 F | WEIGHT: 170 LBS

## 2020-01-01 VITALS
DIASTOLIC BLOOD PRESSURE: 68 MMHG | HEART RATE: 93 BPM | TEMPERATURE: 98 F | SYSTOLIC BLOOD PRESSURE: 104 MMHG | OXYGEN SATURATION: 97 % | RESPIRATION RATE: 18 BRPM | WEIGHT: 195.99 LBS

## 2020-01-01 VITALS
WEIGHT: 192.02 LBS | OXYGEN SATURATION: 97 % | SYSTOLIC BLOOD PRESSURE: 123 MMHG | HEART RATE: 95 BPM | RESPIRATION RATE: 22 BRPM | HEIGHT: 70 IN | DIASTOLIC BLOOD PRESSURE: 85 MMHG | TEMPERATURE: 98 F

## 2020-01-01 VITALS
DIASTOLIC BLOOD PRESSURE: 69 MMHG | WEIGHT: 220.02 LBS | HEIGHT: 68 IN | HEART RATE: 84 BPM | OXYGEN SATURATION: 100 % | RESPIRATION RATE: 20 BRPM | SYSTOLIC BLOOD PRESSURE: 103 MMHG | TEMPERATURE: 98 F

## 2020-01-01 VITALS
OXYGEN SATURATION: 96 % | RESPIRATION RATE: 18 BRPM | DIASTOLIC BLOOD PRESSURE: 69 MMHG | HEART RATE: 100 BPM | TEMPERATURE: 99 F | SYSTOLIC BLOOD PRESSURE: 127 MMHG

## 2020-01-01 VITALS
SYSTOLIC BLOOD PRESSURE: 87 MMHG | OXYGEN SATURATION: 96 % | HEART RATE: 101 BPM | BODY MASS INDEX: 27.77 KG/M2 | DIASTOLIC BLOOD PRESSURE: 53 MMHG | TEMPERATURE: 97.9 F | HEIGHT: 70 IN | RESPIRATION RATE: 12 BRPM | WEIGHT: 194 LBS

## 2020-01-01 VITALS — SYSTOLIC BLOOD PRESSURE: 121 MMHG | DIASTOLIC BLOOD PRESSURE: 64 MMHG | HEART RATE: 87 BPM

## 2020-01-01 VITALS
OXYGEN SATURATION: 100 % | HEART RATE: 80 BPM | SYSTOLIC BLOOD PRESSURE: 105 MMHG | DIASTOLIC BLOOD PRESSURE: 70 MMHG | RESPIRATION RATE: 18 BRPM | TEMPERATURE: 97 F

## 2020-01-01 VITALS
SYSTOLIC BLOOD PRESSURE: 122 MMHG | OXYGEN SATURATION: 97 % | HEIGHT: 69 IN | DIASTOLIC BLOOD PRESSURE: 60 MMHG | WEIGHT: 189 LBS | BODY MASS INDEX: 27.99 KG/M2 | HEART RATE: 90 BPM

## 2020-01-01 VITALS
OXYGEN SATURATION: 97 % | HEART RATE: 88 BPM | DIASTOLIC BLOOD PRESSURE: 62 MMHG | RESPIRATION RATE: 18 BRPM | SYSTOLIC BLOOD PRESSURE: 95 MMHG | TEMPERATURE: 98 F

## 2020-01-01 VITALS
RESPIRATION RATE: 17 BRPM | SYSTOLIC BLOOD PRESSURE: 70 MMHG | HEART RATE: 56 BPM | HEIGHT: 70 IN | OXYGEN SATURATION: 97 % | TEMPERATURE: 98 F | DIASTOLIC BLOOD PRESSURE: 42 MMHG

## 2020-01-01 VITALS
SYSTOLIC BLOOD PRESSURE: 115 MMHG | TEMPERATURE: 98 F | RESPIRATION RATE: 16 BRPM | HEART RATE: 99 BPM | HEIGHT: 70 IN | DIASTOLIC BLOOD PRESSURE: 77 MMHG | OXYGEN SATURATION: 100 % | WEIGHT: 188.05 LBS

## 2020-01-01 VITALS
DIASTOLIC BLOOD PRESSURE: 65 MMHG | OXYGEN SATURATION: 99 % | WEIGHT: 172 LBS | TEMPERATURE: 97.6 F | BODY MASS INDEX: 24.62 KG/M2 | SYSTOLIC BLOOD PRESSURE: 108 MMHG | HEIGHT: 70 IN | HEART RATE: 105 BPM | RESPIRATION RATE: 16 BRPM

## 2020-01-01 VITALS
DIASTOLIC BLOOD PRESSURE: 60 MMHG | HEART RATE: 97 BPM | SYSTOLIC BLOOD PRESSURE: 120 MMHG | OXYGEN SATURATION: 95 % | BODY MASS INDEX: 28.06 KG/M2 | WEIGHT: 190 LBS | TEMPERATURE: 97.3 F

## 2020-01-01 VITALS — WEIGHT: 165.79 LBS

## 2020-01-01 VITALS — WEIGHT: 187 LBS | HEIGHT: 70 IN | BODY MASS INDEX: 26.77 KG/M2

## 2020-01-01 VITALS
DIASTOLIC BLOOD PRESSURE: 79 MMHG | HEIGHT: 70 IN | RESPIRATION RATE: 16 BRPM | SYSTOLIC BLOOD PRESSURE: 115 MMHG | TEMPERATURE: 99 F | WEIGHT: 186.95 LBS | HEART RATE: 106 BPM | OXYGEN SATURATION: 97 %

## 2020-01-01 VITALS — DIASTOLIC BLOOD PRESSURE: 92 MMHG | SYSTOLIC BLOOD PRESSURE: 133 MMHG | HEART RATE: 98 BPM

## 2020-01-01 VITALS — SYSTOLIC BLOOD PRESSURE: 76 MMHG | DIASTOLIC BLOOD PRESSURE: 42 MMHG

## 2020-01-01 VITALS — WEIGHT: 177.69 LBS

## 2020-01-01 DIAGNOSIS — I95.9 HYPOTENSION, UNSPECIFIED: ICD-10-CM

## 2020-01-01 DIAGNOSIS — R06.02 SHORTNESS OF BREATH: ICD-10-CM

## 2020-01-01 DIAGNOSIS — N18.30 CHRONIC KIDNEY DISEASE, STAGE 3 UNSPECIFIED: ICD-10-CM

## 2020-01-01 DIAGNOSIS — I49.3 VENTRICULAR PREMATURE DEPOLARIZATION: ICD-10-CM

## 2020-01-01 DIAGNOSIS — I50.22 CHRONIC SYSTOLIC (CONGESTIVE) HEART FAILURE: ICD-10-CM

## 2020-01-01 DIAGNOSIS — I25.10 ATHEROSCLEROTIC HEART DISEASE OF NATIVE CORONARY ARTERY WITHOUT ANGINA PECTORIS: ICD-10-CM

## 2020-01-01 DIAGNOSIS — Z96.611 PRESENCE OF RIGHT ARTIFICIAL SHOULDER JOINT: Chronic | ICD-10-CM

## 2020-01-01 DIAGNOSIS — Z02.9 ENCOUNTER FOR ADMINISTRATIVE EXAMINATIONS, UNSPECIFIED: ICD-10-CM

## 2020-01-01 DIAGNOSIS — R07.9 CHEST PAIN, UNSPECIFIED: ICD-10-CM

## 2020-01-01 DIAGNOSIS — I25.5 ISCHEMIC CARDIOMYOPATHY: ICD-10-CM

## 2020-01-01 DIAGNOSIS — N18.3 CHRONIC KIDNEY DISEASE, STAGE 3 (MODERATE): ICD-10-CM

## 2020-01-01 DIAGNOSIS — I50.9 HEART FAILURE, UNSPECIFIED: ICD-10-CM

## 2020-01-01 DIAGNOSIS — R55 SYNCOPE AND COLLAPSE: ICD-10-CM

## 2020-01-01 DIAGNOSIS — I35.0 NONRHEUMATIC AORTIC (VALVE) STENOSIS: ICD-10-CM

## 2020-01-01 DIAGNOSIS — Z29.9 ENCOUNTER FOR PROPHYLACTIC MEASURES, UNSPECIFIED: ICD-10-CM

## 2020-01-01 DIAGNOSIS — N18.31 CHRONIC KIDNEY DISEASE, STAGE 3A: ICD-10-CM

## 2020-01-01 DIAGNOSIS — I10 ESSENTIAL (PRIMARY) HYPERTENSION: ICD-10-CM

## 2020-01-01 DIAGNOSIS — Z51.5 ENCOUNTER FOR PALLIATIVE CARE: ICD-10-CM

## 2020-01-01 DIAGNOSIS — I95.1 ORTHOSTATIC HYPOTENSION: ICD-10-CM

## 2020-01-01 DIAGNOSIS — E11.29 TYPE 2 DIABETES MELLITUS WITH OTHER DIABETIC KIDNEY COMPLICATION: ICD-10-CM

## 2020-01-01 DIAGNOSIS — F41.9 ANXIETY DISORDER, UNSPECIFIED: ICD-10-CM

## 2020-01-01 DIAGNOSIS — E78.5 HYPERLIPIDEMIA, UNSPECIFIED: ICD-10-CM

## 2020-01-01 DIAGNOSIS — R60.0 LOCALIZED EDEMA: ICD-10-CM

## 2020-01-01 DIAGNOSIS — I21.4 NON-ST ELEVATION (NSTEMI) MYOCARDIAL INFARCTION: ICD-10-CM

## 2020-01-01 DIAGNOSIS — G25.2 OTHER SPECIFIED FORMS OF TREMOR: ICD-10-CM

## 2020-01-01 DIAGNOSIS — I49.01 VENTRICULAR FIBRILLATION: ICD-10-CM

## 2020-01-01 DIAGNOSIS — R06.00 DYSPNEA, UNSPECIFIED: ICD-10-CM

## 2020-01-01 DIAGNOSIS — Z66 DO NOT RESUSCITATE: ICD-10-CM

## 2020-01-01 DIAGNOSIS — I50.42 CHRONIC COMBINED SYSTOLIC (CONGESTIVE) AND DIASTOLIC (CONGESTIVE) HEART FAILURE: ICD-10-CM

## 2020-01-01 DIAGNOSIS — I47.2 VENTRICULAR TACHYCARDIA: ICD-10-CM

## 2020-01-01 DIAGNOSIS — E78.00 PURE HYPERCHOLESTEROLEMIA, UNSPECIFIED: ICD-10-CM

## 2020-01-01 DIAGNOSIS — I50.43 ACUTE ON CHRONIC COMBINED SYSTOLIC (CONGESTIVE) AND DIASTOLIC (CONGESTIVE) HEART FAILURE: ICD-10-CM

## 2020-01-01 DIAGNOSIS — R79.89 OTHER SPECIFIED ABNORMAL FINDINGS OF BLOOD CHEMISTRY: ICD-10-CM

## 2020-01-01 DIAGNOSIS — Z79.899 OTHER LONG TERM (CURRENT) DRUG THERAPY: ICD-10-CM

## 2020-01-01 DIAGNOSIS — I35.9 NONRHEUMATIC AORTIC VALVE DISORDER, UNSPECIFIED: ICD-10-CM

## 2020-01-01 DIAGNOSIS — M1A.9XX0 CHRONIC GOUT, UNSPECIFIED, WITHOUT TOPHUS (TOPHI): ICD-10-CM

## 2020-01-01 DIAGNOSIS — N20.0 CALCULUS OF KIDNEY: ICD-10-CM

## 2020-01-01 DIAGNOSIS — Z45.02 ENCOUNTER FOR ADJUSTMENT AND MANAGEMENT OF AUTOMATIC IMPLANTABLE CARDIAC DEFIBRILLATOR: ICD-10-CM

## 2020-01-01 DIAGNOSIS — G47.00 INSOMNIA, UNSPECIFIED: ICD-10-CM

## 2020-01-01 DIAGNOSIS — I50.23 ACUTE ON CHRONIC SYSTOLIC (CONGESTIVE) HEART FAILURE: ICD-10-CM

## 2020-01-01 DIAGNOSIS — N17.9 ACUTE KIDNEY FAILURE, UNSPECIFIED: ICD-10-CM

## 2020-01-01 DIAGNOSIS — E11.69 TYPE 2 DIABETES MELLITUS WITH OTHER SPECIFIED COMPLICATION: ICD-10-CM

## 2020-01-01 DIAGNOSIS — N18.9 CHRONIC KIDNEY DISEASE, UNSPECIFIED: ICD-10-CM

## 2020-01-01 DIAGNOSIS — I42.9 CARDIOMYOPATHY, UNSPECIFIED: ICD-10-CM

## 2020-01-01 DIAGNOSIS — I25.10 ATHEROSCLEROTIC HEART DISEASE OF NATIVE CORONARY ARTERY W/OUT ANGINA PECTORIS: ICD-10-CM

## 2020-01-01 DIAGNOSIS — R09.89 OTHER SPECIFIED SYMPTOMS AND SIGNS INVOLVING THE CIRCULATORY AND RESPIRATORY SYSTEMS: ICD-10-CM

## 2020-01-01 DIAGNOSIS — Z95.810 PRESENCE OF AUTOMATIC (IMPLANTABLE) CARDIAC DEFIBRILLATOR: ICD-10-CM

## 2020-01-01 DIAGNOSIS — Z71.89 OTHER SPECIFIED COUNSELING: ICD-10-CM

## 2020-01-01 DIAGNOSIS — R53.81 OTHER MALAISE: ICD-10-CM

## 2020-01-01 DIAGNOSIS — R52 PAIN, UNSPECIFIED: ICD-10-CM

## 2020-01-01 DIAGNOSIS — E11.9 TYPE 2 DIABETES MELLITUS WITHOUT COMPLICATIONS: ICD-10-CM

## 2020-01-01 DIAGNOSIS — E11.65 TYPE 2 DIABETES MELLITUS WITH OTHER DIABETIC KIDNEY COMPLICATION: ICD-10-CM

## 2020-01-01 DIAGNOSIS — F32.9 MAJOR DEPRESSIVE DISORDER, SINGLE EPISODE, UNSPECIFIED: ICD-10-CM

## 2020-01-01 DIAGNOSIS — J96.01 ACUTE RESPIRATORY FAILURE WITH HYPOXIA: ICD-10-CM

## 2020-01-01 DIAGNOSIS — I50.84 END STAGE HEART FAILURE: ICD-10-CM

## 2020-01-01 DIAGNOSIS — I25.810 ATHEROSCLEROSIS OF CORONARY ARTERY BYPASS GRAFT(S) WITHOUT ANGINA PECTORIS: ICD-10-CM

## 2020-01-01 LAB
A1C WITH ESTIMATED AVERAGE GLUCOSE RESULT: 11.6 % — HIGH (ref 4–5.6)
A1C WITH ESTIMATED AVERAGE GLUCOSE RESULT: 12.2 % — HIGH (ref 4–5.6)
A1C WITH ESTIMATED AVERAGE GLUCOSE RESULT: 9.6 % — HIGH (ref 4–5.6)
ALBUMIN SERPL ELPH-MCNC: 2.3 G/DL — LOW (ref 3.5–5)
ALBUMIN SERPL ELPH-MCNC: 2.5 G/DL — LOW (ref 3.5–5)
ALBUMIN SERPL ELPH-MCNC: 2.6 G/DL — LOW (ref 3.5–5)
ALBUMIN SERPL ELPH-MCNC: 2.7 G/DL — LOW (ref 3.5–5)
ALBUMIN SERPL ELPH-MCNC: 3 G/DL — LOW (ref 3.5–5)
ALBUMIN SERPL ELPH-MCNC: 3.2 G/DL — LOW (ref 3.3–5)
ALBUMIN SERPL ELPH-MCNC: 3.5 G/DL — SIGNIFICANT CHANGE UP (ref 3.3–5)
ALBUMIN SERPL ELPH-MCNC: 3.6 G/DL — SIGNIFICANT CHANGE UP (ref 3.3–5)
ALBUMIN SERPL ELPH-MCNC: 3.7 G/DL — SIGNIFICANT CHANGE UP (ref 3.3–5)
ALBUMIN SERPL ELPH-MCNC: 3.8 G/DL — SIGNIFICANT CHANGE UP (ref 3.3–5)
ALBUMIN SERPL ELPH-MCNC: 4 G/DL
ALBUMIN SERPL ELPH-MCNC: 4 G/DL — SIGNIFICANT CHANGE UP (ref 3.3–5)
ALBUMIN SERPL ELPH-MCNC: 4.5 G/DL
ALBUMIN SERPL ELPH-MCNC: 4.5 G/DL
ALP BLD-CCNC: 102 U/L
ALP BLD-CCNC: 108 U/L
ALP BLD-CCNC: 80 U/L
ALP SERPL-CCNC: 66 U/L — SIGNIFICANT CHANGE UP (ref 40–120)
ALP SERPL-CCNC: 69 U/L — SIGNIFICANT CHANGE UP (ref 40–120)
ALP SERPL-CCNC: 72 U/L — SIGNIFICANT CHANGE UP (ref 40–120)
ALP SERPL-CCNC: 77 U/L — SIGNIFICANT CHANGE UP (ref 40–120)
ALP SERPL-CCNC: 78 U/L — SIGNIFICANT CHANGE UP (ref 40–120)
ALP SERPL-CCNC: 81 U/L — SIGNIFICANT CHANGE UP (ref 40–120)
ALP SERPL-CCNC: 83 U/L — SIGNIFICANT CHANGE UP (ref 40–120)
ALP SERPL-CCNC: 84 U/L — SIGNIFICANT CHANGE UP (ref 40–120)
ALP SERPL-CCNC: 85 U/L — SIGNIFICANT CHANGE UP (ref 40–120)
ALP SERPL-CCNC: 87 U/L — SIGNIFICANT CHANGE UP (ref 40–120)
ALP SERPL-CCNC: 90 U/L — SIGNIFICANT CHANGE UP (ref 40–120)
ALP SERPL-CCNC: 91 U/L — SIGNIFICANT CHANGE UP (ref 40–120)
ALP SERPL-CCNC: 92 U/L — SIGNIFICANT CHANGE UP (ref 40–120)
ALP SERPL-CCNC: 95 U/L — SIGNIFICANT CHANGE UP (ref 40–120)
ALP SERPL-CCNC: 95 U/L — SIGNIFICANT CHANGE UP (ref 40–120)
ALP SERPL-CCNC: 96 U/L — SIGNIFICANT CHANGE UP (ref 40–120)
ALT FLD-CCNC: 25 U/L — SIGNIFICANT CHANGE UP (ref 10–45)
ALT FLD-CCNC: 26 U/L — SIGNIFICANT CHANGE UP (ref 10–45)
ALT FLD-CCNC: 29 U/L — SIGNIFICANT CHANGE UP (ref 10–45)
ALT FLD-CCNC: 29 U/L — SIGNIFICANT CHANGE UP (ref 10–45)
ALT FLD-CCNC: 32 U/L — SIGNIFICANT CHANGE UP (ref 10–45)
ALT FLD-CCNC: 33 U/L DA — SIGNIFICANT CHANGE UP (ref 10–60)
ALT FLD-CCNC: 36 U/L — SIGNIFICANT CHANGE UP (ref 10–45)
ALT FLD-CCNC: 36 U/L — SIGNIFICANT CHANGE UP (ref 10–45)
ALT FLD-CCNC: 37 U/L DA — SIGNIFICANT CHANGE UP (ref 10–60)
ALT FLD-CCNC: 39 U/L DA — SIGNIFICANT CHANGE UP (ref 10–60)
ALT FLD-CCNC: 41 U/L DA — SIGNIFICANT CHANGE UP (ref 10–60)
ALT FLD-CCNC: 42 U/L — SIGNIFICANT CHANGE UP (ref 10–45)
ALT FLD-CCNC: 43 U/L DA — SIGNIFICANT CHANGE UP (ref 10–60)
ALT FLD-CCNC: 51 U/L — HIGH (ref 10–45)
ALT FLD-CCNC: 75 U/L — HIGH (ref 10–45)
ALT FLD-CCNC: 93 U/L — HIGH (ref 10–45)
ALT SERPL-CCNC: 187 U/L
ALT SERPL-CCNC: 29 U/L
ALT SERPL-CCNC: 35 U/L
AMYLASE P1 CFR SERPL: 44 U/L — SIGNIFICANT CHANGE UP (ref 25–125)
ANION GAP SERPL CALC-SCNC: 10 MMOL/L — SIGNIFICANT CHANGE UP (ref 5–17)
ANION GAP SERPL CALC-SCNC: 11 MMOL/L — SIGNIFICANT CHANGE UP (ref 5–17)
ANION GAP SERPL CALC-SCNC: 12 MMOL/L — SIGNIFICANT CHANGE UP (ref 5–17)
ANION GAP SERPL CALC-SCNC: 13 MMOL/L
ANION GAP SERPL CALC-SCNC: 13 MMOL/L — SIGNIFICANT CHANGE UP (ref 5–17)
ANION GAP SERPL CALC-SCNC: 14 MMOL/L — SIGNIFICANT CHANGE UP (ref 5–17)
ANION GAP SERPL CALC-SCNC: 15 MMOL/L
ANION GAP SERPL CALC-SCNC: 15 MMOL/L — SIGNIFICANT CHANGE UP (ref 5–17)
ANION GAP SERPL CALC-SCNC: 16 MMOL/L — SIGNIFICANT CHANGE UP (ref 5–17)
ANION GAP SERPL CALC-SCNC: 17 MMOL/L
ANION GAP SERPL CALC-SCNC: 17 MMOL/L — SIGNIFICANT CHANGE UP (ref 5–17)
ANION GAP SERPL CALC-SCNC: 18 MMOL/L
ANION GAP SERPL CALC-SCNC: 19 MMOL/L — HIGH (ref 5–17)
ANION GAP SERPL CALC-SCNC: 5 MMOL/L — SIGNIFICANT CHANGE UP (ref 5–17)
ANION GAP SERPL CALC-SCNC: 5 MMOL/L — SIGNIFICANT CHANGE UP (ref 5–17)
ANION GAP SERPL CALC-SCNC: 6 MMOL/L — SIGNIFICANT CHANGE UP (ref 5–17)
ANION GAP SERPL CALC-SCNC: 7 MMOL/L — SIGNIFICANT CHANGE UP (ref 5–17)
ANION GAP SERPL CALC-SCNC: 8 MMOL/L — SIGNIFICANT CHANGE UP (ref 5–17)
ANION GAP SERPL CALC-SCNC: 8 MMOL/L — SIGNIFICANT CHANGE UP (ref 5–17)
APPEARANCE UR: CLEAR — SIGNIFICANT CHANGE UP
APTT BLD: 153.1 SEC — CRITICAL HIGH (ref 27.5–35.5)
APTT BLD: 27.2 SEC — LOW (ref 27.5–35.5)
APTT BLD: 29.5 SEC — SIGNIFICANT CHANGE UP (ref 27.5–35.5)
APTT BLD: 30.1 SEC — SIGNIFICANT CHANGE UP (ref 27.5–35.5)
APTT BLD: 30.3 SEC — SIGNIFICANT CHANGE UP (ref 27.5–35.5)
APTT BLD: 31.3 SEC — SIGNIFICANT CHANGE UP (ref 27.5–36.3)
APTT BLD: 33.3 SEC — SIGNIFICANT CHANGE UP (ref 27.5–35.5)
APTT BLD: 33.6 SEC — SIGNIFICANT CHANGE UP (ref 27.5–35.5)
APTT BLD: 33.9 SEC — SIGNIFICANT CHANGE UP (ref 27.5–35.5)
APTT BLD: 38.4 SEC — HIGH (ref 27.5–35.5)
APTT BLD: 44.8 SEC — HIGH (ref 27.5–36.3)
APTT BLD: 45.1 SEC — HIGH (ref 27.5–35.5)
APTT BLD: 52.4 SEC — HIGH (ref 27.5–36.3)
APTT BLD: 68.7 SEC — HIGH (ref 27.5–35.5)
AST SERPL-CCNC: 16 U/L — SIGNIFICANT CHANGE UP (ref 10–40)
AST SERPL-CCNC: 17 U/L — SIGNIFICANT CHANGE UP (ref 10–40)
AST SERPL-CCNC: 18 U/L — SIGNIFICANT CHANGE UP (ref 10–40)
AST SERPL-CCNC: 19 U/L
AST SERPL-CCNC: 19 U/L — SIGNIFICANT CHANGE UP (ref 10–40)
AST SERPL-CCNC: 21 U/L — SIGNIFICANT CHANGE UP (ref 10–40)
AST SERPL-CCNC: 22 U/L — SIGNIFICANT CHANGE UP (ref 10–40)
AST SERPL-CCNC: 22 U/L — SIGNIFICANT CHANGE UP (ref 10–40)
AST SERPL-CCNC: 23 U/L
AST SERPL-CCNC: 23 U/L — SIGNIFICANT CHANGE UP (ref 10–40)
AST SERPL-CCNC: 25 U/L — SIGNIFICANT CHANGE UP (ref 10–40)
AST SERPL-CCNC: 26 U/L — SIGNIFICANT CHANGE UP (ref 10–40)
AST SERPL-CCNC: 26 U/L — SIGNIFICANT CHANGE UP (ref 10–40)
AST SERPL-CCNC: 29 U/L — SIGNIFICANT CHANGE UP (ref 10–40)
AST SERPL-CCNC: 31 U/L — SIGNIFICANT CHANGE UP (ref 10–40)
AST SERPL-CCNC: 32 U/L — SIGNIFICANT CHANGE UP (ref 10–40)
AST SERPL-CCNC: 40 U/L — SIGNIFICANT CHANGE UP (ref 10–40)
AST SERPL-CCNC: 47 U/L — HIGH (ref 10–40)
AST SERPL-CCNC: 96 U/L
BACTERIA # UR AUTO: ABNORMAL /HPF
BACTERIA # UR AUTO: ABNORMAL /HPF
BACTERIA # UR AUTO: NEGATIVE — SIGNIFICANT CHANGE UP
BASE EXCESS BLDV CALC-SCNC: -1.5 MMOL/L — SIGNIFICANT CHANGE UP (ref -2–2)
BASE EXCESS BLDV CALC-SCNC: 0.8 MMOL/L — SIGNIFICANT CHANGE UP (ref -2–2)
BASE EXCESS BLDV CALC-SCNC: 6.4 MMOL/L — HIGH (ref -2–2)
BASE EXCESS BLDV CALC-SCNC: 7.6 MMOL/L — HIGH (ref -2–2)
BASOPHILS # BLD AUTO: 0.01 K/UL — SIGNIFICANT CHANGE UP (ref 0–0.2)
BASOPHILS # BLD AUTO: 0.02 K/UL — SIGNIFICANT CHANGE UP (ref 0–0.2)
BASOPHILS # BLD AUTO: 0.03 K/UL
BASOPHILS # BLD AUTO: 0.03 K/UL — SIGNIFICANT CHANGE UP (ref 0–0.2)
BASOPHILS NFR BLD AUTO: 0.1 % — SIGNIFICANT CHANGE UP (ref 0–2)
BASOPHILS NFR BLD AUTO: 0.2 % — SIGNIFICANT CHANGE UP (ref 0–2)
BASOPHILS NFR BLD AUTO: 0.3 % — SIGNIFICANT CHANGE UP (ref 0–2)
BASOPHILS NFR BLD AUTO: 0.3 % — SIGNIFICANT CHANGE UP (ref 0–2)
BASOPHILS NFR BLD AUTO: 0.4 %
BASOPHILS NFR BLD AUTO: 0.4 % — SIGNIFICANT CHANGE UP (ref 0–2)
BASOPHILS NFR BLD AUTO: 0.4 % — SIGNIFICANT CHANGE UP (ref 0–2)
BILIRUB SERPL-MCNC: 0.5 MG/DL — SIGNIFICANT CHANGE UP (ref 0.2–1.2)
BILIRUB SERPL-MCNC: 0.6 MG/DL
BILIRUB SERPL-MCNC: 0.6 MG/DL
BILIRUB SERPL-MCNC: 0.6 MG/DL — SIGNIFICANT CHANGE UP (ref 0.2–1.2)
BILIRUB SERPL-MCNC: 0.7 MG/DL
BILIRUB SERPL-MCNC: 0.7 MG/DL — SIGNIFICANT CHANGE UP (ref 0.2–1.2)
BILIRUB SERPL-MCNC: 0.7 MG/DL — SIGNIFICANT CHANGE UP (ref 0.2–1.2)
BILIRUB SERPL-MCNC: 0.8 MG/DL — SIGNIFICANT CHANGE UP (ref 0.2–1.2)
BILIRUB SERPL-MCNC: 0.9 MG/DL — SIGNIFICANT CHANGE UP (ref 0.2–1.2)
BILIRUB SERPL-MCNC: 0.9 MG/DL — SIGNIFICANT CHANGE UP (ref 0.2–1.2)
BILIRUB SERPL-MCNC: 1.3 MG/DL — HIGH (ref 0.2–1.2)
BILIRUB UR-MCNC: NEGATIVE — SIGNIFICANT CHANGE UP
BLOOD GAS COMMENTS, VENOUS: SIGNIFICANT CHANGE UP
BUN SERPL-MCNC: 33 MG/DL — HIGH (ref 7–23)
BUN SERPL-MCNC: 35 MG/DL — HIGH (ref 7–23)
BUN SERPL-MCNC: 35 MG/DL — HIGH (ref 7–23)
BUN SERPL-MCNC: 36 MG/DL — HIGH (ref 7–23)
BUN SERPL-MCNC: 36 MG/DL — HIGH (ref 7–23)
BUN SERPL-MCNC: 37 MG/DL — HIGH (ref 7–23)
BUN SERPL-MCNC: 38 MG/DL
BUN SERPL-MCNC: 38 MG/DL — HIGH (ref 7–18)
BUN SERPL-MCNC: 38 MG/DL — HIGH (ref 7–23)
BUN SERPL-MCNC: 40 MG/DL — HIGH (ref 7–23)
BUN SERPL-MCNC: 41 MG/DL — HIGH (ref 7–18)
BUN SERPL-MCNC: 41 MG/DL — HIGH (ref 7–23)
BUN SERPL-MCNC: 41 MG/DL — HIGH (ref 7–23)
BUN SERPL-MCNC: 42 MG/DL — HIGH (ref 7–18)
BUN SERPL-MCNC: 42 MG/DL — HIGH (ref 7–23)
BUN SERPL-MCNC: 43 MG/DL — HIGH (ref 7–18)
BUN SERPL-MCNC: 43 MG/DL — HIGH (ref 7–23)
BUN SERPL-MCNC: 43 MG/DL — HIGH (ref 7–23)
BUN SERPL-MCNC: 44 MG/DL — HIGH (ref 7–23)
BUN SERPL-MCNC: 45 MG/DL — HIGH (ref 7–18)
BUN SERPL-MCNC: 45 MG/DL — HIGH (ref 7–23)
BUN SERPL-MCNC: 46 MG/DL — HIGH (ref 7–18)
BUN SERPL-MCNC: 46 MG/DL — HIGH (ref 7–23)
BUN SERPL-MCNC: 46 MG/DL — HIGH (ref 7–23)
BUN SERPL-MCNC: 47 MG/DL — HIGH (ref 7–18)
BUN SERPL-MCNC: 47 MG/DL — HIGH (ref 7–23)
BUN SERPL-MCNC: 48 MG/DL — HIGH (ref 7–23)
BUN SERPL-MCNC: 49 MG/DL — HIGH (ref 7–18)
BUN SERPL-MCNC: 49 MG/DL — HIGH (ref 7–23)
BUN SERPL-MCNC: 49 MG/DL — HIGH (ref 7–23)
BUN SERPL-MCNC: 50 MG/DL
BUN SERPL-MCNC: 50 MG/DL — HIGH (ref 7–23)
BUN SERPL-MCNC: 50 MG/DL — HIGH (ref 7–23)
BUN SERPL-MCNC: 51 MG/DL — HIGH (ref 7–23)
BUN SERPL-MCNC: 53 MG/DL
BUN SERPL-MCNC: 53 MG/DL — HIGH (ref 7–23)
BUN SERPL-MCNC: 54 MG/DL — HIGH (ref 7–23)
BUN SERPL-MCNC: 55 MG/DL — HIGH (ref 7–23)
BUN SERPL-MCNC: 56 MG/DL — HIGH (ref 7–23)
BUN SERPL-MCNC: 56 MG/DL — HIGH (ref 7–23)
BUN SERPL-MCNC: 57 MG/DL — HIGH (ref 7–18)
BUN SERPL-MCNC: 57 MG/DL — HIGH (ref 7–23)
BUN SERPL-MCNC: 58 MG/DL — HIGH (ref 7–23)
BUN SERPL-MCNC: 58 MG/DL — HIGH (ref 7–23)
BUN SERPL-MCNC: 59 MG/DL — HIGH (ref 7–23)
BUN SERPL-MCNC: 60 MG/DL — HIGH (ref 7–23)
BUN SERPL-MCNC: 61 MG/DL — HIGH (ref 7–23)
BUN SERPL-MCNC: 62 MG/DL — HIGH (ref 7–23)
BUN SERPL-MCNC: 62 MG/DL — HIGH (ref 7–23)
BUN SERPL-MCNC: 63 MG/DL — HIGH (ref 7–23)
BUN SERPL-MCNC: 63 MG/DL — HIGH (ref 7–23)
BUN SERPL-MCNC: 64 MG/DL — HIGH (ref 7–23)
BUN SERPL-MCNC: 65 MG/DL — HIGH (ref 7–18)
BUN SERPL-MCNC: 75 MG/DL — HIGH (ref 7–23)
BUN SERPL-MCNC: 83 MG/DL — HIGH (ref 7–23)
BUN SERPL-MCNC: 85 MG/DL — HIGH (ref 7–23)
BUN SERPL-MCNC: 97 MG/DL
CA-I SERPL-SCNC: 1.12 MMOL/L — SIGNIFICANT CHANGE UP (ref 1.12–1.3)
CA-I SERPL-SCNC: 1.15 MMOL/L — SIGNIFICANT CHANGE UP (ref 1.12–1.3)
CA-I SERPL-SCNC: 1.15 MMOL/L — SIGNIFICANT CHANGE UP (ref 1.12–1.3)
CALCIUM SERPL-MCNC: 10 MG/DL
CALCIUM SERPL-MCNC: 10 MG/DL — SIGNIFICANT CHANGE UP (ref 8.4–10.5)
CALCIUM SERPL-MCNC: 10.2 MG/DL
CALCIUM SERPL-MCNC: 10.2 MG/DL
CALCIUM SERPL-MCNC: 10.2 MG/DL — SIGNIFICANT CHANGE UP (ref 8.4–10.5)
CALCIUM SERPL-MCNC: 10.2 MG/DL — SIGNIFICANT CHANGE UP (ref 8.4–10.5)
CALCIUM SERPL-MCNC: 10.3 MG/DL — SIGNIFICANT CHANGE UP (ref 8.4–10.5)
CALCIUM SERPL-MCNC: 10.8 MG/DL
CALCIUM SERPL-MCNC: 8.7 MG/DL — SIGNIFICANT CHANGE UP (ref 8.4–10.5)
CALCIUM SERPL-MCNC: 8.8 MG/DL — SIGNIFICANT CHANGE UP (ref 8.4–10.5)
CALCIUM SERPL-MCNC: 8.9 MG/DL — SIGNIFICANT CHANGE UP (ref 8.4–10.5)
CALCIUM SERPL-MCNC: 8.9 MG/DL — SIGNIFICANT CHANGE UP (ref 8.4–10.5)
CALCIUM SERPL-MCNC: 9 MG/DL — SIGNIFICANT CHANGE UP (ref 8.4–10.5)
CALCIUM SERPL-MCNC: 9.1 MG/DL — SIGNIFICANT CHANGE UP (ref 8.4–10.5)
CALCIUM SERPL-MCNC: 9.1 MG/DL — SIGNIFICANT CHANGE UP (ref 8.4–10.5)
CALCIUM SERPL-MCNC: 9.2 MG/DL — SIGNIFICANT CHANGE UP (ref 8.4–10.5)
CALCIUM SERPL-MCNC: 9.3 MG/DL — SIGNIFICANT CHANGE UP (ref 8.4–10.5)
CALCIUM SERPL-MCNC: 9.4 MG/DL — SIGNIFICANT CHANGE UP (ref 8.4–10.5)
CALCIUM SERPL-MCNC: 9.5 MG/DL — SIGNIFICANT CHANGE UP (ref 8.4–10.5)
CALCIUM SERPL-MCNC: 9.6 MG/DL — SIGNIFICANT CHANGE UP (ref 8.4–10.5)
CALCIUM SERPL-MCNC: 9.7 MG/DL — SIGNIFICANT CHANGE UP (ref 8.4–10.5)
CALCIUM SERPL-MCNC: 9.8 MG/DL — SIGNIFICANT CHANGE UP (ref 8.4–10.5)
CALCIUM SERPL-MCNC: 9.9 MG/DL — SIGNIFICANT CHANGE UP (ref 8.4–10.5)
CHLORIDE BLDV-SCNC: 104 MMOL/L — SIGNIFICANT CHANGE UP (ref 96–108)
CHLORIDE BLDV-SCNC: 94 MMOL/L — LOW (ref 96–108)
CHLORIDE BLDV-SCNC: 99 MMOL/L — SIGNIFICANT CHANGE UP (ref 96–108)
CHLORIDE SERPL-SCNC: 100 MMOL/L — SIGNIFICANT CHANGE UP (ref 96–108)
CHLORIDE SERPL-SCNC: 101 MMOL/L — SIGNIFICANT CHANGE UP (ref 96–108)
CHLORIDE SERPL-SCNC: 103 MMOL/L — SIGNIFICANT CHANGE UP (ref 96–108)
CHLORIDE SERPL-SCNC: 106 MMOL/L — SIGNIFICANT CHANGE UP (ref 96–108)
CHLORIDE SERPL-SCNC: 107 MMOL/L — SIGNIFICANT CHANGE UP (ref 96–108)
CHLORIDE SERPL-SCNC: 107 MMOL/L — SIGNIFICANT CHANGE UP (ref 96–108)
CHLORIDE SERPL-SCNC: 85 MMOL/L
CHLORIDE SERPL-SCNC: 89 MMOL/L — LOW (ref 96–108)
CHLORIDE SERPL-SCNC: 89 MMOL/L — LOW (ref 96–108)
CHLORIDE SERPL-SCNC: 90 MMOL/L — LOW (ref 96–108)
CHLORIDE SERPL-SCNC: 91 MMOL/L — LOW (ref 96–108)
CHLORIDE SERPL-SCNC: 91 MMOL/L — LOW (ref 96–108)
CHLORIDE SERPL-SCNC: 92 MMOL/L — LOW (ref 96–108)
CHLORIDE SERPL-SCNC: 93 MMOL/L
CHLORIDE SERPL-SCNC: 93 MMOL/L — LOW (ref 96–108)
CHLORIDE SERPL-SCNC: 94 MMOL/L
CHLORIDE SERPL-SCNC: 94 MMOL/L — LOW (ref 96–108)
CHLORIDE SERPL-SCNC: 94 MMOL/L — LOW (ref 96–108)
CHLORIDE SERPL-SCNC: 95 MMOL/L — LOW (ref 96–108)
CHLORIDE SERPL-SCNC: 96 MMOL/L — SIGNIFICANT CHANGE UP (ref 96–108)
CHLORIDE SERPL-SCNC: 97 MMOL/L
CHLORIDE SERPL-SCNC: 97 MMOL/L — SIGNIFICANT CHANGE UP (ref 96–108)
CHLORIDE SERPL-SCNC: 98 MMOL/L — SIGNIFICANT CHANGE UP (ref 96–108)
CHLORIDE SERPL-SCNC: 99 MMOL/L — SIGNIFICANT CHANGE UP (ref 96–108)
CHOLEST SERPL-MCNC: 132 MG/DL — SIGNIFICANT CHANGE UP (ref 10–199)
CHOLEST SERPL-MCNC: 145 MG/DL
CHOLEST/HDLC SERPL: 4.3 RATIO
CK MB BLD-MCNC: 2 % — SIGNIFICANT CHANGE UP (ref 0–3.5)
CK MB BLD-MCNC: 4.1 % — HIGH (ref 0–3.5)
CK MB BLD-MCNC: 4.7 % — HIGH (ref 0–3.5)
CK MB BLD-MCNC: 5.2 % — HIGH (ref 0–3.5)
CK MB BLD-MCNC: 5.4 % — HIGH (ref 0–3.5)
CK MB BLD-MCNC: 6.6 % — HIGH (ref 0–3.5)
CK MB BLD-MCNC: 7.5 % — HIGH (ref 0–3.5)
CK MB BLD-MCNC: <1.8 % — SIGNIFICANT CHANGE UP (ref 0–3.5)
CK MB BLD-MCNC: <1.9 % — SIGNIFICANT CHANGE UP (ref 0–3.5)
CK MB CFR SERPL CALC: 1.9 NG/ML — SIGNIFICANT CHANGE UP (ref 0–6.7)
CK MB CFR SERPL CALC: 10.2 NG/ML — HIGH (ref 0–6.7)
CK MB CFR SERPL CALC: 12.5 NG/ML — HIGH (ref 0–6.7)
CK MB CFR SERPL CALC: 2.6 NG/ML — SIGNIFICANT CHANGE UP (ref 0–6.7)
CK MB CFR SERPL CALC: 3.6 NG/ML — SIGNIFICANT CHANGE UP (ref 0–6.7)
CK MB CFR SERPL CALC: 5.6 NG/ML — SIGNIFICANT CHANGE UP (ref 0–6.7)
CK MB CFR SERPL CALC: 6.2 NG/ML — SIGNIFICANT CHANGE UP (ref 0–6.7)
CK MB CFR SERPL CALC: 6.5 NG/ML — SIGNIFICANT CHANGE UP (ref 0–6.7)
CK MB CFR SERPL CALC: 7.9 NG/ML — HIGH (ref 0–6.7)
CK MB CFR SERPL CALC: <1 NG/ML — SIGNIFICANT CHANGE UP (ref 0–3.6)
CK MB CFR SERPL CALC: <1 NG/ML — SIGNIFICANT CHANGE UP (ref 0–3.6)
CK SERPL-CCNC: 114 U/L — SIGNIFICANT CHANGE UP (ref 30–200)
CK SERPL-CCNC: 137 U/L — SIGNIFICANT CHANGE UP (ref 30–200)
CK SERPL-CCNC: 137 U/L — SIGNIFICANT CHANGE UP (ref 30–200)
CK SERPL-CCNC: 152 U/L — SIGNIFICANT CHANGE UP (ref 30–200)
CK SERPL-CCNC: 155 U/L — SIGNIFICANT CHANGE UP (ref 30–200)
CK SERPL-CCNC: 166 U/L — SIGNIFICANT CHANGE UP (ref 30–200)
CK SERPL-CCNC: 179 U/L — SIGNIFICANT CHANGE UP (ref 30–200)
CK SERPL-CCNC: 53 U/L — SIGNIFICANT CHANGE UP (ref 35–232)
CK SERPL-CCNC: 55 U/L — SIGNIFICANT CHANGE UP (ref 35–232)
CK SERPL-CCNC: 67 U/L — SIGNIFICANT CHANGE UP (ref 30–200)
CK SERPL-CCNC: 89 U/L — SIGNIFICANT CHANGE UP (ref 30–200)
CO2 BLDV-SCNC: 25 MMOL/L — SIGNIFICANT CHANGE UP (ref 22–30)
CO2 BLDV-SCNC: 33 MMOL/L — HIGH (ref 22–30)
CO2 BLDV-SCNC: 34 MMOL/L — HIGH (ref 22–30)
CO2 SERPL-SCNC: 20 MMOL/L — LOW (ref 22–31)
CO2 SERPL-SCNC: 22 MMOL/L — SIGNIFICANT CHANGE UP (ref 22–31)
CO2 SERPL-SCNC: 23 MMOL/L — SIGNIFICANT CHANGE UP (ref 22–31)
CO2 SERPL-SCNC: 24 MMOL/L — SIGNIFICANT CHANGE UP (ref 22–31)
CO2 SERPL-SCNC: 25 MMOL/L — SIGNIFICANT CHANGE UP (ref 22–31)
CO2 SERPL-SCNC: 26 MMOL/L — SIGNIFICANT CHANGE UP (ref 22–31)
CO2 SERPL-SCNC: 27 MMOL/L — SIGNIFICANT CHANGE UP (ref 22–31)
CO2 SERPL-SCNC: 28 MMOL/L
CO2 SERPL-SCNC: 28 MMOL/L — SIGNIFICANT CHANGE UP (ref 22–31)
CO2 SERPL-SCNC: 29 MMOL/L
CO2 SERPL-SCNC: 29 MMOL/L
CO2 SERPL-SCNC: 29 MMOL/L — SIGNIFICANT CHANGE UP (ref 22–31)
CO2 SERPL-SCNC: 30 MMOL/L — SIGNIFICANT CHANGE UP (ref 22–31)
CO2 SERPL-SCNC: 31 MMOL/L — SIGNIFICANT CHANGE UP (ref 22–31)
CO2 SERPL-SCNC: 32 MMOL/L
CO2 SERPL-SCNC: 32 MMOL/L — HIGH (ref 22–31)
CO2 SERPL-SCNC: 32 MMOL/L — HIGH (ref 22–31)
COLOR SPEC: SIGNIFICANT CHANGE UP
COLOR SPEC: SIGNIFICANT CHANGE UP
COLOR SPEC: YELLOW — SIGNIFICANT CHANGE UP
COMMENT - URINE: SIGNIFICANT CHANGE UP
CORTIS AM PEAK SERPL-MCNC: 12.4 UG/DL — SIGNIFICANT CHANGE UP (ref 6–18.4)
CORTIS AM PEAK SERPL-MCNC: 14.6 UG/DL — SIGNIFICANT CHANGE UP (ref 6–18.4)
CREAT ?TM UR-MCNC: 68 MG/DL — SIGNIFICANT CHANGE UP
CREAT ?TM UR-MCNC: 93 MG/DL — SIGNIFICANT CHANGE UP
CREAT SERPL-MCNC: 1.48 MG/DL — HIGH (ref 0.5–1.3)
CREAT SERPL-MCNC: 1.54 MG/DL — HIGH (ref 0.5–1.3)
CREAT SERPL-MCNC: 1.54 MG/DL — HIGH (ref 0.5–1.3)
CREAT SERPL-MCNC: 1.57 MG/DL — HIGH (ref 0.5–1.3)
CREAT SERPL-MCNC: 1.65 MG/DL — HIGH (ref 0.5–1.3)
CREAT SERPL-MCNC: 1.67 MG/DL — HIGH (ref 0.5–1.3)
CREAT SERPL-MCNC: 1.68 MG/DL — HIGH (ref 0.5–1.3)
CREAT SERPL-MCNC: 1.7 MG/DL — HIGH (ref 0.5–1.3)
CREAT SERPL-MCNC: 1.75 MG/DL — HIGH (ref 0.5–1.3)
CREAT SERPL-MCNC: 1.77 MG/DL — HIGH (ref 0.5–1.3)
CREAT SERPL-MCNC: 1.78 MG/DL — HIGH (ref 0.5–1.3)
CREAT SERPL-MCNC: 1.81 MG/DL — HIGH (ref 0.5–1.3)
CREAT SERPL-MCNC: 1.83 MG/DL — HIGH (ref 0.5–1.3)
CREAT SERPL-MCNC: 1.86 MG/DL — HIGH (ref 0.5–1.3)
CREAT SERPL-MCNC: 1.88 MG/DL — HIGH (ref 0.5–1.3)
CREAT SERPL-MCNC: 1.88 MG/DL — HIGH (ref 0.5–1.3)
CREAT SERPL-MCNC: 1.89 MG/DL — HIGH (ref 0.5–1.3)
CREAT SERPL-MCNC: 1.89 MG/DL — HIGH (ref 0.5–1.3)
CREAT SERPL-MCNC: 1.92 MG/DL
CREAT SERPL-MCNC: 1.93 MG/DL — HIGH (ref 0.5–1.3)
CREAT SERPL-MCNC: 1.94 MG/DL — HIGH (ref 0.5–1.3)
CREAT SERPL-MCNC: 1.95 MG/DL — HIGH (ref 0.5–1.3)
CREAT SERPL-MCNC: 1.99 MG/DL — HIGH (ref 0.5–1.3)
CREAT SERPL-MCNC: 2.03 MG/DL
CREAT SERPL-MCNC: 2.05 MG/DL — HIGH (ref 0.5–1.3)
CREAT SERPL-MCNC: 2.05 MG/DL — HIGH (ref 0.5–1.3)
CREAT SERPL-MCNC: 2.06 MG/DL — HIGH (ref 0.5–1.3)
CREAT SERPL-MCNC: 2.07 MG/DL — HIGH (ref 0.5–1.3)
CREAT SERPL-MCNC: 2.08 MG/DL — HIGH (ref 0.5–1.3)
CREAT SERPL-MCNC: 2.1 MG/DL — HIGH (ref 0.5–1.3)
CREAT SERPL-MCNC: 2.1 MG/DL — HIGH (ref 0.5–1.3)
CREAT SERPL-MCNC: 2.11 MG/DL — HIGH (ref 0.5–1.3)
CREAT SERPL-MCNC: 2.12 MG/DL — HIGH (ref 0.5–1.3)
CREAT SERPL-MCNC: 2.12 MG/DL — HIGH (ref 0.5–1.3)
CREAT SERPL-MCNC: 2.13 MG/DL — HIGH (ref 0.5–1.3)
CREAT SERPL-MCNC: 2.15 MG/DL — HIGH (ref 0.5–1.3)
CREAT SERPL-MCNC: 2.17 MG/DL — HIGH (ref 0.5–1.3)
CREAT SERPL-MCNC: 2.21 MG/DL — HIGH (ref 0.5–1.3)
CREAT SERPL-MCNC: 2.22 MG/DL — HIGH (ref 0.5–1.3)
CREAT SERPL-MCNC: 2.22 MG/DL — HIGH (ref 0.5–1.3)
CREAT SERPL-MCNC: 2.24 MG/DL — HIGH (ref 0.5–1.3)
CREAT SERPL-MCNC: 2.26 MG/DL — HIGH (ref 0.5–1.3)
CREAT SERPL-MCNC: 2.32 MG/DL — HIGH (ref 0.5–1.3)
CREAT SERPL-MCNC: 2.33 MG/DL
CREAT SERPL-MCNC: 2.34 MG/DL — HIGH (ref 0.5–1.3)
CREAT SERPL-MCNC: 2.35 MG/DL — HIGH (ref 0.5–1.3)
CREAT SERPL-MCNC: 2.37 MG/DL — HIGH (ref 0.5–1.3)
CREAT SERPL-MCNC: 2.4 MG/DL — HIGH (ref 0.5–1.3)
CREAT SERPL-MCNC: 2.41 MG/DL — HIGH (ref 0.5–1.3)
CREAT SERPL-MCNC: 2.42 MG/DL — HIGH (ref 0.5–1.3)
CREAT SERPL-MCNC: 2.57 MG/DL — HIGH (ref 0.5–1.3)
CREAT SERPL-MCNC: 2.57 MG/DL — HIGH (ref 0.5–1.3)
CREAT SERPL-MCNC: 2.63 MG/DL — HIGH (ref 0.5–1.3)
CREAT SERPL-MCNC: 2.81 MG/DL — HIGH (ref 0.5–1.3)
CREAT SERPL-MCNC: 2.94 MG/DL — HIGH (ref 0.5–1.3)
CREAT SERPL-MCNC: 3.1 MG/DL
CULTURE RESULTS: SIGNIFICANT CHANGE UP
D DIMER BLD IA.RAPID-MCNC: 482 NG/ML DDU — HIGH
DIFF PNL FLD: ABNORMAL
DIFF PNL FLD: ABNORMAL
DIFF PNL FLD: NEGATIVE — SIGNIFICANT CHANGE UP
EOSINOPHIL # BLD AUTO: 0.08 K/UL — SIGNIFICANT CHANGE UP (ref 0–0.5)
EOSINOPHIL # BLD AUTO: 0.11 K/UL — SIGNIFICANT CHANGE UP (ref 0–0.5)
EOSINOPHIL # BLD AUTO: 0.11 K/UL — SIGNIFICANT CHANGE UP (ref 0–0.5)
EOSINOPHIL # BLD AUTO: 0.13 K/UL — SIGNIFICANT CHANGE UP (ref 0–0.5)
EOSINOPHIL # BLD AUTO: 0.14 K/UL — SIGNIFICANT CHANGE UP (ref 0–0.5)
EOSINOPHIL # BLD AUTO: 0.15 K/UL — SIGNIFICANT CHANGE UP (ref 0–0.5)
EOSINOPHIL # BLD AUTO: 0.17 K/UL — SIGNIFICANT CHANGE UP (ref 0–0.5)
EOSINOPHIL # BLD AUTO: 0.17 K/UL — SIGNIFICANT CHANGE UP (ref 0–0.5)
EOSINOPHIL # BLD AUTO: 0.18 K/UL — SIGNIFICANT CHANGE UP (ref 0–0.5)
EOSINOPHIL # BLD AUTO: 0.18 K/UL — SIGNIFICANT CHANGE UP (ref 0–0.5)
EOSINOPHIL # BLD AUTO: 0.22 K/UL
EOSINOPHIL # BLD AUTO: 0.22 K/UL — SIGNIFICANT CHANGE UP (ref 0–0.5)
EOSINOPHIL # BLD AUTO: 0.22 K/UL — SIGNIFICANT CHANGE UP (ref 0–0.5)
EOSINOPHIL NFR BLD AUTO: 1.1 % — SIGNIFICANT CHANGE UP (ref 0–6)
EOSINOPHIL NFR BLD AUTO: 1.3 % — SIGNIFICANT CHANGE UP (ref 0–6)
EOSINOPHIL NFR BLD AUTO: 1.4 % — SIGNIFICANT CHANGE UP (ref 0–6)
EOSINOPHIL NFR BLD AUTO: 1.5 % — SIGNIFICANT CHANGE UP (ref 0–6)
EOSINOPHIL NFR BLD AUTO: 2.1 % — SIGNIFICANT CHANGE UP (ref 0–6)
EOSINOPHIL NFR BLD AUTO: 2.1 % — SIGNIFICANT CHANGE UP (ref 0–6)
EOSINOPHIL NFR BLD AUTO: 2.2 % — SIGNIFICANT CHANGE UP (ref 0–6)
EOSINOPHIL NFR BLD AUTO: 2.3 % — SIGNIFICANT CHANGE UP (ref 0–6)
EOSINOPHIL NFR BLD AUTO: 2.4 % — SIGNIFICANT CHANGE UP (ref 0–6)
EOSINOPHIL NFR BLD AUTO: 2.4 % — SIGNIFICANT CHANGE UP (ref 0–6)
EOSINOPHIL NFR BLD AUTO: 2.6 %
EOSINOPHIL NFR BLD AUTO: 2.9 % — SIGNIFICANT CHANGE UP (ref 0–6)
EOSINOPHIL NFR BLD AUTO: 3 % — SIGNIFICANT CHANGE UP (ref 0–6)
EPI CELLS # UR: 1 /HPF — SIGNIFICANT CHANGE UP
EPI CELLS # UR: SIGNIFICANT CHANGE UP /HPF
EPI CELLS # UR: SIGNIFICANT CHANGE UP /HPF
ESTIMATED AVERAGE GLUCOSE: 229 MG/DL — HIGH (ref 68–114)
ESTIMATED AVERAGE GLUCOSE: 286 MG/DL — HIGH (ref 68–114)
ESTIMATED AVERAGE GLUCOSE: 298 MG/DL
ESTIMATED AVERAGE GLUCOSE: 303 MG/DL — HIGH (ref 68–114)
FERRITIN SERPL-MCNC: 170 NG/ML — SIGNIFICANT CHANGE UP (ref 30–400)
FERRITIN SERPL-MCNC: 370 NG/ML — SIGNIFICANT CHANGE UP (ref 30–400)
FLU A RESULT: SIGNIFICANT CHANGE UP
FLU A RESULT: SIGNIFICANT CHANGE UP
FLUAV AG NPH QL: SIGNIFICANT CHANGE UP
FLUBV AG NPH QL: SIGNIFICANT CHANGE UP
GAS PNL BLDV: 131 MMOL/L — LOW (ref 135–145)
GAS PNL BLDV: 133 MMOL/L — LOW (ref 135–145)
GAS PNL BLDV: 136 MMOL/L — SIGNIFICANT CHANGE UP (ref 135–145)
GAS PNL BLDV: SIGNIFICANT CHANGE UP
GLUCOSE BLDC GLUCOMTR-MCNC: 100 MG/DL — HIGH (ref 70–99)
GLUCOSE BLDC GLUCOMTR-MCNC: 103 MG/DL — HIGH (ref 70–99)
GLUCOSE BLDC GLUCOMTR-MCNC: 110 MG/DL — HIGH (ref 70–99)
GLUCOSE BLDC GLUCOMTR-MCNC: 110 MG/DL — HIGH (ref 70–99)
GLUCOSE BLDC GLUCOMTR-MCNC: 111 MG/DL — HIGH (ref 70–99)
GLUCOSE BLDC GLUCOMTR-MCNC: 112 MG/DL — HIGH (ref 70–99)
GLUCOSE BLDC GLUCOMTR-MCNC: 112 MG/DL — HIGH (ref 70–99)
GLUCOSE BLDC GLUCOMTR-MCNC: 118 MG/DL — HIGH (ref 70–99)
GLUCOSE BLDC GLUCOMTR-MCNC: 123 MG/DL — HIGH (ref 70–99)
GLUCOSE BLDC GLUCOMTR-MCNC: 125 MG/DL — HIGH (ref 70–99)
GLUCOSE BLDC GLUCOMTR-MCNC: 127 MG/DL — HIGH (ref 70–99)
GLUCOSE BLDC GLUCOMTR-MCNC: 130 MG/DL — HIGH (ref 70–99)
GLUCOSE BLDC GLUCOMTR-MCNC: 131 MG/DL — HIGH (ref 70–99)
GLUCOSE BLDC GLUCOMTR-MCNC: 131 MG/DL — HIGH (ref 70–99)
GLUCOSE BLDC GLUCOMTR-MCNC: 132 MG/DL — HIGH (ref 70–99)
GLUCOSE BLDC GLUCOMTR-MCNC: 137 MG/DL — HIGH (ref 70–99)
GLUCOSE BLDC GLUCOMTR-MCNC: 138 MG/DL — HIGH (ref 70–99)
GLUCOSE BLDC GLUCOMTR-MCNC: 139 MG/DL — HIGH (ref 70–99)
GLUCOSE BLDC GLUCOMTR-MCNC: 142 MG/DL — HIGH (ref 70–99)
GLUCOSE BLDC GLUCOMTR-MCNC: 146 MG/DL — HIGH (ref 70–99)
GLUCOSE BLDC GLUCOMTR-MCNC: 146 MG/DL — HIGH (ref 70–99)
GLUCOSE BLDC GLUCOMTR-MCNC: 147 MG/DL — HIGH (ref 70–99)
GLUCOSE BLDC GLUCOMTR-MCNC: 149 MG/DL — HIGH (ref 70–99)
GLUCOSE BLDC GLUCOMTR-MCNC: 151 MG/DL — HIGH (ref 70–99)
GLUCOSE BLDC GLUCOMTR-MCNC: 151 MG/DL — HIGH (ref 70–99)
GLUCOSE BLDC GLUCOMTR-MCNC: 152 MG/DL — HIGH (ref 70–99)
GLUCOSE BLDC GLUCOMTR-MCNC: 153 MG/DL — HIGH (ref 70–99)
GLUCOSE BLDC GLUCOMTR-MCNC: 155 MG/DL — HIGH (ref 70–99)
GLUCOSE BLDC GLUCOMTR-MCNC: 155 MG/DL — HIGH (ref 70–99)
GLUCOSE BLDC GLUCOMTR-MCNC: 156 MG/DL — HIGH (ref 70–99)
GLUCOSE BLDC GLUCOMTR-MCNC: 157 MG/DL — HIGH (ref 70–99)
GLUCOSE BLDC GLUCOMTR-MCNC: 158 MG/DL — HIGH (ref 70–99)
GLUCOSE BLDC GLUCOMTR-MCNC: 158 MG/DL — HIGH (ref 70–99)
GLUCOSE BLDC GLUCOMTR-MCNC: 159 MG/DL — HIGH (ref 70–99)
GLUCOSE BLDC GLUCOMTR-MCNC: 159 MG/DL — HIGH (ref 70–99)
GLUCOSE BLDC GLUCOMTR-MCNC: 162 MG/DL — HIGH (ref 70–99)
GLUCOSE BLDC GLUCOMTR-MCNC: 163 MG/DL — HIGH (ref 70–99)
GLUCOSE BLDC GLUCOMTR-MCNC: 164 MG/DL — HIGH (ref 70–99)
GLUCOSE BLDC GLUCOMTR-MCNC: 165 MG/DL — HIGH (ref 70–99)
GLUCOSE BLDC GLUCOMTR-MCNC: 167 MG/DL — HIGH (ref 70–99)
GLUCOSE BLDC GLUCOMTR-MCNC: 167 MG/DL — HIGH (ref 70–99)
GLUCOSE BLDC GLUCOMTR-MCNC: 168 MG/DL — HIGH (ref 70–99)
GLUCOSE BLDC GLUCOMTR-MCNC: 168 MG/DL — HIGH (ref 70–99)
GLUCOSE BLDC GLUCOMTR-MCNC: 169 MG/DL — HIGH (ref 70–99)
GLUCOSE BLDC GLUCOMTR-MCNC: 171 MG/DL — HIGH (ref 70–99)
GLUCOSE BLDC GLUCOMTR-MCNC: 172 MG/DL — HIGH (ref 70–99)
GLUCOSE BLDC GLUCOMTR-MCNC: 174 MG/DL — HIGH (ref 70–99)
GLUCOSE BLDC GLUCOMTR-MCNC: 175 MG/DL — HIGH (ref 70–99)
GLUCOSE BLDC GLUCOMTR-MCNC: 176 MG/DL — HIGH (ref 70–99)
GLUCOSE BLDC GLUCOMTR-MCNC: 178 MG/DL — HIGH (ref 70–99)
GLUCOSE BLDC GLUCOMTR-MCNC: 180 MG/DL — HIGH (ref 70–99)
GLUCOSE BLDC GLUCOMTR-MCNC: 180 MG/DL — HIGH (ref 70–99)
GLUCOSE BLDC GLUCOMTR-MCNC: 181 MG/DL — HIGH (ref 70–99)
GLUCOSE BLDC GLUCOMTR-MCNC: 182 MG/DL — HIGH (ref 70–99)
GLUCOSE BLDC GLUCOMTR-MCNC: 183 MG/DL — HIGH (ref 70–99)
GLUCOSE BLDC GLUCOMTR-MCNC: 183 MG/DL — HIGH (ref 70–99)
GLUCOSE BLDC GLUCOMTR-MCNC: 184 MG/DL — HIGH (ref 70–99)
GLUCOSE BLDC GLUCOMTR-MCNC: 186 MG/DL — HIGH (ref 70–99)
GLUCOSE BLDC GLUCOMTR-MCNC: 186 MG/DL — HIGH (ref 70–99)
GLUCOSE BLDC GLUCOMTR-MCNC: 187 MG/DL — HIGH (ref 70–99)
GLUCOSE BLDC GLUCOMTR-MCNC: 189 MG/DL — HIGH (ref 70–99)
GLUCOSE BLDC GLUCOMTR-MCNC: 190 MG/DL — HIGH (ref 70–99)
GLUCOSE BLDC GLUCOMTR-MCNC: 191 MG/DL — HIGH (ref 70–99)
GLUCOSE BLDC GLUCOMTR-MCNC: 193 MG/DL — HIGH (ref 70–99)
GLUCOSE BLDC GLUCOMTR-MCNC: 195 MG/DL — HIGH (ref 70–99)
GLUCOSE BLDC GLUCOMTR-MCNC: 196 MG/DL — HIGH (ref 70–99)
GLUCOSE BLDC GLUCOMTR-MCNC: 197 MG/DL — HIGH (ref 70–99)
GLUCOSE BLDC GLUCOMTR-MCNC: 197 MG/DL — HIGH (ref 70–99)
GLUCOSE BLDC GLUCOMTR-MCNC: 198 MG/DL — HIGH (ref 70–99)
GLUCOSE BLDC GLUCOMTR-MCNC: 203 MG/DL — HIGH (ref 70–99)
GLUCOSE BLDC GLUCOMTR-MCNC: 204 MG/DL — HIGH (ref 70–99)
GLUCOSE BLDC GLUCOMTR-MCNC: 205 MG/DL — HIGH (ref 70–99)
GLUCOSE BLDC GLUCOMTR-MCNC: 206 MG/DL — HIGH (ref 70–99)
GLUCOSE BLDC GLUCOMTR-MCNC: 209 MG/DL — HIGH (ref 70–99)
GLUCOSE BLDC GLUCOMTR-MCNC: 209 MG/DL — HIGH (ref 70–99)
GLUCOSE BLDC GLUCOMTR-MCNC: 210 MG/DL — HIGH (ref 70–99)
GLUCOSE BLDC GLUCOMTR-MCNC: 211 MG/DL — HIGH (ref 70–99)
GLUCOSE BLDC GLUCOMTR-MCNC: 214 MG/DL — HIGH (ref 70–99)
GLUCOSE BLDC GLUCOMTR-MCNC: 214 MG/DL — HIGH (ref 70–99)
GLUCOSE BLDC GLUCOMTR-MCNC: 215 MG/DL — HIGH (ref 70–99)
GLUCOSE BLDC GLUCOMTR-MCNC: 216 MG/DL — HIGH (ref 70–99)
GLUCOSE BLDC GLUCOMTR-MCNC: 216 MG/DL — HIGH (ref 70–99)
GLUCOSE BLDC GLUCOMTR-MCNC: 219 MG/DL — HIGH (ref 70–99)
GLUCOSE BLDC GLUCOMTR-MCNC: 224 MG/DL — HIGH (ref 70–99)
GLUCOSE BLDC GLUCOMTR-MCNC: 226 MG/DL — HIGH (ref 70–99)
GLUCOSE BLDC GLUCOMTR-MCNC: 227 MG/DL — HIGH (ref 70–99)
GLUCOSE BLDC GLUCOMTR-MCNC: 228 MG/DL — HIGH (ref 70–99)
GLUCOSE BLDC GLUCOMTR-MCNC: 230 MG/DL — HIGH (ref 70–99)
GLUCOSE BLDC GLUCOMTR-MCNC: 230 MG/DL — HIGH (ref 70–99)
GLUCOSE BLDC GLUCOMTR-MCNC: 231 MG/DL — HIGH (ref 70–99)
GLUCOSE BLDC GLUCOMTR-MCNC: 232 MG/DL — HIGH (ref 70–99)
GLUCOSE BLDC GLUCOMTR-MCNC: 233 MG/DL — HIGH (ref 70–99)
GLUCOSE BLDC GLUCOMTR-MCNC: 235 MG/DL — HIGH (ref 70–99)
GLUCOSE BLDC GLUCOMTR-MCNC: 235 MG/DL — HIGH (ref 70–99)
GLUCOSE BLDC GLUCOMTR-MCNC: 237 MG/DL — HIGH (ref 70–99)
GLUCOSE BLDC GLUCOMTR-MCNC: 238 MG/DL — HIGH (ref 70–99)
GLUCOSE BLDC GLUCOMTR-MCNC: 239 MG/DL — HIGH (ref 70–99)
GLUCOSE BLDC GLUCOMTR-MCNC: 243 MG/DL — HIGH (ref 70–99)
GLUCOSE BLDC GLUCOMTR-MCNC: 245 MG/DL — HIGH (ref 70–99)
GLUCOSE BLDC GLUCOMTR-MCNC: 245 MG/DL — HIGH (ref 70–99)
GLUCOSE BLDC GLUCOMTR-MCNC: 248 MG/DL — HIGH (ref 70–99)
GLUCOSE BLDC GLUCOMTR-MCNC: 249 MG/DL — HIGH (ref 70–99)
GLUCOSE BLDC GLUCOMTR-MCNC: 250 MG/DL — HIGH (ref 70–99)
GLUCOSE BLDC GLUCOMTR-MCNC: 251 MG/DL — HIGH (ref 70–99)
GLUCOSE BLDC GLUCOMTR-MCNC: 251 MG/DL — HIGH (ref 70–99)
GLUCOSE BLDC GLUCOMTR-MCNC: 254 MG/DL — HIGH (ref 70–99)
GLUCOSE BLDC GLUCOMTR-MCNC: 256 MG/DL — HIGH (ref 70–99)
GLUCOSE BLDC GLUCOMTR-MCNC: 257 MG/DL — HIGH (ref 70–99)
GLUCOSE BLDC GLUCOMTR-MCNC: 259 MG/DL — HIGH (ref 70–99)
GLUCOSE BLDC GLUCOMTR-MCNC: 260 MG/DL — HIGH (ref 70–99)
GLUCOSE BLDC GLUCOMTR-MCNC: 261 MG/DL — HIGH (ref 70–99)
GLUCOSE BLDC GLUCOMTR-MCNC: 263 MG/DL — HIGH (ref 70–99)
GLUCOSE BLDC GLUCOMTR-MCNC: 264 MG/DL — HIGH (ref 70–99)
GLUCOSE BLDC GLUCOMTR-MCNC: 265 MG/DL — HIGH (ref 70–99)
GLUCOSE BLDC GLUCOMTR-MCNC: 265 MG/DL — HIGH (ref 70–99)
GLUCOSE BLDC GLUCOMTR-MCNC: 271 MG/DL — HIGH (ref 70–99)
GLUCOSE BLDC GLUCOMTR-MCNC: 272 MG/DL — HIGH (ref 70–99)
GLUCOSE BLDC GLUCOMTR-MCNC: 275 MG/DL — HIGH (ref 70–99)
GLUCOSE BLDC GLUCOMTR-MCNC: 276 MG/DL — HIGH (ref 70–99)
GLUCOSE BLDC GLUCOMTR-MCNC: 282 MG/DL — HIGH (ref 70–99)
GLUCOSE BLDC GLUCOMTR-MCNC: 282 MG/DL — HIGH (ref 70–99)
GLUCOSE BLDC GLUCOMTR-MCNC: 287 MG/DL — HIGH (ref 70–99)
GLUCOSE BLDC GLUCOMTR-MCNC: 292 MG/DL — HIGH (ref 70–99)
GLUCOSE BLDC GLUCOMTR-MCNC: 298 MG/DL — HIGH (ref 70–99)
GLUCOSE BLDC GLUCOMTR-MCNC: 316 MG/DL — HIGH (ref 70–99)
GLUCOSE BLDC GLUCOMTR-MCNC: 316 MG/DL — HIGH (ref 70–99)
GLUCOSE BLDC GLUCOMTR-MCNC: 321 MG/DL — HIGH (ref 70–99)
GLUCOSE BLDC GLUCOMTR-MCNC: 327 MG/DL — HIGH (ref 70–99)
GLUCOSE BLDC GLUCOMTR-MCNC: 331 MG/DL — HIGH (ref 70–99)
GLUCOSE BLDC GLUCOMTR-MCNC: 342 MG/DL — HIGH (ref 70–99)
GLUCOSE BLDC GLUCOMTR-MCNC: 348 MG/DL — HIGH (ref 70–99)
GLUCOSE BLDC GLUCOMTR-MCNC: 355 MG/DL — HIGH (ref 70–99)
GLUCOSE BLDC GLUCOMTR-MCNC: 365 MG/DL — HIGH (ref 70–99)
GLUCOSE BLDC GLUCOMTR-MCNC: 376 MG/DL — HIGH (ref 70–99)
GLUCOSE BLDC GLUCOMTR-MCNC: 50 MG/DL — LOW (ref 70–99)
GLUCOSE BLDC GLUCOMTR-MCNC: 54 MG/DL — LOW (ref 70–99)
GLUCOSE BLDC GLUCOMTR-MCNC: 69 MG/DL — LOW (ref 70–99)
GLUCOSE BLDC GLUCOMTR-MCNC: 69 MG/DL — LOW (ref 70–99)
GLUCOSE BLDC GLUCOMTR-MCNC: 79 MG/DL — SIGNIFICANT CHANGE UP (ref 70–99)
GLUCOSE BLDC GLUCOMTR-MCNC: 84 MG/DL — SIGNIFICANT CHANGE UP (ref 70–99)
GLUCOSE BLDC GLUCOMTR-MCNC: 98 MG/DL — SIGNIFICANT CHANGE UP (ref 70–99)
GLUCOSE BLDV-MCNC: 196 MG/DL — HIGH (ref 70–99)
GLUCOSE BLDV-MCNC: 220 MG/DL — HIGH (ref 70–99)
GLUCOSE BLDV-MCNC: 435 MG/DL — HIGH (ref 70–99)
GLUCOSE SERPL-MCNC: 100 MG/DL — HIGH (ref 70–99)
GLUCOSE SERPL-MCNC: 108 MG/DL — HIGH (ref 70–99)
GLUCOSE SERPL-MCNC: 109 MG/DL — HIGH (ref 70–99)
GLUCOSE SERPL-MCNC: 114 MG/DL — HIGH (ref 70–99)
GLUCOSE SERPL-MCNC: 120 MG/DL — HIGH (ref 70–99)
GLUCOSE SERPL-MCNC: 124 MG/DL — HIGH (ref 70–99)
GLUCOSE SERPL-MCNC: 128 MG/DL — HIGH (ref 70–99)
GLUCOSE SERPL-MCNC: 131 MG/DL — HIGH (ref 70–99)
GLUCOSE SERPL-MCNC: 136 MG/DL — HIGH (ref 70–99)
GLUCOSE SERPL-MCNC: 137 MG/DL — HIGH (ref 70–99)
GLUCOSE SERPL-MCNC: 143 MG/DL — HIGH (ref 70–99)
GLUCOSE SERPL-MCNC: 144 MG/DL — HIGH (ref 70–99)
GLUCOSE SERPL-MCNC: 146 MG/DL — HIGH (ref 70–99)
GLUCOSE SERPL-MCNC: 149 MG/DL — HIGH (ref 70–99)
GLUCOSE SERPL-MCNC: 150 MG/DL
GLUCOSE SERPL-MCNC: 154 MG/DL — HIGH (ref 70–99)
GLUCOSE SERPL-MCNC: 158 MG/DL — HIGH (ref 70–99)
GLUCOSE SERPL-MCNC: 158 MG/DL — HIGH (ref 70–99)
GLUCOSE SERPL-MCNC: 160 MG/DL — HIGH (ref 70–99)
GLUCOSE SERPL-MCNC: 166 MG/DL — HIGH (ref 70–99)
GLUCOSE SERPL-MCNC: 168 MG/DL — HIGH (ref 70–99)
GLUCOSE SERPL-MCNC: 169 MG/DL — HIGH (ref 70–99)
GLUCOSE SERPL-MCNC: 171 MG/DL — HIGH (ref 70–99)
GLUCOSE SERPL-MCNC: 171 MG/DL — HIGH (ref 70–99)
GLUCOSE SERPL-MCNC: 173 MG/DL — HIGH (ref 70–99)
GLUCOSE SERPL-MCNC: 180 MG/DL — HIGH (ref 70–99)
GLUCOSE SERPL-MCNC: 181 MG/DL — HIGH (ref 70–99)
GLUCOSE SERPL-MCNC: 188 MG/DL — HIGH (ref 70–99)
GLUCOSE SERPL-MCNC: 191 MG/DL — HIGH (ref 70–99)
GLUCOSE SERPL-MCNC: 191 MG/DL — HIGH (ref 70–99)
GLUCOSE SERPL-MCNC: 194 MG/DL — HIGH (ref 70–99)
GLUCOSE SERPL-MCNC: 195 MG/DL — HIGH (ref 70–99)
GLUCOSE SERPL-MCNC: 198 MG/DL — HIGH (ref 70–99)
GLUCOSE SERPL-MCNC: 202 MG/DL — HIGH (ref 70–99)
GLUCOSE SERPL-MCNC: 203 MG/DL — HIGH (ref 70–99)
GLUCOSE SERPL-MCNC: 212 MG/DL
GLUCOSE SERPL-MCNC: 212 MG/DL — HIGH (ref 70–99)
GLUCOSE SERPL-MCNC: 227 MG/DL — HIGH (ref 70–99)
GLUCOSE SERPL-MCNC: 229 MG/DL — HIGH (ref 70–99)
GLUCOSE SERPL-MCNC: 231 MG/DL — HIGH (ref 70–99)
GLUCOSE SERPL-MCNC: 240 MG/DL — HIGH (ref 70–99)
GLUCOSE SERPL-MCNC: 244 MG/DL — HIGH (ref 70–99)
GLUCOSE SERPL-MCNC: 246 MG/DL — HIGH (ref 70–99)
GLUCOSE SERPL-MCNC: 253 MG/DL — HIGH (ref 70–99)
GLUCOSE SERPL-MCNC: 261 MG/DL — HIGH (ref 70–99)
GLUCOSE SERPL-MCNC: 267 MG/DL — HIGH (ref 70–99)
GLUCOSE SERPL-MCNC: 276 MG/DL — HIGH (ref 70–99)
GLUCOSE SERPL-MCNC: 286 MG/DL — HIGH (ref 70–99)
GLUCOSE SERPL-MCNC: 298 MG/DL — HIGH (ref 70–99)
GLUCOSE SERPL-MCNC: 315 MG/DL — HIGH (ref 70–99)
GLUCOSE SERPL-MCNC: 335 MG/DL — HIGH (ref 70–99)
GLUCOSE SERPL-MCNC: 354 MG/DL — HIGH (ref 70–99)
GLUCOSE SERPL-MCNC: 356 MG/DL — HIGH (ref 70–99)
GLUCOSE SERPL-MCNC: 387 MG/DL
GLUCOSE SERPL-MCNC: 393 MG/DL — HIGH (ref 70–99)
GLUCOSE SERPL-MCNC: 415 MG/DL — HIGH (ref 70–99)
GLUCOSE SERPL-MCNC: 417 MG/DL
GLUCOSE SERPL-MCNC: 464 MG/DL — CRITICAL HIGH (ref 70–99)
GLUCOSE SERPL-MCNC: 66 MG/DL — LOW (ref 70–99)
GLUCOSE SERPL-MCNC: 67 MG/DL — LOW (ref 70–99)
GLUCOSE SERPL-MCNC: 74 MG/DL — SIGNIFICANT CHANGE UP (ref 70–99)
GLUCOSE SERPL-MCNC: 97 MG/DL — SIGNIFICANT CHANGE UP (ref 70–99)
GLUCOSE UR QL: 100 MG/DL
GLUCOSE UR QL: ABNORMAL
GLUCOSE UR QL: NEGATIVE — SIGNIFICANT CHANGE UP
GRAN CASTS # UR COMP ASSIST: ABNORMAL /LPF
HBA1C BLD-MCNC: 10.7 % — HIGH (ref 4–5.6)
HBA1C MFR BLD HPLC: 12 %
HCO3 BLDV-SCNC: 23 MMOL/L — SIGNIFICANT CHANGE UP (ref 21–29)
HCO3 BLDV-SCNC: 24 MMOL/L — SIGNIFICANT CHANGE UP (ref 21–29)
HCO3 BLDV-SCNC: 31 MMOL/L — HIGH (ref 21–29)
HCO3 BLDV-SCNC: 32 MMOL/L — HIGH (ref 21–29)
HCT VFR BLD CALC: 29.2 % — LOW (ref 39–50)
HCT VFR BLD CALC: 29.3 % — LOW (ref 39–50)
HCT VFR BLD CALC: 29.7 % — LOW (ref 39–50)
HCT VFR BLD CALC: 30.3 % — LOW (ref 39–50)
HCT VFR BLD CALC: 30.5 % — LOW (ref 39–50)
HCT VFR BLD CALC: 30.6 % — LOW (ref 39–50)
HCT VFR BLD CALC: 30.8 % — LOW (ref 39–50)
HCT VFR BLD CALC: 31 % — LOW (ref 39–50)
HCT VFR BLD CALC: 31.1 % — LOW (ref 39–50)
HCT VFR BLD CALC: 31.2 % — LOW (ref 39–50)
HCT VFR BLD CALC: 31.2 % — LOW (ref 39–50)
HCT VFR BLD CALC: 31.4 % — LOW (ref 39–50)
HCT VFR BLD CALC: 31.5 % — LOW (ref 39–50)
HCT VFR BLD CALC: 31.6 % — LOW (ref 39–50)
HCT VFR BLD CALC: 31.6 % — LOW (ref 39–50)
HCT VFR BLD CALC: 32 % — LOW (ref 39–50)
HCT VFR BLD CALC: 32.1 % — LOW (ref 39–50)
HCT VFR BLD CALC: 32.3 % — LOW (ref 39–50)
HCT VFR BLD CALC: 32.4 % — LOW (ref 39–50)
HCT VFR BLD CALC: 32.5 % — LOW (ref 39–50)
HCT VFR BLD CALC: 32.7 % — LOW (ref 39–50)
HCT VFR BLD CALC: 32.7 % — LOW (ref 39–50)
HCT VFR BLD CALC: 32.8 % — LOW (ref 39–50)
HCT VFR BLD CALC: 33.1 % — LOW (ref 39–50)
HCT VFR BLD CALC: 33.2 % — LOW (ref 39–50)
HCT VFR BLD CALC: 33.7 % — LOW (ref 39–50)
HCT VFR BLD CALC: 34 % — LOW (ref 39–50)
HCT VFR BLD CALC: 34.2 % — LOW (ref 39–50)
HCT VFR BLD CALC: 34.4 % — LOW (ref 39–50)
HCT VFR BLD CALC: 35.2 % — LOW (ref 39–50)
HCT VFR BLD CALC: 35.5 % — LOW (ref 39–50)
HCT VFR BLD CALC: 35.8 % — LOW (ref 39–50)
HCT VFR BLD CALC: 36.3 % — LOW (ref 39–50)
HCT VFR BLD CALC: 36.4 % — LOW (ref 39–50)
HCT VFR BLD CALC: 36.7 % — LOW (ref 39–50)
HCT VFR BLD CALC: 37.1 % — LOW (ref 39–50)
HCT VFR BLD CALC: 37.4 % — LOW (ref 39–50)
HCT VFR BLD CALC: 37.4 % — LOW (ref 39–50)
HCT VFR BLD CALC: 38.1 % — LOW (ref 39–50)
HCT VFR BLD CALC: 40.8 % — SIGNIFICANT CHANGE UP (ref 39–50)
HCT VFR BLD CALC: 41.6 %
HCT VFR BLDA CALC: 30 % — LOW (ref 39–50)
HCT VFR BLDA CALC: 32 % — LOW (ref 39–50)
HCT VFR BLDA CALC: 37 % — LOW (ref 39–50)
HDLC SERPL-MCNC: 28 MG/DL — LOW
HDLC SERPL-MCNC: 34 MG/DL
HGB BLD CALC-MCNC: 10.5 G/DL — LOW (ref 13–17)
HGB BLD CALC-MCNC: 12.1 G/DL — LOW (ref 13–17)
HGB BLD CALC-MCNC: 9.7 G/DL — LOW (ref 13–17)
HGB BLD-MCNC: 10.1 G/DL — LOW (ref 13–17)
HGB BLD-MCNC: 10.1 G/DL — LOW (ref 13–17)
HGB BLD-MCNC: 10.2 G/DL — LOW (ref 13–17)
HGB BLD-MCNC: 10.2 G/DL — LOW (ref 13–17)
HGB BLD-MCNC: 10.3 G/DL — LOW (ref 13–17)
HGB BLD-MCNC: 10.4 G/DL — LOW (ref 13–17)
HGB BLD-MCNC: 10.5 G/DL — LOW (ref 13–17)
HGB BLD-MCNC: 10.6 G/DL — LOW (ref 13–17)
HGB BLD-MCNC: 10.7 G/DL — LOW (ref 13–17)
HGB BLD-MCNC: 10.7 G/DL — LOW (ref 13–17)
HGB BLD-MCNC: 10.8 G/DL — LOW (ref 13–17)
HGB BLD-MCNC: 10.8 G/DL — LOW (ref 13–17)
HGB BLD-MCNC: 10.9 G/DL — LOW (ref 13–17)
HGB BLD-MCNC: 10.9 G/DL — LOW (ref 13–17)
HGB BLD-MCNC: 11.1 G/DL — LOW (ref 13–17)
HGB BLD-MCNC: 11.2 G/DL — LOW (ref 13–17)
HGB BLD-MCNC: 11.3 G/DL — LOW (ref 13–17)
HGB BLD-MCNC: 11.6 G/DL — LOW (ref 13–17)
HGB BLD-MCNC: 11.6 G/DL — LOW (ref 13–17)
HGB BLD-MCNC: 11.7 G/DL — LOW (ref 13–17)
HGB BLD-MCNC: 11.9 G/DL — LOW (ref 13–17)
HGB BLD-MCNC: 12.1 G/DL — LOW (ref 13–17)
HGB BLD-MCNC: 12.3 G/DL — LOW (ref 13–17)
HGB BLD-MCNC: 12.3 G/DL — LOW (ref 13–17)
HGB BLD-MCNC: 12.4 G/DL — LOW (ref 13–17)
HGB BLD-MCNC: 12.5 G/DL — LOW (ref 13–17)
HGB BLD-MCNC: 12.5 G/DL — LOW (ref 13–17)
HGB BLD-MCNC: 13.4 G/DL
HGB BLD-MCNC: 13.7 G/DL — SIGNIFICANT CHANGE UP (ref 13–17)
HGB BLD-MCNC: 9.4 G/DL — LOW (ref 13–17)
HGB BLD-MCNC: 9.7 G/DL — LOW (ref 13–17)
HGB BLD-MCNC: 9.8 G/DL — LOW (ref 13–17)
HGB BLD-MCNC: 9.8 G/DL — LOW (ref 13–17)
HGB BLD-MCNC: 9.9 G/DL — LOW (ref 13–17)
HOROWITZ INDEX BLDV+IHG-RTO: SIGNIFICANT CHANGE UP
HOROWITZ INDEX BLDV+IHG-RTO: SIGNIFICANT CHANGE UP
HYALINE CASTS # UR AUTO: 0 /LPF — SIGNIFICANT CHANGE UP (ref 0–2)
HYALINE CASTS # UR AUTO: ABNORMAL /LPF
IMM GRANULOCYTES NFR BLD AUTO: 0.2 %
IMM GRANULOCYTES NFR BLD AUTO: 0.3 % — SIGNIFICANT CHANGE UP (ref 0–1.5)
IMM GRANULOCYTES NFR BLD AUTO: 0.4 % — SIGNIFICANT CHANGE UP (ref 0–1.5)
IMM GRANULOCYTES NFR BLD AUTO: 0.5 % — SIGNIFICANT CHANGE UP (ref 0–1.5)
IMM GRANULOCYTES NFR BLD AUTO: 0.7 % — SIGNIFICANT CHANGE UP (ref 0–1.5)
INR BLD: 1.09 RATIO — SIGNIFICANT CHANGE UP (ref 0.88–1.16)
INR BLD: 1.09 RATIO — SIGNIFICANT CHANGE UP (ref 0.88–1.16)
INR BLD: 1.1 RATIO — SIGNIFICANT CHANGE UP (ref 0.88–1.16)
INR BLD: 1.21 RATIO — HIGH (ref 0.88–1.16)
INR BLD: 1.23 RATIO — HIGH (ref 0.88–1.16)
INR BLD: 1.25 RATIO — HIGH (ref 0.88–1.16)
INR BLD: 1.25 RATIO — HIGH (ref 0.88–1.16)
INR BLD: 1.3 RATIO — HIGH (ref 0.88–1.16)
INR BLD: 1.31 RATIO — HIGH (ref 0.88–1.16)
INTERPRETATION SERPL IFE-IMP: SIGNIFICANT CHANGE UP
IRON SATN MFR SERPL: 12 % — LOW (ref 16–55)
IRON SATN MFR SERPL: 14 % — LOW (ref 16–55)
IRON SATN MFR SERPL: 36 UG/DL — LOW (ref 45–165)
IRON SATN MFR SERPL: 38 UG/DL — LOW (ref 45–165)
KETONES UR-MCNC: NEGATIVE — SIGNIFICANT CHANGE UP
LACTATE BLDV-MCNC: 0.8 MMOL/L — SIGNIFICANT CHANGE UP (ref 0.7–2)
LACTATE BLDV-MCNC: 1 MMOL/L — SIGNIFICANT CHANGE UP (ref 0.7–2)
LACTATE BLDV-MCNC: 1.9 MMOL/L — SIGNIFICANT CHANGE UP (ref 0.7–2)
LACTATE SERPL-SCNC: 1.1 MMOL/L — SIGNIFICANT CHANGE UP (ref 0.7–2)
LACTATE SERPL-SCNC: 1.5 MMOL/L — SIGNIFICANT CHANGE UP (ref 0.7–2)
LACTATE SERPL-SCNC: 2.5 MMOL/L — HIGH (ref 0.7–2)
LDLC SERPL CALC-MCNC: 47 MG/DL
LEUKOCYTE ESTERASE UR-ACNC: ABNORMAL
LEUKOCYTE ESTERASE UR-ACNC: NEGATIVE — SIGNIFICANT CHANGE UP
LIDOCAIN IGE QN: 44 U/L — SIGNIFICANT CHANGE UP (ref 7–60)
LIDOCAIN IGE QN: 67 U/L — HIGH (ref 7–60)
LIPID PNL WITH DIRECT LDL SERPL: 74 MG/DL — SIGNIFICANT CHANGE UP
LYMPHOCYTES # BLD AUTO: 0.63 K/UL — LOW (ref 1–3.3)
LYMPHOCYTES # BLD AUTO: 0.64 K/UL — LOW (ref 1–3.3)
LYMPHOCYTES # BLD AUTO: 0.67 K/UL — LOW (ref 1–3.3)
LYMPHOCYTES # BLD AUTO: 0.68 K/UL — LOW (ref 1–3.3)
LYMPHOCYTES # BLD AUTO: 0.72 K/UL — LOW (ref 1–3.3)
LYMPHOCYTES # BLD AUTO: 0.73 K/UL — LOW (ref 1–3.3)
LYMPHOCYTES # BLD AUTO: 0.75 K/UL — LOW (ref 1–3.3)
LYMPHOCYTES # BLD AUTO: 0.76 K/UL — LOW (ref 1–3.3)
LYMPHOCYTES # BLD AUTO: 0.8 K/UL — LOW (ref 1–3.3)
LYMPHOCYTES # BLD AUTO: 0.85 K/UL — LOW (ref 1–3.3)
LYMPHOCYTES # BLD AUTO: 1.06 K/UL — SIGNIFICANT CHANGE UP (ref 1–3.3)
LYMPHOCYTES # BLD AUTO: 1.34 K/UL
LYMPHOCYTES # BLD AUTO: 1.46 K/UL — SIGNIFICANT CHANGE UP (ref 1–3.3)
LYMPHOCYTES # BLD AUTO: 10.1 % — LOW (ref 13–44)
LYMPHOCYTES # BLD AUTO: 10.6 % — LOW (ref 13–44)
LYMPHOCYTES # BLD AUTO: 11.7 % — LOW (ref 13–44)
LYMPHOCYTES # BLD AUTO: 12.8 % — LOW (ref 13–44)
LYMPHOCYTES # BLD AUTO: 12.9 % — LOW (ref 13–44)
LYMPHOCYTES # BLD AUTO: 19.9 % — SIGNIFICANT CHANGE UP (ref 13–44)
LYMPHOCYTES # BLD AUTO: 7.6 % — LOW (ref 13–44)
LYMPHOCYTES # BLD AUTO: 8.1 % — LOW (ref 13–44)
LYMPHOCYTES # BLD AUTO: 8.2 % — LOW (ref 13–44)
LYMPHOCYTES # BLD AUTO: 8.6 % — LOW (ref 13–44)
LYMPHOCYTES # BLD AUTO: 9.4 % — LOW (ref 13–44)
LYMPHOCYTES # BLD AUTO: 9.9 % — LOW (ref 13–44)
LYMPHOCYTES NFR BLD AUTO: 15.8 %
MAGNESIUM SERPL-MCNC: 1.6 MG/DL — SIGNIFICANT CHANGE UP (ref 1.6–2.6)
MAGNESIUM SERPL-MCNC: 1.8 MG/DL — SIGNIFICANT CHANGE UP (ref 1.6–2.6)
MAGNESIUM SERPL-MCNC: 1.9 MG/DL — SIGNIFICANT CHANGE UP (ref 1.6–2.6)
MAGNESIUM SERPL-MCNC: 2 MG/DL — SIGNIFICANT CHANGE UP (ref 1.6–2.6)
MAGNESIUM SERPL-MCNC: 2.1 MG/DL — SIGNIFICANT CHANGE UP (ref 1.6–2.6)
MAGNESIUM SERPL-MCNC: 2.2 MG/DL — SIGNIFICANT CHANGE UP (ref 1.6–2.6)
MAGNESIUM SERPL-MCNC: 2.3 MG/DL — SIGNIFICANT CHANGE UP (ref 1.6–2.6)
MAGNESIUM SERPL-MCNC: 2.3 MG/DL — SIGNIFICANT CHANGE UP (ref 1.6–2.6)
MAGNESIUM SERPL-MCNC: 2.4 MG/DL — SIGNIFICANT CHANGE UP (ref 1.6–2.6)
MAGNESIUM SERPL-MCNC: 2.4 MG/DL — SIGNIFICANT CHANGE UP (ref 1.6–2.6)
MAN DIFF?: NORMAL
MCHC RBC-ENTMCNC: 30.7 PG — SIGNIFICANT CHANGE UP (ref 27–34)
MCHC RBC-ENTMCNC: 30.8 PG — SIGNIFICANT CHANGE UP (ref 27–34)
MCHC RBC-ENTMCNC: 30.9 PG — SIGNIFICANT CHANGE UP (ref 27–34)
MCHC RBC-ENTMCNC: 31 PG
MCHC RBC-ENTMCNC: 31 PG — SIGNIFICANT CHANGE UP (ref 27–34)
MCHC RBC-ENTMCNC: 31.1 PG — SIGNIFICANT CHANGE UP (ref 27–34)
MCHC RBC-ENTMCNC: 31.2 PG — SIGNIFICANT CHANGE UP (ref 27–34)
MCHC RBC-ENTMCNC: 31.3 PG — SIGNIFICANT CHANGE UP (ref 27–34)
MCHC RBC-ENTMCNC: 31.4 PG — SIGNIFICANT CHANGE UP (ref 27–34)
MCHC RBC-ENTMCNC: 31.5 PG — SIGNIFICANT CHANGE UP (ref 27–34)
MCHC RBC-ENTMCNC: 31.6 PG — SIGNIFICANT CHANGE UP (ref 27–34)
MCHC RBC-ENTMCNC: 31.7 PG — SIGNIFICANT CHANGE UP (ref 27–34)
MCHC RBC-ENTMCNC: 31.8 GM/DL — LOW (ref 32–36)
MCHC RBC-ENTMCNC: 31.8 GM/DL — LOW (ref 32–36)
MCHC RBC-ENTMCNC: 31.8 PG — SIGNIFICANT CHANGE UP (ref 27–34)
MCHC RBC-ENTMCNC: 31.9 PG — SIGNIFICANT CHANGE UP (ref 27–34)
MCHC RBC-ENTMCNC: 32.1 GM/DL — SIGNIFICANT CHANGE UP (ref 32–36)
MCHC RBC-ENTMCNC: 32.2 GM/DL
MCHC RBC-ENTMCNC: 32.2 GM/DL — SIGNIFICANT CHANGE UP (ref 32–36)
MCHC RBC-ENTMCNC: 32.2 GM/DL — SIGNIFICANT CHANGE UP (ref 32–36)
MCHC RBC-ENTMCNC: 32.3 GM/DL — SIGNIFICANT CHANGE UP (ref 32–36)
MCHC RBC-ENTMCNC: 32.3 GM/DL — SIGNIFICANT CHANGE UP (ref 32–36)
MCHC RBC-ENTMCNC: 32.4 GM/DL — SIGNIFICANT CHANGE UP (ref 32–36)
MCHC RBC-ENTMCNC: 32.5 GM/DL — SIGNIFICANT CHANGE UP (ref 32–36)
MCHC RBC-ENTMCNC: 32.6 GM/DL — SIGNIFICANT CHANGE UP (ref 32–36)
MCHC RBC-ENTMCNC: 32.6 PG — SIGNIFICANT CHANGE UP (ref 27–34)
MCHC RBC-ENTMCNC: 32.7 GM/DL — SIGNIFICANT CHANGE UP (ref 32–36)
MCHC RBC-ENTMCNC: 32.7 GM/DL — SIGNIFICANT CHANGE UP (ref 32–36)
MCHC RBC-ENTMCNC: 32.8 GM/DL — SIGNIFICANT CHANGE UP (ref 32–36)
MCHC RBC-ENTMCNC: 32.9 GM/DL — SIGNIFICANT CHANGE UP (ref 32–36)
MCHC RBC-ENTMCNC: 33 GM/DL — SIGNIFICANT CHANGE UP (ref 32–36)
MCHC RBC-ENTMCNC: 33.1 GM/DL — SIGNIFICANT CHANGE UP (ref 32–36)
MCHC RBC-ENTMCNC: 33.1 GM/DL — SIGNIFICANT CHANGE UP (ref 32–36)
MCHC RBC-ENTMCNC: 33.2 GM/DL — SIGNIFICANT CHANGE UP (ref 32–36)
MCHC RBC-ENTMCNC: 33.3 GM/DL — SIGNIFICANT CHANGE UP (ref 32–36)
MCHC RBC-ENTMCNC: 33.3 GM/DL — SIGNIFICANT CHANGE UP (ref 32–36)
MCHC RBC-ENTMCNC: 33.4 GM/DL — SIGNIFICANT CHANGE UP (ref 32–36)
MCHC RBC-ENTMCNC: 33.5 GM/DL — SIGNIFICANT CHANGE UP (ref 32–36)
MCHC RBC-ENTMCNC: 33.5 GM/DL — SIGNIFICANT CHANGE UP (ref 32–36)
MCHC RBC-ENTMCNC: 33.6 GM/DL — SIGNIFICANT CHANGE UP (ref 32–36)
MCHC RBC-ENTMCNC: 33.6 GM/DL — SIGNIFICANT CHANGE UP (ref 32–36)
MCHC RBC-ENTMCNC: 33.7 GM/DL — SIGNIFICANT CHANGE UP (ref 32–36)
MCHC RBC-ENTMCNC: 33.8 GM/DL — SIGNIFICANT CHANGE UP (ref 32–36)
MCHC RBC-ENTMCNC: 33.8 GM/DL — SIGNIFICANT CHANGE UP (ref 32–36)
MCHC RBC-ENTMCNC: 33.9 GM/DL — SIGNIFICANT CHANGE UP (ref 32–36)
MCHC RBC-ENTMCNC: 34.5 GM/DL — SIGNIFICANT CHANGE UP (ref 32–36)
MCV RBC AUTO: 92.7 FL — SIGNIFICANT CHANGE UP (ref 80–100)
MCV RBC AUTO: 92.7 FL — SIGNIFICANT CHANGE UP (ref 80–100)
MCV RBC AUTO: 92.8 FL — SIGNIFICANT CHANGE UP (ref 80–100)
MCV RBC AUTO: 92.9 FL — SIGNIFICANT CHANGE UP (ref 80–100)
MCV RBC AUTO: 93.2 FL — SIGNIFICANT CHANGE UP (ref 80–100)
MCV RBC AUTO: 93.3 FL — SIGNIFICANT CHANGE UP (ref 80–100)
MCV RBC AUTO: 93.7 FL — SIGNIFICANT CHANGE UP (ref 80–100)
MCV RBC AUTO: 93.7 FL — SIGNIFICANT CHANGE UP (ref 80–100)
MCV RBC AUTO: 94 FL — SIGNIFICANT CHANGE UP (ref 80–100)
MCV RBC AUTO: 94.1 FL — SIGNIFICANT CHANGE UP (ref 80–100)
MCV RBC AUTO: 94.1 FL — SIGNIFICANT CHANGE UP (ref 80–100)
MCV RBC AUTO: 94.2 FL — SIGNIFICANT CHANGE UP (ref 80–100)
MCV RBC AUTO: 94.4 FL — SIGNIFICANT CHANGE UP (ref 80–100)
MCV RBC AUTO: 94.5 FL — SIGNIFICANT CHANGE UP (ref 80–100)
MCV RBC AUTO: 94.8 FL — SIGNIFICANT CHANGE UP (ref 80–100)
MCV RBC AUTO: 95 FL — SIGNIFICANT CHANGE UP (ref 80–100)
MCV RBC AUTO: 95.2 FL — SIGNIFICANT CHANGE UP (ref 80–100)
MCV RBC AUTO: 95.2 FL — SIGNIFICANT CHANGE UP (ref 80–100)
MCV RBC AUTO: 95.3 FL — SIGNIFICANT CHANGE UP (ref 80–100)
MCV RBC AUTO: 95.5 FL — SIGNIFICANT CHANGE UP (ref 80–100)
MCV RBC AUTO: 95.5 FL — SIGNIFICANT CHANGE UP (ref 80–100)
MCV RBC AUTO: 95.8 FL — SIGNIFICANT CHANGE UP (ref 80–100)
MCV RBC AUTO: 95.9 FL — SIGNIFICANT CHANGE UP (ref 80–100)
MCV RBC AUTO: 96 FL — SIGNIFICANT CHANGE UP (ref 80–100)
MCV RBC AUTO: 96 FL — SIGNIFICANT CHANGE UP (ref 80–100)
MCV RBC AUTO: 96.3 FL
MCV RBC AUTO: 96.4 FL — SIGNIFICANT CHANGE UP (ref 80–100)
MCV RBC AUTO: 96.6 FL — SIGNIFICANT CHANGE UP (ref 80–100)
MCV RBC AUTO: 96.7 FL — SIGNIFICANT CHANGE UP (ref 80–100)
MCV RBC AUTO: 96.9 FL — SIGNIFICANT CHANGE UP (ref 80–100)
MCV RBC AUTO: 97.7 FL — SIGNIFICANT CHANGE UP (ref 80–100)
MCV RBC AUTO: 97.9 FL — SIGNIFICANT CHANGE UP (ref 80–100)
MCV RBC AUTO: 98.1 FL — SIGNIFICANT CHANGE UP (ref 80–100)
MONOCYTES # BLD AUTO: 0.5 K/UL — SIGNIFICANT CHANGE UP (ref 0–0.9)
MONOCYTES # BLD AUTO: 0.59 K/UL — SIGNIFICANT CHANGE UP (ref 0–0.9)
MONOCYTES # BLD AUTO: 0.59 K/UL — SIGNIFICANT CHANGE UP (ref 0–0.9)
MONOCYTES # BLD AUTO: 0.62 K/UL — SIGNIFICANT CHANGE UP (ref 0–0.9)
MONOCYTES # BLD AUTO: 0.68 K/UL — SIGNIFICANT CHANGE UP (ref 0–0.9)
MONOCYTES # BLD AUTO: 0.69 K/UL — SIGNIFICANT CHANGE UP (ref 0–0.9)
MONOCYTES # BLD AUTO: 0.71 K/UL — SIGNIFICANT CHANGE UP (ref 0–0.9)
MONOCYTES # BLD AUTO: 0.72 K/UL
MONOCYTES # BLD AUTO: 0.74 K/UL — SIGNIFICANT CHANGE UP (ref 0–0.9)
MONOCYTES # BLD AUTO: 0.75 K/UL — SIGNIFICANT CHANGE UP (ref 0–0.9)
MONOCYTES # BLD AUTO: 0.79 K/UL — SIGNIFICANT CHANGE UP (ref 0–0.9)
MONOCYTES # BLD AUTO: 0.88 K/UL — SIGNIFICANT CHANGE UP (ref 0–0.9)
MONOCYTES # BLD AUTO: 0.94 K/UL — HIGH (ref 0–0.9)
MONOCYTES NFR BLD AUTO: 10.2 % — SIGNIFICANT CHANGE UP (ref 2–14)
MONOCYTES NFR BLD AUTO: 10.7 % — SIGNIFICANT CHANGE UP (ref 2–14)
MONOCYTES NFR BLD AUTO: 13.3 % — SIGNIFICANT CHANGE UP (ref 2–14)
MONOCYTES NFR BLD AUTO: 16.2 % — HIGH (ref 2–14)
MONOCYTES NFR BLD AUTO: 6.4 % — SIGNIFICANT CHANGE UP (ref 2–14)
MONOCYTES NFR BLD AUTO: 7.5 % — SIGNIFICANT CHANGE UP (ref 2–14)
MONOCYTES NFR BLD AUTO: 7.7 % — SIGNIFICANT CHANGE UP (ref 2–14)
MONOCYTES NFR BLD AUTO: 8 % — SIGNIFICANT CHANGE UP (ref 2–14)
MONOCYTES NFR BLD AUTO: 8.1 % — SIGNIFICANT CHANGE UP (ref 2–14)
MONOCYTES NFR BLD AUTO: 8.3 % — SIGNIFICANT CHANGE UP (ref 2–14)
MONOCYTES NFR BLD AUTO: 8.3 % — SIGNIFICANT CHANGE UP (ref 2–14)
MONOCYTES NFR BLD AUTO: 8.5 %
MONOCYTES NFR BLD AUTO: 9.6 % — SIGNIFICANT CHANGE UP (ref 2–14)
NEUTROPHILS # BLD AUTO: 4.01 K/UL — SIGNIFICANT CHANGE UP (ref 1.8–7.4)
NEUTROPHILS # BLD AUTO: 4.73 K/UL — SIGNIFICANT CHANGE UP (ref 1.8–7.4)
NEUTROPHILS # BLD AUTO: 4.84 K/UL — SIGNIFICANT CHANGE UP (ref 1.8–7.4)
NEUTROPHILS # BLD AUTO: 4.89 K/UL — SIGNIFICANT CHANGE UP (ref 1.8–7.4)
NEUTROPHILS # BLD AUTO: 5.64 K/UL — SIGNIFICANT CHANGE UP (ref 1.8–7.4)
NEUTROPHILS # BLD AUTO: 5.79 K/UL — SIGNIFICANT CHANGE UP (ref 1.8–7.4)
NEUTROPHILS # BLD AUTO: 6.04 K/UL — SIGNIFICANT CHANGE UP (ref 1.8–7.4)
NEUTROPHILS # BLD AUTO: 6.14 K/UL
NEUTROPHILS # BLD AUTO: 6.31 K/UL — SIGNIFICANT CHANGE UP (ref 1.8–7.4)
NEUTROPHILS # BLD AUTO: 6.8 K/UL — SIGNIFICANT CHANGE UP (ref 1.8–7.4)
NEUTROPHILS # BLD AUTO: 6.92 K/UL — SIGNIFICANT CHANGE UP (ref 1.8–7.4)
NEUTROPHILS # BLD AUTO: 7.27 K/UL — SIGNIFICANT CHANGE UP (ref 1.8–7.4)
NEUTROPHILS # BLD AUTO: 7.63 K/UL — HIGH (ref 1.8–7.4)
NEUTROPHILS NFR BLD AUTO: 66.2 % — SIGNIFICANT CHANGE UP (ref 43–77)
NEUTROPHILS NFR BLD AUTO: 69.2 % — SIGNIFICANT CHANGE UP (ref 43–77)
NEUTROPHILS NFR BLD AUTO: 72.5 %
NEUTROPHILS NFR BLD AUTO: 73.5 % — SIGNIFICANT CHANGE UP (ref 43–77)
NEUTROPHILS NFR BLD AUTO: 75.8 % — SIGNIFICANT CHANGE UP (ref 43–77)
NEUTROPHILS NFR BLD AUTO: 76.6 % — SIGNIFICANT CHANGE UP (ref 43–77)
NEUTROPHILS NFR BLD AUTO: 78.4 % — HIGH (ref 43–77)
NEUTROPHILS NFR BLD AUTO: 78.8 % — HIGH (ref 43–77)
NEUTROPHILS NFR BLD AUTO: 79 % — HIGH (ref 43–77)
NEUTROPHILS NFR BLD AUTO: 79.1 % — HIGH (ref 43–77)
NEUTROPHILS NFR BLD AUTO: 81.1 % — HIGH (ref 43–77)
NEUTROPHILS NFR BLD AUTO: 82.4 % — HIGH (ref 43–77)
NEUTROPHILS NFR BLD AUTO: 83.5 % — HIGH (ref 43–77)
NITRITE UR-MCNC: NEGATIVE — SIGNIFICANT CHANGE UP
NRBC # BLD: 0 /100 WBCS — SIGNIFICANT CHANGE UP (ref 0–0)
NT-PROBNP SERPL-MCNC: 3271 PG/ML
NT-PROBNP SERPL-MCNC: 4631 PG/ML
NT-PROBNP SERPL-SCNC: 1689 PG/ML — HIGH (ref 0–300)
NT-PROBNP SERPL-SCNC: 2090 PG/ML — HIGH (ref 0–300)
NT-PROBNP SERPL-SCNC: 4053 PG/ML — HIGH (ref 0–450)
NT-PROBNP SERPL-SCNC: 4545 PG/ML — HIGH (ref 0–300)
NT-PROBNP SERPL-SCNC: 5763 PG/ML — HIGH (ref 0–300)
NT-PROBNP SERPL-SCNC: 6320 PG/ML — HIGH (ref 0–450)
NT-PROBNP SERPL-SCNC: 6687 PG/ML — HIGH (ref 0–300)
NT-PROBNP SERPL-SCNC: 6705 PG/ML — HIGH (ref 0–300)
OTHER CELLS CSF MANUAL: 4 ML/DL — LOW (ref 18–23)
PCO2 BLDV: 36 MMHG — SIGNIFICANT CHANGE UP (ref 35–50)
PCO2 BLDV: 37 MMHG — SIGNIFICANT CHANGE UP (ref 35–50)
PCO2 BLDV: 47 MMHG — SIGNIFICANT CHANGE UP (ref 35–50)
PCO2 BLDV: 50 MMHG — SIGNIFICANT CHANGE UP (ref 35–50)
PH BLDV: 7.4 — SIGNIFICANT CHANGE UP (ref 7.35–7.45)
PH BLDV: 7.42 — SIGNIFICANT CHANGE UP (ref 7.35–7.45)
PH BLDV: 7.44 — SIGNIFICANT CHANGE UP (ref 7.35–7.45)
PH BLDV: 7.45 — SIGNIFICANT CHANGE UP (ref 7.35–7.45)
PH UR: 5 — SIGNIFICANT CHANGE UP (ref 5–8)
PH UR: 5 — SIGNIFICANT CHANGE UP (ref 5–8)
PH UR: 5.5 — SIGNIFICANT CHANGE UP (ref 5–8)
PH UR: 5.5 — SIGNIFICANT CHANGE UP (ref 5–8)
PH UR: 6 — SIGNIFICANT CHANGE UP (ref 5–8)
PHOSPHATE SERPL-MCNC: 2.2 MG/DL — LOW (ref 2.5–4.5)
PHOSPHATE SERPL-MCNC: 2.8 MG/DL — SIGNIFICANT CHANGE UP (ref 2.5–4.5)
PHOSPHATE SERPL-MCNC: 2.9 MG/DL — SIGNIFICANT CHANGE UP (ref 2.5–4.5)
PHOSPHATE SERPL-MCNC: 3.1 MG/DL — SIGNIFICANT CHANGE UP (ref 2.5–4.5)
PHOSPHATE SERPL-MCNC: 3.1 MG/DL — SIGNIFICANT CHANGE UP (ref 2.5–4.5)
PHOSPHATE SERPL-MCNC: 3.3 MG/DL — SIGNIFICANT CHANGE UP (ref 2.5–4.5)
PHOSPHATE SERPL-MCNC: 3.3 MG/DL — SIGNIFICANT CHANGE UP (ref 2.5–4.5)
PHOSPHATE SERPL-MCNC: 3.4 MG/DL — SIGNIFICANT CHANGE UP (ref 2.5–4.5)
PHOSPHATE SERPL-MCNC: 3.5 MG/DL — SIGNIFICANT CHANGE UP (ref 2.5–4.5)
PHOSPHATE SERPL-MCNC: 3.7 MG/DL — SIGNIFICANT CHANGE UP (ref 2.5–4.5)
PHOSPHATE SERPL-MCNC: 3.7 MG/DL — SIGNIFICANT CHANGE UP (ref 2.5–4.5)
PHOSPHATE SERPL-MCNC: 3.8 MG/DL — SIGNIFICANT CHANGE UP (ref 2.5–4.5)
PHOSPHATE SERPL-MCNC: 3.9 MG/DL — SIGNIFICANT CHANGE UP (ref 2.5–4.5)
PHOSPHATE SERPL-MCNC: 4.2 MG/DL — SIGNIFICANT CHANGE UP (ref 2.5–4.5)
PHOSPHATE SERPL-MCNC: 4.4 MG/DL — SIGNIFICANT CHANGE UP (ref 2.5–4.5)
PHOSPHATE SERPL-MCNC: 4.7 MG/DL — HIGH (ref 2.5–4.5)
PHOSPHATE SERPL-MCNC: 5 MG/DL — HIGH (ref 2.5–4.5)
PHOSPHATE SERPL-MCNC: 5.3 MG/DL — HIGH (ref 2.5–4.5)
PLATELET # BLD AUTO: 109 K/UL — LOW (ref 150–400)
PLATELET # BLD AUTO: 111 K/UL — LOW (ref 150–400)
PLATELET # BLD AUTO: 113 K/UL — LOW (ref 150–400)
PLATELET # BLD AUTO: 118 K/UL — LOW (ref 150–400)
PLATELET # BLD AUTO: 127 K/UL — LOW (ref 150–400)
PLATELET # BLD AUTO: 128 K/UL — LOW (ref 150–400)
PLATELET # BLD AUTO: 129 K/UL — LOW (ref 150–400)
PLATELET # BLD AUTO: 129 K/UL — LOW (ref 150–400)
PLATELET # BLD AUTO: 132 K/UL — LOW (ref 150–400)
PLATELET # BLD AUTO: 132 K/UL — LOW (ref 150–400)
PLATELET # BLD AUTO: 133 K/UL — LOW (ref 150–400)
PLATELET # BLD AUTO: 135 K/UL — LOW (ref 150–400)
PLATELET # BLD AUTO: 138 K/UL — LOW (ref 150–400)
PLATELET # BLD AUTO: 138 K/UL — LOW (ref 150–400)
PLATELET # BLD AUTO: 141 K/UL — LOW (ref 150–400)
PLATELET # BLD AUTO: 145 K/UL — LOW (ref 150–400)
PLATELET # BLD AUTO: 151 K/UL — SIGNIFICANT CHANGE UP (ref 150–400)
PLATELET # BLD AUTO: 153 K/UL — SIGNIFICANT CHANGE UP (ref 150–400)
PLATELET # BLD AUTO: 157 K/UL — SIGNIFICANT CHANGE UP (ref 150–400)
PLATELET # BLD AUTO: 158 K/UL — SIGNIFICANT CHANGE UP (ref 150–400)
PLATELET # BLD AUTO: 159 K/UL — SIGNIFICANT CHANGE UP (ref 150–400)
PLATELET # BLD AUTO: 163 K/UL
PLATELET # BLD AUTO: 164 K/UL — SIGNIFICANT CHANGE UP (ref 150–400)
PLATELET # BLD AUTO: 167 K/UL — SIGNIFICANT CHANGE UP (ref 150–400)
PLATELET # BLD AUTO: 170 K/UL — SIGNIFICANT CHANGE UP (ref 150–400)
PLATELET # BLD AUTO: 171 K/UL — SIGNIFICANT CHANGE UP (ref 150–400)
PLATELET # BLD AUTO: 171 K/UL — SIGNIFICANT CHANGE UP (ref 150–400)
PLATELET # BLD AUTO: 172 K/UL — SIGNIFICANT CHANGE UP (ref 150–400)
PLATELET # BLD AUTO: 176 K/UL — SIGNIFICANT CHANGE UP (ref 150–400)
PLATELET # BLD AUTO: 176 K/UL — SIGNIFICANT CHANGE UP (ref 150–400)
PLATELET # BLD AUTO: 184 K/UL — SIGNIFICANT CHANGE UP (ref 150–400)
PLATELET # BLD AUTO: 189 K/UL — SIGNIFICANT CHANGE UP (ref 150–400)
PLATELET # BLD AUTO: 194 K/UL — SIGNIFICANT CHANGE UP (ref 150–400)
PLATELET # BLD AUTO: 200 K/UL — SIGNIFICANT CHANGE UP (ref 150–400)
PLATELET # BLD AUTO: 203 K/UL — SIGNIFICANT CHANGE UP (ref 150–400)
PLATELET # BLD AUTO: 203 K/UL — SIGNIFICANT CHANGE UP (ref 150–400)
PLATELET # BLD AUTO: 218 K/UL — SIGNIFICANT CHANGE UP (ref 150–400)
PLATELET # BLD AUTO: 233 K/UL — SIGNIFICANT CHANGE UP (ref 150–400)
PO2 BLDV: 23 MMHG — LOW (ref 25–45)
PO2 BLDV: 35 MMHG — SIGNIFICANT CHANGE UP (ref 25–45)
PO2 BLDV: 36 MMHG — SIGNIFICANT CHANGE UP (ref 25–45)
PO2 BLDV: 55 MMHG — HIGH (ref 25–45)
POTASSIUM BLDV-SCNC: 3.6 MMOL/L — SIGNIFICANT CHANGE UP (ref 3.5–5.3)
POTASSIUM BLDV-SCNC: 4.2 MMOL/L — SIGNIFICANT CHANGE UP (ref 3.5–5.3)
POTASSIUM BLDV-SCNC: 4.6 MMOL/L — SIGNIFICANT CHANGE UP (ref 3.5–5.3)
POTASSIUM SERPL-MCNC: 3.2 MMOL/L — LOW (ref 3.5–5.3)
POTASSIUM SERPL-MCNC: 3.3 MMOL/L — LOW (ref 3.5–5.3)
POTASSIUM SERPL-MCNC: 3.3 MMOL/L — LOW (ref 3.5–5.3)
POTASSIUM SERPL-MCNC: 3.4 MMOL/L — LOW (ref 3.5–5.3)
POTASSIUM SERPL-MCNC: 3.5 MMOL/L — SIGNIFICANT CHANGE UP (ref 3.5–5.3)
POTASSIUM SERPL-MCNC: 3.6 MMOL/L — SIGNIFICANT CHANGE UP (ref 3.5–5.3)
POTASSIUM SERPL-MCNC: 3.7 MMOL/L — SIGNIFICANT CHANGE UP (ref 3.5–5.3)
POTASSIUM SERPL-MCNC: 3.8 MMOL/L — SIGNIFICANT CHANGE UP (ref 3.5–5.3)
POTASSIUM SERPL-MCNC: 3.9 MMOL/L — SIGNIFICANT CHANGE UP (ref 3.5–5.3)
POTASSIUM SERPL-MCNC: 4 MMOL/L — SIGNIFICANT CHANGE UP (ref 3.5–5.3)
POTASSIUM SERPL-MCNC: 4 MMOL/L — SIGNIFICANT CHANGE UP (ref 3.5–5.3)
POTASSIUM SERPL-MCNC: 4.1 MMOL/L — SIGNIFICANT CHANGE UP (ref 3.5–5.3)
POTASSIUM SERPL-MCNC: 4.2 MMOL/L — SIGNIFICANT CHANGE UP (ref 3.5–5.3)
POTASSIUM SERPL-MCNC: 4.3 MMOL/L — SIGNIFICANT CHANGE UP (ref 3.5–5.3)
POTASSIUM SERPL-MCNC: 4.4 MMOL/L — SIGNIFICANT CHANGE UP (ref 3.5–5.3)
POTASSIUM SERPL-MCNC: 4.6 MMOL/L — SIGNIFICANT CHANGE UP (ref 3.5–5.3)
POTASSIUM SERPL-MCNC: 4.6 MMOL/L — SIGNIFICANT CHANGE UP (ref 3.5–5.3)
POTASSIUM SERPL-MCNC: 4.7 MMOL/L — SIGNIFICANT CHANGE UP (ref 3.5–5.3)
POTASSIUM SERPL-MCNC: 4.8 MMOL/L — SIGNIFICANT CHANGE UP (ref 3.5–5.3)
POTASSIUM SERPL-MCNC: 4.9 MMOL/L — SIGNIFICANT CHANGE UP (ref 3.5–5.3)
POTASSIUM SERPL-SCNC: 3.2 MMOL/L — LOW (ref 3.5–5.3)
POTASSIUM SERPL-SCNC: 3.3 MMOL/L — LOW (ref 3.5–5.3)
POTASSIUM SERPL-SCNC: 3.3 MMOL/L — LOW (ref 3.5–5.3)
POTASSIUM SERPL-SCNC: 3.4 MMOL/L — LOW (ref 3.5–5.3)
POTASSIUM SERPL-SCNC: 3.5 MMOL/L — SIGNIFICANT CHANGE UP (ref 3.5–5.3)
POTASSIUM SERPL-SCNC: 3.6 MMOL/L — SIGNIFICANT CHANGE UP (ref 3.5–5.3)
POTASSIUM SERPL-SCNC: 3.7 MMOL/L — SIGNIFICANT CHANGE UP (ref 3.5–5.3)
POTASSIUM SERPL-SCNC: 3.8 MMOL/L — SIGNIFICANT CHANGE UP (ref 3.5–5.3)
POTASSIUM SERPL-SCNC: 3.9 MMOL/L — SIGNIFICANT CHANGE UP (ref 3.5–5.3)
POTASSIUM SERPL-SCNC: 4 MMOL/L — SIGNIFICANT CHANGE UP (ref 3.5–5.3)
POTASSIUM SERPL-SCNC: 4 MMOL/L — SIGNIFICANT CHANGE UP (ref 3.5–5.3)
POTASSIUM SERPL-SCNC: 4.1 MMOL/L — SIGNIFICANT CHANGE UP (ref 3.5–5.3)
POTASSIUM SERPL-SCNC: 4.2 MMOL/L — SIGNIFICANT CHANGE UP (ref 3.5–5.3)
POTASSIUM SERPL-SCNC: 4.3 MMOL/L — SIGNIFICANT CHANGE UP (ref 3.5–5.3)
POTASSIUM SERPL-SCNC: 4.4 MMOL/L — SIGNIFICANT CHANGE UP (ref 3.5–5.3)
POTASSIUM SERPL-SCNC: 4.6 MMOL/L — SIGNIFICANT CHANGE UP (ref 3.5–5.3)
POTASSIUM SERPL-SCNC: 4.6 MMOL/L — SIGNIFICANT CHANGE UP (ref 3.5–5.3)
POTASSIUM SERPL-SCNC: 4.7 MMOL/L — SIGNIFICANT CHANGE UP (ref 3.5–5.3)
POTASSIUM SERPL-SCNC: 4.8 MMOL/L — SIGNIFICANT CHANGE UP (ref 3.5–5.3)
POTASSIUM SERPL-SCNC: 4.9 MMOL/L — SIGNIFICANT CHANGE UP (ref 3.5–5.3)
POTASSIUM SERPL-SCNC: 5 MMOL/L
POTASSIUM SERPL-SCNC: 5.1 MMOL/L
POTASSIUM SERPL-SCNC: 5.5 MMOL/L
POTASSIUM SERPL-SCNC: 6.8 MMOL/L
PROT ?TM UR-MCNC: 44 MG/DL — HIGH (ref 0–12)
PROT SERPL-MCNC: 6.3 G/DL — SIGNIFICANT CHANGE UP (ref 6–8.3)
PROT SERPL-MCNC: 6.4 G/DL — SIGNIFICANT CHANGE UP (ref 6–8.3)
PROT SERPL-MCNC: 6.5 G/DL — SIGNIFICANT CHANGE UP (ref 6–8.3)
PROT SERPL-MCNC: 6.6 G/DL
PROT SERPL-MCNC: 6.6 G/DL — SIGNIFICANT CHANGE UP (ref 6–8.3)
PROT SERPL-MCNC: 6.6 G/DL — SIGNIFICANT CHANGE UP (ref 6–8.3)
PROT SERPL-MCNC: 6.7 G/DL — SIGNIFICANT CHANGE UP (ref 6–8.3)
PROT SERPL-MCNC: 6.8 G/DL — SIGNIFICANT CHANGE UP (ref 6–8.3)
PROT SERPL-MCNC: 6.9 G/DL
PROT SERPL-MCNC: 6.9 G/DL — SIGNIFICANT CHANGE UP (ref 6–8.3)
PROT SERPL-MCNC: 7 G/DL — SIGNIFICANT CHANGE UP (ref 6–8.3)
PROT SERPL-MCNC: 7.1 G/DL — SIGNIFICANT CHANGE UP (ref 6–8.3)
PROT SERPL-MCNC: 7.2 G/DL — SIGNIFICANT CHANGE UP (ref 6–8.3)
PROT SERPL-MCNC: 7.6 G/DL — SIGNIFICANT CHANGE UP (ref 6–8.3)
PROT SERPL-MCNC: 7.9 G/DL
PROT UR-MCNC: 100
PROT UR-MCNC: 30 MG/DL
PROT UR-MCNC: ABNORMAL
PROT/CREAT UR-RTO: 0.6 RATIO — HIGH (ref 0–0.2)
PROTHROM AB SERPL-ACNC: 12.6 SEC — SIGNIFICANT CHANGE UP (ref 10–12.9)
PROTHROM AB SERPL-ACNC: 12.9 SEC — SIGNIFICANT CHANGE UP (ref 10.6–13.6)
PROTHROM AB SERPL-ACNC: 12.9 SEC — SIGNIFICANT CHANGE UP (ref 10.6–13.6)
PROTHROM AB SERPL-ACNC: 14.3 SEC — HIGH (ref 10.6–13.6)
PROTHROM AB SERPL-ACNC: 14.3 SEC — HIGH (ref 10.6–13.6)
PROTHROM AB SERPL-ACNC: 14.5 SEC — HIGH (ref 10.6–13.6)
PROTHROM AB SERPL-ACNC: 14.8 SEC — HIGH (ref 10.6–13.6)
PROTHROM AB SERPL-ACNC: 15.1 SEC — HIGH (ref 10.6–13.6)
PROTHROM AB SERPL-ACNC: 15.3 SEC — HIGH (ref 10.6–13.6)
PSA FREE FLD-MCNC: 58 %
PSA FREE SERPL-MCNC: 0.73 NG/ML
PSA SERPL-MCNC: 1.26 NG/ML
RBC # BLD: 2.96 M/UL — LOW (ref 4.2–5.8)
RBC # BLD: 3.03 M/UL — LOW (ref 4.2–5.8)
RBC # BLD: 3.07 M/UL — LOW (ref 4.2–5.8)
RBC # BLD: 3.1 M/UL — LOW (ref 4.2–5.8)
RBC # BLD: 3.1 M/UL — LOW (ref 4.2–5.8)
RBC # BLD: 3.19 M/UL — LOW (ref 4.2–5.8)
RBC # BLD: 3.2 M/UL — LOW (ref 4.2–5.8)
RBC # BLD: 3.21 M/UL — LOW (ref 4.2–5.8)
RBC # BLD: 3.22 M/UL — LOW (ref 4.2–5.8)
RBC # BLD: 3.27 M/UL — LOW (ref 4.2–5.8)
RBC # BLD: 3.29 M/UL — LOW (ref 4.2–5.8)
RBC # BLD: 3.31 M/UL — LOW (ref 4.2–5.8)
RBC # BLD: 3.32 M/UL — LOW (ref 4.2–5.8)
RBC # BLD: 3.33 M/UL — LOW (ref 4.2–5.8)
RBC # BLD: 3.34 M/UL — LOW (ref 4.2–5.8)
RBC # BLD: 3.37 M/UL — LOW (ref 4.2–5.8)
RBC # BLD: 3.37 M/UL — LOW (ref 4.2–5.8)
RBC # BLD: 3.39 M/UL — LOW (ref 4.2–5.8)
RBC # BLD: 3.41 M/UL — LOW (ref 4.2–5.8)
RBC # BLD: 3.44 M/UL — LOW (ref 4.2–5.8)
RBC # BLD: 3.47 M/UL — LOW (ref 4.2–5.8)
RBC # BLD: 3.47 M/UL — LOW (ref 4.2–5.8)
RBC # BLD: 3.49 M/UL — LOW (ref 4.2–5.8)
RBC # BLD: 3.51 M/UL — LOW (ref 4.2–5.8)
RBC # BLD: 3.53 M/UL — LOW (ref 4.2–5.8)
RBC # BLD: 3.54 M/UL — LOW (ref 4.2–5.8)
RBC # BLD: 3.6 M/UL — LOW (ref 4.2–5.8)
RBC # BLD: 3.6 M/UL — LOW (ref 4.2–5.8)
RBC # BLD: 3.67 M/UL — LOW (ref 4.2–5.8)
RBC # BLD: 3.71 M/UL — LOW (ref 4.2–5.8)
RBC # BLD: 3.73 M/UL — LOW (ref 4.2–5.8)
RBC # BLD: 3.77 M/UL — LOW (ref 4.2–5.8)
RBC # BLD: 3.8 M/UL — LOW (ref 4.2–5.8)
RBC # BLD: 3.9 M/UL — LOW (ref 4.2–5.8)
RBC # BLD: 3.93 M/UL — LOW (ref 4.2–5.8)
RBC # BLD: 3.94 M/UL — LOW (ref 4.2–5.8)
RBC # BLD: 3.96 M/UL — LOW (ref 4.2–5.8)
RBC # BLD: 3.96 M/UL — LOW (ref 4.2–5.8)
RBC # BLD: 4.03 M/UL — LOW (ref 4.2–5.8)
RBC # BLD: 4.32 M/UL
RBC # BLD: 4.39 M/UL — SIGNIFICANT CHANGE UP (ref 4.2–5.8)
RBC # FLD: 13.5 % — SIGNIFICANT CHANGE UP (ref 10.3–14.5)
RBC # FLD: 13.7 % — SIGNIFICANT CHANGE UP (ref 10.3–14.5)
RBC # FLD: 13.7 % — SIGNIFICANT CHANGE UP (ref 10.3–14.5)
RBC # FLD: 13.9 %
RBC # FLD: 13.9 % — SIGNIFICANT CHANGE UP (ref 10.3–14.5)
RBC # FLD: 14 % — SIGNIFICANT CHANGE UP (ref 10.3–14.5)
RBC # FLD: 14.1 % — SIGNIFICANT CHANGE UP (ref 10.3–14.5)
RBC # FLD: 14.3 % — SIGNIFICANT CHANGE UP (ref 10.3–14.5)
RBC # FLD: 14.4 % — SIGNIFICANT CHANGE UP (ref 10.3–14.5)
RBC # FLD: 14.6 % — HIGH (ref 10.3–14.5)
RBC # FLD: 14.7 % — HIGH (ref 10.3–14.5)
RBC # FLD: 14.9 % — HIGH (ref 10.3–14.5)
RBC # FLD: 14.9 % — HIGH (ref 10.3–14.5)
RBC # FLD: 15 % — HIGH (ref 10.3–14.5)
RBC # FLD: 15 % — HIGH (ref 10.3–14.5)
RBC # FLD: 15.1 % — HIGH (ref 10.3–14.5)
RBC # FLD: 15.1 % — HIGH (ref 10.3–14.5)
RBC # FLD: 15.2 % — HIGH (ref 10.3–14.5)
RBC # FLD: 15.2 % — HIGH (ref 10.3–14.5)
RBC # FLD: 15.3 % — HIGH (ref 10.3–14.5)
RBC # FLD: 15.4 % — HIGH (ref 10.3–14.5)
RBC # FLD: 15.5 % — HIGH (ref 10.3–14.5)
RBC # FLD: 15.6 % — HIGH (ref 10.3–14.5)
RBC # FLD: 15.8 % — HIGH (ref 10.3–14.5)
RBC # FLD: 15.9 % — HIGH (ref 10.3–14.5)
RBC # FLD: 15.9 % — HIGH (ref 10.3–14.5)
RBC # FLD: 16.1 % — HIGH (ref 10.3–14.5)
RBC # FLD: 16.2 % — HIGH (ref 10.3–14.5)
RBC CASTS # UR COMP ASSIST: 1 /HPF — SIGNIFICANT CHANGE UP (ref 0–4)
RBC CASTS # UR COMP ASSIST: ABNORMAL /HPF (ref 0–2)
RBC CASTS # UR COMP ASSIST: ABNORMAL /HPF (ref 0–2)
RETICS #: 67.7 K/UL — SIGNIFICANT CHANGE UP (ref 25–125)
RETICS #: 84.1 K/UL — SIGNIFICANT CHANGE UP (ref 25–125)
RETICS/RBC NFR: 2 % — SIGNIFICANT CHANGE UP (ref 0.5–2.5)
RETICS/RBC NFR: 2.8 % — HIGH (ref 0.5–2.5)
RSV RESULT: SIGNIFICANT CHANGE UP
RSV RNA RESP QL NAA+PROBE: SIGNIFICANT CHANGE UP
SAO2 % BLDV: 30 % — LOW (ref 67–88)
SAO2 % BLDV: 58 % — LOW (ref 67–88)
SAO2 % BLDV: 62 % — LOW (ref 67–88)
SAO2 % BLDV: 87 % — SIGNIFICANT CHANGE UP (ref 67–88)
SARS-COV-2 IGG SERPL QL IA: NEGATIVE — SIGNIFICANT CHANGE UP
SARS-COV-2 IGM SERPL IA-ACNC: 0.09 INDEX — SIGNIFICANT CHANGE UP
SARS-COV-2 IGM SERPL IA-ACNC: 0.2 INDEX — SIGNIFICANT CHANGE UP
SARS-COV-2 IGM SERPL IA-ACNC: 0.46 INDEX — SIGNIFICANT CHANGE UP
SARS-COV-2 IGM SERPL IA-ACNC: <3.8 AU/ML — SIGNIFICANT CHANGE UP
SARS-COV-2 IGM SERPL IA-ACNC: <3.8 AU/ML — SIGNIFICANT CHANGE UP
SARS-COV-2 RNA SPEC QL NAA+PROBE: SIGNIFICANT CHANGE UP
SODIUM SERPL-SCNC: 130 MMOL/L — LOW (ref 135–145)
SODIUM SERPL-SCNC: 131 MMOL/L
SODIUM SERPL-SCNC: 131 MMOL/L — LOW (ref 135–145)
SODIUM SERPL-SCNC: 132 MMOL/L — LOW (ref 135–145)
SODIUM SERPL-SCNC: 132 MMOL/L — LOW (ref 135–145)
SODIUM SERPL-SCNC: 133 MMOL/L — LOW (ref 135–145)
SODIUM SERPL-SCNC: 134 MMOL/L — LOW (ref 135–145)
SODIUM SERPL-SCNC: 135 MMOL/L — SIGNIFICANT CHANGE UP (ref 135–145)
SODIUM SERPL-SCNC: 136 MMOL/L — SIGNIFICANT CHANGE UP (ref 135–145)
SODIUM SERPL-SCNC: 137 MMOL/L
SODIUM SERPL-SCNC: 137 MMOL/L — SIGNIFICANT CHANGE UP (ref 135–145)
SODIUM SERPL-SCNC: 138 MMOL/L — SIGNIFICANT CHANGE UP (ref 135–145)
SODIUM SERPL-SCNC: 139 MMOL/L — SIGNIFICANT CHANGE UP (ref 135–145)
SODIUM SERPL-SCNC: 140 MMOL/L
SODIUM SERPL-SCNC: 140 MMOL/L — SIGNIFICANT CHANGE UP (ref 135–145)
SODIUM SERPL-SCNC: 141 MMOL/L — SIGNIFICANT CHANGE UP (ref 135–145)
SODIUM SERPL-SCNC: 142 MMOL/L
SODIUM UR-SCNC: 21 MMOL/L — SIGNIFICANT CHANGE UP
SP GR SPEC: 1.01 — SIGNIFICANT CHANGE UP (ref 1.01–1.02)
SP GR SPEC: 1.02 — SIGNIFICANT CHANGE UP (ref 1.01–1.02)
SP GR SPEC: 1.02 — SIGNIFICANT CHANGE UP (ref 1.01–1.02)
SPECIMEN SOURCE: SIGNIFICANT CHANGE UP
T4 FREE SERPL-MCNC: 1.5 NG/DL — SIGNIFICANT CHANGE UP (ref 0.9–1.8)
T4 FREE SERPL-MCNC: 1.8 NG/DL — SIGNIFICANT CHANGE UP (ref 0.9–1.8)
T4 SERPL-MCNC: 7.1 UG/DL
TIBC SERPL-MCNC: 274 UG/DL — SIGNIFICANT CHANGE UP (ref 220–430)
TIBC SERPL-MCNC: 308 UG/DL — SIGNIFICANT CHANGE UP (ref 220–430)
TOTAL CHOLESTEROL/HDL RATIO MEASUREMENT: 4.7 RATIO — SIGNIFICANT CHANGE UP (ref 3.4–9.6)
TRIGL SERPL-MCNC: 149 MG/DL — SIGNIFICANT CHANGE UP (ref 10–149)
TRIGL SERPL-MCNC: 317 MG/DL
TROPONIN I SERPL-MCNC: 0.04 NG/ML — SIGNIFICANT CHANGE UP (ref 0–0.04)
TROPONIN I SERPL-MCNC: 0.04 NG/ML — SIGNIFICANT CHANGE UP (ref 0–0.04)
TROPONIN I SERPL-MCNC: 0.05 NG/ML — HIGH (ref 0–0.04)
TROPONIN I SERPL-MCNC: 0.06 NG/ML — HIGH (ref 0–0.04)
TROPONIN I SERPL-MCNC: 0.12 NG/ML — HIGH (ref 0–0.04)
TROPONIN I SERPL-MCNC: 0.12 NG/ML — HIGH (ref 0–0.04)
TROPONIN I SERPL-MCNC: 0.13 NG/ML — HIGH (ref 0–0.04)
TROPONIN I SERPL-MCNC: 0.15 NG/ML — HIGH (ref 0–0.04)
TROPONIN I SERPL-MCNC: 0.15 NG/ML — HIGH (ref 0–0.04)
TROPONIN T, HIGH SENSITIVITY RESULT: 104 NG/L — HIGH (ref 0–51)
TROPONIN T, HIGH SENSITIVITY RESULT: 108 NG/L — HIGH (ref 0–51)
TROPONIN T, HIGH SENSITIVITY RESULT: 110 NG/L — HIGH (ref 0–51)
TROPONIN T, HIGH SENSITIVITY RESULT: 126 NG/L — HIGH (ref 0–51)
TROPONIN T, HIGH SENSITIVITY RESULT: 154 NG/L — HIGH (ref 0–51)
TROPONIN T, HIGH SENSITIVITY RESULT: 158 NG/L — HIGH (ref 0–51)
TROPONIN T, HIGH SENSITIVITY RESULT: 160 NG/L — HIGH (ref 0–51)
TROPONIN T, HIGH SENSITIVITY RESULT: 226 NG/L — HIGH (ref 0–51)
TROPONIN T, HIGH SENSITIVITY RESULT: 367 NG/L — HIGH (ref 0–51)
TROPONIN T, HIGH SENSITIVITY RESULT: 380 NG/L — HIGH (ref 0–51)
TROPONIN T, HIGH SENSITIVITY RESULT: 396 NG/L — HIGH (ref 0–51)
TROPONIN T, HIGH SENSITIVITY RESULT: 43 NG/L — SIGNIFICANT CHANGE UP (ref 0–51)
TROPONIN T, HIGH SENSITIVITY RESULT: 46 NG/L — SIGNIFICANT CHANGE UP (ref 0–51)
TROPONIN T, HIGH SENSITIVITY RESULT: 46 NG/L — SIGNIFICANT CHANGE UP (ref 0–51)
TROPONIN T, HIGH SENSITIVITY RESULT: 47 NG/L — SIGNIFICANT CHANGE UP (ref 0–51)
TROPONIN T, HIGH SENSITIVITY RESULT: 78 NG/L — HIGH (ref 0–51)
TROPONIN T, HIGH SENSITIVITY RESULT: 80 NG/L — HIGH (ref 0–51)
TROPONIN T, HIGH SENSITIVITY RESULT: 82 NG/L — HIGH (ref 0–51)
TROPONIN T, HIGH SENSITIVITY RESULT: 88 NG/L — HIGH (ref 0–51)
TROPONIN T, HIGH SENSITIVITY RESULT: 93 NG/L — HIGH (ref 0–51)
TROPONIN T, HIGH SENSITIVITY RESULT: 94 NG/L — HIGH (ref 0–51)
TROPONIN T, HIGH SENSITIVITY RESULT: 95 NG/L — HIGH (ref 0–51)
TSH SERPL-ACNC: 2.09 UIU/ML
TSH SERPL-MCNC: 1.02 UIU/ML — SIGNIFICANT CHANGE UP (ref 0.27–4.2)
TSH SERPL-MCNC: 1.42 UU/ML — SIGNIFICANT CHANGE UP (ref 0.34–4.82)
TSH SERPL-MCNC: 1.99 UU/ML — SIGNIFICANT CHANGE UP (ref 0.34–4.82)
UIBC SERPL-MCNC: 236 UG/DL — SIGNIFICANT CHANGE UP (ref 110–370)
UIBC SERPL-MCNC: 272 UG/DL — SIGNIFICANT CHANGE UP (ref 110–370)
URATE SERPL-MCNC: 6.3 MG/DL — SIGNIFICANT CHANGE UP (ref 3.4–8.8)
URATE SERPL-MCNC: 9.3 MG/DL — HIGH (ref 3.4–8.8)
UROBILINOGEN FLD QL: NEGATIVE — SIGNIFICANT CHANGE UP
UROBILINOGEN FLD QL: SIGNIFICANT CHANGE UP
UROBILINOGEN FLD QL: SIGNIFICANT CHANGE UP
WBC # BLD: 15.49 K/UL — HIGH (ref 3.8–10.5)
WBC # BLD: 4.95 K/UL — SIGNIFICANT CHANGE UP (ref 3.8–10.5)
WBC # BLD: 5.01 K/UL — SIGNIFICANT CHANGE UP (ref 3.8–10.5)
WBC # BLD: 5.74 K/UL — SIGNIFICANT CHANGE UP (ref 3.8–10.5)
WBC # BLD: 5.8 K/UL — SIGNIFICANT CHANGE UP (ref 3.8–10.5)
WBC # BLD: 6.1 K/UL — SIGNIFICANT CHANGE UP (ref 3.8–10.5)
WBC # BLD: 6.24 K/UL — SIGNIFICANT CHANGE UP (ref 3.8–10.5)
WBC # BLD: 6.33 K/UL — SIGNIFICANT CHANGE UP (ref 3.8–10.5)
WBC # BLD: 6.34 K/UL — SIGNIFICANT CHANGE UP (ref 3.8–10.5)
WBC # BLD: 6.58 K/UL — SIGNIFICANT CHANGE UP (ref 3.8–10.5)
WBC # BLD: 6.64 K/UL — SIGNIFICANT CHANGE UP (ref 3.8–10.5)
WBC # BLD: 6.73 K/UL — SIGNIFICANT CHANGE UP (ref 3.8–10.5)
WBC # BLD: 6.81 K/UL — SIGNIFICANT CHANGE UP (ref 3.8–10.5)
WBC # BLD: 6.85 K/UL — SIGNIFICANT CHANGE UP (ref 3.8–10.5)
WBC # BLD: 6.87 K/UL — SIGNIFICANT CHANGE UP (ref 3.8–10.5)
WBC # BLD: 6.92 K/UL — SIGNIFICANT CHANGE UP (ref 3.8–10.5)
WBC # BLD: 7.06 K/UL — SIGNIFICANT CHANGE UP (ref 3.8–10.5)
WBC # BLD: 7.14 K/UL — SIGNIFICANT CHANGE UP (ref 3.8–10.5)
WBC # BLD: 7.32 K/UL — SIGNIFICANT CHANGE UP (ref 3.8–10.5)
WBC # BLD: 7.33 K/UL — SIGNIFICANT CHANGE UP (ref 3.8–10.5)
WBC # BLD: 7.35 K/UL — SIGNIFICANT CHANGE UP (ref 3.8–10.5)
WBC # BLD: 7.36 K/UL — SIGNIFICANT CHANGE UP (ref 3.8–10.5)
WBC # BLD: 7.44 K/UL — SIGNIFICANT CHANGE UP (ref 3.8–10.5)
WBC # BLD: 7.49 K/UL — SIGNIFICANT CHANGE UP (ref 3.8–10.5)
WBC # BLD: 7.7 K/UL — SIGNIFICANT CHANGE UP (ref 3.8–10.5)
WBC # BLD: 7.88 K/UL — SIGNIFICANT CHANGE UP (ref 3.8–10.5)
WBC # BLD: 7.94 K/UL — SIGNIFICANT CHANGE UP (ref 3.8–10.5)
WBC # BLD: 8.19 K/UL — SIGNIFICANT CHANGE UP (ref 3.8–10.5)
WBC # BLD: 8.23 K/UL — SIGNIFICANT CHANGE UP (ref 3.8–10.5)
WBC # BLD: 8.23 K/UL — SIGNIFICANT CHANGE UP (ref 3.8–10.5)
WBC # BLD: 8.4 K/UL — SIGNIFICANT CHANGE UP (ref 3.8–10.5)
WBC # BLD: 8.53 K/UL — SIGNIFICANT CHANGE UP (ref 3.8–10.5)
WBC # BLD: 8.56 K/UL — SIGNIFICANT CHANGE UP (ref 3.8–10.5)
WBC # BLD: 8.6 K/UL — SIGNIFICANT CHANGE UP (ref 3.8–10.5)
WBC # BLD: 8.62 K/UL — SIGNIFICANT CHANGE UP (ref 3.8–10.5)
WBC # BLD: 8.96 K/UL — SIGNIFICANT CHANGE UP (ref 3.8–10.5)
WBC # BLD: 8.98 K/UL — SIGNIFICANT CHANGE UP (ref 3.8–10.5)
WBC # BLD: 9.05 K/UL — SIGNIFICANT CHANGE UP (ref 3.8–10.5)
WBC # BLD: 9.15 K/UL — SIGNIFICANT CHANGE UP (ref 3.8–10.5)
WBC # BLD: 9.35 K/UL — SIGNIFICANT CHANGE UP (ref 3.8–10.5)
WBC # FLD AUTO: 15.49 K/UL — HIGH (ref 3.8–10.5)
WBC # FLD AUTO: 4.95 K/UL — SIGNIFICANT CHANGE UP (ref 3.8–10.5)
WBC # FLD AUTO: 5.01 K/UL — SIGNIFICANT CHANGE UP (ref 3.8–10.5)
WBC # FLD AUTO: 5.74 K/UL — SIGNIFICANT CHANGE UP (ref 3.8–10.5)
WBC # FLD AUTO: 5.8 K/UL — SIGNIFICANT CHANGE UP (ref 3.8–10.5)
WBC # FLD AUTO: 6.1 K/UL — SIGNIFICANT CHANGE UP (ref 3.8–10.5)
WBC # FLD AUTO: 6.24 K/UL — SIGNIFICANT CHANGE UP (ref 3.8–10.5)
WBC # FLD AUTO: 6.33 K/UL — SIGNIFICANT CHANGE UP (ref 3.8–10.5)
WBC # FLD AUTO: 6.34 K/UL — SIGNIFICANT CHANGE UP (ref 3.8–10.5)
WBC # FLD AUTO: 6.58 K/UL — SIGNIFICANT CHANGE UP (ref 3.8–10.5)
WBC # FLD AUTO: 6.64 K/UL — SIGNIFICANT CHANGE UP (ref 3.8–10.5)
WBC # FLD AUTO: 6.73 K/UL — SIGNIFICANT CHANGE UP (ref 3.8–10.5)
WBC # FLD AUTO: 6.81 K/UL — SIGNIFICANT CHANGE UP (ref 3.8–10.5)
WBC # FLD AUTO: 6.85 K/UL — SIGNIFICANT CHANGE UP (ref 3.8–10.5)
WBC # FLD AUTO: 6.87 K/UL — SIGNIFICANT CHANGE UP (ref 3.8–10.5)
WBC # FLD AUTO: 6.92 K/UL — SIGNIFICANT CHANGE UP (ref 3.8–10.5)
WBC # FLD AUTO: 7.06 K/UL — SIGNIFICANT CHANGE UP (ref 3.8–10.5)
WBC # FLD AUTO: 7.14 K/UL — SIGNIFICANT CHANGE UP (ref 3.8–10.5)
WBC # FLD AUTO: 7.32 K/UL — SIGNIFICANT CHANGE UP (ref 3.8–10.5)
WBC # FLD AUTO: 7.33 K/UL — SIGNIFICANT CHANGE UP (ref 3.8–10.5)
WBC # FLD AUTO: 7.35 K/UL — SIGNIFICANT CHANGE UP (ref 3.8–10.5)
WBC # FLD AUTO: 7.36 K/UL — SIGNIFICANT CHANGE UP (ref 3.8–10.5)
WBC # FLD AUTO: 7.44 K/UL — SIGNIFICANT CHANGE UP (ref 3.8–10.5)
WBC # FLD AUTO: 7.49 K/UL — SIGNIFICANT CHANGE UP (ref 3.8–10.5)
WBC # FLD AUTO: 7.7 K/UL — SIGNIFICANT CHANGE UP (ref 3.8–10.5)
WBC # FLD AUTO: 7.88 K/UL — SIGNIFICANT CHANGE UP (ref 3.8–10.5)
WBC # FLD AUTO: 7.94 K/UL — SIGNIFICANT CHANGE UP (ref 3.8–10.5)
WBC # FLD AUTO: 8.19 K/UL — SIGNIFICANT CHANGE UP (ref 3.8–10.5)
WBC # FLD AUTO: 8.23 K/UL — SIGNIFICANT CHANGE UP (ref 3.8–10.5)
WBC # FLD AUTO: 8.23 K/UL — SIGNIFICANT CHANGE UP (ref 3.8–10.5)
WBC # FLD AUTO: 8.4 K/UL — SIGNIFICANT CHANGE UP (ref 3.8–10.5)
WBC # FLD AUTO: 8.47 K/UL
WBC # FLD AUTO: 8.53 K/UL — SIGNIFICANT CHANGE UP (ref 3.8–10.5)
WBC # FLD AUTO: 8.56 K/UL — SIGNIFICANT CHANGE UP (ref 3.8–10.5)
WBC # FLD AUTO: 8.6 K/UL — SIGNIFICANT CHANGE UP (ref 3.8–10.5)
WBC # FLD AUTO: 8.62 K/UL — SIGNIFICANT CHANGE UP (ref 3.8–10.5)
WBC # FLD AUTO: 8.96 K/UL — SIGNIFICANT CHANGE UP (ref 3.8–10.5)
WBC # FLD AUTO: 8.98 K/UL — SIGNIFICANT CHANGE UP (ref 3.8–10.5)
WBC # FLD AUTO: 9.05 K/UL — SIGNIFICANT CHANGE UP (ref 3.8–10.5)
WBC # FLD AUTO: 9.15 K/UL — SIGNIFICANT CHANGE UP (ref 3.8–10.5)
WBC # FLD AUTO: 9.35 K/UL — SIGNIFICANT CHANGE UP (ref 3.8–10.5)
WBC UR QL: 1 /HPF — SIGNIFICANT CHANGE UP (ref 0–5)
WBC UR QL: ABNORMAL /HPF (ref 0–5)
WBC UR QL: ABNORMAL /HPF (ref 0–5)

## 2020-01-01 PROCEDURE — 73030 X-RAY EXAM OF SHOULDER: CPT

## 2020-01-01 PROCEDURE — 87040 BLOOD CULTURE FOR BACTERIA: CPT

## 2020-01-01 PROCEDURE — 80053 COMPREHEN METABOLIC PANEL: CPT

## 2020-01-01 PROCEDURE — 99233 SBSQ HOSP IP/OBS HIGH 50: CPT

## 2020-01-01 PROCEDURE — 93306 TTE W/DOPPLER COMPLETE: CPT

## 2020-01-01 PROCEDURE — 99233 SBSQ HOSP IP/OBS HIGH 50: CPT | Mod: GC

## 2020-01-01 PROCEDURE — 85027 COMPLETE CBC AUTOMATED: CPT

## 2020-01-01 PROCEDURE — 84100 ASSAY OF PHOSPHORUS: CPT

## 2020-01-01 PROCEDURE — 83540 ASSAY OF IRON: CPT

## 2020-01-01 PROCEDURE — 84484 ASSAY OF TROPONIN QUANT: CPT

## 2020-01-01 PROCEDURE — 82553 CREATINE MB FRACTION: CPT

## 2020-01-01 PROCEDURE — 83036 HEMOGLOBIN GLYCOSYLATED A1C: CPT

## 2020-01-01 PROCEDURE — 86769 SARS-COV-2 COVID-19 ANTIBODY: CPT

## 2020-01-01 PROCEDURE — 99223 1ST HOSP IP/OBS HIGH 75: CPT

## 2020-01-01 PROCEDURE — 70450 CT HEAD/BRAIN W/O DYE: CPT | Mod: 26

## 2020-01-01 PROCEDURE — 96374 THER/PROPH/DIAG INJ IV PUSH: CPT

## 2020-01-01 PROCEDURE — 85730 THROMBOPLASTIN TIME PARTIAL: CPT

## 2020-01-01 PROCEDURE — 99285 EMERGENCY DEPT VISIT HI MDM: CPT | Mod: 25

## 2020-01-01 PROCEDURE — 82728 ASSAY OF FERRITIN: CPT

## 2020-01-01 PROCEDURE — 71045 X-RAY EXAM CHEST 1 VIEW: CPT

## 2020-01-01 PROCEDURE — 85379 FIBRIN DEGRADATION QUANT: CPT

## 2020-01-01 PROCEDURE — C2624: CPT

## 2020-01-01 PROCEDURE — 99291 CRITICAL CARE FIRST HOUR: CPT | Mod: CS,GC

## 2020-01-01 PROCEDURE — P9047: CPT

## 2020-01-01 PROCEDURE — 93005 ELECTROCARDIOGRAM TRACING: CPT

## 2020-01-01 PROCEDURE — 90686 IIV4 VACC NO PRSV 0.5 ML IM: CPT

## 2020-01-01 PROCEDURE — 93010 ELECTROCARDIOGRAM REPORT: CPT | Mod: GC

## 2020-01-01 PROCEDURE — 99291 CRITICAL CARE FIRST HOUR: CPT

## 2020-01-01 PROCEDURE — 93970 EXTREMITY STUDY: CPT

## 2020-01-01 PROCEDURE — 93970 EXTREMITY STUDY: CPT | Mod: 26

## 2020-01-01 PROCEDURE — 83735 ASSAY OF MAGNESIUM: CPT

## 2020-01-01 PROCEDURE — 83880 ASSAY OF NATRIURETIC PEPTIDE: CPT

## 2020-01-01 PROCEDURE — 78830 RP LOCLZJ TUM SPECT W/CT 1: CPT | Mod: 26

## 2020-01-01 PROCEDURE — 71250 CT THORAX DX C-: CPT

## 2020-01-01 PROCEDURE — 73030 X-RAY EXAM OF SHOULDER: CPT | Mod: 26,RT

## 2020-01-01 PROCEDURE — 76937 US GUIDE VASCULAR ACCESS: CPT | Mod: 26

## 2020-01-01 PROCEDURE — 99223 1ST HOSP IP/OBS HIGH 75: CPT | Mod: GC

## 2020-01-01 PROCEDURE — 71045 X-RAY EXAM CHEST 1 VIEW: CPT | Mod: 26

## 2020-01-01 PROCEDURE — 84156 ASSAY OF PROTEIN URINE: CPT

## 2020-01-01 PROCEDURE — 99232 SBSQ HOSP IP/OBS MODERATE 35: CPT

## 2020-01-01 PROCEDURE — C8929: CPT

## 2020-01-01 PROCEDURE — 80048 BASIC METABOLIC PNL TOTAL CA: CPT

## 2020-01-01 PROCEDURE — 85018 HEMOGLOBIN: CPT

## 2020-01-01 PROCEDURE — 84132 ASSAY OF SERUM POTASSIUM: CPT

## 2020-01-01 PROCEDURE — 83690 ASSAY OF LIPASE: CPT

## 2020-01-01 PROCEDURE — 82962 GLUCOSE BLOOD TEST: CPT

## 2020-01-01 PROCEDURE — 82803 BLOOD GASES ANY COMBINATION: CPT

## 2020-01-01 PROCEDURE — 84295 ASSAY OF SERUM SODIUM: CPT

## 2020-01-01 PROCEDURE — U0003: CPT

## 2020-01-01 PROCEDURE — 33289 TCAT IMPL WRLS P-ART PRS SNR: CPT

## 2020-01-01 PROCEDURE — 99205 OFFICE O/P NEW HI 60 MIN: CPT

## 2020-01-01 PROCEDURE — 85025 COMPLETE CBC W/AUTO DIFF WBC: CPT

## 2020-01-01 PROCEDURE — 93010 ELECTROCARDIOGRAM REPORT: CPT

## 2020-01-01 PROCEDURE — 99239 HOSP IP/OBS DSCHRG MGMT >30: CPT

## 2020-01-01 PROCEDURE — 93308 TTE F-UP OR LMTD: CPT | Mod: 26

## 2020-01-01 PROCEDURE — 99053 MED SERV 10PM-8AM 24 HR FAC: CPT

## 2020-01-01 PROCEDURE — 82435 ASSAY OF BLOOD CHLORIDE: CPT

## 2020-01-01 PROCEDURE — 75571 CT HRT W/O DYE W/CA TEST: CPT

## 2020-01-01 PROCEDURE — 83521 IG LIGHT CHAINS FREE EACH: CPT

## 2020-01-01 PROCEDURE — 99214 OFFICE O/P EST MOD 30 MIN: CPT

## 2020-01-01 PROCEDURE — 73502 X-RAY EXAM HIP UNI 2-3 VIEWS: CPT | Mod: 26,RT

## 2020-01-01 PROCEDURE — 85045 AUTOMATED RETICULOCYTE COUNT: CPT

## 2020-01-01 PROCEDURE — 70450 CT HEAD/BRAIN W/O DYE: CPT

## 2020-01-01 PROCEDURE — 83605 ASSAY OF LACTIC ACID: CPT

## 2020-01-01 PROCEDURE — 84439 ASSAY OF FREE THYROXINE: CPT

## 2020-01-01 PROCEDURE — 93000 ELECTROCARDIOGRAM COMPLETE: CPT

## 2020-01-01 PROCEDURE — 82570 ASSAY OF URINE CREATININE: CPT

## 2020-01-01 PROCEDURE — 93295 DEV INTERROG REMOTE 1/2/MLT: CPT

## 2020-01-01 PROCEDURE — 85610 PROTHROMBIN TIME: CPT

## 2020-01-01 PROCEDURE — 82533 TOTAL CORTISOL: CPT

## 2020-01-01 PROCEDURE — 93000 ELECTROCARDIOGRAM COMPLETE: CPT | Mod: PD

## 2020-01-01 PROCEDURE — 93306 TTE W/DOPPLER COMPLETE: CPT | Mod: 26

## 2020-01-01 PROCEDURE — 36558 INSERT TUNNELED CV CATH: CPT

## 2020-01-01 PROCEDURE — 82550 ASSAY OF CK (CPK): CPT

## 2020-01-01 PROCEDURE — 82150 ASSAY OF AMYLASE: CPT

## 2020-01-01 PROCEDURE — 82330 ASSAY OF CALCIUM: CPT

## 2020-01-01 PROCEDURE — 81001 URINALYSIS AUTO W/SCOPE: CPT

## 2020-01-01 PROCEDURE — 73560 X-RAY EXAM OF KNEE 1 OR 2: CPT | Mod: 26,RT

## 2020-01-01 PROCEDURE — C1894: CPT

## 2020-01-01 PROCEDURE — 99291 CRITICAL CARE FIRST HOUR: CPT | Mod: 25

## 2020-01-01 PROCEDURE — 99285 EMERGENCY DEPT VISIT HI MDM: CPT | Mod: GC

## 2020-01-01 PROCEDURE — 84443 ASSAY THYROID STIM HORMONE: CPT

## 2020-01-01 PROCEDURE — 96375 TX/PRO/DX INJ NEW DRUG ADDON: CPT

## 2020-01-01 PROCEDURE — 99215 OFFICE O/P EST HI 40 MIN: CPT

## 2020-01-01 PROCEDURE — 86334 IMMUNOFIX E-PHORESIS SERUM: CPT

## 2020-01-01 PROCEDURE — 83550 IRON BINDING TEST: CPT

## 2020-01-01 PROCEDURE — 99285 EMERGENCY DEPT VISIT HI MDM: CPT | Mod: CS,GC

## 2020-01-01 PROCEDURE — 76937 US GUIDE VASCULAR ACCESS: CPT

## 2020-01-01 PROCEDURE — 99231 SBSQ HOSP IP/OBS SF/LOW 25: CPT

## 2020-01-01 PROCEDURE — 73030 X-RAY EXAM OF SHOULDER: CPT | Mod: 26,LT

## 2020-01-01 PROCEDURE — 99285 EMERGENCY DEPT VISIT HI MDM: CPT

## 2020-01-01 PROCEDURE — 36415 COLL VENOUS BLD VENIPUNCTURE: CPT

## 2020-01-01 PROCEDURE — 73502 X-RAY EXAM HIP UNI 2-3 VIEWS: CPT

## 2020-01-01 PROCEDURE — 93296 REM INTERROG EVL PM/IDS: CPT

## 2020-01-01 PROCEDURE — 99204 OFFICE O/P NEW MOD 45 MIN: CPT

## 2020-01-01 PROCEDURE — 96361 HYDRATE IV INFUSION ADD-ON: CPT

## 2020-01-01 PROCEDURE — 71250 CT THORAX DX C-: CPT | Mod: 26

## 2020-01-01 PROCEDURE — 71046 X-RAY EXAM CHEST 2 VIEWS: CPT | Mod: 26

## 2020-01-01 PROCEDURE — 36556 INSERT NON-TUNNEL CV CATH: CPT | Mod: RT

## 2020-01-01 PROCEDURE — 77001 FLUOROGUIDE FOR VEIN DEVICE: CPT

## 2020-01-01 PROCEDURE — 93282 PRGRMG EVAL IMPLANTABLE DFB: CPT

## 2020-01-01 PROCEDURE — 99495 TRANSJ CARE MGMT MOD F2F 14D: CPT

## 2020-01-01 PROCEDURE — 80061 LIPID PANEL: CPT

## 2020-01-01 PROCEDURE — 71045 X-RAY EXAM CHEST 1 VIEW: CPT | Mod: 26,77

## 2020-01-01 PROCEDURE — 73000 X-RAY EXAM OF COLLAR BONE: CPT | Mod: 26,LT

## 2020-01-01 PROCEDURE — 85014 HEMATOCRIT: CPT

## 2020-01-01 PROCEDURE — 71046 X-RAY EXAM CHEST 2 VIEWS: CPT

## 2020-01-01 PROCEDURE — 87631 RESP VIRUS 3-5 TARGETS: CPT

## 2020-01-01 PROCEDURE — 93289 INTERROG DEVICE EVAL HEART: CPT | Mod: 26

## 2020-01-01 PROCEDURE — 99284 EMERGENCY DEPT VISIT MOD MDM: CPT | Mod: 25

## 2020-01-01 PROCEDURE — C1769: CPT

## 2020-01-01 PROCEDURE — 99222 1ST HOSP IP/OBS MODERATE 55: CPT

## 2020-01-01 PROCEDURE — 76770 US EXAM ABDO BACK WALL COMP: CPT

## 2020-01-01 PROCEDURE — 93289 INTERROG DEVICE EVAL HEART: CPT

## 2020-01-01 PROCEDURE — 84550 ASSAY OF BLOOD/URIC ACID: CPT

## 2020-01-01 PROCEDURE — 87635 SARS-COV-2 COVID-19 AMP PRB: CPT

## 2020-01-01 PROCEDURE — 97162 PT EVAL MOD COMPLEX 30 MIN: CPT

## 2020-01-01 PROCEDURE — 93308 TTE F-UP OR LMTD: CPT

## 2020-01-01 PROCEDURE — 73000 X-RAY EXAM OF COLLAR BONE: CPT

## 2020-01-01 PROCEDURE — 97116 GAIT TRAINING THERAPY: CPT

## 2020-01-01 PROCEDURE — C1751: CPT

## 2020-01-01 PROCEDURE — 76770 US EXAM ABDO BACK WALL COMP: CPT | Mod: 26

## 2020-01-01 PROCEDURE — 82947 ASSAY GLUCOSE BLOOD QUANT: CPT

## 2020-01-01 PROCEDURE — 99348 HOME/RES VST EST LOW MDM 30: CPT

## 2020-01-01 PROCEDURE — 82565 ASSAY OF CREATININE: CPT

## 2020-01-01 PROCEDURE — 78830 RP LOCLZJ TUM SPECT W/CT 1: CPT

## 2020-01-01 PROCEDURE — 97161 PT EVAL LOW COMPLEX 20 MIN: CPT

## 2020-01-01 PROCEDURE — 77001 FLUOROGUIDE FOR VEIN DEVICE: CPT | Mod: 26

## 2020-01-01 PROCEDURE — 84300 ASSAY OF URINE SODIUM: CPT

## 2020-01-01 PROCEDURE — C1760: CPT

## 2020-01-01 PROCEDURE — 93282 PRGRMG EVAL IMPLANTABLE DFB: CPT | Mod: 26

## 2020-01-01 PROCEDURE — 75571 CT HRT W/O DYE W/CA TEST: CPT | Mod: 26

## 2020-01-01 PROCEDURE — 73560 X-RAY EXAM OF KNEE 1 OR 2: CPT

## 2020-01-01 PROCEDURE — 99215 OFFICE O/P EST HI 40 MIN: CPT | Mod: PD

## 2020-01-01 PROCEDURE — C1889: CPT

## 2020-01-01 PROCEDURE — C1887: CPT

## 2020-01-01 PROCEDURE — 99238 HOSP IP/OBS DSCHRG MGMT 30/<: CPT

## 2020-01-01 RX ORDER — FUROSEMIDE 40 MG
40 TABLET ORAL
Refills: 0 | Status: DISCONTINUED | OUTPATIENT
Start: 2020-01-01 | End: 2020-01-01

## 2020-01-01 RX ORDER — HYDRALAZINE HCL 50 MG
1 TABLET ORAL
Qty: 0 | Refills: 0 | DISCHARGE
Start: 2020-01-01

## 2020-01-01 RX ORDER — ISOSORBIDE MONONITRATE 60 MG/1
60 TABLET, EXTENDED RELEASE ORAL DAILY
Refills: 0 | Status: DISCONTINUED | OUTPATIENT
Start: 2020-01-01 | End: 2020-01-01

## 2020-01-01 RX ORDER — DOBUTAMINE HCL 250MG/20ML
10 VIAL (ML) INTRAVENOUS
Qty: 500 | Refills: 0 | Status: DISCONTINUED | OUTPATIENT
Start: 2020-01-01 | End: 2020-01-01

## 2020-01-01 RX ORDER — MILRINONE LACTATE 1 MG/ML
0.3 INJECTION, SOLUTION INTRAVENOUS
Qty: 20 | Refills: 0 | Status: DISCONTINUED | OUTPATIENT
Start: 2020-01-01 | End: 2020-01-01

## 2020-01-01 RX ORDER — ATORVASTATIN CALCIUM 80 MG/1
80 TABLET, FILM COATED ORAL AT BEDTIME
Refills: 0 | Status: DISCONTINUED | OUTPATIENT
Start: 2020-01-01 | End: 2020-01-01

## 2020-01-01 RX ORDER — GLUCAGON INJECTION, SOLUTION 0.5 MG/.1ML
1 INJECTION, SOLUTION SUBCUTANEOUS ONCE
Refills: 0 | Status: DISCONTINUED | OUTPATIENT
Start: 2020-01-01 | End: 2020-01-01

## 2020-01-01 RX ORDER — MAGNESIUM SULFATE 500 MG/ML
1 VIAL (ML) INJECTION ONCE
Refills: 0 | Status: COMPLETED | OUTPATIENT
Start: 2020-01-01 | End: 2020-01-01

## 2020-01-01 RX ORDER — NOREPINEPHRINE BITARTRATE/D5W 8 MG/250ML
0.05 PLASTIC BAG, INJECTION (ML) INTRAVENOUS
Qty: 16 | Refills: 0 | Status: DISCONTINUED | OUTPATIENT
Start: 2020-01-01 | End: 2020-01-01

## 2020-01-01 RX ORDER — INSULIN LISPRO 100/ML
VIAL (ML) SUBCUTANEOUS
Refills: 0 | Status: DISCONTINUED | OUTPATIENT
Start: 2020-01-01 | End: 2020-01-01

## 2020-01-01 RX ORDER — DEXTROSE 50 % IN WATER 50 %
15 SYRINGE (ML) INTRAVENOUS ONCE
Refills: 0 | Status: DISCONTINUED | OUTPATIENT
Start: 2020-01-01 | End: 2020-01-01

## 2020-01-01 RX ORDER — TICAGRELOR 90 MG/1
90 TABLET ORAL EVERY 12 HOURS
Refills: 0 | Status: DISCONTINUED | OUTPATIENT
Start: 2020-01-01 | End: 2020-01-01

## 2020-01-01 RX ORDER — ACETAMINOPHEN 500 MG
650 TABLET ORAL EVERY 6 HOURS
Refills: 0 | Status: DISCONTINUED | OUTPATIENT
Start: 2020-01-01 | End: 2020-01-01

## 2020-01-01 RX ORDER — INSULIN LISPRO 100/ML
11 VIAL (ML) SUBCUTANEOUS
Refills: 0 | Status: DISCONTINUED | OUTPATIENT
Start: 2020-01-01 | End: 2020-01-01

## 2020-01-01 RX ORDER — DEXTROSE 50 % IN WATER 50 %
12.5 SYRINGE (ML) INTRAVENOUS ONCE
Refills: 0 | Status: DISCONTINUED | OUTPATIENT
Start: 2020-01-01 | End: 2020-01-01

## 2020-01-01 RX ORDER — POTASSIUM CHLORIDE 20 MEQ
40 PACKET (EA) ORAL ONCE
Refills: 0 | Status: COMPLETED | OUTPATIENT
Start: 2020-01-01 | End: 2020-01-01

## 2020-01-01 RX ORDER — ASPIRIN/CALCIUM CARB/MAGNESIUM 324 MG
81 TABLET ORAL DAILY
Refills: 0 | Status: DISCONTINUED | OUTPATIENT
Start: 2020-01-01 | End: 2020-01-01

## 2020-01-01 RX ORDER — HEPARIN SODIUM 5000 [USP'U]/ML
3500 INJECTION INTRAVENOUS; SUBCUTANEOUS EVERY 6 HOURS
Refills: 0 | Status: DISCONTINUED | OUTPATIENT
Start: 2020-01-01 | End: 2020-01-01

## 2020-01-01 RX ORDER — ALLOPURINOL 300 MG
300 TABLET ORAL DAILY
Refills: 0 | Status: DISCONTINUED | OUTPATIENT
Start: 2020-01-01 | End: 2020-01-01

## 2020-01-01 RX ORDER — REPAGLINIDE 1 MG/1
1 TABLET ORAL
Qty: 90 | Refills: 0
Start: 2020-01-01 | End: 2020-01-01

## 2020-01-01 RX ORDER — HYDRALAZINE HCL 50 MG
50 TABLET ORAL THREE TIMES A DAY
Refills: 0 | Status: DISCONTINUED | OUTPATIENT
Start: 2020-01-01 | End: 2020-01-01

## 2020-01-01 RX ORDER — RANOLAZINE 500 MG/1
500 TABLET, FILM COATED, EXTENDED RELEASE ORAL
Refills: 0 | Status: DISCONTINUED | OUTPATIENT
Start: 2020-01-01 | End: 2020-01-01

## 2020-01-01 RX ORDER — INSULIN ASPART 100 [IU]/ML
10 INJECTION, SOLUTION SUBCUTANEOUS
Qty: 0 | Refills: 0 | DISCHARGE

## 2020-01-01 RX ORDER — INSULIN ASPART 100 [IU]/ML
12 INJECTION, SOLUTION SUBCUTANEOUS
Qty: 0 | Refills: 0 | DISCHARGE

## 2020-01-01 RX ORDER — HEPARIN SODIUM 5000 [USP'U]/ML
5000 INJECTION INTRAVENOUS; SUBCUTANEOUS EVERY 12 HOURS
Refills: 0 | Status: DISCONTINUED | OUTPATIENT
Start: 2020-01-01 | End: 2020-01-01

## 2020-01-01 RX ORDER — ISOSORBIDE MONONITRATE 60 MG/1
2 TABLET, EXTENDED RELEASE ORAL
Qty: 0 | Refills: 0 | DISCHARGE

## 2020-01-01 RX ORDER — ATORVASTATIN CALCIUM 80 MG/1
1 TABLET, FILM COATED ORAL
Qty: 0 | Refills: 0 | DISCHARGE
Start: 2020-01-01

## 2020-01-01 RX ORDER — INSULIN GLARGINE 100 [IU]/ML
16 INJECTION, SOLUTION SUBCUTANEOUS AT BEDTIME
Refills: 0 | Status: DISCONTINUED | OUTPATIENT
Start: 2020-01-01 | End: 2020-01-01

## 2020-01-01 RX ORDER — INSULIN GLARGINE 100 [IU]/ML
40 INJECTION, SOLUTION SUBCUTANEOUS AT BEDTIME
Refills: 0 | Status: DISCONTINUED | OUTPATIENT
Start: 2020-01-01 | End: 2020-01-01

## 2020-01-01 RX ORDER — MIRTAZAPINE 45 MG/1
15 TABLET, ORALLY DISINTEGRATING ORAL AT BEDTIME
Refills: 0 | Status: DISCONTINUED | OUTPATIENT
Start: 2020-01-01 | End: 2020-01-01

## 2020-01-01 RX ORDER — METOPROLOL TARTRATE 50 MG
1 TABLET ORAL
Qty: 60 | Refills: 0
Start: 2020-01-01 | End: 2020-01-01

## 2020-01-01 RX ORDER — INSULIN LISPRO 100/ML
9 VIAL (ML) SUBCUTANEOUS
Refills: 0 | Status: DISCONTINUED | OUTPATIENT
Start: 2020-01-01 | End: 2020-01-01

## 2020-01-01 RX ORDER — SPIRONOLACTONE 25 MG/1
25 TABLET, FILM COATED ORAL
Refills: 0 | Status: DISCONTINUED | OUTPATIENT
Start: 2020-01-01 | End: 2020-01-01

## 2020-01-01 RX ORDER — INSULIN LISPRO 100/ML
VIAL (ML) SUBCUTANEOUS AT BEDTIME
Refills: 0 | Status: DISCONTINUED | OUTPATIENT
Start: 2020-01-01 | End: 2020-01-01

## 2020-01-01 RX ORDER — DEXTROSE 50 % IN WATER 50 %
25 SYRINGE (ML) INTRAVENOUS ONCE
Refills: 0 | Status: DISCONTINUED | OUTPATIENT
Start: 2020-01-01 | End: 2020-01-01

## 2020-01-01 RX ORDER — HYDRALAZINE HYDROCHLORIDE 25 MG/1
25 TABLET ORAL 3 TIMES DAILY
Refills: 0 | Status: DISCONTINUED | COMMUNITY
Start: 2020-01-01 | End: 2020-01-01

## 2020-01-01 RX ORDER — HYDRALAZINE HYDROCHLORIDE 10 MG/1
10 TABLET ORAL 3 TIMES DAILY
Refills: 0 | Status: DISCONTINUED | COMMUNITY
Start: 2020-01-01 | End: 2020-01-01

## 2020-01-01 RX ORDER — POTASSIUM CITRATE MONOHYDRATE 100 %
20 POWDER (GRAM) MISCELLANEOUS ONCE
Refills: 0 | Status: COMPLETED | OUTPATIENT
Start: 2020-01-01 | End: 2020-01-01

## 2020-01-01 RX ORDER — LANOLIN ALCOHOL/MO/W.PET/CERES
3 CREAM (GRAM) TOPICAL AT BEDTIME
Refills: 0 | Status: DISCONTINUED | OUTPATIENT
Start: 2020-01-01 | End: 2020-01-01

## 2020-01-01 RX ORDER — INSULIN GLARGINE 100 [IU]/ML
30 INJECTION, SOLUTION SUBCUTANEOUS AT BEDTIME
Refills: 0 | Status: DISCONTINUED | OUTPATIENT
Start: 2020-01-01 | End: 2020-01-01

## 2020-01-01 RX ORDER — ASPIRIN/CALCIUM CARB/MAGNESIUM 324 MG
1 TABLET ORAL
Qty: 0 | Refills: 0 | DISCHARGE
Start: 2020-01-01

## 2020-01-01 RX ORDER — CLOPIDOGREL BISULFATE 75 MG/1
75 TABLET, FILM COATED ORAL DAILY
Refills: 0 | Status: DISCONTINUED | COMMUNITY
Start: 2020-01-01 | End: 2020-01-01

## 2020-01-01 RX ORDER — SODIUM CHLORIDE 9 MG/ML
1000 INJECTION INTRAMUSCULAR; INTRAVENOUS; SUBCUTANEOUS ONCE
Refills: 0 | Status: COMPLETED | OUTPATIENT
Start: 2020-01-01 | End: 2020-01-01

## 2020-01-01 RX ORDER — INSULIN GLARGINE 100 [IU]/ML
40 INJECTION, SOLUTION SUBCUTANEOUS
Qty: 0 | Refills: 0 | DISCHARGE

## 2020-01-01 RX ORDER — ALPRAZOLAM 0.25 MG
1 TABLET ORAL
Qty: 0 | Refills: 0 | DISCHARGE

## 2020-01-01 RX ORDER — POTASSIUM CITRATE MONOHYDRATE 100 %
20 POWDER (GRAM) MISCELLANEOUS
Refills: 0 | Status: DISCONTINUED | OUTPATIENT
Start: 2020-01-01 | End: 2020-01-01

## 2020-01-01 RX ORDER — FUROSEMIDE 40 MG
40 TABLET ORAL THREE TIMES A DAY
Refills: 0 | Status: DISCONTINUED | OUTPATIENT
Start: 2020-01-01 | End: 2020-01-01

## 2020-01-01 RX ORDER — SODIUM CHLORIDE 9 MG/ML
1000 INJECTION, SOLUTION INTRAVENOUS
Refills: 0 | Status: DISCONTINUED | OUTPATIENT
Start: 2020-01-01 | End: 2020-01-01

## 2020-01-01 RX ORDER — HYDRALAZINE HCL 50 MG
3 TABLET ORAL
Qty: 270 | Refills: 0
Start: 2020-01-01 | End: 2020-01-01

## 2020-01-01 RX ORDER — HEPARIN SODIUM 5000 [USP'U]/ML
INJECTION INTRAVENOUS; SUBCUTANEOUS
Qty: 25000 | Refills: 0 | Status: DISCONTINUED | OUTPATIENT
Start: 2020-01-01 | End: 2020-01-01

## 2020-01-01 RX ORDER — INSULIN HUMAN 100 [IU]/ML
5 INJECTION, SOLUTION SUBCUTANEOUS ONCE
Refills: 0 | Status: COMPLETED | OUTPATIENT
Start: 2020-01-01 | End: 2020-01-01

## 2020-01-01 RX ORDER — BUMETANIDE 0.25 MG/ML
2 INJECTION INTRAMUSCULAR; INTRAVENOUS
Refills: 0 | Status: DISCONTINUED | OUTPATIENT
Start: 2020-01-01 | End: 2020-01-01

## 2020-01-01 RX ORDER — ALLOPURINOL 300 MG
300 TABLET ORAL ONCE
Refills: 0 | Status: COMPLETED | OUTPATIENT
Start: 2020-01-01 | End: 2020-01-01

## 2020-01-01 RX ORDER — CARVEDILOL PHOSPHATE 80 MG/1
25 CAPSULE, EXTENDED RELEASE ORAL EVERY 12 HOURS
Refills: 0 | Status: DISCONTINUED | OUTPATIENT
Start: 2020-01-01 | End: 2020-01-01

## 2020-01-01 RX ORDER — AMIODARONE HYDROCHLORIDE 400 MG/1
1 TABLET ORAL
Qty: 60 | Refills: 0
Start: 2020-01-01 | End: 2020-01-01

## 2020-01-01 RX ORDER — SPIRONOLACTONE 25 MG/1
2 TABLET, FILM COATED ORAL
Qty: 120 | Refills: 0
Start: 2020-01-01 | End: 2020-01-01

## 2020-01-01 RX ORDER — INSULIN GLARGINE 100 [IU]/ML
25 INJECTION, SOLUTION SUBCUTANEOUS AT BEDTIME
Refills: 0 | Status: DISCONTINUED | OUTPATIENT
Start: 2020-01-01 | End: 2020-01-01

## 2020-01-01 RX ORDER — ISOSORBIDE MONONITRATE 60 MG/1
0.5 TABLET, EXTENDED RELEASE ORAL
Qty: 0 | Refills: 0 | DISCHARGE

## 2020-01-01 RX ORDER — INSULIN GLARGINE 100 [IU]/ML
10 INJECTION, SOLUTION SUBCUTANEOUS AT BEDTIME
Refills: 0 | Status: DISCONTINUED | OUTPATIENT
Start: 2020-01-01 | End: 2020-01-01

## 2020-01-01 RX ORDER — ASPIRIN/CALCIUM CARB/MAGNESIUM 324 MG
243 TABLET ORAL ONCE
Refills: 0 | Status: COMPLETED | OUTPATIENT
Start: 2020-01-01 | End: 2020-01-01

## 2020-01-01 RX ORDER — INSULIN LISPRO 100/ML
10 VIAL (ML) SUBCUTANEOUS
Refills: 0 | Status: DISCONTINUED | OUTPATIENT
Start: 2020-01-01 | End: 2020-01-01

## 2020-01-01 RX ORDER — INSULIN GLARGINE 100 [IU]/ML
36 INJECTION, SOLUTION SUBCUTANEOUS AT BEDTIME
Refills: 0 | Status: DISCONTINUED | OUTPATIENT
Start: 2020-01-01 | End: 2020-01-01

## 2020-01-01 RX ORDER — SPIRONOLACTONE 25 MG/1
25 TABLET ORAL DAILY
Qty: 30 | Refills: 3 | Status: ACTIVE | COMMUNITY
Start: 2020-01-01

## 2020-01-01 RX ORDER — CARVEDILOL 25 MG/1
25 TABLET, FILM COATED ORAL TWICE DAILY
Refills: 0 | Status: DISCONTINUED | COMMUNITY
End: 2020-01-01

## 2020-01-01 RX ORDER — SAXAGLIPTIN 5 MG/1
1 TABLET, FILM COATED ORAL
Qty: 0 | Refills: 0 | DISCHARGE

## 2020-01-01 RX ORDER — CLOPIDOGREL BISULFATE 75 MG/1
1 TABLET, FILM COATED ORAL
Qty: 0 | Refills: 0 | DISCHARGE

## 2020-01-01 RX ORDER — ASPIRIN 325 MG/1
325 TABLET ORAL DAILY
Refills: 0 | Status: DISCONTINUED | COMMUNITY
Start: 2020-01-01 | End: 2020-01-01

## 2020-01-01 RX ORDER — ATORVASTATIN CALCIUM 80 MG/1
1 TABLET, FILM COATED ORAL
Qty: 0 | Refills: 0 | DISCHARGE

## 2020-01-01 RX ORDER — AMIODARONE HYDROCHLORIDE 400 MG/1
200 TABLET ORAL DAILY
Refills: 0 | Status: DISCONTINUED | OUTPATIENT
Start: 2020-01-01 | End: 2020-01-01

## 2020-01-01 RX ORDER — TICAGRELOR 90 MG/1
180 TABLET ORAL ONCE
Refills: 0 | Status: COMPLETED | OUTPATIENT
Start: 2020-01-01 | End: 2020-01-01

## 2020-01-01 RX ORDER — ISOSORBIDE MONONITRATE 60 MG/1
1 TABLET, EXTENDED RELEASE ORAL
Qty: 0 | Refills: 0 | DISCHARGE

## 2020-01-01 RX ORDER — ASCORBIC ACID 60 MG
500 TABLET,CHEWABLE ORAL DAILY
Refills: 0 | Status: DISCONTINUED | OUTPATIENT
Start: 2020-01-01 | End: 2020-01-01

## 2020-01-01 RX ORDER — INFLUENZA VIRUS VACCINE 15; 15; 15; 15 UG/.5ML; UG/.5ML; UG/.5ML; UG/.5ML
0.5 SUSPENSION INTRAMUSCULAR ONCE
Refills: 0 | Status: COMPLETED | OUTPATIENT
Start: 2020-01-01 | End: 2020-01-01

## 2020-01-01 RX ORDER — AMIODARONE HYDROCHLORIDE 400 MG/1
200 TABLET ORAL DAILY
Refills: 0 | Status: CANCELLED | OUTPATIENT
Start: 2020-01-01 | End: 2020-01-01

## 2020-01-01 RX ORDER — POTASSIUM CHLORIDE 20 MEQ
20 PACKET (EA) ORAL ONCE
Refills: 0 | Status: COMPLETED | OUTPATIENT
Start: 2020-01-01 | End: 2020-01-01

## 2020-01-01 RX ORDER — CLOPIDOGREL BISULFATE 75 MG/1
1 TABLET, FILM COATED ORAL
Qty: 30 | Refills: 0

## 2020-01-01 RX ORDER — SACUBITRIL AND VALSARTAN 24; 26 MG/1; MG/1
24-26 TABLET, FILM COATED ORAL TWICE DAILY
Qty: 60 | Refills: 3 | Status: DISCONTINUED | COMMUNITY
Start: 2019-11-13 | End: 2020-01-01

## 2020-01-01 RX ORDER — METOPROLOL TARTRATE 50 MG
25 TABLET ORAL
Refills: 0 | Status: DISCONTINUED | OUTPATIENT
Start: 2020-01-01 | End: 2020-01-01

## 2020-01-01 RX ORDER — SPIRONOLACTONE 25 MG/1
50 TABLET, FILM COATED ORAL
Refills: 0 | Status: DISCONTINUED | OUTPATIENT
Start: 2020-01-01 | End: 2020-01-01

## 2020-01-01 RX ORDER — POTASSIUM CITRATE MONOHYDRATE 100 %
2 POWDER (GRAM) MISCELLANEOUS
Qty: 0 | Refills: 0 | DISCHARGE

## 2020-01-01 RX ORDER — ACETAMINOPHEN 500 MG
975 TABLET ORAL ONCE
Refills: 0 | Status: COMPLETED | OUTPATIENT
Start: 2020-01-01 | End: 2020-01-01

## 2020-01-01 RX ORDER — POTASSIUM CHLORIDE 20 MEQ
40 PACKET (EA) ORAL
Refills: 0 | Status: DISCONTINUED | OUTPATIENT
Start: 2020-01-01 | End: 2020-01-01

## 2020-01-01 RX ORDER — ALOGLIPTIN 6.25 MG/1
6.25 TABLET, FILM COATED ORAL DAILY
Refills: 0 | Status: ACTIVE | COMMUNITY
Start: 2020-01-01

## 2020-01-01 RX ORDER — FUROSEMIDE 40 MG
80 TABLET ORAL EVERY 12 HOURS
Refills: 0 | Status: DISCONTINUED | OUTPATIENT
Start: 2020-01-01 | End: 2020-01-01

## 2020-01-01 RX ORDER — MELOXICAM 15 MG/1
15 TABLET ORAL DAILY
Qty: 30 | Refills: 0 | Status: DISCONTINUED | COMMUNITY
Start: 2019-01-11 | End: 2020-01-01

## 2020-01-01 RX ORDER — SODIUM CHLORIDE 9 MG/ML
3 INJECTION INTRAMUSCULAR; INTRAVENOUS; SUBCUTANEOUS EVERY 8 HOURS
Refills: 0 | Status: DISCONTINUED | OUTPATIENT
Start: 2020-01-01 | End: 2020-01-01

## 2020-01-01 RX ORDER — AMIODARONE HYDROCHLORIDE 400 MG/1
400 TABLET ORAL
Refills: 0 | Status: DISCONTINUED | OUTPATIENT
Start: 2020-01-01 | End: 2020-01-01

## 2020-01-01 RX ORDER — INSULIN LISPRO 100/ML
5 VIAL (ML) SUBCUTANEOUS
Refills: 0 | Status: DISCONTINUED | OUTPATIENT
Start: 2020-01-01 | End: 2020-01-01

## 2020-01-01 RX ORDER — CLOPIDOGREL BISULFATE 75 MG/1
1 TABLET, FILM COATED ORAL
Qty: 0 | Refills: 0 | DISCHARGE
Start: 2020-01-01

## 2020-01-01 RX ORDER — SODIUM CHLORIDE 9 MG/ML
10 INJECTION INTRAMUSCULAR; INTRAVENOUS; SUBCUTANEOUS
Refills: 0 | Status: DISCONTINUED | OUTPATIENT
Start: 2020-01-01 | End: 2020-01-01

## 2020-01-01 RX ORDER — ALLOPURINOL 300 MG
100 TABLET ORAL DAILY
Refills: 0 | Status: DISCONTINUED | OUTPATIENT
Start: 2020-01-01 | End: 2020-01-01

## 2020-01-01 RX ORDER — CLOPIDOGREL BISULFATE 75 MG/1
75 TABLET, FILM COATED ORAL DAILY
Refills: 0 | Status: DISCONTINUED | OUTPATIENT
Start: 2020-01-01 | End: 2020-01-01

## 2020-01-01 RX ORDER — INDOMETHACIN 50 MG/1
50 CAPSULE ORAL
Qty: 60 | Refills: 1 | Status: DISCONTINUED | COMMUNITY
Start: 2017-12-18 | End: 2020-01-01

## 2020-01-01 RX ORDER — FUROSEMIDE 40 MG
1 TABLET ORAL
Qty: 0 | Refills: 0 | DISCHARGE

## 2020-01-01 RX ORDER — INSULIN GLARGINE 100 [IU]/ML
100 INJECTION, SOLUTION SUBCUTANEOUS
Refills: 3 | Status: DISCONTINUED | COMMUNITY
End: 2020-01-01

## 2020-01-01 RX ORDER — ASPIRIN/CALCIUM CARB/MAGNESIUM 324 MG
325 TABLET ORAL DAILY
Refills: 0 | Status: DISCONTINUED | OUTPATIENT
Start: 2020-01-01 | End: 2020-01-01

## 2020-01-01 RX ORDER — CARVEDILOL PHOSPHATE 80 MG/1
1 CAPSULE, EXTENDED RELEASE ORAL
Qty: 60 | Refills: 0

## 2020-01-01 RX ORDER — METOPROLOL TARTRATE 50 MG
25 TABLET ORAL EVERY 12 HOURS
Refills: 0 | Status: DISCONTINUED | OUTPATIENT
Start: 2020-01-01 | End: 2020-01-01

## 2020-01-01 RX ORDER — FUROSEMIDE 40 MG
1 TABLET ORAL
Qty: 0 | Refills: 0 | DISCHARGE
Start: 2020-01-01 | End: 2020-01-01

## 2020-01-01 RX ORDER — ASPIRIN/CALCIUM CARB/MAGNESIUM 324 MG
1 TABLET ORAL
Qty: 0 | Refills: 0 | DISCHARGE

## 2020-01-01 RX ORDER — MILRINONE LACTATE 1 MG/ML
0.25 INJECTION, SOLUTION INTRAVENOUS
Qty: 60 | Refills: 0
Start: 2020-01-01

## 2020-01-01 RX ORDER — FUROSEMIDE 40 MG
40 TABLET ORAL ONCE
Refills: 0 | Status: DISCONTINUED | OUTPATIENT
Start: 2020-01-01 | End: 2020-01-01

## 2020-01-01 RX ORDER — POTASSIUM CHLORIDE 20 MEQ
40 PACKET (EA) ORAL ONCE
Refills: 0 | Status: DISCONTINUED | OUTPATIENT
Start: 2020-01-01 | End: 2020-01-01

## 2020-01-01 RX ORDER — REPAGLINIDE 1 MG/1
1 TABLET ORAL
Qty: 90 | Refills: 0 | Status: COMPLETED | COMMUNITY
Start: 2020-01-01

## 2020-01-01 RX ORDER — ALOGLIPTIN 12.5 MG/1
1 TABLET, FILM COATED ORAL
Qty: 0 | Refills: 0 | DISCHARGE

## 2020-01-01 RX ORDER — LANCETS
EACH MISCELLANEOUS
Qty: 100 | Refills: 1 | Status: ACTIVE | COMMUNITY
Start: 2020-01-01

## 2020-01-01 RX ORDER — HYDRALAZINE HCL 50 MG
10 TABLET ORAL THREE TIMES A DAY
Refills: 0 | Status: DISCONTINUED | OUTPATIENT
Start: 2020-01-01 | End: 2020-01-01

## 2020-01-01 RX ORDER — RANOLAZINE 500 MG/1
1 TABLET, FILM COATED, EXTENDED RELEASE ORAL
Qty: 0 | Refills: 0 | DISCHARGE
Start: 2020-01-01

## 2020-01-01 RX ORDER — AMIODARONE HYDROCHLORIDE 400 MG/1
200 TABLET ORAL
Refills: 0 | Status: DISCONTINUED | OUTPATIENT
Start: 2020-01-01 | End: 2020-01-01

## 2020-01-01 RX ORDER — TICAGRELOR 90 MG/1
90 TABLET ORAL TWICE DAILY
Qty: 60 | Refills: 10 | Status: DISCONTINUED | COMMUNITY
Start: 2019-11-19 | End: 2020-01-01

## 2020-01-01 RX ORDER — HYDRALAZINE HCL 50 MG
75 TABLET ORAL THREE TIMES A DAY
Refills: 0 | Status: DISCONTINUED | OUTPATIENT
Start: 2020-01-01 | End: 2020-01-01

## 2020-01-01 RX ORDER — FUROSEMIDE 40 MG
40 TABLET ORAL ONCE
Refills: 0 | Status: COMPLETED | OUTPATIENT
Start: 2020-01-01 | End: 2020-01-01

## 2020-01-01 RX ORDER — METOPROLOL TARTRATE 50 MG
50 TABLET ORAL
Refills: 0 | Status: DISCONTINUED | OUTPATIENT
Start: 2020-01-01 | End: 2020-01-01

## 2020-01-01 RX ORDER — LISINOPRIL 2.5 MG/1
1 TABLET ORAL
Qty: 0 | Refills: 0 | DISCHARGE

## 2020-01-01 RX ORDER — FUROSEMIDE 40 MG
20 TABLET ORAL ONCE
Refills: 0 | Status: COMPLETED | OUTPATIENT
Start: 2020-01-01 | End: 2020-01-01

## 2020-01-01 RX ORDER — CARVEDILOL 3.12 MG/1
3.12 TABLET, FILM COATED ORAL
Qty: 60 | Refills: 0 | Status: COMPLETED | COMMUNITY
Start: 2020-01-01

## 2020-01-01 RX ORDER — MULTIVIT-MIN/FERROUS GLUCONATE 9 MG/15 ML
1 LIQUID (ML) ORAL DAILY
Refills: 0 | Status: DISCONTINUED | OUTPATIENT
Start: 2020-01-01 | End: 2020-01-01

## 2020-01-01 RX ORDER — INSULIN GLARGINE 100 [IU]/ML
27 INJECTION, SOLUTION SUBCUTANEOUS AT BEDTIME
Refills: 0 | Status: DISCONTINUED | OUTPATIENT
Start: 2020-01-01 | End: 2020-01-01

## 2020-01-01 RX ORDER — POTASSIUM CITRATE 10 MEQ/1
10 MEQ TABLET, EXTENDED RELEASE ORAL TWICE DAILY
Refills: 0 | Status: DISCONTINUED | COMMUNITY
End: 2020-01-01

## 2020-01-01 RX ORDER — MIRTAZAPINE 45 MG/1
1 TABLET, ORALLY DISINTEGRATING ORAL
Qty: 0 | Refills: 0 | DISCHARGE

## 2020-01-01 RX ORDER — BUMETANIDE 0.25 MG/ML
2 INJECTION INTRAMUSCULAR; INTRAVENOUS ONCE
Refills: 0 | Status: COMPLETED | OUTPATIENT
Start: 2020-01-01 | End: 2020-01-01

## 2020-01-01 RX ORDER — ALLOPURINOL 300 MG
1 TABLET ORAL
Qty: 0 | Refills: 0 | DISCHARGE
Start: 2020-01-01

## 2020-01-01 RX ORDER — ASPIRIN/CALCIUM CARB/MAGNESIUM 324 MG
1 TABLET ORAL
Qty: 30 | Refills: 0

## 2020-01-01 RX ORDER — AMIODARONE HYDROCHLORIDE 400 MG/1
1 TABLET ORAL
Qty: 0 | Refills: 0 | DISCHARGE
Start: 2020-01-01

## 2020-01-01 RX ORDER — CLOPIDOGREL BISULFATE 75 MG/1
75 TABLET, FILM COATED ORAL DAILY
Qty: 90 | Refills: 1 | Status: ACTIVE | COMMUNITY
Start: 2020-01-01

## 2020-01-01 RX ORDER — CARVEDILOL PHOSPHATE 80 MG/1
3.12 CAPSULE, EXTENDED RELEASE ORAL EVERY 12 HOURS
Refills: 0 | Status: DISCONTINUED | OUTPATIENT
Start: 2020-01-01 | End: 2020-01-01

## 2020-01-01 RX ORDER — AMIODARONE HYDROCHLORIDE 400 MG/1
400 TABLET ORAL ONCE
Refills: 0 | Status: COMPLETED | OUTPATIENT
Start: 2020-01-01 | End: 2020-01-01

## 2020-01-01 RX ORDER — TORSEMIDE 20 MG/1
20 TABLET ORAL
Qty: 120 | Refills: 3 | Status: COMPLETED | COMMUNITY
Start: 2020-01-01 | End: 2020-01-01

## 2020-01-01 RX ORDER — CLOPIDOGREL 75 MG/1
75 TABLET, FILM COATED ORAL
Refills: 0 | Status: COMPLETED | COMMUNITY
End: 2020-01-01

## 2020-01-01 RX ORDER — POTASSIUM CHLORIDE 750 MG/1
10 TABLET, FILM COATED, EXTENDED RELEASE ORAL TWICE DAILY
Refills: 0 | Status: DISCONTINUED | COMMUNITY
End: 2020-01-01

## 2020-01-01 RX ORDER — MILRINONE LACTATE 1 MG/ML
0.3 INJECTION, SOLUTION INTRAVENOUS
Qty: 1 | Refills: 0
Start: 2020-01-01 | End: 2020-01-01

## 2020-01-01 RX ORDER — TORSEMIDE 20 MG/1
20 TABLET ORAL
Qty: 180 | Refills: 3 | Status: ACTIVE | COMMUNITY
Start: 2020-01-01 | End: 1900-01-01

## 2020-01-01 RX ORDER — HEPARIN SODIUM 5000 [USP'U]/ML
7000 INJECTION INTRAVENOUS; SUBCUTANEOUS EVERY 6 HOURS
Refills: 0 | Status: DISCONTINUED | OUTPATIENT
Start: 2020-01-01 | End: 2020-01-01

## 2020-01-01 RX ORDER — INSULIN LISPRO 100/ML
7 VIAL (ML) SUBCUTANEOUS
Refills: 0 | Status: DISCONTINUED | OUTPATIENT
Start: 2020-01-01 | End: 2020-01-01

## 2020-01-01 RX ORDER — AMIODARONE HYDROCHLORIDE 400 MG/1
1 TABLET ORAL
Qty: 30 | Refills: 0
Start: 2020-01-01 | End: 2020-01-01

## 2020-01-01 RX ORDER — INSULIN GLARGINE 100 [IU]/ML
100 INJECTION, SOLUTION SUBCUTANEOUS
Qty: 1 | Refills: 3 | Status: ACTIVE | COMMUNITY
Start: 1900-01-01 | End: 1900-01-01

## 2020-01-01 RX ORDER — POTASSIUM CHLORIDE 20 MEQ
40 PACKET (EA) ORAL EVERY 4 HOURS
Refills: 0 | Status: COMPLETED | OUTPATIENT
Start: 2020-01-01 | End: 2020-01-01

## 2020-01-01 RX ORDER — LANCETS
EACH MISCELLANEOUS
Qty: 120 | Refills: 0 | Status: COMPLETED | COMMUNITY
Start: 2020-01-01 | End: 2020-01-01

## 2020-01-01 RX ORDER — TICAGRELOR 90 MG/1
60 TABLET ORAL EVERY 12 HOURS
Refills: 0 | Status: DISCONTINUED | OUTPATIENT
Start: 2020-01-01 | End: 2020-01-01

## 2020-01-01 RX ORDER — AMIODARONE HYDROCHLORIDE 400 MG/1
TABLET ORAL
Refills: 0 | Status: DISCONTINUED | OUTPATIENT
Start: 2020-01-01 | End: 2020-01-01

## 2020-01-01 RX ORDER — FUROSEMIDE 40 MG/1
40 TABLET ORAL
Refills: 0 | Status: DISCONTINUED | COMMUNITY
Start: 2019-12-10 | End: 2020-01-01

## 2020-01-01 RX ORDER — SACUBITRIL AND VALSARTAN 24; 26 MG/1; MG/1
1 TABLET, FILM COATED ORAL
Qty: 0 | Refills: 0 | DISCHARGE

## 2020-01-01 RX ORDER — AMIODARONE HYDROCHLORIDE 400 MG/1
1 TABLET ORAL
Qty: 61 | Refills: 0
Start: 2020-01-01 | End: 2020-01-01

## 2020-01-01 RX ORDER — AMIODARONE HYDROCHLORIDE 400 MG/1
200 TABLET ORAL
Refills: 0 | Status: CANCELLED | OUTPATIENT
Start: 2020-01-01 | End: 2020-01-01

## 2020-01-01 RX ORDER — CLOPIDOGREL BISULFATE 75 MG/1
300 TABLET, FILM COATED ORAL ONCE
Refills: 0 | Status: COMPLETED | OUTPATIENT
Start: 2020-01-01 | End: 2020-01-01

## 2020-01-01 RX ORDER — SACUBITRIL AND VALSARTAN 24; 26 MG/1; MG/1
24-26 TABLET, FILM COATED ORAL TWICE DAILY
Qty: 60 | Refills: 5 | Status: DISCONTINUED | COMMUNITY
Start: 2020-01-01 | End: 2020-01-01

## 2020-01-01 RX ORDER — HEPARIN SODIUM 5000 [USP'U]/ML
5300 INJECTION INTRAVENOUS; SUBCUTANEOUS EVERY 6 HOURS
Refills: 0 | Status: DISCONTINUED | OUTPATIENT
Start: 2020-01-01 | End: 2020-01-01

## 2020-01-01 RX ORDER — HYDRALAZINE HYDROCHLORIDE 10 MG/1
10 TABLET ORAL
Qty: 90 | Refills: 3 | Status: ACTIVE | COMMUNITY
Start: 2020-01-01

## 2020-01-01 RX ORDER — ALPRAZOLAM 0.5 MG/1
0.5 TABLET ORAL
Qty: 90 | Refills: 0 | Status: ACTIVE | COMMUNITY
Start: 2018-07-17 | End: 1900-01-01

## 2020-01-01 RX ORDER — FUROSEMIDE 40 MG
40 TABLET ORAL DAILY
Refills: 0 | Status: DISCONTINUED | OUTPATIENT
Start: 2020-01-01 | End: 2020-01-01

## 2020-01-01 RX ORDER — MILRINONE LACTATE 1 MG/ML
0.25 INJECTION, SOLUTION INTRAVENOUS
Qty: 20 | Refills: 0 | Status: DISCONTINUED | OUTPATIENT
Start: 2020-01-01 | End: 2020-01-01

## 2020-01-01 RX ORDER — CARVEDILOL PHOSPHATE 80 MG/1
6.25 CAPSULE, EXTENDED RELEASE ORAL EVERY 12 HOURS
Refills: 0 | Status: DISCONTINUED | OUTPATIENT
Start: 2020-01-01 | End: 2020-01-01

## 2020-01-01 RX ORDER — MULTIVIT-MIN/FERROUS GLUCONATE 9 MG/15 ML
1 LIQUID (ML) ORAL
Qty: 0 | Refills: 0 | DISCHARGE

## 2020-01-01 RX ORDER — ASCORBIC ACID 60 MG
1 TABLET,CHEWABLE ORAL
Qty: 0 | Refills: 0 | DISCHARGE

## 2020-01-01 RX ORDER — HYDRALAZINE HCL 50 MG
10 TABLET ORAL EVERY 8 HOURS
Refills: 0 | Status: DISCONTINUED | OUTPATIENT
Start: 2020-01-01 | End: 2020-01-01

## 2020-01-01 RX ORDER — SPIRONOLACTONE 25 MG/1
25 TABLET, FILM COATED ORAL DAILY
Refills: 0 | Status: DISCONTINUED | OUTPATIENT
Start: 2020-01-01 | End: 2020-01-01

## 2020-01-01 RX ORDER — MAGNESIUM OXIDE 400 MG ORAL TABLET 241.3 MG
400 TABLET ORAL ONCE
Refills: 0 | Status: COMPLETED | OUTPATIENT
Start: 2020-01-01 | End: 2020-01-01

## 2020-01-01 RX ORDER — MIDODRINE HYDROCHLORIDE 2.5 MG/1
10 TABLET ORAL THREE TIMES A DAY
Refills: 0 | Status: DISCONTINUED | OUTPATIENT
Start: 2020-01-01 | End: 2020-01-01

## 2020-01-01 RX ORDER — CARVEDILOL PHOSPHATE 80 MG/1
1 CAPSULE, EXTENDED RELEASE ORAL
Qty: 0 | Refills: 0 | DISCHARGE

## 2020-01-01 RX ORDER — ASPIRIN/CALCIUM CARB/MAGNESIUM 324 MG
162 TABLET ORAL ONCE
Refills: 0 | Status: DISCONTINUED | OUTPATIENT
Start: 2020-01-01 | End: 2020-01-01

## 2020-01-01 RX ORDER — BUMETANIDE 0.25 MG/ML
1 INJECTION INTRAMUSCULAR; INTRAVENOUS
Qty: 20 | Refills: 0 | Status: DISCONTINUED | OUTPATIENT
Start: 2020-01-01 | End: 2020-01-01

## 2020-01-01 RX ORDER — AMIODARONE HYDROCHLORIDE 400 MG/1
200 TABLET ORAL EVERY 12 HOURS
Refills: 0 | Status: DISCONTINUED | OUTPATIENT
Start: 2020-01-01 | End: 2020-01-01

## 2020-01-01 RX ORDER — HEPARIN SODIUM 5000 [USP'U]/ML
5000 INJECTION INTRAVENOUS; SUBCUTANEOUS EVERY 8 HOURS
Refills: 0 | Status: DISCONTINUED | OUTPATIENT
Start: 2020-01-01 | End: 2020-01-01

## 2020-01-01 RX ORDER — INSULIN GLARGINE 100 [IU]/ML
35 INJECTION, SOLUTION SUBCUTANEOUS AT BEDTIME
Refills: 0 | Status: DISCONTINUED | OUTPATIENT
Start: 2020-01-01 | End: 2020-01-01

## 2020-01-01 RX ORDER — AMIODARONE HYDROCHLORIDE 200 MG/1
200 TABLET ORAL
Refills: 0 | Status: ACTIVE | COMMUNITY

## 2020-01-01 RX ORDER — INSULIN GLARGINE 100 [IU]/ML
35 INJECTION, SOLUTION SUBCUTANEOUS
Qty: 0 | Refills: 0 | DISCHARGE

## 2020-01-01 RX ORDER — INSULIN LISPRO 100/ML
8 VIAL (ML) SUBCUTANEOUS
Refills: 0 | Status: DISCONTINUED | OUTPATIENT
Start: 2020-01-01 | End: 2020-01-01

## 2020-01-01 RX ORDER — RANOLAZINE 500 MG/1
1 TABLET, FILM COATED, EXTENDED RELEASE ORAL
Qty: 0 | Refills: 0 | DISCHARGE

## 2020-01-01 RX ORDER — ASPIRIN/CALCIUM CARB/MAGNESIUM 324 MG
325 TABLET ORAL ONCE
Refills: 0 | Status: COMPLETED | OUTPATIENT
Start: 2020-01-01 | End: 2020-01-01

## 2020-01-01 RX ORDER — SPIRONOLACTONE 25 MG/1
1 TABLET, FILM COATED ORAL
Qty: 30 | Refills: 0
Start: 2020-01-01 | End: 2020-01-01

## 2020-01-01 RX ORDER — CARVEDILOL PHOSPHATE 80 MG/1
1 CAPSULE, EXTENDED RELEASE ORAL
Qty: 0 | Refills: 0 | DISCHARGE
Start: 2020-01-01

## 2020-01-01 RX ORDER — HYDRALAZINE HCL 50 MG
30 TABLET ORAL THREE TIMES A DAY
Refills: 0 | Status: DISCONTINUED | OUTPATIENT
Start: 2020-01-01 | End: 2020-01-01

## 2020-01-01 RX ORDER — POTASSIUM CITRATE MONOHYDRATE 100 %
2 POWDER (GRAM) MISCELLANEOUS
Qty: 120 | Refills: 0

## 2020-01-01 RX ORDER — CHLORHEXIDINE GLUCONATE 213 G/1000ML
1 SOLUTION TOPICAL
Refills: 0 | Status: DISCONTINUED | OUTPATIENT
Start: 2020-01-01 | End: 2020-01-01

## 2020-01-01 RX ORDER — POTASSIUM CHLORIDE 20 MEQ
40 PACKET (EA) ORAL
Refills: 0 | Status: COMPLETED | OUTPATIENT
Start: 2020-01-01 | End: 2020-01-01

## 2020-01-01 RX ORDER — CARVEDILOL 6.25 MG/1
6.25 TABLET, FILM COATED ORAL
Refills: 0 | Status: DISCONTINUED | COMMUNITY
End: 2020-01-01

## 2020-01-01 RX ORDER — FUROSEMIDE 40 MG
60 TABLET ORAL EVERY 12 HOURS
Refills: 0 | Status: DISCONTINUED | OUTPATIENT
Start: 2020-01-01 | End: 2020-01-01

## 2020-01-01 RX ORDER — INSULIN GLARGINE 100 [IU]/ML
16 INJECTION, SOLUTION SUBCUTANEOUS ONCE
Refills: 0 | Status: COMPLETED | OUTPATIENT
Start: 2020-01-01 | End: 2020-01-01

## 2020-01-01 RX ORDER — NOREPINEPHRINE BITARTRATE/D5W 8 MG/250ML
0.1 PLASTIC BAG, INJECTION (ML) INTRAVENOUS
Qty: 16 | Refills: 0 | Status: DISCONTINUED | OUTPATIENT
Start: 2020-01-01 | End: 2020-01-01

## 2020-01-01 RX ORDER — OXYCODONE AND ACETAMINOPHEN 5; 325 MG/1; MG/1
1 TABLET ORAL ONCE
Refills: 0 | Status: DISCONTINUED | OUTPATIENT
Start: 2020-01-01 | End: 2020-01-01

## 2020-01-01 RX ORDER — ALBUMIN HUMAN 25 %
200 VIAL (ML) INTRAVENOUS ONCE
Refills: 0 | Status: COMPLETED | OUTPATIENT
Start: 2020-01-01 | End: 2020-01-01

## 2020-01-01 RX ORDER — INSULIN ASPART 100 [IU]/ML
12 INJECTION, SUSPENSION SUBCUTANEOUS
Qty: 0 | Refills: 0 | DISCHARGE

## 2020-01-01 RX ORDER — INSULIN GLARGINE 100 [IU]/ML
22 INJECTION, SOLUTION SUBCUTANEOUS AT BEDTIME
Refills: 0 | Status: DISCONTINUED | OUTPATIENT
Start: 2020-01-01 | End: 2020-01-01

## 2020-01-01 RX ORDER — FUROSEMIDE 40 MG
1 TABLET ORAL
Qty: 60 | Refills: 0

## 2020-01-01 RX ORDER — PHENYLEPHRINE HYDROCHLORIDE 10 MG/ML
0.1 INJECTION INTRAVENOUS
Qty: 40 | Refills: 0 | Status: DISCONTINUED | OUTPATIENT
Start: 2020-01-01 | End: 2020-01-01

## 2020-01-01 RX ORDER — INSULIN GLARGINE 100 [IU]/ML
32 INJECTION, SOLUTION SUBCUTANEOUS AT BEDTIME
Refills: 0 | Status: DISCONTINUED | OUTPATIENT
Start: 2020-01-01 | End: 2020-01-01

## 2020-01-01 RX ORDER — HYDRALAZINE HCL 50 MG
20 TABLET ORAL THREE TIMES A DAY
Refills: 0 | Status: DISCONTINUED | OUTPATIENT
Start: 2020-01-01 | End: 2020-01-01

## 2020-01-01 RX ORDER — MILRINONE LACTATE 200 UG/ML
200-5 INJECTION, SOLUTION INTRAVENOUS
Refills: 0 | Status: ACTIVE | COMMUNITY

## 2020-01-01 RX ORDER — TORSEMIDE 20 MG/1
20 TABLET ORAL DAILY
Refills: 0 | Status: DISCONTINUED | COMMUNITY
End: 2020-01-01

## 2020-01-01 RX ORDER — INSULIN GLARGINE 100 [IU]/ML
30 INJECTION, SOLUTION SUBCUTANEOUS
Qty: 0 | Refills: 0 | DISCHARGE

## 2020-01-01 RX ORDER — MIRTAZAPINE 45 MG/1
1 TABLET, ORALLY DISINTEGRATING ORAL
Qty: 30 | Refills: 0

## 2020-01-01 RX ORDER — INSULIN ASPART 100 [IU]/ML
11 INJECTION, SOLUTION SUBCUTANEOUS
Qty: 0 | Refills: 0 | DISCHARGE

## 2020-01-01 RX ORDER — MIRTAZAPINE 15 MG/1
15 TABLET, FILM COATED ORAL
Qty: 90 | Refills: 3 | Status: ACTIVE | COMMUNITY
Start: 2020-01-01 | End: 1900-01-01

## 2020-01-01 RX ORDER — CARVEDILOL PHOSPHATE 80 MG/1
0.5 CAPSULE, EXTENDED RELEASE ORAL
Qty: 0 | Refills: 0 | DISCHARGE

## 2020-01-01 RX ORDER — VITAMIN E 100 UNIT
1 CAPSULE ORAL
Qty: 0 | Refills: 0 | DISCHARGE

## 2020-01-01 RX ORDER — TICAGRELOR 90 MG/1
1 TABLET ORAL
Qty: 0 | Refills: 0 | DISCHARGE

## 2020-01-01 RX ORDER — ACETAMINOPHEN 500 MG
1000 TABLET ORAL ONCE
Refills: 0 | Status: COMPLETED | OUTPATIENT
Start: 2020-01-01 | End: 2020-01-01

## 2020-01-01 RX ORDER — BUMETANIDE 0.25 MG/ML
2 INJECTION INTRAMUSCULAR; INTRAVENOUS
Qty: 20 | Refills: 0 | Status: DISCONTINUED | OUTPATIENT
Start: 2020-01-01 | End: 2020-01-01

## 2020-01-01 RX ORDER — TICAGRELOR 90 MG/1
90 TABLET ORAL
Refills: 0 | Status: DISCONTINUED | OUTPATIENT
Start: 2020-01-01 | End: 2020-01-01

## 2020-01-01 RX ORDER — INSULIN ASPART 100 [IU]/ML
100 INJECTION, SOLUTION INTRAVENOUS; SUBCUTANEOUS
Qty: 1 | Refills: 3 | Status: ACTIVE | COMMUNITY
Start: 1900-01-01 | End: 1900-01-01

## 2020-01-01 RX ORDER — ISOSORBIDE MONONITRATE 60 MG/1
60 TABLET, EXTENDED RELEASE ORAL TWICE DAILY
Qty: 90 | Refills: 3 | Status: DISCONTINUED | COMMUNITY
Start: 2017-01-17 | End: 2020-01-01

## 2020-01-01 RX ORDER — ACETAMINOPHEN 500 MG
650 TABLET ORAL ONCE
Refills: 0 | Status: COMPLETED | OUTPATIENT
Start: 2020-01-01 | End: 2020-01-01

## 2020-01-01 RX ORDER — HEPARIN SODIUM 5000 [USP'U]/ML
7000 INJECTION INTRAVENOUS; SUBCUTANEOUS ONCE
Refills: 0 | Status: COMPLETED | OUTPATIENT
Start: 2020-01-01 | End: 2020-01-01

## 2020-01-01 RX ORDER — ASPIRIN/CALCIUM CARB/MAGNESIUM 324 MG
324 TABLET ORAL ONCE
Refills: 0 | Status: COMPLETED | OUTPATIENT
Start: 2020-01-01 | End: 2020-01-01

## 2020-01-01 RX ORDER — ALPRAZOLAM 0.25 MG
0.25 TABLET ORAL ONCE
Refills: 0 | Status: DISCONTINUED | OUTPATIENT
Start: 2020-01-01 | End: 2020-01-01

## 2020-01-01 RX ORDER — MULTIVIT-MIN/FA/LYCOPEN/LUTEIN .4-300-25
TABLET ORAL DAILY
Refills: 0 | Status: ACTIVE | COMMUNITY

## 2020-01-01 RX ORDER — GLIMEPIRIDE 1 MG
1 TABLET ORAL
Qty: 0 | Refills: 0 | DISCHARGE

## 2020-01-01 RX ORDER — CARVEDILOL 25 MG/1
25 TABLET, FILM COATED ORAL
Qty: 180 | Refills: 0 | Status: DISCONTINUED | COMMUNITY
Start: 2020-01-01 | End: 2020-01-01

## 2020-01-01 RX ORDER — CARVEDILOL PHOSPHATE 80 MG/1
1 CAPSULE, EXTENDED RELEASE ORAL
Qty: 60 | Refills: 0
Start: 2020-01-01 | End: 2020-01-01

## 2020-01-01 RX ORDER — HYDRALAZINE HCL 50 MG
1 TABLET ORAL
Qty: 90 | Refills: 0
Start: 2020-01-01 | End: 2020-01-01

## 2020-01-01 RX ORDER — CARVEDILOL PHOSPHATE 80 MG/1
1.5 CAPSULE, EXTENDED RELEASE ORAL
Qty: 0 | Refills: 0 | DISCHARGE

## 2020-01-01 RX ORDER — CARVEDILOL PHOSPHATE 80 MG/1
0.51 CAPSULE, EXTENDED RELEASE ORAL
Qty: 0 | Refills: 0 | DISCHARGE

## 2020-01-01 RX ORDER — TICAGRELOR 90 MG/1
1 TABLET ORAL
Qty: 60 | Refills: 0

## 2020-01-01 RX ORDER — MIRTAZAPINE 45 MG/1
30 TABLET, ORALLY DISINTEGRATING ORAL AT BEDTIME
Refills: 0 | Status: DISCONTINUED | OUTPATIENT
Start: 2020-01-01 | End: 2020-01-01

## 2020-01-01 RX ORDER — METOPROLOL SUCCINATE 50 MG/1
50 TABLET, EXTENDED RELEASE ORAL TWICE DAILY
Qty: 180 | Refills: 0 | Status: ACTIVE | COMMUNITY
Start: 2020-01-01 | End: 1900-01-01

## 2020-01-01 RX ORDER — RANOLAZINE 500 MG/1
1 TABLET, FILM COATED, EXTENDED RELEASE ORAL
Qty: 60 | Refills: 0

## 2020-01-01 RX ORDER — SPIRONOLACTONE 25 MG/1
25 TABLET, FILM COATED ORAL ONCE
Refills: 0 | Status: COMPLETED | OUTPATIENT
Start: 2020-01-01 | End: 2020-01-01

## 2020-01-01 RX ORDER — HYDRALAZINE HYDROCHLORIDE 25 MG/1
25 TABLET ORAL
Refills: 0 | Status: DISCONTINUED | COMMUNITY
End: 2020-01-01

## 2020-01-01 RX ORDER — ISOSORBIDE MONONITRATE 60 MG/1
1.5 TABLET, EXTENDED RELEASE ORAL
Qty: 0 | Refills: 0 | DISCHARGE

## 2020-01-01 RX ORDER — FUROSEMIDE 40 MG
1 TABLET ORAL
Qty: 30 | Refills: 0
Start: 2020-01-01 | End: 2020-01-01

## 2020-01-01 RX ORDER — INSULIN GLARGINE 100 [IU]/ML
36 INJECTION, SOLUTION SUBCUTANEOUS
Qty: 0 | Refills: 0 | DISCHARGE

## 2020-01-01 RX ORDER — POTASSIUM CHLORIDE 20 MEQ
2 PACKET (EA) ORAL
Qty: 0 | Refills: 0 | DISCHARGE

## 2020-01-01 RX ORDER — BLOOD SUGAR DIAGNOSTIC
STRIP MISCELLANEOUS 4 TIMES DAILY
Qty: 120 | Refills: 0 | Status: ACTIVE | COMMUNITY
Start: 2020-01-01 | End: 1900-01-01

## 2020-01-01 RX ADMIN — RANOLAZINE 500 MILLIGRAM(S): 500 TABLET, FILM COATED, EXTENDED RELEASE ORAL at 17:06

## 2020-01-01 RX ADMIN — HEPARIN SODIUM 5000 UNIT(S): 5000 INJECTION INTRAVENOUS; SUBCUTANEOUS at 14:39

## 2020-01-01 RX ADMIN — Medication 81 MILLIGRAM(S): at 12:27

## 2020-01-01 RX ADMIN — MILRINONE LACTATE 6.53 MICROGRAM(S)/KG/MIN: 1 INJECTION, SOLUTION INTRAVENOUS at 11:22

## 2020-01-01 RX ADMIN — Medication 40 MILLIGRAM(S): at 12:34

## 2020-01-01 RX ADMIN — RANOLAZINE 500 MILLIGRAM(S): 500 TABLET, FILM COATED, EXTENDED RELEASE ORAL at 17:29

## 2020-01-01 RX ADMIN — MILRINONE LACTATE 6.16 MICROGRAM(S)/KG/MIN: 1 INJECTION, SOLUTION INTRAVENOUS at 08:13

## 2020-01-01 RX ADMIN — Medication 40 MILLIGRAM(S): at 21:39

## 2020-01-01 RX ADMIN — TICAGRELOR 60 MILLIGRAM(S): 90 TABLET ORAL at 05:10

## 2020-01-01 RX ADMIN — MILRINONE LACTATE 6.94 MICROGRAM(S)/KG/MIN: 1 INJECTION, SOLUTION INTRAVENOUS at 05:35

## 2020-01-01 RX ADMIN — Medication 1: at 17:30

## 2020-01-01 RX ADMIN — TICAGRELOR 60 MILLIGRAM(S): 90 TABLET ORAL at 22:00

## 2020-01-01 RX ADMIN — Medication 40 MILLIGRAM(S): at 18:10

## 2020-01-01 RX ADMIN — Medication 40 MILLIGRAM(S): at 18:28

## 2020-01-01 RX ADMIN — Medication 20 MILLIEQUIVALENT(S): at 15:05

## 2020-01-01 RX ADMIN — HEPARIN SODIUM 5000 UNIT(S): 5000 INJECTION INTRAVENOUS; SUBCUTANEOUS at 12:27

## 2020-01-01 RX ADMIN — CLOPIDOGREL BISULFATE 75 MILLIGRAM(S): 75 TABLET, FILM COATED ORAL at 10:40

## 2020-01-01 RX ADMIN — INSULIN GLARGINE 35 UNIT(S): 100 INJECTION, SOLUTION SUBCUTANEOUS at 22:23

## 2020-01-01 RX ADMIN — INFLUENZA VIRUS VACCINE 0.5 MILLILITER(S): 15; 15; 15; 15 SUSPENSION INTRAMUSCULAR at 14:33

## 2020-01-01 RX ADMIN — CARVEDILOL PHOSPHATE 6.25 MILLIGRAM(S): 80 CAPSULE, EXTENDED RELEASE ORAL at 05:47

## 2020-01-01 RX ADMIN — AMIODARONE HYDROCHLORIDE 200 MILLIGRAM(S): 400 TABLET ORAL at 18:01

## 2020-01-01 RX ADMIN — RANOLAZINE 500 MILLIGRAM(S): 500 TABLET, FILM COATED, EXTENDED RELEASE ORAL at 05:37

## 2020-01-01 RX ADMIN — RANOLAZINE 500 MILLIGRAM(S): 500 TABLET, FILM COATED, EXTENDED RELEASE ORAL at 17:27

## 2020-01-01 RX ADMIN — RANOLAZINE 500 MILLIGRAM(S): 500 TABLET, FILM COATED, EXTENDED RELEASE ORAL at 05:08

## 2020-01-01 RX ADMIN — AMIODARONE HYDROCHLORIDE 200 MILLIGRAM(S): 400 TABLET ORAL at 05:36

## 2020-01-01 RX ADMIN — ATORVASTATIN CALCIUM 80 MILLIGRAM(S): 80 TABLET, FILM COATED ORAL at 21:31

## 2020-01-01 RX ADMIN — Medication 81 MILLIGRAM(S): at 11:49

## 2020-01-01 RX ADMIN — SPIRONOLACTONE 50 MILLIGRAM(S): 25 TABLET, FILM COATED ORAL at 17:13

## 2020-01-01 RX ADMIN — MILRINONE LACTATE 6.16 MICROGRAM(S)/KG/MIN: 1 INJECTION, SOLUTION INTRAVENOUS at 05:18

## 2020-01-01 RX ADMIN — CARVEDILOL PHOSPHATE 6.25 MILLIGRAM(S): 80 CAPSULE, EXTENDED RELEASE ORAL at 18:00

## 2020-01-01 RX ADMIN — Medication 1: at 18:10

## 2020-01-01 RX ADMIN — Medication 25 MILLIGRAM(S): at 05:35

## 2020-01-01 RX ADMIN — Medication 10 MILLIGRAM(S): at 17:27

## 2020-01-01 RX ADMIN — Medication 80 MILLIGRAM(S): at 05:46

## 2020-01-01 RX ADMIN — ATORVASTATIN CALCIUM 80 MILLIGRAM(S): 80 TABLET, FILM COATED ORAL at 21:39

## 2020-01-01 RX ADMIN — Medication 3: at 13:53

## 2020-01-01 RX ADMIN — Medication 81 MILLIGRAM(S): at 11:32

## 2020-01-01 RX ADMIN — Medication 4: at 12:15

## 2020-01-01 RX ADMIN — Medication 20 MILLIEQUIVALENT(S): at 18:22

## 2020-01-01 RX ADMIN — Medication 20 MILLIGRAM(S): at 22:23

## 2020-01-01 RX ADMIN — CARVEDILOL PHOSPHATE 25 MILLIGRAM(S): 80 CAPSULE, EXTENDED RELEASE ORAL at 06:43

## 2020-01-01 RX ADMIN — Medication 3: at 07:42

## 2020-01-01 RX ADMIN — HEPARIN SODIUM 5000 UNIT(S): 5000 INJECTION INTRAVENOUS; SUBCUTANEOUS at 05:55

## 2020-01-01 RX ADMIN — AMIODARONE HYDROCHLORIDE 200 MILLIGRAM(S): 400 TABLET ORAL at 05:08

## 2020-01-01 RX ADMIN — HEPARIN SODIUM 5000 UNIT(S): 5000 INJECTION INTRAVENOUS; SUBCUTANEOUS at 05:30

## 2020-01-01 RX ADMIN — Medication 9 UNIT(S): at 11:56

## 2020-01-01 RX ADMIN — TICAGRELOR 60 MILLIGRAM(S): 90 TABLET ORAL at 17:39

## 2020-01-01 RX ADMIN — INSULIN GLARGINE 10 UNIT(S): 100 INJECTION, SOLUTION SUBCUTANEOUS at 22:01

## 2020-01-01 RX ADMIN — SPIRONOLACTONE 50 MILLIGRAM(S): 25 TABLET, FILM COATED ORAL at 17:27

## 2020-01-01 RX ADMIN — Medication 1: at 17:26

## 2020-01-01 RX ADMIN — MILRINONE LACTATE 7.84 MICROGRAM(S)/KG/MIN: 1 INJECTION, SOLUTION INTRAVENOUS at 08:39

## 2020-01-01 RX ADMIN — Medication 40 MILLIGRAM(S): at 05:28

## 2020-01-01 RX ADMIN — INSULIN GLARGINE 30 UNIT(S): 100 INJECTION, SOLUTION SUBCUTANEOUS at 22:49

## 2020-01-01 RX ADMIN — Medication 81 MILLIGRAM(S): at 11:58

## 2020-01-01 RX ADMIN — Medication 81 MILLIGRAM(S): at 10:54

## 2020-01-01 RX ADMIN — Medication 8 UNIT(S): at 18:06

## 2020-01-01 RX ADMIN — Medication 4: at 08:03

## 2020-01-01 RX ADMIN — MILRINONE LACTATE 7.19 MICROGRAM(S)/KG/MIN: 1 INJECTION, SOLUTION INTRAVENOUS at 20:45

## 2020-01-01 RX ADMIN — HEPARIN SODIUM 5000 UNIT(S): 5000 INJECTION INTRAVENOUS; SUBCUTANEOUS at 21:33

## 2020-01-01 RX ADMIN — Medication 40 MILLIGRAM(S): at 05:24

## 2020-01-01 RX ADMIN — HEPARIN SODIUM 5000 UNIT(S): 5000 INJECTION INTRAVENOUS; SUBCUTANEOUS at 21:52

## 2020-01-01 RX ADMIN — MIRTAZAPINE 15 MILLIGRAM(S): 45 TABLET, ORALLY DISINTEGRATING ORAL at 22:07

## 2020-01-01 RX ADMIN — AMIODARONE HYDROCHLORIDE 200 MILLIGRAM(S): 400 TABLET ORAL at 05:42

## 2020-01-01 RX ADMIN — RANOLAZINE 500 MILLIGRAM(S): 500 TABLET, FILM COATED, EXTENDED RELEASE ORAL at 17:55

## 2020-01-01 RX ADMIN — CLOPIDOGREL BISULFATE 75 MILLIGRAM(S): 75 TABLET, FILM COATED ORAL at 13:28

## 2020-01-01 RX ADMIN — Medication 975 MILLIGRAM(S): at 21:12

## 2020-01-01 RX ADMIN — INSULIN GLARGINE 27 UNIT(S): 100 INJECTION, SOLUTION SUBCUTANEOUS at 22:16

## 2020-01-01 RX ADMIN — CLOPIDOGREL BISULFATE 75 MILLIGRAM(S): 75 TABLET, FILM COATED ORAL at 11:54

## 2020-01-01 RX ADMIN — Medication 4: at 13:08

## 2020-01-01 RX ADMIN — MIRTAZAPINE 15 MILLIGRAM(S): 45 TABLET, ORALLY DISINTEGRATING ORAL at 21:04

## 2020-01-01 RX ADMIN — Medication 1 TABLET(S): at 11:54

## 2020-01-01 RX ADMIN — Medication 1: at 06:33

## 2020-01-01 RX ADMIN — Medication 11 UNIT(S): at 07:52

## 2020-01-01 RX ADMIN — CARVEDILOL PHOSPHATE 25 MILLIGRAM(S): 80 CAPSULE, EXTENDED RELEASE ORAL at 17:06

## 2020-01-01 RX ADMIN — TICAGRELOR 60 MILLIGRAM(S): 90 TABLET ORAL at 16:36

## 2020-01-01 RX ADMIN — ATORVASTATIN CALCIUM 80 MILLIGRAM(S): 80 TABLET, FILM COATED ORAL at 21:52

## 2020-01-01 RX ADMIN — Medication 2: at 17:09

## 2020-01-01 RX ADMIN — RANOLAZINE 500 MILLIGRAM(S): 500 TABLET, FILM COATED, EXTENDED RELEASE ORAL at 13:28

## 2020-01-01 RX ADMIN — Medication 50 MILLIGRAM(S): at 13:28

## 2020-01-01 RX ADMIN — Medication 20 MILLIEQUIVALENT(S): at 05:19

## 2020-01-01 RX ADMIN — MIRTAZAPINE 15 MILLIGRAM(S): 45 TABLET, ORALLY DISINTEGRATING ORAL at 21:32

## 2020-01-01 RX ADMIN — CLOPIDOGREL BISULFATE 75 MILLIGRAM(S): 75 TABLET, FILM COATED ORAL at 11:32

## 2020-01-01 RX ADMIN — Medication 81 MILLIGRAM(S): at 13:47

## 2020-01-01 RX ADMIN — Medication 40 MILLIGRAM(S): at 05:43

## 2020-01-01 RX ADMIN — Medication 1 TABLET(S): at 12:39

## 2020-01-01 RX ADMIN — HEPARIN SODIUM 1500 UNIT(S)/HR: 5000 INJECTION INTRAVENOUS; SUBCUTANEOUS at 22:16

## 2020-01-01 RX ADMIN — HEPARIN SODIUM 1500 UNIT(S)/HR: 5000 INJECTION INTRAVENOUS; SUBCUTANEOUS at 06:50

## 2020-01-01 RX ADMIN — Medication 3 MILLIGRAM(S): at 21:29

## 2020-01-01 RX ADMIN — Medication 4: at 12:42

## 2020-01-01 RX ADMIN — HEPARIN SODIUM 5000 UNIT(S): 5000 INJECTION INTRAVENOUS; SUBCUTANEOUS at 17:09

## 2020-01-01 RX ADMIN — HEPARIN SODIUM 5000 UNIT(S): 5000 INJECTION INTRAVENOUS; SUBCUTANEOUS at 05:27

## 2020-01-01 RX ADMIN — HEPARIN SODIUM 5000 UNIT(S): 5000 INJECTION INTRAVENOUS; SUBCUTANEOUS at 21:04

## 2020-01-01 RX ADMIN — Medication 81 MILLIGRAM(S): at 11:57

## 2020-01-01 RX ADMIN — Medication 10 MILLIGRAM(S): at 13:17

## 2020-01-01 RX ADMIN — Medication 10 MILLIGRAM(S): at 05:24

## 2020-01-01 RX ADMIN — Medication 40 MILLIGRAM(S): at 05:49

## 2020-01-01 RX ADMIN — MIRTAZAPINE 30 MILLIGRAM(S): 45 TABLET, ORALLY DISINTEGRATING ORAL at 21:57

## 2020-01-01 RX ADMIN — RANOLAZINE 500 MILLIGRAM(S): 500 TABLET, FILM COATED, EXTENDED RELEASE ORAL at 17:43

## 2020-01-01 RX ADMIN — RANOLAZINE 500 MILLIGRAM(S): 500 TABLET, FILM COATED, EXTENDED RELEASE ORAL at 17:32

## 2020-01-01 RX ADMIN — ATORVASTATIN CALCIUM 80 MILLIGRAM(S): 80 TABLET, FILM COATED ORAL at 21:22

## 2020-01-01 RX ADMIN — MILRINONE LACTATE 7.84 MICROGRAM(S)/KG/MIN: 1 INJECTION, SOLUTION INTRAVENOUS at 12:18

## 2020-01-01 RX ADMIN — BUMETANIDE 2 MILLIGRAM(S): 0.25 INJECTION INTRAMUSCULAR; INTRAVENOUS at 18:08

## 2020-01-01 RX ADMIN — ATORVASTATIN CALCIUM 80 MILLIGRAM(S): 80 TABLET, FILM COATED ORAL at 21:38

## 2020-01-01 RX ADMIN — Medication 40 MILLIEQUIVALENT(S): at 11:22

## 2020-01-01 RX ADMIN — ATORVASTATIN CALCIUM 80 MILLIGRAM(S): 80 TABLET, FILM COATED ORAL at 21:54

## 2020-01-01 RX ADMIN — Medication 11 UNIT(S): at 08:20

## 2020-01-01 RX ADMIN — HEPARIN SODIUM 5000 UNIT(S): 5000 INJECTION INTRAVENOUS; SUBCUTANEOUS at 05:03

## 2020-01-01 RX ADMIN — Medication 81 MILLIGRAM(S): at 13:28

## 2020-01-01 RX ADMIN — Medication 650 MILLIGRAM(S): at 06:46

## 2020-01-01 RX ADMIN — Medication 1: at 08:23

## 2020-01-01 RX ADMIN — Medication 2: at 08:22

## 2020-01-01 RX ADMIN — Medication 300 MILLIGRAM(S): at 10:41

## 2020-01-01 RX ADMIN — Medication 20 MILLIEQUIVALENT(S): at 19:00

## 2020-01-01 RX ADMIN — Medication 10 UNIT(S): at 17:47

## 2020-01-01 RX ADMIN — CARVEDILOL PHOSPHATE 6.25 MILLIGRAM(S): 80 CAPSULE, EXTENDED RELEASE ORAL at 17:17

## 2020-01-01 RX ADMIN — Medication 10 MILLIGRAM(S): at 22:07

## 2020-01-01 RX ADMIN — INSULIN GLARGINE 40 UNIT(S): 100 INJECTION, SOLUTION SUBCUTANEOUS at 22:16

## 2020-01-01 RX ADMIN — Medication 20 MILLIEQUIVALENT(S): at 05:12

## 2020-01-01 RX ADMIN — Medication 10 MILLIGRAM(S): at 11:32

## 2020-01-01 RX ADMIN — Medication 300 MILLIGRAM(S): at 11:32

## 2020-01-01 RX ADMIN — Medication 2: at 08:19

## 2020-01-01 RX ADMIN — TICAGRELOR 90 MILLIGRAM(S): 90 TABLET ORAL at 05:03

## 2020-01-01 RX ADMIN — MIRTAZAPINE 30 MILLIGRAM(S): 45 TABLET, ORALLY DISINTEGRATING ORAL at 21:29

## 2020-01-01 RX ADMIN — RANOLAZINE 500 MILLIGRAM(S): 500 TABLET, FILM COATED, EXTENDED RELEASE ORAL at 17:46

## 2020-01-01 RX ADMIN — Medication 1: at 11:59

## 2020-01-01 RX ADMIN — CARVEDILOL PHOSPHATE 6.25 MILLIGRAM(S): 80 CAPSULE, EXTENDED RELEASE ORAL at 17:48

## 2020-01-01 RX ADMIN — Medication 1: at 09:10

## 2020-01-01 RX ADMIN — Medication 300 MILLIGRAM(S): at 11:52

## 2020-01-01 RX ADMIN — AMIODARONE HYDROCHLORIDE 200 MILLIGRAM(S): 400 TABLET ORAL at 17:43

## 2020-01-01 RX ADMIN — Medication 10 MILLIGRAM(S): at 05:49

## 2020-01-01 RX ADMIN — Medication 1: at 22:36

## 2020-01-01 RX ADMIN — Medication 650 MILLIGRAM(S): at 08:00

## 2020-01-01 RX ADMIN — INSULIN GLARGINE 40 UNIT(S): 100 INJECTION, SOLUTION SUBCUTANEOUS at 21:49

## 2020-01-01 RX ADMIN — Medication 2: at 08:12

## 2020-01-01 RX ADMIN — SPIRONOLACTONE 25 MILLIGRAM(S): 25 TABLET, FILM COATED ORAL at 18:03

## 2020-01-01 RX ADMIN — Medication 300 MILLIGRAM(S): at 11:57

## 2020-01-01 RX ADMIN — Medication 975 MILLIGRAM(S): at 22:17

## 2020-01-01 RX ADMIN — ATORVASTATIN CALCIUM 80 MILLIGRAM(S): 80 TABLET, FILM COATED ORAL at 22:36

## 2020-01-01 RX ADMIN — Medication 3 MILLIGRAM(S): at 21:54

## 2020-01-01 RX ADMIN — HEPARIN SODIUM 5000 UNIT(S): 5000 INJECTION INTRAVENOUS; SUBCUTANEOUS at 22:22

## 2020-01-01 RX ADMIN — Medication 650 MILLIGRAM(S): at 22:24

## 2020-01-01 RX ADMIN — Medication 25.5 MICROGRAM(S)/KG/MIN: at 12:31

## 2020-01-01 RX ADMIN — Medication 8 UNIT(S): at 17:53

## 2020-01-01 RX ADMIN — Medication 2: at 18:23

## 2020-01-01 RX ADMIN — MIRTAZAPINE 15 MILLIGRAM(S): 45 TABLET, ORALLY DISINTEGRATING ORAL at 21:35

## 2020-01-01 RX ADMIN — HEPARIN SODIUM 3500 UNIT(S): 5000 INJECTION INTRAVENOUS; SUBCUTANEOUS at 22:31

## 2020-01-01 RX ADMIN — MILRINONE LACTATE 6.16 MICROGRAM(S)/KG/MIN: 1 INJECTION, SOLUTION INTRAVENOUS at 22:08

## 2020-01-01 RX ADMIN — RANOLAZINE 500 MILLIGRAM(S): 500 TABLET, FILM COATED, EXTENDED RELEASE ORAL at 17:17

## 2020-01-01 RX ADMIN — Medication 40 MILLIGRAM(S): at 13:16

## 2020-01-01 RX ADMIN — CARVEDILOL PHOSPHATE 6.25 MILLIGRAM(S): 80 CAPSULE, EXTENDED RELEASE ORAL at 05:06

## 2020-01-01 RX ADMIN — CARVEDILOL PHOSPHATE 3.12 MILLIGRAM(S): 80 CAPSULE, EXTENDED RELEASE ORAL at 11:16

## 2020-01-01 RX ADMIN — Medication 8 UNIT(S): at 17:26

## 2020-01-01 RX ADMIN — TICAGRELOR 90 MILLIGRAM(S): 90 TABLET ORAL at 05:30

## 2020-01-01 RX ADMIN — Medication 20 MILLIEQUIVALENT(S): at 05:17

## 2020-01-01 RX ADMIN — MIRTAZAPINE 15 MILLIGRAM(S): 45 TABLET, ORALLY DISINTEGRATING ORAL at 22:22

## 2020-01-01 RX ADMIN — CARVEDILOL PHOSPHATE 6.25 MILLIGRAM(S): 80 CAPSULE, EXTENDED RELEASE ORAL at 17:29

## 2020-01-01 RX ADMIN — Medication 1: at 12:01

## 2020-01-01 RX ADMIN — HEPARIN SODIUM 5000 UNIT(S): 5000 INJECTION INTRAVENOUS; SUBCUTANEOUS at 13:10

## 2020-01-01 RX ADMIN — BUMETANIDE 5 MG/HR: 0.25 INJECTION INTRAMUSCULAR; INTRAVENOUS at 08:38

## 2020-01-01 RX ADMIN — BUMETANIDE 2 MILLIGRAM(S): 0.25 INJECTION INTRAMUSCULAR; INTRAVENOUS at 14:17

## 2020-01-01 RX ADMIN — HEPARIN SODIUM 5000 UNIT(S): 5000 INJECTION INTRAVENOUS; SUBCUTANEOUS at 05:19

## 2020-01-01 RX ADMIN — TICAGRELOR 60 MILLIGRAM(S): 90 TABLET ORAL at 17:21

## 2020-01-01 RX ADMIN — Medication 300 MILLIGRAM(S): at 11:30

## 2020-01-01 RX ADMIN — TICAGRELOR 90 MILLIGRAM(S): 90 TABLET ORAL at 17:44

## 2020-01-01 RX ADMIN — Medication 40 MILLIEQUIVALENT(S): at 15:44

## 2020-01-01 RX ADMIN — HEPARIN SODIUM 5000 UNIT(S): 5000 INJECTION INTRAVENOUS; SUBCUTANEOUS at 22:02

## 2020-01-01 RX ADMIN — CARVEDILOL PHOSPHATE 25 MILLIGRAM(S): 80 CAPSULE, EXTENDED RELEASE ORAL at 05:12

## 2020-01-01 RX ADMIN — INSULIN GLARGINE 25 UNIT(S): 100 INJECTION, SOLUTION SUBCUTANEOUS at 22:25

## 2020-01-01 RX ADMIN — Medication 1: at 10:07

## 2020-01-01 RX ADMIN — Medication 30 MILLIGRAM(S): at 05:19

## 2020-01-01 RX ADMIN — Medication 1: at 13:47

## 2020-01-01 RX ADMIN — TICAGRELOR 60 MILLIGRAM(S): 90 TABLET ORAL at 05:13

## 2020-01-01 RX ADMIN — MIRTAZAPINE 30 MILLIGRAM(S): 45 TABLET, ORALLY DISINTEGRATING ORAL at 21:54

## 2020-01-01 RX ADMIN — Medication 300 MILLIGRAM(S): at 09:08

## 2020-01-01 RX ADMIN — HEPARIN SODIUM 5000 UNIT(S): 5000 INJECTION INTRAVENOUS; SUBCUTANEOUS at 05:05

## 2020-01-01 RX ADMIN — RANOLAZINE 500 MILLIGRAM(S): 500 TABLET, FILM COATED, EXTENDED RELEASE ORAL at 05:39

## 2020-01-01 RX ADMIN — MILRINONE LACTATE 6.94 MICROGRAM(S)/KG/MIN: 1 INJECTION, SOLUTION INTRAVENOUS at 17:26

## 2020-01-01 RX ADMIN — Medication 1: at 22:01

## 2020-01-01 RX ADMIN — Medication 2: at 12:31

## 2020-01-01 RX ADMIN — MIRTAZAPINE 15 MILLIGRAM(S): 45 TABLET, ORALLY DISINTEGRATING ORAL at 21:57

## 2020-01-01 RX ADMIN — Medication 975 MILLIGRAM(S): at 07:19

## 2020-01-01 RX ADMIN — HEPARIN SODIUM 5000 UNIT(S): 5000 INJECTION INTRAVENOUS; SUBCUTANEOUS at 05:12

## 2020-01-01 RX ADMIN — MIRTAZAPINE 15 MILLIGRAM(S): 45 TABLET, ORALLY DISINTEGRATING ORAL at 21:39

## 2020-01-01 RX ADMIN — CARVEDILOL PHOSPHATE 6.25 MILLIGRAM(S): 80 CAPSULE, EXTENDED RELEASE ORAL at 17:41

## 2020-01-01 RX ADMIN — Medication 1: at 08:40

## 2020-01-01 RX ADMIN — TICAGRELOR 90 MILLIGRAM(S): 90 TABLET ORAL at 17:08

## 2020-01-01 RX ADMIN — CARVEDILOL PHOSPHATE 25 MILLIGRAM(S): 80 CAPSULE, EXTENDED RELEASE ORAL at 17:44

## 2020-01-01 RX ADMIN — Medication 1: at 16:49

## 2020-01-01 RX ADMIN — CLOPIDOGREL BISULFATE 75 MILLIGRAM(S): 75 TABLET, FILM COATED ORAL at 12:01

## 2020-01-01 RX ADMIN — INSULIN GLARGINE 32 UNIT(S): 100 INJECTION, SOLUTION SUBCUTANEOUS at 21:48

## 2020-01-01 RX ADMIN — Medication 300 MILLIGRAM(S): at 08:14

## 2020-01-01 RX ADMIN — Medication 25 MILLIGRAM(S): at 08:44

## 2020-01-01 RX ADMIN — INSULIN GLARGINE 40 UNIT(S): 100 INJECTION, SOLUTION SUBCUTANEOUS at 22:57

## 2020-01-01 RX ADMIN — Medication 81 MILLIGRAM(S): at 08:14

## 2020-01-01 RX ADMIN — AMIODARONE HYDROCHLORIDE 200 MILLIGRAM(S): 400 TABLET ORAL at 18:33

## 2020-01-01 RX ADMIN — CARVEDILOL PHOSPHATE 25 MILLIGRAM(S): 80 CAPSULE, EXTENDED RELEASE ORAL at 17:03

## 2020-01-01 RX ADMIN — Medication 300 MILLIGRAM(S): at 12:22

## 2020-01-01 RX ADMIN — Medication 1: at 17:43

## 2020-01-01 RX ADMIN — Medication 11 UNIT(S): at 16:50

## 2020-01-01 RX ADMIN — HEPARIN SODIUM 5000 UNIT(S): 5000 INJECTION INTRAVENOUS; SUBCUTANEOUS at 17:13

## 2020-01-01 RX ADMIN — INSULIN GLARGINE 32 UNIT(S): 100 INJECTION, SOLUTION SUBCUTANEOUS at 21:52

## 2020-01-01 RX ADMIN — CLOPIDOGREL BISULFATE 75 MILLIGRAM(S): 75 TABLET, FILM COATED ORAL at 11:20

## 2020-01-01 RX ADMIN — TICAGRELOR 180 MILLIGRAM(S): 90 TABLET ORAL at 05:57

## 2020-01-01 RX ADMIN — RANOLAZINE 500 MILLIGRAM(S): 500 TABLET, FILM COATED, EXTENDED RELEASE ORAL at 05:15

## 2020-01-01 RX ADMIN — Medication 40 MILLIGRAM(S): at 05:22

## 2020-01-01 RX ADMIN — MILRINONE LACTATE 6.53 MICROGRAM(S)/KG/MIN: 1 INJECTION, SOLUTION INTRAVENOUS at 19:46

## 2020-01-01 RX ADMIN — RANOLAZINE 500 MILLIGRAM(S): 500 TABLET, FILM COATED, EXTENDED RELEASE ORAL at 05:03

## 2020-01-01 RX ADMIN — Medication 1: at 21:41

## 2020-01-01 RX ADMIN — Medication 5 UNIT(S): at 08:39

## 2020-01-01 RX ADMIN — Medication 650 MILLIGRAM(S): at 18:03

## 2020-01-01 RX ADMIN — Medication 81 MILLIGRAM(S): at 11:10

## 2020-01-01 RX ADMIN — INSULIN GLARGINE 40 UNIT(S): 100 INJECTION, SOLUTION SUBCUTANEOUS at 21:39

## 2020-01-01 RX ADMIN — TICAGRELOR 60 MILLIGRAM(S): 90 TABLET ORAL at 05:04

## 2020-01-01 RX ADMIN — INSULIN GLARGINE 40 UNIT(S): 100 INJECTION, SOLUTION SUBCUTANEOUS at 21:22

## 2020-01-01 RX ADMIN — CARVEDILOL PHOSPHATE 25 MILLIGRAM(S): 80 CAPSULE, EXTENDED RELEASE ORAL at 05:08

## 2020-01-01 RX ADMIN — RANOLAZINE 500 MILLIGRAM(S): 500 TABLET, FILM COATED, EXTENDED RELEASE ORAL at 05:55

## 2020-01-01 RX ADMIN — MILRINONE LACTATE 6.16 MICROGRAM(S)/KG/MIN: 1 INJECTION, SOLUTION INTRAVENOUS at 05:46

## 2020-01-01 RX ADMIN — MIRTAZAPINE 15 MILLIGRAM(S): 45 TABLET, ORALLY DISINTEGRATING ORAL at 21:49

## 2020-01-01 RX ADMIN — ISOSORBIDE MONONITRATE 60 MILLIGRAM(S): 60 TABLET, EXTENDED RELEASE ORAL at 11:23

## 2020-01-01 RX ADMIN — MIRTAZAPINE 15 MILLIGRAM(S): 45 TABLET, ORALLY DISINTEGRATING ORAL at 21:29

## 2020-01-01 RX ADMIN — Medication 40 MILLIGRAM(S): at 17:44

## 2020-01-01 RX ADMIN — Medication 3 MILLIGRAM(S): at 22:29

## 2020-01-01 RX ADMIN — Medication 8 UNIT(S): at 18:10

## 2020-01-01 RX ADMIN — Medication 30 MILLIGRAM(S): at 22:05

## 2020-01-01 RX ADMIN — AMIODARONE HYDROCHLORIDE 200 MILLIGRAM(S): 400 TABLET ORAL at 05:49

## 2020-01-01 RX ADMIN — TICAGRELOR 90 MILLIGRAM(S): 90 TABLET ORAL at 06:06

## 2020-01-01 RX ADMIN — CARVEDILOL PHOSPHATE 6.25 MILLIGRAM(S): 80 CAPSULE, EXTENDED RELEASE ORAL at 05:27

## 2020-01-01 RX ADMIN — CHLORHEXIDINE GLUCONATE 1 APPLICATION(S): 213 SOLUTION TOPICAL at 10:07

## 2020-01-01 RX ADMIN — Medication 1: at 17:34

## 2020-01-01 RX ADMIN — MIRTAZAPINE 15 MILLIGRAM(S): 45 TABLET, ORALLY DISINTEGRATING ORAL at 22:02

## 2020-01-01 RX ADMIN — CARVEDILOL PHOSPHATE 6.25 MILLIGRAM(S): 80 CAPSULE, EXTENDED RELEASE ORAL at 05:25

## 2020-01-01 RX ADMIN — SPIRONOLACTONE 50 MILLIGRAM(S): 25 TABLET, FILM COATED ORAL at 17:08

## 2020-01-01 RX ADMIN — Medication 81 MILLIGRAM(S): at 11:22

## 2020-01-01 RX ADMIN — Medication 40 MILLIEQUIVALENT(S): at 17:27

## 2020-01-01 RX ADMIN — Medication 40 MILLIGRAM(S): at 05:15

## 2020-01-01 RX ADMIN — CLOPIDOGREL BISULFATE 75 MILLIGRAM(S): 75 TABLET, FILM COATED ORAL at 11:30

## 2020-01-01 RX ADMIN — RANOLAZINE 500 MILLIGRAM(S): 500 TABLET, FILM COATED, EXTENDED RELEASE ORAL at 05:29

## 2020-01-01 RX ADMIN — Medication 300 MILLIGRAM(S): at 12:27

## 2020-01-01 RX ADMIN — Medication 243 MILLIGRAM(S): at 13:06

## 2020-01-01 RX ADMIN — SPIRONOLACTONE 50 MILLIGRAM(S): 25 TABLET, FILM COATED ORAL at 05:28

## 2020-01-01 RX ADMIN — Medication 3 MILLIGRAM(S): at 22:14

## 2020-01-01 RX ADMIN — Medication 40 MILLIGRAM(S): at 21:17

## 2020-01-01 RX ADMIN — MILRINONE LACTATE 7.19 MICROGRAM(S)/KG/MIN: 1 INJECTION, SOLUTION INTRAVENOUS at 05:51

## 2020-01-01 RX ADMIN — Medication 40 MILLIGRAM(S): at 21:47

## 2020-01-01 RX ADMIN — HEPARIN SODIUM 5000 UNIT(S): 5000 INJECTION INTRAVENOUS; SUBCUTANEOUS at 21:39

## 2020-01-01 RX ADMIN — Medication 81 MILLIGRAM(S): at 11:18

## 2020-01-01 RX ADMIN — SPIRONOLACTONE 50 MILLIGRAM(S): 25 TABLET, FILM COATED ORAL at 17:36

## 2020-01-01 RX ADMIN — Medication 81 MILLIGRAM(S): at 11:50

## 2020-01-01 RX ADMIN — Medication 20 MILLIGRAM(S): at 05:37

## 2020-01-01 RX ADMIN — CLOPIDOGREL BISULFATE 75 MILLIGRAM(S): 75 TABLET, FILM COATED ORAL at 12:18

## 2020-01-01 RX ADMIN — HEPARIN SODIUM 5000 UNIT(S): 5000 INJECTION INTRAVENOUS; SUBCUTANEOUS at 05:22

## 2020-01-01 RX ADMIN — Medication 40 MILLIGRAM(S): at 05:25

## 2020-01-01 RX ADMIN — Medication 81 MILLIGRAM(S): at 10:41

## 2020-01-01 RX ADMIN — INSULIN GLARGINE 32 UNIT(S): 100 INJECTION, SOLUTION SUBCUTANEOUS at 00:32

## 2020-01-01 RX ADMIN — Medication 81 MILLIGRAM(S): at 09:13

## 2020-01-01 RX ADMIN — TICAGRELOR 60 MILLIGRAM(S): 90 TABLET ORAL at 05:57

## 2020-01-01 RX ADMIN — Medication 40 MILLIGRAM(S): at 17:46

## 2020-01-01 RX ADMIN — TICAGRELOR 90 MILLIGRAM(S): 90 TABLET ORAL at 23:16

## 2020-01-01 RX ADMIN — Medication 2: at 07:48

## 2020-01-01 RX ADMIN — Medication 10: at 16:41

## 2020-01-01 RX ADMIN — Medication 300 MILLIGRAM(S): at 11:20

## 2020-01-01 RX ADMIN — Medication 81 MILLIGRAM(S): at 11:54

## 2020-01-01 RX ADMIN — Medication 2: at 22:08

## 2020-01-01 RX ADMIN — ISOSORBIDE MONONITRATE 60 MILLIGRAM(S): 60 TABLET, EXTENDED RELEASE ORAL at 10:54

## 2020-01-01 RX ADMIN — RANOLAZINE 500 MILLIGRAM(S): 500 TABLET, FILM COATED, EXTENDED RELEASE ORAL at 16:49

## 2020-01-01 RX ADMIN — Medication 8 UNIT(S): at 17:29

## 2020-01-01 RX ADMIN — Medication 3 MILLIGRAM(S): at 21:32

## 2020-01-01 RX ADMIN — RANOLAZINE 500 MILLIGRAM(S): 500 TABLET, FILM COATED, EXTENDED RELEASE ORAL at 17:47

## 2020-01-01 RX ADMIN — Medication 1: at 21:22

## 2020-01-01 RX ADMIN — Medication 81 MILLIGRAM(S): at 11:21

## 2020-01-01 RX ADMIN — Medication 80 MILLIGRAM(S): at 17:06

## 2020-01-01 RX ADMIN — Medication 300 MILLIGRAM(S): at 13:06

## 2020-01-01 RX ADMIN — Medication 10 MILLIGRAM(S): at 22:14

## 2020-01-01 RX ADMIN — Medication 0.5 MILLIGRAM(S): at 17:07

## 2020-01-01 RX ADMIN — CLOPIDOGREL BISULFATE 75 MILLIGRAM(S): 75 TABLET, FILM COATED ORAL at 12:35

## 2020-01-01 RX ADMIN — TICAGRELOR 60 MILLIGRAM(S): 90 TABLET ORAL at 23:02

## 2020-01-01 RX ADMIN — AMIODARONE HYDROCHLORIDE 200 MILLIGRAM(S): 400 TABLET ORAL at 06:59

## 2020-01-01 RX ADMIN — Medication 81 MILLIGRAM(S): at 11:23

## 2020-01-01 RX ADMIN — Medication 80 MILLIGRAM(S): at 16:49

## 2020-01-01 RX ADMIN — HEPARIN SODIUM 5000 UNIT(S): 5000 INJECTION INTRAVENOUS; SUBCUTANEOUS at 17:48

## 2020-01-01 RX ADMIN — RANOLAZINE 500 MILLIGRAM(S): 500 TABLET, FILM COATED, EXTENDED RELEASE ORAL at 18:29

## 2020-01-01 RX ADMIN — HEPARIN SODIUM 5000 UNIT(S): 5000 INJECTION INTRAVENOUS; SUBCUTANEOUS at 22:07

## 2020-01-01 RX ADMIN — ISOSORBIDE MONONITRATE 60 MILLIGRAM(S): 60 TABLET, EXTENDED RELEASE ORAL at 11:58

## 2020-01-01 RX ADMIN — Medication 10 UNIT(S): at 17:57

## 2020-01-01 RX ADMIN — CLOPIDOGREL BISULFATE 75 MILLIGRAM(S): 75 TABLET, FILM COATED ORAL at 12:16

## 2020-01-01 RX ADMIN — ATORVASTATIN CALCIUM 80 MILLIGRAM(S): 80 TABLET, FILM COATED ORAL at 21:33

## 2020-01-01 RX ADMIN — Medication 20 MILLIEQUIVALENT(S): at 05:06

## 2020-01-01 RX ADMIN — AMIODARONE HYDROCHLORIDE 200 MILLIGRAM(S): 400 TABLET ORAL at 18:19

## 2020-01-01 RX ADMIN — Medication 300 MILLIGRAM(S): at 11:50

## 2020-01-01 RX ADMIN — Medication 40 MILLIEQUIVALENT(S): at 12:12

## 2020-01-01 RX ADMIN — MIDODRINE HYDROCHLORIDE 10 MILLIGRAM(S): 2.5 TABLET ORAL at 11:19

## 2020-01-01 RX ADMIN — Medication 2: at 07:59

## 2020-01-01 RX ADMIN — HEPARIN SODIUM 1200 UNIT(S)/HR: 5000 INJECTION INTRAVENOUS; SUBCUTANEOUS at 02:55

## 2020-01-01 RX ADMIN — Medication 650 MILLIGRAM(S): at 05:24

## 2020-01-01 RX ADMIN — Medication 100 MILLILITER(S): at 16:04

## 2020-01-01 RX ADMIN — Medication 40 MILLIGRAM(S): at 17:15

## 2020-01-01 RX ADMIN — INSULIN GLARGINE 25 UNIT(S): 100 INJECTION, SOLUTION SUBCUTANEOUS at 22:04

## 2020-01-01 RX ADMIN — Medication 2: at 12:24

## 2020-01-01 RX ADMIN — HEPARIN SODIUM 5000 UNIT(S): 5000 INJECTION INTRAVENOUS; SUBCUTANEOUS at 22:36

## 2020-01-01 RX ADMIN — INSULIN GLARGINE 30 UNIT(S): 100 INJECTION, SOLUTION SUBCUTANEOUS at 23:02

## 2020-01-01 RX ADMIN — RANOLAZINE 500 MILLIGRAM(S): 500 TABLET, FILM COATED, EXTENDED RELEASE ORAL at 17:24

## 2020-01-01 RX ADMIN — Medication 20 MILLIEQUIVALENT(S): at 17:35

## 2020-01-01 RX ADMIN — HEPARIN SODIUM 5000 UNIT(S): 5000 INJECTION INTRAVENOUS; SUBCUTANEOUS at 17:17

## 2020-01-01 RX ADMIN — Medication 60 MILLIGRAM(S): at 17:36

## 2020-01-01 RX ADMIN — Medication 325 MILLIGRAM(S): at 05:57

## 2020-01-01 RX ADMIN — ATORVASTATIN CALCIUM 80 MILLIGRAM(S): 80 TABLET, FILM COATED ORAL at 21:29

## 2020-01-01 RX ADMIN — TICAGRELOR 90 MILLIGRAM(S): 90 TABLET ORAL at 17:03

## 2020-01-01 RX ADMIN — Medication 8 UNIT(S): at 19:01

## 2020-01-01 RX ADMIN — MIRTAZAPINE 15 MILLIGRAM(S): 45 TABLET, ORALLY DISINTEGRATING ORAL at 21:17

## 2020-01-01 RX ADMIN — ISOSORBIDE MONONITRATE 60 MILLIGRAM(S): 60 TABLET, EXTENDED RELEASE ORAL at 09:13

## 2020-01-01 RX ADMIN — TICAGRELOR 90 MILLIGRAM(S): 90 TABLET ORAL at 17:24

## 2020-01-01 RX ADMIN — RANOLAZINE 500 MILLIGRAM(S): 500 TABLET, FILM COATED, EXTENDED RELEASE ORAL at 17:44

## 2020-01-01 RX ADMIN — MIRTAZAPINE 15 MILLIGRAM(S): 45 TABLET, ORALLY DISINTEGRATING ORAL at 22:20

## 2020-01-01 RX ADMIN — CLOPIDOGREL BISULFATE 75 MILLIGRAM(S): 75 TABLET, FILM COATED ORAL at 11:22

## 2020-01-01 RX ADMIN — HEPARIN SODIUM 5000 UNIT(S): 5000 INJECTION INTRAVENOUS; SUBCUTANEOUS at 13:28

## 2020-01-01 RX ADMIN — Medication 81 MILLIGRAM(S): at 11:59

## 2020-01-01 RX ADMIN — Medication 50 MILLIGRAM(S): at 21:51

## 2020-01-01 RX ADMIN — RANOLAZINE 500 MILLIGRAM(S): 500 TABLET, FILM COATED, EXTENDED RELEASE ORAL at 05:24

## 2020-01-01 RX ADMIN — BUMETANIDE 5 MG/HR: 0.25 INJECTION INTRAMUSCULAR; INTRAVENOUS at 12:16

## 2020-01-01 RX ADMIN — RANOLAZINE 500 MILLIGRAM(S): 500 TABLET, FILM COATED, EXTENDED RELEASE ORAL at 05:19

## 2020-01-01 RX ADMIN — Medication 81 MILLIGRAM(S): at 12:34

## 2020-01-01 RX ADMIN — Medication 2: at 12:12

## 2020-01-01 RX ADMIN — Medication 0: at 21:50

## 2020-01-01 RX ADMIN — Medication 40 MILLIEQUIVALENT(S): at 14:16

## 2020-01-01 RX ADMIN — Medication 30 MILLIGRAM(S): at 13:22

## 2020-01-01 RX ADMIN — HEPARIN SODIUM 5000 UNIT(S): 5000 INJECTION INTRAVENOUS; SUBCUTANEOUS at 13:06

## 2020-01-01 RX ADMIN — TICAGRELOR 90 MILLIGRAM(S): 90 TABLET ORAL at 05:42

## 2020-01-01 RX ADMIN — Medication 40 MILLIEQUIVALENT(S): at 17:17

## 2020-01-01 RX ADMIN — Medication 60 MILLIGRAM(S): at 18:20

## 2020-01-01 RX ADMIN — Medication 300 MILLIGRAM(S): at 11:22

## 2020-01-01 RX ADMIN — HEPARIN SODIUM 5000 UNIT(S): 5000 INJECTION INTRAVENOUS; SUBCUTANEOUS at 17:08

## 2020-01-01 RX ADMIN — HEPARIN SODIUM 5000 UNIT(S): 5000 INJECTION INTRAVENOUS; SUBCUTANEOUS at 13:16

## 2020-01-01 RX ADMIN — SPIRONOLACTONE 50 MILLIGRAM(S): 25 TABLET, FILM COATED ORAL at 07:47

## 2020-01-01 RX ADMIN — CLOPIDOGREL BISULFATE 75 MILLIGRAM(S): 75 TABLET, FILM COATED ORAL at 11:57

## 2020-01-01 RX ADMIN — ATORVASTATIN CALCIUM 80 MILLIGRAM(S): 80 TABLET, FILM COATED ORAL at 22:14

## 2020-01-01 RX ADMIN — Medication 10 MILLIGRAM(S): at 01:07

## 2020-01-01 RX ADMIN — ATORVASTATIN CALCIUM 80 MILLIGRAM(S): 80 TABLET, FILM COATED ORAL at 21:26

## 2020-01-01 RX ADMIN — Medication 0: at 22:06

## 2020-01-01 RX ADMIN — AMIODARONE HYDROCHLORIDE 200 MILLIGRAM(S): 400 TABLET ORAL at 17:18

## 2020-01-01 RX ADMIN — Medication 40 MILLIGRAM(S): at 17:57

## 2020-01-01 RX ADMIN — Medication 650 MILLIGRAM(S): at 05:54

## 2020-01-01 RX ADMIN — Medication 20 MILLIEQUIVALENT(S): at 05:48

## 2020-01-01 RX ADMIN — RANOLAZINE 500 MILLIGRAM(S): 500 TABLET, FILM COATED, EXTENDED RELEASE ORAL at 05:30

## 2020-01-01 RX ADMIN — Medication 10 MILLIGRAM(S): at 21:29

## 2020-01-01 RX ADMIN — Medication 10 UNIT(S): at 07:42

## 2020-01-01 RX ADMIN — Medication 2: at 12:15

## 2020-01-01 RX ADMIN — RANOLAZINE 500 MILLIGRAM(S): 500 TABLET, FILM COATED, EXTENDED RELEASE ORAL at 18:33

## 2020-01-01 RX ADMIN — Medication 40 MILLIEQUIVALENT(S): at 11:57

## 2020-01-01 RX ADMIN — Medication 40 MILLIGRAM(S): at 05:57

## 2020-01-01 RX ADMIN — ATORVASTATIN CALCIUM 80 MILLIGRAM(S): 80 TABLET, FILM COATED ORAL at 21:32

## 2020-01-01 RX ADMIN — CLOPIDOGREL BISULFATE 75 MILLIGRAM(S): 75 TABLET, FILM COATED ORAL at 12:39

## 2020-01-01 RX ADMIN — RANOLAZINE 500 MILLIGRAM(S): 500 TABLET, FILM COATED, EXTENDED RELEASE ORAL at 18:02

## 2020-01-01 RX ADMIN — Medication 40 MILLIGRAM(S): at 05:06

## 2020-01-01 RX ADMIN — Medication 20 MILLIEQUIVALENT(S): at 18:03

## 2020-01-01 RX ADMIN — Medication 8 UNIT(S): at 17:44

## 2020-01-01 RX ADMIN — Medication 3: at 12:36

## 2020-01-01 RX ADMIN — BUMETANIDE 5 MG/HR: 0.25 INJECTION INTRAMUSCULAR; INTRAVENOUS at 21:55

## 2020-01-01 RX ADMIN — Medication 40 MILLIGRAM(S): at 05:47

## 2020-01-01 RX ADMIN — Medication 10 UNIT(S): at 09:10

## 2020-01-01 RX ADMIN — CLOPIDOGREL BISULFATE 75 MILLIGRAM(S): 75 TABLET, FILM COATED ORAL at 11:52

## 2020-01-01 RX ADMIN — Medication 300 MILLIGRAM(S): at 11:58

## 2020-01-01 RX ADMIN — SPIRONOLACTONE 25 MILLIGRAM(S): 25 TABLET, FILM COATED ORAL at 16:21

## 2020-01-01 RX ADMIN — HEPARIN SODIUM 5000 UNIT(S): 5000 INJECTION INTRAVENOUS; SUBCUTANEOUS at 05:29

## 2020-01-01 RX ADMIN — TICAGRELOR 90 MILLIGRAM(S): 90 TABLET ORAL at 05:19

## 2020-01-01 RX ADMIN — ATORVASTATIN CALCIUM 80 MILLIGRAM(S): 80 TABLET, FILM COATED ORAL at 00:32

## 2020-01-01 RX ADMIN — ISOSORBIDE MONONITRATE 60 MILLIGRAM(S): 60 TABLET, EXTENDED RELEASE ORAL at 11:50

## 2020-01-01 RX ADMIN — CARVEDILOL PHOSPHATE 6.25 MILLIGRAM(S): 80 CAPSULE, EXTENDED RELEASE ORAL at 18:03

## 2020-01-01 RX ADMIN — Medication 1 TABLET(S): at 13:27

## 2020-01-01 RX ADMIN — HEPARIN SODIUM 5000 UNIT(S): 5000 INJECTION INTRAVENOUS; SUBCUTANEOUS at 05:48

## 2020-01-01 RX ADMIN — MILRINONE LACTATE 7.84 MICROGRAM(S)/KG/MIN: 1 INJECTION, SOLUTION INTRAVENOUS at 05:42

## 2020-01-01 RX ADMIN — TICAGRELOR 60 MILLIGRAM(S): 90 TABLET ORAL at 05:51

## 2020-01-01 RX ADMIN — Medication 1: at 11:58

## 2020-01-01 RX ADMIN — ATORVASTATIN CALCIUM 80 MILLIGRAM(S): 80 TABLET, FILM COATED ORAL at 21:35

## 2020-01-01 RX ADMIN — CLOPIDOGREL BISULFATE 75 MILLIGRAM(S): 75 TABLET, FILM COATED ORAL at 11:49

## 2020-01-01 RX ADMIN — Medication 650 MILLIGRAM(S): at 21:54

## 2020-01-01 RX ADMIN — CLOPIDOGREL BISULFATE 75 MILLIGRAM(S): 75 TABLET, FILM COATED ORAL at 12:10

## 2020-01-01 RX ADMIN — RANOLAZINE 500 MILLIGRAM(S): 500 TABLET, FILM COATED, EXTENDED RELEASE ORAL at 05:12

## 2020-01-01 RX ADMIN — MIDODRINE HYDROCHLORIDE 10 MILLIGRAM(S): 2.5 TABLET ORAL at 05:13

## 2020-01-01 RX ADMIN — AMIODARONE HYDROCHLORIDE 200 MILLIGRAM(S): 400 TABLET ORAL at 05:02

## 2020-01-01 RX ADMIN — Medication 25 MILLIGRAM(S): at 17:08

## 2020-01-01 RX ADMIN — Medication 300 MILLIGRAM(S): at 12:24

## 2020-01-01 RX ADMIN — MILRINONE LACTATE 6.53 MICROGRAM(S)/KG/MIN: 1 INJECTION, SOLUTION INTRAVENOUS at 20:23

## 2020-01-01 RX ADMIN — Medication 2: at 13:30

## 2020-01-01 RX ADMIN — TICAGRELOR 90 MILLIGRAM(S): 90 TABLET ORAL at 05:12

## 2020-01-01 RX ADMIN — INSULIN GLARGINE 40 UNIT(S): 100 INJECTION, SOLUTION SUBCUTANEOUS at 21:58

## 2020-01-01 RX ADMIN — INSULIN GLARGINE 10 UNIT(S): 100 INJECTION, SOLUTION SUBCUTANEOUS at 21:33

## 2020-01-01 RX ADMIN — INSULIN GLARGINE 40 UNIT(S): 100 INJECTION, SOLUTION SUBCUTANEOUS at 22:20

## 2020-01-01 RX ADMIN — Medication 60 MILLIGRAM(S): at 06:14

## 2020-01-01 RX ADMIN — Medication 20 MILLIEQUIVALENT(S): at 05:42

## 2020-01-01 RX ADMIN — Medication 81 MILLIGRAM(S): at 12:19

## 2020-01-01 RX ADMIN — BUMETANIDE 5 MG/HR: 0.25 INJECTION INTRAMUSCULAR; INTRAVENOUS at 12:18

## 2020-01-01 RX ADMIN — Medication 10 UNIT(S): at 13:10

## 2020-01-01 RX ADMIN — Medication 2: at 17:27

## 2020-01-01 RX ADMIN — Medication 80 MILLIGRAM(S): at 05:35

## 2020-01-01 RX ADMIN — Medication 40 MILLIEQUIVALENT(S): at 16:05

## 2020-01-01 RX ADMIN — Medication 300 MILLIGRAM(S): at 12:36

## 2020-01-01 RX ADMIN — AMIODARONE HYDROCHLORIDE 400 MILLIGRAM(S): 400 TABLET ORAL at 06:43

## 2020-01-01 RX ADMIN — Medication 5 UNIT(S): at 12:11

## 2020-01-01 RX ADMIN — HEPARIN SODIUM 5000 UNIT(S): 5000 INJECTION INTRAVENOUS; SUBCUTANEOUS at 05:52

## 2020-01-01 RX ADMIN — Medication 10 UNIT(S): at 09:13

## 2020-01-01 RX ADMIN — RANOLAZINE 500 MILLIGRAM(S): 500 TABLET, FILM COATED, EXTENDED RELEASE ORAL at 05:25

## 2020-01-01 RX ADMIN — Medication 8 UNIT(S): at 17:17

## 2020-01-01 RX ADMIN — ATORVASTATIN CALCIUM 80 MILLIGRAM(S): 80 TABLET, FILM COATED ORAL at 21:49

## 2020-01-01 RX ADMIN — RANOLAZINE 500 MILLIGRAM(S): 500 TABLET, FILM COATED, EXTENDED RELEASE ORAL at 05:22

## 2020-01-01 RX ADMIN — Medication 1000 MILLIGRAM(S): at 18:45

## 2020-01-01 RX ADMIN — CARVEDILOL PHOSPHATE 6.25 MILLIGRAM(S): 80 CAPSULE, EXTENDED RELEASE ORAL at 00:32

## 2020-01-01 RX ADMIN — Medication 60 MILLIGRAM(S): at 00:04

## 2020-01-01 RX ADMIN — Medication 20 MILLIEQUIVALENT(S): at 05:38

## 2020-01-01 RX ADMIN — ATORVASTATIN CALCIUM 80 MILLIGRAM(S): 80 TABLET, FILM COATED ORAL at 21:57

## 2020-01-01 RX ADMIN — Medication 2: at 21:34

## 2020-01-01 RX ADMIN — AMIODARONE HYDROCHLORIDE 200 MILLIGRAM(S): 400 TABLET ORAL at 05:18

## 2020-01-01 RX ADMIN — Medication 300 MILLIGRAM(S): at 12:18

## 2020-01-01 RX ADMIN — Medication 60 MILLIGRAM(S): at 05:04

## 2020-01-01 RX ADMIN — Medication 40 MILLIGRAM(S): at 22:20

## 2020-01-01 RX ADMIN — HEPARIN SODIUM 5000 UNIT(S): 5000 INJECTION INTRAVENOUS; SUBCUTANEOUS at 05:43

## 2020-01-01 RX ADMIN — CARVEDILOL PHOSPHATE 6.25 MILLIGRAM(S): 80 CAPSULE, EXTENDED RELEASE ORAL at 17:18

## 2020-01-01 RX ADMIN — Medication 10 UNIT(S): at 12:25

## 2020-01-01 RX ADMIN — HEPARIN SODIUM 5000 UNIT(S): 5000 INJECTION INTRAVENOUS; SUBCUTANEOUS at 05:51

## 2020-01-01 RX ADMIN — CARVEDILOL PHOSPHATE 25 MILLIGRAM(S): 80 CAPSULE, EXTENDED RELEASE ORAL at 06:06

## 2020-01-01 RX ADMIN — SPIRONOLACTONE 50 MILLIGRAM(S): 25 TABLET, FILM COATED ORAL at 17:23

## 2020-01-01 RX ADMIN — CLOPIDOGREL BISULFATE 75 MILLIGRAM(S): 75 TABLET, FILM COATED ORAL at 11:58

## 2020-01-01 RX ADMIN — CLOPIDOGREL BISULFATE 75 MILLIGRAM(S): 75 TABLET, FILM COATED ORAL at 13:06

## 2020-01-01 RX ADMIN — AMIODARONE HYDROCHLORIDE 200 MILLIGRAM(S): 400 TABLET ORAL at 05:24

## 2020-01-01 RX ADMIN — RANOLAZINE 500 MILLIGRAM(S): 500 TABLET, FILM COATED, EXTENDED RELEASE ORAL at 23:16

## 2020-01-01 RX ADMIN — Medication 40 MILLIEQUIVALENT(S): at 12:24

## 2020-01-01 RX ADMIN — Medication 300 MILLIGRAM(S): at 11:18

## 2020-01-01 RX ADMIN — Medication 40 MILLIGRAM(S): at 18:58

## 2020-01-01 RX ADMIN — RANOLAZINE 500 MILLIGRAM(S): 500 TABLET, FILM COATED, EXTENDED RELEASE ORAL at 06:59

## 2020-01-01 RX ADMIN — SODIUM CHLORIDE 1000 MILLILITER(S): 9 INJECTION INTRAMUSCULAR; INTRAVENOUS; SUBCUTANEOUS at 13:05

## 2020-01-01 RX ADMIN — INSULIN GLARGINE 36 UNIT(S): 100 INJECTION, SOLUTION SUBCUTANEOUS at 21:22

## 2020-01-01 RX ADMIN — AMIODARONE HYDROCHLORIDE 200 MILLIGRAM(S): 400 TABLET ORAL at 05:27

## 2020-01-01 RX ADMIN — Medication 40 MILLIEQUIVALENT(S): at 12:26

## 2020-01-01 RX ADMIN — RANOLAZINE 500 MILLIGRAM(S): 500 TABLET, FILM COATED, EXTENDED RELEASE ORAL at 17:28

## 2020-01-01 RX ADMIN — Medication 4: at 11:51

## 2020-01-01 RX ADMIN — Medication 30 MILLIGRAM(S): at 13:54

## 2020-01-01 RX ADMIN — MIDODRINE HYDROCHLORIDE 10 MILLIGRAM(S): 2.5 TABLET ORAL at 05:05

## 2020-01-01 RX ADMIN — Medication 81 MILLIGRAM(S): at 11:29

## 2020-01-01 RX ADMIN — Medication 300 MILLIGRAM(S): at 12:17

## 2020-01-01 RX ADMIN — Medication 2: at 17:17

## 2020-01-01 RX ADMIN — Medication 3: at 11:51

## 2020-01-01 RX ADMIN — Medication 10 MILLIGRAM(S): at 05:52

## 2020-01-01 RX ADMIN — MIRTAZAPINE 15 MILLIGRAM(S): 45 TABLET, ORALLY DISINTEGRATING ORAL at 21:22

## 2020-01-01 RX ADMIN — ATORVASTATIN CALCIUM 80 MILLIGRAM(S): 80 TABLET, FILM COATED ORAL at 22:15

## 2020-01-01 RX ADMIN — INSULIN GLARGINE 35 UNIT(S): 100 INJECTION, SOLUTION SUBCUTANEOUS at 21:28

## 2020-01-01 RX ADMIN — RANOLAZINE 500 MILLIGRAM(S): 500 TABLET, FILM COATED, EXTENDED RELEASE ORAL at 06:06

## 2020-01-01 RX ADMIN — Medication 4: at 13:11

## 2020-01-01 RX ADMIN — HEPARIN SODIUM 5000 UNIT(S): 5000 INJECTION INTRAVENOUS; SUBCUTANEOUS at 05:36

## 2020-01-01 RX ADMIN — Medication 40 MILLIGRAM(S): at 06:11

## 2020-01-01 RX ADMIN — RANOLAZINE 500 MILLIGRAM(S): 500 TABLET, FILM COATED, EXTENDED RELEASE ORAL at 20:36

## 2020-01-01 RX ADMIN — Medication 8 UNIT(S): at 17:43

## 2020-01-01 RX ADMIN — Medication 4: at 17:30

## 2020-01-01 RX ADMIN — Medication 40 MILLIGRAM(S): at 05:18

## 2020-01-01 RX ADMIN — TICAGRELOR 90 MILLIGRAM(S): 90 TABLET ORAL at 06:43

## 2020-01-01 RX ADMIN — SPIRONOLACTONE 50 MILLIGRAM(S): 25 TABLET, FILM COATED ORAL at 18:29

## 2020-01-01 RX ADMIN — Medication 2: at 08:05

## 2020-01-01 RX ADMIN — CARVEDILOL PHOSPHATE 6.25 MILLIGRAM(S): 80 CAPSULE, EXTENDED RELEASE ORAL at 05:39

## 2020-01-01 RX ADMIN — Medication 50 MILLIGRAM(S): at 21:57

## 2020-01-01 RX ADMIN — HEPARIN SODIUM 5000 UNIT(S): 5000 INJECTION INTRAVENOUS; SUBCUTANEOUS at 22:20

## 2020-01-01 RX ADMIN — AMIODARONE HYDROCHLORIDE 200 MILLIGRAM(S): 400 TABLET ORAL at 05:06

## 2020-01-01 RX ADMIN — CLOPIDOGREL BISULFATE 75 MILLIGRAM(S): 75 TABLET, FILM COATED ORAL at 11:18

## 2020-01-01 RX ADMIN — Medication 20 MILLIEQUIVALENT(S): at 05:55

## 2020-01-01 RX ADMIN — MILRINONE LACTATE 7.19 MICROGRAM(S)/KG/MIN: 1 INJECTION, SOLUTION INTRAVENOUS at 05:48

## 2020-01-01 RX ADMIN — Medication 300 MILLIGRAM(S): at 13:28

## 2020-01-01 RX ADMIN — Medication 81 MILLIGRAM(S): at 09:08

## 2020-01-01 RX ADMIN — Medication 1: at 08:20

## 2020-01-01 RX ADMIN — AMIODARONE HYDROCHLORIDE 200 MILLIGRAM(S): 400 TABLET ORAL at 05:25

## 2020-01-01 RX ADMIN — CARVEDILOL PHOSPHATE 6.25 MILLIGRAM(S): 80 CAPSULE, EXTENDED RELEASE ORAL at 05:36

## 2020-01-01 RX ADMIN — Medication 40 MILLIGRAM(S): at 05:31

## 2020-01-01 RX ADMIN — HEPARIN SODIUM 5000 UNIT(S): 5000 INJECTION INTRAVENOUS; SUBCUTANEOUS at 05:13

## 2020-01-01 RX ADMIN — Medication 40 MILLIGRAM(S): at 12:27

## 2020-01-01 RX ADMIN — MILRINONE LACTATE 6.16 MICROGRAM(S)/KG/MIN: 1 INJECTION, SOLUTION INTRAVENOUS at 12:23

## 2020-01-01 RX ADMIN — Medication 81 MILLIGRAM(S): at 12:22

## 2020-01-01 RX ADMIN — Medication 1: at 17:29

## 2020-01-01 RX ADMIN — Medication 5 UNIT(S): at 17:08

## 2020-01-01 RX ADMIN — RANOLAZINE 500 MILLIGRAM(S): 500 TABLET, FILM COATED, EXTENDED RELEASE ORAL at 00:31

## 2020-01-01 RX ADMIN — ISOSORBIDE MONONITRATE 60 MILLIGRAM(S): 60 TABLET, EXTENDED RELEASE ORAL at 12:24

## 2020-01-01 RX ADMIN — Medication 40 MILLIGRAM(S): at 21:33

## 2020-01-01 RX ADMIN — INSULIN GLARGINE 35 UNIT(S): 100 INJECTION, SOLUTION SUBCUTANEOUS at 21:43

## 2020-01-01 RX ADMIN — Medication 8 UNIT(S): at 18:16

## 2020-01-01 RX ADMIN — Medication 100 MILLIGRAM(S): at 21:32

## 2020-01-01 RX ADMIN — HEPARIN SODIUM 5000 UNIT(S): 5000 INJECTION INTRAVENOUS; SUBCUTANEOUS at 17:27

## 2020-01-01 RX ADMIN — HEPARIN SODIUM 1300 UNIT(S)/HR: 5000 INJECTION INTRAVENOUS; SUBCUTANEOUS at 15:28

## 2020-01-01 RX ADMIN — MILRINONE LACTATE 7.19 MICROGRAM(S)/KG/MIN: 1 INJECTION, SOLUTION INTRAVENOUS at 08:48

## 2020-01-01 RX ADMIN — MIRTAZAPINE 30 MILLIGRAM(S): 45 TABLET, ORALLY DISINTEGRATING ORAL at 21:52

## 2020-01-01 RX ADMIN — Medication 1: at 11:40

## 2020-01-01 RX ADMIN — CARVEDILOL PHOSPHATE 6.25 MILLIGRAM(S): 80 CAPSULE, EXTENDED RELEASE ORAL at 05:11

## 2020-01-01 RX ADMIN — HEPARIN SODIUM 1400 UNIT(S)/HR: 5000 INJECTION INTRAVENOUS; SUBCUTANEOUS at 10:40

## 2020-01-01 RX ADMIN — INSULIN GLARGINE 35 UNIT(S): 100 INJECTION, SOLUTION SUBCUTANEOUS at 22:45

## 2020-01-01 RX ADMIN — Medication 10 MILLIGRAM(S): at 22:25

## 2020-01-01 RX ADMIN — Medication 4: at 17:08

## 2020-01-01 RX ADMIN — MILRINONE LACTATE 7.19 MICROGRAM(S)/KG/MIN: 1 INJECTION, SOLUTION INTRAVENOUS at 11:29

## 2020-01-01 RX ADMIN — RANOLAZINE 500 MILLIGRAM(S): 500 TABLET, FILM COATED, EXTENDED RELEASE ORAL at 05:27

## 2020-01-01 RX ADMIN — MILRINONE LACTATE 7.19 MICROGRAM(S)/KG/MIN: 1 INJECTION, SOLUTION INTRAVENOUS at 22:26

## 2020-01-01 RX ADMIN — INSULIN GLARGINE 40 UNIT(S): 100 INJECTION, SOLUTION SUBCUTANEOUS at 21:25

## 2020-01-01 RX ADMIN — MIRTAZAPINE 15 MILLIGRAM(S): 45 TABLET, ORALLY DISINTEGRATING ORAL at 21:14

## 2020-01-01 RX ADMIN — INSULIN GLARGINE 40 UNIT(S): 100 INJECTION, SOLUTION SUBCUTANEOUS at 21:47

## 2020-01-01 RX ADMIN — HEPARIN SODIUM 5000 UNIT(S): 5000 INJECTION INTRAVENOUS; SUBCUTANEOUS at 12:35

## 2020-01-01 RX ADMIN — Medication 80 MILLIGRAM(S): at 06:13

## 2020-01-01 RX ADMIN — TICAGRELOR 90 MILLIGRAM(S): 90 TABLET ORAL at 18:29

## 2020-01-01 RX ADMIN — MILRINONE LACTATE 7.84 MICROGRAM(S)/KG/MIN: 1 INJECTION, SOLUTION INTRAVENOUS at 21:53

## 2020-01-01 RX ADMIN — Medication 5: at 16:56

## 2020-01-01 RX ADMIN — MILRINONE LACTATE 6.16 MICROGRAM(S)/KG/MIN: 1 INJECTION, SOLUTION INTRAVENOUS at 21:53

## 2020-01-01 RX ADMIN — Medication 1: at 08:59

## 2020-01-01 RX ADMIN — MIRTAZAPINE 15 MILLIGRAM(S): 45 TABLET, ORALLY DISINTEGRATING ORAL at 21:50

## 2020-01-01 RX ADMIN — Medication 20 MILLIEQUIVALENT(S): at 18:27

## 2020-01-01 RX ADMIN — HEPARIN SODIUM 5000 UNIT(S): 5000 INJECTION INTRAVENOUS; SUBCUTANEOUS at 14:14

## 2020-01-01 RX ADMIN — HEPARIN SODIUM 5000 UNIT(S): 5000 INJECTION INTRAVENOUS; SUBCUTANEOUS at 06:11

## 2020-01-01 RX ADMIN — RANOLAZINE 500 MILLIGRAM(S): 500 TABLET, FILM COATED, EXTENDED RELEASE ORAL at 05:42

## 2020-01-01 RX ADMIN — AMIODARONE HYDROCHLORIDE 200 MILLIGRAM(S): 400 TABLET ORAL at 17:41

## 2020-01-01 RX ADMIN — AMIODARONE HYDROCHLORIDE 200 MILLIGRAM(S): 400 TABLET ORAL at 17:29

## 2020-01-01 RX ADMIN — CLOPIDOGREL BISULFATE 75 MILLIGRAM(S): 75 TABLET, FILM COATED ORAL at 12:00

## 2020-01-01 RX ADMIN — Medication 1: at 08:16

## 2020-01-01 RX ADMIN — HEPARIN SODIUM 5000 UNIT(S): 5000 INJECTION INTRAVENOUS; SUBCUTANEOUS at 21:38

## 2020-01-01 RX ADMIN — Medication 60 MILLIGRAM(S): at 12:10

## 2020-01-01 RX ADMIN — Medication 300 MILLIGRAM(S): at 12:39

## 2020-01-01 RX ADMIN — MIRTAZAPINE 15 MILLIGRAM(S): 45 TABLET, ORALLY DISINTEGRATING ORAL at 23:16

## 2020-01-01 RX ADMIN — Medication 100 GRAM(S): at 07:47

## 2020-01-01 RX ADMIN — CARVEDILOL PHOSPHATE 6.25 MILLIGRAM(S): 80 CAPSULE, EXTENDED RELEASE ORAL at 05:08

## 2020-01-01 RX ADMIN — ATORVASTATIN CALCIUM 80 MILLIGRAM(S): 80 TABLET, FILM COATED ORAL at 21:51

## 2020-01-01 RX ADMIN — AMIODARONE HYDROCHLORIDE 200 MILLIGRAM(S): 400 TABLET ORAL at 06:14

## 2020-01-01 RX ADMIN — Medication 4: at 17:21

## 2020-01-01 RX ADMIN — OXYCODONE AND ACETAMINOPHEN 1 TABLET(S): 5; 325 TABLET ORAL at 01:30

## 2020-01-01 RX ADMIN — MIDODRINE HYDROCHLORIDE 10 MILLIGRAM(S): 2.5 TABLET ORAL at 11:21

## 2020-01-01 RX ADMIN — HEPARIN SODIUM 5000 UNIT(S): 5000 INJECTION INTRAVENOUS; SUBCUTANEOUS at 13:11

## 2020-01-01 RX ADMIN — Medication 25 MILLIGRAM(S): at 05:24

## 2020-01-01 RX ADMIN — MIRTAZAPINE 15 MILLIGRAM(S): 45 TABLET, ORALLY DISINTEGRATING ORAL at 21:52

## 2020-01-01 RX ADMIN — Medication 80 MILLIGRAM(S): at 17:27

## 2020-01-01 RX ADMIN — SPIRONOLACTONE 50 MILLIGRAM(S): 25 TABLET, FILM COATED ORAL at 05:51

## 2020-01-01 RX ADMIN — Medication 60 MILLIGRAM(S): at 11:56

## 2020-01-01 RX ADMIN — Medication 20 MILLIGRAM(S): at 14:29

## 2020-01-01 RX ADMIN — SPIRONOLACTONE 25 MILLIGRAM(S): 25 TABLET, FILM COATED ORAL at 05:37

## 2020-01-01 RX ADMIN — Medication 300 MILLIGRAM(S): at 11:49

## 2020-01-01 RX ADMIN — CARVEDILOL PHOSPHATE 6.25 MILLIGRAM(S): 80 CAPSULE, EXTENDED RELEASE ORAL at 17:27

## 2020-01-01 RX ADMIN — CHLORHEXIDINE GLUCONATE 1 APPLICATION(S): 213 SOLUTION TOPICAL at 08:16

## 2020-01-01 RX ADMIN — Medication 9 UNIT(S): at 08:14

## 2020-01-01 RX ADMIN — TICAGRELOR 60 MILLIGRAM(S): 90 TABLET ORAL at 11:56

## 2020-01-01 RX ADMIN — MIRTAZAPINE 15 MILLIGRAM(S): 45 TABLET, ORALLY DISINTEGRATING ORAL at 22:16

## 2020-01-01 RX ADMIN — Medication 50 MILLIGRAM(S): at 22:22

## 2020-01-01 RX ADMIN — Medication 40 MILLIEQUIVALENT(S): at 17:06

## 2020-01-01 RX ADMIN — Medication 3 MILLIGRAM(S): at 21:22

## 2020-01-01 RX ADMIN — MILRINONE LACTATE 6.53 MICROGRAM(S)/KG/MIN: 1 INJECTION, SOLUTION INTRAVENOUS at 11:43

## 2020-01-01 RX ADMIN — Medication 10 UNIT(S): at 11:51

## 2020-01-01 RX ADMIN — MILRINONE LACTATE 6.16 MICROGRAM(S)/KG/MIN: 1 INJECTION, SOLUTION INTRAVENOUS at 11:53

## 2020-01-01 RX ADMIN — Medication 20 MILLIEQUIVALENT(S): at 14:06

## 2020-01-01 RX ADMIN — CARVEDILOL PHOSPHATE 6.25 MILLIGRAM(S): 80 CAPSULE, EXTENDED RELEASE ORAL at 18:33

## 2020-01-01 RX ADMIN — SPIRONOLACTONE 25 MILLIGRAM(S): 25 TABLET, FILM COATED ORAL at 13:30

## 2020-01-01 RX ADMIN — Medication 10 MILLIGRAM(S): at 14:06

## 2020-01-01 RX ADMIN — Medication 40 MILLIGRAM(S): at 05:30

## 2020-01-01 RX ADMIN — HEPARIN SODIUM 5000 UNIT(S): 5000 INJECTION INTRAVENOUS; SUBCUTANEOUS at 13:47

## 2020-01-01 RX ADMIN — OXYCODONE AND ACETAMINOPHEN 1 TABLET(S): 5; 325 TABLET ORAL at 00:34

## 2020-01-01 RX ADMIN — Medication 10 UNIT(S): at 09:12

## 2020-01-01 RX ADMIN — Medication 80 MILLIGRAM(S): at 05:49

## 2020-01-01 RX ADMIN — INSULIN GLARGINE 25 UNIT(S): 100 INJECTION, SOLUTION SUBCUTANEOUS at 21:57

## 2020-01-01 RX ADMIN — Medication 10 MILLIGRAM(S): at 22:36

## 2020-01-01 RX ADMIN — Medication 81 MILLIGRAM(S): at 11:19

## 2020-01-01 RX ADMIN — RANOLAZINE 500 MILLIGRAM(S): 500 TABLET, FILM COATED, EXTENDED RELEASE ORAL at 05:36

## 2020-01-01 RX ADMIN — Medication 40 MILLIGRAM(S): at 07:40

## 2020-01-01 RX ADMIN — AMIODARONE HYDROCHLORIDE 200 MILLIGRAM(S): 400 TABLET ORAL at 17:30

## 2020-01-01 RX ADMIN — CHLORHEXIDINE GLUCONATE 1 APPLICATION(S): 213 SOLUTION TOPICAL at 07:36

## 2020-01-01 RX ADMIN — RANOLAZINE 500 MILLIGRAM(S): 500 TABLET, FILM COATED, EXTENDED RELEASE ORAL at 18:19

## 2020-01-01 RX ADMIN — MILRINONE LACTATE 7.19 MICROGRAM(S)/KG/MIN: 1 INJECTION, SOLUTION INTRAVENOUS at 21:30

## 2020-01-01 RX ADMIN — Medication 2: at 13:29

## 2020-01-01 RX ADMIN — MIRTAZAPINE 15 MILLIGRAM(S): 45 TABLET, ORALLY DISINTEGRATING ORAL at 21:31

## 2020-01-01 RX ADMIN — MILRINONE LACTATE 7.19 MICROGRAM(S)/KG/MIN: 1 INJECTION, SOLUTION INTRAVENOUS at 14:06

## 2020-01-01 RX ADMIN — Medication 3: at 17:56

## 2020-01-01 RX ADMIN — CARVEDILOL PHOSPHATE 25 MILLIGRAM(S): 80 CAPSULE, EXTENDED RELEASE ORAL at 18:55

## 2020-01-01 RX ADMIN — BUMETANIDE 5 MG/HR: 0.25 INJECTION INTRAMUSCULAR; INTRAVENOUS at 05:43

## 2020-01-01 RX ADMIN — HEPARIN SODIUM 1600 UNIT(S)/HR: 5000 INJECTION INTRAVENOUS; SUBCUTANEOUS at 06:02

## 2020-01-01 RX ADMIN — Medication 40 MILLIGRAM(S): at 05:12

## 2020-01-01 RX ADMIN — AMIODARONE HYDROCHLORIDE 400 MILLIGRAM(S): 400 TABLET ORAL at 12:24

## 2020-01-01 RX ADMIN — CARVEDILOL PHOSPHATE 25 MILLIGRAM(S): 80 CAPSULE, EXTENDED RELEASE ORAL at 05:18

## 2020-01-01 RX ADMIN — CARVEDILOL PHOSPHATE 6.25 MILLIGRAM(S): 80 CAPSULE, EXTENDED RELEASE ORAL at 05:19

## 2020-01-01 RX ADMIN — MIRTAZAPINE 15 MILLIGRAM(S): 45 TABLET, ORALLY DISINTEGRATING ORAL at 22:57

## 2020-01-01 RX ADMIN — Medication 3 MILLIGRAM(S): at 01:23

## 2020-01-01 RX ADMIN — Medication 25 MILLIGRAM(S): at 17:15

## 2020-01-01 RX ADMIN — RANOLAZINE 500 MILLIGRAM(S): 500 TABLET, FILM COATED, EXTENDED RELEASE ORAL at 05:31

## 2020-01-01 RX ADMIN — Medication 7.91 MICROGRAM(S)/KG/MIN: at 20:35

## 2020-01-01 RX ADMIN — MIRTAZAPINE 15 MILLIGRAM(S): 45 TABLET, ORALLY DISINTEGRATING ORAL at 21:47

## 2020-01-01 RX ADMIN — Medication 300 MILLIGRAM(S): at 12:02

## 2020-01-01 RX ADMIN — Medication 2: at 07:57

## 2020-01-01 RX ADMIN — Medication 300 MILLIGRAM(S): at 12:10

## 2020-01-01 RX ADMIN — Medication 650 MILLIGRAM(S): at 08:17

## 2020-01-01 RX ADMIN — RANOLAZINE 500 MILLIGRAM(S): 500 TABLET, FILM COATED, EXTENDED RELEASE ORAL at 05:52

## 2020-01-01 RX ADMIN — INSULIN GLARGINE 25 UNIT(S): 100 INJECTION, SOLUTION SUBCUTANEOUS at 21:50

## 2020-01-01 RX ADMIN — TICAGRELOR 90 MILLIGRAM(S): 90 TABLET ORAL at 17:55

## 2020-01-01 RX ADMIN — RANOLAZINE 500 MILLIGRAM(S): 500 TABLET, FILM COATED, EXTENDED RELEASE ORAL at 05:47

## 2020-01-01 RX ADMIN — Medication 1: at 13:06

## 2020-01-01 RX ADMIN — BUMETANIDE 5 MG/HR: 0.25 INJECTION INTRAMUSCULAR; INTRAVENOUS at 16:18

## 2020-01-01 RX ADMIN — Medication 10 UNIT(S): at 07:59

## 2020-01-01 RX ADMIN — Medication 0: at 21:54

## 2020-01-01 RX ADMIN — Medication 0.25 MILLIGRAM(S): at 10:58

## 2020-01-01 RX ADMIN — MIRTAZAPINE 30 MILLIGRAM(S): 45 TABLET, ORALLY DISINTEGRATING ORAL at 21:55

## 2020-01-01 RX ADMIN — RANOLAZINE 500 MILLIGRAM(S): 500 TABLET, FILM COATED, EXTENDED RELEASE ORAL at 17:22

## 2020-01-01 RX ADMIN — INSULIN GLARGINE 40 UNIT(S): 100 INJECTION, SOLUTION SUBCUTANEOUS at 21:57

## 2020-01-01 RX ADMIN — Medication 300 MILLIGRAM(S): at 12:12

## 2020-01-01 RX ADMIN — Medication 20 MILLIEQUIVALENT(S): at 05:22

## 2020-01-01 RX ADMIN — INSULIN GLARGINE 27 UNIT(S): 100 INJECTION, SOLUTION SUBCUTANEOUS at 22:14

## 2020-01-01 RX ADMIN — SPIRONOLACTONE 50 MILLIGRAM(S): 25 TABLET, FILM COATED ORAL at 05:31

## 2020-01-01 RX ADMIN — AMIODARONE HYDROCHLORIDE 200 MILLIGRAM(S): 400 TABLET ORAL at 06:13

## 2020-01-01 RX ADMIN — Medication 7 UNIT(S): at 13:11

## 2020-01-01 RX ADMIN — MILRINONE LACTATE 7.19 MICROGRAM(S)/KG/MIN: 1 INJECTION, SOLUTION INTRAVENOUS at 22:15

## 2020-01-01 RX ADMIN — Medication 0.25 MILLIGRAM(S): at 10:56

## 2020-01-01 RX ADMIN — HEPARIN SODIUM 5000 UNIT(S): 5000 INJECTION INTRAVENOUS; SUBCUTANEOUS at 05:24

## 2020-01-01 RX ADMIN — CARVEDILOL PHOSPHATE 6.25 MILLIGRAM(S): 80 CAPSULE, EXTENDED RELEASE ORAL at 17:45

## 2020-01-01 RX ADMIN — Medication 1: at 12:34

## 2020-01-01 RX ADMIN — Medication 10 UNIT(S): at 17:46

## 2020-01-01 RX ADMIN — AMIODARONE HYDROCHLORIDE 200 MILLIGRAM(S): 400 TABLET ORAL at 05:22

## 2020-01-01 RX ADMIN — HEPARIN SODIUM 5000 UNIT(S): 5000 INJECTION INTRAVENOUS; SUBCUTANEOUS at 21:17

## 2020-01-01 RX ADMIN — HEPARIN SODIUM 5000 UNIT(S): 5000 INJECTION INTRAVENOUS; SUBCUTANEOUS at 18:03

## 2020-01-01 RX ADMIN — Medication 81 MILLIGRAM(S): at 12:24

## 2020-01-01 RX ADMIN — Medication 300 MILLIGRAM(S): at 11:23

## 2020-01-01 RX ADMIN — Medication 40 MILLIGRAM(S): at 11:44

## 2020-01-01 RX ADMIN — MIRTAZAPINE 15 MILLIGRAM(S): 45 TABLET, ORALLY DISINTEGRATING ORAL at 22:05

## 2020-01-01 RX ADMIN — Medication 40 MILLIGRAM(S): at 17:48

## 2020-01-01 RX ADMIN — Medication 40 MILLIGRAM(S): at 18:01

## 2020-01-01 RX ADMIN — Medication 81 MILLIGRAM(S): at 10:40

## 2020-01-01 RX ADMIN — CARVEDILOL PHOSPHATE 6.25 MILLIGRAM(S): 80 CAPSULE, EXTENDED RELEASE ORAL at 05:01

## 2020-01-01 RX ADMIN — CARVEDILOL PHOSPHATE 6.25 MILLIGRAM(S): 80 CAPSULE, EXTENDED RELEASE ORAL at 05:55

## 2020-01-01 RX ADMIN — Medication 40 MILLIEQUIVALENT(S): at 13:28

## 2020-01-01 RX ADMIN — RANOLAZINE 500 MILLIGRAM(S): 500 TABLET, FILM COATED, EXTENDED RELEASE ORAL at 05:02

## 2020-01-01 RX ADMIN — HEPARIN SODIUM 5000 UNIT(S): 5000 INJECTION INTRAVENOUS; SUBCUTANEOUS at 21:32

## 2020-01-01 RX ADMIN — INSULIN GLARGINE 35 UNIT(S): 100 INJECTION, SOLUTION SUBCUTANEOUS at 22:35

## 2020-01-01 RX ADMIN — TICAGRELOR 90 MILLIGRAM(S): 90 TABLET ORAL at 05:15

## 2020-01-01 RX ADMIN — HEPARIN SODIUM 5000 UNIT(S): 5000 INJECTION INTRAVENOUS; SUBCUTANEOUS at 05:11

## 2020-01-01 RX ADMIN — MIDODRINE HYDROCHLORIDE 10 MILLIGRAM(S): 2.5 TABLET ORAL at 11:49

## 2020-01-01 RX ADMIN — ATORVASTATIN CALCIUM 80 MILLIGRAM(S): 80 TABLET, FILM COATED ORAL at 00:04

## 2020-01-01 RX ADMIN — RANOLAZINE 500 MILLIGRAM(S): 500 TABLET, FILM COATED, EXTENDED RELEASE ORAL at 05:57

## 2020-01-01 RX ADMIN — SPIRONOLACTONE 50 MILLIGRAM(S): 25 TABLET, FILM COATED ORAL at 17:17

## 2020-01-01 RX ADMIN — ATORVASTATIN CALCIUM 80 MILLIGRAM(S): 80 TABLET, FILM COATED ORAL at 23:16

## 2020-01-01 RX ADMIN — Medication 60 MILLIGRAM(S): at 19:00

## 2020-01-01 RX ADMIN — Medication 8 UNIT(S): at 17:33

## 2020-01-01 RX ADMIN — SPIRONOLACTONE 25 MILLIGRAM(S): 25 TABLET, FILM COATED ORAL at 05:25

## 2020-01-01 RX ADMIN — Medication 1: at 17:03

## 2020-01-01 RX ADMIN — Medication 20 MILLIEQUIVALENT(S): at 17:28

## 2020-01-01 RX ADMIN — Medication 2: at 12:46

## 2020-01-01 RX ADMIN — Medication 10 MILLIGRAM(S): at 14:47

## 2020-01-01 RX ADMIN — CHLORHEXIDINE GLUCONATE 1 APPLICATION(S): 213 SOLUTION TOPICAL at 05:06

## 2020-01-01 RX ADMIN — CARVEDILOL PHOSPHATE 25 MILLIGRAM(S): 80 CAPSULE, EXTENDED RELEASE ORAL at 16:49

## 2020-01-01 RX ADMIN — Medication 300 MILLIGRAM(S): at 09:13

## 2020-01-01 RX ADMIN — Medication 1: at 07:52

## 2020-01-01 RX ADMIN — Medication 2: at 12:35

## 2020-01-01 RX ADMIN — CARVEDILOL PHOSPHATE 25 MILLIGRAM(S): 80 CAPSULE, EXTENDED RELEASE ORAL at 17:08

## 2020-01-01 RX ADMIN — Medication 3: at 16:26

## 2020-01-01 RX ADMIN — Medication 40 MILLIGRAM(S): at 05:08

## 2020-01-01 RX ADMIN — AMIODARONE HYDROCHLORIDE 200 MILLIGRAM(S): 400 TABLET ORAL at 05:47

## 2020-01-01 RX ADMIN — RANOLAZINE 500 MILLIGRAM(S): 500 TABLET, FILM COATED, EXTENDED RELEASE ORAL at 06:43

## 2020-01-01 RX ADMIN — CARVEDILOL PHOSPHATE 6.25 MILLIGRAM(S): 80 CAPSULE, EXTENDED RELEASE ORAL at 05:29

## 2020-01-01 RX ADMIN — MIDODRINE HYDROCHLORIDE 10 MILLIGRAM(S): 2.5 TABLET ORAL at 17:25

## 2020-01-01 RX ADMIN — Medication 650 MILLIGRAM(S): at 05:41

## 2020-01-01 RX ADMIN — MILRINONE LACTATE 7.84 MICROGRAM(S)/KG/MIN: 1 INJECTION, SOLUTION INTRAVENOUS at 12:16

## 2020-01-01 RX ADMIN — HEPARIN SODIUM 5000 UNIT(S): 5000 INJECTION INTRAVENOUS; SUBCUTANEOUS at 17:32

## 2020-01-01 RX ADMIN — CARVEDILOL PHOSPHATE 6.25 MILLIGRAM(S): 80 CAPSULE, EXTENDED RELEASE ORAL at 17:46

## 2020-01-01 RX ADMIN — Medication 60 MILLIGRAM(S): at 05:19

## 2020-01-01 RX ADMIN — INSULIN GLARGINE 35 UNIT(S): 100 INJECTION, SOLUTION SUBCUTANEOUS at 21:57

## 2020-01-01 RX ADMIN — Medication 650 MILLIGRAM(S): at 11:00

## 2020-01-01 RX ADMIN — Medication 324 MILLIGRAM(S): at 12:12

## 2020-01-01 RX ADMIN — CARVEDILOL PHOSPHATE 25 MILLIGRAM(S): 80 CAPSULE, EXTENDED RELEASE ORAL at 05:15

## 2020-01-01 RX ADMIN — ATORVASTATIN CALCIUM 80 MILLIGRAM(S): 80 TABLET, FILM COATED ORAL at 22:07

## 2020-01-01 RX ADMIN — Medication 50 MILLIGRAM(S): at 05:55

## 2020-01-01 RX ADMIN — ATORVASTATIN CALCIUM 80 MILLIGRAM(S): 80 TABLET, FILM COATED ORAL at 22:20

## 2020-01-01 RX ADMIN — HEPARIN SODIUM 5000 UNIT(S): 5000 INJECTION INTRAVENOUS; SUBCUTANEOUS at 06:13

## 2020-01-01 RX ADMIN — TICAGRELOR 60 MILLIGRAM(S): 90 TABLET ORAL at 17:25

## 2020-01-01 RX ADMIN — MILRINONE LACTATE 6.53 MICROGRAM(S)/KG/MIN: 1 INJECTION, SOLUTION INTRAVENOUS at 07:03

## 2020-01-01 RX ADMIN — HEPARIN SODIUM 5000 UNIT(S): 5000 INJECTION INTRAVENOUS; SUBCUTANEOUS at 21:49

## 2020-01-01 RX ADMIN — HEPARIN SODIUM 5000 UNIT(S): 5000 INJECTION INTRAVENOUS; SUBCUTANEOUS at 13:22

## 2020-01-01 RX ADMIN — ISOSORBIDE MONONITRATE 60 MILLIGRAM(S): 60 TABLET, EXTENDED RELEASE ORAL at 11:10

## 2020-01-01 RX ADMIN — CARVEDILOL PHOSPHATE 6.25 MILLIGRAM(S): 80 CAPSULE, EXTENDED RELEASE ORAL at 05:52

## 2020-01-01 RX ADMIN — Medication 40 MILLIGRAM(S): at 05:27

## 2020-01-01 RX ADMIN — INSULIN GLARGINE 35 UNIT(S): 100 INJECTION, SOLUTION SUBCUTANEOUS at 22:08

## 2020-01-01 RX ADMIN — Medication 81 MILLIGRAM(S): at 12:35

## 2020-01-01 RX ADMIN — Medication 10 MILLIGRAM(S): at 05:22

## 2020-01-01 RX ADMIN — Medication 1: at 19:00

## 2020-01-01 RX ADMIN — Medication 2: at 17:24

## 2020-01-01 RX ADMIN — MIRTAZAPINE 15 MILLIGRAM(S): 45 TABLET, ORALLY DISINTEGRATING ORAL at 22:41

## 2020-01-01 RX ADMIN — Medication 1: at 08:06

## 2020-01-01 RX ADMIN — Medication 40 MILLIGRAM(S): at 22:56

## 2020-01-01 RX ADMIN — INSULIN GLARGINE 35 UNIT(S): 100 INJECTION, SOLUTION SUBCUTANEOUS at 22:36

## 2020-01-01 RX ADMIN — AMIODARONE HYDROCHLORIDE 200 MILLIGRAM(S): 400 TABLET ORAL at 18:10

## 2020-01-01 RX ADMIN — CARVEDILOL PHOSPHATE 6.25 MILLIGRAM(S): 80 CAPSULE, EXTENDED RELEASE ORAL at 19:00

## 2020-01-01 RX ADMIN — ATORVASTATIN CALCIUM 80 MILLIGRAM(S): 80 TABLET, FILM COATED ORAL at 22:02

## 2020-01-01 RX ADMIN — AMIODARONE HYDROCHLORIDE 200 MILLIGRAM(S): 400 TABLET ORAL at 05:55

## 2020-01-01 RX ADMIN — HEPARIN SODIUM 5000 UNIT(S): 5000 INJECTION INTRAVENOUS; SUBCUTANEOUS at 17:22

## 2020-01-01 RX ADMIN — CARVEDILOL PHOSPHATE 6.25 MILLIGRAM(S): 80 CAPSULE, EXTENDED RELEASE ORAL at 05:12

## 2020-01-01 RX ADMIN — ATORVASTATIN CALCIUM 80 MILLIGRAM(S): 80 TABLET, FILM COATED ORAL at 21:50

## 2020-01-01 RX ADMIN — Medication 40 MILLIGRAM(S): at 17:03

## 2020-01-01 RX ADMIN — Medication 20 MILLIEQUIVALENT(S): at 14:50

## 2020-01-01 RX ADMIN — Medication 20 MILLIEQUIVALENT(S): at 12:34

## 2020-01-01 RX ADMIN — HEPARIN SODIUM 5000 UNIT(S): 5000 INJECTION INTRAVENOUS; SUBCUTANEOUS at 21:51

## 2020-01-01 RX ADMIN — Medication 300 MILLIGRAM(S): at 11:10

## 2020-01-01 RX ADMIN — Medication 8 UNIT(S): at 18:04

## 2020-01-01 RX ADMIN — RANOLAZINE 500 MILLIGRAM(S): 500 TABLET, FILM COATED, EXTENDED RELEASE ORAL at 19:00

## 2020-01-01 RX ADMIN — Medication 40 MILLIGRAM(S): at 18:35

## 2020-01-01 RX ADMIN — Medication 10 MILLIGRAM(S): at 09:29

## 2020-01-01 RX ADMIN — ATORVASTATIN CALCIUM 80 MILLIGRAM(S): 80 TABLET, FILM COATED ORAL at 21:14

## 2020-01-01 RX ADMIN — HEPARIN SODIUM 5000 UNIT(S): 5000 INJECTION INTRAVENOUS; SUBCUTANEOUS at 21:29

## 2020-01-01 RX ADMIN — MIRTAZAPINE 30 MILLIGRAM(S): 45 TABLET, ORALLY DISINTEGRATING ORAL at 21:32

## 2020-01-01 RX ADMIN — CARVEDILOL PHOSPHATE 25 MILLIGRAM(S): 80 CAPSULE, EXTENDED RELEASE ORAL at 17:25

## 2020-01-01 RX ADMIN — HEPARIN SODIUM 1000 UNIT(S)/HR: 5000 INJECTION INTRAVENOUS; SUBCUTANEOUS at 20:35

## 2020-01-01 RX ADMIN — RANOLAZINE 500 MILLIGRAM(S): 500 TABLET, FILM COATED, EXTENDED RELEASE ORAL at 05:18

## 2020-01-01 RX ADMIN — Medication 10 MILLIGRAM(S): at 05:47

## 2020-01-01 RX ADMIN — RANOLAZINE 500 MILLIGRAM(S): 500 TABLET, FILM COATED, EXTENDED RELEASE ORAL at 06:14

## 2020-01-01 RX ADMIN — Medication 81 MILLIGRAM(S): at 12:10

## 2020-01-01 RX ADMIN — Medication 81 MILLIGRAM(S): at 12:16

## 2020-01-01 RX ADMIN — CARVEDILOL PHOSPHATE 25 MILLIGRAM(S): 80 CAPSULE, EXTENDED RELEASE ORAL at 17:46

## 2020-01-01 RX ADMIN — Medication 1: at 21:51

## 2020-01-01 RX ADMIN — CARVEDILOL PHOSPHATE 6.25 MILLIGRAM(S): 80 CAPSULE, EXTENDED RELEASE ORAL at 17:52

## 2020-01-01 RX ADMIN — RANOLAZINE 500 MILLIGRAM(S): 500 TABLET, FILM COATED, EXTENDED RELEASE ORAL at 17:59

## 2020-01-01 RX ADMIN — AMIODARONE HYDROCHLORIDE 200 MILLIGRAM(S): 400 TABLET ORAL at 05:28

## 2020-01-01 RX ADMIN — HEPARIN SODIUM 5000 UNIT(S): 5000 INJECTION INTRAVENOUS; SUBCUTANEOUS at 06:59

## 2020-01-01 RX ADMIN — CHLORHEXIDINE GLUCONATE 1 APPLICATION(S): 213 SOLUTION TOPICAL at 21:40

## 2020-01-01 RX ADMIN — MIDODRINE HYDROCHLORIDE 10 MILLIGRAM(S): 2.5 TABLET ORAL at 17:39

## 2020-01-01 RX ADMIN — RANOLAZINE 500 MILLIGRAM(S): 500 TABLET, FILM COATED, EXTENDED RELEASE ORAL at 06:13

## 2020-01-01 RX ADMIN — Medication 60 MILLIGRAM(S): at 05:52

## 2020-01-01 RX ADMIN — RANOLAZINE 500 MILLIGRAM(S): 500 TABLET, FILM COATED, EXTENDED RELEASE ORAL at 17:13

## 2020-01-01 RX ADMIN — RANOLAZINE 500 MILLIGRAM(S): 500 TABLET, FILM COATED, EXTENDED RELEASE ORAL at 05:49

## 2020-01-01 RX ADMIN — Medication 60 MILLIGRAM(S): at 05:56

## 2020-01-01 RX ADMIN — ATORVASTATIN CALCIUM 80 MILLIGRAM(S): 80 TABLET, FILM COATED ORAL at 22:03

## 2020-01-01 RX ADMIN — Medication 10 UNIT(S): at 13:06

## 2020-01-01 RX ADMIN — RANOLAZINE 500 MILLIGRAM(S): 500 TABLET, FILM COATED, EXTENDED RELEASE ORAL at 17:35

## 2020-01-01 RX ADMIN — RANOLAZINE 500 MILLIGRAM(S): 500 TABLET, FILM COATED, EXTENDED RELEASE ORAL at 17:48

## 2020-01-01 RX ADMIN — Medication 100 MILLIGRAM(S): at 11:16

## 2020-01-01 RX ADMIN — Medication 1: at 17:52

## 2020-01-01 RX ADMIN — MIRTAZAPINE 15 MILLIGRAM(S): 45 TABLET, ORALLY DISINTEGRATING ORAL at 00:32

## 2020-01-01 RX ADMIN — MILRINONE LACTATE 7.84 MICROGRAM(S)/KG/MIN: 1 INJECTION, SOLUTION INTRAVENOUS at 11:54

## 2020-01-01 RX ADMIN — MIRTAZAPINE 15 MILLIGRAM(S): 45 TABLET, ORALLY DISINTEGRATING ORAL at 22:32

## 2020-01-01 RX ADMIN — Medication 11 UNIT(S): at 11:58

## 2020-01-01 RX ADMIN — Medication 10 MILLIGRAM(S): at 13:22

## 2020-01-01 RX ADMIN — MILRINONE LACTATE 6.16 MICROGRAM(S)/KG/MIN: 1 INJECTION, SOLUTION INTRAVENOUS at 12:39

## 2020-01-01 RX ADMIN — BUMETANIDE 5 MG/HR: 0.25 INJECTION INTRAMUSCULAR; INTRAVENOUS at 19:42

## 2020-01-01 RX ADMIN — HEPARIN SODIUM 7000 UNIT(S): 5000 INJECTION INTRAVENOUS; SUBCUTANEOUS at 06:02

## 2020-01-01 RX ADMIN — CARVEDILOL PHOSPHATE 25 MILLIGRAM(S): 80 CAPSULE, EXTENDED RELEASE ORAL at 05:46

## 2020-01-01 RX ADMIN — AMIODARONE HYDROCHLORIDE 200 MILLIGRAM(S): 400 TABLET ORAL at 17:28

## 2020-01-01 RX ADMIN — RANOLAZINE 500 MILLIGRAM(S): 500 TABLET, FILM COATED, EXTENDED RELEASE ORAL at 17:09

## 2020-01-01 RX ADMIN — ATORVASTATIN CALCIUM 80 MILLIGRAM(S): 80 TABLET, FILM COATED ORAL at 21:47

## 2020-01-01 RX ADMIN — Medication 1: at 09:13

## 2020-01-01 RX ADMIN — INSULIN HUMAN 5 UNIT(S): 100 INJECTION, SOLUTION SUBCUTANEOUS at 11:50

## 2020-01-01 RX ADMIN — Medication 81 MILLIGRAM(S): at 12:02

## 2020-01-01 RX ADMIN — Medication 20 MILLIEQUIVALENT(S): at 13:05

## 2020-01-01 RX ADMIN — INSULIN GLARGINE 40 UNIT(S): 100 INJECTION, SOLUTION SUBCUTANEOUS at 21:40

## 2020-01-01 RX ADMIN — RANOLAZINE 500 MILLIGRAM(S): 500 TABLET, FILM COATED, EXTENDED RELEASE ORAL at 05:46

## 2020-01-01 RX ADMIN — SPIRONOLACTONE 50 MILLIGRAM(S): 25 TABLET, FILM COATED ORAL at 05:24

## 2020-01-01 RX ADMIN — Medication 8 UNIT(S): at 17:30

## 2020-01-01 RX ADMIN — HEPARIN SODIUM 5000 UNIT(S): 5000 INJECTION INTRAVENOUS; SUBCUTANEOUS at 05:38

## 2020-01-01 RX ADMIN — MIDODRINE HYDROCHLORIDE 10 MILLIGRAM(S): 2.5 TABLET ORAL at 05:51

## 2020-01-01 RX ADMIN — Medication 10 UNIT(S): at 17:03

## 2020-01-01 RX ADMIN — Medication 40 MILLIGRAM(S): at 06:43

## 2020-01-01 RX ADMIN — SPIRONOLACTONE 50 MILLIGRAM(S): 25 TABLET, FILM COATED ORAL at 05:11

## 2020-01-01 RX ADMIN — Medication 300 MILLIGRAM(S): at 12:34

## 2020-01-01 RX ADMIN — Medication 11 UNIT(S): at 12:12

## 2020-01-01 RX ADMIN — CARVEDILOL PHOSPHATE 6.25 MILLIGRAM(S): 80 CAPSULE, EXTENDED RELEASE ORAL at 17:43

## 2020-01-01 RX ADMIN — MILRINONE LACTATE 6.16 MICROGRAM(S)/KG/MIN: 1 INJECTION, SOLUTION INTRAVENOUS at 05:55

## 2020-01-01 RX ADMIN — MILRINONE LACTATE 7.84 MICROGRAM(S)/KG/MIN: 1 INJECTION, SOLUTION INTRAVENOUS at 01:25

## 2020-01-01 RX ADMIN — Medication 2: at 12:45

## 2020-01-01 RX ADMIN — ATORVASTATIN CALCIUM 80 MILLIGRAM(S): 80 TABLET, FILM COATED ORAL at 22:16

## 2020-01-01 RX ADMIN — BUMETANIDE 10 MG/HR: 0.25 INJECTION INTRAMUSCULAR; INTRAVENOUS at 05:35

## 2020-01-01 RX ADMIN — MILRINONE LACTATE 7.84 MICROGRAM(S)/KG/MIN: 1 INJECTION, SOLUTION INTRAVENOUS at 21:55

## 2020-01-01 RX ADMIN — INSULIN GLARGINE 16 UNIT(S): 100 INJECTION, SOLUTION SUBCUTANEOUS at 23:14

## 2020-01-01 RX ADMIN — Medication 40 MILLIEQUIVALENT(S): at 18:29

## 2020-01-01 RX ADMIN — Medication 1: at 18:16

## 2020-01-01 RX ADMIN — Medication 10 MILLIGRAM(S): at 14:42

## 2020-01-01 RX ADMIN — Medication 20 MILLIEQUIVALENT(S): at 19:02

## 2020-01-01 RX ADMIN — Medication 40 MILLIEQUIVALENT(S): at 06:39

## 2020-01-01 RX ADMIN — SODIUM CHLORIDE 1000 MILLILITER(S): 9 INJECTION INTRAMUSCULAR; INTRAVENOUS; SUBCUTANEOUS at 12:32

## 2020-01-01 RX ADMIN — ISOSORBIDE MONONITRATE 60 MILLIGRAM(S): 60 TABLET, EXTENDED RELEASE ORAL at 13:10

## 2020-01-01 RX ADMIN — AMIODARONE HYDROCHLORIDE 400 MILLIGRAM(S): 400 TABLET ORAL at 17:46

## 2020-01-01 RX ADMIN — CARVEDILOL PHOSPHATE 6.25 MILLIGRAM(S): 80 CAPSULE, EXTENDED RELEASE ORAL at 06:59

## 2020-01-01 RX ADMIN — RANOLAZINE 500 MILLIGRAM(S): 500 TABLET, FILM COATED, EXTENDED RELEASE ORAL at 18:10

## 2020-01-01 RX ADMIN — MIDODRINE HYDROCHLORIDE 10 MILLIGRAM(S): 2.5 TABLET ORAL at 15:30

## 2020-01-01 RX ADMIN — Medication 20 MILLIEQUIVALENT(S): at 18:32

## 2020-01-01 RX ADMIN — TICAGRELOR 90 MILLIGRAM(S): 90 TABLET ORAL at 17:46

## 2020-01-01 RX ADMIN — ISOSORBIDE MONONITRATE 60 MILLIGRAM(S): 60 TABLET, EXTENDED RELEASE ORAL at 12:12

## 2020-01-01 RX ADMIN — HEPARIN SODIUM 5000 UNIT(S): 5000 INJECTION INTRAVENOUS; SUBCUTANEOUS at 13:23

## 2020-01-01 RX ADMIN — Medication 20 MILLIGRAM(S): at 22:02

## 2020-01-01 RX ADMIN — Medication 2: at 08:23

## 2020-01-01 RX ADMIN — Medication 975 MILLIGRAM(S): at 04:00

## 2020-01-01 RX ADMIN — Medication 40 MILLIGRAM(S): at 17:09

## 2020-01-01 RX ADMIN — MILRINONE LACTATE 7.19 MICROGRAM(S)/KG/MIN: 1 INJECTION, SOLUTION INTRAVENOUS at 08:24

## 2020-01-01 RX ADMIN — HEPARIN SODIUM 5000 UNIT(S): 5000 INJECTION INTRAVENOUS; SUBCUTANEOUS at 17:46

## 2020-01-01 RX ADMIN — BUMETANIDE 10 MG/HR: 0.25 INJECTION INTRAMUSCULAR; INTRAVENOUS at 20:23

## 2020-01-01 RX ADMIN — MIRTAZAPINE 15 MILLIGRAM(S): 45 TABLET, ORALLY DISINTEGRATING ORAL at 22:37

## 2020-01-01 RX ADMIN — CARVEDILOL PHOSPHATE 6.25 MILLIGRAM(S): 80 CAPSULE, EXTENDED RELEASE ORAL at 17:22

## 2020-01-01 RX ADMIN — Medication 20 MILLIEQUIVALENT(S): at 17:59

## 2020-01-01 RX ADMIN — CARVEDILOL PHOSPHATE 6.25 MILLIGRAM(S): 80 CAPSULE, EXTENDED RELEASE ORAL at 18:10

## 2020-01-01 RX ADMIN — CARVEDILOL PHOSPHATE 25 MILLIGRAM(S): 80 CAPSULE, EXTENDED RELEASE ORAL at 05:47

## 2020-01-01 RX ADMIN — RANOLAZINE 500 MILLIGRAM(S): 500 TABLET, FILM COATED, EXTENDED RELEASE ORAL at 05:05

## 2020-01-01 RX ADMIN — Medication 1: at 09:12

## 2020-01-01 RX ADMIN — INSULIN GLARGINE 40 UNIT(S): 100 INJECTION, SOLUTION SUBCUTANEOUS at 22:18

## 2020-01-01 RX ADMIN — Medication 300 MILLIGRAM(S): at 13:47

## 2020-01-01 RX ADMIN — Medication 50 MILLIGRAM(S): at 08:19

## 2020-01-01 RX ADMIN — INSULIN GLARGINE 40 UNIT(S): 100 INJECTION, SOLUTION SUBCUTANEOUS at 00:04

## 2020-01-01 RX ADMIN — AMIODARONE HYDROCHLORIDE 200 MILLIGRAM(S): 400 TABLET ORAL at 19:00

## 2020-01-01 RX ADMIN — ATORVASTATIN CALCIUM 80 MILLIGRAM(S): 80 TABLET, FILM COATED ORAL at 21:17

## 2020-01-01 RX ADMIN — RANOLAZINE 500 MILLIGRAM(S): 500 TABLET, FILM COATED, EXTENDED RELEASE ORAL at 05:44

## 2020-01-01 RX ADMIN — CLOPIDOGREL BISULFATE 300 MILLIGRAM(S): 75 TABLET, FILM COATED ORAL at 10:41

## 2020-01-01 RX ADMIN — Medication 11 UNIT(S): at 17:15

## 2020-01-01 RX ADMIN — HEPARIN SODIUM 5000 UNIT(S): 5000 INJECTION INTRAVENOUS; SUBCUTANEOUS at 06:14

## 2020-01-01 RX ADMIN — CARVEDILOL PHOSPHATE 6.25 MILLIGRAM(S): 80 CAPSULE, EXTENDED RELEASE ORAL at 06:14

## 2020-01-01 RX ADMIN — Medication 60 MILLIGRAM(S): at 17:26

## 2020-01-01 RX ADMIN — CARVEDILOL PHOSPHATE 6.25 MILLIGRAM(S): 80 CAPSULE, EXTENDED RELEASE ORAL at 06:11

## 2020-01-01 RX ADMIN — CARVEDILOL PHOSPHATE 25 MILLIGRAM(S): 80 CAPSULE, EXTENDED RELEASE ORAL at 05:03

## 2020-01-01 RX ADMIN — MIRTAZAPINE 30 MILLIGRAM(S): 45 TABLET, ORALLY DISINTEGRATING ORAL at 22:24

## 2020-01-01 RX ADMIN — RANOLAZINE 500 MILLIGRAM(S): 500 TABLET, FILM COATED, EXTENDED RELEASE ORAL at 18:03

## 2020-01-01 RX ADMIN — Medication 400 MILLIGRAM(S): at 18:04

## 2020-01-01 RX ADMIN — TICAGRELOR 90 MILLIGRAM(S): 90 TABLET ORAL at 17:59

## 2020-01-01 RX ADMIN — Medication 40 MILLIGRAM(S): at 21:28

## 2020-01-01 RX ADMIN — Medication 2: at 09:08

## 2020-01-01 RX ADMIN — Medication 40 MILLIGRAM(S): at 06:06

## 2020-01-01 RX ADMIN — Medication 300 MILLIGRAM(S): at 12:15

## 2020-01-01 RX ADMIN — RANOLAZINE 500 MILLIGRAM(S): 500 TABLET, FILM COATED, EXTENDED RELEASE ORAL at 18:54

## 2020-01-01 RX ADMIN — Medication 100 MILLIGRAM(S): at 11:19

## 2020-01-01 RX ADMIN — Medication 20 MILLIEQUIVALENT(S): at 05:37

## 2020-01-01 RX ADMIN — Medication 1: at 12:27

## 2020-01-01 RX ADMIN — Medication 3: at 19:28

## 2020-01-01 RX ADMIN — Medication 81 MILLIGRAM(S): at 11:52

## 2020-01-01 RX ADMIN — SPIRONOLACTONE 50 MILLIGRAM(S): 25 TABLET, FILM COATED ORAL at 17:47

## 2020-01-01 RX ADMIN — RANOLAZINE 500 MILLIGRAM(S): 500 TABLET, FILM COATED, EXTENDED RELEASE ORAL at 06:15

## 2020-01-01 RX ADMIN — ATORVASTATIN CALCIUM 80 MILLIGRAM(S): 80 TABLET, FILM COATED ORAL at 22:01

## 2020-01-01 RX ADMIN — CARVEDILOL PHOSPHATE 6.25 MILLIGRAM(S): 80 CAPSULE, EXTENDED RELEASE ORAL at 17:31

## 2020-01-01 RX ADMIN — Medication 40 MILLIGRAM(S): at 13:47

## 2020-01-01 RX ADMIN — SPIRONOLACTONE 25 MILLIGRAM(S): 25 TABLET, FILM COATED ORAL at 06:13

## 2020-01-01 RX ADMIN — INSULIN GLARGINE 35 UNIT(S): 100 INJECTION, SOLUTION SUBCUTANEOUS at 22:06

## 2020-01-01 RX ADMIN — MILRINONE LACTATE 7.84 MICROGRAM(S)/KG/MIN: 1 INJECTION, SOLUTION INTRAVENOUS at 21:58

## 2020-01-01 RX ADMIN — AMIODARONE HYDROCHLORIDE 200 MILLIGRAM(S): 400 TABLET ORAL at 17:27

## 2020-01-01 RX ADMIN — MIDODRINE HYDROCHLORIDE 10 MILLIGRAM(S): 2.5 TABLET ORAL at 17:21

## 2020-01-01 RX ADMIN — CHLORHEXIDINE GLUCONATE 1 APPLICATION(S): 213 SOLUTION TOPICAL at 05:07

## 2020-01-01 RX ADMIN — INSULIN GLARGINE 40 UNIT(S): 100 INJECTION, SOLUTION SUBCUTANEOUS at 21:52

## 2020-01-01 RX ADMIN — SPIRONOLACTONE 25 MILLIGRAM(S): 25 TABLET, FILM COATED ORAL at 12:27

## 2020-01-01 RX ADMIN — MIRTAZAPINE 15 MILLIGRAM(S): 45 TABLET, ORALLY DISINTEGRATING ORAL at 22:01

## 2020-01-01 RX ADMIN — Medication 50 MILLIGRAM(S): at 06:13

## 2020-01-01 RX ADMIN — AMIODARONE HYDROCHLORIDE 200 MILLIGRAM(S): 400 TABLET ORAL at 05:39

## 2020-01-01 RX ADMIN — CARVEDILOL PHOSPHATE 6.25 MILLIGRAM(S): 80 CAPSULE, EXTENDED RELEASE ORAL at 18:19

## 2020-01-01 RX ADMIN — AMIODARONE HYDROCHLORIDE 200 MILLIGRAM(S): 400 TABLET ORAL at 05:11

## 2020-01-01 RX ADMIN — Medication 2: at 11:49

## 2020-01-01 RX ADMIN — Medication 20 MILLIEQUIVALENT(S): at 17:17

## 2020-01-01 RX ADMIN — Medication 40 MILLIGRAM(S): at 09:51

## 2020-01-01 RX ADMIN — Medication 81 MILLIGRAM(S): at 13:06

## 2020-01-01 RX ADMIN — Medication 25 MILLIGRAM(S): at 18:29

## 2020-01-01 RX ADMIN — RANOLAZINE 500 MILLIGRAM(S): 500 TABLET, FILM COATED, EXTENDED RELEASE ORAL at 05:11

## 2020-01-01 RX ADMIN — RANOLAZINE 500 MILLIGRAM(S): 500 TABLET, FILM COATED, EXTENDED RELEASE ORAL at 17:19

## 2020-01-01 RX ADMIN — Medication 40 MILLIGRAM(S): at 08:12

## 2020-01-01 RX ADMIN — ATORVASTATIN CALCIUM 80 MILLIGRAM(S): 80 TABLET, FILM COATED ORAL at 22:05

## 2020-01-01 RX ADMIN — AMIODARONE HYDROCHLORIDE 200 MILLIGRAM(S): 400 TABLET ORAL at 05:12

## 2020-01-01 RX ADMIN — MIDODRINE HYDROCHLORIDE 10 MILLIGRAM(S): 2.5 TABLET ORAL at 05:10

## 2020-01-01 RX ADMIN — Medication 81 MILLIGRAM(S): at 11:16

## 2020-01-01 RX ADMIN — Medication 650 MILLIGRAM(S): at 05:11

## 2020-01-01 RX ADMIN — Medication 40 MILLIGRAM(S): at 05:03

## 2020-01-01 RX ADMIN — Medication 1: at 13:30

## 2020-01-01 RX ADMIN — INSULIN GLARGINE 27 UNIT(S): 100 INJECTION, SOLUTION SUBCUTANEOUS at 21:34

## 2020-01-01 RX ADMIN — Medication 50 MILLIGRAM(S): at 13:23

## 2020-01-01 RX ADMIN — ATORVASTATIN CALCIUM 80 MILLIGRAM(S): 80 TABLET, FILM COATED ORAL at 21:48

## 2020-01-01 RX ADMIN — HEPARIN SODIUM 5000 UNIT(S): 5000 INJECTION INTRAVENOUS; SUBCUTANEOUS at 05:49

## 2020-01-01 RX ADMIN — ATORVASTATIN CALCIUM 80 MILLIGRAM(S): 80 TABLET, FILM COATED ORAL at 22:56

## 2020-01-01 RX ADMIN — CLOPIDOGREL BISULFATE 75 MILLIGRAM(S): 75 TABLET, FILM COATED ORAL at 13:47

## 2020-01-01 RX ADMIN — Medication 50 MILLIGRAM(S): at 17:27

## 2020-01-01 RX ADMIN — Medication 81 MILLIGRAM(S): at 12:12

## 2020-01-01 RX ADMIN — HEPARIN SODIUM 5000 UNIT(S): 5000 INJECTION INTRAVENOUS; SUBCUTANEOUS at 05:08

## 2020-01-01 RX ADMIN — Medication 300 MILLIGRAM(S): at 12:00

## 2020-01-01 RX ADMIN — HEPARIN SODIUM 5000 UNIT(S): 5000 INJECTION INTRAVENOUS; SUBCUTANEOUS at 05:35

## 2020-01-01 RX ADMIN — Medication 20 MILLIGRAM(S): at 14:14

## 2020-01-01 RX ADMIN — Medication 2: at 21:37

## 2020-01-01 RX ADMIN — Medication 3: at 11:35

## 2020-01-01 RX ADMIN — SPIRONOLACTONE 50 MILLIGRAM(S): 25 TABLET, FILM COATED ORAL at 05:49

## 2020-01-01 RX ADMIN — HEPARIN SODIUM 5000 UNIT(S): 5000 INJECTION INTRAVENOUS; SUBCUTANEOUS at 05:25

## 2020-01-01 RX ADMIN — Medication 1: at 18:05

## 2020-01-01 RX ADMIN — Medication 3 MILLIGRAM(S): at 23:53

## 2020-01-01 RX ADMIN — Medication 1: at 13:10

## 2020-01-01 RX ADMIN — Medication 1: at 09:17

## 2020-01-01 RX ADMIN — Medication 500 MILLIGRAM(S): at 09:08

## 2020-01-01 RX ADMIN — Medication 2: at 17:23

## 2020-01-01 RX ADMIN — AMIODARONE HYDROCHLORIDE 200 MILLIGRAM(S): 400 TABLET ORAL at 05:35

## 2020-01-01 RX ADMIN — MIRTAZAPINE 15 MILLIGRAM(S): 45 TABLET, ORALLY DISINTEGRATING ORAL at 21:33

## 2020-01-01 RX ADMIN — Medication 50 MILLIGRAM(S): at 05:01

## 2020-01-01 RX ADMIN — BUMETANIDE 5 MG/HR: 0.25 INJECTION INTRAMUSCULAR; INTRAVENOUS at 21:53

## 2020-01-01 RX ADMIN — ATORVASTATIN CALCIUM 80 MILLIGRAM(S): 80 TABLET, FILM COATED ORAL at 22:22

## 2020-01-01 RX ADMIN — MIRTAZAPINE 15 MILLIGRAM(S): 45 TABLET, ORALLY DISINTEGRATING ORAL at 21:26

## 2020-01-01 RX ADMIN — Medication 300 MILLIGRAM(S): at 05:57

## 2020-01-01 RX ADMIN — Medication 1 TABLET(S): at 09:08

## 2020-01-01 RX ADMIN — Medication 5 UNIT(S): at 17:55

## 2020-01-01 RX ADMIN — Medication 4: at 12:12

## 2020-01-01 RX ADMIN — ISOSORBIDE MONONITRATE 60 MILLIGRAM(S): 60 TABLET, EXTENDED RELEASE ORAL at 12:22

## 2020-01-01 RX ADMIN — CARVEDILOL PHOSPHATE 6.25 MILLIGRAM(S): 80 CAPSULE, EXTENDED RELEASE ORAL at 05:30

## 2020-01-01 RX ADMIN — Medication 4: at 08:15

## 2020-01-01 RX ADMIN — Medication 6: at 08:38

## 2020-01-01 RX ADMIN — ATORVASTATIN CALCIUM 80 MILLIGRAM(S): 80 TABLET, FILM COATED ORAL at 22:24

## 2020-01-01 RX ADMIN — Medication 4: at 12:06

## 2020-01-01 RX ADMIN — Medication 60 MILLIGRAM(S): at 22:25

## 2020-01-01 RX ADMIN — CARVEDILOL PHOSPHATE 6.25 MILLIGRAM(S): 80 CAPSULE, EXTENDED RELEASE ORAL at 05:22

## 2020-01-01 RX ADMIN — HEPARIN SODIUM 0 UNIT(S)/HR: 5000 INJECTION INTRAVENOUS; SUBCUTANEOUS at 13:00

## 2020-01-01 RX ADMIN — HEPARIN SODIUM 5000 UNIT(S): 5000 INJECTION INTRAVENOUS; SUBCUTANEOUS at 21:57

## 2020-01-01 RX ADMIN — Medication 1: at 18:06

## 2020-01-01 RX ADMIN — Medication 650 MILLIGRAM(S): at 18:52

## 2020-01-01 RX ADMIN — INSULIN GLARGINE 30 UNIT(S): 100 INJECTION, SOLUTION SUBCUTANEOUS at 21:33

## 2020-01-01 RX ADMIN — RANOLAZINE 500 MILLIGRAM(S): 500 TABLET, FILM COATED, EXTENDED RELEASE ORAL at 17:08

## 2020-01-01 RX ADMIN — MIRTAZAPINE 30 MILLIGRAM(S): 45 TABLET, ORALLY DISINTEGRATING ORAL at 22:14

## 2020-01-01 RX ADMIN — Medication 81 MILLIGRAM(S): at 12:39

## 2020-01-01 RX ADMIN — Medication 1: at 17:44

## 2020-01-01 RX ADMIN — Medication 1: at 11:55

## 2020-01-01 RX ADMIN — Medication 1 MILLIGRAM(S): at 17:16

## 2020-01-01 RX ADMIN — INSULIN GLARGINE 35 UNIT(S): 100 INJECTION, SOLUTION SUBCUTANEOUS at 21:50

## 2020-01-01 RX ADMIN — Medication 3: at 08:14

## 2020-01-01 RX ADMIN — HEPARIN SODIUM 5000 UNIT(S): 5000 INJECTION INTRAVENOUS; SUBCUTANEOUS at 05:37

## 2020-01-01 RX ADMIN — Medication 100 GRAM(S): at 18:08

## 2020-01-01 RX ADMIN — Medication 20 MILLIGRAM(S): at 05:38

## 2020-01-01 RX ADMIN — AMIODARONE HYDROCHLORIDE 200 MILLIGRAM(S): 400 TABLET ORAL at 05:52

## 2020-01-01 RX ADMIN — BUMETANIDE 5 MG/HR: 0.25 INJECTION INTRAMUSCULAR; INTRAVENOUS at 05:08

## 2020-01-01 RX ADMIN — CARVEDILOL PHOSPHATE 25 MILLIGRAM(S): 80 CAPSULE, EXTENDED RELEASE ORAL at 05:30

## 2020-01-01 RX ADMIN — Medication 1: at 12:29

## 2020-01-01 RX ADMIN — Medication 5 UNIT(S): at 12:09

## 2020-01-01 RX ADMIN — MAGNESIUM OXIDE 400 MG ORAL TABLET 400 MILLIGRAM(S): 241.3 TABLET ORAL at 12:30

## 2020-01-01 RX ADMIN — Medication 81 MILLIGRAM(S): at 11:20

## 2020-01-01 RX ADMIN — RANOLAZINE 500 MILLIGRAM(S): 500 TABLET, FILM COATED, EXTENDED RELEASE ORAL at 17:03

## 2020-01-01 RX ADMIN — ATORVASTATIN CALCIUM 80 MILLIGRAM(S): 80 TABLET, FILM COATED ORAL at 21:04

## 2020-01-01 RX ADMIN — Medication 2: at 08:11

## 2020-01-01 RX ADMIN — Medication 5 UNIT(S): at 11:26

## 2020-01-01 RX ADMIN — TICAGRELOR 60 MILLIGRAM(S): 90 TABLET ORAL at 18:33

## 2020-01-01 RX ADMIN — Medication 300 MILLIGRAM(S): at 18:01

## 2020-01-01 RX ADMIN — CARVEDILOL PHOSPHATE 6.25 MILLIGRAM(S): 80 CAPSULE, EXTENDED RELEASE ORAL at 05:31

## 2020-01-02 ENCOUNTER — APPOINTMENT (OUTPATIENT)
Dept: ELECTROPHYSIOLOGY | Facility: CLINIC | Age: 80
End: 2020-01-02

## 2020-01-03 ENCOUNTER — APPOINTMENT (OUTPATIENT)
Dept: CARDIOLOGY | Facility: CLINIC | Age: 80
End: 2020-01-03
Payer: MEDICARE

## 2020-01-03 VITALS
HEART RATE: 84 BPM | WEIGHT: 198 LBS | HEIGHT: 69 IN | OXYGEN SATURATION: 96 % | DIASTOLIC BLOOD PRESSURE: 70 MMHG | SYSTOLIC BLOOD PRESSURE: 114 MMHG | BODY MASS INDEX: 29.33 KG/M2

## 2020-01-03 PROCEDURE — 99215 OFFICE O/P EST HI 40 MIN: CPT

## 2020-01-07 ENCOUNTER — APPOINTMENT (OUTPATIENT)
Dept: CARDIOLOGY | Facility: CLINIC | Age: 80
End: 2020-01-07

## 2020-01-07 RX ORDER — VALSARTAN 80 MG/1
80 TABLET, COATED ORAL DAILY
Qty: 90 | Refills: 3 | Status: DISCONTINUED | COMMUNITY
Start: 2019-12-12 | End: 2020-01-07

## 2020-01-07 RX ORDER — AMOXICILLIN AND CLAVULANATE POTASSIUM 875; 125 MG/1; MG/1
875-125 TABLET, COATED ORAL
Qty: 14 | Refills: 0 | Status: DISCONTINUED | COMMUNITY
Start: 2019-01-16 | End: 2020-01-07

## 2020-01-21 ENCOUNTER — NON-APPOINTMENT (OUTPATIENT)
Age: 80
End: 2020-01-21

## 2020-01-21 ENCOUNTER — APPOINTMENT (OUTPATIENT)
Dept: CARDIOLOGY | Facility: CLINIC | Age: 80
End: 2020-01-21
Payer: MEDICARE

## 2020-01-21 VITALS
OXYGEN SATURATION: 99 % | WEIGHT: 194 LBS | SYSTOLIC BLOOD PRESSURE: 110 MMHG | DIASTOLIC BLOOD PRESSURE: 66 MMHG | TEMPERATURE: 97.9 F | HEIGHT: 69 IN | RESPIRATION RATE: 17 BRPM | HEART RATE: 119 BPM | BODY MASS INDEX: 28.73 KG/M2

## 2020-01-21 PROCEDURE — 99215 OFFICE O/P EST HI 40 MIN: CPT

## 2020-01-21 PROCEDURE — 93000 ELECTROCARDIOGRAM COMPLETE: CPT

## 2020-02-13 NOTE — H&P ADULT - NSHPPHYSICALEXAM_GEN_ALL_CORE
Vital Signs Last 24 Hrs  T(C): 36.8 (13 Feb 2020 14:22), Max: 36.8 (13 Feb 2020 14:22)  T(F): 98.2 (13 Feb 2020 14:22), Max: 98.2 (13 Feb 2020 14:22)  HR: 76 (13 Feb 2020 14:22) (76 - 93)  BP: 121/57 (13 Feb 2020 14:22) (104/68 - 121/57)  BP(mean): --  RR: 18 (13 Feb 2020 14:22) (18 - 18)  SpO2: 96% (13 Feb 2020 14:22) (96% - 97%)    PHYSICAL EXAM:  GENERAL: NAD, well-developed  HEAD:  Atraumatic, Normocephalic  EYES: EOMI, PERRLA, conjunctiva and sclera clear  NECK: Supple, No JVD  CHEST/LUNG: Clear to auscultation bilaterally; No wheeze  HEART: Regular rate and rhythm;    ABDOMEN: Soft, Nontender, Nondistended; Bowel sounds present  EXTREMITIES:  2+ Peripheral Pulses, No clubbing, cyanosis, or edema  PSYCH: AAOx3  NEUROLOGY: non-focal  SKIN: No rashes or lesions

## 2020-02-13 NOTE — ED ADULT NURSE NOTE - CHPI ED NUR SYMPTOMS NEG
no diaphoresis/no congestion/no fever/no back pain/no chills/no nausea/no vomiting/no dizziness/no syncope

## 2020-02-13 NOTE — ED PROVIDER NOTE - CLINICAL SUMMARY MEDICAL DECISION MAKING FREE TEXT BOX
78yo man with significant cardiac history presents with chest pressure since 2:30 am that is worse with lying flat with no associated symptoms except chronic nonproductive cough. Concern for acs due to history will obtain ecg, labs, cxr, and will reassess. 80yo man with significant cardiac history presents with chest pressure since 2:30 am that is worse with lying flat with no associated symptoms except chronic nonproductive cough. Concern for acs due to history will obtain ecg, labs, cxr, and will reassess.ZR

## 2020-02-13 NOTE — ED PROVIDER NOTE - NS ED ROS FT
General: no fevers  Head: no headache or lightheadedness (until after nitro administration)  Eyes: no vision change  ENT: no nasal discharge/congestion, no sore throat  CV: +chest pressure  Resp: no SOB, +chronic cough  GI: no N/V, no abdominal pain  : no dysuria  MSK: no joint pain  Skin: no new rash  Neuro: no focal weakness, no change in sensation

## 2020-02-13 NOTE — ED ADULT NURSE NOTE - OBJECTIVE STATEMENT
79 yrs old male with h/o cardiac dx , present to the Er for chest pressure. As per pt he woke up around 2:30 am to use the bathroom when he started to experience mid sternal chest pain. Pt reported that pain level was 8/10 on pain scale but is now 5/10 . Pt report that pain is worse on exertion and improves upon rest. Denies N/V/D.

## 2020-02-13 NOTE — ED PROVIDER NOTE - OBJECTIVE STATEMENT
80yo man PMH DM, HTN, HLD, CAD s/p CABG and stents x 17 and AICD placement 11/2019 presents with nonexertional chest pressure with lying down that started at 2:30am and took 1 aspirin 81mg and was give 2 more aspirins and nitroglycerin by EMS prior to arrival. Pt states he doesn't notice any change in chest pressure. He had woken up to go to the bathroom and went back to bed and noticed the chest pressure when he lying down and had to get up to relieve some pressure. No radiation of pressure or pain. He has a chronic nonproductive cough since 3 months ago. No shortness of breath, no n/v. He states he has some lightheadedness after nitro administration but none prior. No trauma, no rash, no strenuous activity. No leg swelling or pain.  Cardiologist/PCP: Dr. Sarkar

## 2020-02-13 NOTE — ED PROVIDER NOTE - PHYSICAL EXAMINATION
General: elderly man sitting in stretcher in no acute distress  Head: normocephalic, atraumatic  Eyes: clear eyes with no discharge or redness  Mouth: mildly dry mucous membranes  Neck: supple neck  CV: normal rate and rhythm, no LE edema or tenderness, peripheral pulses 2+ bilaterally  Respiratory: crackles at bilateral bases  Abdomen: soft, nontender, nondistended  : no suprapubic tenderness, no CVAT  Neuro: alert and oriented x3, speech clear, moving all extremities  Skin: no rash on chest, well healed scar midchest

## 2020-02-13 NOTE — ED PROVIDER NOTE - PROGRESS NOTE DETAILS
Cata Khan, resident MD: initial trop 47, will repeat trop. with persistent chest pain will admit for further monitoring and work up. spoke with Dr. Romero covering for Dr. Sarkar and will admit to Dr. Jackson Monet

## 2020-02-13 NOTE — H&P ADULT - NSICDXPASTMEDICALHX_GEN_ALL_CORE_FT
PAST MEDICAL HISTORY:  AICD (automatic cardioverter/defibrillator) present     Angina pectoris associated with type 2 diabetes mellitus     Anxiety     Arthritis     CAD (Coronary Artery Disease)     CHF (congestive heart failure) as per medical records Pt denies t PST 08/23/2016    Chronic Gout     CKD (chronic kidney disease) stage 3, GFR 30-59 ml/min     Depression     Diverticula, Colon     Erectile dysfunction     HTN (Hypertension)     Hypercholesteremia     MI (myocardial infarction) x2- 2013/2014    Renal Stone     Type 2 diabetes, uncontrolled, with renal manifestation

## 2020-02-13 NOTE — H&P ADULT - ASSESSMENT
78 yo male h/o cad s/p pci x 17, chf s/p aicd, dm, htn, chol, a/w chest pain r/o acs      chest pain  extensive h/o cad   trend trop  tele  cont asa, Brilinta   cards f/u    chf  chronic sytolic  no evid of acute exac  monitor    chol  cont statin    dm  iss  monitor fs    cont all prior home meds        plan d/w pt, wife, cards      Advanced care planning was discussed with patient and family.  Advanced care planning forms were reviewed and discussed as appropriate.  Differential diagnosis and plan of care discussed with patient after the evaluation.   Pain assessed and judicious use of narcotics when appropriate was discussed.  Importance of Fall prevention discussed.  Counseling on Smoking and Alcohol cessation was offered when appropriate.  Counseling on Diet, exercise, and medication compliance was done.   Approx 65 minutes spent.

## 2020-02-14 PROBLEM — Z95.810 PRESENCE OF AUTOMATIC (IMPLANTABLE) CARDIAC DEFIBRILLATOR: Chronic | Status: ACTIVE | Noted: 2020-01-01

## 2020-02-14 NOTE — DISCHARGE NOTE PROVIDER - HOSPITAL COURSE
to be  done pt with extensive cardiac history    a/w chest pain    trop neg x3    echo unchnaged    recent cath    dc home with outpt f/u            Advanced care planning was discussed with patient and family.  Advanced care planning forms were reviewed and discussed as appropriate.    Differential diagnosis and plan of care discussed with patient after the evaluation.     Pain assessed and judicious use of narcotics when appropriate was discussed.    Importance of Fall prevention discussed.    Counseling on Smoking and Alcohol cessation was offered when appropriate.    Counseling on Diet, exercise, and medication compliance was done.     Approx 65 minutes spent.

## 2020-02-14 NOTE — PROVIDER CONTACT NOTE (OTHER) - ASSESSMENT
Pt A&04, VSS. Pt is 98% on RA. Pt c/o he awoke suddenly from sleeping and had trouble catching his breath. Denies chest pain or palpitations

## 2020-02-14 NOTE — DISCHARGE NOTE PROVIDER - NSDCFUSCHEDAPPT_GEN_ALL_CORE_FT
VERONICA DORMAN ; 03/02/2020 ; [facility] [address] [facility]  VERONICA DORMAN ; 03/11/2020 ; [facility] [address] VERONICA Sky ; 04/03/2020 ; [facility] [address] [facility] VERONICA DORMAN ; 03/02/2020 ; [facility] [address] [facility]  VERONICA DORMAN ; 03/11/2020 ; [facility] [address] VERONICA Sky ; 04/03/2020 ; [facility] [address] [facility]

## 2020-02-14 NOTE — CHART NOTE - NSCHARTNOTEFT_GEN_A_CORE
Medicine PA Note     VERONICA DORMAN  MRN-[id number]  Allergies    No Known Allergies    Intolerances     Called to evaluate patient for shortness of breath. As per RN, patient had one episode of shortness of breath which he states "woke him up from sleep". Pt currently sitting upright, resting comfortably. Pt states he has not had any similar episodes in the past. Denies chest pain, dizziness, headache, palpitations.    Vital Signs Last 24 Hrs  T(C): 36.7 (02-14-20 @ 00:20), Max: 36.8 (02-13-20 @ 14:22)  T(F): 98.1 (02-14-20 @ 00:20), Max: 98.3 (02-13-20 @ 17:13)  HR: 96 (02-14-20 @ 00:20) (65 - 99)  BP: 126/72 (02-14-20 @ 00:20) (96/55 - 126/72)  BP(mean): --  RR: 19 (02-14-20 @ 00:20) (18 - 19)  SpO2: 98% (02-14-20 @ 00:20) (96% - 98%)  Daily Height in cm: 177.8 (13 Feb 2020 18:02)       I&O's Summary    CAPILLARY BLOOD GLUCOSE                          12.4   6.92  )-----------( 133      ( 13 Feb 2020 11:28 )             36.7     02-13    130<L>  |  92<L>  |  41<H>  ----------------------------<  356<H>  4.3   |  24  |  1.68<H>    Ca    9.4      13 Feb 2020 11:28    TPro  6.9  /  Alb  3.7  /  TBili  0.6  /  DBili  x   /  AST  22  /  ALT  32  /  AlkPhos  83  02-13      Radiology:   < from: Xray Chest 2 Views PA/Lat (02.13.20 @ 11:55) >  IMPRESSION: Clear lungs.  < end of copied text >      PHYSICAL EXAM:  GENERAL: NAD, well-developed  CHEST/LUNG: Clear to auscultation bilaterally; No wheeze  HEART: Regular rate and rhythm;   ABDOMEN: Soft, Nontender, Nondistended; Bowel sounds present  EXTREMITIES:  2+ Peripheral Pulses, No clubbing, cyanosis, or edema      Assessment/Plan: HPI:  80 yo male h/o cad s/p pci x 17, chf s/p aicd, dm, htn, chol, a/w chest pain r/o acs, now with shortness of breath.    #Shortness of breath, Patient HD stable, O2sat is 98% on RA, Pt in no current distress. Low likelihood of ?CHF exacerbation  -Stat EKG ordered  -Stat CXR  -Trops/cardiac enzymes ordered  -Will continue to follow and assess closely.  -Day team to follow in AM    -Andrei Galloway PA-C, [id number], Dept of Medicine

## 2020-02-14 NOTE — DISCHARGE NOTE PROVIDER - NSDCMRMEDTOKEN_GEN_ALL_CORE_FT
allopurinol 300 mg oral tablet: 1 tab(s) orally once a day  Aspirin Enteric Coated 81 mg oral delayed release tablet: 1 tab(s) orally once a day MDD:1  Brilinta (ticagrelor) 90 mg oral tablet: 1 tab(s) orally 2 times a day MDD:1  carvedilol 25 mg oral tablet: 1 tab(s) orally every 12 hours  Centrum Silver Men&#x27;s oral tablet: 1 tab(s) orally once a day  isosorbide mononitrate 60 mg oral tablet, extended release: 1 tab(s) orally once a day (in the morning)  Lantus 100 units/mL subcutaneous solution: 40 unit(s) subcutaneous once a day (at bedtime)  Lasix 40 mg oral tablet: 1 tab(s) orally once a day  Lipitor 80 mg oral tablet: 1 tab(s) orally once a day  NovoLOG 100 units/mL subcutaneous solution: 12 unit(s) subcutaneous once a day  Onglyza 5 mg oral tablet: 1 tab(s) orally once a day  Plavix 75 mg oral tablet: 1 tab(s) orally once a day  potassium citrate 10 mEq oral tablet, extended release: 2 tab(s) orally 2 times a day  ranolazine 500 mg oral tablet, extended release: 1 tab(s) orally 2 times a day

## 2020-02-14 NOTE — DISCHARGE NOTE PROVIDER - CARE PROVIDER_API CALL
Stuart Sarkar (MD)  Cardiovascular Disease; Internal Medicine  [address] [facility] [address]  [place] [address]  Phone: [phone]  Fax: [phone]  Follow Up Time: 1 week

## 2020-02-23 NOTE — ED PROVIDER NOTE - CRITICAL CARE PROVIDED
interpretation of diagnostic studies/additional history taking/consultation with other physicians/direct patient care (not related to procedure)

## 2020-02-23 NOTE — CHART NOTE - NSCHARTNOTEFT_GEN_A_CORE
pt with rising trop  denies active chest pain  will start on hep gtt for rising trop  cont to trend trop  plan d/w cards

## 2020-02-23 NOTE — CONSULT NOTE ADULT - PROBLEM SELECTOR RECOMMENDATION 9
Will start on Lantus 30 units at bed time.  Will start on Humalog 9 units before each meal in addition to Humalog correction scale coverage.  Patient counseled for compliance with consistent low carb diet, exercise.

## 2020-02-23 NOTE — ED PROVIDER NOTE - ATTENDING CONTRIBUTION TO CARE
Attending MD Catherine Rizo:   I personally have seen and examined this patient.  Physician assistant note reviewed and agree on plan of care and except where noted.  See HPI, PE, and MDM for details.

## 2020-02-23 NOTE — CONSULT NOTE ADULT - ASSESSMENT
Assessment  DMT2: 79y Male with DM T2 with hyperglycemia, was on insulin at home, admitted with CHF exacerbation, blood sugars running high, no hypoglycemic episode,  eating meals, compliant with low carb diet.  CHF: on medications, no chest pain, stable, monitored.  HTN: Controlled,  on antihypertensive medications.  CKD: Monitor labs/BMP,   Overweight/Obesity: No strict exercise routines, not on any weight loss program, neither on low calorie diet.          Vincenzo Lujan MD  Cell: [phone]  Office: [phone]
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
## 2020-02-23 NOTE — ED PROVIDER NOTE - PROGRESS NOTE DETAILS
Attending Catherine Rizo: labs show elevated troponin, ekg performed and showing st depressions which appear new. pt denies any active chest pain. pocus does show diffuse B lines concerning for pulmonary edema. cards paged. consider starting heparin and diuresis Spoke to Dr. Franko Romero in regards to pt. Dr. Romero covering for Dr. Sarkar. Reviewed case and elevated troponins. States to hold heparin for now and trend trops. Pt to be admitted back to [facility]. Roly Romero will see in am and medicine to call team for any emergent needs   Pauline Mirza PA-C

## 2020-02-23 NOTE — H&P ADULT - NSHPPHYSICALEXAM_GEN_ALL_CORE
Vital Signs Last 24 Hrs  T(C): 37.3 (23 Feb 2020 11:20), Max: 37.5 (23 Feb 2020 11:00)  T(F): 99.1 (23 Feb 2020 11:20), Max: 99.5 (23 Feb 2020 11:00)  HR: 102 (23 Feb 2020 11:00) (102 - 107)  BP: 97/67 (23 Feb 2020 11:00) (97/67 - 112/73)  BP(mean): --  RR: 18 (23 Feb 2020 11:00) (18 - 20)  SpO2: 96% (23 Feb 2020 11:00) (96% - 96%)    PHYSICAL EXAM:  GENERAL: NAD, well-developed  HEAD:  Atraumatic, Normocephalic  EYES: EOMI, PERRLA, conjunctiva and sclera clear  NECK: Supple, No JVD  CHEST/LUNG: Clear to auscultation bilaterally; No wheeze  HEART: Regular rate and rhythm; No murmurs, rubs, or gallops  ABDOMEN: Soft, Nontender, Nondistended; Bowel sounds present  EXTREMITIES:  2+ Peripheral Pulses, No clubbing, cyanosis, or edema  PSYCH: AAOx3  NEUROLOGY: non-focal  SKIN: No rashes or lesions

## 2020-02-23 NOTE — H&P ADULT - HISTORY OF PRESENT ILLNESS
78yo man PMH DM, HTN, HLD, CAD s/p CABG and stents x 17 and AICD placement 11/2019 presents with  sob and palpitations. pt states felt well yesterday and went to a 50th birthday party. did eat a large meal at that time. felt okay when he returned home. overnight felt increased sob and difficulty breathing. no fevers or chills. denies any cough. not normally on oxygen. thinks he is losing weight. no pain with inspiration. no swelling to legs. pt denies any chest pain

## 2020-02-23 NOTE — ED PROVIDER NOTE - OBJECTIVE STATEMENT
Attending Catherine Rizo: 80 y/o male h/o CHF, CAD, HTn presenting with sob and palpitations. pt states felt well yesterday and went to a 50th birthday party. did eat a large meal at that time. felt okay when he returned home. overnight felt increased sob and difficulty breathing. no fevers or chills. denies any cough. not normally on oxygen. thinks he is losing weight. no pain with inspiration. no swelling to legs. pt denies any chest pain

## 2020-02-23 NOTE — ED ADULT NURSE NOTE - OBJECTIVE STATEMENT
Pt is a 78 yo M who was brought to the ED from home via EMS c/o sob and palpitations since 2am. Per EMS pt NSR , 100% 02 on RA. Denies fever/chills. Pt is a 78 yo M who was brought to the ED from home via EMS c/o sob and palpitations since 2am. Per EMS pt NSR , 100% 02 on RA. Denies fever/chills. A/O resp regular and unlabored, color good, skin warm, dry, +pulses, no edema.

## 2020-02-23 NOTE — ED ADULT NURSE NOTE - PMH
AICD (automatic cardioverter/defibrillator) present    Angina pectoris associated with type 2 diabetes mellitus    Anxiety    Arthritis    CAD (Coronary Artery Disease)    CHF (congestive heart failure)  as per medical records Pt denies t PST 08/23/2016  Chronic Gout    CKD (chronic kidney disease) stage 3, GFR 30-59 ml/min    Depression    Diverticula, Colon    Erectile dysfunction    HTN (Hypertension)    Hypercholesteremia    MI (myocardial infarction)  x2- 2013/2014  Renal Stone    Type 2 diabetes, uncontrolled, with renal manifestation

## 2020-02-23 NOTE — H&P ADULT - ASSESSMENT
78yo man PMH DM, HTN, HLD, CAD s/p CABG and stents x 17 and AICD placement 11/2019 presents with acute on chronic systolic heart failure, chest pain, r/o acs    acute on chronic systolic heart failure  cards f/u  diuresis with lasix iv  daily wt  strict i/o  trend trop  tele monitor  interrogate aicd    uncontrolled DM  endo consulted  insulin  monitor fs    ckd  creat around baseline  monitor  avoid nephrotoxins    h/o cad s/p pci  cont DAPT    cont other home meds    dvt ppx      Advanced care planning was discussed with patient and family.  Advanced care planning forms were reviewed and discussed as appropriate.  Differential diagnosis and plan of care discussed with patient after the evaluation.   Pain assessed and judicious use of narcotics when appropriate was discussed.  Importance of Fall prevention discussed.  Counseling on Smoking and Alcohol cessation was offered when appropriate.  Counseling on Diet, exercise, and medication compliance was done.   Approx 65 minutes spent.

## 2020-02-23 NOTE — ED PROVIDER NOTE - CLINICAL SUMMARY MEDICAL DECISION MAKING FREE TEXT BOX
Attending Catherine Rizo: 72 y/o male h/o CHF, CAD with multiple stents presenting with palpitations and sob. upon arrival pt with increased wob. ekg performed showing ST depressions. pt denies any chest pain however does report palpitaitons. placed on monitor. pt hypoxic on room air requiring supplemental oxygen. pocus shows evidence of anterior B lines concerning for possible pulmonary edema. d/w cards as labs showed elevated troponin. agrees with diuresis. if pt develops pain will likley start heparin gtt. plan to admit. troponin elevation could also be demand fro CHF exacerbation. cards consulted

## 2020-02-23 NOTE — ED PROVIDER NOTE - PHYSICAL EXAMINATION
Attending Catherine Rizo: Gen: increased wob, heent: atrauamtic, eomi, perrla, mmm, op pink, uvula midline, neck; nttp, no nuchal rigidity, chest: nttp, no crepitus, cv: rrr, , lungs: scattered rhonchi on left, decreased on right, abd: soft, nontender, nondistended, no peritoneal signs, +BS, no guarding, ext: wwp, neg homans, skin: no rash, neuro: awake and alert, following commands, speech clear, sensation and strength intact, no focal deficits

## 2020-02-24 NOTE — PROGRESS NOTE ADULT - ASSESSMENT
Assessment  DMT2: 79y Male with DM T2 with hyperglycemia, A1C 10.7%, was on insulin at home, now on basal bolus insulin, blood sugars improving though still running high, no hypoglycemic episodes. Patient is eating meals, appears comfortable.  CHF: on medications, no chest pain, stable, monitored.  HTN: Controlled,  on antihypertensive medications.  CKD: Monitor labs/BMP,   Overweight/Obesity: No strict exercise routines, not on any weight loss program, neither on low calorie diet.          Vincenzo Lujan MD  Cell: [phone]  Office: [phone] Assessment  DMT2: 79y Male with DM T2 with hyperglycemia, A1C 10.7%, was on insulin at home, now on basal bolus insulin,  blood sugars improving though still running high, no hypoglycemic episodes. Patient is eating meals, appears comfortable.  CHF: on medications, no chest pain, stable, monitored.  HTN: Controlled,  on antihypertensive medications.  CKD: Monitor labs/BMP,   Overweight/Obesity: No strict exercise routines, not on any weight loss program, neither on low calorie diet.          Vincenzo Lujan MD  Cell: [phone]  Office: [phone]

## 2020-02-24 NOTE — CONSULT NOTE ADULT - ASSESSMENT
78yo man PMH DM, HTN, HLD, CAD s/p CABG and stents x 17 and AICD placement 11/2019 presents with  sob and palpitations. dx with chf.  also with ckd III       1- ckd III   2- chf   3- HTN     cr seems to be near baseline range  has hx of renal calculi.   to have renal sono r/o obstuction   lasix 80 mg iv bid today and re-evaluate in am   coreg 25 mg po bid
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
## 2020-02-24 NOTE — CONSULT NOTE ADULT - ASSESSMENT
79 year-old with known coronary artery disease status post CABG and PCI, ischemic cardiomyopathy status post ICD ([facility] [facility]), presents with symptoms consistent with acute on chronic systolic heart failure, e.g. orthopnea, seen to have elevated troponin.  Cardiac enzyme rise is minimal and has peaked.  No need to trend further.  Okay to discontinue heparin gtt at this time.    Continue dual antiplatelet therapy with ASA and ticagrelor.  Continue beta-blocker at carvedilol 25 mg BID.  Continue long acting nitrate and Ranexa as anti-anginal medications.    Rales on exam:  Diuresis as tolerated. Would use Lasix 80 mg IV push BID for now, then wean down.  Keep O > I, K > 4.0, Mag > 2.0.  Daily standing weight.  Monitor renal function. Nephrology consult requested.    When optimized, restart Entresto and uptitrate as tolerated as outpatient.  Would also consider aldosterone antagonist if renal function permits.    Patient had small vessel disease on November 2019 cath. No immediate plans for left heart catheterization at this time.

## 2020-02-24 NOTE — PROGRESS NOTE ADULT - PROBLEM SELECTOR PLAN 1
Will increase Lantus to 35u at bedtime.  Will increase Humalog to 11u before each meal and continue Humalog correction scale coverage. Will continue monitoring FS and FU.  Patient counseled for compliance with consistent low carb diet and exercise as tolerated outpatient.

## 2020-02-24 NOTE — PROGRESS NOTE ADULT - ASSESSMENT
78yo man PMH DM, HTN, HLD, CAD s/p CABG and stents x 17 and AICD placement 11/2019 presents with acute on chronic systolic heart failure, chest pain, r/o acs    acute on chronic systolic heart failure  cards f/u  diuresis with lasix 80 iv bid  daily wt  strict i/o  tele monitor  interrogate aicd    +trop   now have leveled off  no chest pain  cards f/u noted  ok to dc hep gtt  no plans for cath at this time    uncontrolled DM  endo consulted  insulin  monitor fs    ckd  creat around baseline  monitor  avoid nephrotoxins  renal consutled     h/o cad s/p pci  cont DAPT    cont other home meds    dvt ppx      Advanced care planning was discussed with patient and family.  Advanced care planning forms were reviewed and discussed as appropriate.  Differential diagnosis and plan of care discussed with patient after the evaluation.   Pain assessed and judicious use of narcotics when appropriate was discussed.  Importance of Fall prevention discussed.  Counseling on Smoking and Alcohol cessation was offered when appropriate.  Counseling on Diet, exercise, and medication compliance was done.   Approx 65 minutes spent.
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
## 2020-02-24 NOTE — PROGRESS NOTE ADULT - SUBJECTIVE AND OBJECTIVE BOX
Chief complaint  Patient is a 79y old  Male who presents with a chief complaint of cp, sob (24 Feb 2020 14:54)   Review of systems  Patient in bed, looks comfortable, no hypoglycemia.    Labs and Fingersticks  CAPILLARY BLOOD GLUCOSE      POCT Blood Glucose.: 158 mg/dL (24 Feb 2020 16:30)  POCT Blood Glucose.: 198 mg/dL (24 Feb 2020 11:52)  POCT Blood Glucose.: 257 mg/dL (24 Feb 2020 07:27)  POCT Blood Glucose.: 263 mg/dL (23 Feb 2020 23:00)  POCT Blood Glucose.: 316 mg/dL (23 Feb 2020 20:55)  POCT Blood Glucose.: 282 mg/dL (23 Feb 2020 19:25)      Anion Gap, Serum: 12 (02-24 @ 06:06)  Anion Gap, Serum: 14 (02-23 @ 11:08)      Calcium, Total Serum: 9.5 (02-24 @ 06:06)  Calcium, Total Serum: 9.2 (02-23 @ 11:08)  Albumin, Serum: 3.6 (02-24 @ 06:06)  Albumin, Serum: 3.5 (02-23 @ 11:08)    Alanine Aminotransferase (ALT/SGPT): 36 (02-24 @ 06:06)  Alanine Aminotransferase (ALT/SGPT): 36 (02-23 @ 11:08)  Alkaline Phosphatase, Serum: 83 (02-24 @ 06:06)  Alkaline Phosphatase, Serum: 84 (02-23 @ 11:08)  Aspartate Aminotransferase (AST/SGOT): 32 (02-24 @ 06:06)  Aspartate Aminotransferase (AST/SGOT): 31 (02-23 @ 11:08)        02-24    135  |  99  |  35<H>  ----------------------------<  246<H>  4.0   |  24  |  1.65<H>    Ca    9.5      24 Feb 2020 06:06  Mg     1.8     02-23    TPro  7.0  /  Alb  3.6  /  TBili  0.8  /  DBili  x   /  AST  32  /  ALT  36  /  AlkPhos  83  02-24                        11.7   8.96  )-----------( 129      ( 24 Feb 2020 06:06 )             36.3     Medications  MEDICATIONS  (STANDING):  allopurinol 300 milliGRAM(s) Oral daily  aspirin enteric coated 81 milliGRAM(s) Oral daily  atorvastatin 80 milliGRAM(s) Oral at bedtime  carvedilol 25 milliGRAM(s) Oral every 12 hours  dextrose 5%. 1000 milliLiter(s) (50 mL/Hr) IV Continuous <Continuous>  dextrose 50% Injectable 12.5 Gram(s) IV Push once  dextrose 50% Injectable 25 Gram(s) IV Push once  dextrose 50% Injectable 25 Gram(s) IV Push once  furosemide   Injectable 80 milliGRAM(s) IV Push every 12 hours  insulin glargine Injectable (LANTUS) 35 Unit(s) SubCutaneous at bedtime  insulin lispro (HumaLOG) corrective regimen sliding scale   SubCutaneous three times a day before meals  insulin lispro (HumaLOG) corrective regimen sliding scale   SubCutaneous at bedtime  insulin lispro Injectable (HumaLOG) 11 Unit(s) SubCutaneous three times a day before meals  isosorbide   mononitrate ER Tablet (IMDUR) 60 milliGRAM(s) Oral daily  ranolazine 500 milliGRAM(s) Oral two times a day  ticagrelor 60 milliGRAM(s) Oral every 12 hours      Physical Exam  General: Patient comfortable in bed  Vital Signs Last 12 Hrs  T(F): 98.1 (02-24-20 @ 12:12), Max: 98.1 (02-24-20 @ 12:12)  HR: 90 (02-24-20 @ 12:12) (90 - 90)  BP: 111/74 (02-24-20 @ 12:12) (111/74 - 111/74)  BP(mean): --  RR: 18 (02-24-20 @ 12:12) (18 - 18)  SpO2: 95% (02-24-20 @ 12:12) (95% - 95%)  Neck: No palpable thyroid nodules.  CVS: S1S2, No murmurs  Respiratory: No wheezing, no crepitations  GI: Abdomen soft, bowel sounds positive  Musculoskeletal:  edema lower extremities.   Skin: No skin rashes, no ecchymosis    Diagnostics Chief complaint  Patient is a 79y old  Male who presents with a chief  complaint of cp, sob (24 Feb 2020 14:54)   Review of systems  Patient in bed, looks comfortable, no hypoglycemia.    Labs and Fingersticks  CAPILLARY BLOOD GLUCOSE      POCT Blood Glucose.: 158 mg/dL (24 Feb 2020 16:30)  POCT Blood Glucose.: 198 mg/dL (24 Feb 2020 11:52)  POCT Blood Glucose.: 257 mg/dL (24 Feb 2020 07:27)  POCT Blood Glucose.: 263 mg/dL (23 Feb 2020 23:00)  POCT Blood Glucose.: 316 mg/dL (23 Feb 2020 20:55)  POCT Blood Glucose.: 282 mg/dL (23 Feb 2020 19:25)      Anion Gap, Serum: 12 (02-24 @ 06:06)  Anion Gap, Serum: 14 (02-23 @ 11:08)      Calcium, Total Serum: 9.5 (02-24 @ 06:06)  Calcium, Total Serum: 9.2 (02-23 @ 11:08)  Albumin, Serum: 3.6 (02-24 @ 06:06)  Albumin, Serum: 3.5 (02-23 @ 11:08)    Alanine Aminotransferase (ALT/SGPT): 36 (02-24 @ 06:06)  Alanine Aminotransferase (ALT/SGPT): 36 (02-23 @ 11:08)  Alkaline Phosphatase, Serum: 83 (02-24 @ 06:06)  Alkaline Phosphatase, Serum: 84 (02-23 @ 11:08)  Aspartate Aminotransferase (AST/SGOT): 32 (02-24 @ 06:06)  Aspartate Aminotransferase (AST/SGOT): 31 (02-23 @ 11:08)        02-24    135  |  99  |  35<H>  ----------------------------<  246<H>  4.0   |  24  |  1.65<H>    Ca    9.5      24 Feb 2020 06:06  Mg     1.8     02-23    TPro  7.0  /  Alb  3.6  /  TBili  0.8  /  DBili  x   /  AST  32  /  ALT  36  /  AlkPhos  83  02-24                        11.7   8.96  )-----------( 129      ( 24 Feb 2020 06:06 )             36.3     Medications  MEDICATIONS  (STANDING):  allopurinol 300 milliGRAM(s) Oral daily  aspirin enteric coated 81 milliGRAM(s) Oral daily  atorvastatin 80 milliGRAM(s) Oral at bedtime  carvedilol 25 milliGRAM(s) Oral every 12 hours  dextrose 5%. 1000 milliLiter(s) (50 mL/Hr) IV Continuous <Continuous>  dextrose 50% Injectable 12.5 Gram(s) IV Push once  dextrose 50% Injectable 25 Gram(s) IV Push once  dextrose 50% Injectable 25 Gram(s) IV Push once  furosemide   Injectable 80 milliGRAM(s) IV Push every 12 hours  insulin glargine Injectable (LANTUS) 35 Unit(s) SubCutaneous at bedtime  insulin lispro (HumaLOG) corrective regimen sliding scale   SubCutaneous three times a day before meals  insulin lispro (HumaLOG) corrective regimen sliding scale   SubCutaneous at bedtime  insulin lispro Injectable (HumaLOG) 11 Unit(s) SubCutaneous three times a day before meals  isosorbide   mononitrate ER Tablet (IMDUR) 60 milliGRAM(s) Oral daily  ranolazine 500 milliGRAM(s) Oral two times a day  ticagrelor 60 milliGRAM(s) Oral every 12 hours      Physical Exam  General: Patient comfortable in bed  Vital Signs Last 12 Hrs  T(F): 98.1 (02-24-20 @ 12:12), Max: 98.1 (02-24-20 @ 12:12)  HR: 90 (02-24-20 @ 12:12) (90 - 90)  BP: 111/74 (02-24-20 @ 12:12) (111/74 - 111/74)  BP(mean): --  RR: 18 (02-24-20 @ 12:12) (18 - 18)  SpO2: 95% (02-24-20 @ 12:12) (95% - 95%)  Neck: No palpable thyroid nodules.  CVS: S1S2, No murmurs  Respiratory: No wheezing, no crepitations  GI: Abdomen soft, bowel sounds positive  Musculoskeletal:  edema lower extremities.   Skin: No skin rashes, no ecchymosis    Diagnostics
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
## 2020-02-25 NOTE — PROGRESS NOTE ADULT - ASSESSMENT
Anxiety improved.    Recommendations  Continue with Remeron 15mg p.o. qhs.  Pt. given my office contact number for outpatient followup.  Following.    Ramana Meadows M.D.  Psychiatry  [phone]

## 2020-02-25 NOTE — PROGRESS NOTE ADULT - ASSESSMENT
Assessment  DMT2: 79y Male with DM T2 with hyperglycemia, A1C 10.7%, was on insulin at home, now on basal bolus insulin, increased dose yesterday, blood sugars improving with some fluctuations, no hypoglycemic episodes. Patient is eating meals, appears comfortable, daughter at the bedside.  CHF: on medications, no chest pain, stable, monitored.  HTN: Controlled,  on antihypertensive medications.  CKD: Monitor labs/BMP,   Overweight/Obesity: No strict exercise routines, not on any weight loss program, neither on low calorie diet.          Vincenzo Lujan MD  Cell: [phone]  Office: [phone] Assessment  DMT2: 79y Male with DM T2 with hyperglycemia, A1C 10.7%, was on insulin at home, now on basal bolus insulin, increased dose yesterday, blood sugars improving with some fluctuations, no hypoglycemic episodes. Patient is eating meals, appears  comfortable, daughter at the bedside.  CHF: on medications, no chest pain, stable, monitored.  HTN: Controlled,  on antihypertensive medications.  CKD: Monitor labs/BMP,   Overweight/Obesity: No strict exercise routines, not on any weight loss program, neither on low calorie diet.          Vincenzo Lujan MD  Cell: [phone]  Office: [phone]
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
## 2020-02-25 NOTE — PROGRESS NOTE ADULT - ASSESSMENT
78yo man PMH DM, HTN, HLD, CAD s/p CABG and stents x 17 and AICD placement 11/2019 presents with  sob and palpitations. dx with chf.  also with ckd III       1- ckd III   2- chf   3- HTN     cr seems to be rising   has hx of renal calculi.   to have renal sono r/o obstuction   lasix 80 mg iv bid to cont today   coreg 25 mg po bid

## 2020-02-25 NOTE — PROGRESS NOTE ADULT - SUBJECTIVE AND OBJECTIVE BOX
[facility]   [phone]  RENAL FOLLOW UP NOTE  --------------------------------------------------------------------------------  Chief Complaint:    24 hour events/subjective:    seen states breathing is improving and orthopnea sx improving     PAST HISTORY  --------------------------------------------------------------------------------  No significant changes to PMH, PSH, FHx, SHx, unless otherwise noted    ALLERGIES & MEDICATIONS  --------------------------------------------------------------------------------  Allergies    No Known Allergies    Intolerances      Standing Inpatient Medications  allopurinol 300 milliGRAM(s) Oral daily  aspirin enteric coated 81 milliGRAM(s) Oral daily  atorvastatin 80 milliGRAM(s) Oral at bedtime  carvedilol 25 milliGRAM(s) Oral every 12 hours  dextrose 5%. 1000 milliLiter(s) IV Continuous <Continuous>  dextrose 50% Injectable 12.5 Gram(s) IV Push once  dextrose 50% Injectable 25 Gram(s) IV Push once  dextrose 50% Injectable 25 Gram(s) IV Push once  furosemide   Injectable 80 milliGRAM(s) IV Push every 12 hours  insulin glargine Injectable (LANTUS) 35 Unit(s) SubCutaneous at bedtime  insulin lispro (HumaLOG) corrective regimen sliding scale   SubCutaneous three times a day before meals  insulin lispro (HumaLOG) corrective regimen sliding scale   SubCutaneous at bedtime  insulin lispro Injectable (HumaLOG) 11 Unit(s) SubCutaneous three times a day before meals  isosorbide   mononitrate ER Tablet (IMDUR) 60 milliGRAM(s) Oral daily  mirtazapine 15 milliGRAM(s) Oral at bedtime  ranolazine 500 milliGRAM(s) Oral two times a day  ticagrelor 60 milliGRAM(s) Oral every 12 hours    PRN Inpatient Medications  acetaminophen   Tablet .. 650 milliGRAM(s) Oral every 6 hours PRN  dextrose 40% Gel 15 Gram(s) Oral once PRN  glucagon  Injectable 1 milliGRAM(s) IntraMuscular once PRN      REVIEW OF SYSTEMS  --------------------------------------------------------------------------------    Gen: denies fevers/chills,  CVS: denies chest pain/palpitations  Resp: + SOB/Cough -   GI: Denies N/V/Abd pain  : Denies dysuria/oliguria/hematuria    All other systems were reviewed and are negative, except as noted.    VITALS/PHYSICAL EXAM  --------------------------------------------------------------------------------  T(C): 36.9 (02-25-20 @ 19:49), Max: 37 (02-25-20 @ 04:23)  HR: 70 (02-25-20 @ 19:49) (60 - 83)  BP: 103/68 (02-25-20 @ 19:49) (103/68 - 116/57)  RR: 18 (02-25-20 @ 19:49) (17 - 18)  SpO2: 96% (02-25-20 @ 19:49) (94% - 96%)  Wt(kg): --        02-24-20 @ 07:01  -  02-25-20 @ 07:00  --------------------------------------------------------  IN: 480 mL / OUT: 2400 mL / NET: -1920 mL    02-25-20 @ 07:01  -  02-25-20 @ 22:01  --------------------------------------------------------  IN: 560 mL / OUT: 800 mL / NET: -240 mL      Physical Exam:  	  Gen: Non toxic comfortable appearing   	no jvd  	Pulm: decrease bs  no rales or ronchi or wheezing  	CV: RRR, S1S2; no rub  	Back: No CVA tenderness  	Abd: +BS, soft, nontender/nondistended  	: No suprapubic tenderness  	UE: Warm, no cyanosis  no clubbing,  no edema  	LE: Warm, no cyanosis  no clubbing, no edema    	    LABS/STUDIES  --------------------------------------------------------------------------------              11.2   7.33  >-----------<  127      [02-25-20 @ 06:18]              34.0     138  |  100  |  42  ----------------------------<  108      [02-25-20 @ 06:18]  3.2   |  26  |  1.89        Ca     9.4     [02-25-20 @ 06:18]    TPro  7.0  /  Alb  3.6  /  TBili  0.8  /  DBili  x   /  AST  32  /  ALT  36  /  AlkPhos  83  [02-24-20 @ 06:06]      PTT: 52.4       [02-24-20 @ 09:16]          [02-24-20 @ 06:06]    Creatinine Trend:  SCr 1.89 [02-25 @ 06:18]  SCr 1.65 [02-24 @ 06:06]  SCr 1.54 [02-23 @ 11:08]  SCr 1.86 [02-14 @ 06:08]  SCr 1.68 [02-13 @ 11:28]              Urinalysis - [02-23-20 @ 15:23]      Color Light Yellow / Appearance Clear / SG 1.020 / pH 6.0      Gluc 1000 mg/dL / Ketone Negative  / Bili Negative / Urobili Negative       Blood Negative / Protein 30 mg/dL / Leuk Est Negative / Nitrite Negative      RBC 1 / WBC 1 / Hyaline 0 / Gran  / Sq Epi  / Non Sq Epi 1 / Bacteria Negative      HbA1c 10.7      [02-14-20 @ 08:35]  TSH 1.02      [02-24-20 @ 08:39]
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
## 2020-02-25 NOTE — PROGRESS NOTE ADULT - ASSESSMENT
80yo man PMH DM, HTN, HLD, CAD s/p CABG and stents x 17 and AICD placement 11/2019 presents with acute on chronic systolic heart failure, chest pain, r/o acs    acute on chronic systolic heart failure  cards f/u  diuresis with lasix 80 iv bid  daily wt  strict i/o  tele monitor  interrogate aicd    +trop   now have leveled off  no chest pain  cards f/u noted  off hep gtt  no plans for cath at this time    uncontrolled DM  endo consulted  insulin  monitor fs    ckd  creat around baseline  monitor  avoid nephrotoxins  renal follow up    h/o cad s/p pci  cont DAPT    cont other home meds    dvt ppx      Advanced care planning was discussed with patient and family.  Advanced care planning forms were reviewed and discussed as appropriate.  Differential diagnosis and plan of care discussed with patient after the evaluation.   Pain assessed and judicious use of narcotics when appropriate was discussed.  Importance of Fall prevention discussed.  Counseling on Smoking and Alcohol cessation was offered when appropriate.  Counseling on Diet, exercise, and medication compliance was done.   Approx 65 minutes spent.

## 2020-02-25 NOTE — PROGRESS NOTE ADULT - SUBJECTIVE AND OBJECTIVE BOX
Events noted. Less anxious/depressed today. Slept well with Remeron. Eating well.       Vital Signs Last 24 Hrs  T(C): 36.6 (25 Feb 2020 11:50), Max: 37 (25 Feb 2020 04:23)  T(F): 97.8 (25 Feb 2020 11:50), Max: 98.6 (25 Feb 2020 04:23)  HR: 82 (25 Feb 2020 11:50) (75 - 92)  BP: 106/67 (25 Feb 2020 11:50) (106/67 - 118/68)  BP(mean): --  RR: 18 (25 Feb 2020 11:50) (18 - 18)  SpO2: 94% (25 Feb 2020 11:50) (94% - 100%)                          11.2   7.33  )-----------( 127      ( 25 Feb 2020 06:18 )             34.0       02-25    138  |  100  |  42<H>  ----------------------------<  108<H>  3.2<L>   |  26  |  1.89<H>    Ca    9.4      25 Feb 2020 06:18    TPro  7.0  /  Alb  3.6  /  TBili  0.8  /  DBili  x   /  AST  32  /  ALT  36  /  AlkPhos  83  02-24              MEDICATIONS  (STANDING):  allopurinol 300 milliGRAM(s) Oral daily  aspirin enteric coated 81 milliGRAM(s) Oral daily  atorvastatin 80 milliGRAM(s) Oral at bedtime  carvedilol 25 milliGRAM(s) Oral every 12 hours  dextrose 5%. 1000 milliLiter(s) (50 mL/Hr) IV Continuous <Continuous>  dextrose 50% Injectable 12.5 Gram(s) IV Push once  dextrose 50% Injectable 25 Gram(s) IV Push once  dextrose 50% Injectable 25 Gram(s) IV Push once  furosemide   Injectable 80 milliGRAM(s) IV Push every 12 hours  insulin glargine Injectable (LANTUS) 35 Unit(s) SubCutaneous at bedtime  insulin lispro (HumaLOG) corrective regimen sliding scale   SubCutaneous three times a day before meals  insulin lispro (HumaLOG) corrective regimen sliding scale   SubCutaneous at bedtime  insulin lispro Injectable (HumaLOG) 11 Unit(s) SubCutaneous three times a day before meals  isosorbide   mononitrate ER Tablet (IMDUR) 60 milliGRAM(s) Oral daily  mirtazapine 15 milliGRAM(s) Oral at bedtime  potassium chloride    Tablet ER 40 milliEquivalent(s) Oral every 4 hours  ranolazine 500 milliGRAM(s) Oral two times a day  ticagrelor 60 milliGRAM(s) Oral every 12 hours    MEDICATIONS  (PRN):  acetaminophen   Tablet .. 650 milliGRAM(s) Oral every 6 hours PRN Mild Pain (1 - 3)  dextrose 40% Gel 15 Gram(s) Oral once PRN Blood Glucose LESS THAN 70 milliGRAM(s)/deciliter  glucagon  Injectable 1 milliGRAM(s) IntraMuscular once PRN Glucose LESS THAN 70 milligrams/deciliter      Elderly WM in bed, calm, cooperative, alert and oriented x 3  .  No psychomotor abnormalities. Insight and judgment are fair. Speech is coherent with normal rate and volume. No hallucinations nor delusions. The patient denied suicidal and homicidal ideation and plan. Mood is euthymic and affect full range and appropriate. Attention and concentration, short term memory, and long term memory within normal limits.     Given cognitive behavioral therapy techniques to address anxiety.

## 2020-02-25 NOTE — PROGRESS NOTE ADULT - ASSESSMENT
79 year-old with known coronary artery disease status post CABG and PCI, ischemic cardiomyopathy status post ICD ([employer] [facility]), presents with symptoms consistent with acute on chronic systolic heart failure, e.g. orthopnea, seen to have elevated troponin.  Cardiac enzyme rise is minimal and has peaked, most consistent with acute on chronic systolic heart failure.    Continue dual antiplatelet therapy with ASA and ticagrelor.  Continue beta-blocker at carvedilol 25 mg BID.  Continue long acting nitrate and Ranexa as anti-anginal medications.    Rales on exam:  Diuresis as tolerated. Would use Lasix 80 mg IV push BID for now, then wean down.  Keep O > I, K > 4.0, Mag > 2.0.  Daily standing weight.  Monitor renal function. Nephrology consult requested.    When optimized, restart Entresto and uptitrate as tolerated as outpatient.  Would also consider aldosterone antagonist if renal function permits.    Patient had small vessel disease on November 2019 cath. No immediate plans for left heart catheterization at this time.

## 2020-02-25 NOTE — PROGRESS NOTE ADULT - PROBLEM SELECTOR PLAN 1
Will continue current insulin regimen for now. Will continue monitoring FS and FU.  Patient was on insulin at home with poorly controlled DM, A1C 10.7%; Suggest to DC on current inpatient insulin regimen and FU endo 4 weeks.  Discussed plan with patient and family. Counseled for compliance with consistent low carb diet and exercise as tolerated outpatient.

## 2020-02-25 NOTE — PROGRESS NOTE ADULT - SUBJECTIVE AND OBJECTIVE BOX
Chief complaint  Patient is a 79y old  Male who presents with a chief complaint of cp, sob (25 Feb 2020 12:14)   Review of systems  Patient in bed, looks comfortable, no hypoglycemia.    Labs and Fingersticks  CAPILLARY BLOOD GLUCOSE      POCT Blood Glucose.: 197 mg/dL (25 Feb 2020 11:28)  POCT Blood Glucose.: 156 mg/dL (25 Feb 2020 07:49)  POCT Blood Glucose.: 165 mg/dL (24 Feb 2020 22:24)  POCT Blood Glucose.: 158 mg/dL (24 Feb 2020 16:30)      Anion Gap, Serum: 12 (02-25 @ 06:18)  Anion Gap, Serum: 12 (02-24 @ 06:06)      Calcium, Total Serum: 9.4 (02-25 @ 06:18)  Calcium, Total Serum: 9.5 (02-24 @ 06:06)  Albumin, Serum: 3.6 (02-24 @ 06:06)    Alanine Aminotransferase (ALT/SGPT): 36 (02-24 @ 06:06)  Alkaline Phosphatase, Serum: 83 (02-24 @ 06:06)  Aspartate Aminotransferase (AST/SGOT): 32 (02-24 @ 06:06)        02-25    138  |  100  |  42<H>  ----------------------------<  108<H>  3.2<L>   |  26  |  1.89<H>    Ca    9.4      25 Feb 2020 06:18    TPro  7.0  /  Alb  3.6  /  TBili  0.8  /  DBili  x   /  AST  32  /  ALT  36  /  AlkPhos  83  02-24                        11.2   7.33  )-----------( 127      ( 25 Feb 2020 06:18 )             34.0     Medications  MEDICATIONS  (STANDING):  allopurinol 300 milliGRAM(s) Oral daily  aspirin enteric coated 81 milliGRAM(s) Oral daily  atorvastatin 80 milliGRAM(s) Oral at bedtime  carvedilol 25 milliGRAM(s) Oral every 12 hours  dextrose 5%. 1000 milliLiter(s) (50 mL/Hr) IV Continuous <Continuous>  dextrose 50% Injectable 12.5 Gram(s) IV Push once  dextrose 50% Injectable 25 Gram(s) IV Push once  dextrose 50% Injectable 25 Gram(s) IV Push once  furosemide   Injectable 80 milliGRAM(s) IV Push every 12 hours  insulin glargine Injectable (LANTUS) 35 Unit(s) SubCutaneous at bedtime  insulin lispro (HumaLOG) corrective regimen sliding scale   SubCutaneous three times a day before meals  insulin lispro (HumaLOG) corrective regimen sliding scale   SubCutaneous at bedtime  insulin lispro Injectable (HumaLOG) 11 Unit(s) SubCutaneous three times a day before meals  isosorbide   mononitrate ER Tablet (IMDUR) 60 milliGRAM(s) Oral daily  mirtazapine 15 milliGRAM(s) Oral at bedtime  potassium chloride    Tablet ER 40 milliEquivalent(s) Oral every 4 hours  ranolazine 500 milliGRAM(s) Oral two times a day  ticagrelor 60 milliGRAM(s) Oral every 12 hours      Physical Exam  General: Patient comfortable in bed  Vital Signs Last 12 Hrs  T(F): 97.8 (02-25-20 @ 11:50), Max: 98.6 (02-25-20 @ 04:23)  HR: 82 (02-25-20 @ 11:50) (81 - 83)  BP: 106/67 (02-25-20 @ 11:50) (106/67 - 116/57)  BP(mean): --  RR: 18 (02-25-20 @ 11:50) (18 - 18)  SpO2: 94% (02-25-20 @ 11:50) (94% - 96%)  Neck: No palpable thyroid nodules.  CVS: S1S2, No murmurs  Respiratory: No wheezing, no crepitations  GI: Abdomen soft, bowel sounds positive  Musculoskeletal:  edema lower extremities.   Skin: No skin rashes, no ecchymosis    Diagnostics Chief complaint  Patient is a 79y old  Male who presents with a chief complaint of cp, sob (25 Feb 2020 12:14)   Review of systems  Patient in bed, looks comfortable, no  hypoglycemia.    Labs and Fingersticks  CAPILLARY BLOOD GLUCOSE      POCT Blood Glucose.: 197 mg/dL (25 Feb 2020 11:28)  POCT Blood Glucose.: 156 mg/dL (25 Feb 2020 07:49)  POCT Blood Glucose.: 165 mg/dL (24 Feb 2020 22:24)  POCT Blood Glucose.: 158 mg/dL (24 Feb 2020 16:30)      Anion Gap, Serum: 12 (02-25 @ 06:18)  Anion Gap, Serum: 12 (02-24 @ 06:06)      Calcium, Total Serum: 9.4 (02-25 @ 06:18)  Calcium, Total Serum: 9.5 (02-24 @ 06:06)  Albumin, Serum: 3.6 (02-24 @ 06:06)    Alanine Aminotransferase (ALT/SGPT): 36 (02-24 @ 06:06)  Alkaline Phosphatase, Serum: 83 (02-24 @ 06:06)  Aspartate Aminotransferase (AST/SGOT): 32 (02-24 @ 06:06)        02-25    138  |  100  |  42<H>  ----------------------------<  108<H>  3.2<L>   |  26  |  1.89<H>    Ca    9.4      25 Feb 2020 06:18    TPro  7.0  /  Alb  3.6  /  TBili  0.8  /  DBili  x   /  AST  32  /  ALT  36  /  AlkPhos  83  02-24                        11.2   7.33  )-----------( 127      ( 25 Feb 2020 06:18 )             34.0     Medications  MEDICATIONS  (STANDING):  allopurinol 300 milliGRAM(s) Oral daily  aspirin enteric coated 81 milliGRAM(s) Oral daily  atorvastatin 80 milliGRAM(s) Oral at bedtime  carvedilol 25 milliGRAM(s) Oral every 12 hours  dextrose 5%. 1000 milliLiter(s) (50 mL/Hr) IV Continuous <Continuous>  dextrose 50% Injectable 12.5 Gram(s) IV Push once  dextrose 50% Injectable 25 Gram(s) IV Push once  dextrose 50% Injectable 25 Gram(s) IV Push once  furosemide   Injectable 80 milliGRAM(s) IV Push every 12 hours  insulin glargine Injectable (LANTUS) 35 Unit(s) SubCutaneous at bedtime  insulin lispro (HumaLOG) corrective regimen sliding scale   SubCutaneous three times a day before meals  insulin lispro (HumaLOG) corrective regimen sliding scale   SubCutaneous at bedtime  insulin lispro Injectable (HumaLOG) 11 Unit(s) SubCutaneous three times a day before meals  isosorbide   mononitrate ER Tablet (IMDUR) 60 milliGRAM(s) Oral daily  mirtazapine 15 milliGRAM(s) Oral at bedtime  potassium chloride    Tablet ER 40 milliEquivalent(s) Oral every 4 hours  ranolazine 500 milliGRAM(s) Oral two times a day  ticagrelor 60 milliGRAM(s) Oral every 12 hours      Physical Exam  General: Patient comfortable in bed  Vital Signs Last 12 Hrs  T(F): 97.8 (02-25-20 @ 11:50), Max: 98.6 (02-25-20 @ 04:23)  HR: 82 (02-25-20 @ 11:50) (81 - 83)  BP: 106/67 (02-25-20 @ 11:50) (106/67 - 116/57)  BP(mean): --  RR: 18 (02-25-20 @ 11:50) (18 - 18)  SpO2: 94% (02-25-20 @ 11:50) (94% - 96%)  Neck: No palpable thyroid nodules.  CVS: S1S2, No murmurs  Respiratory: No wheezing, no crepitations  GI: Abdomen soft, bowel sounds positive  Musculoskeletal:  edema lower extremities.   Skin: No skin rashes, no ecchymosis    Diagnostics

## 2020-02-26 NOTE — PHYSICAL THERAPY INITIAL EVALUATION ADULT - PERTINENT HX OF CURRENT PROBLEM, REHAB EVAL
78 y/o M pt w/ the below PMH presents w/ SOB and increased HR in setting of acute on chronic systolic heart failure now improving on IV lasix.

## 2020-02-26 NOTE — DISCHARGE NOTE PROVIDER - NSDCCPCAREPLAN_GEN_ALL_CORE_FT
PRINCIPAL DISCHARGE DIAGNOSIS  Diagnosis: CHF exacerbation  Assessment and Plan of Treatment: Weigh yourself daily.  If you gain 3lbs in 3 days, or 5lbs in a week call your Health Care Provider.  Do not eat or drink foods containing more than 2000mg of salt (sodium) in your diet every day.  Call your Health Care Provider if you have any swelling or increased swelling in your feet, ankles, and/or stomach.  Take all of your medication as directed.  If you become dizzy call your Health Care Provider.        SECONDARY DISCHARGE DIAGNOSES  Diagnosis: Anxiety  Assessment and Plan of Treatment: Follow up with Dr. Meadows      Diagnosis: CKD (chronic kidney disease) stage 3, GFR 30-59 ml/min  Assessment and Plan of Treatment: Avoid taking (NSAIDs) - (ex: Ibuprofen, Advil, Celebrex, Naprosyn)  Avoid taking any nephrotoxic agents (can harm kidneys) - Intravenous contrast for diagnostic testing, combination cold medications.  Have all medications adjusted for your renal function by your Health Care Provider.  Blood pressure control is important.  Take all medication as prescribed.      Diagnosis: Type 2 diabetes, uncontrolled, with renal manifestation  Assessment and Plan of Treatment: HgA1C this admission 10.7  Make sure you get your HgA1c checked every three months.  If you take oral diabetes medications, check your blood glucose two times a day.  If you take insulin, check your blood glucose before meals and at bedtime.  It's important not to skip any meals.  Keep a log of your blood glucose results and always take it with you to your doctor appointments.  Keep a list of your current medications including injectables and over the counter medications and bring this medication list with you to all your doctor appointments.  If you have not seen your ophthalmologist this year call for appointment.  Check your feet daily for redness, sores, or openings. Do not self treat. If no improvement in two days call your primary care physician for an appointment.  Low blood sugar (hypoglycemia) is a blood sugar below 70mg/dl. Check your blood sugar if you feel signs/symptoms of hypoglycemia. If your blood sugar is below 70 take 15 grams of carbohydrates (ex 4 oz of apple juice, 3-4 glucose tablets, or 4-6 oz of regular soda) wait 15 minutes and repeat blood sugar to make sure it comes up above 70.  If your blood sugar is above 70 and you are due for a meal, have a meal.  If you are not due for a meal have a snack.  This snack helps keeps your blood sugar at a safe range.      Diagnosis: Non-ST elevation MI (NSTEMI)  Assessment and Plan of Treatment: Coronary artery disease is a condition where the arteries the supply the heart muscle get clogges with fatty deposits & puts you at risk for a heart attack  Call your doctor if you have any new pain, pressure, or discomfort in the center of your chest, pain, tingling or discomfort in arms, back, neck, jaw, or stomach, shortness of breath, nausea, vomiting, burping or heartburn, sweating, cold and clammy skin, racing or abnormal heartbeat for more than 10 minutes or if they keep coming & going.  Call 911 and do not tr to get to hospital by care  You can help yourself with lefestyle changes (quitting smoking if you smoke), eat lots of fruits & vegetables & low fat dairy products, not a lot of meat & fatty foods, walk or some form of physical activity most days of the week, lose weight if you are overweight  Take your cardiac medication as prescribed to lower cholesterol, to lower blood pressure, aspirin to prevent blood clots, and diabetes control  Make sure to keep appointments with doctor for cardiac follow up care PRINCIPAL DISCHARGE DIAGNOSIS  Diagnosis: CHF exacerbation  Assessment and Plan of Treatment: Weigh yourself daily.  If you gain 3lbs in 3 days, or 5lbs in a week call your Health Care Provider.  Do not eat or drink foods containing more than 2000mg of salt (sodium) in your diet every day.  Call your Health Care Provider if you have any swelling or increased swelling in your feet, ankles, and/or stomach.  Take all of your medication as directed.  If you become dizzy call your Health Care Provider.        SECONDARY DISCHARGE DIAGNOSES  Diagnosis: Anxiety  Assessment and Plan of Treatment: Follow up with zena Méndez      Diagnosis: CKD (chronic kidney disease) stage 3, GFR 30-59 ml/min  Assessment and Plan of Treatment: Avoid taking (NSAIDs) - (ex: Ibuprofen, Advil, Celebrex, Naprosyn)  Avoid taking any nephrotoxic agents (can harm kidneys) - Intravenous contrast for diagnostic testing, combination cold medications.  Have all medications adjusted for your renal function by your Health Care Provider.  Blood pressure control is important.  Take all medication as prescribed.  Follow up with Dr. Sarkar or Dr. Mc next week to check LAB work       Diagnosis: Type 2 diabetes, uncontrolled, with renal manifestation  Assessment and Plan of Treatment: HgA1C this admission 10.7  Make sure you get your HgA1c checked every three months.  If you take oral diabetes medications, check your blood glucose two times a day.  If you take insulin, check your blood glucose before meals and at bedtime.  It's important not to skip any meals.  Keep a log of your blood glucose results and always take it with you to your doctor appointments.  Keep a list of your current medications including injectables and over the counter medications and bring this medication list with you to all your doctor appointments.  If you have not seen your ophthalmologist this year call for appointment.  Check your feet daily for redness, sores, or openings. Do not self treat. If no improvement in two days call your primary care physician for an appointment.  Low blood sugar (hypoglycemia) is a blood sugar below 70mg/dl. Check your blood sugar if you feel signs/symptoms of hypoglycemia. If your blood sugar is below 70 take 15 grams of carbohydrates (ex 4 oz of apple juice, 3-4 glucose tablets, or 4-6 oz of regular soda) wait 15 minutes and repeat blood sugar to make sure it comes up above 70.  If your blood sugar is above 70 and you are due for a meal, have a meal.  If you are not due for a meal have a snack.  This snack helps keeps your blood sugar at a safe range.  Follow up with Dr. Lujan in  4 weeks.  Continue  consistent low carb diet and exercise as tolerated outpatient.       Diagnosis: Non-ST elevation MI (NSTEMI)  Assessment and Plan of Treatment: Coronary artery disease is a condition where the arteries the supply the heart muscle get clogges with fatty deposits & puts you at risk for a heart attack  Call your doctor if you have any new pain, pressure, or discomfort in the center of your chest, pain, tingling or discomfort in arms, back, neck, jaw, or stomach, shortness of breath, nausea, vomiting, burping or heartburn, sweating, cold and clammy skin, racing or abnormal heartbeat for more than 10 minutes or if they keep coming & going.  Call 911 and do not tr to get to hospital by care  You can help yourself with lefestyle changes (quitting smoking if you smoke), eat lots of fruits & vegetables & low fat dairy products, not a lot of meat & fatty foods, walk or some form of physical activity most days of the week, lose weight if you are overweight  Take your cardiac medication as prescribed to lower cholesterol, to lower blood pressure, aspirin to prevent blood clots, and diabetes control  Make sure to keep appointments with doctor for cardiac follow up care
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
## 2020-02-26 NOTE — PROGRESS NOTE ADULT - ASSESSMENT
78yo man PMH DM, HTN, HLD, CAD s/p CABG and stents x 17 and AICD placement 11/2019 presents with acute on chronic systolic heart failure, chest pain, r/o acs    acute on chronic systolic heart failure  improved  change to lasix 40 po bid  cards f/u    daily wt  strict i/o  tele monitor      +trop   now have leveled off  no chest pain  cards f/u noted  off hep gtt  no plans for cath at this time    uncontrolled DM  endo consulted  insulin  monitor fs    ckd  creat around baseline  monitor  avoid nephrotoxins  renal follow up    h/o cad s/p pci  cont DAPT    cont other home meds    dvt ppx  dc home  f/u for labs 3-5 dayd    Advanced care planning was discussed with patient and family.  Advanced care planning forms were reviewed and discussed as appropriate.  Differential diagnosis and plan of care discussed with patient after the evaluation.   Pain assessed and judicious use of narcotics when appropriate was discussed.  Importance of Fall prevention discussed.  Counseling on Smoking and Alcohol cessation was offered when appropriate.  Counseling on Diet, exercise, and medication compliance was done.   Approx 65 minutes spent.

## 2020-02-26 NOTE — DISCHARGE NOTE PROVIDER - CARE PROVIDERS DIRECT ADDRESSES
,DirectAddress_Unknown,DirectAddress_Unknown,DirectAddress_Unknown,[email or web address]

## 2020-02-26 NOTE — PROGRESS NOTE ADULT - ASSESSMENT
Assessment  DMT2: 79y Male with DM T2 with hyperglycemia, A1C 10.7%, was on insulin at home, now on basal bolus insulin, blood sugars improving, FS within overall acceptable range with intermittent elevations, no hypoglycemic episodes. Patient is eating meals, comfortable without complaints, planning DC home today.  CHF: on medications, no chest pain, stable, monitored.  HTN: Controlled,  on antihypertensive medications.  CKD: Monitor labs/BMP,   Overweight/Obesity: No strict exercise routines, not on any weight loss program, neither on low calorie diet.          Vincenzo Lujan MD  Cell: [phone]  Office: [phone] Assessment  DMT2: 79y Male with DM T2 with hyperglycemia, A1C 10.7%, was on insulin at home, now on basal bolus insulin, blood sugars improving, FS within overall acceptable  range with intermittent elevations, no hypoglycemic episodes. Patient is eating meals, comfortable without complaints, planning DC home today.  CHF: on medications, no chest pain, stable, monitored.  HTN: Controlled,  on antihypertensive medications.  CKD: Monitor labs/BMP,   Overweight/Obesity: No strict exercise routines, not on any weight loss program, neither on low calorie diet.          Vincenzo Lujan MD  Cell: [phone]  Office: [phone]

## 2020-02-26 NOTE — PHYSICAL THERAPY INITIAL EVALUATION ADULT - ADDITIONAL COMMENTS
As per pt he lives w/ her wife and ex-girlfriend in an apt w/ elevator access and no steps to negotiate. PTA pt was independent w/ all ADLs and mobility, no AD at baseline.

## 2020-02-26 NOTE — DISCHARGE NOTE NURSING/CASE MANAGEMENT/SOCIAL WORK - PATIENT PORTAL LINK FT
You can access the FollowMyHealth Patient Portal offered by [facility] by registering at the following website: [email or web address]. By joining [employer]’s FollowMyHealth portal, you will also be able to view your health information using other applications (apps) compatible with our system.

## 2020-02-26 NOTE — DISCHARGE NOTE PROVIDER - CARE PROVIDER_API CALL
Susan Mc (DO)  Nephrology  [address] [facility] [address] [facility] [address]  [place] [address]  Phone: [phone]  Fax: [phone]  Follow Up Time:     Ramana Meadows)  Psychiatry  [address] [address]  Phone: [phone]  Fax: [phone]  Follow Up Time:     Vincenzo Lujan)  EndocrinologyMetabDiabetes; Internal Medicine  [address] [place] [address]  Phone: [phone]  Fax: [phone]  Follow Up Time:     Stuart Sarkar)  Cardiovascular Disease; Internal Medicine  [address] [facility] [facility] [address]  [place] [address]  Phone: [phone]  Fax: [phone]  Follow Up Time:

## 2020-02-26 NOTE — DISCHARGE NOTE PROVIDER - HOSPITAL COURSE
79 year-old with known coronary artery disease status post CABG and PCI, ischemic cardiomyopathy status post ICD ([employer]), presents with symptoms consistent with acute on chronic systolic heart failure, e.g. orthopnea, seen to have elevated troponin.    Cardiac enzyme rise is minimal and has peaked, most consistent with acute on chronic systolic heart failure.        Continue dual antiplatelet therapy with ASA and ticagrelor.    Continue beta-blocker at carvedilol 25 mg BID.    Continue long acting nitrate and Ranexa as anti-anginal medications.        When optimized, restart Entresto and uptitrate as tolerated as outpatient.    Would also consider aldosterone antagonist if renal function permits.        Patient had small vessel disease on November 2019 cath. No immediate plans for left heart catheterization at this time.        RUFINA - seen by Dr. Mc. CKD III , renal US without obstructions 79 year-old with known coronary artery disease status post CABG and PCI, ischemic cardiomyopathy status post ICD ([employer]), presents with symptoms consistent with acute on chronic systolic heart failure, e.g. orthopnea, seen to have elevated troponin.    Cardiac enzyme rise is minimal and has peaked, most consistent with acute on chronic systolic heart failure.        Continue dual antiplatelet therapy with ASA and ticagrelor.    Continue beta-blocker at carvedilol 25 mg BID.    Continue long acting nitrate and Ranexa as anti-anginal medications.        When optimized, restart Entresto and uptitrate as tolerated as outpatient.    Would also consider aldosterone antagonist if renal function permits.        Patient had small vessel disease on November 2019 cath. No immediate plans for left heart catheterization at this time.        RUFINA - seen by Dr. Mc. CKD III , renal US without obstructions    DMII uncontrolled, seen by Dr. Mccain, medications adjusted. will follow up in 4 weeks        Discharged home no needs. 79 year-old with known coronary artery disease status post CABG and PCI, ischemic cardiomyopathy status post ICD ([employer]), presents with symptoms consistent with acute on chronic systolic heart failure, e.g. orthopnea, seen to have elevated troponin.    Cardiac enzyme rise is minimal and has peaked, most consistent with acute on chronic systolic heart failure.        Continue dual antiplatelet therapy with ASA and ticagrelor.    Continue beta-blocker at carvedilol 25 mg BID.    Continue long acting nitrate and Ranexa as anti-anginal medications.        When optimized, restart Entresto and uptitrate as tolerated as outpatient.    Would also consider aldosterone antagonist if renal function permits.        Patient had small vessel disease on November 2019 cath. No immediate plans for left heart catheterization at this time.        RUFINA - seen by Dr. cM. CKD III , renal US without obstructions    DMII uncontrolled, seen by Dr. Mccain, medications adjusted. will follow up in 4 weeks        Discharged home no needs.             Advanced care planning was discussed with patient and family.  Advanced care planning forms were reviewed and discussed as appropriate.    Differential diagnosis and plan of care discussed with patient after the evaluation.     Pain assessed and judicious use of narcotics when appropriate was discussed.    Importance of Fall prevention discussed.    Counseling on Smoking and Alcohol cessation was offered when appropriate.    Counseling on Diet, exercise, and medication compliance was done.     Approx 65 minutes spent.

## 2020-02-26 NOTE — PROGRESS NOTE ADULT - PROBLEM SELECTOR PLAN 1
Will continue current insulin regimen for now. Will continue monitoring FS and FU.  Patient was on insulin at home (2 injections daily) with poorly controlled DM, A1C 10.7%; Suggest to DC on current inpatient insulin regimen and FU endo 4 weeks.  Discussed plan with patient and family. Counseled for compliance with insulin pen injections, consistent low carb diet, and exercise as tolerated outpatient.

## 2020-02-26 NOTE — PROGRESS NOTE ADULT - REASON FOR ADMISSION
acute on chronic systolic heart failure
cp, sob

## 2020-02-26 NOTE — CHART NOTE - NSCHARTNOTEFT_GEN_A_CORE
Pt medically cleared by Dr. Romero and Dr. Morales for discharge home. Medications reconciled with Dr. Morales. Discharge home 40 lasix BID and current insulin regimen. Follow up with Dr. Sarkar next week for labs

## 2020-02-26 NOTE — DISCHARGE NOTE PROVIDER - NSDCMRMEDTOKEN_GEN_ALL_CORE_FT
allopurinol 300 mg oral tablet: 1 tab(s) orally once a day  Aspirin Enteric Coated 81 mg oral delayed release tablet: 1 tab(s) orally once a day MDD:1  Brilinta (ticagrelor) 90 mg oral tablet: 1 tab(s) orally 2 times a day MDD:1  carvedilol 25 mg oral tablet: 0.5 tab(s) orally every 12 hours  Centrum Silver Men&#x27;s oral tablet: 1 tab(s) orally once a day  isosorbide mononitrate 60 mg oral tablet, extended release: 1 tab(s) orally once a day (in the morning)  Lantus 100 units/mL subcutaneous solution: 40 unit(s) subcutaneous once a day (at bedtime)  Lasix 40 mg oral tablet: 1 tab(s) orally once a day  Lipitor 80 mg oral tablet: 1 tab(s) orally once a day  NovoLOG 100 units/mL subcutaneous solution: 12 unit(s) subcutaneous once a day  Onglyza 5 mg oral tablet: 1 tab(s) orally once a day    Note:Alogliptim 6.25 once a day from [facility] was suppose to replace Onglyza.  Plavix 75 mg oral tablet: 1 tab(s) orally once a day    Note:Last filled in August for 90 day supply.  potassium citrate 10 mEq oral tablet, extended release: 2 tab(s) orally 2 times a day  ranolazine 500 mg oral tablet, extended release: 1 tab(s) orally 2 times a day  Tylenol PM: 1 tab(s) orally once a day (at bedtime)  vitamin E oral capsule: 1 cap(s) orally once a day allopurinol 300 mg oral tablet: 1 tab(s) orally once a day  Aspirin Enteric Coated 81 mg oral delayed release tablet: 1 tab(s) orally once a day MDD:1  Brilinta (ticagrelor) 90 mg oral tablet: 1 tab(s) orally 2 times a day MDD:1  carvedilol 25 mg oral tablet: 0.5 tab(s) orally every 12 hours  Centrum Silver Men&#x27;s oral tablet: 1 tab(s) orally once a day  furosemide 40 mg oral tablet: 1 tab(s) orally 2 times a day  isosorbide mononitrate 60 mg oral tablet, extended release: 1 tab(s) orally once a day (in the morning)  Lantus 100 units/mL subcutaneous solution: 35 unit(s) subcutaneous once a day (at bedtime)  Lipitor 80 mg oral tablet: 1 tab(s) orally once a day  mirtazapine 15 mg oral tablet: 1 tab(s) orally once a day (at bedtime)  NovoLOG 100 units/mL subcutaneous solution: 11 unit(s) subcutaneous 3 times a day- with meals  potassium citrate 10 mEq oral tablet, extended release: 2 tab(s) orally 2 times a day  ranolazine 500 mg oral tablet, extended release: 1 tab(s) orally 2 times a day  vitamin E oral capsule: 1 cap(s) orally once a day

## 2020-02-26 NOTE — DISCHARGE NOTE PROVIDER - PROVIDER TOKENS
PROVIDER:[TOKEN:[[id number]]],PROVIDER:[TOKEN:[[id number]]],PROVIDER:[TOKEN:[[id number]]],PROVIDER:[TOKEN:[[id number]]]

## 2020-02-26 NOTE — PROGRESS NOTE ADULT - SUBJECTIVE AND OBJECTIVE BOX
Chief complaint  Patient is a 79y old  Male who presents with a chief complaint of cp, sob (26 Feb 2020 12:29)   Review of systems  Patient in bed, looks comfortable, no hypoglycemia, planning DC home today.    Labs and Fingersticks  CAPILLARY BLOOD GLUCOSE      POCT Blood Glucose.: 235 mg/dL (26 Feb 2020 11:28)  POCT Blood Glucose.: 189 mg/dL (26 Feb 2020 07:39)  POCT Blood Glucose.: 180 mg/dL (25 Feb 2020 21:25)  POCT Blood Glucose.: 127 mg/dL (25 Feb 2020 16:42)      Anion Gap, Serum: 19 <H> (02-26 @ 07:14)  Anion Gap, Serum: 12 (02-25 @ 06:18)      Calcium, Total Serum: 9.9 (02-26 @ 07:14)  Calcium, Total Serum: 9.4 (02-25 @ 06:18)          02-26    138  |  97  |  51<H>  ----------------------------<  136<H>  3.7   |  22  |  2.12<H>    Ca    9.9      26 Feb 2020 07:14                          12.5   7.36  )-----------( 138      ( 26 Feb 2020 07:14 )             38.1     Medications  MEDICATIONS  (STANDING):  allopurinol 300 milliGRAM(s) Oral daily  aspirin enteric coated 81 milliGRAM(s) Oral daily  atorvastatin 80 milliGRAM(s) Oral at bedtime  carvedilol 25 milliGRAM(s) Oral every 12 hours  dextrose 5%. 1000 milliLiter(s) (50 mL/Hr) IV Continuous <Continuous>  dextrose 50% Injectable 12.5 Gram(s) IV Push once  dextrose 50% Injectable 25 Gram(s) IV Push once  dextrose 50% Injectable 25 Gram(s) IV Push once  furosemide    Tablet 40 milliGRAM(s) Oral two times a day  heparin  Injectable 5000 Unit(s) SubCutaneous every 12 hours  insulin glargine Injectable (LANTUS) 35 Unit(s) SubCutaneous at bedtime  insulin lispro (HumaLOG) corrective regimen sliding scale   SubCutaneous three times a day before meals  insulin lispro (HumaLOG) corrective regimen sliding scale   SubCutaneous at bedtime  insulin lispro Injectable (HumaLOG) 11 Unit(s) SubCutaneous three times a day before meals  isosorbide   mononitrate ER Tablet (IMDUR) 60 milliGRAM(s) Oral daily  mirtazapine 15 milliGRAM(s) Oral at bedtime  ranolazine 500 milliGRAM(s) Oral two times a day  ticagrelor 60 milliGRAM(s) Oral every 12 hours      Physical Exam  General: Patient comfortable in bed  Vital Signs Last 12 Hrs  T(F): 97.8 (02-26-20 @ 12:23), Max: 98.2 (02-26-20 @ 04:39)  HR: 77 (02-26-20 @ 12:23) (77 - 88)  BP: 98/65 (02-26-20 @ 12:23) (98/65 - 129/76)  BP(mean): --  RR: 18 (02-26-20 @ 12:23) (18 - 18)  SpO2: 94% (02-26-20 @ 12:23) (94% - 97%)  Neck: No palpable thyroid nodules.  CVS: S1S2, No murmurs  Respiratory: No wheezing, no crepitations  GI: Abdomen soft, bowel sounds positive  Musculoskeletal:  edema lower extremities.   Skin: No skin rashes, no ecchymosis    Diagnostics Chief complaint  Patient is a 79y old  Male who presents with a chief complaint of cp, sob (26 Feb 2020 12:29)   Review of systems  Patient in bed, looks comfortable, no hypoglycemia,  planning DC home today.    Labs and Fingersticks  CAPILLARY BLOOD GLUCOSE      POCT Blood Glucose.: 235 mg/dL (26 Feb 2020 11:28)  POCT Blood Glucose.: 189 mg/dL (26 Feb 2020 07:39)  POCT Blood Glucose.: 180 mg/dL (25 Feb 2020 21:25)  POCT Blood Glucose.: 127 mg/dL (25 Feb 2020 16:42)      Anion Gap, Serum: 19 <H> (02-26 @ 07:14)  Anion Gap, Serum: 12 (02-25 @ 06:18)      Calcium, Total Serum: 9.9 (02-26 @ 07:14)  Calcium, Total Serum: 9.4 (02-25 @ 06:18)          02-26    138  |  97  |  51<H>  ----------------------------<  136<H>  3.7   |  22  |  2.12<H>    Ca    9.9      26 Feb 2020 07:14                          12.5   7.36  )-----------( 138      ( 26 Feb 2020 07:14 )             38.1     Medications  MEDICATIONS  (STANDING):  allopurinol 300 milliGRAM(s) Oral daily  aspirin enteric coated 81 milliGRAM(s) Oral daily  atorvastatin 80 milliGRAM(s) Oral at bedtime  carvedilol 25 milliGRAM(s) Oral every 12 hours  dextrose 5%. 1000 milliLiter(s) (50 mL/Hr) IV Continuous <Continuous>  dextrose 50% Injectable 12.5 Gram(s) IV Push once  dextrose 50% Injectable 25 Gram(s) IV Push once  dextrose 50% Injectable 25 Gram(s) IV Push once  furosemide    Tablet 40 milliGRAM(s) Oral two times a day  heparin  Injectable 5000 Unit(s) SubCutaneous every 12 hours  insulin glargine Injectable (LANTUS) 35 Unit(s) SubCutaneous at bedtime  insulin lispro (HumaLOG) corrective regimen sliding scale   SubCutaneous three times a day before meals  insulin lispro (HumaLOG) corrective regimen sliding scale   SubCutaneous at bedtime  insulin lispro Injectable (HumaLOG) 11 Unit(s) SubCutaneous three times a day before meals  isosorbide   mononitrate ER Tablet (IMDUR) 60 milliGRAM(s) Oral daily  mirtazapine 15 milliGRAM(s) Oral at bedtime  ranolazine 500 milliGRAM(s) Oral two times a day  ticagrelor 60 milliGRAM(s) Oral every 12 hours      Physical Exam  General: Patient comfortable in bed  Vital Signs Last 12 Hrs  T(F): 97.8 (02-26-20 @ 12:23), Max: 98.2 (02-26-20 @ 04:39)  HR: 77 (02-26-20 @ 12:23) (77 - 88)  BP: 98/65 (02-26-20 @ 12:23) (98/65 - 129/76)  BP(mean): --  RR: 18 (02-26-20 @ 12:23) (18 - 18)  SpO2: 94% (02-26-20 @ 12:23) (94% - 97%)  Neck: No palpable thyroid nodules.  CVS: S1S2, No murmurs  Respiratory: No wheezing, no crepitations  GI: Abdomen soft, bowel sounds positive  Musculoskeletal:  edema lower extremities.   Skin: No skin rashes, no ecchymosis    Diagnostics

## 2020-02-26 NOTE — DISCHARGE NOTE PROVIDER - NSDCFUSCHEDAPPT_GEN_ALL_CORE_FT
VERONICA DORMAN ; 03/02/2020 ; [facility] [address] [facility]  VERONICA DORMAN ; 03/11/2020 ; [facility] [address] VERONICA Sky ; 04/03/2020 ; [facility] [address] [address] [facility] VERONICA DORMAN ; 03/02/2020 ; [facility] [address] [facility]  VERONICA DORMAN ; 03/11/2020 ; [facility] [address] VERONICA Sky ; 04/03/2020 ; [facility] [address] [facility] VERONICA DORMAN ; 03/02/2020 ; [facility] [address] [facility]  VERONICA DORMAN ; 03/11/2020 ; [facility] [address] [address] VERONICA Sky ; 04/03/2020 ; [facility] [address] [address] [facility] VERONICA DORMAN ; 03/02/2020 ; hospitals Cardio [address] [facility]  VERONICA DORMAN ; 03/11/2020 ; hospitals [address] [address] VERONICA Sky ; 04/03/2020 ; hospitals Cardio [address] [facility] VERONICA DORMAN ; 03/02/2020 ; [facility] [address] [facility]  VERONICA DORMAN ; 03/11/2020 ; [facility] [address] [address] VERONICA Sky ; 04/03/2020 ; [facility] [address] [address] [facility]

## 2020-03-09 NOTE — ASSESSMENT
[FreeTextEntry1] : 78 y/o male seen in the home. Patient's  and 's aide in the home. Patient is alert and oriented x 4, ambulates independently and behavior appropriate during home visit. Denies ICD firing and increased swelling/weight since follow up appointment with Dr. Sarkar.Overall, patient is stable.

## 2020-03-09 NOTE — HISTORY OF PRESENT ILLNESS
[FreeTextEntry1] : Follow for hospitalization: Heart Failure [de-identified] : As per [facility]'s hospital course authored by Jeannette Olivier NP on 2/26/2020:\par \par  "79 year-old with known coronary artery disease status post CABG and PCI, ischemic cardiomyopathy status post ICD ([employer]), presents with symptoms consistent with acute on chronic systolic heart failure, e.g. orthopnea, seen to have elevated troponin.\par Cardiac enzyme rise is minimal and has peaked, most consistent with acute on chronic systolic heart failure.\par Continue dual antiplatelet therapy with ASA and ticagrelor.\par Continue beta-blocker at carvedilol 25 mg BID.\par Continue long acting nitrate and Ranexa as anti-anginal medications.\par When optimized, restart Entresto and uptitrate as tolerated as outpatient.\par Would also consider aldosterone antagonist if renal function permits.\par Patient had small vessel disease on November 2019 cath. No immediate plans for left heart catheterization at this time.\par RUFINA - seen by Dr. Mc. CKD III , renal US without obstructions\par DMII uncontrolled, seen by Dr. Mccain, medications adjusted. will follow up in 4 weeks\par Discharged home no needs."\par \par VERONICA DORMAN is being seen after discharge home from [facility]. He was admitted on 2/23/2020 for heart failure and discharged home on 2/26/2020. Discharge medications were reviewed and reconciled with the current medication list and medications in home. \par \par \par \par

## 2020-03-09 NOTE — PLAN
[FreeTextEntry1] : -CN placed call to Dr Gomes ([facility]) for follow up appointment; Left message.\par \par \par [employer] TCM STARS teaching: Enforced with patient need for daily weights. Advised to call for an increase of greater than 2 lbs. in a day or 5 pounds in a week. Adhere to low salt diet and educated patient on foods that should be avoided such as processed or fast food.Continue medications as ordered.  Follow up with Cardiologist. Contact information given, patient advised to call with any questions/concerns. \par \par

## 2020-03-09 NOTE — PHYSICAL EXAM
[No JVD] : no jugular venous distention [No Tracheal Deviation] : the trachea was midline [Normal Rate] : the respiratory rate was normal [Normal Rhythm/Effort] : normal respiratory rhythm and effort [Clear Bilaterally] : the lungs were clear to auscultation bilaterally [Rales / Crackles Bilateral] : no rales or crackles were heard [Wheezing Bilaterally] : no wheezing was heard [Rhonchi Bilateral] : no rhonchi were heard [Decreased Breath Sounds] : breath sounds were not diminished [Regular Rhythm] : with a regular rhythm [Normal S1, S2] : normal S1 and S2 [5th Left ICS - MCL] : palpated at the 5th LICS in the midclavicular line [Normal] : normal [Pedal Pulses Present] : the pedal pulses are present [No Edema] : there was no peripheral edema [No Extremity Clubbing/Cyanosis] : no extremity clubbing/cyanosis [Soft] : abdomen soft [Non Tender] : non-tender [Normal Bowel Sounds] : normal bowel sounds [No CVA Tenderness] : no CVA  tenderness [No Joint Swelling] : no joint swelling [No Rash] : no rash [Alert and Oriented x3] : oriented to person, place, and time [Right Foot Was Examined] : Right foot ~C was examined [Left Foot Was Examined] : left foot ~C was examined [] : Both feet are normal [None] : no ulcers in either foot were found [de-identified] : JORDIN- 3/9/2020

## 2020-07-02 PROBLEM — G25.2 RESTING TREMOR: Status: ACTIVE | Noted: 2020-01-01

## 2020-07-15 NOTE — H&P ADULT - PROBLEM SELECTOR PLAN 7
Transitions of Care Status:  1.  Name of PCP:     Stuart Sarkar MD (cardiology) [phone]  2.  PCP Contacted on Admission: [ ] Y    [ x] N    3.  PCP contacted at Discharge: [ ] Y    [ ] N    [ ] N/A  4.  Post-Discharge Appointment Date and Location:  5.  Summary of Handoff given to PCP:

## 2020-07-15 NOTE — ED ADULT NURSE NOTE - ED STAT RN HANDOFF DETAILS
Report given to Elizabeth pugh RN. VSS, pt resting comfortably, awaiting COVID results and bed assignment.

## 2020-07-15 NOTE — ED ADULT NURSE REASSESSMENT NOTE - NS ED NURSE REASSESS COMMENT FT1
Pt resting comfortably on stretcher, COVID swab done, admitted pending bed assignment. VSS, safety maintained, will continue to monitor.

## 2020-07-15 NOTE — H&P ADULT - PROBLEM SELECTOR PLAN 1
See above.   Would consider EPS check of AICD.  Echo.  Orthostatics.  CTT head to exclude intracranial process.

## 2020-07-15 NOTE — H&P ADULT - NSHPOUTPATIENTPROVIDERS_GEN_ALL_CORE
Stuart Sarkar MD (cardiology) [phone] Stuart Sarkar MD (cardiology) [phone]  Susan Mc DO (renal) [phone]

## 2020-07-15 NOTE — H&P ADULT - NSHPSOCIALHISTORY_GEN_ALL_CORE
No tobacco or ethanol use.    Supportive spouse (patient on phone with spouse and patient did not wish for examiner to contact spouse at this time).  Retired .  [employer] [place] [place]

## 2020-07-15 NOTE — H&P ADULT - NSHPPHYSICALEXAM_GEN_ALL_CORE
Physical exam with an elderly, nontoxic, chronically ill appearing M appears older than stated age.    Afebrile.  HR  89    RR 14.   BP  120/71   98% on RA    HEENT< PERRL< EOMI  Neck supple  No thyromegaly  Chest clear, AICD site c/d/i  Cor s1 s2  Abdomen soft nontender, normal bowel sounds, no rebound  Skin dry  Ext poor nail hallux nail hygiene, no ulcers, trace B/L LE oedema.  Neurologic AxOx3.  Speech fluent.  Cognition intact.   UE/LE 5/5  NO SI/HI.

## 2020-07-15 NOTE — H&P ADULT - PROBLEM SELECTOR PLAN 3
See above.   Conservative 0.2 U/kg x 80 kg >> 16 U Lantus x 1 for now.   Would consider formal endocrinology evaluation in the AM.
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
## 2020-07-15 NOTE — H&P ADULT - ASSESSMENT
NIGHT HOSPITALIST:  Presentation of patient with near syncopal episode in the setting of a complicated medical history of CABG, poor systolic LV function requiring an AICD, with poorly controlled glycaemic control (unclear if patient had an interval hypoglycaemic episode this AM)--will not assume vasovagal for now.   Would consider formal check by EPS for the AICD interrogation.  Doubt CVA but noncontrast head CTT ordered.   Orthostatics.    Patient with poor glycaemic control and will provide a conservative 0.2U/kg x 80 kg >> 16 U x 1 now.   Patient with normal AG and HCO3.   Would consider formal evaluation by endocrinology in the AM.

## 2020-07-15 NOTE — ED PROVIDER NOTE - CLINICAL SUMMARY MEDICAL DECISION MAKING FREE TEXT BOX
ATTG: : weakness / lightheaded, concern for cardiac / infection check labs, check ekg, cardiac work up, check xray chest, re eval for dispo

## 2020-07-15 NOTE — INPATIENT CERTIFICATION FOR MEDICARE PATIENTS - RISKS OF ADVERSE EVENTS
Concern for cardiopulmonary deterioration/Concern for delay in diagnosis and treatment/Concern for renal deterioration/Concern for neurologic deterioration

## 2020-07-15 NOTE — ED ADULT NURSE NOTE - CHPI ED NUR SYMPTOMS NEG
no back pain/no shortness of breath/no diaphoresis/no nausea/no chest pain/no chills/no congestion/no vomiting/no fever

## 2020-07-15 NOTE — H&P ADULT - NSHPLABSRESULTS_GEN_ALL_CORE
WBC 8.2.   Hgb 12.5    Platelets of 132K    Normal differential.    HS troponin 104>>94    Na+ 134.    K+ 4.2    Random glucose of 354  HCO3 26  AG 16    Cr 1.9    BNP 1689.    Chest radiograph reviewed with no infiltrate or effusion.  +AICD.    COVID-19 PCR negative.
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
## 2020-07-15 NOTE — ED PROVIDER NOTE - ATTENDING CONTRIBUTION TO CARE
78yo man PMH DM, HTN, HLD, CAD s/p CABG and stents x 17 and AICD placement 11/2019 presents by [facility] EMS for well check and found patient not feeling well. had an appt with urology and was sent for labs, started feeling weak shortly after and going home. no chest pain. no shortness of breath.   pmd Dr. Mello Lopez. elderly male, no apparent distress  HEENT:  perrl eomi  Lungs:  b/l bs  CVS: S1S2   Abd;  soft non tender no distention  Ext: no pitting edema or erythema  Neuro: aaox3 no focal deficits  MSK: strength 5/5 b/l upper and lower ext.

## 2020-07-15 NOTE — H&P ADULT - ATTENDING COMMENTS
NIGHT HOSPITALIST:   Patient/ spouse aware of course and agree with plan/care as above.   Given patient's comorbidities, patient's long term prognosis is guarded.   Emotional support provided to patient.  Care reviewed with covering NP/PA.  Care assumed by Dr. Alba.    Olivier Infante MD  [phone]

## 2020-07-15 NOTE — H&P ADULT - NSHPSOURCEINFOTX_GEN_ALL_CORE
Spouse on phone line with patient aware of course.   Patient did not wish for examiner to contact spouse at this time.   Medication review by ED pharmacist and with patient, "I am on the same Rx from Feb 2020)

## 2020-07-15 NOTE — H&P ADULT - NSHPREVIEWOFSYSTEMS_GEN_ALL_CORE
NO HA, no focal weakness  NO chest pain/pressure.  NO palpitations.  NO fever, no chills, no rigors.  NO abdominal pain, no red blood per rectum or melena.  No back pain, no tearing back pain.    NO rash.  No joint pain.  NO dysuria, no hematuria.  No weight loss or anorexia.  NO SI/HI.  NO thyroid symptoms.
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
## 2020-07-16 NOTE — DISCHARGE NOTE PROVIDER - NSDCFUSCHEDAPPT_GEN_ALL_CORE_FT
VERONICA DORMAN ; 08/06/2020 ; [facility] [address] [facility]  VERONICA DORMAN ; 09/09/2020 ; [facility] [address]  VERONICA DORMAN ; 08/06/2020 ; [facility] [address] [facility]  VERONICA DORMAN ; 09/09/2020 ; [facility] [address]

## 2020-07-16 NOTE — CONSULT NOTE ADULT - ASSESSMENT
Assessment  DMT2: 80y Male with DM T2 with hyperglycemia, last know A1C 10.7% (February 2020), repeat A1C pending, was on insulin at home, partially compliant, now on basal insulin plus coverage, blood sugars running high and not at target, no hypoglycemic episodes, eating meals, appears alert and comfortable, wants to go home.  Near Syncope: cardiac workup in progress, stable, monitored.  HTN: Controlled,  on antihypertensive medications.  HLD: On statin  CKD: Monitor labs/BMP          Vincenzo Lujan MD  Cell: [phone]  Office: [phone] Assessment  DMT2: 80y Male with DM T2 with hyperglycemia, last know A1C 10.7% (February 2020), repeat A1C pending, was on insulin at home, partially compliant,  now on basal insulin plus coverage, blood sugars running high and not at target, no hypoglycemic episodes, eating meals, appears alert and comfortable, wants to go home.  Near Syncope: cardiac workup in progress, stable, monitored.  HTN: Controlled,  on antihypertensive medications.  HLD: On statin  CKD: Monitor labs/BMP          Vincenzo Lujan MD  Cell: [phone]  Office: [phone]

## 2020-07-16 NOTE — DISCHARGE NOTE NURSING/CASE MANAGEMENT/SOCIAL WORK - NSDCPEPT PROEDHF_GEN_ALL_CORE
Report signs and symptoms to primary care provider/Call primary care provider for follow up after discharge/Activities as tolerated/Low salt diet/Monitor weight daily no

## 2020-07-16 NOTE — DISCHARGE NOTE PROVIDER - NSDCCPTREATMENT_GEN_ALL_CORE_FT
PRINCIPAL PROCEDURE  Procedure: CT head wo con  Findings and Treatment: FINDINGS: There is no acute intracranial hemorrhage, mass effect, shift of the midline structures, herniation, extra-axial fluid collection, or hydrocephalus.  There is diffuse cerebral volume loss with prominence of the sulci, fissures, and cisternal spaces which is normal for the patient's age. There is mild deep and periventricular white matter hypoattenuation statistically compatible with microvascular changes given calcific atherosclerotic disease of the intracranial arteries.  The paranasal sinuses and mastoid air cells are clear. The calvarium is intact. The imaged orbits are unremarkable.  IMPRESSION: No acute intracranial hemorrhage, mass effect, or shift of the midline structures.  Similar-appearing mild chronic white matter microvascular type changes.
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
## 2020-07-16 NOTE — CHART NOTE - NSCHARTNOTEFT_GEN_A_CORE
Pt denies any further dizziness, chest pain. Pt adamant on getting discharge. Dr. Paz discussed with cardiologist. Request from Dr. Paz to facilitate patient discharge. Medication reconciliation reviewed, revised, and resolved with Dr. Paz who had medically cleared patient for discharge with follow-up as advised. Please refer to discharge note for detailed hospital course. Patient is currently stable for discharge to home at this time.    Contact # [phone]

## 2020-07-16 NOTE — DISCHARGE NOTE PROVIDER - NSDCCPCAREPLAN_GEN_ALL_CORE_FT
PRINCIPAL DISCHARGE DIAGNOSIS  Diagnosis: Near syncope  Assessment and Plan of Treatment: Follow u p cardiologist in 1-3 days  HOME CARE INSTRUCTIONS  Have someone stay with you until you feel stable.  Do not drive, operate machinery, or play sports until your caregiver says it is okay.  Keep all follow-up appointments as directed by your caregiver.   Lie down right away if you start feeling like you might faint. Breathe deeply and steadily. Wait until all the symptoms have passed.Drink enough fluids to keep your urine clear or pale yellow.  If you are taking blood pressure or heart medicine, get up slowly, taking several minutes to sit and then stand. This can reduce dizziness.  SEEK IMMEDIATE MEDICAL CARE IF:  You have a severe headache.  You have unusual pain in the chest, abdomen, or back.  You are bleeding from the mouth or rectum, or you have black or tarry stool.  You have an irregular or very fast heartbeat.  You have pain with breathing.  You have repeated fainting or seizure-like jerking during an episode.  You faint when sitting or lying down.  You have confusion.  You have difficulty walking.  You have severe weakness.  You have vision problems.  If you fainted, call your local emergency services (__________[phone]___________). Do not drive yourself to the hospital        SECONDARY DISCHARGE DIAGNOSES  Diagnosis: Acute-on-chronic kidney injury  Assessment and Plan of Treatment: Avoid taking (NSAIDs) - (ex: Ibuprofen, Advil, Celebrex, Naprosyn)  Avoid taking any nephrotoxic agents (can harm kidneys) - Intravenous contrast for diagnostic testing, combination cold medications.  Have all medications adjusted for your renal function by your Health Care Provider.  Blood pressure control is important.  Take all medication as prescribed.      Diagnosis: Systolic CHF, chronic  Assessment and Plan of Treatment: Weigh yourself daily.  If you gain 3lbs in 3 days, or 5lbs in a week call your Health Care Provider.  Do not eat or drink foods containing more than 2000mg of salt (sodium) in your diet every day.  Call your Health Care Provider if you have any swelling or increased swelling in your feet, ankles, and/or stomach.  Take all of your medication as directed.  If you become dizzy call your Health Care Provider.      Diagnosis: Type 2 diabetes, uncontrolled, with renal manifestation  Assessment and Plan of Treatment: Last know A1C 10.7% (February 2020), repeat A1C pending - follow up results with Dr. Welch  Very important to be compliant with insulin and blood sugar checks.  Follow up with endocrine in 1 week  Make sure you get your HgA1c checked every three months.  If you take insulin, check your blood glucose before meals and at bedtime.  It's important not to skip any meals.  Keep a log of your blood glucose results and always take it with you to your doctor appointments.  Keep a list of your current medications including injectables and over the counter medications and bring this medication list with you to all your doctor appointments.  If you have not seen your ophthalmologist this year call for appointment.  Check your feet daily for redness, sores, or openings. Do not self treat. If no improvement in two days call your primary care physician for an appointment.  Low blood sugar (hypoglycemia) is a blood sugar below 70mg/dl. Check your blood sugar if you feel signs/symptoms of hypoglycemia. If your blood sugar is below 70 take 15 grams of carbohydrates (ex 4 oz of apple juice, 3-4 glucose tablets, or 4-6 oz of regular soda) wait 15 minutes and repeat blood sugar to make sure it comes up above 70.  If your blood sugar is above 70 and you are due for a meal, have a meal.  If you are not due for a meal have a snack.  This snack helps keeps your blood sugar at a safe range.      Diagnosis: HTN (Hypertension)  Assessment and Plan of Treatment: Take medications for your blood pressure as recommended.  Eat a heart healthy diet that is low in saturated fats and salt, and includes whole grains, fruits, vegetables and lean protein   Exercise regularly (consult with your physician or cardiologist first); maintain a heart healthy weight.   If you smoke - quit (A resource to help you stop smoking is the [facility] – phone number [phone].). Continue to follow with your primary physician or cardiologist.   Seek medical help for dizziness, Lightheadedness, Blurry vision, Headache, Chest pain, Shortness of breath  Follow up with your medical doctor to establish long term blood pressure treatment goals.      Diagnosis: HLD (hyperlipidemia)  Assessment and Plan of Treatment: Continue with your cholesterol medications. Eat a heart healthy diet that is low in saturated fats and salt, and includes whole grains, fruits, vegetables and lean protein; exercise regularly (consult with your physician or cardiologist first); maintain a heart healthy weight; if you smoke - quit (A resource to help you stop smoking is the [facility] – phone number [phone].). Continue to follow with your primary physician or cardiologist.  Seek medical help for dizziness, Lightheadedness, Blurry vision, Headache, Chest pain, Shortness of breath

## 2020-07-16 NOTE — DISCHARGE NOTE NURSING/CASE MANAGEMENT/SOCIAL WORK - PATIENT PORTAL LINK FT
You can access the FollowMyHealth Patient Portal offered by [facility] by registering at the following website: [email or web address]. By joining [employer]’s FollowMyHealth portal, you will also be able to view your health information using other applications (apps) compatible with our system.
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
## 2020-07-16 NOTE — PROCEDURE NOTE - ADDITIONAL PROCEDURE DETAILS
call to check ICD for near syncope   normal sensing and pacing thresholds stable lead impedence  stored data review since last interrogation on March 11, 2020   [place]vue impedence monitoring is at impedence line indicating no excess fluid accumulation   no stored episodes to correlate with symptoms    [id number]

## 2020-07-16 NOTE — CONSULT NOTE ADULT - PROBLEM SELECTOR RECOMMENDATION 3
Spontaneous, unlabored and symmetrical
Suggest to continue medications, monitoring, FU primary team recommendations.

## 2020-07-16 NOTE — DISCHARGE NOTE PROVIDER - CARE PROVIDER_API CALL
Stuart Sarkar J  CARDIOVASCULAR DISEASE  [address] [place] [address]  Phone: [phone]  Fax: [phone]  Follow Up Time: 1-3 days

## 2020-07-16 NOTE — DISCHARGE NOTE PROVIDER - NSDCMRMEDTOKEN_GEN_ALL_CORE_FT
allopurinol 300 mg oral tablet: 1 tab(s) orally once a day  Alogliptin 6.25 mg oral tablet: 1 tab(s) orally once a day  Aspirin Enteric Coated 81 mg oral delayed release tablet: 1 tab(s) orally once a day MDD:1  Brilinta (ticagrelor) 90 mg oral tablet: 1 tab(s) orally 2 times a day MDD:1  carvedilol 25 mg oral tablet: 1 tab(s) orally every 12 hours  Centrum Silver Men&#x27;s oral tablet: 1 tab(s) orally once a day  furosemide 40 mg oral tablet: 1 tab(s) orally 2 times a day  isosorbide mononitrate 60 mg oral tablet, extended release: 1 tab(s) orally once a day (in the morning)  Lantus 100 units/mL subcutaneous solution: 40 unit(s) subcutaneous once a day (at bedtime)  Lipitor 80 mg oral tablet: 1 tab(s) orally once a day  mirtazapine 15 mg oral tablet: 1 tab(s) orally once a day (at bedtime)  NovoLOG 100 units/mL subcutaneous solution: 12 unit(s) subcutaneous once a day - with dinner  potassium citrate 10 mEq oral tablet, extended release: 2 tab(s) orally 2 times a day (while on LAsix)  ranolazine 500 mg oral tablet, extended release: 1 tab(s) orally 2 times a day

## 2020-07-16 NOTE — CONSULT NOTE ADULT - PROBLEM SELECTOR RECOMMENDATION 9
Will increase Lantus to 22u at bedtime.  Will start Humalog 7u before each meal and continue Humalog correction scale coverage. Will continue monitoring FS, log, and FU.  Patient counseled for compliance with insulin injections and FS testing, consistent low carb diet and exercise as tolerated outpatient.

## 2020-08-15 NOTE — H&P ADULT - NSHPPHYSICALEXAM_GEN_ALL_CORE
Vital Signs Last 24 Hrs  T(C): 37.2 (15 Aug 2020 20:06), Max: 37.2 (15 Aug 2020 20:06)  T(F): 99 (15 Aug 2020 20:06), Max: 99 (15 Aug 2020 20:06)  HR: 108 (15 Aug 2020 20:06) (85 - 108)  BP: 124/68 (15 Aug 2020 20:06) (101/78 - 134/87)  BP(mean): 81 (15 Aug 2020 10:31) (78 - 81)  RR: 17 (15 Aug 2020 20:06) (15 - 20)  SpO2: 92% (15 Aug 2020 20:06) (88% - 97%)    PHYSICAL EXAM:  GENERAL: NAD, well-developed  HEAD:  Atraumatic, Normocephalic  EYES: EOMI, PERRLA, conjunctiva and sclera clear  NECK: Supple, No JVD  CHEST/LUNG: Clear to auscultation bilaterally; No wheeze  HEART: Regular rate and rhythm; No murmurs, rubs, or gallops  ABDOMEN: Soft, Nontender, Nondistended; Bowel sounds present  EXTREMITIES:  2+ Peripheral Pulses, No clubbing, cyanosis, or edema  PSYCH: AAOx3  NEUROLOGY: non-focal  SKIN: No rashes or lesions

## 2020-08-15 NOTE — ED ADULT NURSE NOTE - NSIMPLEMENTINTERV_GEN_ALL_ED
Implemented All Fall with Harm Risk Interventions:  [place] to call system. Call bell, personal items and telephone within reach. Instruct patient to call for assistance. Room bathroom lighting operational. Non-slip footwear when patient is off stretcher. Physically safe environment: no spills, clutter or unnecessary equipment. Stretcher in lowest position, wheels locked, appropriate side rails in place. Provide visual cue, wrist band, yellow gown, etc. Monitor gait and stability. Monitor for mental status changes and reorient to person, place, and time. Review medications for side effects contributing to fall risk. Reinforce activity limits and safety measures with patient and family. Provide visual clues: red socks.

## 2020-08-15 NOTE — ED PROVIDER NOTE - SKIN, MLM
No abrasion, laceration, hematoma, ecchymosis. Skin normal color for race, warm, dry and intact. No evidence of rash.

## 2020-08-15 NOTE — ED PROVIDER NOTE - CLINICAL SUMMARY MEDICAL DECISION MAKING FREE TEXT BOX
80M with AICD p/w worsening CP and SOB, s/p getting hit in the chest by 40lbs of tile 2 days ago. Concern for cardiac contusion vs MI - ekg, cxr, ct chest, echo, pain control.

## 2020-08-15 NOTE — ED ADULT NURSE REASSESSMENT NOTE - NS ED NURSE REASSESS COMMENT FT1
Pt is breathing unlabored on RA. As per MD Silva, pt can eat/drink. Pt provided meal at this time. Educated pt on plan of care. Safety and comfort maintained. Awaiting results. Call bell within reach.

## 2020-08-15 NOTE — ED PROVIDER NOTE - ATTENDING CONTRIBUTION TO CARE
MD Mckeon:  patient seen and evaluated personally.   I agree with the History & Physical,  Impression & Plan other than what was detailed in my note.  MD Mckeon  80M with AICD placed 11/2019, on aspirin, plavix, s/p 40 lbs of carmen falling onto his chest two days ago. presenting to ed w/ chest pain and sob. Denies f/c n/v, head injury, headache, palpitations, afebrle vitals stable, non toxic, lung exam benign with equal bs, no w/r/r, pos left chest ttp anteriorly no ttp over pacer, no abd ttp, no midline c spine ttp. plan for ct chest to screen for rib frx, ptx, do not feel ct head/c spie, abd pelvis indicated given no clincial signs of trauma two days after and mechanism. MD Mckeon:  patient seen and evaluated personally.   I agree with the History & Physical,  Impression & Plan other than what was detailed in my note.  MD Mckeon  80M with AICD placed 11/2019, on aspirin, plavix, s/p 40 lbs of carmen falling onto his chest two days ago. presenting to ed w/ chest pain and sob. Denies f/c n/v, head injury, headache, palpitations, afebrle vitals stable, non toxic, lung exam benign with equal bs, no w/r/r, pos left chest ttp anteriorly no ttp over pacer, no abd ttp, no midline c spine ttp. plan for ct chest to screen for rib frx, ptx, do not feel ct head/c spine, abd pelvis indicated given no clincial signs of trauma to these areas and  two days after and mechanism.

## 2020-08-15 NOTE — ED ADULT NURSE REASSESSMENT NOTE - NS ED NURSE REASSESS COMMENT FT1
Report received from LES Kelly. Pt is breathing unlabored on RA. VSS. Educated pt on plan of care. Safety and comfort maintained. Awaiting trauma consult. Call bell within reach. Red socks on. Belongings at bedside. EKG completed. Report received from LES Kelly. Pt is breathing unlabored on RA. VSS. EKG completed. Pt NSR on cardiac monitor, HR in 90's. 18 G to R AC in place, flushes without difficulty with + blood return. Educated pt on plan of care. Safety and comfort maintained. Awaiting trauma consult. Call bell within reach. Red socks on. Belongings at bedside.

## 2020-08-15 NOTE — ED PROVIDER NOTE - OBJECTIVE STATEMENT
80M with AICD placed 11/2019 p/w 6/10 CP today, s/p mechanical injury 2 days ago. Pt states that he was looking for something in his closet and 40lbs of tiles/carmen fell on his chest exactly where the AICD is located, knocked him to the ground, no head strike or LOC. Pt iced the area, with mild relief of pain, but then tonight he was lying in bed, trying to get comfortable, and felt "shooting pain down left arm" and SOB. Pt states that this does not feel like prior MI. Denies jaw pain, n/v, HA, dizziness. Took Tylenol at 9:30pm with mild relief of pain. 80M with AICD placed 11/2019 p/w 6/10 CP today, s/p mechanical injury 2 days ago. Pt states that he was looking for something in his closet and 40lbs of tiles/carmen fell on his chest, knocked him to the ground, no head strike or LOC. Pt iced the area, with mild relief of pain, but then tonight he was lying in bed, trying to get comfortable, and felt "shooting pain down left arm" and SOB. Pt states that this does not feel like prior MI. Denies jaw pain, n/v, HA, dizziness. Took Tylenol at 9:30pm with mild relief of pain.

## 2020-08-15 NOTE — ED ADULT NURSE NOTE - OBJECTIVE STATEMENT
79 yo male with a PMH of MI, 17 stents, AICD, on plavix presents to the ED via EMS from home complaining of L sided chest pain. Patient was looking for something in the closet on Thursday night and did not see wooden floor boards and had about 40 lbs of wood fall on his chest causing patient to fall backwards. Denies any LOC or head injury. Patient states L chest is tender, pain is reproducible upon palpation. Patient states that he felt L arm pain today as well. Is able to move the L arm but with difficulty, limited ROM. Patient is speaking in full coherent sentences, VSS. Awaiting to be seen by MDs. Denies headache, dizziness, vision changes, shortness of breath, abdominal pain, nausea, vomiting, diarrhea, fevers, chills, dysuria, hematuria, recent illness travel.

## 2020-08-15 NOTE — CONSULT NOTE ADULT - ASSESSMENT
80 year old male with PMH of CAD, AICD placement, DM2 presenting 2 days after bag of tile fell on his chest. No acute injuries noted    Plan:  - Patient noted to have creatinine elevation, recommend follow up with PCP  - No injuries noted other than some bruising   - Discussed with Dr Knapp    ACS [id number]
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
## 2020-08-15 NOTE — H&P ADULT - ASSESSMENT
81 yo male h/o DM, CHF, htn, chol, cad s/p pci, s/p aicd, a/w chest pain, following chest trauma    trauma eval noted  no acute injuries  pain control    chest pain  +trop  cont to trend   likely due to truama  will check echo and aicd  pain control    chronic systolic CHF  stable  cont home meds    cad s/p pci  cont asa/ Brilinta   statin    DM  lantus/humalog  monitor fs    ckd  creat around baseline  monitor    cont other home meds    PT        Advanced care planning was discussed with patient and family.  Advanced care planning forms were reviewed and discussed as appropriate.  Differential diagnosis and plan of care discussed with patient after the evaluation.   Pain assessed and judicious use of narcotics when appropriate was discussed.  Importance of Fall prevention discussed.  Counseling on Smoking and Alcohol cessation was offered when appropriate.  Counseling on Diet, exercise, and medication compliance was done.   Approx 30 minutes spent.

## 2020-08-15 NOTE — ED PROVIDER NOTE - PROGRESS NOTE DETAILS
Increasing troponin-discussed with trauma & do not feel that it is due to cardiac contusion. Increasing troponin-discussed with trauma & do not feel that it is due to cardiac contusion. Discussed with Dr. Sarkar's colleague-will admit to telemetry for Echo and serial troponins to Dr. Paz. Dr. Paz accepted patient.

## 2020-08-16 NOTE — PROVIDER CONTACT NOTE (OTHER) - BACKGROUND
80M h/o DM2, HTN, HLD, CHF, CAD s/p CABG  x17 stents (extensive cardiac history) s/p AICD  with AICD placed 11/2019 p/w 6/10 CP today, s/p mechanical injury

## 2020-08-16 NOTE — CONSULT NOTE ADULT - ASSESSMENT
Impression:  Ischemic cardiomyopathy with compensated CHF.                       Possible cardiac contusion with abnormal and upward trending troponin vs. small amount of myocardial necrosis from known underlying diffuse CAD.    Plan:  Observe on tele for arrhythmias.            Echocardiogram but may be difficult to see if new wall motion abnormality given suboptimal endocardial visualization despite definity on last prior echo.            Continue to trend enzymes.            AIICD interrogation. Impression:  Ischemic cardiomyopathy with compensated CHF.                       Possible cardiac contusion with abnormal and upward trending troponin vs. small amount of myocardial necrosis from known underlying diffuse CAD.    Plan:  Observe on tele for arrhythmias.            Echocardiogram ordered but may be difficult to see if new wall motion abnormality given suboptimal endocardial visualization despite definity on last prior echo.            Continue to trend enzymes.            AIICD interrogation.            After discussion with Dr. Gutierrez, xanax 0.25 mg given.

## 2020-08-16 NOTE — PROVIDER CONTACT NOTE (OTHER) - ASSESSMENT
VSS. Pt denies dizziness, chest pain. Pt is asymptomatic with no shortness of breath. Discussed with PA AM labs and meds.

## 2020-08-16 NOTE — CONSULT NOTE ADULT - SUBJECTIVE AND OBJECTIVE BOX
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 segments of the left lower lobe are nonspecific, differential considerations include passive atelectasis, focal edema and less likely pulmonary contusion.  2. Interval development of an indeterminate 2 cm lesion in the inferior spleen. Nonemergent MRI evaluation can be obtained on an outpatient basis if clinically warranted.  3. No subcutaneous hematoma. No acute fracture.              DESTINEE HARRISON M.D., RADIOLOGY RESIDENT  This document has been electronically signed.  RETA WEST M.D., ATTENDING RADIOLOGIST  This document has been elec

## 2020-08-16 NOTE — PROGRESS NOTE ADULT - ASSESSMENT
81 yo male h/o DM, CHF, htn, chol, cad s/p pci, s/p aicd, a/w chest pain, following chest trauma    trauma eval noted  no acute injuries  pain control    chest pain  +trop  cont to trend   likely due to truama  will check echo and aicd  pain control    chronic systolic CHF  stable  cont home meds    cad s/p pci  cont asa/ Brilinta   statin    DM  lantus/humalog  monitor fs    ckd  creat around baseline  monitor    anxiety  xanax prn    cont other home meds    PT    d/w cards and NP         Advanced care planning was discussed with patient and family.  Advanced care planning forms were reviewed and discussed as appropriate.  Differential diagnosis and plan of care discussed with patient after the evaluation.   Pain assessed and judicious use of narcotics when appropriate was discussed.  Importance of Fall prevention discussed.  Counseling on Smoking and Alcohol cessation was offered when appropriate.  Counseling on Diet, exercise, and medication compliance was done.   Approx 30 minutes spent.
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
## 2020-08-17 NOTE — PROCEDURE NOTE - ADDITIONAL PROCEDURE DETAILS
Indication: Injury to chest wall, check device function  -Normal sensing and pacing thresholds  -stable lead impedance  -Vpaced <1% Indication: Injury to chest wall, check device function  -Normal sensing and pacing thresholds  -stable lead impedance  -Vpaced <1%  -Review of EGM revealed ventricular high rate episodes 3/18/20, 6/3/20, and 8/16.  EGM c/w NSVT few beats.  -Changes Made: None

## 2020-08-17 NOTE — DISCHARGE NOTE PROVIDER - NSDCCPCAREPLAN_GEN_ALL_CORE_FT
PRINCIPAL DISCHARGE DIAGNOSIS  Diagnosis: Chest pain  Assessment and Plan of Treatment:   HOME CARE INSTRUCTIONS  For the next few days, avoid physical activities that bring on chest pain. Continue physical activities as directed.  Do not Informed pt of the various negative side effects of smoking including risk of COPD, Lung Ca etc  Strongly recommended that pt stops smoking and pt given various options of smoking cessasion tools such as NRT's and other pharmacotherapies.  Avoid drinking alcohol.   Only take over-the-counter or prescription medicine for pain, discomfort, or fever as directed by your caregiver.  Follow your caregiver's suggestions for further testing if your chest pain does not go away.  Keep any follow-up appointments you made. If you do not go to an appointment, you could develop lasting (chronic) problems with pain. If there is any problem keeping an appointment, you must call to reschedule.   SEEK MEDICAL CARE IF:  You think you are having problems from the medicine you are taking. Read your medicine instructions carefully.  Your chest pain does not go away, even after treatment.  You develop a rash with blisters on your chest.  SEEK IMMEDIATE MEDICAL CARE IF:  You have increased chest pain or pain that spreads to your arm, neck, jaw, back, or abdomen.   You develop shortness of breath, an increasing cough, or you are coughing up blood.  You have severe back or abdominal pain, feel nauseous, or vomit.  You develop severe weakness, fainting, or chills.  You have a fever.  THIS IS AN EMERGENCY. Do not wait to see if the pain will go away. Get medical help at once. Call your local emergency services (_____________________). Do not drive yourself to the hospital.      SECONDARY DISCHARGE DIAGNOSES  Diagnosis: Pulmonary contusion  Assessment and Plan of Treatment: Follow up with your doctor in 1 week    Diagnosis: DM (diabetes mellitus)  Assessment and Plan of Treatment: HgA1C this admission 12.1.    Make sure you get your HgA1c checked every three months.  If you take oral diabetes medications, check your blood glucose two times a day.  If you take insulin, check your blood glucose before meals and at bedtime.  It's important not to skip any meals.  Keep a log of your blood glucose results and always take it with you to your doctor appointments.  Keep a list of your current medications including injectables and over the counter medications and bring this medication list with you to all your doctor appointments.  If you have not seen your ophthalmologist this year call for appointment.  Check your feet daily for redness, sores, or openings. Do not self treat. If no improvement in two days call your primary care physician for an appointment.  Low blood sugar (hypoglycemia) is a blood sugar below 70mg/dl. Check your blood sugar if you feel signs/symptoms of hypoglycemia. If your blood sugar is below 70 take 15 grams of carbohydrates (ex 4 oz of apple juice, 3-4 glucose tablets, or 4-6 oz of regular soda) wait 15 minutes and repeat blood sugar to make sure it comes up above 70.  If your blood sugar is above 70 and you are due for a meal, have a meal.  If you are not due for a meal have a snack.  This snack helps keeps your blood sugar at a safe range.
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
## 2020-08-17 NOTE — DISCHARGE NOTE PROVIDER - HOSPITAL COURSE
80 year-old admitted with trauma to chest wall (40 lbs of tiles fell on his chest at ICD site).    No loss of consciousness. Patient does not believe ICD fired.    Elevated troponin noted.        ICD interrogated, no shock or lead damage noted.     Patient appears compensated from heart failure perspective. Continue current medications. Entresto and aldosterone antagonist if renal function permits as outpatient.  A1C 12.1.        Discharge to home and outpatient follow-up with Dr. Sarkar and endocrine. 80 year-old admitted with trauma to chest wall (40 lbs of tiles fell on his chest at ICD site).    No loss of consciousness. Patient does not believe ICD fired.    Elevated troponin noted.        ICD interrogated, no shock or lead damage noted.     Patient appears compensated from heart failure perspective. Continue current medications. Entresto and aldosterone antagonist if renal function permits as outpatient.  A1C 12.1.        Discharge to home and outpatient follow-up with Dr. Sarkar and endocrine.            Advanced care planning was discussed with patient and family.  Advanced care planning forms were reviewed and discussed as appropriate.    Differential diagnosis and plan of care discussed with patient after the evaluation.     Pain assessed and judicious use of narcotics when appropriate was discussed.    Importance of Fall prevention discussed.    Counseling on Smoking and Alcohol cessation was offered when appropriate.    Counseling on Diet, exercise, and medication compliance was done.     Approx 65 minutes spent.

## 2020-08-17 NOTE — DISCHARGE NOTE PROVIDER - CARE PROVIDER_API CALL
Stuart Sarkar J  CARDIOVASCULAR DISEASE  [address] [place] [address]  Phone: [phone]  Fax: [phone]  Follow Up Time:

## 2020-08-17 NOTE — PROGRESS NOTE ADULT - ASSESSMENT
80 year-old admitted with trauma to chest wall (40 lbs of tiles fell on his chest at ICD site).  No loss of consciousness. Patient does not believe ICD fired.  Elevated troponin noted.    Would interrogate ICD to evaluate for shock or lead damage.  Patient appears compensated from heart failure perspective. Continue current medications. Entresto and aldosterone antagonist if renal function permits as outpatient.    Discharge planning and outpatient follow-up with Dr. Sarkar and EP.

## 2020-08-17 NOTE — DISCHARGE NOTE PROVIDER - NSDCFUSCHEDAPPT_GEN_ALL_CORE_FT
VERONICA DORMAN ; 08/21/2020 ; [facility] [address] VERONICA Sky ; 09/09/2020 ; [facility] [address] VERONICA Sky ; 10/15/2020 ; [facility] [address] [facility] VERONICA DORMAN ; 08/21/2020 ; [facility] [address] VERONICA Sky ; 09/09/2020 ; [facility] [address] VERONICA Sky ; 10/15/2020 ; [facility] [address] [facility]

## 2020-08-19 NOTE — ED ADULT TRIAGE NOTE - CHIEF COMPLAINT QUOTE
as per ems report, pt has a chest pain and shortness of breath for the last two hours, pt reports he has 19 stents in the heart

## 2020-08-19 NOTE — ED PROVIDER NOTE - CPE EDP GASTRO NORM
normal... Other (Free Text): Mailed Lab requisition to patient Note Text (......Xxx Chief Complaint.): This diagnosis correlates with the Detail Level: Zone

## 2020-08-19 NOTE — ED PROVIDER NOTE - PROGRESS NOTE DETAILS
patient trop elevated. endorses chest pain resolved. admitted for chest pain work up given elevated trop.

## 2020-08-20 NOTE — H&P ADULT - PROBLEM SELECTOR PLAN 3
-Pt has medical hx of CKD Stage 3  -eGFR 29, Creatinine 2.10   -Follow up BMP in am and daily  -Avoid nephrotoxins -Pt has medical hx of CKD Stage 3  -eGFR 29, Creatinine 2.10 (Acute on Chronic kidney disease)  -Follow up BMP in am and daily  -Follow up urine electrolytes  -Avoid nephrotoxins

## 2020-08-20 NOTE — H&P ADULT - PROBLEM SELECTOR PLAN 1
-Pt p/w substernal chest pain x 1 day, radiating to L arm  -Non-positional, non-reproducible pain associated with SOB (NYHA Class III-IV)  -Pt saturating 99% on 2 L, still mildly uncomfortable  -Cxray Cardiomegaly +, O/E bibasilar crackles +  -To trend troponins and serial EKG  -To f/u Transthoracic Echo  -Cardiology consult  -Pt started on Aspirin 325 mg, Ticagrelol 180 mg loading doses and Heparin drip, Atorvastatin 40 mg, Carvedilol 25 mg -Pt p/w substernal chest pain x 1 day, radiating to L arm- likely NSTEMI  -Non-positional, non-reproducible pain associated with SOB (NYHA Class III-IV)  -Pt saturating 99% on 2 L, still mildly uncomfortable  -Cxray Cardiomegaly +, O/E bibasilar crackles +  -To trend troponins and serial EKG  -To f/u Transthoracic Echo  -Cardiology consulted- Dr. David  -Pt started on Aspirin 325 mg, Ticagrelol 180 mg LOADING doses and Heparin drip, Atorvastatin 40 mg, Carvedilol 25 mg  -Pt started on Aspirin 81 mg, Ticagrelol 90 mg maintanence doses daily thereafter -Pt p/w substernal chest pain x 1 day, radiating to L arm- likely NSTEMI  -Non-positional, non-reproducible pain associated with SOB (NYHA Class III-IV)  -Pt saturating 99% on 2 L, still mildly uncomfortable  -Cxray Cardiomegaly +, O/E bibasilar crackles +  -To trend troponins and serial EKG  -To f/u Transthoracic Echo  -Cardiology consulted- Dr. David  -Pt started on Aspirin 325 mg, Ticagrelol 180 mg LOADING doses and Heparin drip, Atorvastatin 40 mg, Carvedilol 25 mg  -Pt started on Aspirin 81 mg, Ticagrelol 90 mg maintenance doses daily thereafter

## 2020-08-20 NOTE — PATIENT PROFILE ADULT - PATIENT REPRESENTATIVE: ( YOU CAN CHOOSE ANY PERSON THAT CAN ASSIST YOU WITH YOUR HEALTH CARE PREFERENCES, DOES NOT HAVE TO BE A SPOUSE, IMMEDIATE FAMILY OR SIGNIFICANT OTHER/PARTNER)
Please call Rossy and notify her that the Toujeo was filled yesterday.  Also cherise is 2 months overdue for her INR and still needs to get her urine testing done.  This needs to be done within the next 7 days    declines

## 2020-08-20 NOTE — H&P ADULT - PROBLEM SELECTOR PLAN 7
IMPROVE VTE Individual Risk Assessment  RISK                                                                Points  [  ] Previous VTE                                                  3  [  ] Thrombophilia                                               2  [  ] Lower limb paralysis                                      2        (unable to hold up >15 seconds)    [  ] Current Cancer                                              2         (within 6 months)  [ x ] Immobilization > 24 hrs                                1  [  ] ICU/CCU stay > 24 hours                              1  [ x ] Age > 60                                                      1    IMPROVE VTE Score _____2____    IMPROVE Score 0-1: Low Risk, No VTE prophylaxis required for most patients, encourage ambulation.   IMPROVE Score 2-3: At risk, pharmacologic VTE prophylaxis is indicated for most patients (in the absence of a contraindication)  IMPROVE Score > or = 4: High Risk, pharmacologic VTE prophylaxis is indicated for most patients (in the absence of a contraindication)  Pt on Heparin infusion -Pt has hx of Gout, takes allopurinol at home  -Pt started on home dose of Allopurinol
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
## 2020-08-20 NOTE — CONSULT NOTE ADULT - ASSESSMENT
81y/o M w/ PMH, quadruple bypass, CAD s/p 17stents, AICD (11/2019, [facility]), DM, HTN, HLD, HFrEF (25%, 8/16) p/w L sided substernal chest pain x3days. Pt w/ elevated troponin, no acute EKG changes admitted to R/O ACS. Pt w/ elevated BNP 6320     Problem 1: Angina  Typical chest pain w/ elevated troponin, no acute EKG changes, likely Angina w/ HFrEF  On ASA, Statin, Ticagrelor, ranolazine at home  No Echo at this time, recent Echo 8/16  Cath from 11/2019 shows Patent LIMA-LAD. Patent mid LAD stent. Severe  diffuse distal LAD disease. Patent SVG to Diag, moderate anastomosis  disease. Patent [id number] with mild restenosis in the prior stent  located between RPDA and OM1  Recommend down trend troponin  Recommend medical management of Angina, continue ranolazine    Problem 2: HFrEF  Acute exacerbation of HFrEF, (+) b/l trace pedal edema  Elevated proBNP 6320  S/P [facility] AICD 11/2019. Last echo shows EF 25%  On IMDUR, Coreg, lasix at home  Pt tried entresto but unable to tolerate  Recommend continue diuresis     Problem 3: Aortic Stenosis  Echo shows moderate-severe AS, EF 25%. Due to decreased EF, true severity of AS cannot be determined  Recommend Dobutamine Stress Echo as outpatient to evaluate for true severity of AS and potential TAVR, depending on stress results  Will defer further management to outpt cardiologist    Problem 4: Diabetes Mellitus  Pt w/ poor insulin compliance  A1C 12.2%  Encouraged insulin compliance  Plan as per primary team     Probelm 5: RUFINA on CKD  on admission SCr 2.1  Baseline 1.54 2/2020    Problem 6: HTN  Pt reports good BP control at home SBP 90s  C/W home medications  Monitor BP 79y/o M w/ PMH, quadruple bypass, CAD s/p 17stents, AICD (11/2019, [facility]), DM, HTN, HLD, HFrEF (25%, 8/16) p/w L sided substernal chest pain x3days. Pt w/ elevated troponin, no acute EKG changes admitted to R/O ACS. Pt w/ elevated BNP 6320 likely Acute on Chronic HFrEF.    Problem 1: Angina  Typical chest pain w/ elevated troponin, no acute EKG changes, likely Angina w/ HFrEF exacerbation  On ASA, Statin, Ticagrelor, ranolazine at home  No Echo at this time, recent Echo 8/16  Cath from 11/2019 shows Patent LIMA-LAD. Patent mid LAD stent. Severe  diffuse distal LAD disease. Patent SVG to Diag, moderate anastomosis  disease. Patent [id number] with mild restenosis in the prior stent  located between RPDA and OM1  Recommend down trend troponin  Recommend medical management of Angina, continue ranolazine    Problem 2: HFrEF  Acute exacerbation of HFrEF, (+) b/l trace pedal edema  SOB on exertion, long standing orthopnea  Elevated proBNP 6320  S/P [facility] AICD 11/2019. Last echo shows EF 25%  On IMDUR, Coreg, lasix at home  Pt tried entresto but unable to tolerate  Recommend continue diuresis   CXR shows mild pulmonary edema, trace pleural effusions    Problem 3: Aortic Stenosis  Echo shows moderate-severe AS, EF 25%. Due to decreased EF, true severity of AS cannot be determined  Recommend Dobutamine Stress Echo as outpatient to evaluate for true severity of AS and potential TAVR, depending on stress results  Will defer further management to outpt cardiologist    Problem 4: Diabetes Mellitus  Pt w/ poor insulin compliance  A1C 12.2%  Encouraged insulin compliance  Plan as per primary team     Probelm 5: RUFINA on CKD  on admission SCr 2.1  Baseline 1.54 2/2020    Problem 6: HTN  Pt reports good BP control at home SBP 90s  C/W home medications  Monitor BP

## 2020-08-20 NOTE — ED ADULT NURSE NOTE - OBJECTIVE STATEMENT
Pt BIBA c/o of left sided chest pain x 1 day. Pt stated couple days ago he a 40lb weight fell on him on his defibrillator. Pt was in the hospital and got discharge. As per pt he is supposed to f/u with cardiology for defibrillator check on Friday. Pt states even since the incident, he has been having chest pain on the defibrillator site along with some SOB. Pt denies fever, N, V, and diarrhea Pt BIBA c/o of left sided chest pain x 1 day. Pt stated couple days ago a 40lb weight fell on him on his defibrillator (left side of his chest). Pt was sent to the hospital and later got discharged. As per pt he is supposed to f/u with cardiology for defibrillator check on Friday. Pt states ever since the incident, he has been having chest pain on the defibrillator site along with some SOB. Pt denies fever, N, V, and diarrhea.

## 2020-08-20 NOTE — CHART NOTE - NSCHARTNOTEFT_GEN_A_CORE
EVENT: SOB/ chest pain, reviewed  CXR and EKG     SUBJECTIVE: I have been experiencing SOB since Thursday     OBJECTIVE: RR 24, CXR showed b/l pleural effusion, on supplemental O2     Vital Signs Last 24 Hrs  T(C): 36.5 (20 Aug 2020 07:00), Max: 36.7 (19 Aug 2020 23:14)  T(F): 97.7 (20 Aug 2020 07:00), Max: 98 (19 Aug 2020 23:14)  HR: 89 (20 Aug 2020 11:55) (85 - 95)  BP: 103/68 (20 Aug 2020 11:55) (102/67 - 123/85)  BP(mean): --  RR: 30 (20 Aug 2020 11:55) (20 - 30)  SpO2: 98% (20 Aug 2020 11:55) (97% - 100%)    LABS:                        11.6   8.53  )-----------( 145      ( 20 Aug 2020 12:14 )             34.4   CARDIAC MARKERS ( 20 Aug 2020 11:46 )  0.153 ng/mL / x     / 55 U/L / x     / <1.0 ng/mL  CARDIAC MARKERS ( 20 Aug 2020 00:17 )  0.155 ng/mL / x     / x     / x     / x        08-20    135  |  99  |  43<H>  ----------------------------<  244<H>  3.5   |  29  |  1.81<H>    Ca    8.9      20 Aug 2020 11:46  Phos  4.2     08-20  Mg     2.0     08-20    TPro  7.2  /  Alb  2.5<L>  /  TBili  0.8  /  DBili  x   /  AST  29  /  ALT  39  /  AlkPhos  92  08-20      EKG:   IMGAGING:  < from: Xray Chest 1 View- PORTABLE-Urgent (08.20.20 @ 00:38) >    EXAM:  XR CHEST PORTABLE URGENT 1V                            PROCEDURE DATE:  08/20/2020          INTERPRETATION:  CLINICAL INDICATION: 80 years  Male with left sided chest pain.    COMPARISON: 7/15/2020    AP view of the chest demonstrates mild pulmonary vascular haziness and trace bilateral pleural effusions. The heart is enlarged. The patient is status post CABG. A left-sided pacemaker/defibrillator is reidentified which partially obscures the left lung field. It is no pneumothorax. There is no mediastinal or hilar mass. The aorta is atherosclerotic.    Mild thoracic degenerative changes are present. A right shoulder prosthesis is reidentified.    IMPRESSION:    Developing mild pulmonary edema with trace bilateral pleural effusions.    Cardiomegaly. CABG. Pacemaker/defibrillator partially obscures the left lung field.              CHOLO SALEEM M.D., ATTENDING RADIOLOGIST  This document has been electronically signed. Aug 20 2020  8:26AM                < end of copied text >      ASSESSMENT:    AO x 3  CV S1S2 RRR  Lungs - crackles   Abd - Nt/ND (+) BS to 4 quadrants   ext : no edema   HPI:  81 y/o M with medical history significant for CAD with 17 stents placed (last in 2016 at [facility]) and quadruple bypass surgery at [place], AICD ([employer] [facility], placed in Nov 2019), HFrEF (EF 25% on QUINTON 8/16), CKD Stage 3, HTN, HLD, DM presented to the hospital with c/o left sided substernal chest pain x 1 day.     Pt reports that he was admitted last week to [facility] with left lateral area chest pain (ICD site) as a 40 lb weight of tiles fell on his chest when he was reaching for the tiles from a height. He was worked up last week- ICD was interrogated, and as his HF was compensated, he was discharged home.     Pt reports that on Tuesday night he was having an argument with his son which aggravated the episode of chest pain which lasted for a few minutes, 7/10 intensity, relieved minimally while resting. He had another episode of substernal chest pain on Wednesday, accompanied with SOB, radiating down his L arm, hence he reported to the hospital. 3 pillow orthopnea + (not a new complaint) Reproducible pain is present on the left anterior chest area, however, patient endorses pain in the substernal region during this admission.     No hx of palpitations, leg swelling, change in quality of pain with position, calf tenderness, nausea, vomiting, fever    In ED, patient's vitals- afebrile, /85, HR 95, mildly tachypneic at 22 on RA  Cxray- cardiomegaly noted  Troponin- elevated at 0.155  EKG- NSR (20 Aug 2020 04:27)      seen and examined pt with attending at bedside. Less SOB, on supplemental O2 , O2 sat remains >97%, no heavy pressure but still reports chest pain. s/p lasix 4mg PO at 5am followed by Lasix IVP 40mg at 7am    PLAN:   1. Angina- continue heparin drip , Dr. Holman is following, continue current medication.   2. pleural effusion - s/p 2 doses of lasix in Ed, on lasix 40mg BID at home, will give another dose of lasix IVP if his BP can tolerate it. continue IV lasix 40mg BID, no echo at this time since pt was admitted to  [place] last week and had echo EVENT: SOB/ chest pain, reviewed  CXR and EKG     SUBJECTIVE: I have been experiencing SOB since Thursday     OBJECTIVE: RR 24, CXR showed b/l pleural effusion, on supplemental O2     Vital Signs Last 24 Hrs  T(C): 36.5 (20 Aug 2020 07:00), Max: 36.7 (19 Aug 2020 23:14)  T(F): 97.7 (20 Aug 2020 07:00), Max: 98 (19 Aug 2020 23:14)  HR: 89 (20 Aug 2020 11:55) (85 - 95)  BP: 103/68 (20 Aug 2020 11:55) (102/67 - 123/85)  BP(mean): --  RR: 30 (20 Aug 2020 11:55) (20 - 30)  SpO2: 98% (20 Aug 2020 11:55) (97% - 100%)    LABS:                        11.6   8.53  )-----------( 145      ( 20 Aug 2020 12:14 )             34.4   CARDIAC MARKERS ( 20 Aug 2020 11:46 )  0.153 ng/mL / x     / 55 U/L / x     / <1.0 ng/mL  CARDIAC MARKERS ( 20 Aug 2020 00:17 )  0.155 ng/mL / x     / x     / x     / x        08-20    135  |  99  |  43<H>  ----------------------------<  244<H>  3.5   |  29  |  1.81<H>    Ca    8.9      20 Aug 2020 11:46  Phos  4.2     08-20  Mg     2.0     08-20    TPro  7.2  /  Alb  2.5<L>  /  TBili  0.8  /  DBili  x   /  AST  29  /  ALT  39  /  AlkPhos  92  08-20      EKG:   IMGAGING:  < from: Xray Chest 1 View- PORTABLE-Urgent (08.20.20 @ 00:38) >    EXAM:  XR CHEST PORTABLE URGENT 1V                            PROCEDURE DATE:  08/20/2020          INTERPRETATION:  CLINICAL INDICATION: 80 years  Male with left sided chest pain.    COMPARISON: 7/15/2020    AP view of the chest demonstrates mild pulmonary vascular haziness and trace bilateral pleural effusions. The heart is enlarged. The patient is status post CABG. A left-sided pacemaker/defibrillator is reidentified which partially obscures the left lung field. It is no pneumothorax. There is no mediastinal or hilar mass. The aorta is atherosclerotic.    Mild thoracic degenerative changes are present. A right shoulder prosthesis is reidentified.    IMPRESSION:    Developing mild pulmonary edema with trace bilateral pleural effusions.    Cardiomegaly. CABG. Pacemaker/defibrillator partially obscures the left lung field.              CHOLO SALEEM M.D., ATTENDING RADIOLOGIST  This document has been electronically signed. Aug 20 2020  8:26AM                < end of copied text >      ASSESSMENT:    AO x 3  CV S1S2 RRR  Lungs - crackles   Abd - Nt/ND (+) BS to 4 quadrants   ext : no edema   HPI:  81 y/o M with medical history significant for CAD with 17 stents placed (last in 2016 at [facility]) and quadruple bypass surgery at [place], AICD ([employer], placed in Nov 2019), HFrEF (EF 25% on QUINTON 8/16), CKD Stage 3, HTN, HLD, DM presented to the hospital with c/o left sided substernal chest pain x 1 day.     Pt reports that he was admitted last week to [facility] with left lateral area chest pain (ICD site) as a 40 lb weight of tiles fell on his chest when he was reaching for the tiles from a height. He was worked up last week- ICD was interrogated, and as his HF was compensated, he was discharged home.     Pt reports that on Tuesday night he was having an argument with his son which aggravated the episode of chest pain which lasted for a few minutes, 7/10 intensity, relieved minimally while resting. He had another episode of substernal chest pain on Wednesday, accompanied with SOB, radiating down his L arm, hence he reported to the hospital. 3 pillow orthopnea + (not a new complaint) Reproducible pain is present on the left anterior chest area, however, patient endorses pain in the substernal region during this admission.     No hx of palpitations, leg swelling, change in quality of pain with position, calf tenderness, nausea, vomiting, fever    In ED, patient's vitals- afebrile, /85, HR 95, mildly tachypneic at 22 on RA  Cxray- cardiomegaly noted  Troponin- elevated at 0.155  EKG- NSR (20 Aug 2020 04:27)      seen and examined pt with attending at bedside. Less SOB, on supplemental O2 , O2 sat remains >97%, no heavy pressure but still reports chest pain. s/p lasix 4mg PO at 5am followed by Lasix IVP 40mg at 7am    PLAN:   1. Angina- continue heparin drip , Dr. Holman is following, continue current medication. troponin trending down, f/u 3rd troponin at 8pm   2. pleural effusion - s/p 2 doses of lasix in Ed, on lasix 40mg BID at home, will give another dose of lasix IVP if his BP can tolerate it. continue IV lasix 40mg BID, no echo at this time since pt was admitted to  [place] last week and had echo

## 2020-08-20 NOTE — H&P ADULT - ASSESSMENT
81 y/o M with medical history significant for CAD with 17 stents placed (last in 2016 at [facility]) and quadruple bypass surgery at [place], AICD ([employer], placed in Nov 2019), HFrEF (EF 25% on QUNITON 8/16), CKD Stage 3, HTN, DM presented to the hospital with c/o left sided substernal chest pain x 1 day, EKG NSR, Troponin 0.155, admitted for chest pain under evaluation. 81 y/o M with medical history significant for CAD with 17 stents placed (last in 2016 at [facility]) and quadruple bypass surgery at [place], AICD ([employer], placed in Nov 2019), HFrEF (EF 25% on QUINTON 8/16), CKD Stage 3, HTN, DM presented to the hospital with c/o left sided substernal chest pain x 1 day, EKG NSR, Troponin 0.155, admitted for chest pain under evaluation, likely NSTEMI.

## 2020-08-20 NOTE — H&P ADULT - NSHPPHYSICALEXAM_GEN_ALL_CORE
PHYSICAL EXAM:  GENERAL: Pt midly uncomfortable,   HEAD:  Atraumatic, Normocephalic  EYES: EOMI, PERRLA, conjunctiva and sclera clear  NECK: Supple, No JVD  CHEST/LUNG: Clear to auscultation bilaterally; No wheeze  HEART: Regular rate and rhythm; s1+ s2+  ABDOMEN: Soft, Nontender, Nondistended; Bowel sounds present  EXTREMITIES:, No clubbing, cyanosis, or edema  NEUROLOGY:AAOx3 non-focal  SKIN: No rashes or lesions PHYSICAL EXAM:  GENERAL: Pt mildly uncomfortable, saturating 99% on 2L, sitting upright. Sternotomy scar +, AICD in left lateral chest +  HEAD:  Atraumatic, Normocephalic  EYES: EOMI, PERRLA, conjunctiva and sclera clear  NECK: Supple, No JVD  CHEST/LUNG: Bibasilar crackles in the bases + , Reproducible tenderness in the left lateral chest   HEART: Regular rate and rhythm; s1+ s2+  ABDOMEN: Soft, Nontender, Nondistended; Bowel sounds present  EXTREMITIES:, No clubbing, cyanosis, or edema  NEUROLOGY:AAOx3 non-focal  SKIN: No rashes or lesions PHYSICAL EXAM:  GENERAL: Pt mildly uncomfortable, saturating 99% on 2L, sitting upright. Sternotomy scar +, AICD in left lateral chest +  HEAD:  Atraumatic, Normocephalic  EYES: EOMI, PERRLA, conjunctiva and sclera clear  NECK: Supple, No JVD  CHEST/LUNG: Bibasilar crackles in the bases + , Reproducible tenderness in the left lateral chest from last week  HEART: Regular rate and rhythm; s1+ s2+  ABDOMEN: Soft, Nontender, Nondistended; Bowel sounds present  EXTREMITIES:, No clubbing, cyanosis, or edema  NEUROLOGY:AAOx3 non-focal  SKIN: No rashes or lesions

## 2020-08-20 NOTE — H&P ADULT - PROBLEM SELECTOR PLAN 8
IMPROVE VTE Individual Risk Assessment  RISK                                                                Points  [  ] Previous VTE                                                  3  [  ] Thrombophilia                                               2  [  ] Lower limb paralysis                                      2        (unable to hold up >15 seconds)    [  ] Current Cancer                                              2         (within 6 months)  [ x ] Immobilization > 24 hrs                                1  [  ] ICU/CCU stay > 24 hours                              1  [ x ] Age > 60                                                      1    IMPROVE VTE Score _____2____    IMPROVE Score 0-1: Low Risk, No VTE prophylaxis required for most patients, encourage ambulation.   IMPROVE Score 2-3: At risk, pharmacologic VTE prophylaxis is indicated for most patients (in the absence of a contraindication)  IMPROVE Score > or = 4: High Risk, pharmacologic VTE prophylaxis is indicated for most patients (in the absence of a contraindication)    Pt on Heparin infusion

## 2020-08-20 NOTE — H&P ADULT - PROBLEM SELECTOR PLAN 4
-Pt has medical hx of HTN  -Takes Carvedilol 25 mg, Furosemide 40 twice daily, isosorbide mononitrate 60 mg daily  -Started on home meds, HOLD with parameters

## 2020-08-20 NOTE — H&P ADULT - ATTENDING COMMENTS
79 y/o M with medical history significant for CAD with 17 stents placed (last in 2016 at [facility]) and quadruple bypass surgery at [place], AICD ([employer], placed in Nov 2019), HFrEF (EF 25% on QUINTON 8/16/2020), CKD Stage 3, HTN, DM presented to the hospital with c/o left sided substernal chest pain x 1 day and shortness of breath on exertion. He was admitted last week to [facility] with left lateral area chest pain (ICD site) after 40 lb weight of tiles fell on his chest.    ros/pmh/sh/social hx: as above    on 2 litre supplemental o2  Neuro: AAO x3; No focal deficits  CVS: S1S2 present, regular  Resp: bibasilar crackles present  GI: Soft, BS+, NT  Extr: No edema or calf tenderness                          11.1   8.60  )-----------( 141      ( 20 Aug 2020 00:17 )             32.7   08-20    134<L>  |  97  |  49<H>  ----------------------------<  335<H>  4.3   |  31  |  2.10<H>    Ca    9.0      20 Aug 2020 00:17    TPro  7.2  /  Alb  2.5<L>  /  TBili  0.8  /  DBili  x   /  AST  29  /  ALT  39  /  AlkPhos  92  08-20  CARDIAC MARKERS ( 20 Aug 2020 00:17 )  0.155 ng/mL / x     / x     / x     / x        CXR: pending official read  increased vascular markings    A/P:    1) NSTEMI:  elevated trop, substernal chest pain  extensive CAD, on ASA, Brilinta  EKG nonspecific ST-T wave changes in lateral leads  will give loading dose of ASA, Brillinta and heparin gtt  trend trop, tele unit   Cardio consult    2) shortness of breath:  - likely acute on chronic CHF, AICD in place  - recent ECHO EF 25%  - increased vascular makings CXR ( pending official read)  - ordered pro BNP, d-dimer  - takes Lasix 40 mg bid, will give iv Lasix 40 mg one time dose and reassess    3) RUFINA on CKD:   worsened creat 1.68>2.1  - follow bmp, urine studies  - consider nephro consult    4) HTN: c/w home meds with holding parameters  5) Type 2 DM: on insulin  6) Gout: on Allopurinol  7) Hyperlipidemia
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
## 2020-08-20 NOTE — CONSULT NOTE ADULT - ATTENDING COMMENTS
79 yo M with noted history including  CAD s/p CABG and PCI, EF 25% s/p ICD who presented with chest pain and dyspnea, most likely in the setting of acute on chronic decompensated systolic and diastolic HF exacerbation. Patient recently hospitalized at [facility]. His most recent cath from 11/2019 reviewed; patent LIMA-LAD and no targets for revascularization at that time.   -Current HF may be in setting of AS, though it is unclear if this is true severe AS or pseudo-aortic stenosis in setting of low EF. Recommend dobutamine stress echocardiogram on outpatient basis with consideration for TAVR if patient has true AS. He can follow with his OP cardiologist for this.   -Do not suspect ACS, low-level troponins have trended down, can discontinue heparin gtt.  -Agree with IV furosemide, resume rest of cardiac home meds.

## 2020-08-20 NOTE — H&P ADULT - PROBLEM SELECTOR PLAN 5
-Pt takes Lantus 40 at bedtime, Novolog 12 units at dinner  -To follow up HbA1c  -Monitor Blood sugars -Pt takes Lantus 40 at bedtime, Novolog 12 units at home  -Patient started on Lantus 40 mg, Humalog 5 units now  -To follow up HbA1c  -Monitor Blood sugars

## 2020-08-20 NOTE — H&P ADULT - NSHPLABSRESULTS_GEN_ALL_CORE
11.1   8.60  )-----------( 141      ( 20 Aug 2020 00:17 )             32.7   08-20    134<L>  |  97  |  49<H>  ----------------------------<  335<H>  4.3   |  31  |  2.10<H>    Ca    9.0      20 Aug 2020 00:17    TPro  7.2  /  Alb  2.5<L>  /  TBili  0.8  /  DBili  x   /  AST  29  /  ALT  39  /  AlkPhos  92  08-20    Troponin- 0.155      < from: TTE with Doppler (w/Cont) (08.16.20 @ 14:06) >    Dimensions:    Normal Values:  LA:     4.8    2.0 - 4.0 cm  Ao:            2.0 - 3.8 cm  SEPTUM: 1.0    0.6 - 1.2 cm  PWT:    1.0    0.6 - 1.1 cm  LVIDd:  6.5    3.0 - 5.6 cm  LVIDs:  6.2    1.8 - 4.0 cm  Derived variables:  LVMI: 139 g/m2  RWT: 0.30  Fractional short: 5 %  EF (Gomez Rule): 25 %Doppler Peak Velocity (m/sec):  AoV=1.9  ------------------------------------------------------------------------  Observations:  Mitral Valve: Mitral annular calcification. Tethered mitral  valve leaflets with normal opening. Moderate mitral  regurgitation.  Aortic Valve/Aorta: Calcified trileaflet aortic valve with  decreased opening. Peak transaortic valve gradient equals  14 mm Hg, mean transaortic valve gradient equals 8 mm Hg,  estimated aortic valve area equals 0.8 sqcm (by continuity  equation), aortic valve velocity time integral equals 33  cm, consistent with severe aortic stenosis. No aortic valve  regurgitation seen. Peak left ventricular outflow tract  gradient equals 1 mm Hg, LVOT velocity time integral equals  7 cm.  Normal aortic root.  Left Atrium: Normal left atrium.  LA volume index = 30  cc/m2.  Left Ventricle: Severe global left ventricular systolic  dysfunction. Endocardial visualization enhanced with  intravenous injection of Ultrasonic Enhancing Agent  (Definity). No left ventricular thrombus. Moderate left  ventricular enlargement. Severe reversible diastolic  dysfunction (Stage III).  Right Heart: A device wire is noted in the right heart.  Normal right ventricular size and function. Normal  tricuspid valve. Mild-moderate tricuspid regurgitation.  Normal pulmonic valve. Minimal pulmonic regurgitation.  Pericardium/Pleura: Normal pericardium with trace  pericardial effusion.  Hemodynamic: Estimated right atrial pressure is 8 mm Hg.  Estimated right ventricular systolic pressure equals 42 mm  Hg, assuming right atrial pressure equals 8 mm Hg,  consistent with mild pulmonary hypertension.  -----------------------------------------------------------------------    Conclusions:  1. Mitral annular calcification. Tethered mitral valve  leaflets with normal opening. Moderate mitral  regurgitation.  2. Calcified trileaflet aortic valve with decreased  opening. Peak transaortic valve gradient equals 14 mm Hg,  mean transaortic valve gradient equals 8 mm Hg, estimated  aortic valve area equals 0.8 sqcm (by continuity equation),  aortic valve velocity time integral equals 33 cm,  consistent with severe aortic stenosis. No aortic valve  regurgitation seen.  3. Moderate left ventricular enlargement.  4. Severe global left ventricular systolic dysfunction.  Endocardial visualization enhanced with intravenous  injection of Ultrasonic Enhancing Agent (Definity). No left  ventricular thrombus.  5. Severe reversible diastolic dysfunction (Stage III).  6. Normal pericardium with trace pericardial effusion.  ------------------------------------------------------------------------  Confirmed on  8/16/2020 - 16:11:06 by Yanick Segovia M.D.    < end of copied text >

## 2020-08-20 NOTE — H&P ADULT - PROBLEM SELECTOR PLAN 2
- -Pt p/w chest pain x 1 day, O/E Bibasilar crackles +, Cxray Cardiomegaly  -HFrEF 25% on QUINTON 8/16, AICD placed Nov 2019  -Follow up pro BNP levels  -Strict I's/O's and daily weight check -Pt p/w chest pain x 1 day, O/E Bibasilar crackles +, Cxray Cardiomegaly  -HFrEF 25% on QUINTON 8/16, AICD placed Nov 2019  -Follow up pro BNP levels  -1 dose IV Lasix 40 mg given today morning as pt has bibasilar crackles, pt takes 40 mg twice a day oral Lasix, morning dose of 40 mg HELD, to reassess before giving evening dose of 40 mg oral Lasix  -Strict I's/O's and daily weight check

## 2020-08-20 NOTE — H&P ADULT - HISTORY OF PRESENT ILLNESS
79 y/o M with medical history significant for CAD with 17 stents placed (last in 2016 at [facility]) and quadruple bypass surgery at [place], AICD (St. Kvng, placed in Nov 2019), HFrEF (EF 25% on QUINTON 8/16), CKD Stage 3, HTN, DM presented to the hospital with c/o left sided substernal chest pain x 1 day.     Pt reports that he was admitted last week to [facility] with left lateral area chest pain (ICD site) as a 40 lb weight of tiles fell on his chest when he was reaching for the tiles from a height. He was worked up last week- ICD was interrogated, and as his HF was compensated, he was discharged home.     Pt reports that on Tuesday night he was having an argument with his son which aggravated the episode of chest pain which lasted for a few minutes, 7/10 intensity, relieved minimally while resting. He had another episode of substernal chest pain on Wednesday, accompanied with SOB, radiating down his L arm, hence he reported to the hospital. Reproducible pain is present on the left anterior chest area, however, patient endorses pain in the substernal region during this admission.    No hx of palpitations, leg swelling, change in quality of pain with position, nausea, vomiting, fever    In ED, patient's vitals- afebrile, /85, HR 95, mildly tachypneic at 22 on RA  Cxray- cardiomegaly noted  Troponin- elevated at 0.155  EKG- NSR 81 y/o M with medical history significant for CAD with 17 stents placed (last in 2016 at [facility]) and quadruple bypass surgery at [place], AICD (St. Kvng, placed in Nov 2019), HFrEF (EF 25% on QUINTON 8/16), CKD Stage 3, HTN, DM presented to the hospital with c/o left sided substernal chest pain x 1 day.     Pt reports that he was admitted last week to [facility] with left lateral area chest pain (ICD site) as a 40 lb weight of tiles fell on his chest when he was reaching for the tiles from a height. He was worked up last week- ICD was interrogated, and as his HF was compensated, he was discharged home.     Pt reports that on Tuesday night he was having an argument with his son which aggravated the episode of chest pain which lasted for a few minutes, 7/10 intensity, relieved minimally while resting. He had another episode of substernal chest pain on Wednesday, accompanied with SOB, radiating down his L arm, hence he reported to the hospital. 3 pillow orthopnea + (not a new complaint) Reproducible pain is present on the left anterior chest area, however, patient endorses pain in the substernal region during this admission.     No hx of palpitations, leg swelling, change in quality of pain with position, nausea, vomiting, fever    In ED, patient's vitals- afebrile, /85, HR 95, mildly tachypneic at 22 on RA  Cxray- cardiomegaly noted  Troponin- elevated at 0.155  EKG- NSR 81 y/o M with medical history significant for CAD with 17 stents placed (last in 2016 at [facility]) and quadruple bypass surgery at [place], AICD (St. Kvng, placed in Nov 2019), HFrEF (EF 25% on QUINTON 8/16), CKD Stage 3, HTN, DM presented to the hospital with c/o left sided substernal chest pain x 1 day.     Pt reports that he was admitted last week to [facility] with left lateral area chest pain (ICD site) as a 40 lb weight of tiles fell on his chest when he was reaching for the tiles from a height. He was worked up last week- ICD was interrogated, and as his HF was compensated, he was discharged home.     Pt reports that on Tuesday night he was having an argument with his son which aggravated the episode of chest pain which lasted for a few minutes, 7/10 intensity, relieved minimally while resting. He had another episode of substernal chest pain on Wednesday, accompanied with SOB, radiating down his L arm, hence he reported to the hospital. 3 pillow orthopnea + (not a new complaint) Reproducible pain is present on the left anterior chest area, however, patient endorses pain in the substernal region during this admission.     No hx of palpitations, leg swelling, change in quality of pain with position, nausea, vomiting, fever    In ED, patient's vitals- afebrile, /85, HR 95, mildly tachypneic at 22 on RA  Cxray- cardiomegaly noted  Troponin- elevated at 0.155  EKG- NSR 79 y/o M with medical history significant for CAD with 17 stents placed (last in 2016 at [facility]) and quadruple bypass surgery at [place], AICD (St. Kvng, placed in Nov 2019), HFrEF (EF 25% on QUINTON 8/16), CKD Stage 3, HTN, DM presented to the hospital with c/o left sided substernal chest pain x 1 day.     Pt reports that he was admitted last week to [facility] with left lateral area chest pain (ICD site) as a 40 lb weight of tiles fell on his chest when he was reaching for the tiles from a height. He was worked up last week- ICD was interrogated, and as his HF was compensated, he was discharged home.     Pt reports that on Tuesday night he was having an argument with his son which aggravated the episode of chest pain which lasted for a few minutes, 7/10 intensity, relieved minimally while resting. He had another episode of substernal chest pain on Wednesday, accompanied with SOB, radiating down his L arm, hence he reported to the hospital. 3 pillow orthopnea + (not a new complaint) Reproducible pain is present on the left anterior chest area, however, patient endorses pain in the substernal region during this admission.     No hx of palpitations, leg swelling, change in quality of pain with position, calf tenderness, nausea, vomiting, fever    In ED, patient's vitals- afebrile, /85, HR 95, mildly tachypneic at 22 on RA  Cxray- cardiomegaly noted  Troponin- elevated at 0.155  EKG- NSR 81 y/o M with medical history significant for CAD with 17 stents placed (last in 2016 at [facility]) and quadruple bypass surgery at [place], AICD (St. Kvng, placed in Nov 2019), HFrEF (EF 25% on QUINTON 8/16), CKD Stage 3, HTN, HLD, DM presented to the hospital with c/o left sided substernal chest pain x 1 day.     Pt reports that he was admitted last week to [facility] with left lateral area chest pain (ICD site) as a 40 lb weight of tiles fell on his chest when he was reaching for the tiles from a height. He was worked up last week- ICD was interrogated, and as his HF was compensated, he was discharged home.     Pt reports that on Tuesday night he was having an argument with his son which aggravated the episode of chest pain which lasted for a few minutes, 7/10 intensity, relieved minimally while resting. He had another episode of substernal chest pain on Wednesday, accompanied with SOB, radiating down his L arm, hence he reported to the hospital. 3 pillow orthopnea + (not a new complaint) Reproducible pain is present on the left anterior chest area, however, patient endorses pain in the substernal region during this admission.     No hx of palpitations, leg swelling, change in quality of pain with position, calf tenderness, nausea, vomiting, fever    In ED, patient's vitals- afebrile, /85, HR 95, mildly tachypneic at 22 on RA  Cxray- cardiomegaly noted  Troponin- elevated at 0.155  EKG- NSR

## 2020-08-20 NOTE — ED ADULT NURSE NOTE - CHPI ED NUR SYMPTOMS NEG
no chills/no fever/no diaphoresis/no back pain/no dizziness/no congestion/no nausea/no syncope/no vomiting

## 2020-08-20 NOTE — H&P ADULT - NSHPREVIEWOFSYSTEMS_GEN_ALL_CORE
REVIEW OF SYSTEMS:    CONSTITUTIONAL: No weakness, fevers or chills  EYES/ENT: No visual changes;  No vertigo or throat pain   NECK: No pain or stiffness  RESPIRATORY: No cough, wheezing, hemoptysis; No shortness of breath  CARDIOVASCULAR: Chest pain +  GASTROINTESTINAL: No abdominal or epigastric pain. No nausea, vomiting, or hematemesis; No diarrhea or constipation. No melena or hematochezia.  GENITOURINARY: No dysuria, frequency or hematuria  NEUROLOGICAL: No numbness or weakness  SKIN: No itching, burning, rashes, or lesions   All other review of systems is negative unless indicated above.

## 2020-08-20 NOTE — H&P ADULT - NSICDXPASTMEDICALHX_GEN_ALL_CORE_FT
PAST MEDICAL HISTORY:  AICD (automatic cardioverter/defibrillator) present     Anxiety     Arthritis     CAD (Coronary Artery Disease)     CHF (congestive heart failure) HFeEF (20-25%)    Chronic Gout     CKD (chronic kidney disease) stage 3, GFR 30-59 ml/min     Depression     Diverticula, Colon     Erectile dysfunction     HTN (Hypertension)     Hypercholesteremia     MI (myocardial infarction) x2- 2013/2014    Renal Stone     Type 2 diabetes, uncontrolled, with renal manifestation

## 2020-08-20 NOTE — H&P ADULT - PROBLEM SELECTOR PLAN 6
-Pt has medical hx of HLD  -Pt on Lipitor 40 mg at home, started on home dose  -To follow up Lipid panel -Pt has medical hx of HLD  -Pt on Lipitor 80 mg at home bedtime, started on home dose  -To follow up Lipid panel

## 2020-08-21 NOTE — DISCHARGE NOTE PROVIDER - HOSPITAL COURSE
HPI:    81 y/o M with medical history significant for CAD with 17 stents placed (last in 2016 at [facility]) and quadruple bypass surgery at [place], AICD (St. Kvng, placed in Nov 2019), HFrEF (EF 25% on QUINTON 8/16), CKD Stage 3, HTN, HLD, DM presented to the hospital with c/o left sided substernal chest pain x 1 day.         Pt reports that he was admitted last week to [facility] with left lateral area chest pain (ICD site) as a 40 lb weight of tiles fell on his chest when he was reaching for the tiles from a height. He was worked up last week- ICD was interrogated, and as his HF was compensated, he was discharged home.         Pt reports that on Tuesday night he was having an argument with his son which aggravated the episode of chest pain which lasted for a few minutes, 7/10 intensity, relieved minimally while resting. He had another episode of substernal chest pain on Wednesday, accompanied with SOB, radiating down his L arm, hence he reported to the hospital. 3 pillow orthopnea + (not a new complaint) Reproducible pain is present on the left anterior chest area, however, patient endorses pain in the substernal region during this admission.         No hx of palpitations, leg swelling, change in quality of pain with position, calf tenderness, nausea, vomiting, fever        In ED, patient's vitals- afebrile, /85, HR 95, mildly tachypneic at 22 on RA    Cxray- cardiomegaly noted    Troponin- elevated at 0.155    EKG- NSR (20 Aug 2020 04:27)    Recommend Dobutamine Stress Echo as outpatient to evaluate for true severity of AS and potential TAVR, depending on stress results    Will defer further management to outpt cardiologist 81 y/o M with medical history significant for CAD with 17 stents placed (last in 2016 at [facility]) and quadruple bypass surgery at [place], AICD (St. Kvng, placed in Nov 2019), HFrEF (EF 25% on QUINTON 8/16), CKD Stage 3, HTN, HLD, DM presented to the hospital with c/o left sided substernal chest pain x 1 day.         Pt reports that he was admitted last week to [facility] with left lateral area chest pain (ICD site) as a 40 lb weight of tiles fell on his chest when he was reaching for the tiles from a height. He was worked up last week- ICD was interrogated, and as his HF was compensated, he was discharged home.         Pt reports that on Tuesday night he was having an argument with his son which aggravated the episode of chest pain which lasted for a few minutes, 7/10 intensity, relieved minimally while resting. He had another episode of substernal chest pain on Wednesday, accompanied with SOB, radiating down his L arm, hence he reported to the hospital. 3 pillow orthopnea + (not a new complaint) Reproducible pain is present on the left anterior chest area, however, patient endorses pain in the substernal region during this admission.         No hx of palpitations, leg swelling, change in quality of pain with position, calf tenderness, nausea, vomiting, fever        In ED, patient's vitals- afebrile, /85, HR 95, mildly tachypneic at 22 on RA    Cxray- cardiomegaly noted    Troponin- elevated at 0.155    EKG- NSR (20 Aug 2020 04:27)    Recommend Dobutamine Stress Echo as outpatient to evaluate for true severity of AS and potential TAVR, depending on stress results    Will defer further management to outpt cardiologist

## 2020-08-21 NOTE — PROGRESS NOTE ADULT - PROBLEM SELECTOR PLAN 5
-Pt takes Lantus 40 at bedtime, Novolog 12 units at home  -Patient  on Lantus 40 mg, Humalog 5 units now  - HbA1c 11.6  -Monitor Blood sugars

## 2020-08-21 NOTE — PROGRESS NOTE ADULT - PROBLEM SELECTOR PLAN 4
-Pt has medical hx of HTN  -Takes Carvedilol 25 mg, Furosemide 40 twice daily, isosorbide mononitrate 60 mg daily  -on home meds, HOLD with parameters

## 2020-08-21 NOTE — PROGRESS NOTE ADULT - SUBJECTIVE AND OBJECTIVE BOX
PGY-1 Progress Note discussed with attending    PAGER #: [[phone]] TILL 5:00 PM  PLEASE CONTACT ON CALL TEAM:  - On Call Team (Please refer to Kaya) FROM 5:00 PM - 8:30PM  - Nightfloat Team FROM 8:30 -7:30 AM    CHIEF COMPLAINT & BRIEF HOSPITAL COURSE:    79 y/o M with medical history significant for CAD with 17 stents placed (last in 2016 at [facility]) and quadruple bypass surgery at [place], AICD ([employer], placed in Nov 2019), HFrEF (EF 25% on QUINTON 8/16), CKD Stage 3, HTN, HLD, DM presented to the hospital with c/o left sided substernal chest pain x 1 day.     INTERVAL HPI/OVERNIGHT EVENTS:   patient examined bed side. still c/o chest pain       REVIEW OF SYSTEMS:  CONSTITUTIONAL: No fever, weight loss, or fatigue  RESPIRATORY: No cough, wheezing, chills or hemoptysis; No shortness of breath  CARDIOVASCULAR: No palpitations, dizziness, or leg swelling. c/o chest pain .  GASTROINTESTINAL: No abdominal pain. No nausea, vomiting, or hematemesis; No diarrhea or constipation. No melena or hematochezia.  GENITOURINARY: No dysuria or hematuria, urinary frequency  NEUROLOGICAL: No headaches, memory loss, loss of strength, numbness, or tremors  SKIN: No itching, burning, rashes, or lesions     MEDICATIONS  (STANDING):  allopurinol 300 milliGRAM(s) Oral daily  aspirin  chewable 81 milliGRAM(s) Oral daily  atorvastatin 80 milliGRAM(s) Oral at bedtime  carvedilol 25 milliGRAM(s) Oral every 12 hours  furosemide   Injectable 40 milliGRAM(s) IV Push two times a day  insulin glargine Injectable (LANTUS) 40 Unit(s) SubCutaneous at bedtime  insulin lispro Injectable (HumaLOG) 5 Unit(s) SubCutaneous three times a day before meals  isosorbide   mononitrate ER Tablet (IMDUR) 60 milliGRAM(s) Oral daily  mirtazapine 15 milliGRAM(s) Oral at bedtime  ranolazine 500 milliGRAM(s) Oral two times a day  ticagrelor 90 milliGRAM(s) Oral every 12 hours    MEDICATIONS  (PRN):  glucagon  Injectable 1 milliGRAM(s) IntraMuscular once PRN Glucose LESS THAN 70 milligrams/deciliter  heparin   Injectable 7000 Unit(s) IV Push every 6 hours PRN For aPTT less than 40  heparin   Injectable 3500 Unit(s) IV Push every 6 hours PRN For aPTT between 40 - 57      Vital Signs Last 24 Hrs  T(C): 36.3 (21 Aug 2020 11:32), Max: 37.1 (21 Aug 2020 03:33)  T(F): 97.3 (21 Aug 2020 11:32), Max: 98.8 (21 Aug 2020 03:33)  HR: 88 (21 Aug 2020 11:32) (87 - 103)  BP: 103/70 (21 Aug 2020 11:32) (102/61 - 124/82)  BP(mean): --  RR: 18 (21 Aug 2020 11:32) (17 - 22)  SpO2: 100% (21 Aug 2020 11:32) (94% - 100%)    PHYSICAL EXAMINATION:  GENERAL: NAD, well built  HEAD:  Atraumatic, Normocephalic  EYES:  conjunctiva and sclera clear  NECK: Supple, No JVD, Normal thyroid  CHEST/LUNG: Clear to auscultation. Clear to percussion bilaterally; No rales, rhonchi, wheezing, or rubs  HEART: Regular rate and rhythm; No murmurs, rubs, or gallops  ABDOMEN: Soft, Nontender, Nondistended; Bowel sounds present  NERVOUS SYSTEM:  Alert & Oriented X3,    EXTREMITIES:  2+ Peripheral Pulses, No clubbing, cyanosis, or edema  SKIN: warm dry                          12.1   8.23  )-----------( 172      ( 21 Aug 2020 06:12 )             36.4     08-21    137  |  100  |  38<H>  ----------------------------<  166<H>  3.3<L>   |  29  |  1.67<H>    Ca    9.2      21 Aug 2020 06:12  Phos  3.5     08-21  Mg     2.0     08-21    TPro  7.6  /  Alb  2.6<L>  /  TBili  1.3<H>  /  DBili  x   /  AST  23  /  ALT  37  /  AlkPhos  95  08-21    LIVER FUNCTIONS - ( 21 Aug 2020 06:12 )  Alb: 2.6 g/dL / Pro: 7.6 g/dL / ALK PHOS: 95 U/L / ALT: 37 U/L DA / AST: 23 U/L / GGT: x           CARDIAC MARKERS ( 21 Aug 2020 12:24 )  0.122 ng/mL / x     / x     / x     / x      CARDIAC MARKERS ( 21 Aug 2020 01:10 )  0.133 ng/mL / x     / 53 U/L / x     / <1.0 ng/mL  CARDIAC MARKERS ( 20 Aug 2020 21:34 )  0.118 ng/mL / x     / x     / x     / x      CARDIAC MARKERS ( 20 Aug 2020 11:46 )  0.153 ng/mL / x     / 55 U/L / x     / <1.0 ng/mL  CARDIAC MARKERS ( 20 Aug 2020 00:17 )  0.155 ng/mL / x     / x     / x     / x          PT/INR - ( 20 Aug 2020 00:17 )   PT: 15.1 sec;   INR: 1.31 ratio         PTT - ( 21 Aug 2020 06:12 )  PTT:68.7 sec        CAPILLARY BLOOD GLUCOSE  CAPILLARY BLOOD GLUCOSE      POCT Blood Glucose.: 282 mg/dL (21 Aug 2020 11:46)    CAPILLARY BLOOD GLUCOSE      POCT Blood Glucose.: 282 mg/dL (21 Aug 2020 11:46)  POCT Blood Glucose.: 261 mg/dL (21 Aug 2020 07:39)  POCT Blood Glucose.: 259 mg/dL (20 Aug 2020 21:10)  POCT Blood Glucose.: 292 mg/dL (20 Aug 2020 17:46)      RADIOLOGY & ADDITIONAL TESTS:  chest xray :    Developing mild pulmonary edema with trace bilateral pleural effusions.    Cardiomegaly. CABG. Pacemaker/defibrillator partially obscures the left lung field. PGY-1 Progress Note discussed with attending    PAGER #: [[phone]] TILL 5:00 PM  PLEASE CONTACT ON CALL TEAM:  - On Call Team (Please refer to Kaya) FROM 5:00 PM - 8:30PM  - Nightfloat Team FROM 8:30 -7:30 AM    CHIEF COMPLAINT & BRIEF HOSPITAL COURSE:    79 y/o M with medical history significant for CAD with 17 stents placed (last in 2016 at [facility]) and quadruple bypass surgery at [place], AICD ([employer], placed in Nov 2019), HFrEF (EF 25% on QUINTON 8/16), CKD Stage 3, HTN, HLD, DM presented to the hospital with c/o left sided substernal chest pain x 1 day.     INTERVAL HPI/OVERNIGHT EVENTS:   patient examined bed side. still c/o chest pain       REVIEW OF SYSTEMS:  CONSTITUTIONAL: No fever, weight loss, or fatigue  RESPIRATORY: No cough, wheezing, chills or hemoptysis  CARDIOVASCULAR: No palpitations, dizziness, or leg swelling. c/o chest pain .  GASTROINTESTINAL: No abdominal pain. No nausea, vomiting, or hematemesis; No diarrhea or constipation. No melena or hematochezia.  GENITOURINARY: No dysuria or hematuria, urinary frequency  NEUROLOGICAL: No headaches, memory loss, loss of strength, numbness, or tremors  SKIN: No itching, burning, rashes, or lesions     MEDICATIONS  (STANDING):  allopurinol 300 milliGRAM(s) Oral daily  aspirin  chewable 81 milliGRAM(s) Oral daily  atorvastatin 80 milliGRAM(s) Oral at bedtime  carvedilol 25 milliGRAM(s) Oral every 12 hours  furosemide   Injectable 40 milliGRAM(s) IV Push two times a day  insulin glargine Injectable (LANTUS) 40 Unit(s) SubCutaneous at bedtime  insulin lispro Injectable (HumaLOG) 5 Unit(s) SubCutaneous three times a day before meals  isosorbide   mononitrate ER Tablet (IMDUR) 60 milliGRAM(s) Oral daily  mirtazapine 15 milliGRAM(s) Oral at bedtime  ranolazine 500 milliGRAM(s) Oral two times a day  ticagrelor 90 milliGRAM(s) Oral every 12 hours    MEDICATIONS  (PRN):  glucagon  Injectable 1 milliGRAM(s) IntraMuscular once PRN Glucose LESS THAN 70 milligrams/deciliter  heparin   Injectable 7000 Unit(s) IV Push every 6 hours PRN For aPTT less than 40  heparin   Injectable 3500 Unit(s) IV Push every 6 hours PRN For aPTT between 40 - 57      Vital Signs Last 24 Hrs  T(C): 36.3 (21 Aug 2020 11:32), Max: 37.1 (21 Aug 2020 03:33)  T(F): 97.3 (21 Aug 2020 11:32), Max: 98.8 (21 Aug 2020 03:33)  HR: 88 (21 Aug 2020 11:32) (87 - 103)  BP: 103/70 (21 Aug 2020 11:32) (102/61 - 124/82)  BP(mean): --  RR: 18 (21 Aug 2020 11:32) (17 - 22)  SpO2: 100% (21 Aug 2020 11:32) (94% - 100%)    PHYSICAL EXAMINATION:  GENERAL: NAD, well built  HEAD:  Atraumatic, Normocephalic  EYES:  conjunctiva and sclera clear  NECK: Supple, No JVD, Normal thyroid  CHEST/LUNG: BL basal crepitations  HEART: Regular rate and rhythm; No murmurs, rubs, or gallops  ABDOMEN: Soft, Nontender, Nondistended; Bowel sounds present  NERVOUS SYSTEM:  Alert & Oriented X3,    EXTREMITIES:  2+ Peripheral Pulses, No clubbing, cyanosis, or edema  SKIN: warm dry                          12.1   8.23  )-----------( 172      ( 21 Aug 2020 06:12 )             36.4     08-21    137  |  100  |  38<H>  ----------------------------<  166<H>  3.3<L>   |  29  |  1.67<H>    Ca    9.2      21 Aug 2020 06:12  Phos  3.5     08-21  Mg     2.0     08-21    TPro  7.6  /  Alb  2.6<L>  /  TBili  1.3<H>  /  DBili  x   /  AST  23  /  ALT  37  /  AlkPhos  95  08-21    LIVER FUNCTIONS - ( 21 Aug 2020 06:12 )  Alb: 2.6 g/dL / Pro: 7.6 g/dL / ALK PHOS: 95 U/L / ALT: 37 U/L DA / AST: 23 U/L / GGT: x           CARDIAC MARKERS ( 21 Aug 2020 12:24 )  0.122 ng/mL / x     / x     / x     / x      CARDIAC MARKERS ( 21 Aug 2020 01:10 )  0.133 ng/mL / x     / 53 U/L / x     / <1.0 ng/mL  CARDIAC MARKERS ( 20 Aug 2020 21:34 )  0.118 ng/mL / x     / x     / x     / x      CARDIAC MARKERS ( 20 Aug 2020 11:46 )  0.153 ng/mL / x     / 55 U/L / x     / <1.0 ng/mL  CARDIAC MARKERS ( 20 Aug 2020 00:17 )  0.155 ng/mL / x     / x     / x     / x          PT/INR - ( 20 Aug 2020 00:17 )   PT: 15.1 sec;   INR: 1.31 ratio         PTT - ( 21 Aug 2020 06:12 )  PTT:68.7 sec        CAPILLARY BLOOD GLUCOSE  CAPILLARY BLOOD GLUCOSE      POCT Blood Glucose.: 282 mg/dL (21 Aug 2020 11:46)    CAPILLARY BLOOD GLUCOSE      POCT Blood Glucose.: 282 mg/dL (21 Aug 2020 11:46)  POCT Blood Glucose.: 261 mg/dL (21 Aug 2020 07:39)  POCT Blood Glucose.: 259 mg/dL (20 Aug 2020 21:10)  POCT Blood Glucose.: 292 mg/dL (20 Aug 2020 17:46)      RADIOLOGY & ADDITIONAL TESTS:  chest xray :    Developing mild pulmonary edema with trace bilateral pleural effusions.    Cardiomegaly. CABG. Pacemaker/defibrillator partially obscures the left lung field.

## 2020-08-21 NOTE — DISCHARGE NOTE PROVIDER - NSDCPNSUBOBJ_GEN_ALL_CORE
Patient is a 80y old  Male who presents with a chief complaint of Chest pain for Evaluation (21 Aug 2020 14:08)        Patient was seen and examined at bedside today    Off NC oxygen, saturating well on RA    Denies any chest pain and SOB         INTERVAL HPI/OVERNIGHT EVENTS:    T(C): 36.9 (08-22-20 @ 11:26), Max: 36.9 (08-22-20 @ 11:26)    HR: 94 (08-22-20 @ 11:26) (74 - 96)    BP: 100/74 (08-22-20 @ 11:26) (92/60 - 106/67)    RR: 18 (08-22-20 @ 11:26) (18 - 18)    SpO2: 98% (08-22-20 @ 11:26) (92% - 100%)    Wt(kg): --    I&O's Summary            REVIEW OF SYSTEMS: denies fever, chills, SOB, palpitations, chest pain, abdominal pain, nausea, vomitting, diarrhea, constipation, dizziness        MEDICATIONS  (STANDING):    allopurinol 300 milliGRAM(s) Oral daily    aspirin  chewable 81 milliGRAM(s) Oral daily    atorvastatin 80 milliGRAM(s) Oral at bedtime    carvedilol 25 milliGRAM(s) Oral every 12 hours    furosemide   Injectable 40 milliGRAM(s) IV Push two times a day    heparin   Injectable 5000 Unit(s) SubCutaneous every 12 hours    insulin glargine Injectable (LANTUS) 40 Unit(s) SubCutaneous at bedtime    insulin lispro Injectable (HumaLOG) 5 Unit(s) SubCutaneous three times a day before meals    isosorbide   mononitrate ER Tablet (IMDUR) 60 milliGRAM(s) Oral daily    melatonin 3 milliGRAM(s) Oral at bedtime    mirtazapine 15 milliGRAM(s) Oral at bedtime    ranolazine 500 milliGRAM(s) Oral two times a day    ticagrelor 90 milliGRAM(s) Oral every 12 hours        MEDICATIONS  (PRN):    glucagon  Injectable 1 milliGRAM(s) IntraMuscular once PRN Glucose LESS THAN 70 milligrams/deciliter            PHYSICAL EXAM:    GENERAL: NAD    NERVOUS SYSTEM:  Alert & Oriented X3, Good concentration; no focal deficit     CHEST/LUNG: few BL basal crepitations, markedly improved from admission    HEART: Regular rate and rhythm; No murmurs, rubs, or gallops    ABDOMEN: Soft, Nontender, Nondistended; Bowel sounds present    EXTREMITIES:  2+ Peripheral Pulses, No clubbing, cyanosis, or edema    SKIN: No rashes or lesions        LABS:                            10.6     6.73  )-----------( 167      ( 22 Aug 2020 07:00 )               32.4         138  |  103  |  42<H>    ----------------------------<  143<H>    3.6   |  30  |  1.75<H>        Assessment and plan:    Acute on chronic systolic heart failure     NSTEMI     Moderate to severe AS    CAD s/p PCI    CKD 3    HTN    DM    HLD        Plan:    Chest pain and SOB improved    Cont Aspirin and Brilinta with statin     Will cont Lasix 40mg BID PO on discharge     Patient was extensively counselled about Salt and water restriction, weight monitoring     Has follow up appointment with primary cardiologist on Monday    Nutrition consult     Cont Imdur, Coreg and Ranolazine    Stable to be discharged home

## 2020-08-21 NOTE — PROGRESS NOTE ADULT - PROBLEM SELECTOR PLAN 3
-Pt has medical hx of CKD Stage 3  -eGFR 29, Creatinine 2.10 (Acute on Chronic kidney disease)  -Avoid nephrotoxins

## 2020-08-21 NOTE — DISCHARGE NOTE PROVIDER - NSDCFUSCHEDAPPT_GEN_ALL_CORE_FT
VERONICA DORMAN JR ; 09/09/2020 ; [facility] [address] VERONICA Sky JR ; 10/15/2020 ; [facility] VERONICA DORMAN JR ; 09/09/2020 ; [facility] [address] VERONICA Sky JR ; 10/15/2020 ; [facility] VERONICA DORMAN JR ; 09/09/2020 ; [facility] [address] VERONICA Sky JR ; 10/15/2020 ; [facility] Card[facility] VERONICA DORMAN JR ; 09/09/2020 ; [facility] [address] VERONICA Sky JR ; 10/15/2020 ; [facility] Card[facility] VERONICA DORMAN ; 09/09/2020 ; [facility] [address] VERONICA Sky ; 10/15/2020 ; [facility] Card[facility]

## 2020-08-21 NOTE — PROGRESS NOTE ADULT - ATTENDING COMMENTS
Patient is a 80y old  Male who presents with a chief complaint of Chest pain     Patient was seen and examined at bedside   Reports chest pain has resolved, SOB improved but still there  Noted to have asian food, counselled about diet     REVIEW OF SYSTEMS: denies fever, chills, abdominal pain, nausea, vomiting, diarrhea, constipation, dizziness    PHYSICAL EXAM:  GENERAL: NAD  NERVOUS SYSTEM:  Alert & Oriented X3, Good concentration; no focal deficit   CHEST/LUNG: BL basal crepitations, improved from yesterday   HEART: Regular rate and rhythm; No murmurs, rubs, or gallops  ABDOMEN: Soft, Nontender, Nondistended; Bowel sounds present  EXTREMITIES:  1+ edema  SKIN: No rashes or lesions  LABS:                        12.1   8.23  )-----------( 172      ( 21 Aug 2020 06:12 )             36.4     137  |  100  |  38<H>  ----------------------------<  166<H>  3.3<L>   |  29  |  1.67<H>    Assessment and plan:  Acute on chronic systolic heart failure   NSTEMI   Moderate to severe AS  CAD s/p PCI  CKD 3  HTN  DM  HLD    Plan:  Trop trended down, chest pain improved, heparin drip discontinued   Cont Aspirin and Brilinta with statin   Cardiology consult appreciated   Will cont Lasix 40mg BID IVP  I/O   Daily weight   Salt and water restriction  Nutrition consult   Cont Imdur, Coreg and Ranolazine  Rest of the management as above

## 2020-08-21 NOTE — PROGRESS NOTE ADULT - ASSESSMENT
81 y/o M with medical history significant for CAD with 17 stents placed (last in 2016 at [facility]) and quadruple bypass surgery at [place], AICD ([employer] Kvng, placed in Nov 2019), HFrEF (EF 25% on QUINTON 8/16), CKD Stage 3, HTN, DM presented to the hospital with c/o left sided substernal chest pain x 1 day, EKG NSR, Troponin 0.155, admitted for chest pain under evaluation, likely NSTEMI.

## 2020-08-21 NOTE — PROGRESS NOTE ADULT - PROBLEM SELECTOR PLAN 1
-Pt p/w substernal chest pain x 1 day, radiating to L arm-  -Non-positional, non-reproducible pain associated with SOB (NYHA Class III-IV)  -Cxray Cardiomegaly +, O/E bibasilar crackles +  troponin 0.153, t2 0.118, t3 0.133  - Transthoracic Echo showed severe global systolic dysfunction  -Cardiology consulted- Dr. David  -Pt  on Aspirin 81 mg, Ticagrelol 90 mg maintenance doses daily

## 2020-08-21 NOTE — PROGRESS NOTE ADULT - PROBLEM SELECTOR PLAN 2
-Pt p/w chest pain x 1 day, O/E Bibasilar crackles +, Cxray Cardiomegaly  -HFrEF 25% on QUINTON 8/16, AICD placed Nov 2019  -Strict I's/O's and daily weight check

## 2020-08-21 NOTE — DISCHARGE NOTE PROVIDER - NSDCCPCAREPLAN_GEN_ALL_CORE_FT
PRINCIPAL DISCHARGE DIAGNOSIS  Diagnosis: Chest pain, unspecified type  Assessment and Plan of Treatment: You presented with chest pain and were admitted to ensure no cardiac cause. your Your EKG showed normal sinus rhythm and your cardiac enzymes were negative when trended. Your chest pain is most likley in the setting of acute on chronic decompensated systolic and diastolic HF exacerbation. curreent heart failutre might be in the setting of Aortic valvestenosis though it is unclear if this is true severe valve narrowing or secondary to  low EF.  Please follow up with your outpatient cardiologist for  dobutamine stress echocardiogram with consideration for TAVR if you have true AS.     At this time we do not suspect cardiac ischemia.    Please continue with your medications as prescribed.        SECONDARY DISCHARGE DIAGNOSES  Diagnosis: Heart failure  Assessment and Plan of Treatment: PRINCIPAL DISCHARGE DIAGNOSIS  Diagnosis: Chest pain, unspecified type  Assessment and Plan of Treatment: You presented with chest pain and were admitted to ensure no cardiac cause. your Your EKG showed normal sinus rhythm and your cardiac enzymes were negative when trended. Your chest pain is most likley in the setting of acute on chronic decompensated systolic and diastolic HF exacerbation. curreent heart failutre might be in the setting of Aortic valvestenosis though it is unclear if this is true severe valve narrowing or secondary to  low EF.  Please follow up with your outpatient cardiologist for  dobutamine stress echocardiogram with consideration for TAVR if you have true AS.     At this time we do not suspect cardiac ischemia.    Please continue with your medications as prescribed.        SECONDARY DISCHARGE DIAGNOSES  Diagnosis: Acute exacerbation of congestive heart failure  Assessment and Plan of Treatment: Your chest pain is most likley in the setting of acute on chronic decompensated systolic and diastolic HF exacerbation. curreent heart failutre might be in the setting of Aortic valvestenosis though it is unclear if this is true severe valve narrowing or secondary to  low EF.Please follow up with your outpatient cardiologist for  dobutamine stress echocardiogram with consideration for TAVR if you have true AS.        Diagnosis: Type 2 diabetes, uncontrolled, with renal manifestation  Assessment and Plan of Treatment: Continue with your blood sugar medication. HbAIC was found in admission on 11.6 which is very high     .  You must maintain a healthy diet that consist of low sugar, low fat, low sodium diet. Exercise frequently if possible. Consider repeating your Hemoglobin A1c within 3 months after discharge to monitor your average blood glucose control. Follow up with primary care physician in one week after discharge.      Diagnosis: HTN (Hypertension)  Assessment and Plan of Treatment: Continue with blood pressure medication. Maintain a healthy diet that consist of low sugar, low fat, low sodium diet. Exercise frequently if possible.  Follow up with primary care physician in one week after discharge.      Diagnosis: CKD (chronic kidney disease) stage 3, GFR 30-59 ml/min  Assessment and Plan of Treatment: CKD stage 3    due to diabetes and high bloo dpressure  Follow up with PCP within 1-2 weeks after discharge  -Concitnue renal diet  -Avoid Nephrotoxic Meds/ Agents such as (NSAIDs, IV contrast, Aminoglycosides such as gentamicin, -Gadolinium contrast, Phosphate containing enemas, etc..)  -Ask your doctor to adjust your medications according to eGFR      Diagnosis: Hypercholesteremia  Assessment and Plan of Treatment: Your cholesterol levels were found to be elevated. In order to lower your risk of cardiac and vascular disease, please take your statin as prescribed. Additionally, we recommend you carefully watch your diet to avoid fatty and greasy foods. Please follow up with your primary care provider to recheck your lipid panel every few years. PRINCIPAL DISCHARGE DIAGNOSIS  Diagnosis: Chest pain, unspecified type  Assessment and Plan of Treatment: You presented with chest pain and were admitted to ensure no cardiac cause. your Your EKG showed normal sinus rhythm and your cardiac enzymes were negative when trended. Your chest pain is most likley in the setting of acute on chronic decompensated systolic and diastolic HF exacerbation. curreent heart failutre might be in the setting of Aortic valvestenosis though it is unclear if this is true severe valve narrowing or secondary to  low EF.  Please follow up with your outpatient cardiologist for  dobutamine stress echocardiogram with consideration for TAVR if you have true AS.     At this time we do not suspect cardiac ischemia.    Please continue with your medications as prescribed.        SECONDARY DISCHARGE DIAGNOSES  Diagnosis: Hypercholesteremia  Assessment and Plan of Treatment: Your cholesterol levels were found to be elevated. In order to lower your risk of cardiac and vascular disease, please take your statin as prescribed. Additionally, we recommend you carefully watch your diet to avoid fatty and greasy foods. Please follow up with your primary care provider to recheck your lipid panel every few years.      Diagnosis: HTN (Hypertension)  Assessment and Plan of Treatment: Continue with blood pressure medication. Maintain a healthy diet that consist of low sugar, low fat, low sodium diet. Exercise frequently if possible.  Follow up with primary care physician in one week after discharge.      Diagnosis: CKD (chronic kidney disease) stage 3, GFR 30-59 ml/min  Assessment and Plan of Treatment: CKD stage 3    due to diabetes and high bloo dpressure  Follow up with PCP within 1-2 weeks after discharge  -Concitnue renal diet  -Avoid Nephrotoxic Meds/ Agents such as (NSAIDs, IV contrast, Aminoglycosides such as gentamicin, -Gadolinium contrast, Phosphate containing enemas, etc..)  -Ask your doctor to adjust your medications according to eGFR      Diagnosis: Type 2 diabetes, uncontrolled, with renal manifestation  Assessment and Plan of Treatment: Continue with your blood sugar medication. HbAIC was found in admission on 11.6 which is very high     .  You must maintain a healthy diet that consist of low sugar, low fat, low sodium diet. Exercise frequently if possible. Consider repeating your Hemoglobin A1c within 3 months after discharge to monitor your average blood glucose control. Follow up with primary care physician in one week after discharge.      Diagnosis: Acute exacerbation of congestive heart failure  Assessment and Plan of Treatment: Your chest pain is most likley in the setting of acute on chronic decompensated systolic and diastolic HF exacerbation. curreent heart failutre might be in the setting of Aortic valvestenosis though it is unclear if this is true severe valve narrowing or secondary to  low EF.Please follow up with your outpatient cardiologist for  dobutamine stress echocardiogram with consideration for TAVR if you have true AS.

## 2020-08-21 NOTE — PROGRESS NOTE ADULT - PROBLEM SELECTOR PLAN 8
IMPROVE VTE Individual Risk Assessment  RISK                                                                Points  [  ] Previous VTE                                                  3  [  ] Thrombophilia                                               2  [  ] Lower limb paralysis                                      2        (unable to hold up >15 seconds)    [  ] Current Cancer                                              2         (within 6 months)  [ x ] Immobilization > 24 hrs                                1  [  ] ICU/CCU stay > 24 hours                              1  [ x ] Age > 60                                                      1    IMPROVE VTE Score _____2____    IMPROVE Score 0-1: Low Risk, No VTE prophylaxis required for most patients, encourage ambulation.   IMPROVE Score 2-3: At risk, pharmacologic VTE prophylaxis is indicated for most patients (in the absence of a contraindication)  IMPROVE Score > or = 4: High Risk, pharmacologic VTE prophylaxis is indicated for most patients (in the absence of a contraindication)    on heparin prophylaxis

## 2020-08-22 NOTE — DIETITIAN INITIAL EVALUATION ADULT. - OTHER INFO
Pt from home, alert, oriented, well-communicated, but anxious, wants to go home; appetite good, denied GI distress, chewing or swallowing problem at present, no specific food choices reported; wt loss of 20 lb ? x a couple of months per pt, wt data from [facility] EMR reviewed: 195.5 lb 2/22/2020; 207/8 lb 7/16/2020; 196.2 lb 8/22/2020; h/o DM for about 30y, not following DM meal planning at home, eats whatever he likes, " I am 80y old, earned it " per pt; likes sweets, observed 16 oz bottle of regular soda at bedside table; pt refusing DM diet in-house, declining diet education at present Pt from home, alert, oriented, well-communicated, but anxious, wants to go home; appetite good, denied GI distress, chewing or swallowing problem at present, no specific food choices reported; wt loss of 20 lb ? x a couple of months per pt, wt data from [facility] EMR reviewed: 195.5 lb 2/22/2020; 207/8 lb 7/16/2020; 196.2 lb 8/22/2020; h/o DM for about 30y, not following DM meal planning at home, eats whatever he likes, " I am 80y old, earned it " per pt, likes sweets, observed 16 oz bottle of regular soda at bedside table; pt refusing DM diet in-house, declining diet education at present; Discussed with MD/RN

## 2020-08-22 NOTE — DISCHARGE NOTE NURSING/CASE MANAGEMENT/SOCIAL WORK - PATIENT PORTAL LINK FT
You can access the FollowMyHealth Patient Portal offered by [facility] by registering at the following website: [email or web address]. By joining [facility]’s FollowMyHealth portal, you will also be able to view your health information using other applications (apps) compatible with our system.

## 2020-08-22 NOTE — DIETITIAN INITIAL EVALUATION ADULT. - DIET TYPE
DASH/TLC (sodium and cholesterol restricted diet)/as medically feasible if pt receptive/consistent carbohydrate (no snacks)

## 2020-08-22 NOTE — DIETITIAN INITIAL EVALUATION ADULT. - PERTINENT MEDS FT
MEDICATIONS  (STANDING):  allopurinol 300 milliGRAM(s) Oral daily  aspirin  chewable 81 milliGRAM(s) Oral daily  atorvastatin 80 milliGRAM(s) Oral at bedtime  carvedilol 25 milliGRAM(s) Oral every 12 hours  furosemide   Injectable 40 milliGRAM(s) IV Push two times a day  heparin   Injectable 5000 Unit(s) SubCutaneous every 12 hours  insulin glargine Injectable (LANTUS) 40 Unit(s) SubCutaneous at bedtime  insulin lispro Injectable (HumaLOG) 5 Unit(s) SubCutaneous three times a day before meals  isosorbide   mononitrate ER Tablet (IMDUR) 60 milliGRAM(s) Oral daily  melatonin 3 milliGRAM(s) Oral at bedtime  mirtazapine 15 milliGRAM(s) Oral at bedtime  ranolazine 500 milliGRAM(s) Oral two times a day  ticagrelor 90 milliGRAM(s) Oral every 12 hours

## 2020-08-22 NOTE — DIETITIAN INITIAL EVALUATION ADULT. - PERTINENT LABORATORY DATA
08-22 Na138 mmol/L Glu 143 mg/dL<H> K+ 3.6 mmol/L Cr  1.75 mg/dL<H> BUN 42 mg/dL<H>   08-22 Phos 3.9 mg/dL   08-22 Alb 2.3 g/dL<L>  Finger stick [id number]=478 to 355   08-20 Chol 132 mg/dL LDL 74 mg/dL HDL 28 mg/dL<L> Trig 149 mg/dL  08-20-20 @ 22:35 HgbA1C 11.6 [4.0 - 5.6]

## 2020-08-23 NOTE — ED PROVIDER NOTE - NS ED ROS FT
ROS:  GENERAL: No fever, no chills  EYES: no change in vision  HEENT: no trouble swallowing, no trouble speaking  CARDIAC: no chest pain  PULMONARY: +SOB. no cough   GI: no abdominal pain, no nausea, no vomiting, no diarrhea, no constipation  : No dysuria, no frequency, no change in appearance, or odor of urine  SKIN: no rashes  NEURO: no headache, no weakness  MSK: No joint pain    Vamsi Olivas PGY3

## 2020-08-23 NOTE — H&P ADULT - NSHPPHYSICALEXAM_GEN_ALL_CORE
Vital Signs Last 24 Hrs  T(C): 36.5 (23 Aug 2020 16:06), Max: 36.6 (23 Aug 2020 12:43)  T(F): 97.7 (23 Aug 2020 16:06), Max: 97.9 (23 Aug 2020 12:43)  HR: 92 (23 Aug 2020 16:06) (92 - 100)  BP: 107/78 (23 Aug 2020 16:06) (107/73 - 115/77)  BP(mean): --  RR: 18 (23 Aug 2020 16:06) (16 - 24)  SpO2: 99% (23 Aug 2020 16:06) (99% - 100%)    PHYSICAL EXAM:  GENERAL: NAD, well-developed  HEAD:  Atraumatic, Normocephalic  EYES: EOMI, PERRLA, conjunctiva and sclera clear  NECK: Supple, No JVD  CHEST/LUNG: rales b/l   HEART: Regular rate and rhythm; No murmurs, rubs, or gallops  ABDOMEN: Soft, Nontender, Nondistended; Bowel sounds present  EXTREMITIES:  2+ Peripheral Pulses, No clubbing, cyanosis, or edema  PSYCH: AAOx3  NEUROLOGY: non-focal  SKIN: No rashes or lesions
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
## 2020-08-23 NOTE — ED PROVIDER NOTE - PHYSICAL EXAMINATION
Gen: AAOx3, non-toxic  Head: NCAT  HEENT: EOMI, oral mucosa moist, normal conjunctiva  Lung: bibasilar rales, no resp distress   CV: RRR, no murmurs, rubs or gallops  Abd: soft, NTND, no guarding  MSK: no visible deformities  Neuro: No focal sensory or motor deficits  Skin: Warm, well perfused, no rash, trace pitting edema b/l  Psych: normal affect.     Vamsi Olivas PGY3
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
## 2020-08-23 NOTE — ED ADULT NURSE NOTE - CHPI ED NUR SYMPTOMS NEG
no cough/no diaphoresis/no chills/no fever/no headache/no chest pain/no wheezing/no body aches/no hemoptysis

## 2020-08-23 NOTE — ED ADULT TRIAGE NOTE - ARRIVAL INFO ADDITIONAL COMMENTS
Patient has a 25% EF and is due for a defib change Patient has a 25% EF and is due for a defib change acuity upgraded

## 2020-08-23 NOTE — ED PROVIDER NOTE - PMH
AICD (automatic cardioverter/defibrillator) present    Anxiety    Arthritis    CAD (Coronary Artery Disease)    CHF (congestive heart failure)  HFeEF (20-25%)  Chronic Gout    CKD (chronic kidney disease) stage 3, GFR 30-59 ml/min    Depression    Diverticula, Colon    Erectile dysfunction    HTN (Hypertension)    Hypercholesteremia    MI (myocardial infarction)  x2- 2013/2014  Renal Stone    Type 2 diabetes, uncontrolled, with renal manifestation

## 2020-08-23 NOTE — ED PROVIDER NOTE - ATTENDING CONTRIBUTION TO CARE
I saw and evaluated patient with resident. I discussed H+P and MDM with resident. I agree with the statements made by the resident unless otherwise noted.    The care of this patient was in support of the [facility] countermeasures to Covid-19.

## 2020-08-23 NOTE — ED ADULT NURSE NOTE - NSIMPLEMENTINTERV_GEN_ALL_ED
Implemented All Fall Risk Interventions:  [place] to call system. Call bell, personal items and telephone within reach. Instruct patient to call for assistance. Room bathroom lighting operational. Non-slip footwear when patient is off stretcher. Physically safe environment: no spills, clutter or unnecessary equipment. Stretcher in lowest position, wheels locked, appropriate side rails in place. Provide visual cue, wrist band, yellow gown, etc. Monitor gait and stability. Monitor for mental status changes and reorient to person, place, and time. Review medications for side effects contributing to fall risk. Reinforce activity limits and safety measures with patient and family.

## 2020-08-23 NOTE — ED PROVIDER NOTE - OBJECTIVE STATEMENT
80M with PMH of CAD with 17 stents placed (last in 2016 at [facility]) and quadruple bypass surgery at [place], AICD ([employer] Kvng, placed in Nov 2019), HFrEF (EF 25% on QUINTON 8/16), CKD Stage 3, HTN, HLD, DM presents to the ER with SOB/PORTILLO + orthopnea x several days. Was recently admitted to [facility] for chest pain after a 40 lb stack of tiles fell onto his chest a week or so ago--ICD was interrogated and the pt was discharged. Returning due to worsening SOB. Denies any current chest pain.

## 2020-08-23 NOTE — H&P ADULT - ASSESSMENT
80 male h/o cad s/p pci, s/p cabg, chf s/p aicd, ckd, htn, chol, dm, a/w acute on chronic systolic heart failure    chf  acute on chornic systolic   diuresis with lasix 40 iv bid  strict i/o  daily wt  cards f/u  tele  pt had echo last week    DM  insulin as ordered  monitor fs    cad s/p pci  cont asa/brilinta    chol  cont statin    cont other home meds    PT          Advanced care planning was discussed with patient and family.  Advanced care planning forms were reviewed and discussed as appropriate.  Differential diagnosis and plan of care discussed with patient after the evaluation.   Pain assessed and judicious use of narcotics when appropriate was discussed.  Importance of Fall prevention discussed.  Counseling on Smoking and Alcohol cessation was offered when appropriate.  Counseling on Diet, exercise, and medication compliance was done.   Approx 45 minutes spent.

## 2020-08-23 NOTE — ED ADULT TRIAGE NOTE - PATIENT ON (OXYGEN DELIVERY METHOD)
HPI:    46y , LMP 2 weeks ago presents with vaginal bleeding for 14 days. Patient reports that she had vaginal spotting for 7 days and then for the past 7 days has had heavy vaginal bleeding using 1 pad an hour that is soaked through. Patient reports that she       OBHx:  GynHx:   PMH:  PSH:  All:  Meds:   SocialHx:     Vital Signs Last 24 Hrs  T(C): 36.9 (15 Rufus 2018 14:15), Max: 36.9 (15 Rufus 2018 14:15)  T(F): 98.5 (15 Rufus 2018 14:15), Max: 98.5 (15 Rufus 2018 14:15)  HR: 88 (15 Rufus 2018 14:15) (88 - 88)  BP: 149/88 (15 Rufus 2018 14:15) (149/88 - 149/88)  BP(mean): --  RR: 18 (15 Rufus 2018 14:15) (18 - 18)  SpO2: 100% (15 Rufus 2018 14:15) (100% - 100%)    PE:  Gen: Comfortable, NAD  CV: RRR  Pulm: CTAB  Abd: Soft, NT  Ext: No edema or tenderness bilaterally  Spec Exam:    LABS:                        13.3   4.8   )-----------( 191      ( 15 Rufus 2018 15:38 )             37.2     01-15    140  |  101  |  11  ----------------------------<  86  3.8   |  26  |  0.76    Ca    9.4      15 Rufus 2018 15:38    TPro  7.3  /  Alb  4.2  /  TBili  0.2  /  DBili  x   /  AST  28  /  ALT  21  /  AlkPhos  49  01-15    PT/INR - ( 15 Rufus 2018 15:38 )   PT: 11.2 sec;   INR: 1.04 ratio         PTT - ( 15 Rufus 2018 15:38 )  PTT:28.6 sec      RADIOLOGY & ADDITIONAL STUDIES:      A/P: 46y G P   who present with  -  -      Natalee Brown PGY2 HPI:    46y , LMP 2 weeks ago presents with vaginal bleeding for 14 days. Patient reports that she had vaginal spotting for 7 days and then for the past 7 days has had heavy vaginal bleeding using 1 pad an hour that is soaked through. Patient reports that she felt dizzy and lightheaded for 1 day prior to presentation. Patient called her gyn Dr. Ferguson and was told to come in to the ED to be evaluated. She reports she has had AUB for the last 1 to 1 1/2 years and has had multiple outpatient TVUS and endometrial biopsies.       OBHx: nsvdx3  GynHx: perimenopausal for 1-2 years, hx of ovarian cyst, denies hx of fibroids   PMH: denies  PSH: RSO, appendectomy, tonsillectomy  All: NKDA  Meds: none      Vital Signs Last 24 Hrs  T(C): 36.9 (15 Rufus 2018 14:15), Max: 36.9 (15 Rufus 2018 14:15)  T(F): 98.5 (15 Rufus 2018 14:15), Max: 98.5 (15 Rufus 2018 14:15)  HR: 88 (15 Rufus 2018 14:15) (88 - 88)  BP: 149/88 (15 Rufus 2018 14:15) (149/88 - 149/88)  BP(mean): --  RR: 18 (15 Rufus 2018 14:15) (18 - 18)  SpO2: 100% (15 Rufus 2018 14:15) (100% - 100%)    PE:  Gen: Comfortable, NAD  Abd: Soft, NT  Ext: No edema or tenderness bilaterally  Spec Exam: 10cc clot in the vagina. slow bleeding from cervical os with valsalva. No cervical or vaginal lesions   Pelvic exam: globular uterus, no adnexal masses, no tenderness on palpation    LABS:                        13.3   4.8   )-----------( 191      ( 15 Rufus 2018 15:38 )             37.2     -15    140  |  101  |  11  ----------------------------<  86  3.8   |  26  |  0.76    Ca    9.4      15 Rufus 2018 15:38    TPro  7.3  /  Alb  4.2  /  TBili  0.2  /  DBili  x   /  AST  28  /  ALT  21  /  AlkPhos  49  -15    PT/INR - ( 15 Rufus 2018 15:38 )   PT: 11.2 sec;   INR: 1.04 ratio         PTT - ( 15 Rufus 2018 15:38 )  PTT:28.6 sec room air

## 2020-08-23 NOTE — H&P ADULT - HISTORY OF PRESENT ILLNESS
80M with PMH of CAD with 17 stents placed (last in 2016 at [facility]) and quadruple bypass surgery at [place], AICD ([employer] Kvng, placed in Nov 2019), HFrEF (EF 25% on QUINTON 8/16), CKD Stage 3, HTN, HLD, DM presents to the ER with SOB/PORTILLO + orthopnea x several days. Was recently admitted for chest pain after a 40 lb stack of tiles fell onto his chest a week or so ago--ICD was interrogated and the pt was discharged. Returning due to worsening SOB. Denies any current chest pain.

## 2020-08-23 NOTE — ED ADULT NURSE REASSESSMENT NOTE - NS ED NURSE REASSESS COMMENT FT1
Patient placed on supplemental oxygen due to SOB/low oxygen saturation. Patient is now resting comfortably. MD aware. Will continue to monitor.

## 2020-08-24 NOTE — CONSULT NOTE ADULT - PROBLEM SELECTOR RECOMMENDATION 9
Will continue current insulin regimen for now. Will continue monitoring FS, log, and FU.  Patient with uncontrolled DM, A1C 11.6%. He is known to Service from previous hospital admissions, has been noncompliant with DM regimen in the past.  Will continue monitoring and make recommendations for outpatient management prior to DC.  Patient counseled for compliance with consistent low carb diet and exercise as tolerated outpatient.

## 2020-08-24 NOTE — CONSULT NOTE ADULT - ASSESSMENT
80 year-old with known chronic systolic heart failure status post ICD, recently admitted with trauma to chest wall (40 lbs of tiles fell on his chest at ICD site), now re-admitted with acute on chronic systolic heart failure.  EP to evaluate for possible CRT upgrade in patient with IVCD (left bundle like).  Patient appears compensated from heart failure perspective. Continue current medications. Entresto and aldosterone antagonist if renal function permits as outpatient.    Discharge planning and outpatient follow-up with Dr. Sarkar and EP.
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
## 2020-08-24 NOTE — PHYSICAL THERAPY INITIAL EVALUATION ADULT - GENERAL OBSERVATIONS, REHAB EVAL
Pt seen for 45min PT Eval. Pt s/p acute on chronic HF. Pt rec'd sitting at EOB, in NAD, BP 93/62 RN and NP Jackie aware. Pt denies lightheaded/dizziness, willing to work with PT.

## 2020-08-24 NOTE — PHYSICAL THERAPY INITIAL EVALUATION ADULT - PLANNED THERAPY INTERVENTIONS, PT EVAL
GOAL: Pt will perform 5 stairs with or without U HR as needed within 4weeks./balance training/gait training/strengthening
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
## 2020-08-24 NOTE — CONSULT NOTE ADULT - ATTENDING COMMENTS
I personally interviewed and examined the patient. He has experienced deteriorating symptoms of exertional dyspnea with fatigue over the past several weeks with culmination in orthopnea and nocturnal dyspnea. He has chronic renal dysfunction and was intolerant of Entresto in the past (hypotensive syncope). Currently, he is on carvedilol 25 mg bid, and isosorbide MN 60 mg daily.     He was implanted with a single chamber [employer] Kvng ICD last year following syncope and severe LV dysfunction. He has normal sinus rhythm with incomplete LBBB, currently QRS duration is 120 msec. Hence, CRT pacing is not indicated at this time. He does have frequent and multiple PVCs of left ventricular posterior septal or papillary muscle origin. This may be related to LV stretch and worsening heart failure but worth attempting to suppress in case it is contributory to LV dysfunction. In addition, the latest echo is reported as showed severe aortic stenosis that may be flow related. He is known to have a calcified AV with mild stenosis on prior echocardiographic studies.    I would recommend commencing amiodarone 400 mg bid for a week followed by 200 bid for two weeks and maintain at 200 daily. If LV function improved with PVC suppression, ablation becomes a possibility. Also, if he develops bradycardia or QRS widening with the amiodarone, ICD revision will be a consideration.    I have spoken to Dr. Romero who is covering for Dr. Stuart Sarkar, his primary cardiologist,  who agree and will continue to follow him.
Patient seen and exam today at bedside. Chart, labs, vitals, radiology reviewed. Above H&P reviewed and edited where appropriate.  Agree with history and physical exam. Agree with assessment and plan.
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
## 2020-08-24 NOTE — PROGRESS NOTE ADULT - ASSESSMENT
80 male h/o cad s/p pci, s/p cabg, chf s/p aicd, ckd, htn, chol, dm, a/w acute on chronic systolic heart failure    chf  acute on chornic systolic   diuresis with lasix 40 iv bid  strict i/o  daily wt  cards f/u  tele monitor  pt had echo last week  EP robbin called for aicd upgrade    DM  insulin as ordered  monitor fs    cad s/p pci  cont asa/brilinta    chol  cont statin    cont other home meds    PT          Advanced care planning was discussed with patient and family.  Advanced care planning forms were reviewed and discussed as appropriate.  Differential diagnosis and plan of care discussed with patient after the evaluation.   Pain assessed and judicious use of narcotics when appropriate was discussed.  Importance of Fall prevention discussed.  Counseling on Smoking and Alcohol cessation was offered when appropriate.  Counseling on Diet, exercise, and medication compliance was done.   Approx 45 minutes spent.

## 2020-08-24 NOTE — PHYSICAL THERAPY INITIAL EVALUATION ADULT - PERTINENT HX OF CURRENT PROBLEM, REHAB EVAL
80M with PMH of CAD with 17 stents placed (last in 2016 at [facility]) and quadruple bypass surgery at [place], AICD ([employer] Kvng, placed in Nov 2019), HFrEF (EF 25% on QUINTON 8/16), CKD Stage 3, HTN, HLD, DM presents to the ER with SOB/PORTILLO + orthopnea x several days. Pt a/w acute on chronic systolic heart failure. CXR: Persistent mild pulmonary edema with small bilateral pleural effusions.

## 2020-08-24 NOTE — CONSULT NOTE ADULT - ASSESSMENT
Assessment  DMT2: 80y Male with DM T2 with hyperglycemia, A1C 11.6%, was on oral meds and insulin at home, previously noncompliant, now on basal bolus insulin, blood sugars improving and trending within overall acceptable range today, no hypoglycemic episodes, he appears SOB.  HF: On meds, monitored, FU Cardiology.          Vincenzo Lujan MD  Cell: [phone]  Office: [phone] Assessment  DMT2: 80y Male with DM T2 with hyperglycemia, A1C 11.6%, was on oral meds and insulin at home, previously noncompliant, now on basal bolus insulin, blood sugars improving and trending within overall acceptable range today, no hypoglycemic episodes, he appears SOB.  HF: On meds, monitored, FU Cardiology.        Vincenzo Lujan MD  Cell: [phone]  Office: [phone]

## 2020-08-24 NOTE — CONSULT NOTE ADULT - REASON FOR ADMISSION
acute on chronic systolic heart failure

## 2020-08-24 NOTE — CHART NOTE - NSCHARTNOTEFT_GEN_A_CORE
VERONICA DORMAN    EP consulted for possible AICD upgrade to BiV ICD. Spoke with Lilibeth GARCIA from EP, patient does not have a left bundle branch block and therefore a BiV is not indicated and would not benefit patient.  Discussed above with Dr Jackson Choudhury AGACNP-c
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
## 2020-08-25 NOTE — PROGRESS NOTE ADULT - SUBJECTIVE AND OBJECTIVE BOX
Chief complaint  Patient is a 80y old  Male who presents with a chief complaint of acute on chronic systolic heart failure (24 Aug 2020 20:32)   Review of systems  Patient in bed, looks comfortable, no hypoglycemic episodes.    Labs and Fingersticks  CAPILLARY BLOOD GLUCOSE      POCT Blood Glucose.: 151 mg/dL (25 Aug 2020 08:58)  POCT Blood Glucose.: 118 mg/dL (24 Aug 2020 21:38)  POCT Blood Glucose.: 100 mg/dL (24 Aug 2020 17:44)  POCT Blood Glucose.: 123 mg/dL (24 Aug 2020 12:42)      Physical Exam  General: Patient comfortable in bed  Vital Signs Last 12 Hrs  T(F): 97.8 (08-25-20 @ 11:17), Max: 97.8 (08-25-20 @ 11:17)  HR: 65 (08-25-20 @ 11:17) (65 - 83)  BP: 107/56 (08-25-20 @ 11:17) (105/70 - 107/56)  BP(mean): --  RR: 18 (08-25-20 @ 11:17) (18 - 18)  SpO2: 99% (08-25-20 @ 11:17) (97% - 99%)
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
## 2020-08-25 NOTE — PROGRESS NOTE ADULT - ASSESSMENT
Assessment  DMT2: 80y Male with DM T2 with hyperglycemia, A1C 11.6%, blood sugars in acceptable range on basal bolus insulin regimen, no hypoglycemic episodes, eating meals.  HF: On meds, monitored, FU Cardiology.        Vincenzo Lujan MD  Cell: [phone]  Office: [phone] Assessment  DMT2: 80y Male with DM T2 with hyperglycemia,  A1C 11.6%, blood sugars in acceptable range on basal bolus insulin regimen, no hypoglycemic episodes, eating meals.  HF: On meds, monitored, FU Cardiology.        Vincenzo Lujan MD  Cell: [phone]  Office: [phone]

## 2020-08-25 NOTE — PROGRESS NOTE ADULT - ASSESSMENT
80 year-old with known chronic systolic heart failure status post ICD, recently admitted with trauma to chest wall (40 lbs of tiles fell on his chest at ICD site), now re-admitted with acute on chronic systolic heart failure.  EP recommends amiodarone to reduce burden of PVCs.  Patient appears compensated from heart failure perspective. Continue current medications.     Patient's most recent echo suggests low flow low gradient aortic stenosis. Would consider outpatient dobutamine stress to determine if it is AS or pseudo-AS in the setting of poor LV function.    Discharge planning and outpatient follow-up with Dr. Sarkar and EP.

## 2020-08-25 NOTE — PROGRESS NOTE ADULT - ASSESSMENT
80 male h/o cad s/p pci, s/p cabg, chf s/p aicd, ckd, htn, chol, dm, a/w acute on chronic systolic heart failure    chf  acute on chornic systolic   diuresis with lasix 40 iv bid  strict i/o  daily wt  cards f/u  tele monitor  pt had echo last week    pvc on tele  EP eval noted  no plan for aicd change/upgrade at this time  amio as per EP for PVCs    DM  insulin as ordered  monitor fs    cad s/p pci  cont asa/brilinta    chol  cont statin    cont other home meds    PT          Advanced care planning was discussed with patient and family.  Advanced care planning forms were reviewed and discussed as appropriate.  Differential diagnosis and plan of care discussed with patient after the evaluation.   Pain assessed and judicious use of narcotics when appropriate was discussed.  Importance of Fall prevention discussed.  Counseling on Smoking and Alcohol cessation was offered when appropriate.  Counseling on Diet, exercise, and medication compliance was done.   Approx 45 minutes spent.

## 2020-08-25 NOTE — PROGRESS NOTE ADULT - PROBLEM SELECTOR PLAN 1
Will continue current insulin regimen for now. Will continue monitoring FS and FU.  Patient with uncontrolled DM, A1C 11.6%. He is known to Service from previous hospital admissions, has been noncompliant with DM regimen in the past.  Will continue monitoring and make recommendations for outpatient management prior to DC.  Patient counseled for compliance with consistent low carb diet and exercise as tolerated outpatient.
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
## 2020-08-25 NOTE — PROGRESS NOTE ADULT - SUBJECTIVE AND OBJECTIVE BOX
24H hour events: Patient without complaints this am. Tele shows that this am w/ frequent PVC's    MEDICATIONS:  aMIOdarone    Tablet 400 milliGRAM(s) Oral two times a day  aspirin enteric coated 81 milliGRAM(s) Oral daily  carvedilol 25 milliGRAM(s) Oral every 12 hours  furosemide   Injectable 40 milliGRAM(s) IV Push two times a day  isosorbide   mononitrate ER Tablet (IMDUR) 60 milliGRAM(s) Oral daily  ranolazine 500 milliGRAM(s) Oral two times a day  ticagrelor 90 milliGRAM(s) Oral every 12 hours  mirtazapine 15 milliGRAM(s) Oral at bedtime  allopurinol 300 milliGRAM(s) Oral daily  atorvastatin 80 milliGRAM(s) Oral at bedtime  dextrose 40% Gel 15 Gram(s) Oral once PRN  dextrose 50% Injectable 12.5 Gram(s) IV Push once  dextrose 50% Injectable 25 Gram(s) IV Push once  dextrose 50% Injectable 25 Gram(s) IV Push once  glucagon  Injectable 1 milliGRAM(s) IntraMuscular once PRN  insulin glargine Injectable (LANTUS) 40 Unit(s) SubCutaneous at bedtime  insulin lispro (HumaLOG) corrective regimen sliding scale   SubCutaneous three times a day before meals  insulin lispro Injectable (HumaLOG) 10 Unit(s) SubCutaneous three times a day before meals  dextrose 5%. 1000 milliLiter(s) IV Continuous <Continuous>      REVIEW OF SYSTEMS:  See HPI, otherwise ROS negative.    PHYSICAL EXAM:  T(C): 36.6 (08-25-20 @ 11:17), Max: 36.6 (08-25-20 @ 11:17)  HR: 65 (08-25-20 @ 11:17) (65 - 83)  BP: 107/56 (08-25-20 @ 11:17) (105/70 - 107/65)  RR: 18 (08-25-20 @ 11:17) (18 - 18)  SpO2: 99% (08-25-20 @ 11:17) (97% - 99%)  Wt(kg): --  I&O's Summary    24 Aug 2020 07:01  -  25 Aug 2020 07:00  --------------------------------------------------------  IN: 1320 mL / OUT: 2830 mL / NET: -1510 mL    25 Aug 2020 07:01  -  25 Aug 2020 15:21  --------------------------------------------------------  IN: 480 mL / OUT: 0 mL / NET: 480 mL        Appearance: Alert. NAD	  Cardiovascular: +S1S2 irreg  Respiratory: CTA B/L	  Psychiatry: A & O x 3, Mood & affect appropriate  Neurologic: Non-focal  Extremities: No edema BLE  Vascular: Peripheral pulses palpable 2+ bilaterally      LABS:	 	    CBC Full  -  ( 24 Aug 2020 06:08 )  WBC Count : 6.58 K/uL  Hemoglobin : 11.3 g/dL  Hematocrit : 34.2 %  Platelet Count - Automated : 200 K/uL  Mean Cell Volume : 95.0 fl  Mean Cell Hemoglobin : 31.4 pg  Mean Cell Hemoglobin Concentration : 33.0 gm/dL    08-25    139  |  99  |  48<H>  ----------------------------<  109<H>  3.7   |  26  |  1.93<H>  08-24    135  |  96  |  43<H>  ----------------------------<  154<H>  3.9   |  23  |  1.83<H>    Ca    9.5      25 Aug 2020 05:19  Ca    9.9      24 Aug 2020 06:08  Mg     2.0     08-25        proBNP: Serum Pro-Brain Natriuretic Peptide: 6705 pg/mL (08-23 @ 13:22)    TELEMETRY: SR w/ frequent PVC's this morning  	  ASSESSMENT/PLAN: 	  81y/o male h/o prior MI, CAD s/p multiple prior PCI's, s/p CABG, HFrEF w/ EF 25%, s/p [employer] Kvng single chamber ICD (11/2019), CKD, HTN, HLD, DM p/w worsening SOB/PORTILLO  1. ICM, CAD, MI s/p prior PCI's s/p prior CABG  2. HFrEF w/ EF 25%  3. St. Kvng single chamber ICD  4. Frequent PVC's  5. AS    --Patient with frequent PVC's this morning. Start amiodarone 400mg BID x 1week then decrease to amiodarone 200mg BID x 2 weeks then decrease to 200mg daily. Discussed side effects of amiodarone and will need outpatient PFT/TFT/LFT/ophthalmology monitoring  --Monitor overnight and can continue amio load as described above as outpatient      Zamzam Hansen PA-C  [id number]    I would recommend commencing amiodarone 400 mg bid for a week followed by 200 bid for two weeks and maintain at 200 daily. If LV function improved with PVC suppression, ablation becomes a possibility. Also, if he develops bradycardia or QRS widening with the amiodarone, ICD revision will be a consideration.

## 2020-08-26 NOTE — DISCHARGE NOTE PROVIDER - CARE PROVIDER_API CALL
Stuart Sarkar  CARDIOVASCULAR DISEASE  [address] [facility], [address]  [place] [address]  Phone: [phone]  Fax: [phone]  Follow Up Time:     mDitry Mitchell)  Cardiac Electrophysiology; Cardiovascular Disease; Internal Medicine  [address] [place] [address]  Phone: [phone]  Fax: [phone]  Follow Up Time:     [place]  THORACIC AND CARDIAC SURGERY  [address] [place] [address]  Phone: [phone]  Fax: [phone]  Follow Up Time: Stuart Sarkar  CARDIOVASCULAR DISEASE  [address] [facility], [address]  [place] [address]  Phone: [phone]  Fax: [phone]  Follow Up Time:     Dmitry Mitchell)  Cardiac Electrophysiology; Cardiovascular Disease; Internal Medicine  [address] [place] [address]  Phone: [phone]  Fax: [phone]  Follow Up Time:     [place]  THORACIC AND CARDIAC SURGERY  [address] [place] [address]  Phone: [phone]  Fax: [phone]  Follow Up Time:     Vincenzo Lujan  Endocrinology, Diabetes and Metabolism  [address] [place] [address]  Phone: [phone]  Fax: [phone]  Follow Up Time:
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
## 2020-08-26 NOTE — DISCHARGE NOTE PROVIDER - HOSPITAL COURSE
80 year-old with prior MI, CAD s/p multiple prior PCI's, s/p CABG, HFrEF w/ EF 25%, s/p St. Kvng single chamber ICD (11/2019), CKD, HTN, HLD, DM p/w worsening SOB/PORTILLO, admitted with acute on chronic systolic heart failure, he was diuresed with IV Lasix.  Echocardiogram revealed EF of 25%, severe AS, moderate MR, and severe diastolic dysfunction.  Patient was evaluated by Cardiology - possible outpatient dobutamine stress to determine if it is AS or pseudo - AS in the setting of poor LV function.  EP was consulted for frequent and multiple PVCs, noted that CRT pacing is not indicated at this time, recommended commencing amiodarone to reduce burden of PVCs, patient is presently on amiodarone load - possible ablation if LV function improves with PVC suppression.  Owing to severe AS, patient was...................... by Structural Heart...............  Patient was seen by Endocrinology for A1c of 11.6 - admitts to non-compliance with pre-meal insulin - being discharged on Prandin instead.  Patient was cleared by Cardiology, EP, and IM - is being discharged home with outpatient PT and will follow up with Primary Care/Cardiology within 1 week, and with EP on 9/24, and with Endocrinology within 4 weeks. 80 year-old with prior MI, CAD s/p multiple prior PCI's, s/p CABG, HFrEF w/ EF 25%, s/p St. Kvng single chamber ICD (11/2019), CKD, HTN, HLD, DM p/w worsening SOB/PORTILLO, admitted with acute on chronic systolic heart failure, he was diuresed with IV Lasix.  Echocardiogram revealed EF of 25%, severe AS, moderate MR, and severe diastolic dysfunction.  Patient was evaluated by Cardiology - possible outpatient dobutamine stress to determine if it is AS or pseudo - AS in the setting of poor LV function.  EP was consulted for frequent and multiple PVCs, noted that CRT pacing is not indicated at this time, recommended commencing amiodarone to reduce burden of PVCs, patient is presently on amiodarone load - possible ablation if LV function improves with PVC suppression.  Owing to severe AS, appointment was made for patient to be seen in the TAVR clinic next week.  Patient was seen by Endocrinology for A1c of 11.6 - admitts to non-compliance with pre-meal insulin - being discharged on Prandin instead.  Patient was cleared by Cardiology, EP, and IM - is being discharged home with outpatient PT and will follow up with Primary Care/Cardiology within 1 week, and with EP on 9/24, and with Endocrinology within 4 weeks.

## 2020-08-26 NOTE — PROGRESS NOTE ADULT - SUBJECTIVE AND OBJECTIVE BOX
Chief complaint  Patient is a 80y old  Male who presents with a chief complaint of acute on chronic systolic heart failure (26 Aug 2020 13:32)   Review of systems  Patient in bed, alert and well-appearing, no hypoglycemic episodes. Planning DC today.    Labs and Fingersticks  CAPILLARY BLOOD GLUCOSE      POCT Blood Glucose.: 186 mg/dL (26 Aug 2020 13:09)  POCT Blood Glucose.: 198 mg/dL (26 Aug 2020 09:01)  POCT Blood Glucose.: 147 mg/dL (25 Aug 2020 23:44)  POCT Blood Glucose.: 139 mg/dL (25 Aug 2020 21:35)  POCT Blood Glucose.: 103 mg/dL (25 Aug 2020 16:54)      Medications  MEDICATIONS  (STANDING):  allopurinol 300 milliGRAM(s) Oral daily  aMIOdarone    Tablet 400 milliGRAM(s) Oral two times a day  aMIOdarone    Tablet   Oral   aspirin enteric coated 81 milliGRAM(s) Oral daily  atorvastatin 80 milliGRAM(s) Oral at bedtime  carvedilol 25 milliGRAM(s) Oral every 12 hours  dextrose 5%. 1000 milliLiter(s) (50 mL/Hr) IV Continuous <Continuous>  dextrose 50% Injectable 12.5 Gram(s) IV Push once  dextrose 50% Injectable 25 Gram(s) IV Push once  dextrose 50% Injectable 25 Gram(s) IV Push once  furosemide   Injectable 40 milliGRAM(s) IV Push two times a day  insulin glargine Injectable (LANTUS) 40 Unit(s) SubCutaneous at bedtime  insulin lispro (HumaLOG) corrective regimen sliding scale   SubCutaneous three times a day before meals  insulin lispro Injectable (HumaLOG) 10 Unit(s) SubCutaneous three times a day before meals  isosorbide   mononitrate ER Tablet (IMDUR) 60 milliGRAM(s) Oral daily  mirtazapine 15 milliGRAM(s) Oral at bedtime  ranolazine 500 milliGRAM(s) Oral two times a day  ticagrelor 90 milliGRAM(s) Oral every 12 hours      Physical Exam  General: Patient comfortable in bed  Vital Signs Last 12 Hrs  T(F): 98 (08-26-20 @ 12:54), Max: 98 (08-26-20 @ 12:54)  HR: 74 (08-26-20 @ 12:54) (71 - 74)  BP: 99/68 (08-26-20 @ 12:54) (93/62 - 102/66)  BP(mean): --  RR: 19 (08-26-20 @ 12:54) (18 - 19)  SpO2: 98% (08-26-20 @ 12:54) (98% - 99%) Chief complaint  Patient is a 80y old  Male who presents with a chief complaint of acute on chronic systolic heart failure (26 Aug 2020 13:32)   Review of systems  Patient in bed, alert and well-appearing, no hypoglycemic episodes. Planning DC today.    Labs and Fingersticks  CAPILLARY BLOOD GLUCOSE    POCT Blood Glucose.: 186 mg/dL (26 Aug 2020 13:09)  POCT Blood Glucose.: 198 mg/dL (26 Aug 2020 09:01)  POCT Blood Glucose.: 147 mg/dL (25 Aug 2020 23:44)  POCT Blood Glucose.: 139 mg/dL (25 Aug 2020 21:35)  POCT Blood Glucose.: 103 mg/dL (25 Aug 2020 16:54)      Medications  MEDICATIONS  (STANDING):  allopurinol 300 milliGRAM(s) Oral daily  aMIOdarone    Tablet 400 milliGRAM(s) Oral two times a day  aMIOdarone    Tablet   Oral   aspirin enteric coated 81 milliGRAM(s) Oral daily  atorvastatin 80 milliGRAM(s) Oral at bedtime  carvedilol 25 milliGRAM(s) Oral every 12 hours  dextrose 5%. 1000 milliLiter(s) (50 mL/Hr) IV Continuous <Continuous>  dextrose 50% Injectable 12.5 Gram(s) IV Push once  dextrose 50% Injectable 25 Gram(s) IV Push once  dextrose 50% Injectable 25 Gram(s) IV Push once  furosemide   Injectable 40 milliGRAM(s) IV Push two times a day  insulin glargine Injectable (LANTUS) 40 Unit(s) SubCutaneous at bedtime  insulin lispro (HumaLOG) corrective regimen sliding scale   SubCutaneous three times a day before meals  insulin lispro Injectable (HumaLOG) 10 Unit(s) SubCutaneous three times a day before meals  isosorbide   mononitrate ER Tablet (IMDUR) 60 milliGRAM(s) Oral daily  mirtazapine 15 milliGRAM(s) Oral at bedtime  ranolazine 500 milliGRAM(s) Oral two times a day  ticagrelor 90 milliGRAM(s) Oral every 12 hours      Physical Exam  General: Patient comfortable in bed  Vital Signs Last 12 Hrs  T(F): 98 (08-26-20 @ 12:54), Max: 98 (08-26-20 @ 12:54)  HR: 74 (08-26-20 @ 12:54) (71 - 74)  BP: 99/68 (08-26-20 @ 12:54) (93/62 - 102/66)  BP(mean): --  RR: 19 (08-26-20 @ 12:54) (18 - 19)  SpO2: 98% (08-26-20 @ 12:54) (98% - 99%)

## 2020-08-26 NOTE — PROGRESS NOTE ADULT - REASON FOR ADMISSION
acute on chronic systolic heart failure

## 2020-08-26 NOTE — DISCHARGE NOTE PROVIDER - NSDCMRMEDTOKEN_GEN_ALL_CORE_FT
allopurinol 300 mg oral tablet: 1 tab(s) orally once a day  Alogliptin 6.25 mg oral tablet: 1 tab(s) orally once a day  amiodarone 200 mg oral tablet: 2 tab(s) PO TWICE daily x 5 days, then  1 tab(s) PO TWICE daily x 14 days, then  1 tab(s) PO ONCE daily  aspirin 81 mg oral delayed release tablet: 1 tab(s) orally once a day  Brilinta (ticagrelor) 90 mg oral tablet: 1 tab(s) orally 2 times a day  carvedilol 25 mg oral tablet: 1 tab(s) orally every 12 hours  Centrum Silver Men&#x27;s oral tablet: 1 tab(s) orally once a day  furosemide 40 mg oral tablet: 1 tab(s) orally 2 times a day  isosorbide mononitrate 60 mg oral tablet, extended release: 1 tab(s) orally once a day (in the morning)  Lantus 100 units/mL subcutaneous solution: 40 unit(s) subcutaneous once a day (at bedtime)  Lipitor 80 mg oral tablet: 1 tab(s) orally once a day  mirtazapine 15 mg oral tablet: 1 tab(s) orally once a day (at bedtime)  NovoLOG Mix 70/30 subcutaneous suspension: 12 unit(s) subcutaneous once a day (at bedtime)  Onglyza 5 mg oral tablet: 1 tab(s) orally once a day  potassium citrate 10 mEq oral tablet, extended release: 2 tab(s) orally 2 times a day (while on LAsix)  Prandin 1 mg oral tablet: 1 tab(s) orally 3 times a day (before meals)   ranolazine 500 mg oral tablet, extended release: 1 tab(s) orally 2 times a day allopurinol 300 mg oral tablet: 1 tab(s) orally once a day  Alogliptin 6.25 mg oral tablet: 1 tab(s) orally once a day  amiodarone 200 mg oral tablet: 2 tab(s) PO TWICE daily x 5 days, then  1 tab(s) PO TWICE daily x 14 days, then  1 tab(s) PO ONCE daily  aspirin 81 mg oral delayed release tablet: 1 tab(s) orally once a day  Brilinta (ticagrelor) 90 mg oral tablet: 1 tab(s) orally 2 times a day  carvedilol 25 mg oral tablet: 1 tab(s) orally every 12 hours  Centrum Silver Men&#x27;s oral tablet: 1 tab(s) orally once a day  furosemide 40 mg oral tablet: 1 tab(s) orally 2 times a day  isosorbide mononitrate 60 mg oral tablet, extended release: 1 tab(s) orally once a day (in the morning)  Lantus 100 units/mL subcutaneous solution: 40 unit(s) subcutaneous once a day (at bedtime)  Lipitor 80 mg oral tablet: 1 tab(s) orally once a day  mirtazapine 15 mg oral tablet: 1 tab(s) orally once a day (at bedtime)  NovoLOG Mix 70/30 subcutaneous suspension: 12 unit(s) subcutaneous once a day (at bedtime)  Onglyza 5 mg oral tablet: 1 tab(s) orally once a day  Physcial Therapy evaluation and treatment: Frequency:  2-3 times weekly  Duration:  2 weeks  Dx:  Acute on chronic HFrEF (heart failure with reduced ejection fraction)  ICD10:  I50.23  potassium citrate 10 mEq oral tablet, extended release: 2 tab(s) orally 2 times a day (while on LAsix)  Prandin 1 mg oral tablet: 1 tab(s) orally 3 times a day (before meals)   ranolazine 500 mg oral tablet, extended release: 1 tab(s) orally 2 times a day allopurinol 300 mg oral tablet: 1 tab(s) orally once a day  amiodarone 200 mg oral tablet: 2 tab(s) PO TWICE daily x 5 days, then  1 tab(s) PO TWICE daily x 14 days, then  1 tab(s) PO ONCE daily  aspirin 81 mg oral delayed release tablet: 1 tab(s) orally once a day  Brilinta (ticagrelor) 90 mg oral tablet: 1 tab(s) orally 2 times a day  carvedilol 25 mg oral tablet: 1 tab(s) orally every 12 hours  Centrum Silver Men&#x27;s oral tablet: 1 tab(s) orally once a day  furosemide 40 mg oral tablet: 1 tab(s) orally 2 times a day  isosorbide mononitrate 60 mg oral tablet, extended release: 1 tab(s) orally once a day (in the morning)  Lantus 100 units/mL subcutaneous solution: 40 unit(s) subcutaneous once a day (at bedtime)  Lipitor 80 mg oral tablet: 1 tab(s) orally once a day  mirtazapine 15 mg oral tablet: 1 tab(s) orally once a day (at bedtime)  Onglyza 5 mg oral tablet: 1 tab(s) orally once a day  Physcial Therapy evaluation and treatment: Frequency:  2-3 times weekly  Duration:  2 weeks  Dx:  Acute on chronic HFrEF (heart failure with reduced ejection fraction)  ICD10:  I50.23  potassium citrate 10 mEq oral tablet, extended release: 2 tab(s) orally 2 times a day (while on LAsix)  Prandin 1 mg oral tablet: 1 tab(s) orally 3 times a day (before meals)   ranolazine 500 mg oral tablet, extended release: 1 tab(s) orally 2 times a day

## 2020-08-26 NOTE — DISCHARGE NOTE PROVIDER - NSDCCPCAREPLAN_GEN_ALL_CORE_FT
PRINCIPAL DISCHARGE DIAGNOSIS  Diagnosis: Acute on chronic HFrEF (heart failure with reduced ejection fraction)  Assessment and Plan of Treatment: Take all medications as prescribed.  Stop smoking if you currently smoke, and avoid high altitudes.  Weigh yourself daily.  If you gain 3lbs in 3 days, or 5lbs in a week call your Health Care Provider.  Eat a low sodium diet.  Call your Health Care Provider if you have any swelling or increased swelling in your feet, ankles, and/or stomach.  If you experience dizziness, chest pain, or shortness of breath, seek immediate medical attention.      SECONDARY DISCHARGE DIAGNOSES  Diagnosis: PVCs (premature ventricular contractions)  Assessment and Plan of Treatment: Take all medications as prescribed.  Call your doctor if you experience chest tightness/pain, lightheadedness, loss of consciousness, shortness of breath (especially with exercise), feel your heart racing or beating unusually, frequent or abnormal bleeding.    Diagnosis: Severe aortic stenosis  Assessment and Plan of Treatment: Keep appointment in the [facility] with Dr. Morales or his associate on 9/1 at 1130am.    Diagnosis: Type 2 diabetes, uncontrolled, with renal manifestation  Assessment and Plan of Treatment: Make sure you get your HgA1c checked every three months.  Check your blood glucose before meals and at bedtime.  It's important not to skip any meals.  Keep a log of your blood glucose results and always take it with you to your doctor appointments.  Keep a list of your current medications including over the counter medications and bring this medication list with you to all your doctor appointments.  If you have not seen your ophthalmologist this year, call for appointment.  Check your feet daily for redness, sores, or openings.  Do not self treat.  If there is no improvement in two days, call your primary care physician for an appointment.  HgA1c this admission was 11.6.

## 2020-08-26 NOTE — DISCHARGE NOTE PROVIDER - NSDCFUSCHEDAPPT_GEN_ALL_CORE_FT
VERONICA DORMAN ; 09/01/2020 ; [facility] CT Surg [address] VERONICA Sky ; 09/09/2020 ; [facility] Cardio Electro [address] VERONICA Sky ; 09/24/2020 ; [facility] Cardio [facility] [address] VERONICA Sky ; 10/15/2020 ; [facility] Cardio [address] [facility] VERONICA DORMAN ; 09/01/2020 ; [facility] CT Surg [address] VERONICA Sky ; 09/09/2020 ; [facility] Cardio Electro [address] VERONICA Sky ; 09/24/2020 ; [facility] Cardio Electro [address] VERONICA Sky ; 10/15/2020 ; [facility] Cardio [address] [facility] VERONICA DORMAN ; 09/01/2020 ; [facility] CT Surg [address] VERONICA Sky ; 09/09/2020 ; [facility] Cardio [facility] [address] VERONICA Sky ; 09/24/2020 ; [facility] Cardio [facility] [address] VERONICA Sky ; 10/15/2020 ; [facility] Cardio [facility]

## 2020-08-26 NOTE — DISCHARGE NOTE PROVIDER - CARE PROVIDERS DIRECT ADDRESSES
[email or web address]_Unknown

## 2020-08-26 NOTE — PROGRESS NOTE ADULT - SUBJECTIVE AND OBJECTIVE BOX
24H hour events: Patient reports not sleeping well and therefore feeling tired this am. Denies CP, SOB, n/v, palps or lightheadedness. Tele shows SR with occasional PVC's    MEDICATIONS:  aMIOdarone    Tablet 400 milliGRAM(s) Oral two times a day  aMIOdarone    Tablet   Oral   aspirin enteric coated 81 milliGRAM(s) Oral daily  carvedilol 25 milliGRAM(s) Oral every 12 hours  furosemide   Injectable 40 milliGRAM(s) IV Push two times a day  isosorbide   mononitrate ER Tablet (IMDUR) 60 milliGRAM(s) Oral daily  ranolazine 500 milliGRAM(s) Oral two times a day  ticagrelor 90 milliGRAM(s) Oral every 12 hours  mirtazapine 15 milliGRAM(s) Oral at bedtime  allopurinol 300 milliGRAM(s) Oral daily  atorvastatin 80 milliGRAM(s) Oral at bedtime  dextrose 40% Gel 15 Gram(s) Oral once PRN  dextrose 50% Injectable 12.5 Gram(s) IV Push once  dextrose 50% Injectable 25 Gram(s) IV Push once  dextrose 50% Injectable 25 Gram(s) IV Push once  glucagon  Injectable 1 milliGRAM(s) IntraMuscular once PRN  insulin glargine Injectable (LANTUS) 40 Unit(s) SubCutaneous at bedtime  insulin lispro (HumaLOG) corrective regimen sliding scale   SubCutaneous three times a day before meals  insulin lispro Injectable (HumaLOG) 10 Unit(s) SubCutaneous three times a day before meals  dextrose 5%. 1000 milliLiter(s) IV Continuous <Continuous>      REVIEW OF SYSTEMS:  See HPI, otherwise ROS negative.    PHYSICAL EXAM:  T(C): 36.6 (08-26-20 @ 04:13), Max: 36.6 (08-25-20 @ 11:17)  HR: 71 (08-26-20 @ 04:13) (65 - 77)  BP: 102/66 (08-26-20 @ 04:13) (102/66 - 107/56)  RR: 18 (08-26-20 @ 04:13) (18 - 18)  SpO2: 99% (08-26-20 @ 04:13) (95% - 99%)  Wt(kg): --  I&O's Summary    25 Aug 2020 07:01  -  26 Aug 2020 07:00  --------------------------------------------------------  IN: 720 mL / OUT: 3 mL / NET: 717 mL        Appearance: Alert. NAD	  Cardiovascular: +S1S2 RRR no m/g/r  Respiratory: CTA B/L	  Psychiatry: A & O x 3, Mood & affect appropriate  Neurologic: Non-focal  Extremities: No edema BLE  Vascular: Peripheral pulses palpable 2+ bilaterally      LABS:	 	      08-26    137  |  98  |  50<H>  ----------------------------<  181<H>  3.9   |  26  |  2.08<H>  08-25    139  |  99  |  48<H>  ----------------------------<  109<H>  3.7   |  26  |  1.93<H>    Ca    9.4      26 Aug 2020 06:43  Ca    9.5      25 Aug 2020 05:19  Mg     2.1     08-26  Mg     2.0     08-25        proBNP: Serum Pro-Brain Natriuretic Peptide: 6705 pg/mL (08-23 @ 13:22)    TELEMETRY: SR with infrequent PVC's    	  ASSESSMENT/PLAN: 	  79y/o male h/o prior MI, CAD s/p multiple prior PCI's, s/p CABG, HFrEF w/ EF 25%, s/p [employer] single chamber ICD (11/2019), CKD, HTN, HLD, DM p/w worsening SOB/PORTILLO  1. ICM, CAD, MI s/p prior PCI's s/p prior CABG  2. HFrEF w/ EF 25%  3. [employer] single chamber ICD  4. Frequent PVC's  5. AS    --Continue amio load: amiodarone 400mg BID x 1week then decrease to amiodarone 200mg BID x 2 weeks then decrease to 200mg daily. Discussed side effects of amiodarone and will need outpatient PFT/TFT/LFT/ophthalmology monitoring  --F/U with Dr. Dmitry Mitchell on 9/24 @ 8am  --From EP persepctive, patient may be able to be discharged home. EP will sign off. Reconsult leann Hansen PA-C  [phone]

## 2020-08-26 NOTE — PROGRESS NOTE ADULT - ASSESSMENT
80 male h/o cad s/p pci, s/p cabg, chf s/p aicd, ckd, htn, chol, dm, a/w acute on chronic systolic heart failure    chf  acute on chornic systolic   diuresis with lasix 40 iv bid  strict i/o  daily wt  cards f/u  now improved  changed to po lasix 40 po bid    AS on echo  structural heart team consulted    pvc on tele  EP eval noted  no plan for aicd change/upgrade at this time  amio as per EP for PVCs    DM  insulin as ordered  monitor fs    cad s/p pci  cont asa/brilinta    chol  cont statin    cont other home meds    PT  dc planning        Advanced care planning was discussed with patient and family.  Advanced care planning forms were reviewed and discussed as appropriate.  Differential diagnosis and plan of care discussed with patient after the evaluation.   Pain assessed and judicious use of narcotics when appropriate was discussed.  Importance of Fall prevention discussed.  Counseling on Smoking and Alcohol cessation was offered when appropriate.  Counseling on Diet, exercise, and medication compliance was done.   Approx 45 minutes spent.

## 2020-08-26 NOTE — PROGRESS NOTE ADULT - ASSESSMENT
Assessment  DMT2: 80y Male with DM T2 with hyperglycemia, A1C 11.6%, on basal bolus insulin, blood sugars trending within overall acceptable range, no hypoglycemic episodes, eating meals, alert, planning DC home today.  HF: On meds, monitored, FU Cardiology.        Vincenzo Lujan MD  Cell: [phone]  Office: [phone] Assessment  DMT2: 80y Male with DM T2 with hyperglycemia, A1C 11.6%, on basal bolus insulin, blood sugars trending within overall acceptable range,  no hypoglycemic episodes, eating meals, alert, planning DC home today.  HF: On meds, monitored, FU Cardiology.        Vincenzo Lujan MD  Cell: [phone]  Office: [phone]

## 2020-08-26 NOTE — DISCHARGE NOTE NURSING/CASE MANAGEMENT/SOCIAL WORK - PATIENT PORTAL LINK FT
You can access the FollowMyHealth Patient Portal offered by [facility] by registering at the following website: [email or web address]. By joining [facility]’s FollowMyHealth portal, you will also be able to view your health information using other applications (apps) compatible with our system.

## 2020-08-26 NOTE — DISCHARGE NOTE PROVIDER - NSDCFUADDINST_GEN_ALL_CORE_FT
- Follow up with your Primary Care Doctor/Cardiologist within 1 week.  - Keep appointment with Dr. Dmitry Mitchell on 9/24 at 8am.  - Keep appointment in [facility] with Dr. Morales or his associate on 9/1 at 1130am. - Follow up with your Primary Care Doctor/Cardiologist within 1 week.  - Keep appointment with Dr. Dmitry Mitchell on 9/24 at 8am.  - Keep appointment in the [facility] with Dr. Morales or his associate on 9/1 at 1130am.  - Follow up with the Endocrinologist within 4 weeks.

## 2020-08-26 NOTE — PROGRESS NOTE ADULT - PROBLEM SELECTOR PLAN 1
Will continue current insulin regimen for now. Will continue monitoring FS and FU.  Patient with uncontrolled DM, A1C 11.6%. He is known to Service from previous hospital admissions, admits being noncompliant with insulin injections at home.  Although basal bolus insulin would be optimal management for this patient, he is noncompliant with multiple insulin injections daily. Suggest DC on the following DM regimen:  -Lantus 40u at bedtime  -Prandin 1mg before each meal  -FU endo 4 weeks  Patient counseled for compliance with consistent low carb diet and exercise as tolerated outpatient.  Discussed plan with patient and primary team.

## 2020-08-26 NOTE — DISCHARGE NOTE PROVIDER - PROVIDER TOKENS
PROVIDER:[TOKEN:[[id number]]],PROVIDER:[TOKEN:[[id number]]],PROVIDER:[TOKEN:[[id number]]] PROVIDER:[TOKEN:[[id number]]],PROVIDER:[TOKEN:[[id number]]],PROVIDER:[TOKEN:[[id number]]],PROVIDER:[TOKEN:[[id number]]]

## 2020-08-27 NOTE — ED ADULT NURSE NOTE - OBJECTIVE STATEMENT
pt is here c/o blurry vision since this morning very hypotensive on arrival- pt is placed on cardiac monitoring 2 iv access in place - icu consult in progress- pale and diaphoretic appearance

## 2020-08-27 NOTE — CONSULT NOTE ADULT - PROBLEM SELECTOR RECOMMENDATION 2
-PMH of CHF  -echo done in the previous admission showed EF of 25%, ?? severe AS for further evaluation as OP, moderate MR, and severe diastolic dysfunction. possible need of outpatient dobutamine stress to determine if it is AS or pseudo - AS in the setting of poor LV function.  -avoid overdiuresis in the sitting of AS -Chronic combined systolic and diastolic HF  -echo done in the previous admission showed EF of 25%, ?? severe AS for further evaluation as OP, moderate MR, and severe diastolic dysfunction. possible need of outpatient dobutamine stress to determine if it is AS or pseudo - AS in the setting of poor LV function.  -avoid overdiuresis in the sitting of AS

## 2020-08-27 NOTE — PATIENT PROFILE ADULT - NSPROIMPLANTSMEDDEV_GEN_A_NUR
Automatic Implantable Cardioverter Defibrillator/Artificial joint/right shoulder replacement, right knee cap replacement

## 2020-08-27 NOTE — H&P ADULT - ATTENDING COMMENTS
Assessment:  1. Hypotension   2. Prolonged QTC  3. Heart failure with reduced EF  4. Coronary artery disease  5. Aortic stenosis   6. Diabetes mellitus   7. CHF stage 4     Plan  - Patient endorsing symptoms after taking medications this morning, though does not recall precisely which medications he took. At this time he was given 1 L IVF in the ED and started on dobutamine.   - Discussed with Cardiology, with history of ectopy and PVC, will avoid dobutamine   - Started on phenylephrine for BP support with aim for getting Systolic BP ~90   - Albumin infusion with hypoalbuminemia   - Hold further amiodarone for now given prolonged QTC   - Follow up cardiology recs  - Monitor fingerstick glucose and cover with sliding scale insulin regimen for hyperglycemia   - AICD investigation  - Check TSH and cortisol levels  - DVT prophylaxis  - Patient refusing central venous catheter placement at this time. Assessment:  1. Hypotension   2. Prolonged QTC  3. Heart failure with reduced EF  4. Coronary artery disease  5. Aortic stenosis   6. Diabetes mellitus   7. CKD stage 4     Plan  - Patient endorsing symptoms after taking medications this morning, though does not recall precisely which medications he took. At this time he was given 1 L IVF in the ED and started on dobutamine.   - Discussed with Cardiology, with history of ectopy and PVC, will avoid dobutamine   - Started on phenylephrine for BP support with aim for getting Systolic BP ~90   - Albumin infusion with hypoalbuminemia   - Hold further amiodarone for now given prolonged QTC   - Follow up cardiology recs  - Monitor fingerstick glucose and cover with sliding scale insulin regimen for hyperglycemia   - AICD investigation  - Check TSH and cortisol levels  - DVT prophylaxis  - Patient refusing central venous catheter placement at this time.

## 2020-08-27 NOTE — CONSULT NOTE ADULT - ASSESSMENT
80 year-old  M From home, with prior MI, CAD s/p multiple prior PCI's(17 stents) s/p CABG, HFrEF w/ EF 25% presented with   Blurry vision  and dizzness since am . in the ED BP was 70/40 . most likely not cardiogenic shock given no LE oedema nor coldness and abscence of SOB or chest pain . 80 year-old  M From home, with prior MI, CAD s/p multiple prior PCI's(17 stents) s/p CABG, HFrEF w/ EF 25% presented with   Blurry vision  and dizzness since am . in the ED BP was 70/40 . most likely not cardiogenic shock given no LE oedema nor coldness and abscence of dyspnea or chest pain .

## 2020-08-27 NOTE — ED PROVIDER NOTE - CRITICAL CARE PROVIDED
interpretation of diagnostic studies/direct patient care (not related to procedure)/documentation/telephone consultation with the patient's family/consultation with other physicians/conducted a detailed discussion of DNR status/additional history taking

## 2020-08-27 NOTE — ED PROVIDER NOTE - PROGRESS NOTE DETAILS
Dr. Lucas has been aware about the patient. Spoke w daughter and wife, aware of dg and overall condition. Improving, BP-80/45, stx improved as well ICU and Cards residents in the ED, accepted pt to the ICU

## 2020-08-27 NOTE — PATIENT PROFILE ADULT - VISION (WITH CORRECTIVE LENSES IF THE PATIENT USUALLY WEARS THEM):
pt wears reading glasses/Partially impaired: cannot see medication labels or newsprint, but can see obstacles in path, and the surrounding layout; can count fingers at arm's length

## 2020-08-27 NOTE — ED PROVIDER NOTE - CARE PLAN
Principal Discharge DX:	Hypotension, unspecified hypotension type  Secondary Diagnosis:	Congestive heart failure, unspecified HF chronicity, unspecified heart failure type

## 2020-08-27 NOTE — ED ADULT TRIAGE NOTE - CHIEF COMPLAINT QUOTE
change in vision (seeing only shadows) started this morning, started on new medication last night (repaglinide)
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
## 2020-08-27 NOTE — H&P ADULT - HISTORY OF PRESENT ILLNESS
80 year-old From home, lives with wife , walks independently with prior MI, CAD s/p multiple prior PCI's(17 stents) s/p CABG, HFrEF w/ EF 25%, s/p [employer] single chamber ICD (11/2019), CKD-3, HTN, HLD, DM was BIB EMS for Hypotension 50/40 and Blurry vision since am . Pt was discharged from [facility] yesterday when he was admitted for acute on chronic systolic heart failure, he was diuresed with IV Lasix. Echocardiogram revealed EF of 25%, severe AS, moderate MR, and severe diastolic dysfunction. Patient was evaluated by Cardiology - possible outpatient dobutamine stress to determine if it is AS or pseudo - AS in the setting of poor LV function. EP was consulted for frequent and multiple PVCs and was started on amiodarone.   Today pt said he ate and took his Diabetes medication( possible prandin which was changed on last adm) and after 15 mins started having blurry vision and dizziness and was able to see anything. Denies any CP, SOB, headache, LOC, fall or hitting head, palpitations. Pt called EMS 80 year-old From home, lives with wife , walks independently with prior MI, CAD s/p multiple prior PCI's(17 stents) s/p CABG, HFrEF w/ EF 25%, s/p [employer] single chamber ICD (11/2019), CKD-3, HTN, HLD, DM was BIB EMS for Hypotension 50/40 and Blurry vision since am . Pt was discharged from [facility] yesterday when he was admitted for acute on chronic systolic heart failure, he was diuresed with IV Lasix. Echocardiogram revealed EF of 25%, severe AS, moderate MR, and severe diastolic dysfunction. Patient was evaluated by Cardiology - possible outpatient dobutamine stress to determine if it is AS or pseudo - AS in the setting of poor LV function. EP was consulted for frequent and multiple PVCs and was started on amiodarone.   Today pt said he ate and took his Diabetes medication( possible prandin which was changed on last adm) and after 15 mins started having blurry vision and dizziness and was able to see anything. Denies any CP, SOB, headache, LOC, fall or hitting head, palpitations. Pt called EMS and was found to have BP 50/40 with  .   Pt states he took most of his meds this am.     ED course : afebrile ,bradycardic to 56 , BP 70/42 RR 97% on 3 L NC   Labs noted for normal wbc count ,lactate 2.1--> 2.5 , Na 134 , Cr 2.63   CXR : no pul edema   ECG : Sinus bjorn ( unchanged from prev one )     SH Denies Smoking, alcohol or drug use 80 year-old From home, lives with wife , walks independently with prior MI, CAD s/p multiple prior PCI's(17 stents) s/p CABG, HFrEF w/ EF 25%, s/p [employer] single chamber ICD (11/2019), CKD-3, HTN, HLD, DM was BIB EMS for Hypotension 50/40 and Blurry vision since am . Pt was discharged from [facility] yesterday when he was admitted for acute on chronic systolic heart failure, he was diuresed with IV Lasix. Echocardiogram revealed EF of 25%, severe AS, moderate MR, and severe diastolic dysfunction. Patient was evaluated by Cardiology - possible outpatient dobutamine stress to determine if it is AS or pseudo - AS in the setting of poor LV function. EP was consulted for frequent and multiple PVCs and was started on amiodarone.   Today pt said he ate and took his Diabetes medication( possible prandin which was changed on last adm) and after 15 mins started having blurry vision and dizziness and was able to see anything. Denies any CP, SOB, headache, LOC, fall or hitting head, palpitations. Pt called EMS and was found to have BP 50/40 with  .   Pt states he took most of his meds this am.     ED course : afebrile ,bradycardic to 56 , BP 70/42 RR 97% on 3 L NC   Labs noted for normal wbc count ,lactate 2.1--> 2.5 , Na 134 , Cr 2.63   CXR : no pul edema   ECG : Sinus bjorn ( unchanged from prev one ) with prolonged QTc 606    SH Denies Smoking, alcohol or drug use

## 2020-08-27 NOTE — ED PROVIDER NOTE - OBJECTIVE STATEMENT
81 y/o M patient with a significant PMHx of MI, CAD, CABG, HTN, HLD and significant PSHx of 17 coronary stents presents to the ED with BIB EMS c/o blurry vison, dizziness after his medication was changed this morning. Patient states being admitted in the [facility] yesterday for shortness of breath, PORTILLO. Patient was found that the patient had ECHO that showed severe AS, moderate MR, and severe diastolic dysfunction. Patient was placed on Amiodarone. As per EMS, patient had his medication changed insulin to other medication yesterday. Patient took the medication and then 15 minutes late, patient started to have these sxs. At the site, patient had blood sugar level of 103 and blood pressure 50/40. Patient denies any fever, chills, cough, chest pain or any other complains. History limited to being sick

## 2020-08-27 NOTE — H&P ADULT - ASSESSMENT
80 year-old From home, lives with wife , walks independently with prior MI, CAD s/p multiple prior PCI's(17 stents) s/p CABG, HFrEF w/ EF 25%, s/p [employer] single chamber ICD (11/2019), CKD-3, HTN, HLD, DM was BIB EMS for Hypotension 50/40 and Blurry vision since am . Pt was discharged from [facility] yesterday when he was admitted for acute on chronic systolic heart failure, he was diuresed with IV Lasix. Echocardiogram revealed EF of 25%, severe AS, moderate MR, and severe diastolic dysfunction. Patient was evaluated by Cardiology - possible outpatient dobutamine stress to determine if it is AS or pseudo - AS in the setting of poor LV function. EP was consulted for frequent and multiple PVCs and was started on amiodarone.   Today pt said he ate and took his Diabetes medication( possible prandin which was changed on last adm) and after 15 mins started having blurry vision and dizziness and was able to see anything. Denies any CP, SOB, headache, LOC, fall or hitting head, palpitations. Pt called EMS and was found to have BP 50/40 with  .   Pt states he took most of his meds this am.     ED course : afebrile ,bradycardic to 56 , BP 70/42 RR 97% on 3 L NC   Labs noted for normal wbc count ,lactate 2.1--> 2.5 , Na 134 , Cr 2.63   CXR : no pul edema   ECG : Sinus bjorn ( unchanged from prev one ) with prolonged QTc 606    Pt will be admitted to ICU for Hypotension         Assessment:  1. Hypotension   2. Chronic Combined heart failure   3. Severe AS   4. DM-2   5. CKD-3   6. HTN   7. HLD     Plan:    =====================[CNS ]==============================  # No issues:   - AAOx 3     =====================[CVS ]===============================  # Hypotension :   - Last echo 8/2020 : 25% Ef with Severe AS with mod MR   - d/d : Severe AS vs medication induced ( pt is on lasix,   - Hold BP medications    =====================[RESP ]==============================  # Acute Hypoxic Resp Failure 2/2     =====================[ GI ]================================  #    - NPO  for now     ====================[ RENAL ]=============================   #   - monitor urine output   - I&Os  - Monitor electrolytes     =====================[ ID ]================================  #     ===================[ HEME/ONC ]===========================  # No issues :  - Hb and Plt stable   - monitor cbc daily     =====================[ ENDO ]=============================  # No history of DM  - target CBG < 180  - FS q6 while NPO  - Start diet when possible    ==================[ SKIN/ CATHETERS ]======================  #   #     ==================[ PROPHYLAXIS ]==========================   # Dvt : Lovenox   # Gi : Protonix     ====================[ DISPO ]==============================   - Monitor in ICU     ===================[ PROGNOSIS ]===========================  - Guarded 80 year-old From home, lives with wife , walks independently with prior MI, CAD s/p multiple prior PCI's(17 stents) s/p CABG, HFrEF w/ EF 25%, s/p [employer] single chamber ICD (11/2019), CKD-3, HTN, HLD, DM was BIB EMS for Hypotension 50/40 and Blurry vision since am . Pt was discharged from [facility] yesterday when he was admitted for acute on chronic systolic heart failure, he was diuresed with IV Lasix. Echocardiogram revealed EF of 25%, severe AS, moderate MR, and severe diastolic dysfunction. Patient was evaluated by Cardiology - possible outpatient dobutamine stress to determine if it is AS or pseudo - AS in the setting of poor LV function. EP was consulted for frequent and multiple PVCs and was started on amiodarone.   Today pt said he ate and took his Diabetes medication( possible prandin which was changed on last adm) and after 15 mins started having blurry vision and dizziness and was able to see anything. Denies any CP, SOB, headache, LOC, fall or hitting head, palpitations. Pt called EMS and was found to have BP 50/40 with  .   Pt states he took most of his meds this am.     ED course : afebrile ,bradycardic to 56 , BP 70/42 RR 97% on 3 L NC   Labs noted for normal wbc count ,lactate 2.1--> 2.5 , Na 134 , Cr 2.63   CXR : no pul edema   ECG : Sinus bjorn ( unchanged from prev one ) with prolonged QTc 606    Pt will be admitted to ICU for Hypotension         Assessment:  1. Hypotension   2. Chronic Combined heart failure   3. Severe AS   4. DM-2   5. CKD-3   6. HTN   7. HLD     Plan:    =====================[CNS ]==============================  # No issues:   - AAOx 3     =====================[CVS ]===============================  # Hypotension :   - Last echo 8/2020 : 25% Ef with Severe AS with mod MR   - d/d : Severe AS vs medication induced ( pt is on lasix, Coreg, Isosorbide)   - pt was started on Dobutamine drip in the ED   - will start on Pheny and will titrate down Dobutamine with aim of SBP 90 ( baseline )   - Pt refused central line at this time   - started on midodrine 10 mg q8 and will give one time dose of Albumin 100 mg   - Monitor BP   - will hold off BP meds for now   - will check TSH and cortisol am level   - will need Dobutamine echo to distinguish btw Severe AS and pseudo AS  likely as Outpt   - Cardio consult Dr David     # Chronic Combined heart failure :   - currently not in fluid overload state   - CXR : no pul edema , no leg swelling   - will hold off the lasix for now     # CAD :   - will start on aspirin and brillinta and atorvastatin   - will hold off ranolazine too and start when BP is better     # PVCs :   - Pt was started on amiodarone on last adm for PVCs   - QTc 606 ,so will hold off the amio     =====================[RESP ]==============================  # No issues :   - Currently saturating 97% on 3 L NC   - c/w supplement O2     =====================[ GI ]================================  # No issues :    - diabetic diet     ====================[ RENAL ]=============================   # RUFINA over CKD-3 :   - Cr 2.63 ,baseline 1.7 -1.8   - likely 2/2 Hypotension   - s/p 1 L bolus in ED   - monitor urine output   - Monitor electrolytes   - f/u BMP   - will check U lytes     =====================[ ID ]================================  # Elevated lactate :   - 2.1--> 2.5 s/p 1 L NS bolus    - likely 2/2 hypotension   - cannot give more IVF given CHF history  - will repeat lactate   - f/u BCx    ===================[ HEME/ONC ]===========================  # No issues :  - Hb and Plt stable   - monitor cbc daily     =====================[ ENDO ]=============================  # DM-2   - Pt on lantus 40 U hs and prandin 1mg TID   - will c/w HSS for now   - monitor BS and readjust insulin as needed   - Start diet when possible    ==================[ SKIN/ CATHETERS ]======================  # 2 20 gauge IV   # Skin intact     ==================[ PROPHYLAXIS ]==========================   # Dvt : HSQ    # Gi : famotidine     ====================[ DISPO ]==============================   - Admit to  ICU     ===================[ PROGNOSIS ]===========================  - Guarded

## 2020-08-27 NOTE — H&P ADULT - NSHPPHYSICALEXAM_GEN_ALL_CORE
GENERAL: NAD  EYES: EOMI, PERRLA,   NECK: Supple, No JVD  CHEST/LUNG: Clear to auscultation b/l  No rales, rhonchi, wheezing   HEART: Bradycardic , Regular rate and rhythm; faint systolic murmur   ABDOMEN: Soft, Nontender, Nondistended; Bowel sounds present  NERVOUS SYSTEM:  Alert & Oriented X3, normal sensations and normal strength     EXTREMITIES:   No clubbing, cyanosis, or edema  LYMPH NODES : non palpable   PSYCH: normal mood and affect

## 2020-08-27 NOTE — CONSULT NOTE ADULT - PROBLEM SELECTOR RECOMMENDATION 9
-BP on admission 70/42  -EKG showed prolonged QT interval   -echo done in the previous admission showed EF of 25%, ?? severe AS for further evaluation as OP, moderate MR, and severe diastolic dysfunction. possible need of outpatient dobutamine stress to determine if it is AS or pseudo - AS in the setting of poor LV function.  - prolonged QT interval that changed from previous admission most probably caused by amiodarone.   - DC amiodarone and avoid overdiuresis in the sitting of AS  - avoid giving dobutamine to elevate the BP in the setting of AS  - Recommend using phenylephrine for hypotention. -BP on admission 70/42  -EKG showed prolonged QT interval   -echo done in the previous admission showed EF of 25%, ?? severe AS for further evaluation as OP, moderate MR, and severe diastolic dysfunction. possible need of outpatient dobutamine stress to determine if it is AS or pseudo - AS in the setting of poor LV function.  - prolonged QT interval that changed from previous admission most probably caused by amiodarone.   - D/C amiodarone and avoid overdiuresis in the sitting of AS  - avoid giving dobutamine to avoid increased contractility in the setting of AS  - Recommend using phenylephrine for hypotention.

## 2020-08-27 NOTE — CONSULT NOTE ADULT - ATTENDING COMMENTS
79 yo M with above history, presenting with hypotension possibly in setting of overdiuresis/decreased preload in setting of possibly severe AS. May be component of anaphylaxis in setting of ? new medication. Agree with above, also resume home cardiac meds (holding antihypertensives due to hypotension)

## 2020-08-28 NOTE — CONSULT NOTE ADULT - SUBJECTIVE AND OBJECTIVE BOX
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 risks and benefits.  Following intravenous injection of Ultrasonic Enhancing  Agent , harmonic imaging was performed.  INDICATION: Heart failure, unspecified (I50.9)  ------------------------------------------------------------------------  Dimensions:    Normal Values:  LA:     4.8    2.0 - 4.0 cm  Ao:            2.0 - 3.8 cm  SEPTUM: 1.0    0.6 - 1.2 cm  PWT:    1.0    0.6 - 1.1 cm  LVIDd:  6.5    3.0 - 5.6 cm  LVIDs:  6.2    1.8 - 4.0 cm  Derived variables:  LVMI: 139 g/m2  RWT: 0.30  Fractional short: 5 %  EF (Gomez Rule): 25 %Doppler Peak Velocity (m/sec):  AoV=1.9  ------------------------------------------------------------------------  Observations:  Mitral Valve: Mitral annular calcification. Tethered mitral  valve leaflets with normal opening. Moderate mitral  regurgitation.  Aortic Valve/Aorta: Calcified trileaflet aortic valve with  decreased opening. Peak transaortic valve gradient equals  14 mm Hg, mean transaortic valve gradient equals 8 mm Hg,  estimated aortic valve area equals 0.8 sqcm (by continuity  equation), aortic valve velocity time integral equals 33  cm, consistent with severe aortic stenosis. No aortic valve  regurgitation seen. Peak left ventricular outflow tract  gradient equals 1 mm Hg, LVOT velocity time integral equals  7 cm.  Normal aortic root.  Left Atrium: Normal left atrium.  LA volume index = 30  cc/m2.  Left Ventricle: Severe global left ventricular systolic  dysfunction. Endocardial visualization enhanced with  intravenous injection of Ultrasonic Enhancing Agent  (Definity). No left ventricular thrombus. Moderate left  ventricular enlargement. Severe reversible diastolic  dysfunction (Stage III).  Right Heart: A device wire is noted in the right heart.  Normal right ventricular size and function. Normal  tricuspid valve. Mild-moderate tricuspid regurgitation.  Normal pulmonic valve. Minimal pulmonic regurgitation.  Pericardium/Pleura: Normal pericardium with trace  pericardial effusion.  Hemodynamic: Estimated right atrial pressure is 8 mm Hg.  Estimated right ventricular systolic pressure equals 42 mm  Hg, assuming right atrial pressure equals 8 mm Hg,  consistent with mild pulmonary hypertension.  ------------------------------------------------------------------------  Conclusions:  1. Mitral annular calcification. Tethered mitral valve  leaflets with normal opening. Moderate mitral  regurgitation.  2. Calcified trileaflet aortic valve with decreased  opening. Peak transaortic valve gradient equals 14 mm Hg,  mean transaortic valve gradient equals 8 mm Hg, estimated  aortic valve area equals 0.8 sqcm (by continuity equation),  aortic valve velocity time integral equals 33 cm,  consistent with severe aortic stenosis. No aortic valve  regurgitation seen.  3. Moderate left ventricular enlargement.  4. Severe global left ventricular systolic dysfunction.  Endocardial visualization enhanced with intravenous  injection of Ultrasonic Enhancing Agent (Definity). No left  ventricular thrombus.  5. Severe reversible diastolic dysfunction (Stage III).  6. Normal pericardium with trace pericardial effusion.  ------------------------------------------------------------------------    < end of copied text >      ADVANCE DIRECTIVES: MOLST: DNR / DNI / trial IV fluids / use abx

## 2020-08-28 NOTE — CONSULT NOTE ADULT - PROBLEM SELECTOR RECOMMENDATION 9
2/2 acute on chronic CHF. Patient on nasal canula; no signs of respiratory distress. Patient is DNR / DNI.

## 2020-08-28 NOTE — PROGRESS NOTE ADULT - ASSESSMENT
80 year-old From home, lives with wife , walks independently with prior MI, CAD s/p multiple prior PCI's(17 stents) s/p CABG, HFrEF w/ EF 25%, s/p [employer] single chamber ICD (11/2019), CKD-3, HTN, HLD, DM was BIB EMS for Hypotension 50/40 and Blurry vision since am . Pt was discharged from [facility] yesterday when he was admitted for acute on chronic systolic heart failure, he was diuresed with IV Lasix. Echocardiogram revealed EF of 25%, severe AS, moderate MR, and severe diastolic dysfunction. Patient was evaluated by Cardiology - possible outpatient dobutamine stress to determine if it is AS or pseudo - AS in the setting of poor LV function. EP was consulted for frequent and multiple PVCs and was started on amiodarone.   Today pt said he ate and took his Diabetes medication( possible prandin which was changed on last adm) and after 15 mins started having blurry vision and dizziness and was able to see anything. Denies any CP, SOB, headache, LOC, fall or hitting head, palpitations. Pt called EMS and was found to have BP 50/40 with  .   Pt states he took most of his meds this am.     ED course : afebrile ,bradycardic to 56 , BP 70/42 RR 97% on 3 L NC   Labs noted for normal wbc count ,lactate 2.1--> 2.5 , Na 134 , Cr 2.63   CXR : no pul edema   ECG : Sinus jborn ( unchanged from prev one ) with prolonged QTc 606    Pt will be admitted to ICU for Hypotension         Assessment:  1. Hypotension   2. Chronic Combined heart failure   3. Severe AS   4. DM-2   5. CKD-3   6. HTN   7. HLD     Plan:    =====================[CNS ]==============================  # No issues:   - AAOx 3     =====================[CVS ]===============================  # Hypotension :   - Last echo 8/2020 : 25% Ef with Severe AS with mod MR   - d/d : Severe AS vs medication induced ( pt is on lasix, Coreg, Isosorbide)   - pt was started on Dobutamine drip in the ED   - will start on Pheny and will titrate down Dobutamine with aim of SBP 90 ( baseline )   - Pt refused central line at this time   - started on midodrine 10 mg q8 and will give one time dose of Albumin 100 mg   - Monitor BP   - will hold off BP meds for now   - will check TSH and cortisol am level   - will need Dobutamine echo to distinguish btw Severe AS and pseudo AS  likely as Outpt   - Cardio consult Dr David     # Chronic Combined heart failure :   - currently not in fluid overload state   - CXR : no pul edema , no leg swelling   - will hold off the lasix for now     # CAD :   - will start on aspirin and brillinta and atorvastatin   - will hold off ranolazine too and start when BP is better     # PVCs :   - Pt was started on amiodarone on last adm for PVCs   - QTc 606 ,so will hold off the amio     =====================[RESP ]==============================  # No issues :   - Currently saturating 97% on 3 L NC   - c/w supplement O2     =====================[ GI ]================================  # No issues :    - diabetic diet     ====================[ RENAL ]=============================   # RUFINA over CKD-3 :   - Cr 2.63 ,baseline 1.7 -1.8   - likely 2/2 Hypotension   - s/p 1 L bolus in ED   - monitor urine output   - Monitor electrolytes   - f/u BMP   - will check U lytes     =====================[ ID ]================================  # Elevated lactate :   - 2.1--> 2.5 s/p 1 L NS bolus    - likely 2/2 hypotension   - cannot give more IVF given CHF history  - will repeat lactate   - f/u BCx    ===================[ HEME/ONC ]===========================  # No issues :  - Hb and Plt stable   - monitor cbc daily     =====================[ ENDO ]=============================  # DM-2   - Pt on lantus 40 U hs and prandin 1mg TID   - will c/w HSS for now   - monitor BS and readjust insulin as needed   - Start diet when possible    ==================[ SKIN/ CATHETERS ]======================  # 2 20 gauge IV   # Skin intact     ==================[ PROPHYLAXIS ]==========================   # Dvt : HSQ    # Gi : famotidine     ====================[ DISPO ]==============================   - Admit to  ICU     ===================[ PROGNOSIS ]===========================  - Guarded 80M from home with wife, walks independently, with PMH CAD s/p prior MI, 17 stents, and CABG, HFrEF EF 25% s/p [employer] single chamber ICD (11/2019), CKD3, HTN, HLD, DM was BIB EMS for hypotension to 50/40 with blurry vision since am, admitted to ICU for management of hypotension.     Assessment:  1. Hypotension   2. Chronic Combined heart failure   3. Severe vs pseudo AS   4. DM-2   5. CKD-3   6. HTN   7. HLD     Plan:  CNS: #No issues:     CVS: #Hypotension:   -Severe AS vs medication induced (home meds Lasix, Coreg, Imdur) vs hypovolemic (recent admission at [facility] s/p Lasix tx)  -Last echo 8/2020 [facility]: 25% EF, severe vs pseudo AS, mod MR   -dobutamine drip started ED, titrated down, now dc  -started on pheny, now on levophed 0.02mcg/kg/min, goal SBP 90s (baseline )   -RIJ placed 8/27  -cw midodrine 10 mg q8  -s/p x1 dose Albumin 100 mg   -hold home BP meds, monitor BPs   -TSH wnl, fu cortisol am level   -outpatient fu for dobutamine stress test to determine AS vs pseudo AS  -Cardio Dr. David following    # Chronic Combined heart failure :   -monitor for fluid overload, no LE edema at this time  -CXR on admission: no pulm edema; repeat CXR possibly developing pulm edema  -hold off home Lasix    # CAD :   -cw aspirin, brillinta, atorvastatin   -hold ranolazine, consider restarting when BPs improve     # PVCs :   -started on amiodarone by [facility] during last admission for PVCs  -hold 2/2 QTc 606     =====================[RESP ]==============================  # No issues :   - Currently saturating 97% on 3 L NC   - c/w supplement O2     =====================[ GI ]================================  # No issues :    - diabetic diet     ====================[ RENAL ]=============================   # RUFINA over CKD-3 :   - Cr 2.63 ,baseline 1.7 -1.8   - likely 2/2 Hypotension   - s/p 1 L bolus in ED   - monitor urine output   - Monitor electrolytes   - f/u BMP   - will check U lytes     =====================[ ID ]================================  # Elevated lactate :   - 2.1--> 2.5 s/p 1 L NS bolus    - likely 2/2 hypotension   - cannot give more IVF given CHF history  - will repeat lactate   - f/u BCx    ===================[ HEME/ONC ]===========================  # No issues :  - Hb and Plt stable   - monitor cbc daily     =====================[ ENDO ]=============================  # DM-2   - Pt on lantus 40 U hs and prandin 1mg TID   - will c/w HSS for now   - monitor BS and readjust insulin as needed   - Start diet when possible    ==================[ SKIN/ CATHETERS ]======================  # 2 20 gauge IV   # Skin intact     ==================[ PROPHYLAXIS ]==========================   # Dvt : HSQ    # Gi : famotidine     ====================[ DISPO ]==============================   - Admit to  ICU     ===================[ PROGNOSIS ]===========================  - Guarded 80M from home with wife, walks independently, with PMH CAD s/p prior MI, 17 stents, and CABG, HFrEF EF 25% s/p [employer] single chamber ICD (11/2019), CKD3, HTN, HLD, DM was BIB EMS for hypotension to 50/40 with blurry vision since am, admitted to ICU for management of hypotension.     Assessment:  1. Hypotension   2. Chronic Combined heart failure   3. Severe vs pseudo AS   4. DM-2   5. CKD-3   6. HTN   7. HLD     Plan:  CNS: #No issues:     CVS: #Hypotension:   -Severe AS vs medication induced (home meds Lasix, Coreg, Imdur) vs hypovolemic (recent admission at [facility] s/p Lasix tx)  -Last echo 8/2020 [facility]: 25% EF, severe vs pseudo AS, mod MR   -dobutamine drip started ED, titrated down, now dc  -started on pheny, now on levophed 0.02mcg/kg/min, goal SBP 90s (baseline )   -RIJ placed 8/27  -cw midodrine 10 mg q8  -s/p x1 dose Albumin 100 mg   -hold home BP meds, monitor BPs   -TSH wnl, fu cortisol am level   -outpatient fu for dobutamine stress test to determine AS vs pseudo AS  -Cardio Dr. David following    # Chronic Combined heart failure :   -monitor for fluid overload, no LE edema at this time  -CXR on admission: no pulm edema; repeat CXR possibly developing pulm edema  -hold off home Lasix    # CAD :   -cw aspirin, brillinta, atorvastatin   -hold ranolazine, consider restarting when BPs improve     # PVCs :   -started on amiodarone by [facility] during last admission for PVCs  -hold 2/2 QTc 606     Pulm: #No issues:   -Satting well 3L NC    GI: #No issues:    -cw soft diabetic diet     Renal: #RUFINA on CKD-3, likely 2/2 hypotension:   -Cr 2.63 on admission, baseline 1.7-1.8, 2.15 this am  -s/p 1 L bolus in ED   -monitor urine output, 24h 850cc  -monitor electrolytes, no repletion needed today  -fu urine lytes   -conservative with IVF 2/2 CHF hx    ID: #Elevated lactate, likely 2/2 hypotension, resolved:   -2.1--> 2.5 s/p 1 L NS bolus, 1.1 this am  -fu BCx, pending    Heme/Onc: #No issues:  -Hb and Plt stable   -monitor cbc daily     Endo: #DM-2:  -Holding home Lantus 40U and Prandin 1mg TID   -cw HSS, monitor FS and adjust as needed  -soft diabetic diet     Skin/Catheters:   -x2 20 gauge IVs  -skin intact   -RIJ placed 8/27    PPx:  -DVT: Heparin 5000U SC q8h   -GI: famotidine     Dispo:  -admit to ICU     Prognosis:  -guarded

## 2020-08-28 NOTE — PROGRESS NOTE ADULT - SUBJECTIVE AND OBJECTIVE BOX
Patient is a 80y old  Male who presents with a chief complaint of Blurry vision and dizzness (27 Aug 2020 14:26)      INTERVAL HPI/OVERNIGHT EVENTS:  ICU Vital Signs Last 24 Hrs  T(C): 36.5 (28 Aug 2020 04:30), Max: 36.8 (27 Aug 2020 11:53)  T(F): 97.7 (28 Aug 2020 04:30), Max: 98.2 (27 Aug 2020 11:53)  HR: 71 (28 Aug 2020 06:45) (53 - 83)  BP: 107/68 (28 Aug 2020 06:45) (66/45 - 125/86)  BP(mean): 77 (28 Aug 2020 06:45) (49 - 103)  ABP: --  ABP(mean): --  RR: 17 (28 Aug 2020 06:45) (9 - 33)  SpO2: 99% (28 Aug 2020 06:45) (92% - 100%)    I&O's Summary    27 Aug 2020 07:01  -  28 Aug 2020 07:00  --------------------------------------------------------  IN: 585 mL / OUT: 850 mL / NET: -265 mL          LABS:                        9.8    8.56  )-----------( 218      ( 28 Aug 2020 06:28 )             30.8     08-28    140  |  107  |  47<H>  ----------------------------<  137<H>  3.7   |  27  |  x     Ca    8.9      28 Aug 2020 06:28    TPro  6.9  /  Alb  2.7<L>  /  TBili  0.6  /  DBili  x   /  AST  22  /  ALT  43  /  AlkPhos  91  08-27    PT/INR - ( 27 Aug 2020 12:41 )   PT: 14.5 sec;   INR: 1.25 ratio         PTT - ( 27 Aug 2020 12:41 )  PTT:33.3 sec  Urinalysis Basic - ( 28 Aug 2020 00:23 )    Color: Yellow / Appearance: Clear / S.015 / pH: x  Gluc: x / Ketone: Negative  / Bili: Negative / Urobili: Negative   Blood: x / Protein: 30 mg/dL / Nitrite: Negative   Leuk Esterase: Negative / RBC: 10-25 /HPF / WBC 6-10 /HPF   Sq Epi: x / Non Sq Epi: Few /HPF / Bacteria: Trace /HPF      CAPILLARY BLOOD GLUCOSE      POCT Blood Glucose.: 198 mg/dL (28 Aug 2020 06:24)  POCT Blood Glucose.: 171 mg/dL (27 Aug 2020 21:33)  POCT Blood Glucose.: 277 mg/dL (27 Aug 2020 16:23)  POCT Blood Glucose.: 286 mg/dL (27 Aug 2020 12:11)        RADIOLOGY & ADDITIONAL TESTS:    Consultant(s) Notes Reviewed:  [x ] YES  [ ] NO    MEDICATIONS  (STANDING):  aspirin enteric coated 81 milliGRAM(s) Oral daily  atorvastatin 80 milliGRAM(s) Oral at bedtime  chlorhexidine 2% Cloths 1 Application(s) Topical <User Schedule>  chlorhexidine 4% Liquid 1 Application(s) Topical <User Schedule>  dextrose 50% Injectable 25 Gram(s) IV Push once  heparin   Injectable 5000 Unit(s) SubCutaneous every 8 hours  insulin lispro (HumaLOG) corrective regimen sliding scale   SubCutaneous Before meals and at bedtime  midodrine. 10 milliGRAM(s) Oral three times a day  mirtazapine 15 milliGRAM(s) Oral at bedtime  norepinephrine Infusion 0.1 MICROgram(s)/kG/Min (7.91 mL/Hr) IV Continuous <Continuous>  ticagrelor 60 milliGRAM(s) Oral every 12 hours    MEDICATIONS  (PRN):  sodium chloride 0.9% lock flush 10 milliLiter(s) IV Push every 1 hour PRN Pre/post blood products, medications, blood draw, and to maintain line patency      PHYSICAL EXAM:  GENERAL: well built, well nourished  HEAD:  Atraumatic, Normocephalic  EYES: EOMI, PERRLA, conjunctiva and sclera clear  ENT: No tonsillar erythema, exudates, or enlargement; Moist mucous membranes, Good dentition, No lesions  NECK: Supple, No JVD, Normal thyroid, no enlarged nodes  NERVOUS SYSTEM:  Alert & Oriented X3, Good concentration; Motor Strength 5/5 B/L upper and lower extremities; DTRs 2+ intact and symmetric, sensory intact  CHEST/LUNG: B/L good air entry; No rales, rhonchi, or wheezing  HEART: S1S2 normal, no S3, Regular rate and rhythm; No murmurs, rubs, or gallops  ABDOMEN: Soft, Nontender, Nondistended; Bowel sounds present  EXTREMITIES:  2+ Peripheral Pulses, No clubbing, cyanosis, or edema  LYMPH: No lymphadenopathy noted  SKIN: No rashes or lesions    Care Discussed with Consultants/Other Providers [ x] YES  [ ] NO HPI: 80M from home with wife, walks independently, with PMH CAD s/p prior MI, 17 stents, and CABG, HFrEF EF 25% s/p [employer] single chamber ICD (2019), CKD3, HTN, HLD, DM was BIB EMS for hypotension to 50/40 with blurry vision since am. Recent admission at [facility] for acute-on-chronic HF during which diuresed with Lasix, discharged yesterday. Echocardiogram revealed EF of 25%, severe AS, moderate MR, and severe diastolic dysfunction.   Patient was evaluated by Cardiology - possible outpatient dobutamine stress to determine if it is AS or pseudo - AS in the setting of poor LV function. EP was consulted for frequent and multiple PVCs and was started on amiodarone.   Today pt said he ate and took his Diabetes medication( possible prandin which was changed on last adm) and after 15 mins started having blurry vision and dizziness and was able to see anything. Denies any CP, SOB, headache, LOC, fall or hitting head, palpitations. Pt called EMS and was found to have BP 50/40 with  .   Pt states he took most of his meds this am.     INTERVAL HPI/OVERNIGHT EVENTS: NAEON. SBPs continue to improve, 111/52 this am. Satting well on 2L NC. VS otherwise wnl. Continuing on Levophed drip 0.04mcg/kg/min.     ICU Vital Signs Last 24 Hrs  T(C): 36.5 (28 Aug 2020 04:30), Max: 36.8 (27 Aug 2020 11:53)  T(F): 97.7 (28 Aug 2020 04:30), Max: 98.2 (27 Aug 2020 11:53)  HR: 71 (28 Aug 2020 06:45) (53 - 83)  BP: 107/68 (28 Aug 2020 06:45) (66/45 - 125/86)  BP(mean): 77 (28 Aug 2020 06:45) (49 - 103)  ABP: --  ABP(mean): --  RR: 17 (28 Aug 2020 06:45) (9 - 33)  SpO2: 99% (28 Aug 2020 06:45) (92% - 100%)    I&O's Summary    27 Aug 2020 07:01  -  28 Aug 2020 07:00  --------------------------------------------------------  IN: 585 mL / OUT: 850 mL / NET: -265 mL          LABS:                        9.8    8.56  )-----------( 218      ( 28 Aug 2020 06:28 )             30.8     0828    140  |  107  |  47<H>  ----------------------------<  137<H>  3.7   |  27  |  x     Ca    8.9      28 Aug 2020 06:28    TPro  6.9  /  Alb  2.7<L>  /  TBili  0.6  /  DBili  x   /  AST  22  /  ALT  43  /  AlkPhos  91      PT/INR - ( 27 Aug 2020 12:41 )   PT: 14.5 sec;   INR: 1.25 ratio         PTT - ( 27 Aug 2020 12:41 )  PTT:33.3 sec  Urinalysis Basic - ( 28 Aug 2020 00:23 )    Color: Yellow / Appearance: Clear / S.015 / pH: x  Gluc: x / Ketone: Negative  / Bili: Negative / Urobili: Negative   Blood: x / Protein: 30 mg/dL / Nitrite: Negative   Leuk Esterase: Negative / RBC: 10-25 /HPF / WBC 6-10 /HPF   Sq Epi: x / Non Sq Epi: Few /HPF / Bacteria: Trace /HPF      CAPILLARY BLOOD GLUCOSE      POCT Blood Glucose.: 198 mg/dL (28 Aug 2020 06:24)  POCT Blood Glucose.: 171 mg/dL (27 Aug 2020 21:33)  POCT Blood Glucose.: 277 mg/dL (27 Aug 2020 16:23)  POCT Blood Glucose.: 286 mg/dL (27 Aug 2020 12:11)        RADIOLOGY & ADDITIONAL TESTS:    Consultant(s) Notes Reviewed:  [x ] YES  [ ] NO    MEDICATIONS  (STANDING):  aspirin enteric coated 81 milliGRAM(s) Oral daily  atorvastatin 80 milliGRAM(s) Oral at bedtime  chlorhexidine 2% Cloths 1 Application(s) Topical <User Schedule>  chlorhexidine 4% Liquid 1 Application(s) Topical <User Schedule>  dextrose 50% Injectable 25 Gram(s) IV Push once  heparin   Injectable 5000 Unit(s) SubCutaneous every 8 hours  insulin lispro (HumaLOG) corrective regimen sliding scale   SubCutaneous Before meals and at bedtime  midodrine. 10 milliGRAM(s) Oral three times a day  mirtazapine 15 milliGRAM(s) Oral at bedtime  norepinephrine Infusion 0.1 MICROgram(s)/kG/Min (7.91 mL/Hr) IV Continuous <Continuous>  ticagrelor 60 milliGRAM(s) Oral every 12 hours    MEDICATIONS  (PRN):  sodium chloride 0.9% lock flush 10 milliLiter(s) IV Push every 1 hour PRN Pre/post blood products, medications, blood draw, and to maintain line patency      PHYSICAL EXAM:  GENERAL: well built, well nourished  HEAD:  Atraumatic, Normocephalic  EYES: EOMI, PERRLA, conjunctiva and sclera clear  ENT: No tonsillar erythema, exudates, or enlargement; Moist mucous membranes, Good dentition, No lesions  NECK: Supple, No JVD, Normal thyroid, no enlarged nodes  NERVOUS SYSTEM:  Alert & Oriented X3, Good concentration; Motor Strength 5/5 B/L upper and lower extremities; DTRs 2+ intact and symmetric, sensory intact  CHEST/LUNG: B/L good air entry; No rales, rhonchi, or wheezing  HEART: S1S2 normal, no S3, Regular rate and rhythm; No murmurs, rubs, or gallops  ABDOMEN: Soft, Nontender, Nondistended; Bowel sounds present  EXTREMITIES:  2+ Peripheral Pulses, No clubbing, cyanosis, or edema  LYMPH: No lymphadenopathy noted  SKIN: No rashes or lesions    Care Discussed with Consultants/Other Providers [ x] YES  [ ] NO HPI: 80M from home with wife, walks independently, with PMH CAD s/p prior MI, 17 stents, and CABG, HFrEF EF 25% s/p [employer] single chamber ICD (2019), CKD3, HTN, HLD, DM was BIB EMS for hypotension to 50/40 with blurry vision since am. Recent admission at [facility] for acute-on-chronic HF during which diuresed with Lasix, discharged yesterday on amiodarone. Echocardiogram revealed EF of 25%, severe AS, moderate MR, and severe diastolic dysfunction. AS may be pseudo in setting of poor LV function, considering possible outpatient dobutamine stress test to elucidate further.   Admitted to ICU for further management. Amiodarine dc 2/2prolonged QTc to 606 and dobutamine, started on phenylephrine drip, changed to Levophed.     INTERVAL HPI/OVERNIGHT EVENTS: NAEON. SBPs continue to improve, 111/52 this am. Satting well on 2L NC. VS otherwise wnl. Continuing on Levophed drip 0.04mcg/kg/min.     ICU Vital Signs Last 24 Hrs  T(C): 36.5 (28 Aug 2020 04:30), Max: 36.8 (27 Aug 2020 11:53)  T(F): 97.7 (28 Aug 2020 04:30), Max: 98.2 (27 Aug 2020 11:53)  HR: 71 (28 Aug 2020 06:45) (53 - 83)  BP: 107/68 (28 Aug 2020 06:45) (66/45 - 125/86)  BP(mean): 77 (28 Aug 2020 06:45) (49 - 103)  ABP: --  ABP(mean): --  RR: 17 (28 Aug 2020 06:45) (9 - 33)  SpO2: 99% (28 Aug 2020 06:45) (92% - 100%)    I&O's Summary    27 Aug 2020 07:01  -  28 Aug 2020 07:00  --------------------------------------------------------  IN: 585 mL / OUT: 850 mL / NET: -265 mL          LABS:                        9.8    8.56  )-----------( 218      ( 28 Aug 2020 06:28 )             30.8     08-    140  |  107  |  47<H>  ----------------------------<  137<H>  3.7   |  27  |  x     Ca    8.9      28 Aug 2020 06:28    TPro  6.9  /  Alb  2.7<L>  /  TBili  0.6  /  DBili  x   /  AST  22  /  ALT  43  /  AlkPhos  91      PT/INR - ( 27 Aug 2020 12:41 )   PT: 14.5 sec;   INR: 1.25 ratio         PTT - ( 27 Aug 2020 12:41 )  PTT:33.3 sec  Urinalysis Basic - ( 28 Aug 2020 00:23 )    Color: Yellow / Appearance: Clear / S.015 / pH: x  Gluc: x / Ketone: Negative  / Bili: Negative / Urobili: Negative   Blood: x / Protein: 30 mg/dL / Nitrite: Negative   Leuk Esterase: Negative / RBC: 10-25 /HPF / WBC 6-10 /HPF   Sq Epi: x / Non Sq Epi: Few /HPF / Bacteria: Trace /HPF      CAPILLARY BLOOD GLUCOSE      POCT Blood Glucose.: 198 mg/dL (28 Aug 2020 06:24)  POCT Blood Glucose.: 171 mg/dL (27 Aug 2020 21:33)  POCT Blood Glucose.: 277 mg/dL (27 Aug 2020 16:23)  POCT Blood Glucose.: 286 mg/dL (27 Aug 2020 12:11)        RADIOLOGY & ADDITIONAL TESTS:    Consultant(s) Notes Reviewed:  [x ] YES  [ ] NO    MEDICATIONS  (STANDING):  aspirin enteric coated 81 milliGRAM(s) Oral daily  atorvastatin 80 milliGRAM(s) Oral at bedtime  chlorhexidine 2% Cloths 1 Application(s) Topical <User Schedule>  chlorhexidine 4% Liquid 1 Application(s) Topical <User Schedule>  dextrose 50% Injectable 25 Gram(s) IV Push once  heparin   Injectable 5000 Unit(s) SubCutaneous every 8 hours  insulin lispro (HumaLOG) corrective regimen sliding scale   SubCutaneous Before meals and at bedtime  midodrine. 10 milliGRAM(s) Oral three times a day  mirtazapine 15 milliGRAM(s) Oral at bedtime  norepinephrine Infusion 0.1 MICROgram(s)/kG/Min (7.91 mL/Hr) IV Continuous <Continuous>  ticagrelor 60 milliGRAM(s) Oral every 12 hours    MEDICATIONS  (PRN):  sodium chloride 0.9% lock flush 10 milliLiter(s) IV Push every 1 hour PRN Pre/post blood products, medications, blood draw, and to maintain line patency      PHYSICAL EXAM:  GENERAL: well built, well nourished  HEAD:  Atraumatic, Normocephalic  EYES: EOMI, PERRLA, conjunctiva and sclera clear  ENT: No tonsillar erythema, exudates, or enlargement; Moist mucous membranes, Good dentition, No lesions  NECK: Supple, No JVD, Normal thyroid, no enlarged nodes  NERVOUS SYSTEM:  Alert & Oriented X3, Good concentration; Motor Strength 5/5 B/L upper and lower extremities; DTRs 2+ intact and symmetric, sensory intact  CHEST/LUNG: B/L good air entry; No rales, rhonchi, or wheezing  HEART: S1S2 normal, no S3, Regular rate and rhythm; No murmurs, rubs, or gallops  ABDOMEN: Soft, Nontender, Nondistended; Bowel sounds present  EXTREMITIES:  2+ Peripheral Pulses, No clubbing, cyanosis, or edema  LYMPH: No lymphadenopathy noted  SKIN: No rashes or lesions    Care Discussed with Consultants/Other Providers [ x] YES  [ ] NO HPI: 80M from home with wife, walks independently, with PMH CAD s/p prior MI, 17 stents, and CABG, HFrEF EF 25% s/p [employer] single chamber ICD (2019), CKD3, HTN, HLD, DM was BIB EMS for hypotension to 50/40 with blurry vision since am. Recent admission at [facility] for acute-on-chronic HF during which diuresed with Lasix, discharged yesterday on amiodarone. Echocardiogram revealed EF of 25%, severe AS, moderate MR, and severe diastolic dysfunction. AS may be pseudo in setting of poor LV function, considering possible outpatient dobutamine stress test to elucidate further.   Started on dobutamine in ED, admitted to ICU for further management. Amiodarone dc 2/ prolonged QTc to 606, dobutamine weaned. Started on phenylephrine drip, changed to Levophed.     INTERVAL HPI/OVERNIGHT EVENTS: NAEON. SBPs continue to improve, 111/52 this am. Satting well on 2L NC. VS otherwise wnl. Patient reports feeling well this am, with resolved dizziness. Denies any pain. Denies SOB, is comfortable on NC. Continuing on Levophed drip 0.04mcg/kg/min.     ICU Vital Signs Last 24 Hrs  T(C): 36.5 (28 Aug 2020 04:30), Max: 36.8 (27 Aug 2020 11:53)  T(F): 97.7 (28 Aug 2020 04:30), Max: 98.2 (27 Aug 2020 11:53)  HR: 71 (28 Aug 2020 06:45) (53 - 83)  BP: 107/68 (28 Aug 2020 06:45) (66/45 - 125/86)  BP(mean): 77 (28 Aug 2020 06:45) (49 - 103)  ABP: --  ABP(mean): --  RR: 17 (28 Aug 2020 06:45) (9 - 33)  SpO2: 99% (28 Aug 2020 06:45) (92% - 100%)    I&O's Summary    27 Aug 2020 07:01  -  28 Aug 2020 07:00  --------------------------------------------------------  IN: 585 mL / OUT: 850 mL / NET: -265 mL      LABS:                        9.8    8.56  )-----------( 218      ( 28 Aug 2020 06:28 )             30.8     08    140  |  107  |  47<H>  ----------------------------<  137<H>  3.7   |  27  |  x     Ca    8.9      28 Aug 2020 06:28    TPro  6.9  /  Alb  2.7<L>  /  TBili  0.6  /  DBili  x   /  AST  22  /  ALT  43  /  AlkPhos  91      PT/INR - ( 27 Aug 2020 12:41 )   PT: 14.5 sec;   INR: 1.25 ratio         PTT - ( 27 Aug 2020 12:41 )  PTT:33.3 sec  Urinalysis Basic - ( 28 Aug 2020 00:23 )    Color: Yellow / Appearance: Clear / S.015 / pH: x  Gluc: x / Ketone: Negative  / Bili: Negative / Urobili: Negative   Blood: x / Protein: 30 mg/dL / Nitrite: Negative   Leuk Esterase: Negative / RBC: 10-25 /HPF / WBC 6-10 /HPF   Sq Epi: x / Non Sq Epi: Few /HPF / Bacteria: Trace /HPF      CAPILLARY BLOOD GLUCOSE      POCT Blood Glucose.: 198 mg/dL (28 Aug 2020 06:24)  POCT Blood Glucose.: 171 mg/dL (27 Aug 2020 21:33)  POCT Blood Glucose.: 277 mg/dL (27 Aug 2020 16:23)  POCT Blood Glucose.: 286 mg/dL (27 Aug 2020 12:11)        RADIOLOGY & ADDITIONAL TESTS:    Consultant(s) Notes Reviewed:  [x ] YES  [ ] NO    MEDICATIONS  (STANDING):  aspirin enteric coated 81 milliGRAM(s) Oral daily  atorvastatin 80 milliGRAM(s) Oral at bedtime  chlorhexidine 2% Cloths 1 Application(s) Topical <User Schedule>  chlorhexidine 4% Liquid 1 Application(s) Topical <User Schedule>  dextrose 50% Injectable 25 Gram(s) IV Push once  heparin   Injectable 5000 Unit(s) SubCutaneous every 8 hours  insulin lispro (HumaLOG) corrective regimen sliding scale   SubCutaneous Before meals and at bedtime  midodrine. 10 milliGRAM(s) Oral three times a day  mirtazapine 15 milliGRAM(s) Oral at bedtime  norepinephrine Infusion 0.1 MICROgram(s)/kG/Min (7.91 mL/Hr) IV Continuous <Continuous>  ticagrelor 60 milliGRAM(s) Oral every 12 hours    MEDICATIONS  (PRN):  sodium chloride 0.9% lock flush 10 milliLiter(s) IV Push every 1 hour PRN Pre/post blood products, medications, blood draw, and to maintain line patency      PHYSICAL EXAM:  GENERAL: well built, well nourished  HEAD:  Atraumatic, Normocephalic  EYES: EOMI, PERRLA, conjunctiva and sclera clear  ENT: No tonsillar erythema, exudates, or enlargement; Moist mucous membranes, Good dentition, No lesions  NECK: Supple, No JVD, Normal thyroid, no enlarged nodes  NERVOUS SYSTEM:  Alert & Oriented X3, Good concentration; Motor Strength 5/5 B/L upper and lower extremities; DTRs 2+ intact and symmetric, sensory intact  CHEST/LUNG: B/L good air entry; No rales, rhonchi, or wheezing  HEART: S1S2 normal, no S3, Regular rate and rhythm; No murmurs, rubs, or gallops  ABDOMEN: Soft, Nontender, Nondistended; Bowel sounds present  EXTREMITIES:  2+ Peripheral Pulses, No clubbing, cyanosis, or edema  LYMPH: No lymphadenopathy noted  SKIN: No rashes or lesions    Care Discussed with Consultants/Other Providers [ x] YES  [ ] NO HPI: 80M from home with wife, walks independently, with PMH CAD s/p prior MI, 17 stents, and CABG, HFrEF EF 25% s/p [employer] single chamber ICD (2019), CKD3, HTN, HLD, DM was BIB EMS for hypotension to 50/40 with blurry vision since am. Recent admission at [facility] for acute-on-chronic HF during which diuresed with Lasix, discharged yesterday on amiodarone. Echocardiogram revealed EF of 25%, severe AS, moderate MR, and severe diastolic dysfunction. AS may be pseudo in setting of poor LV function, considering possible outpatient dobutamine stress test to elucidate further.   Started on dobutamine in ED, admitted to ICU for further management. Amiodarone dc 2/ prolonged QTc to 606, dobutamine weaned. Started on phenylephrine drip, changed to Levophed.     INTERVAL HPI/OVERNIGHT EVENTS: NAEON. SBPs continue to improve, 111/52 this am. Satting well on 2L NC. VS otherwise wnl. Patient reports feeling well this am, with resolved dizziness. Denies any pain. Denies SOB, is comfortable on NC. Continuing on Levophed drip 0.04mcg/kg/min.     ICU Vital Signs Last 24 Hrs  T(C): 36.5 (28 Aug 2020 04:30), Max: 36.8 (27 Aug 2020 11:53)  T(F): 97.7 (28 Aug 2020 04:30), Max: 98.2 (27 Aug 2020 11:53)  HR: 71 (28 Aug 2020 06:45) (53 - 83)  BP: 107/68 (28 Aug 2020 06:45) (66/45 - 125/86)  BP(mean): 77 (28 Aug 2020 06:45) (49 - 103)  ABP: --  ABP(mean): --  RR: 17 (28 Aug 2020 06:45) (9 - 33)  SpO2: 99% (28 Aug 2020 06:45) (92% - 100%)    I&O's Summary    27 Aug 2020 07:01  -  28 Aug 2020 07:00  --------------------------------------------------------  IN: 585 mL / OUT: 850 mL / NET: -265 mL      LABS:                        9.8    8.56  )-----------( 218      ( 28 Aug 2020 06:28 )             30.8     08    140  |  107  |  47<H>  ----------------------------<  137<H>  3.7   |  27  |  x     Ca    8.9      28 Aug 2020 06:28    TPro  6.9  /  Alb  2.7<L>  /  TBili  0.6  /  DBili  x   /  AST  22  /  ALT  43  /  AlkPhos  91      PT/INR - ( 27 Aug 2020 12:41 )   PT: 14.5 sec;   INR: 1.25 ratio         PTT - ( 27 Aug 2020 12:41 )  PTT:33.3 sec  Urinalysis Basic - ( 28 Aug 2020 00:23 )    Color: Yellow / Appearance: Clear / S.015 / pH: x  Gluc: x / Ketone: Negative  / Bili: Negative / Urobili: Negative   Blood: x / Protein: 30 mg/dL / Nitrite: Negative   Leuk Esterase: Negative / RBC: 10-25 /HPF / WBC 6-10 /HPF   Sq Epi: x / Non Sq Epi: Few /HPF / Bacteria: Trace /HPF      CAPILLARY BLOOD GLUCOSE      POCT Blood Glucose.: 198 mg/dL (28 Aug 2020 06:24)  POCT Blood Glucose.: 171 mg/dL (27 Aug 2020 21:33)  POCT Blood Glucose.: 277 mg/dL (27 Aug 2020 16:23)  POCT Blood Glucose.: 286 mg/dL (27 Aug 2020 12:11)        RADIOLOGY & ADDITIONAL TESTS:    Consultant(s) Notes Reviewed:  [x ] YES  [ ] NO    MEDICATIONS  (STANDING):  aspirin enteric coated 81 milliGRAM(s) Oral daily  atorvastatin 80 milliGRAM(s) Oral at bedtime  chlorhexidine 2% Cloths 1 Application(s) Topical <User Schedule>  chlorhexidine 4% Liquid 1 Application(s) Topical <User Schedule>  dextrose 50% Injectable 25 Gram(s) IV Push once  heparin   Injectable 5000 Unit(s) SubCutaneous every 8 hours  insulin lispro (HumaLOG) corrective regimen sliding scale   SubCutaneous Before meals and at bedtime  midodrine. 10 milliGRAM(s) Oral three times a day  mirtazapine 15 milliGRAM(s) Oral at bedtime  norepinephrine Infusion 0.1 MICROgram(s)/kG/Min (7.91 mL/Hr) IV Continuous <Continuous>  ticagrelor 60 milliGRAM(s) Oral every 12 hours    MEDICATIONS  (PRN):  sodium chloride 0.9% lock flush 10 milliLiter(s) IV Push every 1 hour PRN Pre/post blood products, medications, blood draw, and to maintain line patency      PHYSICAL EXAM:  GENERAL: thin, well nourished, appears stated age  HEAD:  Atraumatic, Normocephalic  EYES: EOMI, PERRLA, conjunctiva and sclera clear  NECK: Supple  NERVOUS SYSTEM:  Alert & Oriented X3  CHEST/LUNG: B/L good air entry; No rales, rhonchi, or wheezing  HEART: S1S2 normal, no S3, Regular rate and rhythm  ABDOMEN: Soft, Nontender, Nondistended; Bowel sounds present  EXTREMITIES:  2+ Peripheral Pulses, No edema  SKIN: No rashes or lesions    Care Discussed with Consultants/Other Providers [ x] YES  [ ] NO HPI: 80M from home with wife, walks independently, with PMH CAD s/p prior MI, 17 stents, and CABG, HFrEF EF 25% s/p [employer] single chamber ICD (2019), CKD3, HTN, HLD, DM was BIB EMS for hypotension to 50/40 with blurry vision since am. Recent admission at [facility] for acute-on-chronic HF during which diuresed with Lasix, discharged yesterday on amiodarone. Echocardiogram revealed EF of 25%, severe AS, moderate MR, and severe diastolic dysfunction. AS may be pseudo in setting of poor LV function, considering possible outpatient dobutamine stress test to elucidate further.   Started on dobutamine in ED, admitted to ICU for further management. Amiodarone dc 2/ prolonged QTc to 606, dobutamine weaned. Started on phenylephrine drip, changed to Levophed.     INTERVAL HPI/OVERNIGHT EVENTS: NAEON. SBPs continue to improve, 111/52 this am. Satting well on 2L NC. VS otherwise wnl. Patient reports feeling well this am, with resolved dizziness. Denies any pain. Denies SOB, is comfortable on NC. Continuing on Levophed drip 0.04mcg/kg/min.     ICU Vital Signs Last 24 Hrs  T(C): 36.5 (28 Aug 2020 04:30), Max: 36.8 (27 Aug 2020 11:53)  T(F): 97.7 (28 Aug 2020 04:30), Max: 98.2 (27 Aug 2020 11:53)  HR: 71 (28 Aug 2020 06:45) (53 - 83)  BP: 107/68 (28 Aug 2020 06:45) (66/45 - 125/86)  BP(mean): 77 (28 Aug 2020 06:45) (49 - 103)  ABP: --  ABP(mean): --  RR: 17 (28 Aug 2020 06:45) (9 - 33)  SpO2: 99% (28 Aug 2020 06:45) (92% - 100%)    I&O's Summary    27 Aug 2020 07:01  -  28 Aug 2020 07:00  --------------------------------------------------------  IN: 585 mL / OUT: 850 mL / NET: -265 mL      LABS:                        9.8    8.56  )-----------( 218      ( 28 Aug 2020 06:28 )             30.8         140  |  107  |  47<H>  ----------------------------<  137<H>  3.7   |  27  |  x     Ca    8.9      28 Aug 2020 06:28    TPro  6.9  /  Alb  2.7<L>  /  TBili  0.6  /  DBili  x   /  AST  22  /  ALT  43  /  AlkPhos  91      PT/INR - ( 27 Aug 2020 12:41 )   PT: 14.5 sec;   INR: 1.25 ratio         PTT - ( 27 Aug 2020 12:41 )  PTT:33.3 sec  Urinalysis Basic - ( 28 Aug 2020 00:23 )    Color: Yellow / Appearance: Clear / S.015 / pH: x  Gluc: x / Ketone: Negative  / Bili: Negative / Urobili: Negative   Blood: x / Protein: 30 mg/dL / Nitrite: Negative   Leuk Esterase: Negative / RBC: 10-25 /HPF / WBC 6-10 /HPF   Sq Epi: x / Non Sq Epi: Few /HPF / Bacteria: Trace /HPF      CAPILLARY BLOOD GLUCOSE      POCT Blood Glucose.: 198 mg/dL (28 Aug 2020 06:24)  POCT Blood Glucose.: 171 mg/dL (27 Aug 2020 21:33)  POCT Blood Glucose.: 277 mg/dL (27 Aug 2020 16:23)  POCT Blood Glucose.: 286 mg/dL (27 Aug 2020 12:11)        RADIOLOGY & ADDITIONAL TESTS:< from: Xray Chest 1 View- PORTABLE-Urgent (20 @ 19:50) >    EXAM:  XR CHEST PORTABLE URGENT 1V                            PROCEDURE DATE:  2020          INTERPRETATION:  CLINICAL INDICATION: 80 years  Male with s/p CVC insertion.    COMPARISON: 2020    There is a right IJ catheter with this tip over the superior vena cava.    AP view of the chest demonstrates developing pulmonary edema.. There is no pleural effusion. There is no pneumothorax.    The heart is enlarged. The patient status post CABG. The left-sided pacemaker/defibrillator is unchanged. There is no mediastinal or hilar mass.    The pulmonary vasculature is normal.    Mild thoracic degenerative changes are present. A right shoulder prosthesis is reidentified.    IMPRESSION:    Right central line in satisfactory position. No pneumothorax.    Developing pulmonary edema. Cardiomegaly. Pacemaker/defibrillator. CABG.              CHOLO SALEEM M.D., ATTENDING RADIOLOGIST  This document has been electronically signed. Aug 27 2020  9:03PM                < end of copied text >      Consultant(s) Notes Reviewed:  [x ] YES  [ ] NO    MEDICATIONS  (STANDING):  aspirin enteric coated 81 milliGRAM(s) Oral daily  atorvastatin 80 milliGRAM(s) Oral at bedtime  chlorhexidine 2% Cloths 1 Application(s) Topical <User Schedule>  chlorhexidine 4% Liquid 1 Application(s) Topical <User Schedule>  dextrose 50% Injectable 25 Gram(s) IV Push once  heparin   Injectable 5000 Unit(s) SubCutaneous every 8 hours  insulin lispro (HumaLOG) corrective regimen sliding scale   SubCutaneous Before meals and at bedtime  midodrine. 10 milliGRAM(s) Oral three times a day  mirtazapine 15 milliGRAM(s) Oral at bedtime  norepinephrine Infusion 0.1 MICROgram(s)/kG/Min (7.91 mL/Hr) IV Continuous <Continuous>  ticagrelor 60 milliGRAM(s) Oral every 12 hours    MEDICATIONS  (PRN):  sodium chloride 0.9% lock flush 10 milliLiter(s) IV Push every 1 hour PRN Pre/post blood products, medications, blood draw, and to maintain line patency      PHYSICAL EXAM:  GENERAL: thin, well nourished, appears stated age  HEAD:  Atraumatic, Normocephalic  EYES: EOMI, PERRLA, conjunctiva and sclera clear  NECK: Supple  NERVOUS SYSTEM:  Alert & Oriented X3  CHEST/LUNG: B/L good air entry; No rales, rhonchi, or wheezing  HEART: S1S2 normal, no S3, Regular rate and rhythm  ABDOMEN: Soft, Nontender, Nondistended; Bowel sounds present  EXTREMITIES:  2+ Peripheral Pulses, No edema  SKIN: No rashes or lesions    Care Discussed with Consultants/Other Providers [ x] YES  [ ] NO

## 2020-08-28 NOTE — CONSULT NOTE ADULT - PROBLEM SELECTOR RECOMMENDATION 2
Per previous echo (8/16), patient with Severe global left ventricular systolic dysfunction / EF 25%; severe reversible diastolic dysfunction (Stage III). Noted with severe AS - pseudo? Patient with s/p angioplasty with stent - 17 stents last stent placement 04/15/2016; Coronary Bypass: 4V [facility]. Per CXR, patient with developing pulmonary edema, cardiomegaly, PPM/defibrillator, CABG. Continues ASA, heparin, statin, midodrine. Repeat echo pending; pending status patient may be a candidate for TAVR as per cardio. Patient DNR / DNI.

## 2020-08-28 NOTE — CONSULT NOTE ADULT - CONVERSATION DETAILS
8/28/20: Met with patient and wife at bedside; Dr. Krishna present. Discussed status. Patient states, "I don't want to be a vegetable." Discussed risks vs benefits of resuscitation, intubation. Patient adamant stating, "I don't want to be a vegetable. No machines. I don't want to be a burden." Wife acknowledges patient's decision. MOLST completed and placed in chart.

## 2020-08-29 NOTE — PROGRESS NOTE ADULT - SUBJECTIVE AND OBJECTIVE BOX
HPI: 80M from home with wife, walks independently, with PMH CAD s/p prior MI, 17 stents, and CABG, HFrEF EF 25% s/p [employer] single chamber ICD (2019), CKD3, HTN, HLD, DM was BIB EMS for hypotension to 50/40 with blurry vision since am. Recent admission at [facility] for acute-on-chronic HF during which diuresed with Lasix, discharged yesterday on amiodarone. Echocardiogram revealed EF of 25%, severe AS, moderate MR, and severe diastolic dysfunction. AS may be pseudo in setting of poor LV function, considering possible outpatient dobutamine stress test to elucidate further.   Started on dobutamine in ED, admitted to ICU for further management. Amiodarone dc 2/2 prolonged QTc to 606, dobutamine weaned. Started on phenylephrine drip, changed to Levophed.     INTERVAL HPI/OVERNIGHT EVENTS: NAEON. Pt Bp noted pt be stable.   ICU Vital Signs Last 24 Hrs  T(C): 36.5 (28 Aug 2020 04:30), Max: 36.8 (27 Aug 2020 11:53)  T(F): 97.7 (28 Aug 2020 04:30), Max: 98.2 (27 Aug 2020 11:53)  HR: 71 (28 Aug 2020 06:45) (53 - 83)  BP: 107/68 (28 Aug 2020 06:45) (66/45 - 125/86)  BP(mean): 77 (28 Aug 2020 06:45) (49 - 103)  ABP: --  ABP(mean): --  RR: 17 (28 Aug 2020 06:45) (9 - 33)  SpO2: 99% (28 Aug 2020 06:45) (92% - 100%)    I&O's Summary    27 Aug 2020 07:01  -  28 Aug 2020 07:00  --------------------------------------------------------  IN: 585 mL / OUT: 850 mL / NET: -265 mL      LABS:                        9.8    8.56  )-----------( 218      ( 28 Aug 2020 06:28 )             30.8     08-28    140  |  107  |  47<H>  ----------------------------<  137<H>  3.7   |  27  |  x     Ca    8.9      28 Aug 2020 06:28    TPro  6.9  /  Alb  2.7<L>  /  TBili  0.6  /  DBili  x   /  AST  22  /  ALT  43  /  AlkPhos  91      PT/INR - ( 27 Aug 2020 12:41 )   PT: 14.5 sec;   INR: 1.25 ratio         PTT - ( 27 Aug 2020 12:41 )  PTT:33.3 sec  Urinalysis Basic - ( 28 Aug 2020 00:23 )    Color: Yellow / Appearance: Clear / S.015 / pH: x  Gluc: x / Ketone: Negative  / Bili: Negative / Urobili: Negative   Blood: x / Protein: 30 mg/dL / Nitrite: Negative   Leuk Esterase: Negative / RBC: 10-25 /HPF / WBC 6-10 /HPF   Sq Epi: x / Non Sq Epi: Few /HPF / Bacteria: Trace /HPF      CAPILLARY BLOOD GLUCOSE      POCT Blood Glucose.: 198 mg/dL (28 Aug 2020 06:24)  POCT Blood Glucose.: 171 mg/dL (27 Aug 2020 21:33)  POCT Blood Glucose.: 277 mg/dL (27 Aug 2020 16:23)  POCT Blood Glucose.: 286 mg/dL (27 Aug 2020 12:11)        RADIOLOGY & ADDITIONAL TESTS:< from: Xray Chest 1 View- PORTABLE-Urgent (20 @ 19:50) >    EXAM:  XR CHEST PORTABLE URGENT 1V                            PROCEDURE DATE:  2020          INTERPRETATION:  CLINICAL INDICATION: 80 years  Male with s/p CVC insertion.    COMPARISON: 2020    There is a right IJ catheter with this tip over the superior vena cava.    AP view of the chest demonstrates developing pulmonary edema.. There is no pleural effusion. There is no pneumothorax.    The heart is enlarged. The patient status post CABG. The left-sided pacemaker/defibrillator is unchanged. There is no mediastinal or hilar mass.    The pulmonary vasculature is normal.    Mild thoracic degenerative changes are present. A right shoulder prosthesis is reidentified.    IMPRESSION:    Right central line in satisfactory position. No pneumothorax.    Developing pulmonary edema. Cardiomegaly. Pacemaker/defibrillator. CABG.              CHOLO SALEEM M.D., ATTENDING RADIOLOGIST  This document has been electronically signed. Aug 27 2020  9:03PM                < end of copied text >      Consultant(s) Notes Reviewed:  [x ] YES  [ ] NO    MEDICATIONS  (STANDING):  aspirin enteric coated 81 milliGRAM(s) Oral daily  atorvastatin 80 milliGRAM(s) Oral at bedtime  chlorhexidine 2% Cloths 1 Application(s) Topical <User Schedule>  chlorhexidine 4% Liquid 1 Application(s) Topical <User Schedule>  dextrose 50% Injectable 25 Gram(s) IV Push once  heparin   Injectable 5000 Unit(s) SubCutaneous every 8 hours  insulin lispro (HumaLOG) corrective regimen sliding scale   SubCutaneous Before meals and at bedtime  midodrine. 10 milliGRAM(s) Oral three times a day  mirtazapine 15 milliGRAM(s) Oral at bedtime  norepinephrine Infusion 0.1 MICROgram(s)/kG/Min (7.91 mL/Hr) IV Continuous <Continuous>  ticagrelor 60 milliGRAM(s) Oral every 12 hours    MEDICATIONS  (PRN):  sodium chloride 0.9% lock flush 10 milliLiter(s) IV Push every 1 hour PRN Pre/post blood products, medications, blood draw, and to maintain line patency      PHYSICAL EXAM:  GENERAL: thin, well nourished, comfortable  HEAD:  Atraumatic, Normocephalic  EYES: EOMI, PERRLA, conjunctiva and sclera clear  NECK: Supple  NERVOUS SYSTEM:  AAOX3  CHEST/LUNG: B/L good air entry; No rales, rhonchi, or wheezing  HEART: S1S2 normal, no S3, Regular rate and rhythm  ABDOMEN: Soft, Nontender, Nondistended; Bowel sounds present  EXTREMITIES:  2+ Peripheral Pulses, No edema  SKIN: No rashes or lesions    Care Discussed with Consultants/Other Providers [ x] YES  [ ] NO

## 2020-08-29 NOTE — CONSULT NOTE ADULT - PROBLEM SELECTOR PROBLEM 1
Hypotension, unspecified hypotension type
Acute respiratory failure with hypoxia
Hypotension, unspecified hypotension type

## 2020-08-29 NOTE — DIETITIAN INITIAL EVALUATION ADULT. - OTHER INFO
Patient reports adequate oral intake PTA, No weight loss. A1C: 11.6. Does not follow DM diet PTA, declined additional DM education .

## 2020-08-29 NOTE — DIETITIAN INITIAL EVALUATION ADULT. - PERTINENT LABORATORY DATA
08-29 Na141 mmol/L Glu 100 mg/dL<H> K+ 4.1 mmol/L Cr  1.88 mg/dL<H> BUN 46 mg/dL<H> 08-29 Phos 2.9 mg/dL 08-27 Alb 2.7 g/dL<L> 08-20 Chol 132 mg/dL LDL 74 mg/dL HDL 28 mg/dL<L> Trig 149 mg/dL
normal...

## 2020-08-29 NOTE — DIETITIAN INITIAL EVALUATION ADULT. - REASON INDICATOR FOR ASSESSMENT
nutrition assessment for length of stay nutrition assessment for length of stay in ICU, transfer to floor

## 2020-08-29 NOTE — CONSULT NOTE ADULT - PROBLEM SELECTOR RECOMMENDATION 4
See GOC section above. Patient alert/oriented. MOLST completed as per patient's wishes: DNR / DNI.
SSI for now. Sugars okay .

## 2020-08-29 NOTE — CONSULT NOTE ADULT - PROBLEM SELECTOR PROBLEM 2
CHF (congestive heart failure)
Acute on chronic combined systolic (congestive) and diastolic (congestive) heart failure
Acute on chronic combined systolic (congestive) and diastolic (congestive) heart failure

## 2020-08-29 NOTE — PROGRESS NOTE ADULT - ASSESSMENT
80M from home with wife, walks independently, with PMH CAD s/p prior MI, 17 stents, and CABG, HFrEF EF 25% s/p [employer] single chamber ICD (11/2019), CKD3, HTN, HLD, DM was BIB EMS for hypotension to 50/40 with blurry vision since am, admitted to ICU for management of hypotension.     Assessment:  1. Hypotension   2. Chronic Combined heart failure   3. Severe vs pseudo AS   4. DM-2   5. CKD-3   6. HTN   7. HLD     Plan:  CNS:   Pt is AAOX3    CVS: #Hypotension:   -Severe AS vs medication induced (home meds Lasix, Coreg, Imdur) vs hypovolemic (recent admission at [facility] s/p Lasix tx)  -Last echo 8/2020 [facility]: 25% EF, severe vs pseudo AS, mod MR   -Pt off PhenyEpine, now on levophed , goal SBP 90s (baseline )   - Cardio recc to hold Dobutamine drips  -RIJ placed 8/27  -cw midodrine 10 mg q8,  -s/p x1 dose Albumin 100 mg   -hold home BP meds, monitor BPs   -TSH wnl, fu cortisol in am   -outpatient fu for dobutamine stress test to determine AS vs pseudo AS  -Cardio Dr. David following    # Chronic Combined heart failure :   -monitor for fluid overload, no LE edema at this time  -CXR on admission: no pulm edema; repeat CXR possibly developing pulm edema  -hold off home Lasix    # CAD :   -cw aspirin, brillinta, atorvastatin   -hold ranolazine, consider restarting when BPs improve     # PVCs :   -started on amiodarone by [facility] during last admission for PVCs  -hold 2/2 QTc 606   - AICD interogation     Pulm: #No issues:   -Pt noted to be comfortable on  3L NC    GI: #No issues:    -cw soft diabetic diet     Renal: #RUFINA on CKD-3, likely 2/2 hypotension:   -Cr 2.63 on admission, baseline 1.7-1.8, 2.15 08/29  -s/p 1 L bolus in ED   -monitor urine output, 24h 850cc  - s/ p albumin infusion 08/28  -conservative with IVF 2/2 CHF hx    ID: #Elevated lactate, likely 2/2 hypotension, resolved:   -2.1--> 2.5 s/p 1 L NS bolus,   -fu BCx, pending    Heme/Onc: #No issues:  -Hb and Plt stable   -monitor cbc daily     Endo: #DM-2:  -Holding home Lantus 40U and Prandin 1mg TID   -cw HSS, monitor FS and adjust as needed  -soft diabetic diet     Skin/Catheters:   -x2 20 gauge IVs  -skin intact   -RIJ placed 8/27    PPx:  -DVT: Heparin 5000U SC q8h   -GI: famotidine     Dispo:  -admit to ICU     Prognosis:  -guarded
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
## 2020-08-29 NOTE — CHART NOTE - NSCHARTNOTEFT_GEN_A_CORE
HPI: 80M from home with wife, walks independently, with PMH CAD s/p prior MI, 17 stents, and CABG, HFrEF EF 25% s/p [employer] single chamber ICD (11/2019), CKD3, HTN, HLD, DM was BIB EMS for hypotension to 50/40 with blurry vision since am. Recent admission at [facility] for acute-on-chronic HF during which diuresed with Lasix, discharged yesterday on amiodarone. Echocardiogram revealed EF of 25%, severe AS, moderate MR, and severe diastolic dysfunction. AS may be pseudo in setting of poor LV function, considering possible outpatient dobutamine stress test to elucidate further.   Started on dobutamine in ED, admitted to ICU for further management. Amiodarone dc 2/2 prolonged QTc to 606, dobutamine weaned. Started on phenylephrine drip, dobutamine added.    ICU Course: Patient was admitted to the ICU and started on pressors. Evaluated by Cardio, Dr. David. Due to ?severe AS, patient was started on phenylephrine. Patient discontinued off of dobutamine to avoid increased contractility in the setting of AS. Central line was placed, pheny changed to levophed by ICU attending. Patient's BP improved and remained stable off of pressors. Lasix was discontinued due to AS to optimize preload. Amio discontinued due to PVCs           #Hypotension:   -Likely due to medication induced. Evaluated by cardio, amiodarone discontinued, Lasix discontinued  -Last echo 8/2020 [facility]: 25% EF, severe vs pseudo AS, mod MR  -cw midodrine 10 mg q8  -s/p x1 dose Albumin 100 mg   -fu for dobutamine stress test to determine AS vs pseudo AS- ?outpatient  -Cardio Dr. David following    # Chronic Combined heart failure :   -monitor for fluid overload, no signs of fluid overload at this time  -hold home Lasix due to hypotension    # CAD :   -cw aspirin, brillinta, atorvastatin   -hold ranolazine, consider restarting when BPs improve further     # PVCs :   -started on amiodarone by [facility] during last admission for PVCs  -hold 2/2 QTc 606    #DM-2:  -Holding home Lantus 40U and Prandin 1mg TID   -cw HSS, monitor FS and adjust as needed  -soft diabetic diet  - Well controlled on just sliding scale      #RUFINA on CKD-3, likely 2/2 hypotension:   -Cr 2.63 on admission, baseline 1.7-1.8  -s/p 1 L bolus in ED   - Judicious use of IVF 2/2 CHF hx

## 2020-08-29 NOTE — CONSULT NOTE ADULT - PROBLEM SELECTOR RECOMMENDATION 3
2/2 CHF. Patient from home, ambulatory, alert/oriented, +multiple hospitalizations. Patient on nasal canula; independent of ADLs except ambulation/transfers/bathing at this time. PT to robbin.
Renal consulted.

## 2020-08-29 NOTE — CONSULT NOTE ADULT - ASSESSMENT
80 year-old From home, lives with wife , walks independently with prior MI, CAD s/p multiple prior PCI's(17 stents) s/p CABG, HFrEF w/ EF 25%, s/p [employer] single chamber ICD (11/2019), CKD-3, HTN, HLD, DM was BIB EMS for Hypotension 50/40 and Blurry vision since am . Pt was discharged from [facility] yesterday when he was admitted for acute on chronic systolic heart failure, he was diuresed with IV Lasix. Echocardiogram revealed EF of 25%, severe AS, moderate MR, and severe diastolic dysfunction. Patient was evaluated by Cardiology - possible outpatient dobutamine stress to determine if it is AS or pseudo - AS in the setting of poor LV function. EP was consulted for frequent and multiple PVCs and was started on amiodarone.   Today pt said he ate and took his Diabetes medication( possible prandin which was changed on last adm) and after 15 mins started having blurry vision and dizziness and was able to see anything. Denies any CP, SOB, headache, LOC, fall or hitting head, palpitations. Pt called EMS and was found to have BP 50/40 with  .Patient was managed in ICU and now transferred to medical floor.

## 2020-08-30 PROBLEM — I21.4 NON-ST ELEVATION MYOCARDIAL INFARCTION (NSTEMI), INITIAL CARE EPISODE: Status: ACTIVE | Noted: 2019-11-14

## 2020-08-30 NOTE — PROGRESS NOTE ADULT - PROBLEM SELECTOR PLAN 3
Problem: Falls - Risk of:  Goal: Will remain free from falls  Outcome: Ongoing  Goal: Absence of physical injury  Outcome: Ongoing -cw aspirin, brillinta, atorvastatin   -hold ranolazine, consider restarting tomorrow after cardiology f/u

## 2020-08-30 NOTE — CONSULT NOTE ADULT - ASSESSMENT
80 year-old From home, lives with wife , walks independently with prior MI, CAD s/p multiple prior PCI's(17 stents) s/p CABG, HFrEF w/ EF 25%, s/p [employer] single chamber ICD (11/2019), CKD-3, HTN, HLD, DM was BIB EMS for Hypotension 50/40 and Blurry vision. Has renal failure that's how I got involved.    A/P:  CKD stage 3:  Baseline Scr 1.5-1.8  Renal function fluctuates sec to CHF  On admission Scr peaked to 2.6 but improved to its baseline  Monitor renal function at present  Urine has mild protein and blood  Repeat UA before further work up  If repeat UA has hematuria get kidney/bladder US    HTN:  Controlled  Monitor BP    CHF EF 25%:  Clinically euvolemic  Follow up cardiology

## 2020-08-30 NOTE — PROGRESS NOTE ADULT - SUBJECTIVE AND OBJECTIVE BOX
INTERVAL HPI/OVERNIGHT EVENTS: I want to go home . I have been here 4 days.   Vital Signs Last 24 Hrs  T(C): 36.6 (30 Aug 2020 04:55), Max: 36.9 (29 Aug 2020 23:33)  T(F): 97.8 (30 Aug 2020 04:55), Max: 98.5 (29 Aug 2020 23:33)  HR: 77 (30 Aug 2020 04:55) (40 - 86)  BP: 120/80 (30 Aug 2020 04:55) (96/62 - 134/60)  BP(mean): 79 (29 Aug 2020 12:00) (75 - 79)  RR: 18 (30 Aug 2020 04:55) (11 - 25)  SpO2: 95% (30 Aug 2020 04:55) (94% - 100%)  I&O's Summary    29 Aug 2020 07:01  -  30 Aug 2020 07:00  --------------------------------------------------------  IN: 680 mL / OUT: 600 mL / NET: 80 mL      MEDICATIONS  (STANDING):  allopurinol 100 milliGRAM(s) Oral daily  aspirin enteric coated 81 milliGRAM(s) Oral daily  atorvastatin 80 milliGRAM(s) Oral at bedtime  heparin   Injectable 5000 Unit(s) SubCutaneous every 8 hours  insulin glargine Injectable (LANTUS) 10 Unit(s) SubCutaneous at bedtime  insulin lispro (HumaLOG) corrective regimen sliding scale   SubCutaneous Before meals and at bedtime  midodrine. 10 milliGRAM(s) Oral three times a day  mirtazapine 15 milliGRAM(s) Oral at bedtime  ticagrelor 60 milliGRAM(s) Oral every 12 hours    MEDICATIONS  (PRN):  sodium chloride 0.9% lock flush 10 milliLiter(s) IV Push every 1 hour PRN Pre/post blood products, medications, blood draw, and to maintain line patency    LABS:                        10.4   7.44  )-----------( 189      ( 29 Aug 2020 06:30 )             32.7     08-29    141  |  107  |  46<H>  ----------------------------<  100<H>  4.1   |  28  |  1.88<H>    Ca    9.0      29 Aug 2020 06:30  Phos  2.9     08-29  Mg     2.4     08-29          CAPILLARY BLOOD GLUCOSE      POCT Blood Glucose.: 224 mg/dL (29 Aug 2020 21:01)  POCT Blood Glucose.: 237 mg/dL (29 Aug 2020 17:54)  POCT Blood Glucose.: 216 mg/dL (29 Aug 2020 16:37)  POCT Blood Glucose.: 149 mg/dL (29 Aug 2020 10:46)          REVIEW OF SYSTEMS:  CONSTITUTIONAL: No fever, weight loss, or fatigue  EYES: No eye pain, visual disturbances, or discharge  ENMT:  No difficulty hearing, tinnitus, vertigo; No sinus or throat pain  RESPIRATORY: No cough, wheezing, chills or hemoptysis; No shortness of breath  CARDIOVASCULAR: No chest pain, palpitations, dizziness, or leg swelling  GASTROINTESTINAL: No abdominal or epigastric pain. No nausea, vomiting, or hematemesis; No diarrhea or constipation. No melena or hematochezia.  GENITOURINARY: No dysuria, frequency, hematuria, or incontinence  NEUROLOGICAL: No headaches, memory loss, loss of strength, numbness, or tremors      Consultant(s) Notes Reviewed:  [x ] YES  [ ] NO    PHYSICAL EXAM:  GENERAL: NAD, well-groomed, well-developed ,not in any distress ,  HEAD:  Atraumatic, Normocephalic  EYES: EOMI, PERRLA, conjunctiva and sclera clear  ENMT: No tonsillar erythema, exudates, or enlargement; Moist mucous membranes, Good dentition, No lesions  NECK: Supple, No JVD, Normal thyroid  NERVOUS SYSTEM:  Alert & Oriented X3, No focal deficit   CHEST/LUNG: Good air entry bilateral with no  rales, rhonchi, wheezing, or rubs  HEART: Regular rate and rhythm; No murmurs, rubs, or gallops  ABDOMEN: Soft, Nontender, Nondistended; Bowel sounds present  EXTREMITIES:  2+ Peripheral Pulses, No clubbing, cyanosis, or edema      Care Discussed with Consultants/Other Providers [ x] YES  [ ] NO

## 2020-08-30 NOTE — CONSULT NOTE ADULT - CONSULT REASON
Renal failure
hypotention
79 y/o M with extensive cardiac hx; admitted for hypotension and CHF. Request to establish goals of care.
Medical Management as transferred to medical floor.

## 2020-08-30 NOTE — PROGRESS NOTE ADULT - PROBLEM SELECTOR PLAN 4
-started on amiodarone by [facility] during last admission for PVCs  -hold 2/2 QTc 606

## 2020-08-30 NOTE — PROGRESS NOTE ADULT - ASSESSMENT
80 year-old From home, lives with wife , walks independently with prior MI, CAD s/p multiple prior PCI's(17 stents) s/p CABG, HFrEF w/ EF 25%, s/p [employer] single chamber ICD (11/2019), CKD-3, HTN, HLD, DM was BIB EMS for Hypotension 50/40 and Blurry vision since am . Pt was discharged from [facility] yesterday when he was admitted for acute on chronic systolic heart failure, he was diuresed with IV Lasix. Echocardiogram revealed EF of 25%, severe AS, moderate MR, and severe diastolic dysfunction. Patient was evaluated by Cardiology - possible outpatient dobutamine stress to determine if it is AS or pseudo - AS in the setting of poor LV function. EP was consulted for frequent and multiple PVCs and was started on amiodarone.   Today pt said he ate and took his Diabetes medication( possible prandin which was changed on last adm) and after 15 mins started having blurry vision and dizziness and was able to see anything. Denies any CP, SOB, headache, LOC, fall or hitting head, palpitations. Pt called EMS and was found to have BP 50/40 with  .Patient was managed in ICU and now transferred to medical floor.      Problem/Recommendation - 1:  Problem: Hypotension, unspecified hypotension type. Recommendation: S/P Vasopressors . Now on Midodrine. BP readings better now.     Problem/Recommendation - 2:  ·  Problem: Acute on chronic combined systolic (congestive) and diastolic (congestive) heart failure.  Recommendation: Holding diuretics as was hypotensive. Cards follow up pending . .      Problem/Recommendation - 3:  ·  Problem: CKD (chronic kidney disease) stage 3, GFR 30-59 ml/min.  Recommendation: Renal consulted.      Problem/Recommendation - 4:  ·  Problem: Type 2 diabetes, uncontrolled, with renal manifestation.  Recommendation: SSI for now. Sugars high so will increase Lantus.       Problem/Recommendation - 5:  ·  Problem: Cardiomyopathy.  Recommendation: S/P AICD .      Problem/Recommendation - 6:  Problem: CAD (Coronary Artery Disease). Recommendation: DAPT . Asymptomatic.     Problem/Recommendation - 7:  Problem: Renal Stone. Recommendation: On pot citrate.     Problem/Recommendation - 8:  Problem: Chronic Gout. Recommendation: Asymptomatic so restarted Allopurinol 100mg .
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
## 2020-08-30 NOTE — PROGRESS NOTE ADULT - PROBLEM SELECTOR PLAN 2
-monitor for fluid overload, no signs of fluid overload at this time  -hold home Lasix due to hypotension

## 2020-08-30 NOTE — PROGRESS NOTE ADULT - ASSESSMENT
Problem/Recommendation - 1:  Problem: Hypotension, unspecified hypotension type. Recommendation: S/P Vasopressors . Now on Midodrine. BP readings better.     Problem/Recommendation - 2:  ·  Problem: Acute on chronic combined systolic (congestive) and diastolic (congestive) heart failure.  Recommendation: Holding diuretics as was hypotensive. Cards helping.      Problem/Recommendation - 3:  ·  Problem: CKD (chronic kidney disease) stage 3, GFR 30-59 ml/min.  Recommendation: Renal consulted.      Problem/Recommendation - 4:  ·  Problem: Type 2 diabetes, uncontrolled, with renal manifestation.  Recommendation: SSI for now. Sugars okay .      Problem/Recommendation - 5:  ·  Problem: Cardiomyopathy.  Recommendation: S/P AICD .      Problem/Recommendation - 6:  Problem: CAD (Coronary Artery Disease). Recommendation: DAPT . Asymptomatic.     Problem/Recommendation - 7:  Problem: Renal Stone. Recommendation: On pot citrate.     Problem/Recommendation - 8:  Problem: Chronic Gout. Recommendation: stable . 80 year-old From home, lives with wife , walks independently with prior MI, CAD s/p multiple prior PCI's(17 stents) s/p CABG, HFrEF w/ EF 25%, s/p [employer] single chamber ICD (11/2019), CKD-3, HTN, HLD, DM was BIB EMS for Hypotension 50/40 and Blurry vision since am .

## 2020-08-30 NOTE — PROGRESS NOTE ADULT - SUBJECTIVE AND OBJECTIVE BOX
DISCUSSED WITH ATTENDING  PGY-1: [-----]   PAGER #: [-----] TILL 5:00 PM  PLEASE CONTACT ON CALL TEAM:  - [NAME & PAGER#] FROM 5:00 PM - 8:30PM  - [ NAME & PAGER #] FROM 8:30 -7:30 AM    CHIEF COMPLAINT & BRIEF HOSPITAL COURSE:  80 year-old From home, lives with wife , walks independently with prior MI, CAD s/p multiple prior PCI's(17 stents) s/p CABG, HFrEF w/ EF 25%, s/p [employer] single chamber ICD (11/2019), CKD-3, HTN, HLD, DM was BIB EMS for Hypotension 50/40 and Blurry vision since am . Pt was discharged from [facility] yesterday when he was admitted for acute on chronic systolic heart failure, he was diuresed with IV Lasix. Echocardiogram revealed EF of 25%, severe AS, moderate MR, and severe diastolic dysfunction. Patient was evaluated by Cardiology - possible outpatient dobutamine stress to determine if it is AS or pseudo - AS in the setting of poor LV function. EP was consulted for frequent and multiple PVCs and was started on amiodarone.   Today pt said he ate and took his Diabetes medication( possible prandin which was changed on last adm) and after 15 mins started having blurry vision and dizziness and was able to see anything. Denies any CP, SOB, headache, LOC, fall or hitting head, palpitations. Pt called EMS and was found to have BP 50/40 with  .   Pt states he took most of his meds this am.     ED course : afebrile ,bradycardic to 56 , BP 70/42 RR 97% on 3 L NC   Labs noted for normal wbc count ,lactate 2.1--> 2.5 , Na 134 , Cr 2.63   CXR : no pul edema   ECG : Sinus bjorn ( unchanged from prev one ) with prolonged QTc 606    SH Denies Smoking, alcohol or drug use         INTERVAL HPI/OVERNIGHT EVENTS:     MEDICATIONS  (STANDING):  acetaminophen   Tablet .. 650 milliGRAM(s) Oral every 6 hours  aspirin enteric coated 81 milliGRAM(s) Oral daily  atorvastatin 40 milliGRAM(s) Oral at bedtime  carvedilol 6.25 milliGRAM(s) Oral every 12 hours  cefepime   IVPB 500 milliGRAM(s) IV Intermittent every 12 hours  cefepime   IVPB      levETIRAcetam 500 milliGRAM(s) Oral two times a day  sodium bicarbonate 650 milliGRAM(s) Oral three times a day  sodium chloride 0.9%. 1000 milliLiter(s) (60 mL/Hr) IV Continuous <Continuous>  tamsulosin 0.4 milliGRAM(s) Oral at bedtime    MEDICATIONS  (PRN):  zolpidem 5 milliGRAM(s) Oral at bedtime PRN Insomnia      Allergies    No Known Allergies      REVIEW OF SYSTEMS:  CONSTITUTIONAL: No fever, weight loss, or fatigue  RESPIRATORY: No cough, wheezing, chills or hemoptysis; No shortness of breath  CARDIOVASCULAR: No chest pain, palpitations, dizziness, or leg swelling  GASTROINTESTINAL: No abdominal or epigastric pain. No nausea, vomiting, or hematemesis; No diarrhea or constipation. No melena or hematochezia.  GENITOURINARY: No dysuria or hematuria, no burning micturition, no polyuria, no urinary hesitancy, no nocturia, normal urinary frequency  NEUROLOGICAL: No headaches, memory loss, loss of strength, numbness, or tremors  SKIN: No itching, burning, rashes, or lesions     Vital Signs Last 24 Hrs  T(C): 36.2 (11 Jun 2020 00:30), Max: 36.7 (10 Pb 2020 04:56)  T(F): 97.1 (11 Jun 2020 00:30), Max: 98 (10 Pb 2020 04:56)  HR: 85 (11 Jun 2020 00:30) (82 - 85)  BP: 104/44 (11 Jun 2020 00:30) (104/44 - 157/81)  BP(mean): --  RR: 16 (11 Jun 2020 00:30) (16 - 17)  SpO2: 97% (11 Jun 2020 00:30) (96% - 100%)    PHYSICAL EXAMINATION:  GENERAL: NAD, well built  HEAD:  Atraumatic, Normocephalic  EYES:  conjunctiva and sclera clear  NECK: Supple, No JVD, Normal thyroid  CHEST/LUNG: Clear to auscultation. Clear to percussion bilaterally; No rales, rhonchi, wheezing, or rubs  HEART: Regular rate and rhythm; No murmurs, rubs, or gallops  ABDOMEN: Soft, Nontender, Nondistended; Bowel sounds present  NERVOUS SYSTEM:  Alert & Oriented X3,    EXTREMITIES:  2+ Peripheral Pulses, No clubbing, cyanosis, or edema  SKIN: warm dry      LABS:                        10.0   28.95 )-----------( 229      ( 10 Pb 2020 06:17 )             31.9     06-10    139  |  110<H>  |  116<H>  ----------------------------<  113<H>  4.9   |  17<L>  |  6.12<H>    Ca    9.1      10 Pb 2020 06:17  Phos  4.3     06-10  Mg     2.2     06-10    TPro  6.3  /  Alb  2.4<L>  /  TBili  0.4  /  DBili  x   /  AST  24  /  ALT  21  /  AlkPhos  69  06-10      CAPILLARY BLOOD GLUCOSE      RADIOLOGY & ADDITIONAL TESTS:    Imaging Personally Reviewed:  [ ] YES     Consultant(s) Notes Reviewed:  [ ] YES DISCUSSED WITH ATTENDING  PGY-1:Darren Cramer  PAGER #: [-----] TILL 5:00 PM  PLEASE CONTACT ON CALL TEAM:  - [NAME & PAGER#] FROM 5:00 PM - 8:30PM  - [ NAME & PAGER #] FROM 8:30 -7:30 AM    CHIEF COMPLAINT & BRIEF HOSPITAL COURSE:  80 year-old From home, lives with wife , walks independently with prior MI, CAD s/p multiple prior PCI's(17 stents) s/p CABG, HFrEF w/ EF 25%, s/p [employer] single chamber ICD (11/2019), CKD-3, HTN, HLD, DM was BIB EMS for Hypotension 50/40 and Blurry vision since am . Pt was discharged from [facility] yesterday when he was admitted for acute on chronic systolic heart failure, he was diuresed with IV Lasix. Echocardiogram revealed EF of 25%, severe AS, moderate MR, and severe diastolic dysfunction. Patient was evaluated by Cardiology - possible outpatient dobutamine stress to determine if it is AS or pseudo - AS in the setting of poor LV function. EP was consulted for frequent and multiple PVCs and was started on amiodarone.   Today pt said he ate and took his Diabetes medication( possible prandin which was changed on last adm) and after 15 mins started having blurry vision and dizziness and was able to see anything. Denies any CP, SOB, headache, LOC, fall or hitting head, palpitations. Pt called EMS and was found to have BP 50/40 with  .   Pt states he took most of his meds this am.     ED course : afebrile ,bradycardic to 56 , BP 70/42 RR 97% on 3 L NC   Labs noted for normal wbc count ,lactate 2.1--> 2.5 , Na 134 , Cr 2.63   CXR : no pul edema   ECG : Sinus bjorn ( unchanged from prev one ) with prolonged QTc 606    SH Denies Smoking, alcohol or drug use         INTERVAL HPI/OVERNIGHT EVENTS: Pt doing much better. Wants to go home. Will be discharged tomorrow.    MEDICATIONS  (STANDING):  acetaminophen   Tablet .. 650 milliGRAM(s) Oral every 6 hours  aspirin enteric coated 81 milliGRAM(s) Oral daily  atorvastatin 40 milliGRAM(s) Oral at bedtime  carvedilol 6.25 milliGRAM(s) Oral every 12 hours  cefepime   IVPB 500 milliGRAM(s) IV Intermittent every 12 hours  cefepime   IVPB      levETIRAcetam 500 milliGRAM(s) Oral two times a day  sodium bicarbonate 650 milliGRAM(s) Oral three times a day  sodium chloride 0.9%. 1000 milliLiter(s) (60 mL/Hr) IV Continuous <Continuous>  tamsulosin 0.4 milliGRAM(s) Oral at bedtime    MEDICATIONS  (PRN):  zolpidem 5 milliGRAM(s) Oral at bedtime PRN Insomnia      Allergies    No Known Allergies      REVIEW OF SYSTEMS:  CONSTITUTIONAL: No fever, weight loss, or fatigue  RESPIRATORY: No cough, wheezing, chills or hemoptysis; No shortness of breath  CARDIOVASCULAR: No chest pain, palpitations, dizziness, or leg swelling  GASTROINTESTINAL: No abdominal or epigastric pain. No nausea, vomiting, or hematemesis; No diarrhea or constipation. No melena or hematochezia.  GENITOURINARY: No dysuria or hematuria, no burning micturition, no polyuria, no urinary hesitancy, no nocturia, normal urinary frequency  NEUROLOGICAL: No headaches, memory loss, loss of strength, numbness, or tremors  SKIN: No itching, burning, rashes, or lesions     Vital Signs Last 24 Hrs  T(C): 36.2 (11 Jun 2020 00:30), Max: 36.7 (10 Pb 2020 04:56)  T(F): 97.1 (11 Jun 2020 00:30), Max: 98 (10 Pb 2020 04:56)  HR: 85 (11 Jun 2020 00:30) (82 - 85)  BP: 104/44 (11 Jun 2020 00:30) (104/44 - 157/81)  BP(mean): --  RR: 16 (11 Jun 2020 00:30) (16 - 17)  SpO2: 97% (11 Jun 2020 00:30) (96% - 100%)    PHYSICAL EXAMINATION:  GENERAL: NAD, well built  HEAD:  Atraumatic, Normocephalic  EYES:  conjunctiva and sclera clear  NECK: Supple, No JVD, Normal thyroid  CHEST/LUNG: Clear to auscultation. Clear to percussion bilaterally; No rales, rhonchi, wheezing, or rubs  HEART: Regular rate and rhythm; No murmurs, rubs, or gallops  ABDOMEN: Soft, Nontender, Nondistended; Bowel sounds present  NERVOUS SYSTEM:  Alert & Oriented X3,    EXTREMITIES:  2+ Peripheral Pulses, No clubbing, cyanosis, or edema  SKIN: warm dry      LABS:                        10.0   28.95 )-----------( 229      ( 10 Pb 2020 06:17 )             31.9     06-10    139  |  110<H>  |  116<H>  ----------------------------<  113<H>  4.9   |  17<L>  |  6.12<H>    Ca    9.1      10 Pb 2020 06:17  Phos  4.3     06-10  Mg     2.2     06-10    TPro  6.3  /  Alb  2.4<L>  /  TBili  0.4  /  DBili  x   /  AST  24  /  ALT  21  /  AlkPhos  69  06-10      CAPILLARY BLOOD GLUCOSE      RADIOLOGY & ADDITIONAL TESTS:    Imaging Personally Reviewed:  [ ] YES     Consultant(s) Notes Reviewed:  [ ] YES

## 2020-08-30 NOTE — PROGRESS NOTE ADULT - PROBLEM SELECTOR PLAN 6
RUFINA on CKD-3, likely 2/2 hypotension:   -Cr 2.63 on admission, baseline 1.7-1.8  -s/p 1 L bolus in ED   - Judicious use of IVF 2/2 CHF hx.

## 2020-08-30 NOTE — PROGRESS NOTE ADULT - PROBLEM SELECTOR PLAN 5
-Holding home Lantus 40U and Prandin 1mg TID   -cw HSS, Lantus 10U, monitor FS and adjust as needed  -soft diabetic diet  - Well controlled on just sliding scale

## 2020-08-30 NOTE — PROGRESS NOTE ADULT - PROBLEM SELECTOR PLAN 1
-Likely due to medication induced. Evaluated by cardio, amiodarone discontinued, Lasix discontinued  -Last echo 8/2020 [facility]: 25% EF, severe vs pseudo AS, mod MR  -cw midodrine 10 mg q8  -s/p x1 dose Albumin 100 mg   -fu for dobutamine stress test to determine AS vs pseudo AS- ?outpatient  -Cardio Dr. David following

## 2020-08-31 NOTE — DISCHARGE NOTE PROVIDER - NSDCCPCAREPLAN_GEN_ALL_CORE_FT
PRINCIPAL DISCHARGE DIAGNOSIS  Diagnosis: Hypotension  Assessment and Plan of Treatment: You were found to have very low BP and was admitted to ICU initially. Hypotension was likely due to multiple medications you were taking for BP or due to aortic stenosis. Last echocardiogram 8/2020 : 25% Ejection fraction with Severe AS with moderate MR .Your BP medications are adjusted. Take medications as prescribed, coreg 3.125 every 12 hours to prevent revound tachycardia. You will need Dobutamine echo to distinguish between Severe Aortic stenosis and pseudo Aortic stenosis as Outpatient. Follow with your cardiologist.      SECONDARY DISCHARGE DIAGNOSES  Diagnosis: Diabetes mellitus  Assessment and Plan of Treatment: Eat healthy diet rich in fruits and vegetables. Avoid salty and fatty diet.Excercise regularly.See your PCP regularly. Get your HbA1C checked every 3 months.    Diagnosis: PVC's (premature ventricular contractions)  Assessment and Plan of Treatment: You had PVC's (premature ventricular contractions) and was started on amiodarone during previous hospitalisation but it was discontinued because on EKG your QT was prolonged. ECG : Sinus bradycardia ( unchanged from prev one ) with prolonged QTc 606.Follow with your cardiologist tomorrow.    Diagnosis: Congestive heart failure (CHF)  Assessment and Plan of Treatment: You were on lasix 40 twice a day but your BP was very low. Your lasix dose is changed to 40 as needed for leg swelling or shortness of breath. Last echocardiogram 8/2020 : 25% Ejection fraction with Severe AS with moderate MR. Follow with your cardiologist tomorrow.

## 2020-08-31 NOTE — DISCHARGE NOTE PROVIDER - NSDCFUSCHEDAPPT_GEN_ALL_CORE_FT
VERONICA DORMAN ; 09/01/2020 ; hospitals CT Surg [address] VERONICA Sky ; 09/09/2020 ; hospitals Cardio Electro [address] VERONICA Sky ; 09/24/2020 ; hospitals Cardio Electro [address] VERONICA Sky ; 10/15/2020 ; hospitals Cardio [facility]

## 2020-08-31 NOTE — PROGRESS NOTE ADULT - SUBJECTIVE AND OBJECTIVE BOX
[facility]   MD FARA VIRGEN MD RUORU WONG, PA    TEL:  OFFICE: [phone]  DR DOVE CELL: [phone]  [place] CELL: [phone]  DR. MCFARLANE CELL: [phone]  DR. MURRY CELL: [phone]    FROM 5 PM - 7 AM PLEASE CALL ANSWERING SERVICE: [phone]    RENAL FOLLOW UP NOTE  --------------------------------------------------------------------------------  HPI:      Pt seen and examined at bedside.   Denies SOB, chest pain     PAST HISTORY  --------------------------------------------------------------------------------  No significant changes to PMH, PSH, FHx, SHx, unless otherwise noted    ALLERGIES & MEDICATIONS  --------------------------------------------------------------------------------  Allergies    No Known Allergies    Intolerances    Entresto (Other)    Standing Inpatient Medications  allopurinol 100 milliGRAM(s) Oral daily  aspirin enteric coated 81 milliGRAM(s) Oral daily  atorvastatin 80 milliGRAM(s) Oral at bedtime  carvedilol 3.125 milliGRAM(s) Oral every 12 hours  heparin   Injectable 5000 Unit(s) SubCutaneous every 8 hours  insulin glargine Injectable (LANTUS) 10 Unit(s) SubCutaneous at bedtime  insulin lispro (HumaLOG) corrective regimen sliding scale   SubCutaneous Before meals and at bedtime  melatonin 3 milliGRAM(s) Oral at bedtime  mirtazapine 15 milliGRAM(s) Oral at bedtime  ticagrelor 60 milliGRAM(s) Oral every 12 hours    PRN Inpatient Medications  sodium chloride 0.9% lock flush 10 milliLiter(s) IV Push every 1 hour PRN      REVIEW OF SYSTEMS  --------------------------------------------------------------------------------  General: no fever  CVS: no chest pain  RESP: no sob, no cough  ABD: no abdominal pain  : no dysuria,  MSK: no edema     VITALS/PHYSICAL EXAM  --------------------------------------------------------------------------------  T(C): 36.6 (08-31-20 @ 07:40), Max: 37.1 (08-30-20 @ 16:21)  HR: 81 (08-31-20 @ 07:40) (69 - 84)  BP: 108/62 (08-31-20 @ 07:40) (108/62 - 121/75)  RR: 18 (08-31-20 @ 07:40) (18 - 20)  SpO2: 99% (08-31-20 @ 07:40) (99% - 100%)  Wt(kg): --    Weight (kg): 86 (08-29-20 @ 12:29)      08-30-20 @ 07:01  -  08-31-20 @ 07:00  --------------------------------------------------------  IN: 275 mL / OUT: 0 mL / NET: 275 mL      Physical Exam:  	Gen: NAD  	HEENT: MMM  	Pulm: CTA B/L  	CV: S1S2  	Abd: Soft, +BS  	Ext: No LE edema B/L                      Neuro: Awake   	Skin: Warm and Dry   	Vascular access: no hd catheter           no  chávez  LABS/STUDIES  --------------------------------------------------------------------------------                Creatinine Trend:  SCr 1.88 [08-29 @ 06:30]  SCr 2.15 [08-28 @ 06:28]  SCr 2.63 [08-27 @ 12:41]  SCr 2.08 [08-26 @ 06:43]  SCr 1.93 [08-25 @ 05:19]    Urinalysis - [08-30-20 @ 20:06]      Color Yellow / Appearance Clear / SG 1.020 / pH 5.0      Gluc 100 / Ketone Negative  / Bili Negative / Urobili Negative       Blood Moderate / Protein 100 / Leuk Est Trace / Nitrite Negative      RBC 10-25 / WBC 6-10 / Hyaline  / Gran  / Sq Epi  / Non Sq Epi Few / Bacteria Few    Urine Creatinine 93      [08-28-20 @ 10:26]  Urine Sodium 21      [08-28-20 @ 10:26]    HbA1c 10.7      [02-14-20 @ 08:35]  TSH 1.99      [08-28-20 @ 06:28]  Lipid: chol 132, , HDL 28, LDL 74      [08-20-20 @ 11:46]

## 2020-08-31 NOTE — PROGRESS NOTE ADULT - ASSESSMENT
80 year-old From home, lives with wife , walks independently with prior MI, CAD s/p multiple prior PCI's(17 stents) s/p CABG, HFrEF w/ EF 25%, s/p [employer] Kvng single chamber ICD (11/2019), CKD-3, HTN, HLD, DM was BIB EMS for Hypotension 50/40 and Blurry vision. Has renal failure that's how I got involved.    A/P:  CKD stage 3:  Baseline Scr 1.5-1.8  Renal function fluctuates sec to CHF  On admission Scr peaked to 2.6 but improved to its baseline  Monitor renal function at present- pending BMP today  Urine has mild protein and blood  Repeat UA before further work up  If repeat UA has hematuria get kidney/bladder US    HTN:  Controlled  Monitor BP    CHF EF 25%:  Clinically euvolemic  Follow up cardiology

## 2020-08-31 NOTE — DISCHARGE NOTE NURSING/CASE MANAGEMENT/SOCIAL WORK - PATIENT PORTAL LINK FT
You can access the FollowMyHealth Patient Portal offered by [facility] by registering at the following website: [email or web address]. By joining [facility] [facility]’s FollowMyHealth portal, you will also be able to view your health information using other applications (apps) compatible with our system.

## 2020-08-31 NOTE — PROGRESS NOTE ADULT - ASSESSMENT
80 year-old M with prior MI, CAD s/p multiple prior PCI's(17 stents) s/p CABG x4 ([place]) HFrEF w/ EF 25%, s/p [employer] Kvng single chamber ICD (11/2019), CKD-3, HTN, HLD, DM who presented with hypotension possibly in setting of anaphylactic-like reaction to new medication. Previous echocardiogram showed low-low low-gradient severe AS.     1. CAD s/p CABG: continue aspirin, statin, ticagrelor, ranolazine    2. ICM s/p ICD:   -See how patient's blood pressure is off the midodrine  -Would at least resume carvedilol 3.125mg Q12H to decrease risk of rebound  -Resume Imdur as hemodynamically tolerated  -Send home on at least PRN furosemide  -Unable to tolerate Entresto in past  -Holding off spironolactone in setting of CKD    3. Low-flow low-gradient AS: consider outpatient dobutamine stress echo to better characterize AS (true severe As or pseudosevere AS).   -Patient may be a candidate for TAVR, depending on his goals of care    ***Note that this is a preliminary note and any recommendations should NOT be carried out until this note is finalized. *** 80 year-old M with prior MI, CAD s/p multiple prior PCI's(17 stents) s/p CABG x4 ([place]) HFrEF w/ EF 25%, s/p [employer] Kvng single chamber ICD (11/2019), CKD-3, HTN, HLD, DM who presented with hypotension possibly in setting of anaphylactic-like reaction to new medication. Previous echocardiogram showed low-low low-gradient severe AS.     1. CAD s/p CABG: continue aspirin, statin, ticagrelor, ranolazine    2. ICM s/p ICD:   -See how patient's blood pressure is off the midodrine  -Would at least resume carvedilol 3.125mg Q12H to decrease risk of rebound  -Resume Imdur as hemodynamically tolerated  -Send home on at least PRN furosemide  -Unable to tolerate Entresto in past  -Holding off spironolactone in setting of CKD    3. Low-flow low-gradient AS: consider outpatient dobutamine stress echo to better characterize AS (true severe AS or pseudosevere AS).   -Patient may be a candidate for TAVR, depending on his goals of care  -Patient states he has appointment tomorrow with cardiologist for further follow-up and evaluation for valve intervention 80 year-old M with prior MI, CAD s/p multiple prior PCI's(17 stents) s/p CABG x4 ([place]) HFrEF w/ EF 25%, s/p [employer] Kvng single chamber ICD (11/2019), CKD-3, HTN, HLD, DM who presented with hypotension possibly in setting of anaphylactic-like reaction to new medication. Previous echocardiogram showed low-low low-gradient severe AS.     1. CAD s/p CABG: continue aspirin, statin, ticagrelor, ranolazine    2. ICM s/p ICD:   -See how patient's blood pressure is off the midodrine  -Would at least resume carvedilol 3.125mg Q12H to decrease risk of rebound  -Resume Imdur as hemodynamically tolerated  -Send home on at least PRN furosemide  -Unable to tolerate Entresto in past  -Holding off spironolactone in setting of CKD  -Close OP cardiology F/U    3. Low-flow low-gradient AS: consider outpatient dobutamine stress echo to better characterize AS (true severe AS or pseudosevere AS).   -Patient may be a candidate for TAVR, depending on his goals of care  -Patient states he has appointment tomorrow with cardiologist for further follow-up and evaluation for valve intervention    4. Prolonged QT: Has improved, though still not normalized. Avoid QT-prolonging medications

## 2020-08-31 NOTE — PROGRESS NOTE ADULT - SUBJECTIVE AND OBJECTIVE BOX
PRESENTING CC:    SUBJ:     In summary, this is an 80 year-old M with prior MI, CAD s/p multiple prior PCI's(17 stents) s/p CABG x4 ([place]) HFrEF w/ EF 25%, s/p [employer] Kvng single chamber ICD (11/2019), CKD-3, HTN, HLD, DM who presented with hypotension possibly in setting of anaphylactic-like reaction to new medication. Previous echocardiogram showed low-low low-gradient severe AS.     Overnight, there were no acute events. Patient     ------------------------  PMH: As above, also ED, anxiety, depression, diverticulosis, arthritis, s/p cholecystectomy    Allergies    No Known Allergies    Intolerances    Entresto (Other)      MEDICATIONS  (STANDING):  allopurinol 100 milliGRAM(s) Oral daily  aspirin enteric coated 81 milliGRAM(s) Oral daily  atorvastatin 80 milliGRAM(s) Oral at bedtime  heparin   Injectable 5000 Unit(s) SubCutaneous every 8 hours  insulin glargine Injectable (LANTUS) 10 Unit(s) SubCutaneous at bedtime  insulin lispro (HumaLOG) corrective regimen sliding scale   SubCutaneous Before meals and at bedtime  melatonin 3 milliGRAM(s) Oral at bedtime  midodrine. 10 milliGRAM(s) Oral three times a day  mirtazapine 15 milliGRAM(s) Oral at bedtime  ticagrelor 60 milliGRAM(s) Oral every 12 hours    MEDICATIONS  (PRN):  sodium chloride 0.9% lock flush 10 milliLiter(s) IV Push every 1 hour PRN Pre/post blood products, medications, blood draw, and to maintain line patency      FAMILY HISTORY:  Family history of diabetes mellitus  Family history of CABG    Reviewed; no change from my prior note    SOCIAL HISTORY:  Reviewed, no change from my prior note    REVIEW OF SYSTEMS:  Constitutional: [ ] fever, [ ]weight loss,  [ ]fatigue  Eyes: [ ] visual changes  Respiratory: [ ]shortness of breath;  [ ] cough, [ ]wheezing, [ ]chills, [ ]hemoptysis  Cardiovascular: [ ] chest pain, [ ]palpitations, [ ]dizziness,  [ ]leg swelling [ ]syncope  Gastrointestinal: [ ] abdominal pain, [ ]nausea, [ ]vomiting,  [ ]diarrhea   Genitourinary: [ ] dysuria, [ ] hematuria  Neurologic: [ ] headaches [ ] tremors [ ] weakness [ ] lightheadedness  Skin: [ ] itching, [ ]burning, [ ] rashes  Endocrine: [ ] heat or cold intolerance  Musculoskeletal: [ ] joint pain or swelling; [ ] muscle, back, or extremity pain  Psychiatric: [ ] depression, [ ]anxiety, [ ]mood swings, or [ ]difficulty sleeping  Hematologic: [ ] easy bruising, [ ] bleeding gums    [x] All remaining systems negative except as per above.   [  ] Unable to obtain    Vital Signs Last 24 Hrs  T(C): 36.6 (31 Aug 2020 07:40), Max: 37.1 (30 Aug 2020 16:21)  T(F): 97.8 (31 Aug 2020 07:40), Max: 98.8 (30 Aug 2020 16:21)  HR: 81 (31 Aug 2020 07:40) (69 - 84)  BP: 108/62 (31 Aug 2020 07:40) (108/62 - 121/75)  BP(mean): --  RR: 18 (31 Aug 2020 07:40) (18 - 20)  SpO2: 99% (31 Aug 2020 07:40) (99% - 100%)  I&O's Summary    30 Aug 2020 07:01  -  31 Aug 2020 07:00  --------------------------------------------------------  IN: 275 mL / OUT: 0 mL / NET: 275 mL        PHYSICAL EXAM:  General: No acute distress  HEENT: EOMI, PERRL  Neck: Supple, No JVD  Lungs: Clear to auscultation bilaterally; No rales or wheezing  Heart: Regular rate and rhythm; No murmurs, rubs, or gallops  Abdomen: Nontender, bowel sounds present  Extremities: No clubbing, cyanosis, or edema  Nervous system:  Alert & Oriented X3, no focal deficits  Psychiatric: Normal affect  Skin: No rashes or lesions    LABS:        Creatinine Trend: 1.88<--, 2.15<--, 2.63<--, 2.08<--, 1.93<--, 1.83<--      Lipid Panel:   Cardiac Enzymes:           RADIOLOGY:    ECG [my interpretation]:    TELEMETRY:    ECHO:    STRESS TEST:    CATHETERIZATION: PRESENTING CC: Vision changes    SUBJ:     In summary, this is an 80 year-old M with prior MI, CAD s/p multiple prior PCI's(17 stents) s/p CABG x4 ([place]) HFrEF w/ EF 25%, s/p [employer]vng single chamber ICD (11/2019), CKD-3, HTN, HLD, DM who presented with hypotension possibly in setting of anaphylactic-like reaction to new medication. Previous echocardiogram showed low-low low-gradient severe AS.     Overnight, there were no acute events. Patient reports he was able ot walk around the unit today without feeling any chest pain, dyspnea, palpitations, lightheadedness, or syncope.     ------------------------  PMH: As above, also ED, anxiety, depression, diverticulosis, arthritis, s/p cholecystectomy    Allergies    No Known Allergies    Intolerances    Entresto (Other)      MEDICATIONS  (STANDING):  allopurinol 100 milliGRAM(s) Oral daily  aspirin enteric coated 81 milliGRAM(s) Oral daily  atorvastatin 80 milliGRAM(s) Oral at bedtime  heparin   Injectable 5000 Unit(s) SubCutaneous every 8 hours  insulin glargine Injectable (LANTUS) 10 Unit(s) SubCutaneous at bedtime  insulin lispro (HumaLOG) corrective regimen sliding scale   SubCutaneous Before meals and at bedtime  melatonin 3 milliGRAM(s) Oral at bedtime  midodrine. 10 milliGRAM(s) Oral three times a day  mirtazapine 15 milliGRAM(s) Oral at bedtime  ticagrelor 60 milliGRAM(s) Oral every 12 hours    MEDICATIONS  (PRN):  sodium chloride 0.9% lock flush 10 milliLiter(s) IV Push every 1 hour PRN Pre/post blood products, medications, blood draw, and to maintain line patency      FAMILY HISTORY:  Family history of diabetes mellitus  Family history of CABG    Reviewed; no change from my prior note    SOCIAL HISTORY:  Reviewed, no change from my prior note    REVIEW OF SYSTEMS:  Constitutional: [ ] fever, [ ]weight loss,  [ ]fatigue  Eyes: [ ] visual changes  Respiratory: [ ]shortness of breath;  [ ] cough, [ ]wheezing, [ ]chills, [ ]hemoptysis  Cardiovascular: [ ] chest pain, [ ]palpitations, [ ]dizziness,  [ ]leg swelling [ ]syncope  Gastrointestinal: [ ] abdominal pain, [ ]nausea, [ ]vomiting,  [ ]diarrhea   Genitourinary: [ ] dysuria, [ ] hematuria  Neurologic: [ ] headaches [ ] tremors [ ] weakness [ ] lightheadedness  Skin: [ ] itching, [ ]burning, [ ] rashes  Endocrine: [ ] heat or cold intolerance  Musculoskeletal: [ ] joint pain or swelling; [ ] muscle, back, or extremity pain  Psychiatric: [ ] depression, [ ]anxiety, [ ]mood swings, or [ ]difficulty sleeping  Hematologic: [ ] easy bruising, [ ] bleeding gums    [x] All remaining systems negative except as per above.   [  ] Unable to obtain    Vital Signs Last 24 Hrs  T(C): 36.6 (31 Aug 2020 07:40), Max: 37.1 (30 Aug 2020 16:21)  T(F): 97.8 (31 Aug 2020 07:40), Max: 98.8 (30 Aug 2020 16:21)  HR: 81 (31 Aug 2020 07:40) (69 - 84)  BP: 108/62 (31 Aug 2020 07:40) (108/62 - 121/75)  BP(mean): --  RR: 18 (31 Aug 2020 07:40) (18 - 20)  SpO2: 99% (31 Aug 2020 07:40) (99% - 100%)  I&O's Summary    30 Aug 2020 07:01  -  31 Aug 2020 07:00  --------------------------------------------------------  IN: 275 mL / OUT: 0 mL / NET: 275 mL        PHYSICAL EXAM:  General: No acute distress  HEENT: EOMI, PERRL  Neck: Supple, No JVD  Lungs: Clear to auscultation bilaterally; No rales or wheezing  Heart: Regular rate and rhythm; No murmurs, rubs, or gallops  Abdomen: Nontender, bowel sounds present  Extremities: No clubbing, cyanosis, or edema  Nervous system:  Alert & Oriented X3, no focal deficits  Psychiatric: Normal affect  Skin: No rashes or lesions    LABS:        Creatinine Trend: 1.88<--, 2.15<--, 2.63<--, 2.08<--, 1.93<--, 1.83<--           TELEMETRY: sinus rhythm with PVCs, no events PRESENTING CC: Vision changes    SUBJ:     In summary, this is an 80 year-old M with prior MI, CAD s/p multiple prior PCI's(17 stents) s/p CABG x4 ([place]) HFrEF w/ EF 25%, s/p [employer] single chamber ICD (11/2019), CKD-3, HTN, HLD, DM who presented with hypotension possibly in setting of anaphylactic-like reaction to new medication. Previous echocardiogram showed low-low low-gradient severe AS.     Overnight, there were no acute events. Patient reports he was able to walk around the unit today without feeling any chest pain, dyspnea, palpitations, lightheadedness, or syncope.     ------------------------  PMH: As above, also ED, anxiety, depression, diverticulosis, arthritis, s/p cholecystectomy    Allergies    No Known Allergies    Intolerances    Entresto (Other)      MEDICATIONS  (STANDING):  allopurinol 100 milliGRAM(s) Oral daily  aspirin enteric coated 81 milliGRAM(s) Oral daily  atorvastatin 80 milliGRAM(s) Oral at bedtime  heparin   Injectable 5000 Unit(s) SubCutaneous every 8 hours  insulin glargine Injectable (LANTUS) 10 Unit(s) SubCutaneous at bedtime  insulin lispro (HumaLOG) corrective regimen sliding scale   SubCutaneous Before meals and at bedtime  melatonin 3 milliGRAM(s) Oral at bedtime  midodrine. 10 milliGRAM(s) Oral three times a day  mirtazapine 15 milliGRAM(s) Oral at bedtime  ticagrelor 60 milliGRAM(s) Oral every 12 hours    MEDICATIONS  (PRN):  sodium chloride 0.9% lock flush 10 milliLiter(s) IV Push every 1 hour PRN Pre/post blood products, medications, blood draw, and to maintain line patency      FAMILY HISTORY:  Family history of diabetes mellitus  Family history of CABG    Reviewed; no change from my prior note    SOCIAL HISTORY:  Reviewed, no change from my prior note    REVIEW OF SYSTEMS:  Constitutional: [ ] fever, [ ]weight loss,  [ ]fatigue  Eyes: [ ] visual changes  Respiratory: [ ]shortness of breath;  [ ] cough, [ ]wheezing, [ ]chills, [ ]hemoptysis  Cardiovascular: [ ] chest pain, [ ]palpitations, [ ]dizziness,  [ ]leg swelling [ ]syncope  Gastrointestinal: [ ] abdominal pain, [ ]nausea, [ ]vomiting,  [ ]diarrhea   Genitourinary: [ ] dysuria, [ ] hematuria  Neurologic: [ ] headaches [ ] tremors [ ] weakness [ ] lightheadedness  Skin: [ ] itching, [ ]burning, [ ] rashes  Endocrine: [ ] heat or cold intolerance  Musculoskeletal: [ ] joint pain or swelling; [ ] muscle, back, or extremity pain  Psychiatric: [ ] depression, [ ]anxiety, [ ]mood swings, or [ ]difficulty sleeping  Hematologic: [ ] easy bruising, [ ] bleeding gums    [x] All remaining systems negative except as per above.   [  ] Unable to obtain    Vital Signs Last 24 Hrs  T(C): 36.6 (31 Aug 2020 07:40), Max: 37.1 (30 Aug 2020 16:21)  T(F): 97.8 (31 Aug 2020 07:40), Max: 98.8 (30 Aug 2020 16:21)  HR: 81 (31 Aug 2020 07:40) (69 - 84)  BP: 108/62 (31 Aug 2020 07:40) (108/62 - 121/75)  BP(mean): --  RR: 18 (31 Aug 2020 07:40) (18 - 20)  SpO2: 99% (31 Aug 2020 07:40) (99% - 100%)  I&O's Summary    30 Aug 2020 07:01  -  31 Aug 2020 07:00  --------------------------------------------------------  IN: 275 mL / OUT: 0 mL / NET: 275 mL        PHYSICAL EXAM:  General: No acute distress  HEENT: EOMI, PERRL  Neck: Supple, No JVD  Lungs: Clear to auscultation bilaterally; No rales or wheezing  Heart: Regular rate and rhythm; No murmurs, rubs, or gallops  Abdomen: Nontender, bowel sounds present  Extremities: No clubbing, cyanosis, or edema  Nervous system:  Alert & Oriented X3, no focal deficits  Psychiatric: Normal affect  Skin: No rashes or lesions    LABS:        Creatinine Trend: 1.88<--, 2.15<--, 2.63<--, 2.08<--, 1.93<--, 1.83<--           TELEMETRY: sinus rhythm with PVCs, no events

## 2020-08-31 NOTE — DISCHARGE NOTE PROVIDER - NSDCMRMEDTOKEN_GEN_ALL_CORE_FT
allopurinol 300 mg oral tablet: 1 tab(s) orally once a day  aspirin 81 mg oral delayed release tablet: 1 tab(s) orally once a day  Brilinta (ticagrelor) 90 mg oral tablet: 1 tab(s) orally 2 times a day  carvedilol 3.125 mg oral tablet: 1 tab(s) orally every 12 hours  Centrum Silver Men&#x27;s oral tablet: 1 tab(s) orally once a day  furosemide 40 mg oral tablet: 1 tab(s) orally once a day, As Needed for leg swelling  Lantus 100 units/mL subcutaneous solution: 40 unit(s) subcutaneous once a day (at bedtime)  Lipitor 80 mg oral tablet: 1 tab(s) orally once a day  mirtazapine 15 mg oral tablet: 1 tab(s) orally once a day (at bedtime)  Onglyza 5 mg oral tablet: 1 tab(s) orally once a day  Prandin 1 mg oral tablet: 1 tab(s) orally 3 times a day (before meals)

## 2020-08-31 NOTE — DISCHARGE NOTE PROVIDER - HOSPITAL COURSE
80 year-old From home, lives with wife , walks independently with prior MI, CAD s/p multiple prior PCI's(17 stents) s/p CABG, HFrEF w/ EF 25%, s/p [employer] Kvng single chamber ICD (11/2019), CKD-3, HTN, HLD, DM was BIB EMS for Hypotension 50/40 and Blurry vision since am . Pt was discharged from [facility] yesterday when he was admitted for acute on chronic systolic heart failure, he was diuresed with IV Lasix. Echocardiogram revealed EF of 25%, severe AS, moderate MR, and severe diastolic dysfunction. Patient was evaluated by Cardiology - possible outpatient dobutamine stress to determine if it is AS or pseudo - AS in the setting of poor LV function. EP was consulted for frequent and multiple PVCs and was started on amiodarone.     Today pt said he ate and took his Diabetes medication( possible prandin which was changed on last adm) and after 15 mins started having blurry vision and dizziness and was able to see anything. Denies any CP, SOB, headache, LOC, fall or hitting head, palpitations. Pt called EMS and was found to have BP 50/40 with  .     ED course : afebrile ,bradycardic to 56 , BP 70/42 RR 97% on 3 L NC     Labs noted for normal wbc count ,lactate 2.1--> 2.5 , Na 134 , Cr 2.63     CXR : no pulm edema     ECG : Sinus bjorn ( unchanged from prev one ) with prolonged QTc 606    Pt was admitted to ICU for Hypotension  likely due to Severe AS vs medication induced ( pt is on lasix, Coreg, Isosorbide)     Last echo 8/2020 : 25% Ef with Severe AS with mod MR     Pt will need Dobutamine echo to distinguish btw Severe AS and pseudo AS as Outpt.    Will send on carvedilol 3.125mg Q12H to decrease risk of rebound    Will send on prn lasix for leg swelling.    Pt clinically stable for discharge.

## 2020-08-31 NOTE — PROGRESS NOTE ADULT - ATTENDING COMMENTS
Patient very adamant in leaving today . Explained in detail that his cardiac medications need to be adjusted . Risks of leaving AMA including worsening condition and death also explained.
80 year-old From home, lives with wife , walks independently with prior MI, CAD s/p multiple prior PCI's(17 stents) s/p CABG, HFrEF w/ EF 25%, s/p [employer] single chamber ICD (11/2019), CKD-3, HTN, HLD, DM was BIB EMS for Hypotension 50/40 and Blurry vision since am . Pt was discharged from [facility] yesterday when he was admitted for acute on chronic systolic heart failure, he was diuresed with IV Lasix. Echocardiogram revealed EF of 25%, severe AS, moderate MR, and severe diastolic dysfunction. Patient was evaluated by Cardiology - possible outpatient dobutamine stress to determine if it is AS or pseudo - AS in the setting of poor LV function. EP was consulted for frequent and multiple PVCs and was started on amiodarone.   Today pt said he ate and took his Diabetes medication( possible prandin which was changed on last adm) and after 15 mins started having blurry vision and dizziness and was able to see anything. Denies any CP, SOB, headache, LOC, fall or hitting head, palpitations. Pt called EMS and was found to have BP 50/40 with  .   Pt states he took most of his meds this am.     ED course : afebrile ,bradycardic to 56 , BP 70/42 RR 97% on 3 L NC   Labs noted for normal wbc count ,lactate 2.1--> 2.5 , Na 134 , Cr 2.63   CXR : no pul edema   ECG : Sinus bjorn ( unchanged from prev one ) with prolonged QTc 606        Assessment:  -  Hypotension   -  Prolonged QTC  -  Heart failure with reduced EF  -  Coronary artery disease  -  Aortic stenosis   -  Diabetes mellitus   -  CKD stage 4     Plan  - Patient stated  symptoms after taking medications this morning, though does not recall precisely which medications he took. At this time he was given 1 L IVF in the ED and started on dobutamine.  Hypotension probable due to meds vs cardiac   - Off pressors  - D/C TLC  - Hold further amiodarone for now given prolonged QTC   - Follow up cardiology recs  - Monitor fingerstick glucose and cover with sliding scale insulin regimen for hyperglycemia   - AICD investigation  - Check TSH and cortisol levels  - DVT prophylaxis
Thank you for the courtesy of a consultation, please contact me for any additional questions
80 year-old From home, lives with wife , walks independently with prior MI, CAD s/p multiple prior PCI's(17 stents) s/p CABG, HFrEF w/ EF 25%, s/p [employer] single chamber ICD (11/2019), CKD-3, HTN, HLD, DM was BIB EMS for Hypotension 50/40 and Blurry vision since am . Pt was discharged from [facility] yesterday when he was admitted for acute on chronic systolic heart failure, he was diuresed with IV Lasix. Echocardiogram revealed EF of 25%, severe AS, moderate MR, and severe diastolic dysfunction. Patient was evaluated by Cardiology - possible outpatient dobutamine stress to determine if it is AS or pseudo - AS in the setting of poor LV function. EP was consulted for frequent and multiple PVCs and was started on amiodarone.   Today pt said he ate and took his Diabetes medication( possible prandin which was changed on last adm) and after 15 mins started having blurry vision and dizziness and was able to see anything. Denies any CP, SOB, headache, LOC, fall or hitting head, palpitations. Pt called EMS and was found to have BP 50/40 with  .   Pt states he took most of his meds this am.     ED course : afebrile ,bradycardic to 56 , BP 70/42 RR 97% on 3 L NC   Labs noted for normal wbc count ,lactate 2.1--> 2.5 , Na 134 , Cr 2.63   CXR : no pul edema   ECG : Sinus bjorn ( unchanged from prev one ) with prolonged QTc 606        Assessment:  -  Hypotension   -  Prolonged QTC  -  Heart failure with reduced EF  -  Coronary artery disease  -  Aortic stenosis   -  Diabetes mellitus   -  CKD stage 4     Plan  - Patient endorsing symptoms after taking medications this morning, though does not recall precisely which medications he took. At this time he was given 1 L IVF in the ED and started on dobutamine.  Hypotension probable due to meds vs cardiac   - Discussed with Cardiology, with history of ectopy and PVC, will avoid dobutamine   - Started on phenylephrine for BP support with aim for getting Systolic BP ~90   - Albumin infusion with hypoalbuminemia   - Hold further amiodarone for now given prolonged QTC   - Follow up cardiology recs  - Monitor fingerstick glucose and cover with sliding scale insulin regimen for hyperglycemia   - AICD investigation  - Check TSH and cortisol levels  - DVT prophylaxis  - Patient refusing central venous catheter placement at this time.

## 2020-09-01 PROBLEM — I35.9 AORTIC VALVE DISORDER: Status: ACTIVE | Noted: 2020-01-01

## 2020-09-01 PROBLEM — Z66 DNR (DO NOT RESUSCITATE): Status: ACTIVE | Noted: 2020-01-01

## 2020-09-01 NOTE — REVIEW OF SYSTEMS
[Feeling Tired] : feeling tired [SOB on Exertion] : shortness of breath during exertion [Joint Pain] : joint pain [Negative] : Heme/Lymph [Chest Pain] : no chest pain [Palpitations] : no palpitations [Dizziness] : no dizziness

## 2020-09-01 NOTE — ASSESSMENT
[FreeTextEntry1] : Isiah is an 80 year old male retired  with PMH of HTN, HLD, obesity, azotemia, poorly controlled DMT2 (f/b Dr. Gomes), CAD/NSTEMI (1993, s/p CABG, SHAISTA to mLAD in 2019), ischemic cardiomyopathy with severe LV systolic dysfunction (s/p [employer] Kv[employer] ICD implantation in 2019, last checked on 6/10/2020 with no treated VT) and aortic valve disease. He follows with Dr. Sarkar for his cardiology care and was last seen for followup on August 13, 2020. As per Dr. Sarkar's note patient reported dyspnea on exertion. Echocardiogram demonstrated moderate MR, calcified trileaflet aortic valve, pGr 14mmHg, mGr 8 mmHg, KHADAR 0.8 sqcm, severe global LV systolic dysfunction EF 25%, severe reversible diastolic dysfunction. He was referred to valve clinic for consideration for SUKI. By our measurements there is mild to moderate AS.  Planimetry of the valve likely inaccurate due to low stroke volume.  The primary issue is LV pump failure and we feel that TAVR would not be helpful. \par \par

## 2020-09-01 NOTE — PHYSICAL EXAM
[General Appearance - Alert] : alert [General Appearance - In No Acute Distress] : in no acute distress [Sclera] : the sclera and conjunctiva were normal [PERRL With Normal Accommodation] : pupils were equal in size, round, and reactive to light [Extraocular Movements] : extraocular movements were intact [Outer Ear] : the ears and nose were normal in appearance [Oropharynx] : the oropharynx was normal [Jugular Venous Distention Increased] : there was no jugular-venous distention [Respiration, Rhythm And Depth] : normal respiratory rhythm and effort [Bowel Sounds] : normal bowel sounds [Abdomen Soft] : soft [Cervical Lymph Nodes Enlarged Posterior Bilaterally] : posterior cervical [No CVA Tenderness] : no ~M costovertebral angle tenderness [Abnormal Walk] : normal gait [Skin Color & Pigmentation] : normal skin color and pigmentation [No Focal Deficits] : no focal deficits [Oriented To Time, Place, And Person] : oriented to person, place, and time [Impaired Insight] : insight and judgment were intact [Right Carotid Bruit] : no bruit heard over the right carotid [Left Carotid Bruit] : no bruit heard over the left carotid

## 2020-09-01 NOTE — CONSULT LETTER
[Dear  ___] : Dear ~RUDY, [Courtesy Letter:] : I had the pleasure of seeing your patient, [unfilled], in my office today. [Please see my note below.] : Please see my note below. [Consult Closing:] : Thank you very much for allowing me to participate in the care of this patient.  If you have any questions, please do not hesitate to contact me. [Sincerely,] : Sincerely, [FreeTextEntry2] : Dr. Stuart Sarkar [FreeTextEntry3] : [place] MD\par \par Cardiovascular & Thoracic Surgery\par Professor\par [facility]\par \par

## 2020-09-01 NOTE — HISTORY OF PRESENT ILLNESS
[FreeTextEntry1] : Isiah is an 80 year old male retired  with PMH of HTN, HLD, obesity, azotemia, poorly controlled DMT2 (f/b Dr. Gomes), CAD/NSTEMI (1993, s/p CABG x 4 at [facility], multiple stents since), ischemic cardiomyopathy with severe LV systolic dysfunction (s/p [employer] Kv[employer] ICD implantation in 2019, last checked on 6/10/2020 with no treated VT) and aortic valve disease. He follows with Dr. Sarkar for his cardiology care and was last seen for followup on August 13, 2020. As per Dr. Sarkar's note patient reported dyspnea on exertion. Echocardiogram demonstrated moderate MR, calcified trileaflet aortic valve, pGr 14 mmHg, mGr 8 mmHg, KHADAR 0.8 sqcm, severe global LV systolic dysfunction EF 25%, severe reversible diastolic dysfunction.  Last cardiac Catherization in 11/2019 demonstrated patent grafts, severe diffuse distal LAD disease. Medical management was recommended.  He has bee hospitalized five times since July 2020. Last admission due to vision loss and was admitted to ICU. \par \par He was referred to valve clinic for consideration for SUKI.  He reports continued dyspnea on exertion (NYHA II) and was discharged from [place] yesterday. He also follows with EP, started on Amiodarone which has since been discontinued due to hypotension and visual disturbances prompting hospital readmission.

## 2020-09-01 NOTE — DISCUSSION/SUMMARY
[FreeTextEntry1] : Mr Arenas has a severe chronic ischemic cardiomyopathy and by TTE what appears to be at most moderate AS (with a very soft murmur, in part due to his severe LV dysfunction). His mean transvalvular aortic gradient is <10mmHg and visually his valve, while calcified, appears to be opening reasonably well, i.e. his predominant issue is likely his severe LV dysfunction. He does not appear volume up today in the office. He is hypotensive (without symptoms), and I am recommending he cut his Isordil in half from 60mg to 30mg daily at this time. I am also recommending he be seen as an outpatient by our CHF colleagues--while he may not be a candidate for advanced mechanical therapies, perhaps an outpatient inotrope may be of benefit. He was also being seen by EPS for consideration for an upgrade to a biventricular device, which I will defer to Dr Sarkar and our EPS colleagues if appropriate at this time (QRS 130ms).

## 2020-09-01 NOTE — REVIEW OF SYSTEMS
[Fever] : no fever [Chills] : no chills [Abdominal Pain] : no abdominal pain [Nausea] : no nausea [Change in Appetite] : no change in appetite [Confusion] : no confusion was observed [Anxiety] : no anxiety

## 2020-09-01 NOTE — DATA REVIEWED
[FreeTextEntry1] : Echocardiogram (8/16/2020) demonstrated moderate MR, calcified trileaflet aortic valve, pGr 14 mmHg, mGr 8 mmHg, KHADAR 0.8 sqcm, severe global LV systolic dysfunction EF 25%, severe reversible diastolic dysfunction.\par \par Last cardiac Catherization in 11/2019 demonstrated \par Patent LIMA-LAD, patent mLAD stent\par severe diffuse distal LAD disease\par Patent SVG to diag, moderate anastomosis disease\par Patent [id number] with mild restenosis in the prior stent located between RPDA and OM1

## 2020-09-01 NOTE — HISTORY OF PRESENT ILLNESS
[FreeTextEntry1] : Mr. Arenas is an 80 year old male, referred by Dr. Sarkar for evaluation of aortic stenosis in the setting of systolic heart failure with a reported EF of 25%. He has been hospitalized five times since July, most recently - here at [facility] for CHF, then presented to [facility] on  for hypotension (suspected reaction to amiodarone which has since been discontinued) requiring admission to ICU for pressors. He was discharged on . Palliative care was consulted on his last hospitalization for goals of care discussion. He is now  DNR, a MOLST form was initiated. He is fatigued, but reports no  in activity tolerance. He denies angina, PND, orthopnea, or syncope. His ICD has never fired. \par \par He has an extensive past medical history including HTN, HLD, CKD 3, T2DM, CABG x4 at [facility] in 's,  multiple PCI (17 stents, most recently 2016, last angiogram 2019), and chronic systolic heart failure s/p ICD placement ([employer] Kvng ). EP has been following him during his hospitalizations for heavy PVC burden and consideration for BiV upgrade. \par \par He notes having exertional dyspnea "for a long while". He states his most recent admission was precipitated by "getting hit in the chest with a 40 pound bag of floor tiles" as he was reaching to bring it down from a shelf in the closet. He was concerned that it hit and may have damaged his AICD. He was also recently at [facility]. He is very frustrated with the recent admissions. 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  He states his appetite is good, noting "I'm starving" and ate a good breakfast this morning. He states his dry weight is 186 pounds.

## 2020-09-09 NOTE — HISTORY OF PRESENT ILLNESS
[FreeTextEntry1] : \par Referring: Dr. Ortega, Dr. Sarkar\par \par Mr. Arenas is a 79 YO M with a history of ACC/AHA Stage C/D ischemic cardiomyopathy s/p ICD, CAD s/p 4v CABG in 90's and multiple subsequent PCI, moderate AS, CKD III (baseline Cr 1.8), and poorly controlled DM2 (A1c 11.6%) presenting to HF clinic for further management. \par \par He has had recurrent hospitalizations in 2020 for ADHF and chest pain and in most recent admission at the end of August for symptomatic hypotension likely related to medications for which he was briefly on pressors. \par \par He presents today for HF consultation. He still remains active and rides motorcycles. He states he could walk 1.5 blocks before having to stop due to dyspnea. He also notes orthopnea and frequently sleeps sitting up. He usually does not have LE edema but has noted this recently. He occasionally notes dizziness/LH a few time a week and had an episode of syncope July 2020. Denies chest pain or chest pressure. Denies palpitations.  He overall is satisfied with his quality of life.

## 2020-09-09 NOTE — DISCUSSION/SUMMARY
[FreeTextEntry1] : \par # ACC/AHA Stage C/D ICM\par - GDMT: current regimen is carvedilol 12.5 mg BID, entresto 24-26 mg BID imdur 30 mg daily. Will stop Entresto given hypotension and start lisinopril 5 mg daily after 48 hour washout. Will stop imdur as well which does not have a mortality benefit and he does not have angina. \par - Diuretic: current regimen is lasix 40 mg TID, will continue for now\par - Device: s/p ICD, no clear indication for CRT upgrade\par - Advanced therapies: he is not a candidate for LVAD/OHT and is DNR/DNI. As above, will consider RHC and initiation of palliative inotropes if quality of life worsens or he develops another HF hospitalization. \par - Labs: check today \par \par # Moderate AS \par - followed with structural cardiology, no indications for TAVR as stenosis is not severe\par \par # CAD\par - follows with Dr. Sarkar, on DAPT and high potency statin \par - he is taking both plavix and brillinta. will discuss with Dr. Sarkar\par \par RTC in 1 month

## 2020-09-09 NOTE — ASSESSMENT
[FreeTextEntry1] : 79 YO M with a history of ACC/AHA Stage C/D ischemic cardiomyopathy s/p ICD, CAD s/p 4v CABG in 90's and multiple subsequent PCI, moderate AS, CKD III (baseline Cr 1.8), and poorly controlled DM2 (A1c 11.6%) presenting to HF clinic for further management. \par \par He appears euvolemic on exam and reporting NYHA II-III symptoms. His blood pressure is marginal at this time and precludes uptitration of neurohormonal therapy. He has high risk features including severe LV dysfunction with low blood pressure and frequent hospitalizations. Additionally he has competing risks of renal dysfunction, advanced age, and poorly controlled DM2. Overall his prognosis is poor and he is clear in his wishes to be DNR/DNI.\par \par He is not a candidate for LVAD/OHT. He is reasonably satisfied with his quality of life at this time. If his functional status worsens or develops another HF hospitalization I will plan for a RHC and likely initiation of palliative inotropes.

## 2020-09-09 NOTE — PHYSICAL EXAM
[Normal Conjunctiva] : the conjunctiva exhibited no abnormalities [Heart Rate And Rhythm] : heart rate and rhythm were normal [Heart Sounds] : normal S1 and S2 [Respiration, Rhythm And Depth] : normal respiratory rhythm and effort [] : no respiratory distress [Bowel Sounds] : normal bowel sounds [Abdomen Soft] : soft [Nail Clubbing] : no clubbing of the fingernails [Cyanosis, Localized] : no localized cyanosis [FreeTextEntry1] : Trace edema L > R

## 2020-09-13 NOTE — H&P ADULT - ASSESSMENT
80 year-old man with PMH of CAD s/p multiple prior PCI's(17 stents) s/p CABG, HFrEF w/ EF 25%, s/p [employer] Kvng single chamber ICD (11/2019), CKD-3, HTN, HLD, DMT2, AS presents with acute SOB thought to be 2/2 to acute on chronic systolic heart failure.

## 2020-09-13 NOTE — ED ADULT TRIAGE NOTE - PATIENT ON (OXYGEN DELIVERY METHOD)
Patient : Brittany Prince Age: 76 year old Sex: female   MRN: [id number] Encounter Date: 5/9/2018  E17/17    History     Chief Complaint   Patient presents with   • Fall     HPI   5/9/2018  11:29 AM Brittany Prince is a 76 year old female with a hx of Alzheimer's who presents to the ED via EMS c/o headache that began this morning when after she experienced an unwitnessed fall out of bed. She does not remember falling and states \"I do not know I just woke up on the floor\". She also complains of dental pain, mandible pain, and right eye pain. [facility] lives at a [facility] facility. She denies neck pain, back pain, and all other symptoms. There are no further complaints or modifying factors at this time.    PCP: Eunice Fragoso MD    Allergies   Allergen Reactions   • Amlodipine-Benazepril [Amlodipine Besy-Benazepril Hcl] Cough   • Mold HIVES   • Zestril [Lisinopril] Cough       Prior to Admission Medications    B COMPLEX CAPS    Take 1 capsule by mouth daily.    CALTRATE 600+D 600-400 MG-UNIT PO TABS    1daily    CITALOPRAM (CELEXA) 20 MG TABLET    Take 1 tablet by mouth daily. For hot flashes    HYDROCODONE-ACETAMINOPHEN (NORCO) 5-325 MG PER TABLET    Take 1-2 tablets by mouth every 4 hours as needed for Pain.    LEVOTHYROXINE (SYNTHROID, LEVOTHROID) 125 MCG TABLET    Take 1 tablet by mouth daily. Alternating with 11/2 tablets    MULTIVITAMINS PO TABS    1 TABLET DAILY    OLMESARTAN-HYDROCHLOROTHIAZIDE (BENICAR HCT) 40-25 MG PER TABLET    Take 1 tablet by mouth daily.    OMEGA-3 FATTY ACIDS (FISH OIL) 1000 MG CAPSULE    Take 1 g by mouth daily.    PREDNISONE 10 MG PO TABS    2 tablets po BID x 5 days, then 1 tablet po BID x 5 days, then 1 tablet daily x 5 days    SIMVASTATIN (ZOCOR) 80 MG TABLET    Take 1 tablet by mouth nightly.    STOOL SOFTENER 100 MG PO CAPS    1 CAPSULES DAILY    VITAMIN D3 1000 UNIT PO TABS    1 TABLET DAILY     Past Medical History:   Diagnosis Date   • Breast Cancer    • Degenerative joint disease     jarrell  knees   • Esophageal reflux 6/10/2009   • Generalized osteoarthrosis, unspecified site     jaw joints   • Hemorrhoids 02/18/2009   • Hyperlipidemia    • Hypertension    • Hypothyroidism     S/P thyroidectomy   • Mitral valve disorders(424.0)     Prolapse   • Mitral Valve Prolapse    • Osteoporosis    • RECTAL POLYP 02/18/2009    hyperplastic   • TACHYCARDIA SUPRAVENTRICULAR        Past Surgical History:   Procedure Laterality Date   • ABLATE DYSRHYTHM FOCUS RX VTACK  03/12/2003    Radiation frequency cardiac ablation for SVT   • CHEMO/RADIATION TX  12/01/1999    Chemo therapy   • CHEMO/RADIATION TX  03/01/2000    Radiation treatment   • COLONOSCOPY W BIOPSY  02/18/2009    Rectal polyp: benign hyperplastic polyp.  Follow up Feb. 2014.   • COLONOSCOPY W BIOPSY  2/20/2014      Fam Hx colon polyp, polyp/5yr recall    • ESOPHAGOGASTRODUODENOSCOPY TRANSORAL FLEX W/BX SINGLE OR MULT  06/15/2009    Gastritis, GERD   • FUSION BIG TOE,I-P JT+TENDN XFER  09/17/2007    fusion of the big toe on the right foot due to arthritis.   • HAND/FINGER SURGERY UNLISTED  12/12/2003    Unspecified triger finger surgery   • LEFT HEART CATH,PERCUTANEOUS  4/14/08    normal   • MANDIBLE GRAFT      bone and skin graft left jaw   • MASTECTOMY PARTIAL  09/09/1999    Breast Lumpectomy   • MYOCARDL PERF REST STRESS  4/8/08    left BBB   • REPAIR DETACD RETINA,INJECT AIR/GAS  06/26/2003   • REPAIR OF NASAL SEPTUM  04/29/992    Septoplasty   • SCREENING MAMMOGRAPHY  7/1/08    benign   • THYROIDECTOMY  04/08/1993    lumps   • TMJ ARTHROSCOPY/SURGERY     • TOTAL ABDOM HYSTERECTOMY  04/15/1997    Total Abd Hyst w BSO   • TOTAL KNEE REPLACEMENT  05/09/2008    Left Knee Replacement       Family History   Problem Relation Age of Onset   • Heart Father      MI   • Hypertension Father    • Cancer Mother      lymphoma   • Hypertension Brother    • Cancer Brother      testicles   • Gastrointestinal Brother      adenomatous colon polyps       Social History    Substance Use Topics   • Smoking status: Never Smoker   • Smokeless tobacco: None   • Alcohol use 1.0 oz/week     1 Glasses of wine, 1 Shots of liquor per week      Comment: Weekends       Review of Systems   Constitutional: Negative for activity change, diaphoresis and fever.   HENT: Positive for dental problem (pain). Negative for congestion, rhinorrhea, sore throat and voice change.         Positive for mandible pain   Eyes: Positive for pain (right ). Negative for discharge, redness and itching.   Respiratory: Negative for cough, chest tightness and shortness of breath.    Cardiovascular: Negative for chest pain and leg swelling.   Gastrointestinal: Negative for abdominal pain, diarrhea, nausea and vomiting.   Genitourinary: Negative for difficulty urinating and dysuria.   Musculoskeletal: Negative for back pain and neck pain.   Skin: Negative for rash.   Neurological: Positive for headaches. Negative for syncope and weakness.   Psychiatric/Behavioral: Negative for confusion. The patient is not nervous/anxious.        Physical Exam     ED Triage Vitals [05/09/18 1135]   ED Triage Vitals Group      Temp 97.4 °F (36.3 °C)      Pulse 68      Resp 16      BP (!) 190/100      SpO2 97 %      EtCO2 mmHg       Height       Weight       Weight Scale Used ED Actual       Physical Exam   Constitutional: She is oriented to person, place, and time. Vital signs are normal. She appears well-developed and well-nourished. No distress.   HENT:   Head: Normocephalic and atraumatic.   Right Ear: External ear normal.   Left Ear: External ear normal.   No dental trauma   No facial bony tenderness   Eyes: Conjunctivae are normal. Pupils are equal, round, and reactive to light.   Neck: Normal range of motion. Neck supple.   Cardiovascular: Normal rate, regular rhythm, normal heart sounds and intact distal pulses.    No murmur heard.  Pulmonary/Chest: Effort normal and breath sounds normal. No stridor. No respiratory distress. She has  no wheezes. She has no rales.   Abdominal: Soft. Bowel sounds are normal. She exhibits no mass. There is no tenderness. There is no rebound and no guarding.   Musculoskeletal: Normal range of motion. She exhibits no edema or tenderness.   Lymphadenopathy:     She has no cervical adenopathy.   Neurological: She is alert and oriented to person, place, and time. No cranial nerve deficit.   No finger to nose dysmetria.  No pronator drift.  Normal sensation throughout all extremities.   Normal strength throughout all extremities.   No aphasia, no dysarthria.   Visual fields intact to confrontation.   Skin: Skin is warm and dry. No rash noted. She is not diaphoretic.   Psychiatric: She has a normal mood and affect.   Nursing note and vitals reviewed.      ED Course     Procedures    Lab Results     Results for orders placed or performed during the hospital encounter of 05/09/18   CBC & Auto Differential   Result Value Ref Range    WBC 8.2 4.2 - 11.0 K/mcL    RBC 4.21 4.00 - 5.20 mil/mcL    HGB 12.9 12.0 - 15.5 g/dL    HCT 39.4 36.0 - 46.5 %    MCV 93.6 78.0 - 100.0 fl    MCH 30.6 26.0 - 34.0 pg    MCHC 32.7 32.0 - 36.5 g/dL    RDW-CV 13.5 11.0 - 15.0 %     140 - 450 K/mcL    DIFF TYPE AUTOMATED DIFFERENTIAL     Neutrophil 66 %    LYMPH 20 %    MONO 10 %    EOSIN 3 %    BASO 1 %    Absolute Neutrophil 5.5 1.8 - 7.7 K/mcL    Absolute Lymph 1.6 1.0 - 4.0 K/mcL    Absolute Mono 0.8 0.3 - 0.9 K/mcL    Absolute Eos 0.3 0.1 - 0.5 K/mcL    Absolute Baso 0.0 0.0 - 0.3 K/mcL   Urinalysis & Reflex Micro with Culture if Indicated   Result Value Ref Range    COLOR YELLOW YELLOW    APPEARANCE CLEAR     GLUCOSE(URINE) NEGATIVE NEGATIVE mg/dL    BILIRUBIN NEGATIVE NEGATIVE    KETONES NEGATIVE NEGATIVE mg/dL    SPECIFIC GRAVITY 1.010 1.005 - 1.030    BLOOD NEGATIVE NEGATIVE    pH 7.5 (H) 5.0 - 7.0 Units    PROTEIN(URINE) NEGATIVE NEGATIVE mg/dL    UROBILINOGEN 0.2 0.0 - 1.0 mg/dL    NITRITE NEGATIVE NEGATIVE    LEUKOCYTE ESTERASE  NEGATIVE NEGATIVE    SPECIMEN TYPE URINE, CATHETERIZED, STRAIGHT    Chem 8 Panel - Point of Care   Result Value Ref Range    Sodium  135 - 145 mmol/L    Potassium POC 4.4 3.4 - 5.1 mmol/L    Chloride  98 - 107 mmol/L    CALCIUM IONIZED-POC 1.21 1.15 - 1.29 mmol/L    CO2 Total 27 (H) 19 - 24 mmol/L    GLUCOSE POC 94 65 - 99 mg/dL    BUN POC 15 6 - 20 mg/dL    HEMATOCRIT POC 39.0 36.0 - 46.5 %    Hemoglobin POC 13.3 12.0 - 15.5 g/dL    ANION GAP POC 13 mmol/L    Creatinine POC 0.80 0.51 - 0.95 mg/dL    Estimated GFR  (POC) 83     Estimated GFR Non- (POC) 72    Troponin I - Point of Care   Result Value Ref Range    Troponin I POC <0.10 <0.10 ng/mL       EKG Results     EKG Interpretation  Rate: 66  Rhythm: normal sinus rhythm   Abnormality: Possible left atrial enlargement, left axis deviation, nonspecific intraventricular block, cannot rule out anteroseptal infarct, age undetermined, No previous EKGs available    EKG interpreted by ED physician    Radiology Results     Imaging Results          CT Head Brain (Final result)  Result time 05/09/18 13:08:59    Final result                 Impression:    IMPRESSION:  1. No acute intracranial hemorrhage, extraaxial fluid collection, shift of  the midline, or other mass effect.  2. Atrophy and periventricular/subcortical white matter attenuation changes  of chronic microvascular disease.  3. No acute calvarial or skull base fracture.               Narrative:    HISTORY: Unwitnessed fall with a history of dementia. Frontal headaches.    EXAM: Head CT.    COMPARISON: None.    TECHNIQUE: Noncontrast CT scan of the head was performed with thin-cut  axial images. Multiplanar reformatted images were created and reviewed at a  dedicated workstation.    FINDINGS: There is no acute intracranial hemorrhage, extraaxial fluid  collection, shift of the midline, or other mass effect.     There is atrophic appropriate volume loss with some  periventricular and  subcortical white matter attenuation changes of chronic microvascular  disease. The ventricular system and basal cisterns are mildly prominent in  size and bilaterally symmetric. The gray-white interface is well-maintained  on this noncontrast exam.    There are no bony lesions or basilar skull fractures. The mastoid air cells  and the paranasal sinuses are well-aerated.                                ED Medication Orders     Start Ordered     Status Ordering Provider    05/09/18 1336 05/09/18 1335  aspirin tablet 325 mg  ONCE      Last MAR action:  Given MAGDALENA VEGA  Vitals  Vitals:    05/09/18 1257 05/09/18 1259 05/09/18 1301 05/09/18 1330   BP: 187/88 181/90 (!) 188/98 172/85   Pulse: 61 66 67 64   Resp:    17   Temp:       TempSrc:       SpO2:    99%       ED Course    11:24 AM The pt's brother, POA, is in the Ed. He states the pt had an unwitnessed fall out of bed this morning. She has a hx of falls but this is the first time she has fallen out of bed. He does not know how long the pt was on the floor but does not think it was very long. Initially after the fall, he states the nurse informed him the pt was asymptomatic but is now complaining of a headache and dental pain. Her teeth cleaned about a month and they were unremarkable. She received Aspirin from her living facility but does not take other anticoagulants. He states \"her heart works at 35% so if you do an EKG it will show her having a heart attack\" and she follows up with a Cardiologist. She is DNR.     1:21 PM Per the RN, the pt was able to ambulate on her own to the bathroom.     1:33 PM I rechecked the pt who was resting comfortably. We discussed the pt's lab results and her unremarkable CT scan. She requested a dose of Aspirin. I advised the pt to follow up with her PCP if her symptoms do not improve. Patient understands and agrees with plan of care. All questions were answered at this  time.    [facility]  Critical Care time spent on this patient outside of billable procedures:  None    Clinical Impression  ED Diagnoses        Final diagnoses    Fall in home, initial encounter     Nonintractable headache, unspecified chronicity pattern, unspecified headache type           The patient was provided with a recommendation to follow up with a primary care provider and obtain reassessment of his/her blood pressure within three months.    Follow Up:  Eunice Fragoso MD  [address] [address]  [phone]      As needed     Pt is discharged to home/self care in stable condition.     I have reviewed the information recorded by the scribe for accuracy and agree with its contents.    ____________________________________________________________________    Paul Moreno acting as a scribe for Ray Yu PA-C.    Ray Yu PA-C  Dictation # [id number]  Scribe: Paul Moreno    Attending Physician: Dr. Cheikh Foster   Dictation # [id number]     Ray Yu PA-C  05/09/18 4017     room air

## 2020-09-13 NOTE — H&P ADULT - PROBLEM SELECTOR PLAN 1
Patient states that he is much improved. Reviewing patient's HPI: patient's symptoms do not seem to be related to volume overload initially as he has been compliant with med, no worsening of orthopnea or overt development of LE edema. Patient is concerned if Lisinopril was source of SOB as it has been a recent change of medication   Patient did receive 500 cc of fluid which may have exacerbated symptoms at [facility]  S/P Lasix 40 mg IV in ED  Will continue with home dose of Lasix 40 TID  Review of Dr. Jaquez (Heart failure team) last outpatient note: Imdur was suggested to be held. Entresto held as Lisinopril was initiated.  consider RHC and initiation of palliative inotropes if quality of life worsens or he develops another HF hospitalization.   Clarify final dose of Coreg. Will resume at conservative dose of 6.25 mg BID

## 2020-09-13 NOTE — ED PROVIDER NOTE - OBJECTIVE STATEMENT
81 y/o male with PMHx of CAD, DM presents to the ED c/o shortness of breath x today. Pt notes occasional shortness of breath and feeling lethargic. Pt was found to be hypotensive by EMS, given 500CCs of fluids in the field. Pt is minimally speaking. Pt denies melena, chest pain, or any other complaints. Pt intolerant to Entresto (LOC).

## 2020-09-13 NOTE — ED PROVIDER NOTE - PROGRESS NOTE DETAILS
Chirag Paez MD. cards eval'd; not a ccu candidate at this time; recommending 40 iv lasix; spoke w/ dr. rose, who accepts pt and is agreeable w/ 40 iv of lasix

## 2020-09-13 NOTE — ED PROVIDER NOTE - ATTENDING CONTRIBUTION TO CARE
I have personally performed a face to face medical and diagnostic evaluation of the patient. I have discussed with and reviewed the Resident's note and agree with the History, ROS, Physical Exam and MDM unless otherwise indicated. A brief summary of my personal evaluation and impression can be found below.    80M hx CKD3 CHF EF 25% cad multiple stents, ICD, transferred from OSH ED in setting of SOB and PORTILLO. Normally does not use home O2. Accepted by cardiology for transfer for CCU eval. Of Note has been hypotensive to mid 80s however mentating well. Aide from SOB pt has no complaints.     All other ROS negative, except as above and as per HPI and ROS section.    VITALS: Initial triage and subsequent vitals have been reviewed by me.  GEN APPEARANCE: WDWN, alert, non-toxic, NAD  HEAD: Atraumatic.  EYES: PERRLa, EOMI, vision grossly intact.   NECK: Supple  CV: RRR, S1S2, no c/r/m/g. Cap refill <2 seconds. No bruits.   LUNGS: crackles to mid lungs  ABDOMEN: Soft, NTND. No guarding or rebound.   MSK/EXT: No spinal or extremity point tenderness. No CVA ttp. Pelvis stable. No peripheral edema.  NEURO: Alert, follows commands. Weight bearing normal. Speech normal. Sensation and motor normal x4 extremities.   SKIN: Warm, dry and intact. No rash.  PSYCH: Appropriate    Plan/MDM: 80M extensive cardiac hx presents as transfer from OSH pending cardiology eval for admission for likely chf exacerbation vs cardiogenic shock? will recheck labs cxr cardiacs, discuss with cardiology fellow, admit.

## 2020-09-13 NOTE — CONSULT NOTE ADULT - ATTENDING COMMENTS
80 year-old with known chronic systolic heart failure status post ICD, recently admitted with trauma to chest wall (40 lbs of tiles fell on his chest at ICD site), now re-admitted with acute on chronic systolic heart failure.  EP previously recommended amiodarone to reduce burden of PVCs (still seen on this admission's ECG).  Patient saw Abhishek Jaquez MD as outpatient - will ask him to consult.    Patient's most recent echo suggests low flow low gradient aortic stenosis. Would consider outpatient dobutamine stress to determine if it is AS or pseudo-AS in the setting of poor LV function.

## 2020-09-13 NOTE — CONSULT NOTE ADULT - ASSESSMENT
80 M with PMH of HFrEF 2/2 ICM with ICD, HTN, DM, AS (possible pseudostenosis) presents with SOB without initial signs of overload, however after some fluids found to have volume overloaded. No signs of end organ damage or labs.     Plan:  -Lasix 40 IV, home dose 40 PO, reassess volume and urine output, daily weights.   -lower extremity doppler given his asymmetric swelling and relative quick onset of SOB (low suspicion for a PE given volume overload)   -admit to medicine for ADHF  -patient was previously considered for  stress test to differential AS and then possible TAVR eval. Once euvolemic can consider it on this admission.     Leigh Meng MD  Cardiology Fellow

## 2020-09-13 NOTE — ED PROVIDER NOTE - PHYSICAL EXAMINATION
PHYSICAL EXAM:  GENERAL: non-toxic appearing; in no respiratory distress  HEAD Atraumatic, Normocephalic  NECK: No JVD; FROM  EYES: PERRL, EOMs intact b/l w/out deficits  CHEST/LUNG: crackles bilaterally to mid lungs;   HEART: RRR no murmur/gallops/rubs  ABDOMEN: +BS, soft, NT, ND  EXTREMITIES: 1+ pitting ankle edema;, +2 radial pulses b/l, +2 DP/PT pulses b/l  MUSCULOSKELETAL: FROM of all 4 extremities;   NERVOUS SYSTEM:  A&Ox3, No motor deficits or sensory deficits; CNII-XII intact; no focal neurologic deficits  SKIN:  No new rashes

## 2020-09-13 NOTE — ED PROVIDER NOTE - OBJECTIVE STATEMENT
Pt presents as transfer from  for CCU evaluation. He received a total of 1.5L of IVF w/ improvement of his BP.     80 M PMHx CKD3, HTN, MI, CAD with Multiple stents, CABG, HFrEF 25%, s/p [employer] Single Chamber ICD, presents to the ED c/o SOB since this morning. It is worse with ambulation and exertion. Denies CP. Has 3 pillow orthopnea and paroxysmal nocturnal dyspnea. Denies cough, fever, nausea, vomiting, diarrhea, abd pain, dysuria, urinary frequency. States he took a dose of his home diuretic today.

## 2020-09-13 NOTE — H&P ADULT - PROBLEM SELECTOR PLAN 2
Per patient's list: On Brilinta and Plavix?  Last discharge med rec only notable for Brilinta  Will continue with Aspirin and Brilinta for now  Reconcile regimen with cardiology  Continue with Lipitor 80

## 2020-09-13 NOTE — H&P ADULT - HISTORY OF PRESENT ILLNESS
80 year-old man with PMH of CAD s/p multiple prior PCI's(17 stents) s/p CABG, HFrEF w/ EF 25%, s/p [employer] single chamber ICD (11/2019), CKD-3, HTN, HLD, DMT2 80 year-old man with PMH of CAD s/p multiple prior PCI's(17 stents) s/p CABG, HFrEF w/ EF 25%, s/p [employer] single chamber ICD (11/2019), CKD-3, HTN, HLD, DMT2, AS, presents from [facility] in setting of acute shortness of breath of 1 day. Patient states that he was at his normal state of health in previous days. He took his medications this morning and after he felt shortness of breath at rest along with a pain radiating down his left arm (a pain which he has never felt before). Denies associated CP, palpitations, dizziness, nausea, diaphoresis. Patient states that he takes Lasix 40 mg three times a day and has not missed doses. He typically sleeps with 3 pillows at night for chronic orthopnea. Denies any worsening of orthopnea. He denies acute worsening of the LE edema but has noticed that the left leg looked rose. Denies fevers, chills, sweats, cough, wheezing, URI symptoms in preceding days. He states that another doctor started him on Lisinopril. He took in on Saturday without issue. He took it again today with his morning meds and wonders if that caused his shortness of breath. He does endorse a reaction to Entresto in the past.    Patient was thought to be hypotensive and was given 500 cc of fluids. patient's Baseline SBP in the 90s.
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
## 2020-09-13 NOTE — H&P ADULT - NSHPPHYSICALEXAM_GEN_ALL_CORE
Vital Signs Last 24 Hrs  T(C): 36.4 (13 Sep 2020 19:46), Max: 36.9 (13 Sep 2020 13:15)  T(F): 97.6 (13 Sep 2020 19:46), Max: 98.4 (13 Sep 2020 13:15)  HR: 83 (13 Sep 2020 18:00) (80 - 89)  BP: 91/53 (13 Sep 2020 19:46) (84/55 - 105/70)  BP(mean): 81 (13 Sep 2020 18:35) (73 - 85)  RR: 18 (13 Sep 2020 19:46) (16 - 23)  SpO2: 97% (13 Sep 2020 19:46) (96% - 100%) Vital Signs Last 24 Hrs  T(C): 36.4 (13 Sep 2020 19:46), Max: 36.9 (13 Sep 2020 13:15)  T(F): 97.6 (13 Sep 2020 19:46), Max: 98.4 (13 Sep 2020 13:15)  HR: 83 (13 Sep 2020 18:00) (80 - 89)  BP: 91/53 (13 Sep 2020 19:46) (84/55 - 105/70)  BP(mean): 81 (13 Sep 2020 18:35) (73 - 85)  RR: 18 (13 Sep 2020 19:46) (16 - 23)  SpO2: 97% (13 Sep 2020 19:46) (96% - 100%)    TELE Sinus 70s      PHYSICAL EXAM:  GENERAL: NAD, well-groomed, well-developed  HEAD:  Atraumatic, Normocephalic  EYES: EOMI, PERRLA, conjunctiva and sclera clear  NECK: Supple, +JVD  NERVOUS SYSTEM:  Alert & Oriented X3, Good concentration; Motor Strength 5/5 B/L upper and lower extremities  CHEST/LUNG: Clear to percussion bilaterally; Scant basilar rales.  HEART: Regular rate and rhythm; +Systolic murmurs  ABDOMEN: Soft, Nontender, Nondistended; Bowel sounds present  EXTREMITIES:  2+ radial pulses. +1 pitting edema L >R  LYMPH: No lymphadenopathy noted  SKIN: Warm and dry. No rashes or lesions

## 2020-09-13 NOTE — ED ADULT NURSE NOTE - OBJECTIVE STATEMENT
80y male with hx MI with 17 stents, heart failure, AICD, CKD stage 4, DMT2, ED, anxiety, depression BIBEMS transfer [facility] for elsy siegel. pt is alert and oriented x 3 and speaking coherently. As per ems his daughter called ems for dyspnea. When EMS arrived, pt was hypotensive, pt given 500cc en route to [facility], and given another 1000cc while at . BP responded to fluids. Pt continues to c/o sob, crackles noted b/l. Pt placed on CM in ER. EKG completed. b/l 18G IVs noted from . Pt denies cp, fevers, n/v/d. pt respirations labored. 95% on RA, pt placed on 2L [place]. MD siegel completed. will reassess,

## 2020-09-13 NOTE — ED PROVIDER NOTE - NS ED ROS FT
Constitutional: no fevers; no chills  HEENT: no visual changes, no sore throat, no rhinorrhea  CV: no cp; no palpitations  Resp: sob; no cough  GI: no abd pain, no nausea, no vomiting, no diarrhea, no constipation  : no dysuria, no hematuria  MSK: no myalgais; no arthralgias  skin: no rashes  neuro: no HA, no numbness; no weakness, no tingling  ROS statement: all other ROS negative except as per HPI

## 2020-09-13 NOTE — H&P ADULT - PROBLEM SELECTOR PLAN 7
Obtained med rec from patient's wife reviewing medication list  Admission med rec incomplete. Primary day team to review medications with outpatient PMD.

## 2020-09-13 NOTE — H&P ADULT - ATTENDING COMMENTS
Dr. Alba accepted patient's case from the ED and requested in house hospitalist team to complete admission. Patient was previously unknown to me. Patient was assigned to me by hospitalist in charge. My involvement in this case consisted only of the initial history, physical and management plan. Dr. Alba to assume care in AM and thereafter. Case discussed in detail with overnight medicine NP/PA [id number]

## 2020-09-13 NOTE — ED PROVIDER NOTE - MUSCULOSKELETAL, MLM
Spine appears normal, range of motion is not limited, no muscle or joint tenderness, no pedal edema.

## 2020-09-13 NOTE — H&P ADULT - NSHPREVIEWOFSYSTEMS_GEN_ALL_CORE
REVIEW OF SYSTEMS:  CONSTITUTIONAL: No fever, chills or sweats   EYES: No eye pain, visual disturbances, or discharge  ENMT: No sinus or throat pain  NECK: No pain or stiffness  RESPIRATORY: SEE HPI  CARDIOVASCULAR: SEE HPI  GASTROINTESTINAL: No abdominal pain. No nausea, vomiting, diarrhea or constipation. No melena or hematochezia.  GENITOURINARY: No dysuria or change of frequency.   NEUROLOGICAL: No headaches, loss of strength, numbness, or tremors  SKIN: No  rashes or lesions   LYMPH NODES: No enlarged glands  MUSCULOSKELETAL: No joint pain. No muscle, back, or extremity pain  HEME/LYMPH: No easy bruising, or bleeding gums  ALLERGY AND IMMUNOLOGIC: Reaction to medications

## 2020-09-13 NOTE — H&P ADULT - NSHPLABSRESULTS_GEN_ALL_CORE
Personally reviewed labs and noted in detail below    Personally reviewed EKG Personally reviewed labs and noted in detail below. elevated BNP    Personally reviewed EKG Sinus Rhythm 70s PVCs grossly unchanged from 8/27/2020 EKG    Personally reviewed CXR: Trace pleural effusion

## 2020-09-13 NOTE — PATIENT PROFILE ADULT - NS PRO AD NO ADVANCE DIRECTIVE
ESRD on iHD  [facility]  4 hours  Dr. Clara TITUS  Wife reports an EDW of 94 kg (93 kg on admission)    Plan/Recommendations:  -HD today for metabolic clearance.  -no net ultrafiltration due to large volume of diarrhea in the past 24 hours.  -Phos WNL, no need for binders  -continue strict I/O's     No

## 2020-09-13 NOTE — ED PROVIDER NOTE - CLINICAL SUMMARY MEDICAL DECISION MAKING FREE TEXT BOX
Chirag Paez MD. pt with chf, cad, cabg, prseents for SOB, PORTILLO x1 day as transfer from  for possibel CCU eval. pt with bp in 90s systolic, mentating well; crackles to mid lungs. will plan to admit for chf; will hold on diuresing right now;

## 2020-09-13 NOTE — ED PROVIDER NOTE - CLINICAL SUMMARY MEDICAL DECISION MAKING FREE TEXT BOX
80M with shortness of breath and lethargy. Infectious cardiac workup, fingerstick, hydrate and reassess.

## 2020-09-13 NOTE — H&P ADULT - PROBLEM SELECTOR PLAN 4
Hold oral agents  Continue with Novolog at dinner  Continue with Lantus 40 U qhs  Corrective SSI  Monitor FS  Hypoglycemia protocol
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
## 2020-09-14 NOTE — CONSULT NOTE ADULT - PROBLEM SELECTOR RECOMMENDATION 2
- follows with structural cardiology as outpatient, AS deemed not severe with no plans for TAVR at this time

## 2020-09-14 NOTE — CONSULT NOTE ADULT - PROBLEM SELECTOR RECOMMENDATION 3
- continue ASA/statin and brillinta   - if hemodynamics acceptable would consider switching brillinta to plavix due to possible dyspnea side effect

## 2020-09-14 NOTE — CONSULT NOTE ADULT - ASSESSMENT
79 YO M with a history of ACC/AHA Stage C/D ischemic cardiomyopathy s/p ICD, CAD s/p 4v CABG in 90's and multiple subsequent PCI, moderate AS, CKD III (baseline Cr 1.8), and poorly controlled DM2 (A1c 11.6%) admitted with dyspnea on exertion. He does not appear overtly volume expanded on exam. I suspect symptoms and prior hospitalizations may be due in part to low output heart failure and he has many high risk features including borderline BP requiring downtitration of medical therapy. He is not a candidate for OHT due to age or LVAD due to combination of age/comorbidities with prior sternotomy. Will obtain RHC for objective hemodynamics and consider palliative inotropes. He has expressed wishes to be DNR/DNI.     Review of studies  TTE 9/1/20: LV 6.5 cm, LVEF 25% with LVOT VTI 14 cm, mild-moderate MR, moderate AS, mild-moderate TR  [facility] 11/2019: severe obstructive 3v CAD, non-obstructive disease in grafts No

## 2020-09-14 NOTE — PHARMACOTHERAPY INTERVENTION NOTE - COMMENTS
Medication reconciliation completed; all medications confirmed with patient and pharmacy. Communicated discrepancies with provider. Please refer to outpatient medication review for the updated list.    Liliana Choudhury, PharmD  PGY-1 Pharmacy Resident  [id number]

## 2020-09-14 NOTE — CONSULT NOTE ADULT - ASSESSMENT
80 year-old man with PMH of CAD s/p multiple prior PCI's(17 stents) s/p CABG, HFrEF w/ EF 25%, s/p [employer] single chamber ICD (11/2019), CKD-3, HTN, HLD, DMT2, AS, presents from [facility] in setting of acute shortness of breath of 1 day.He took his medicationsand after he felt shortness of breath at rest along with a pain radiating down his left arm (a pain which he has never felt before).  he takes Lasix 40 mg three times a day and has not missed doses. He typically sleeps with 3 pillows at night for chronic orthopnea and has had reaction to entresto i npast      1- CKD III   2- CHF   3- cardiomyopathy   4- DM   5- anemia     creatinine seems baseline   to have urine pro/cr ratio   lasix 40 mg tid   RHC to evaluate fluid status with possible primacor support  to have retic ct and iron profile   to have uric acid level   d/w CHF at bedside as well

## 2020-09-15 NOTE — DIETITIAN INITIAL EVALUATION ADULT. - PERTINENT MEDS FT
furosemide, potassium chloride, Lipitor   insulin glargine Injectable (LANTUS) 40 Unit(s) SubCutaneous at bedtime  insulin lispro (HumaLOG) corrective regimen sliding scale   SubCutaneous three times a day before meals  insulin lispro (HumaLOG) corrective regimen sliding scale   SubCutaneous at bedtime  insulin lispro Injectable (HumaLOG) 8 Unit(s) SubCutaneous before dinner

## 2020-09-15 NOTE — DIETITIAN INITIAL EVALUATION ADULT. - PHYSICAL APPEARANCE
well nourished/other (specify) Ht: 68 inches  Wt: 192.4 pounds  BMI: 29.3 kG/m2  IBW: 154 pounds +/-10%  IBW%: 125%  Skin per nursing documentation: No pressure injuries noted.   Edema per nursing documentation: None noted.  Nutrition focused physical exam deferred by pt.
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
## 2020-09-15 NOTE — PROGRESS NOTE ADULT - ASSESSMENT
80 year-old with acute on chronic systolic heart failure.  Appreciate Heart Failure input and consideration for intrope therapy and/or CardioMems.  Patient with low flow, low gradient aortic stenosis. Consider dobutamine stress echo to evaluate.  Ongoing ventricular ectopy - would reconsult EP for evaluation.

## 2020-09-15 NOTE — DIETITIAN INITIAL EVALUATION ADULT. - ADD RECOMMEND
1) Continue current DASH, consistent carbohydrate, no concentrated K or Phos diet. 2) Reinforce CHF education and provide type 2 diabetes nutrition education as able. 3) Continue to trend labs, weight, skin integrity, and intake.

## 2020-09-15 NOTE — DIETITIAN INITIAL EVALUATION ADULT. - PERTINENT LABORATORY DATA
09-15 Na 137 mmol/L Glu 97 mg/dL K+ 3.3 mmol/L<L> Cr  1.94 mg/dL<H> BUN 56 mg/dL<H> Phos 3.7 mg/dL Alb 3.7 g/dL PAB n/a   Hgb 10.7 g/dL<L> Hct 32.3 %<L>   08-20 A1C 11.6%  CAPILLARY BLOOD GLUCOSE  POCT Blood Glucose.: 84 mg/dL (15 Sep 2020 08:44)  POCT Blood Glucose.: 176 mg/dL (14 Sep 2020 21:40)  POCT Blood Glucose.: 182 mg/dL (14 Sep 2020 17:11)  POCT Blood Glucose.: 169 mg/dL (14 Sep 2020 12:27)
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
## 2020-09-15 NOTE — PROGRESS NOTE ADULT - ASSESSMENT
80 year-old man with PMH of CAD s/p multiple prior PCI's(17 stents) s/p CABG, HFrEF w/ EF 25%, s/p [employer] single chamber ICD (11/2019), CKD-3, HTN, HLD, DMT2, AS, presents from [facility] in setting of acute shortness of breath of 1 day.He took his medicationsand after he felt shortness of breath at rest along with a pain radiating down his left arm (a pain which he has never felt before).  he takes Lasix 40 mg three times a day and has not missed doses. He typically sleeps with 3 pillows at night for chronic orthopnea and has had reaction to entresto i npast      1- CKD III   2- CHF   3- cardiomyopathy   4- DM   5- anemia     creatinine slightly higher   subnephrotic proteinuria once cr stabilizes will attempt ARB   lasix 40 mg tid   RHC to evaluate fluid status with possible primacor support  to have retic ct and iron profile   to have uric acid level   iron supplement   hyperglycemia control

## 2020-09-15 NOTE — DIETITIAN INITIAL EVALUATION ADULT. - PROBLEM SELECTOR PLAN 1
Patient states that he is much improved. Reviewing patient's HPI: patient's symptoms do not seem to be related to volume overload initially as he has been compliant with med, no worsening of orthopnea or overt development of LE edema. Patient is concerned if Lisinopril was source of SOB as it has been a recent change of medication   Patient did receive 500 cc of fluid which may have exacerbated symptoms at [facility]  S/P Lasix 40 mg IV in ED  Will continue with home dose of Lasix 40 TID  Review of Dr. Jaquez (Heart failure team) last outpatient note: Imdur was suggested to be held. Entresto held as Lisinopril was initiated.  consider RHC and initiation of palliative inotropes if quality of life worsens or he develops another HF hospitalization.   Clarify final dose of Coreg. Will resume at conservative dose of 6.25 mg BID

## 2020-09-15 NOTE — DIETITIAN INITIAL EVALUATION ADULT. - OTHER INFO
Diet PTA: Pt was eating well with no changes in appetite. Pt was not following dietary restrictions; wife prepares food. Confirms no known food allergies. Denies Hx of chewing or swallowing issues. Pt takes multivitamin; denies nutrition supplement use. Pt with Hx of T2DM; manages with Novolog and Lantus. Pt does not check blood sugars. Recent A1c 11.6% (8/20) indicating poor glycemic control.    Dosing wt: 192.4 lbs. Daily wt in lbs: 186.5 (9/15), 190 (9/14). Reports UBW of 187 lb, denies any recent changes in wt. Decrease of wt in-house likely related to fluid shifts; pt on Lasix.    Pt is eating well with no changes in appetite. Denies recent N/V, diarrhea, or constipation. Last BM 9/15. Pt declines nutrition education for type 2 diabetes and CHF management. Provided handouts on heart failure nutrition therapy, reading heart healthy nutrition labels, heart healthy shopping tips and low sodium food list. Emphasized the importance of weighing self daily and alerting doctor of wt fluctuations of 2-3 lb overnight; pt endorses doing after showering almost everyday. RD contact information left with patient for any future questioning.

## 2020-09-15 NOTE — CONSULT NOTE ADULT - ASSESSMENT
Mr Arenas is an 79y/o gentleman admitted with acute on chronic systolic heart failure.  He is known to the Structural Heart Team as we have evaluated him in the office.  He has severe CAD, an AICD, CKD III, DM2.      NYHA III  STS risk for AVR: 5.8%

## 2020-09-15 NOTE — PROGRESS NOTE ADULT - PROBLEM SELECTOR PLAN 1
feeling better s/p lasix iv  cont lasix as ordered  cards f/u   heart failure team f/u  plan for right heart cardiac cath

## 2020-09-15 NOTE — CHART NOTE - NSCHARTNOTEFT_GEN_A_CORE
Notified by RN pt has 6 beats WCT.  Pt is currently ASx, lying in bed comfortably in NAD. Pt baseline rythm is NSR 80-90s and is back to baseline.    Tele reviewed. Labs reviewed- electrolytes WNL.    Plan:  #6 beats WCT  - Currently on Carvedilol  - F/u BMP, Mg and Phos in AM  - If pt has persistent arrythmia or is symptomatic will call cardio  - F/u cardiology in AM      Yas Moore PA-C  Medicine Team  [id number]    >>>>>>  >06:35 UPDATE:  K repleted with 40 mEq PO x1.    09-15    137  |  97  |  56<H>  ----------------------------<  97  3.3<L>   |  25  |  1.94<H>    Ca    9.8      15 Sep 2020 05:45  Phos  3.3     09-14  Mg     2.1     09-15    TPro  6.8  /  Alb  3.7  /  TBili  0.9  /  DBili  x   /  AST  21  /  ALT  29  /  AlkPhos  77  09-15      Yas Moore PA-C  Medicine Team  [id number]

## 2020-09-16 NOTE — CONSULT NOTE ADULT - ATTENDING COMMENTS
Patient seen and examined. He is well known to my colleague Dmitry Mitchell MD. Here mainly for acute systolic heart failure exacerbation. Appears to be having quadrigeminal VPD's but aside from lead V1 on a six lead telemetry tracing suggests intermittent preexcitation which may be incidental. These could be epicardial VPD's as well. He may benefit from CRT upgrade and possible repeat EPS and ablation but this is not urgent. Dr. Mithcell will review and make recommendations. Otherwise agree with Dr. Lewis's history, physical and plans

## 2020-09-16 NOTE — CONSULT NOTE ADULT - ASSESSMENT
79 yo M with PMH of CAD s/p 4v CABG in 1990s and multiple PCI (17 stents), HFrEF (EF 25%) 2/2 ICM s/p single chamber ICD, ACC/AHA Stage C/D, low flow, low gradient severe AS, CKD 3 and poorly controlled DM2 who was transferred from [facility] with acute on chronic dyspnea on exertion, concern for low output heart failure. EP consulted to evaluate frequent ectopy seen on telemetry.    #Palpitations  #Monomorphic PVCs  - Telemetry reviewed and ICD interrogated, episode of NSVT 9/10 not correlating with any reported symptoms, no increased ectopy noted during episode of palpitations today from baseline  - PVCs appear to be right superior in origin, occurring in trigeminy, different in morphology and more frequent than last admission, unable to assess for PVC burden with single chamber ICD  - Continue carvedilol 6.125mg BID, increase if able to tolerate for ectopy suppression, amiodarone previously discontinued due to QT prolongation  - May benefit from event monitor to assess PVC burden, may consider ablation if contributing to worsening LV function    #ICM s/p ICD  - Has single chamber ICD for primary prevention, no sustained arrythmia as above  - Not likely candidate for CRT-D due to advanced heart failure, LBBB with QRS < 150    Patient to be staffed with attending. Please await attending addendum.    Heather Lewis MD  Cardiology Fellow  [phone]  All Cardiology service information can be found 24/7 on [email or web address], password: [email or web address] 79 yo M with PMH of CAD s/p 4v CABG in 1990s and multiple PCI (17 stents), HFrEF (EF 25%) 2/2 ICM s/p single chamber ICD, ACC/AHA Stage C/D, low flow, low gradient severe AS, CKD 3 and poorly controlled DM2 [facility] chronic dyspnea on exertion, concern for low output heart failure. EP consulted to evaluate frequent ectopy seen on telemetry.    #Palpitations  #Pre-excitation pathway  - Telemetry reviewed and ICD interrogated, episode of NSVT 9/10 not correlating with any reported symptoms, no increased ectopy noted during episode of palpitations today from baseline  - Unable to assess for PVC burden with single chamber ICD  - Patient appears to have frequent pre-excitation via right sided pathway, different from noted PVCs during last admission, not noted on baseline EKG  - Continue carvedilol 6.125mg BID  - Will continue to monitor on telemetry, especially if patient is started on inotropic therapy, consider ablation if patient develops sustained arrythmia    #ICM s/p ICD  - Has single chamber ICD for primary prevention, no sustained arrythmia as above  - Patient may be candidate for CRT-D with LBBB, QRS ~ 130, will assess after results of RHC tomorrow    Patient to be staffed with attending. Please await attending addendum.    Heather Lewis MD  Cardiology Fellow  [phone]  All Cardiology service information can be found 24/7 on [email or web address], password: [email or web address]
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
## 2020-09-16 NOTE — PROCEDURE NOTE - ADDITIONAL PROCEDURE DETAILS
ICD interrogated due to history of ectopy and report of intermittent palpitations. Patient without episodes of treated VT/VF, episode of NSVT for 10 sec, at 165 bpm, resolved on own.  All parameters WNL, no changes made to device. ICD interrogated due to history of ectopy and report of intermittent palpitations. Patient without episodes of treated VT/VF, episode of NSVT for 10 sec, at 165 bpm, resolved on own.  All parameters WNL, no changes made to device. I have reviewed interrogation and tracings. NO further recommendations at this time

## 2020-09-17 NOTE — PROGRESS NOTE ADULT - ASSESSMENT
80 year-old man with PMH of CAD s/p multiple prior PCI's(17 stents) s/p CABG, HFrEF w/ EF 25%, s/p [employer] single chamber ICD (11/2019), CKD-3, HTN, HLD, DMT2, AS, presents from [facility] in setting of acute shortness of breath of 1 day.He took his medicationsand after he felt shortness of breath at rest along with a pain radiating down his left arm (a pain which he has never felt before).  he takes Lasix 40 mg three times a day and has not missed doses. He typically sleeps with 3 pillows at night for chronic orthopnea and has had reaction to entresto i npast      1- CKD III   2- CHF   3- cardiomyopathy   4- DM   5- anemia     creatinine higher slightly   subnephrotic proteinuria once cr stabilizes will attempt ARB   increase lasix   RHC to evaluate fluid status with possible primacor support  iron supplement   hyperglycemia control   hypokalemia kcl 40 meq

## 2020-09-17 NOTE — PROGRESS NOTE ADULT - ASSESSMENT
79 yo M with PMH of CAD s/p 4v CABG in 1990s and multiple PCI (17 stents), HFrEF (EF 25%) 2/2 ICM s/p single chamber ICD, ACC/AHA Stage C/D, low flow, low gradient severe AS, CKD 3 and poorly controlled DM2 who was transferred from [facility] with acute on chronic dyspnea on exertion, concern for low output heart failure. EP consulted to evaluate frequent ectopy seen on telemetry.    #Palpitations  #PVCs   ICD interrogated, episode of NSVT 9/10 not correlating with any reported symptoms, unable to assess for PVC burden with single chamber ICD  - Telemetry reviewed, continued PVCs in trigeminy, likely right superior in origin  - Continue carvedilol 6.125mg BID  - Would restart amiodarone 200mg BID for ectopy suppression  - Will continue to monitor on telemetry, especially if patient is started on inotropic therapy    #ICM s/p ICD  - Has single chamber ICD for primary prevention, no sustained arrythmia as above  - Patient may be candidate for CRT-D with LBBB, QRS ~ 130, will assess after workup for AS per structural team and RHC per HF    Patient to be staffed with attending. Please await attending addendum.    Heather Lewis MD  Cardiology Fellow  [phone]  All Cardiology service information can be found 24/7 on [email or web address], password: [employer]

## 2020-09-17 NOTE — PROGRESS NOTE ADULT - ASSESSMENT
80 year-old with acute on chronic systolic heart failure.  Appreciate Heart Failure input and consideration for intrope therapy and/or CardioMems.  Appreciate EP input for possible preexcitation/accessory pathway, ventricular ectopy, and consideration for upgrade to CRT-D.  Apprecaite Structural Heart input and follow-up for low flow, low gradient AS.    Patient with low flow, low gradient aortic stenosis. Consider dobutamine stress echo to evaluate.

## 2020-09-18 NOTE — PROGRESS NOTE ADULT - PROBLEM SELECTOR PLAN 1
- Will plan for RHC with CardioMEMS pending AS workup  - GDMT: current regimen is carvedilol 6.25 mg BID. resume lisinopril 5 mg daily  - Diuretic: current regimen is lasix 40 mg TID, will continue for now pending RHC  - Device: s/p ICD, given QRS < 150 ms CRT upgrade would be of unclear benefit. would plan to obtain hemodynamics first

## 2020-09-18 NOTE — PROGRESS NOTE ADULT - ASSESSMENT
79 yo M with PMH of CAD s/p 4v CABG in 1990s and multiple PCI (17 stents), HFrEF (EF 25%) 2/2 ICM s/p single chamber ICD, ACC/AHA Stage C/D, low flow, low gradient severe AS, CKD 3 and poorly controlled DM2 who was transferred from [facility] with acute on chronic dyspnea on exertion, concern for low output heart failure. EP consulted to evaluate frequent ectopy seen on telemetry.    #PVCs  - ICD interrogated, episode of NSVT 9/10, no other sustained arrythmia, unable to assess for PVC burden with single chamber ICD  - Telemetry reviewed, continued PVCs in trigeminy, mostly right superior in origin  - Continue carvedilol 6.125mg BID  - Restart amiodarone 200mg BID for ectopy suppression, check EKG in AM due to history of QT prolongation  - Monitor LFTs, TFTs while on amiodarone, will need OP PFTs and opthalmology exams  - Will continue to monitor on telemetry, especially if patient is started on inotropic therapy    #ICM s/p ICD  - Has single chamber ICD for primary prevention, no sustained arrythmia as above  - Patient may be candidate for CRT-D with LBBB, QRS ~ 130, will assess after workup for AS per structural team and RHC per HF    Patient to be staffed with attending. Please await attending addendum.    Heather Lewis MD  Cardiology Fellow  [phone]  All Cardiology service information can be found 24/7 on [email or web address], password: [employer]

## 2020-09-18 NOTE — PROGRESS NOTE ADULT - PROBLEM SELECTOR PLAN 1
cont lasix as ordered  cards f/u   heart failure team f/u  plan for right heart cardiac cath as per heart failure team   nuc amyloid scan neg  unable to get CT cor given ckd.  ok for cath.  d/w renal  EP f/u.  no plan for aicd upgrade at this time

## 2020-09-18 NOTE — PROGRESS NOTE ADULT - ASSESSMENT
80 year-old with acute on chronic systolic heart failure.  Appreciate Heart Failure input and consideration for intrope therapy and/or CardioMems.  Appreciate EP input for possible preexcitation/accessory pathway, ventricular ectopy, and consideration for upgrade to CRT-D.  Apprecaite Structural Heart input and follow-up for low flow, low gradient AS.    Patient with low flow, low gradient aortic stenosis. Dobutamine stress echo to evaluate.

## 2020-09-18 NOTE — PROGRESS NOTE ADULT - ASSESSMENT
81 YO M with a history of ACC/AHA Stage C/D ischemic cardiomyopathy s/p ICD, CAD s/p 4v CABG in 90's and multiple subsequent PCI, at least moderate LF/LG AS, CKD III (baseline Cr 1.8), and poorly controlled DM2 (A1c 11.6%) admitted with dyspnea on exertion. He does not appear overtly volume expanded on exam. I suspect symptoms and prior hospitalizations may be due in part to low output heart failure and he has many high risk features including borderline BP requiring downtitration of medical therapy. He is not a candidate for OHT due to age or LVAD due to combination of age/comorbidities with prior sternotomy. Current plan is to re-evaluate aortic valve with DSE to see if AS is potentially severe at which point he could be considered for TAVR evaluation. Otherwise will obtain RHC/CardioMEMS for objective hemodynamics and to reduce risk of rehospitalization as well as consider palliative inotropes. He has expressed wishes to be DNR/DNI.     Review of studies  TTE 9/1/20: LV 6.5 cm, LVEF 25% with LVOT VTI 14 cm, mild-moderate MR, moderate AS, mild-moderate TR  [facility] 11/2019: severe obstructive 3v CAD, non-obstructive disease in grafts

## 2020-09-18 NOTE — PROGRESS NOTE ADULT - ASSESSMENT
80 year-old man with PMH of CAD s/p multiple prior PCI's(17 stents) s/p CABG, HFrEF w/ EF 25%, s/p [employer] single chamber ICD (11/2019), CKD-3, HTN, HLD, DMT2, AS, presents from [facility] in setting of acute shortness of breath of 1 day.He took his medicationsand after he felt shortness of breath at rest along with a pain radiating down his left arm (a pain which he has never felt before).  he takes Lasix 40 mg three times a day and has not missed doses. He typically sleeps with 3 pillows at night for chronic orthopnea and has had reaction to entresto i npast      1- CKD III   2- CHF   3- cardiomyopathy   4- DM   5- anemia     creatinine stabilizing   subnephrotic proteinuria once cr stabilizes will attempt ARB   lasix 40 mg tid   RHC to evaluate fluid status with possible primacor support  iron supplement   hyperglycemia control   k is better

## 2020-09-19 NOTE — PROGRESS NOTE ADULT - PROBLEM SELECTOR PLAN 1
- Will plan for RHC with CardioMEMS (9/22 with Dr. Pena) with possible simultaneous AV gradient measurement  - GDMT: current regimen is carvedilol 6.25 mg BID. resume lisinopril 5 mg daily  - Diuretic: current regimen is lasix 40 mg TID, will continue for now pending RHC  - Device: s/p ICD, given QRS < 150 ms CRT upgrade would be of unclear benefit. would plan to obtain hemodynamics first  - Will consider palliative inotopes based on RHC results   - Patient has expressed desire to be DNR/DNI on this and prior admissions, please complete MOLST form

## 2020-09-19 NOTE — PROGRESS NOTE ADULT - ASSESSMENT
79 YO M with a history of ACC/AHA Stage C/D ischemic cardiomyopathy s/p ICD, CAD s/p 4v CABG in 90's and multiple subsequent PCI, at least moderate LF/LG AS, CKD III (baseline Cr 1.8), and poorly controlled DM2 (A1c 11.6%) admitted with dyspnea on exertion. He does not appear overtly volume expanded on exam. I suspect symptoms and prior hospitalizations may be due in part to low output heart failure and he has many high risk features including borderline BP requiring downtitration of medical therapy. He is not a candidate for OHT due to age or LVAD due to combination of age/comorbidities with prior sternotomy. Current plan is to re-evaluate aortic valve with DSE to see if AS is potentially severe at which point he could be considered for TAVR evaluation. Otherwise will obtain RHC/CardioMEMS for objective hemodynamics and to reduce risk of rehospitalization as well as consider palliative inotropes. He has expressed wishes to be DNR/DNI.     Review of studies  TTE 9/1/20: LV 6.5 cm, LVEF 25% with LVOT VTI 14 cm, mild-moderate MR, moderate AS, mild-moderate TR  [facility] 11/2019: severe obstructive 3v CAD, non-obstructive disease in grafts

## 2020-09-19 NOTE — PROGRESS NOTE ADULT - ASSESSMENT
80 year-old man with PMH of CAD s/p multiple prior PCI's(17 stents) s/p CABG, HFrEF w/ EF 25%, s/p [employer] single chamber ICD (11/2019), CKD-3, HTN, HLD, DMT2, AS, presents from [facility] in setting of acute shortness of breath of 1 day.He took his medicationsand after he felt shortness of breath at rest along with a pain radiating down his left arm (a pain which he has never felt before).  he takes Lasix 40 mg three times a day and has not missed doses. He typically sleeps with 3 pillows at night for chronic orthopnea and has had reaction to entresto i npast      1- CKD III   2- CHF   3- cardiomyopathy   4- DM   5- anemia   6- hypokalemia   7- hx renal calculi     creatinine stabilizing   subnephrotic proteinuria once cr stabilizes will attempt ARB   lasix 40 mg tid   RHC to evaluate fluid status with possible primacor support  iron supplement   hyperglycemia control   k is low again. start k citrate 2 tab bid

## 2020-09-19 NOTE — PROGRESS NOTE ADULT - PROBLEM SELECTOR PLAN 1
cont po furosemide 40 TID lasix as ordered  cards follow up appreciated   heart failure team f/u  plan for right heart cardiac cath as per heart failure team

## 2020-09-19 NOTE — CHART NOTE - NSCHARTNOTEFT_GEN_A_CORE
EP Tele/ECG Check  Tele: SR  w/PVC. no bjorn episodes, on amiodarone and carvedilol.  EKG: SR w/PVC, manually measured QTC <500ms

## 2020-09-20 NOTE — PROGRESS NOTE ADULT - PROBLEM SELECTOR PLAN 1
cont lasix as ordered  cards f/u   heart failure team f/u  plan for right heart cardiac cath as per heart failure team   nuc amyloid scan neg  pending CT heart w/o contrast for calcium score  pending  stress echo to eval aortic valve   EP f/u.  no plan for aicd upgrade at this time

## 2020-09-20 NOTE — PROGRESS NOTE ADULT - ASSESSMENT
80 year-old man with PMH of CAD s/p multiple prior PCI's(17 stents) s/p CABG, HFrEF w/ EF 25%, s/p [employer] single chamber ICD (11/2019), CKD-3, HTN, HLD, DMT2, AS, presents from [facility] in setting of acute shortness of breath of 1 day.He took his medicationsand after he felt shortness of breath at rest along with a pain radiating down his left arm (a pain which he has never felt before).  he takes Lasix 40 mg three times a day and has not missed doses. He typically sleeps with 3 pillows at night for chronic orthopnea and has had reaction to entresto i npast      1- CKD III   2- CHF   3- cardiomyopathy   4- DM   5- anemia   6- hypokalemia   7- hx renal calculi     creatinine stabilizing   subnephrotic proteinuria once cr stabilizes will attempt ARB   lasix 40 mg tid   RHC to evaluate fluid status with possible primacor support  iron supplement   hyperglycemia control   k is low again. supplement kcl and cont  k citrate 2 tab bid   may need aldactone if k cont to remain low

## 2020-09-21 NOTE — CONSULT NOTE ADULT - PROBLEM SELECTOR RECOMMENDATION 9
- TTE on  read by Structural Heart Team as moderate AS  - if still thought to be low outflow, low output then a  stress echo could be done  -- however, given his other comorbidities and EF, it is unclear that TAVR would help him   - would recommend EP consult for possible BiV upgrade
- will plan for RHC Tuesday/Wednesday and consideration of palliative milrinone  - GDMT: current regimen is carvedilol 6.25 mg BID. will plan to resume low dose lisinopril pending BP  - Diuretic: current regimen is lasix 40 mg TID, will continue for now pending RHC  - Device: s/p ICD, no clear indication for CRT upgrade
continue medical management  goal for right heart cath with possible milrinone support  may place MEMS

## 2020-09-21 NOTE — CONSULT NOTE ADULT - PROBLEM SELECTOR RECOMMENDATION 3
spoke with pt at length  he is aware of overall medical condition and prognosis  wishes to pursue all medical interventions but would not want cpr or intubation "don't want to be a vegetable"  this is consistent with his discussion at other hospital and abby was filled out  unable to find so filled out again, placed in chart and gave pt a copy  also discussed risks and benefits of milrinone as outpatient including rec not riding his motorcycle as it would be a very high risk including death  will follow up in am

## 2020-09-21 NOTE — PROGRESS NOTE ADULT - ASSESSMENT
79 YO M with a history of ACC/AHA Stage C/D ischemic cardiomyopathy s/p ICD, CAD s/p 4v CABG in 90's and multiple subsequent PCI, at least moderate LF/LG AS, CKD III (baseline Cr 1.8), and poorly controlled DM2 (A1c 11.6%) admitted with dyspnea on exertion. He does not appear overtly volume expanded on exam. I suspect symptoms and prior hospitalizations may be due in part to low output heart failure and he has many high risk features including borderline BP requiring downtitration of medical therapy. He is not a candidate for OHT due to age or LVAD due to combination of age/comorbidities with prior sternotomy. Current plan is to re-evaluate aortic valve with DSE to see if AS is potentially severe at which point he could be considered for TAVR evaluation. Otherwise will obtain RHC/CardioMEMS for objective hemodynamics and to reduce risk of rehospitalization as well as consider palliative inotropes. He has expressed wishes to be DNR/DNI.     Review of studies  TTE 9/1/20: LV 6.5 cm, LVEF 25% with LVOT VTI 14 cm, mild-moderate MR, moderate AS, mild-moderate TR  [facility] 11/2019: severe obstructive 3v CAD, non-obstructive disease in grafts 79 YO M with a history of ACC/AHA Stage C/D ischemic cardiomyopathy s/p ICD, CAD s/p 4v CABG in 90's and multiple subsequent PCI, at least moderate LF/LG AS, CKD III (baseline Cr 1.8), and poorly controlled DM2 (A1c 11.6%) admitted with dyspnea on exertion. He does not appear overtly volume expanded on exam. I suspect symptoms and prior hospitalizations may be due in part to low output heart failure and he has many high risk features including borderline BP requiring downtitration of medical therapy. He is not a candidate for OHT due to age or LVAD due to combination of age/comorbidities with prior sternotomy. Current plan is to re-evaluate aortic valve with DSE to see if AS is potentially severe at which point he could be considered for TAVR evaluation. Otherwise will obtain RHC/CardioMEMS tomorrow for objective hemodynamics and to reduce risk of rehospitalization as well as consider palliative inotropes. He has expressed wishes to be DNR/DNI.     Review of studies  TTE 9/1/20: LV 6.5 cm, LVEF 25% with LVOT VTI 14 cm, mild-moderate MR, moderate AS, mild-moderate TR  [facility] 11/2019: severe obstructive 3v CAD, non-obstructive disease in grafts

## 2020-09-21 NOTE — PROGRESS NOTE ADULT - ASSESSMENT
79 yo M with PMH of CAD s/p 4v CABG in 1990s and multiple PCI (17 stents), HFrEF (EF 25%) 2/2 ICM s/p single chamber ICD, ACC/AHA Stage C/D, low flow, low gradient severe AS, CKD 3 and poorly controlled DM2 who was transferred from [facility] with acute on chronic dyspnea on exertion, concern for low output heart failure. EP consulted to evaluate frequent ectopy seen on telemetry.    #PVCs  - ICD interrogated, episode of NSVT 9/10, no other sustained arrythmia, unable to assess for PVC burden with single chamber ICD  - Telemetry reviewed, with continued PVCs, mostly right superior in origin, patient asymptomatic  - Continue carvedilol 6.125mg BID  - Continue amiodarone 200mg BID for ectopy suppression, serial EKG with QTc stable in 500s, continue to monitor  - Monitor LFTs, TFTs while on amiodarone, will need OP PFTs and opthalmology exams  - Continue to monitor on telemetry, ablation therapy deferred pending HF/structural workup    #ICM s/p ICD  - Has single chamber ICD for primary prevention, no sustained arrythmia as above  - Patient may be candidate for CRT-D with LBBB, QRS ~ 130, will assess after workup for AS per structural team and RHC per HF    Patient to be staffed with attending. Please await attending addendum.    Heather Lewis MD  Cardiology Fellow  [phone]  All Cardiology service information can be found 24/7 on [email or web address], password: [email or web address] 79 yo M with PMH of CAD s/p 4v CABG in 1990s and multiple PCI (17 stents), HFrEF (EF 25%) 2/2 ICM s/p single chamber ICD, ACC/AHA Stage C/D, low flow, low gradient severe AS, CKD 3 and poorly controlled DM2 who was transferred from [facility] with acute on chronic dyspnea on exertion, concern for low output heart failure. EP consulted to evaluate frequent ectopy seen on telemetry.    #PVCs  - ICD interrogated, episode of NSVT 9/10, no other sustained arrythmia, unable to assess for PVC burden with single chamber ICD  - Telemetry reviewed, with continued PVCs, mostly right superior in origin, patient asymptomatic  - Continue carvedilol 6.125mg BID  - Continue amiodarone 200mg BID for ectopy suppression, serial EKG with QTc stable in 500s, continue to monitor  - Monitor LFTs, TFTs while on amiodarone, will need OP PFTs and opthalmology exams  - Continue to monitor on telemetry, ablation therapy deferred pending HF/structural workup    #ICM s/p ICD  - Has single chamber ICD for primary prevention, no sustained arrythmia as above  - Patient may be candidate for CRT-D with LBBB, QRS ~ 130, will assess after workup for AS per structural team and RHC per HF    EP to sign off, please reconsult if additional questions or patient has clinical change    Heather Lewis MD  Cardiology Fellow  [phone]  All Cardiology service information can be found 24/7 on [email or web address], password: [email or web address] 81 yo M with PMH of CAD s/p 4v CABG in 1990s and multiple PCI (17 stents), HFrEF (EF 25%) 2/2 ICM s/p single chamber ICD, ACC/AHA Stage C/D, low flow, low gradient severe AS, CKD 3 and poorly controlled DM2 who was transferred from [facility] with acute on chronic dyspnea on exertion, concern for low output heart failure. EP consulted to evaluate frequent ectopy seen on telemetry.    #PVCs  - ICD interrogated, episode of NSVT 9/10, no other sustained arrythmia, unable to assess for PVC burden with single chamber ICD  - Telemetry reviewed, with continued PVCs, mostly right superior in origin, patient asymptomatic  - Continue carvedilol 6.125mg BID  - Continue amiodarone 200mg BID for ectopy suppression, serial EKG with QTc stable in 500s, continue to monitor  - Monitor LFTs, TFTs while on amiodarone, will need OP PFTs and opthalmology exams  - Continue to monitor on telemetry, ablation therapy deferred pending HF/structural workup    #ICM s/p ICD  - Has single chamber ICD for primary prevention, no sustained arrythmia as above  - Patient may be candidate for CRT-D with LBBB, QRS ~ 130, will assess after workup for AS per structural team and RHC per HF    EP to sign off for now, please reconsult if additional questions or patient has clinical change    Heather Lewis MD  Cardiology Fellow  [phone]  All Cardiology service information can be found 24/7 on [email or web address], password: [email or web address]
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
## 2020-09-21 NOTE — PHARMACOTHERAPY INTERVENTION NOTE - COMMENTS
Recommended patient's Lantus dose be reduced from 40 units qhs to 35 units qhs, as patient had a fasting BG reading of 69 this morning. Additionally, patient states he doesn't like the taste of the hospital food and has been skipping some of his meals.     Liliana Choudhury, PharmD  PGY-1 Pharmacy Resident  [id number]

## 2020-09-21 NOTE — PROGRESS NOTE ADULT - ASSESSMENT
80 year-old man with PMH of CAD s/p multiple prior PCI's(17 stents) s/p CABG, HFrEF w/ EF 25%, s/p [employer] single chamber ICD (11/2019), CKD-3, HTN, HLD, DMT2, AS, presents from [facility] in setting of acute shortness of breath of 1 day.He took his medicationsand after he felt shortness of breath at rest along with a pain radiating down his left arm (a pain which he has never felt before).  he takes Lasix 40 mg three times a day and has not missed doses. He typically sleeps with 3 pillows at night for chronic orthopnea and has had reaction to entresto i npast      1- CKD III   2- CHF   3- cardiomyopathy   4- DM   5- anemia   6- hypokalemia   7- hx renal calculi     creatinine stabilizing   subnephrotic proteinuria once cr stabilizes will attempt ARB   lasix 40 mg tid   RHC  pending   iron supplement   hyperglycemia control    cont  k citrate 2 tab bid   may need aldactone if k cont to remain low

## 2020-09-21 NOTE — PROGRESS NOTE ADULT - PROBLEM SELECTOR PLAN 1
cont lasix as ordered  cards f/u   heart failure team f/u  plan for right heart cardiac cath with MEMS tomorrow as per heart failure team   nuc amyloid scan neg   CT heart noted. mild-mod AS on CT   EP f/u.  no plan for aicd upgrade at this time

## 2020-09-21 NOTE — PROGRESS NOTE ADULT - ASSESSMENT
80 year-old with acute on chronic systolic heart failure.  Plan for RHC and CardioMEMs, possible cath evaluation of aortic valve.  Consider dobutamine stress to evaluate LV reserve and aortic valve.    Appreciate Structural Heart team and Heart Failure follow-up.

## 2020-09-21 NOTE — PROGRESS NOTE ADULT - PROBLEM SELECTOR PLAN 1
- Will plan for RHC with CardioMEMS (9/22 with Dr. Pena) with possible simultaneous AV gradient measurement  - GDMT: current regimen is carvedilol 6.25 mg BID. lisinopril held as per renal. Would start hydralazine 10mg PO TID  - Diuretic: decrease lasix 40 mg BID, will reassess after RHC  - Device: s/p ICD, given QRS < 150 ms CRT upgrade would be of unclear benefit. would plan to obtain hemodynamics first  - Will consider palliative inotopes based on RHC results   - Patient has expressed desire to be DNR/DNI on this and prior admissions, please complete MOLST form

## 2020-09-21 NOTE — CONSULT NOTE ADULT - PROBLEM SELECTOR PROBLEM 1
Aortic valve stenosis, etiology of cardiac valve disease unspecified
Chronic systolic heart failure
Chronic systolic heart failure

## 2020-09-22 NOTE — CHART NOTE - NSCHARTNOTEFT_GEN_A_CORE
Pt in cath.  will follow up after.  goals established.  if no other issues will sign off.  Mckenna Paez Do

## 2020-09-22 NOTE — PROGRESS NOTE ADULT - ASSESSMENT
80 year-old with acute on chronic systolic heart failure.  Plan for RHC and CardioMEMs, possible cath evaluation of aortic valve.  Consider dobutamine stress to evaluate LV reserve and aortic valve.    Appreciate Structural Heart team and Heart Failure follow-up.    ADDENDUM:   Right heart cath reviewed with Dr. Pena. Elevated filling pressures with low output state.  Will discuss further diuresis and possible inotrope or neurohormonal re-challenge with Heart Failure.

## 2020-09-22 NOTE — PROGRESS NOTE ADULT - PROBLEM SELECTOR PLAN 1
cont lasix as ordered  cards f/u   heart failure team f/u  s/p right heart cardiac cath with MEMS this am  nuc amyloid scan neg   CT heart noted. mild-mod AS on CT   EP f/u.  no plan for aicd upgrade at this time

## 2020-09-23 NOTE — CHART NOTE - NSCHARTNOTEFT_GEN_A_CORE
Nutrition Chart Note.  Pt seen for: Nutrition Follow-up     Source: EMR, Team, Pt    Diet : DASH/TLC, Consistent Carbohydrate {Evening Snack}, No Concentrated Potassium, No Concentrated Phosphorus        Patient reports [ ] nausea  [ ] vomiting [ ] diarrhea [ ] constipation  [ ]chewing problems [ ] swallowing issues  [ ] other:     PO intake:  < 50% [ ] 50-75% [ ]   % [ ]  other :    Enteral /Parenteral Nutrition:       Current Weight: Weight (kg): 82.1 (09-22 @ 10:17)  % Weight Change    Pertinent Medications: MEDICATIONS  (STANDING):  allopurinol 300 milliGRAM(s) Oral daily  aMIOdarone    Tablet 200 milliGRAM(s) Oral two times a day  aspirin enteric coated 81 milliGRAM(s) Oral daily  atorvastatin 80 milliGRAM(s) Oral at bedtime  carvedilol 6.25 milliGRAM(s) Oral every 12 hours  clopidogrel Tablet 75 milliGRAM(s) Oral daily  dextrose 5%. 1000 milliLiter(s) (50 mL/Hr) IV Continuous <Continuous>  dextrose 50% Injectable 12.5 Gram(s) IV Push once  dextrose 50% Injectable 25 Gram(s) IV Push once  dextrose 50% Injectable 25 Gram(s) IV Push once  furosemide   Injectable 40 milliGRAM(s) IV Push two times a day  heparin   Injectable 5000 Unit(s) SubCutaneous every 8 hours  hydrALAZINE 20 milliGRAM(s) Oral three times a day  hydrALAZINE 10 milliGRAM(s) Oral three times a day  influenza   Vaccine 0.5 milliLiter(s) IntraMuscular once  insulin glargine Injectable (LANTUS) 35 Unit(s) SubCutaneous at bedtime  insulin lispro (HumaLOG) corrective regimen sliding scale   SubCutaneous three times a day before meals  insulin lispro (HumaLOG) corrective regimen sliding scale   SubCutaneous at bedtime  insulin lispro Injectable (HumaLOG) 8 Unit(s) SubCutaneous before dinner  melatonin 3 milliGRAM(s) Oral at bedtime  mirtazapine 15 milliGRAM(s) Oral at bedtime  potassium citrate 20 milliEquivalent(s) Oral two times a day  ranolazine 500 milliGRAM(s) Oral two times a day    MEDICATIONS  (PRN):  dextrose 40% Gel 15 Gram(s) Oral once PRN Blood Glucose LESS THAN 70 milliGRAM(s)/deciliter  glucagon  Injectable 1 milliGRAM(s) IntraMuscular once PRN Glucose LESS THAN 70 milligrams/deciliter    Pertinent Labs:  09-23 Na139 mmol/L Glu 149 mg/dL<H> K+ 3.8 mmol/L Cr  2.06 mg/dL<H> BUN 62 mg/dL<H> 09-17 Phos 4.5 mg/dL 09-22 Alb 3.5 g/dL      Skin per nursing documentation:   Edema:    Estimated Needs:   [ ] no change since previous assessment  [ ] recalculated:       Previous Nutrition Diagnosis:     [ ] Inadequate Energy Intake [ ]Inadequate Oral Intake [ ] Excessive Energy Intake     [ ] Underweight [ ] Increased Nutrient Needs [ ] Overweight/Obesity     [ ] Altered GI Function [ ] Unintended Weight Loss [ ] Food & Nutrition Related Knowledge Deficit [ ] Malnutrition          Nutrition Diagnosis is [ ] ongoing  [ ] resolved [ ] not applicable          New Nutrition Diagnosis: [ ] not applicable    [ ] Inadequate Protein Energy Intake [ ]Inadequate Oral Intake [ ] Excessive Energy Intake     [ ] Underweight [ ] Increased Nutrient Needs [ ] Overweight/Obesity     [ ] Altered GI Function [ ] Unintended Weight Loss [ ] Food & Nutrition Related Knowledge Deficit[ ] Limited Adherence to nutrition related recommendations [ ] Malnutrition  [ ] other: Free text       Related to:   As evidenced by:     Interventions:        Monitoring and Evaluation:   Continue to monitor Nutritional intake, Tolerance to diet prescription, weights, labs, skin integrity  RD remains available upon request and will follow up per protocol Nutrition Chart Note.  Pt seen for: Nutrition Follow-up     Source: EMR, Team, Pt    Chart reviewed, events noted. Per chart: "80 year-old man with PMH of CAD s/p multiple prior PCI's(17 stents) s/p CABG, HFrEF w/ EF 25%, s/p [employer] single chamber ICD (11/2019), CKD-3, HTN, HLD, DMT2, AS presents with acute SOB thought to be 2/2 to acute on chronic systolic heart failure." s/p RHC 9/22. Palliative following for GOC.    Diet : DASH/TLC, Consistent Carbohydrate {Evening Snack}, No Concentrated Potassium, No Concentrated Phosphorus      Pt reports he has been with decreased PO intake in-house, consuming <50% at meals since admission 2/2 dislike for institutionalized foods. Admits the other day he ate some of a sandwich his wife brought him from home. Pt declined oral nutrition supplements at this time. RD obtained dietary preferences and reviewed menu order procedures. Also noted would try to liberalize diet (remove K & P restriction - serum levels WNL) to provide pt with additional options. Discussed importance of optimizing PO intake 2/2 increased nutritional needs along with suboptimal intake affecting BG levels in setting of DM. Per initial RD assessment recent A1c 11.6% (8/20) and declined DM education; A1c obtained 9/15 - 9.6% indicating improvement, however still with suboptimal glycemic control. Discussed consistent CHO diet education. Reviewed foods containing CHO, pairing CHO with proteins, monitoring blood glucose levels, not skipping meals. Reviewed meal/snack options.    Pt admits he had weight loss within the 6 months PTA; was 207 Ibs and admits to decreased PO intake 2/2 "personal reasons" consequently leading to wt loss getting down to 187 pounds. [facility] (11/2019) -> 189 pounds (3/2020). Pt admitted to hospital upon current admission with fluid retention/edema and was diuresed - dosing weight 9/13 - 192.4 pounds (standing) -> weight 9/22 - 180.9 Ibs. (standing); Pt continues to receive lasix in-house, however also noted with suboptimal PO intake. Nutrition focused physical exam conducted upon verbal consent from pt, findings reveal mild muscle/fat loss of temples, clavicles, calves, triceps, and orbitals.     GI: Denies GI distress. no bowel regimen ordered    PO intake:  < 50%    Current Weight:   180.9 Ibs. (9-22) - standing  186.5 Ibs. (9-15) - standing  192.4 Ibs. (9-13) - standing; dosing  % Weight Change: 11.5 pound (~6%) wt loss in < 2 weeks; 30 pound (14%) wt loss x 10 months.     Pertinent Medications: MEDICATIONS  (STANDING):  allopurinol 300 milliGRAM(s) Oral daily  aMIOdarone    Tablet 200 milliGRAM(s) Oral two times a day  aspirin enteric coated 81 milliGRAM(s) Oral daily  atorvastatin 80 milliGRAM(s) Oral at bedtime  carvedilol 6.25 milliGRAM(s) Oral every 12 hours  clopidogrel Tablet 75 milliGRAM(s) Oral daily  dextrose 5%. 1000 milliLiter(s) (50 mL/Hr) IV Continuous <Continuous>  dextrose 50% Injectable 12.5 Gram(s) IV Push once  dextrose 50% Injectable 25 Gram(s) IV Push once  dextrose 50% Injectable 25 Gram(s) IV Push once  furosemide   Injectable 40 milliGRAM(s) IV Push two times a day  heparin   Injectable 5000 Unit(s) SubCutaneous every 8 hours  hydrALAZINE 20 milliGRAM(s) Oral three times a day  hydrALAZINE 10 milliGRAM(s) Oral three times a day  influenza   Vaccine 0.5 milliLiter(s) IntraMuscular once  insulin glargine Injectable (LANTUS) 35 Unit(s) SubCutaneous at bedtime  insulin lispro (HumaLOG) corrective regimen sliding scale   SubCutaneous three times a day before meals  insulin lispro (HumaLOG) corrective regimen sliding scale   SubCutaneous at bedtime  insulin lispro Injectable (HumaLOG) 8 Unit(s) SubCutaneous before dinner  melatonin 3 milliGRAM(s) Oral at bedtime  mirtazapine 15 milliGRAM(s) Oral at bedtime  potassium citrate 20 milliEquivalent(s) Oral two times a day  ranolazine 500 milliGRAM(s) Oral two times a day    MEDICATIONS  (PRN):  dextrose 40% Gel 15 Gram(s) Oral once PRN Blood Glucose LESS THAN 70 milliGRAM(s)/deciliter  glucagon  Injectable 1 milliGRAM(s) IntraMuscular once PRN Glucose LESS THAN 70 milligrams/deciliter    Pertinent Labs:  09-23 Na139 mmol/L Glu 149 mg/dL<H> K+ 3.8 mmol/L Cr  2.06 mg/dL<H> BUN 62 mg/dL<H> 09-17 Phos 4.5 mg/dL 09-22 Alb 3.5 g/dL  CAPILLARY BLOOD GLUCOSE  POCT Blood Glucose.: 159 mg/dL (23 Sep 2020 11:46)  POCT Blood Glucose.: 149 mg/dL (23 Sep 2020 07:19)  POCT Blood Glucose.: 331 mg/dL (22 Sep 2020 21:49)  POCT Blood Glucose.: 178 mg/dL (22 Sep 2020 17:11)      Skin per nursing documentation: no pressure injuries noted  Edema: none    Estimated Needs:   [x ] recalculated: Based on  pounds (70kg)  Energy (25-30 kcals/kg): 8144-4135  Protein (1.2-1.4 g/kg): 84-98      Previous Nutrition Diagnosis: Food & Nutrition Related Knowledge Deficit  Nutrition Diagnosis is [ x] ongoing - addressed with diet education - reinforced information upon follow-up         New Nutrition Diagnosis: Malnutrition - Moderate, acute on chronic  Related to: inadequate protein calorie intake with increased physiological demand  As evidenced by: meeting <75% estimated nutritional needs >7 days, 11.5 pound (~6%) wt loss in < 2 weeks; 30 pound (14%) wt loss x 10 months, mild muscle/fat loss    Interventions:   1. Recommend Consistent CHO, DASH diet - d/c Potassium & Phosphorous restriction (serum levels WNL)  2. Recommend Multivitamin supplementation in setting of suboptimal PO intake  3. Diet preferences obtained, RD to provide as feasible.  4. Diet education provided. RD to reinforce as able  5. New malnutrition alert placed - Will follow-up according to protocol.       Monitoring and Evaluation:   Continue to monitor Nutritional intake, Tolerance to diet prescription, weights, labs, skin integrity  RD remains available upon request and will follow up per protocol    Taylor Hutchinson, MS, RD, CDN  pager #[phone]

## 2020-09-23 NOTE — CHART NOTE - TREATMENT: THE FOLLOWING DIET HAS BEEN RECOMMENDED
Recommend Consistent CHO, DASH diet - d/c Potassium & Phosphorous restriction (serum levels WNL)  Recommend Multivitamin supplementation in setting of suboptimal PO intake

## 2020-09-23 NOTE — CHART NOTE - FINDINGS AS BASED ON:
Patient Education/Calorie counts (nutrient intake analysis/Comprehensive nutrition assessment and consultation

## 2020-09-23 NOTE — PROGRESS NOTE ADULT - ASSESSMENT
79 YO M with a history of ACC/AHA Stage C/D ischemic cardiomyopathy s/p ICD, CAD s/p 4v CABG in 90's and multiple subsequent PCI, at least moderate LF/LG AS, CKD III (baseline Cr 1.8), and poorly controlled DM2 (A1c 11.6%) admitted with dyspnea on exertion. He does not appear overtly volume expanded on exam. I suspect symptoms and prior hospitalizations may be due in part to low output heart failure and he has many high risk features including borderline BP requiring downtitration of medical therapy. He is not a candidate for OHT due to age or LVAD due to combination of age/comorbidities with prior sternotomy. Current plan is to re-evaluate aortic valve with DSE to see if AS is potentially severe at which point he could be considered for TAVR evaluation. Otherwise will obtain RHC/CardioMEMS tomorrow for objective hemodynamics and to reduce risk of rehospitalization as well as consider palliative inotropes. He has expressed wishes to be DNR/DNI.     Review of studies    RHC 9/22: RA 15, RV 56/18, PA 56/30/40, PCWP: 33,. CO ([place]) 3.171, CI: 1.571, SVR 1514  TTE 9/1/20: LV 6.5 cm, LVEF 25% with LVOT VTI 14 cm, mild-moderate MR, moderate AS, mild-moderate TR  [facility] 11/2019: severe obstructive 3v CAD, non-obstructive disease in grafts 81 YO M with a history of ACC/AHA Stage C/D ischemic cardiomyopathy s/p ICD, CAD s/p 4v CABG in 90's and multiple subsequent PCI, at least moderate LF/LG AS, CKD III (baseline Cr 1.8), and poorly controlled DM2 (A1c 11.6%) admitted with dyspnea on exertion. He initially did not appear volume expanded and he likely has low output heart failure and he has many high risk features including borderline BP requiring downtitration of medical therapy. He is not a candidate for OHT due to age or LVAD due to combination of age/comorbidities with prior sternotomy. There was concern he had significant AS that would require possible TAVR evaluation but CT scan showed mild-mod calcification and on exam, appears to have moderate AS at best. Underwent RHC which did show elevated filling pressures and low CI. He has expressed wishes to be DNR/DNI.     Review of studies    RHC 9/22: RA 15, RV 56/18, PA 56/30/40, PCWP: 33,. CO ([place]) 3.171, CI: 1.571, SVR 1514  TTE 9/1/20: LV 6.5 cm, LVEF 25% with LVOT VTI 14 cm, mild-moderate MR, moderate AS, mild-moderate TR  [facility] 11/2019: severe obstructive 3v CAD, non-obstructive disease in grafts

## 2020-09-23 NOTE — PROGRESS NOTE ADULT - PROBLEM SELECTOR PLAN 1
- s/p RHC/CardioMEMS with elevated filling pressures and low output  - GDMT: current regimen is carvedilol 6.25 mg BID. lisinopril held as per renal. - Would increase hydralazine 20mg PO TID  - Diuretic: Would start lasix 40mg IV BID, will reassess tomorrow morning  - Device: s/p ICD, given QRS < 150 ms CRT upgrade would be of unclear benefit. would plan to obtain hemodynamics first  - Will consider palliative inotopes if unable to uptitrate meds  - Patient has expressed desire to be DNR/DNI on this and prior admissions, please complete MOLST form - s/p RHC/CardioMEMS with elevated filling pressures and low output  - GDMT: current regimen is carvedilol 6.25 mg BID. lisinopril held as per renal. - increase hydralazine 20mg PO TID  - Diuretic: Would start lasix 40mg IV BID, will reassess tomorrow morning  - Device: s/p ICD, given QRS < 150 ms CRT upgrade would be of unclear benefit. would plan to obtain hemodynamics first  - Will consider palliative inotopes if unable to uptitrate meds  - Patient has expressed desire to be DNR/DNI on this and prior admissions

## 2020-09-23 NOTE — PROGRESS NOTE ADULT - ASSESSMENT
80 year-old man with PMH of CAD s/p multiple prior PCI's(17 stents) s/p CABG, HFrEF w/ EF 25%, s/p [employer] single chamber ICD (11/2019), CKD-3, HTN, HLD, DMT2, AS, presents from [facility] in setting of acute shortness of breath of 1 day.He took his medicationsand after he felt shortness of breath at rest along with a pain radiating down his left arm (a pain which he has never felt before).  he takes Lasix 40 mg three times a day and has not missed doses. He typically sleeps with 3 pillows at night for chronic orthopnea and has had reaction to entresto i npast      1- CKD III   2- CHF   3- cardiomyopathy   4- DM   5- anemia   6- hypokalemia   7- hx renal calculi     creatinine steady   subnephrotic proteinuria once cr stabilizes will attempt ARB   lasix 40 mg tid   iron supplement   hyperglycemia control    cont  k citrate 2 tab bid

## 2020-09-23 NOTE — PROGRESS NOTE ADULT - PROBLEM SELECTOR PLAN 1
cont lasix as ordered  cards f/u   heart failure team f/u  s/p right heart cardiac cath with MEMS 9/22  nuc amyloid scan neg   CT heart noted. mild-mod AS on CT   EP f/u.  no plan for aicd upgrade at this time  heart failure f/u. additional lasix as per heart failure team  may need milrinone at home

## 2020-09-23 NOTE — PROGRESS NOTE ADULT - ASSESSMENT
80 year-old with acute on chronic systolic heart failure.  Now status post RHC showing low output state, high filling pressures, and high SVR.  CardioMEMs placed.    Consider dobutamine stress to evaluate LV reserve and aortic valve. This can be pursued as outpatient.    Appreciate Structural Heart team and Heart Failure follow-up.

## 2020-09-24 NOTE — CHART NOTE - NSCHARTNOTEFT_GEN_A_CORE
Patient received IV Lasix 40 this morning, per HF, IV Lasix 60 BID STAT dose now and start Milrinone gtt at 0.25/hr. HF to re-evaluate in the afternoon.    HD stable. Order placed   RN made aware of plan    Shawna Flower PA-C

## 2020-09-24 NOTE — CHART NOTE - NSCHARTNOTEFT_GEN_A_CORE
goals established (MOLST in chart/ copy provided to patient).  No active symptoms.  Will sign off. Please reconsult as needed.  Mckenna Paez DO

## 2020-09-24 NOTE — PROGRESS NOTE ADULT - PROBLEM SELECTOR PLAN 1
cont lasix as ordered  cards f/u   heart failure team f/u  s/p right heart cardiac cath with MEMS 9/22  nuc amyloid scan neg   CT heart noted. mild-mod AS on CT   EP f/u.  no plan for aicd upgrade at this time  heart failure f/u. additional lasix as per heart failure team  milrinone as per heart failure team

## 2020-09-24 NOTE — PROGRESS NOTE ADULT - ASSESSMENT
81 YO M with a history of ACC/AHA Stage C/D ischemic cardiomyopathy s/p ICD, CAD s/p 4v CABG in 90's and multiple subsequent PCI, at least moderate LF/LG AS, CKD III (baseline Cr 1.8), and poorly controlled DM2 (A1c 11.6%) admitted with dyspnea on exertion. He initially did not appear volume expanded and he likely has low output heart failure and he has many high risk features including borderline BP requiring downtitration of medical therapy. He is not a candidate for OHT due to age or LVAD due to combination of age/comorbidities with prior sternotomy. There was concern he had significant AS that would require possible TAVR evaluation but CT scan showed mild-mod calcification and on exam, appears to have moderate AS at best. Underwent RHC which did show elevated filling pressures and low CI. He has expressed wishes to be DNR/DNI.     Review of studies    RHC 9/22: RA 15, RV 56/18, PA 56/30/40, PCWP: 33,. CO ([place]) 3.171, CI: 1.571, SVR 1514  TTE 9/1/20: LV 6.5 cm, LVEF 25% with LVOT VTI 14 cm, mild-moderate MR, moderate AS, mild-moderate TR  [facility] 11/2019: severe obstructive 3v CAD, non-obstructive disease in grafts

## 2020-09-24 NOTE — PROGRESS NOTE ADULT - PROBLEM SELECTOR PLAN 1
- s/p RHC/CardioMEMS with elevated filling pressures and low output  - GDMT: current regimen is carvedilol 6.25 mg BID. lisinopril held as per renal. c/w  hydralazine 20mg PO TID.  - Diuretic: Would give additional 60mg IV lasix and start BID. monitor I/O will reassess this evening  - Device: s/p ICD, given QRS < 150 ms CRT upgrade would be of unclear benefit. would plan to obtain hemodynamics first  Advance Therapies: start milrinone 0.25mcg/kg/min, repeat VBG/lactate from bui this evening  - Patient has expressed desire to be DNR/DNI on this and prior admissions - s/p RHC/CardioMEMS with elevated filling pressures and low output  - GDMT: current regimen is carvedilol 6.25 mg BID. lisinopril held as per renal. c/w  hydralazine 20mg PO TID.  - Diuretic: Would give additional 60mg IV lasix and start BID. monitor I/O will reassess this evening  - Device: s/p ICD, given QRS < 150 ms CRT upgrade would be of unclear benefit. would plan to obtain hemodynamics first  Advance Therapies: start milrinone 0.25mcg/kg/min,   - Patient has expressed desire to be DNR/DNI on this and prior admissions

## 2020-09-24 NOTE — PROGRESS NOTE ADULT - ASSESSMENT
80 year-old with acute on chronic systolic heart failure.  Now status post RHC showing low output state, high filling pressures, and high SVR.  CardioMEMs placed.  Plan for challenge with inotrope assist per Heart Failure.    Consider dobutamine stress to evaluate LV reserve and aortic valve. This can be pursued as outpatient.    Appreciate Structural Heart team and Heart Failure follow-up.

## 2020-09-25 NOTE — CHART NOTE - NSCHARTNOTEFT_GEN_A_CORE
Nutrition Chart Note.  Pt seen for: Initial malnutrition follow-up    Source: EMR, Pt    Chart reviewed, events noted. Per chart: "80 year-old man with PMH of CAD s/p multiple prior PCI's(17 stents) s/p CABG, HFrEF w/ EF 25%, s/p [employer] single chamber ICD (11/2019), CKD-3, HTN, HLD, DMT2, AS presents with acute SOB thought to be 2/2 to acute on chronic systolic heart failure." s/p RHC 9/22. Palliative following for GOC.    Diet :  DASH/TLC, Consistent Carbohydrate {Evening Snack}, No Concentrated Potassium, No Concentrated Phosphorus     Upon prior RD visit (9/23): Pt reported decreased PO intake in-house since admission, consuming <50% at meals 2/2 dislike for institutionalized foods. RD obtained dietary preferences and reviewed menu order procedures. Also noted would try to liberalize diet (remove K & P restriction - serum levels WNL) to provide pt with additional options. Discussed importance of optimizing PO intake 2/2 increased nutritional needs along with suboptimal intake affecting BG levels in setting of DM. DM education provided.    Upon current RD visit (9/25): Reports he has been eating more at his meals (>50% at meals) and consuming more protein as discussed prior; RD has been providing dietary preferences as discussed. Pt continues to decline oral nutrition supplements at this time. Reinforced optimizing PO intake at meals/snacks and focusing on protein intake at meals. Pt denies GI distress at this time.    Nutrition Events:  - Last Potassium & Phosphorous draws WNL  - DM - A1c obtained 9/15 - 9.6%. BG optimization in-house with Lantus, Humalog & sliding scale.    GI: Last BM 9/24 - no bowel regimen ordered    PO intake:  50-75%    Current Weight: Pt currently being diuresed on lasix. Will continue to monitor/trend weight status.  180.3 Ibs. (9-25) - standing  180.9 Ibs. (9-22) - standing  186.5 Ibs. (9-15) - standing  192.4 Ibs. (9-13) - standing; dosing  % Weight Change    Pertinent Medications: MEDICATIONS  (STANDING):  allopurinol 300 milliGRAM(s) Oral daily  aMIOdarone    Tablet 200 milliGRAM(s) Oral two times a day  aspirin enteric coated 81 milliGRAM(s) Oral daily  atorvastatin 80 milliGRAM(s) Oral at bedtime  carvedilol 6.25 milliGRAM(s) Oral every 12 hours  clopidogrel Tablet 75 milliGRAM(s) Oral daily  dextrose 5%. 1000 milliLiter(s) (50 mL/Hr) IV Continuous <Continuous>  dextrose 50% Injectable 12.5 Gram(s) IV Push once  dextrose 50% Injectable 25 Gram(s) IV Push once  dextrose 50% Injectable 25 Gram(s) IV Push once  furosemide   Injectable 60 milliGRAM(s) IV Push every 12 hours  heparin   Injectable 5000 Unit(s) SubCutaneous every 8 hours  hydrALAZINE 30 milliGRAM(s) Oral three times a day  influenza   Vaccine 0.5 milliLiter(s) IntraMuscular once  insulin glargine Injectable (LANTUS) 35 Unit(s) SubCutaneous at bedtime  insulin lispro (HumaLOG) corrective regimen sliding scale   SubCutaneous three times a day before meals  insulin lispro (HumaLOG) corrective regimen sliding scale   SubCutaneous at bedtime  insulin lispro Injectable (HumaLOG) 8 Unit(s) SubCutaneous before dinner  melatonin 3 milliGRAM(s) Oral at bedtime  milrinone Infusion 0.25 MICROgram(s)/kG/Min (6.16 mL/Hr) IV Continuous <Continuous>  mirtazapine 15 milliGRAM(s) Oral at bedtime  potassium chloride    Tablet ER 20 milliEquivalent(s) Oral once  potassium citrate 20 milliEquivalent(s) Oral two times a day  ranolazine 500 milliGRAM(s) Oral two times a day    MEDICATIONS  (PRN):  dextrose 40% Gel 15 Gram(s) Oral once PRN Blood Glucose LESS THAN 70 milliGRAM(s)/deciliter  glucagon  Injectable 1 milliGRAM(s) IntraMuscular once PRN Glucose LESS THAN 70 milligrams/deciliter    Pertinent Labs:  09-25 Na137 mmol/L Glu 202 mg/dL<H> K+ 3.7 mmol/L Cr  2.13 mg/dL<H> BUN 56 mg/dL<H> 09-22 Alb 3.5 g/dL    CAPILLARY BLOOD GLUCOSE  POCT Blood Glucose.: 176 mg/dL (25 Sep 2020 07:54)  POCT Blood Glucose.: 162 mg/dL (24 Sep 2020 22:14)  POCT Blood Glucose.: 189 mg/dL (24 Sep 2020 17:41)  POCT Blood Glucose.: 211 mg/dL (24 Sep 2020 11:32)      Skin per nursing documentation: no pressure injuries noted  Edema: none    Estimated Needs:   [x ] recalculated: Based on  pounds (70kg)  Energy (25-30 kcals/kg): 3189-2671  Protein (1.2-1.4 g/kg): 84-98      Previous Nutrition Diagnosis:   1. Malnutrition- Moderate, acute on chronic - Nutrition Diagnosis ongoing - addressed with dietary preferances and increased PO  2. Food & Nutrition Related Knowledge Deficit - Nutrition Diagnosis - addressed with diet education - reinforced information upon follow-up      New Nutrition Diagnosis: N/a    Interventions:   1. Recommend Consistent CHO, DASH diet - d/c Potassium & Phosphorous restriction (serum levels WNL)  2. Recommend Multivitamin supplementation in setting of suboptimal PO intake  3. Diet preferences obtained, RD to provide as feasible.  4. Diet education reinforced.      Monitoring and Evaluation:   Continue to monitor Nutritional intake, Tolerance to diet prescription, weights, labs, skin integrity  RD remains available upon request and will follow up per protocol    Taylor Hutchinson, MS, RD, CDN  pager #[phone]
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
## 2020-09-25 NOTE — PROGRESS NOTE ADULT - ASSESSMENT
79 YO M with a history of ACC/AHA Stage C/D ischemic cardiomyopathy s/p ICD, CAD s/p 4v CABG in 90's and multiple subsequent PCI, at least moderate LF/LG AS, CKD III (baseline Cr 1.8), and poorly controlled DM2 (A1c 11.6%) admitted with dyspnea on exertion. He initially did not appear volume expanded and he likely has low output heart failure and he has many high risk features including borderline BP requiring downtitration of medical therapy. He is not a candidate for OHT due to age or LVAD due to combination of age/comorbidities with prior sternotomy. There was concern he had significant AS that would require possible TAVR evaluation but CT scan showed mild-mod calcification and on exam, appears to have moderate AS at best. Underwent RHC which did show elevated filling pressures and low CI. He has expressed wishes to be DNR/DNI.     Review of studies    RHC 9/22: RA 15, RV 56/18, PA 56/30/40, PCWP: 33,. CO ([place]) 3.171, CI: 1.571, SVR 1514  TTE 9/1/20: LV 6.5 cm, LVEF 25% with LVOT VTI 14 cm, mild-moderate MR, moderate AS, mild-moderate TR  [facility] 11/2019: severe obstructive 3v CAD, non-obstructive disease in grafts

## 2020-09-25 NOTE — PROVIDER CONTACT NOTE (CHANGE IN STATUS NOTIFICATION) - ASSESSMENT
Right bui dressing is saturated and actively bleeding. Right bui dressing is saturated and actively bleeding. Vital signs as per flowsheet.

## 2020-09-25 NOTE — PROGRESS NOTE ADULT - PROBLEM SELECTOR PLAN 1
- s/p RHC/CardioMEMS with elevated filling pressures and low output  - GDMT: current regimen is carvedilol 6.25 mg BID. lisinopril held as per renal. increase hydralazine 30mg PO TID.  - Diuretic: c/w lasix 60mg IV q12  - Device: s/p ICD, given QRS < 150 ms CRT upgrade would be of unclear benefit. would plan to obtain hemodynamics first  Advance Therapies: c/w milrinone 0.25mcg/kg/min, pt. will likely benefit from home milrinone. Would obtain Plascencia catheter.   - Patient has expressed desire to be DNR/DNI on this and prior admissions

## 2020-09-25 NOTE — PROGRESS NOTE ADULT - ASSESSMENT
80 year-old with acute on chronic systolic heart failure.  Now status post RHC showing low output state, high filling pressures, and high SVR.  CardioMEMs placed.  Inotrope assist per Heart Failure.    Appreciate Heart Failure follow-up.

## 2020-09-25 NOTE — PROGRESS NOTE ADULT - PROBLEM SELECTOR PLAN 1
cont lasix as ordered  cards f/u   heart failure team f/u  s/p right heart cardiac cath with MEMS 9/22  nuc amyloid scan neg   CT heart noted. mild-mod AS on CT   EP f/u.  no plan for aicd upgrade at this time  heart failure f/u. additional lasix as per heart failure team  milrinone as per heart failure team  pending central line placement for home infusion

## 2020-09-25 NOTE — PROVIDER CONTACT NOTE (CHANGE IN STATUS NOTIFICATION) - ACTION/TREATMENT ORDERED:
Dressing reinforced with three guaze pads and tape. IR called, left message; Dressing reinforced with three guaze pads and tape. RN will continue to monitor

## 2020-09-26 NOTE — PROGRESS NOTE ADULT - PROBLEM SELECTOR PLAN 1
cont lasix as ordered  cards f/u   heart failure team f/u  s/p right heart cardiac cath with MEMS 9/22  nuc amyloid scan neg   CT heart noted. mild-mod AS on CT   EP f/u.  no plan for aicd upgrade at this time  heart failure f/u. additional lasix as per heart failure team  milrinone as per heart failure team  s/p central line placement for home infusion

## 2020-09-26 NOTE — PROGRESS NOTE ADULT - PROBLEM SELECTOR PLAN 1
- s/p RHC/CardioMEMS with elevated filling pressures and low output  - GDMT: current regimen is carvedilol 6.25 mg BID. lisinopril held as per renal  - increase hydralazine to 50mg TID; holding parameter SBP <90  - Diuretic: c/w lasix 60mg IV q12; check daily standing weight and strict i/o's  - check venous blood gas today  - Device: s/p ICD, given QRS < 150 ms CRT upgrade would be of unclear benefit. would plan to obtain hemodynamics first  Advance Therapies: c/w milrinone 0.25mcg/kg/min, pt. will likely benefit from home milrinone. Would obtain Plascencia catheter.   - Patient has expressed desire to be DNR/DNI on this and prior admissions

## 2020-09-26 NOTE — PROGRESS NOTE ADULT - ASSESSMENT
80 year-old with acute on chronic systolic heart failure.  Now status post RHC showing low output state, high filling pressures, and high SVR.  CardioMEMs placed.  Inotrope assist per Heart Failure.    Appreciate Heart Failure follow-up. Follow-up with Abhishek Jaquez MD as outpatient.

## 2020-09-26 NOTE — PROGRESS NOTE ADULT - ASSESSMENT
81 YO M with a history of ACC/AHA Stage C/D ischemic cardiomyopathy s/p ICD, CAD s/p 4v CABG in 90's and multiple subsequent PCI, at least moderate LF/LG AS, CKD III (baseline Cr 1.8), and poorly controlled DM2 (A1c 11.6%) admitted with dyspnea on exertion. He initially did not appear volume expanded and he likely has low output heart failure and he has many high risk features including borderline BP requiring downtitration of medical therapy. He is not a candidate for OHT due to age or LVAD due to combination of age/comorbidities with prior sternotomy. There was concern he had significant AS that would require possible TAVR evaluation but CT scan showed mild-mod calcification and on exam, appears to have moderate AS at best. Underwent RHC which did show elevated filling pressures and low CI. He has expressed wishes to be DNR/DNI.     Review of studies    RHC 9/22: RA 15, RV 56/18, PA 56/30/40, PCWP: 33,. CO ([place]) 3.171, CI: 1.571, SVR 1514  TTE 9/1/20: LV 6.5 cm, LVEF 25% with LVOT VTI 14 cm, mild-moderate MR, moderate AS, mild-moderate TR  [facility] 11/2019: severe obstructive 3v CAD, non-obstructive disease in grafts

## 2020-09-27 NOTE — PROGRESS NOTE ADULT - PROBLEM SELECTOR PLAN 1
- s/p RHC/CardioMEMS with elevated filling pressures and low output  - GDMT: current regimen is carvedilol 6.25 mg BID. lisinopril held as per renal  - continue hydralazine to 50mg TID; holding parameter SBP <90  - Diuretic: switch to torsemide 40mg BID starting tonight; check daily standing weight and strict i/o's  - check venous blood gas today, not checked on 9/26  - Device: s/p ICD, given QRS < 150 ms CRT upgrade would be of unclear benefit. would plan to obtain hemodynamics first  Advance Therapies: c/w milrinone 0.25mcg/kg/min, pt. will likely benefit from home milrinone. Would obtain Plascencia catheter.   - Patient has expressed desire to be DNR/DNI on this and prior admissions

## 2020-09-27 NOTE — PROGRESS NOTE ADULT - ASSESSMENT
81 YO M with a history of ACC/AHA Stage C/D ischemic cardiomyopathy s/p ICD, CAD s/p 4v CABG in 90's and multiple subsequent PCI, at least moderate LF/LG AS, CKD III (baseline Cr 1.8), and poorly controlled DM2 (A1c 11.6%) admitted with dyspnea on exertion. He initially did not appear volume expanded and he likely has low output heart failure and he has many high risk features including borderline BP requiring downtitration of medical therapy. He is not a candidate for OHT due to age or LVAD due to combination of age/comorbidities with prior sternotomy. There was concern he had significant AS that would require possible TAVR evaluation but CT scan showed mild-mod calcification and on exam, appears to have moderate AS at best. Underwent RHC which did show elevated filling pressures and low CI. He has expressed wishes to be DNR/DNI.     Review of studies    RHC 9/22: RA 15, RV 56/18, PA 56/30/40, PCWP: 33,. CO ([place]) 3.171, CI: 1.571, SVR 1514  TTE 9/1/20: LV 6.5 cm, LVEF 25% with LVOT VTI 14 cm, mild-moderate MR, moderate AS, mild-moderate TR  [facility] 11/2019: severe obstructive 3v CAD, non-obstructive disease in grafts

## 2020-09-27 NOTE — PROGRESS NOTE ADULT - ASSESSMENT
80 year-old man with PMH of CAD s/p multiple prior PCI's(17 stents) s/p CABG, HFrEF w/ EF 25%, s/p [employer] single chamber ICD (11/2019), CKD-3, HTN, HLD, DMT2, AS, presents from [facility] in setting of acute shortness of breath of 1 day.He took his medicationsand after he felt shortness of breath at rest along with a pain radiating down his left arm (a pain which he has never felt before).  he takes Lasix 40 mg three times a day and has not missed doses. He typically sleeps with 3 pillows at night for chronic orthopnea and has had reaction to entresto i npast      1- CKD III   2- CHF   3- cardiomyopathy   4- DM   5- anemia   6- hypokalemia   7- hx renal calculi     creatinine steady   subnephrotic proteinuria once cr stabilizes will attempt ARB   lasix 60 mg iv bid   milrinone drip   iron supplement   hyperglycemia control    cont  k citrate 2 tab bid

## 2020-09-27 NOTE — PROGRESS NOTE ADULT - PROBLEM SELECTOR PLAN 1
cont lasix as ordered  cards f/u   heart failure team f/u  s/p right heart cardiac cath with MEMS 9/22  nuc amyloid scan neg   CT heart noted. mild-mod AS on CT   EP f/u.  no plan for aicd upgrade at this time  heart failure f/u.    milrinone as per heart failure team  s/p central line placement for home infusion

## 2020-09-28 NOTE — PROGRESS NOTE ADULT - ASSESSMENT
79 YO M with a history of ACC/AHA Stage C/D ischemic cardiomyopathy s/p ICD, CAD s/p 4v CABG in 90's and multiple subsequent PCI, at least moderate LF/LG AS, CKD III (baseline Cr 1.8), and poorly controlled DM2 (A1c 11.6%) admitted with dyspnea on exertion. He initially did not appear volume expanded and he likely has low output heart failure and he has many high risk features including borderline BP requiring downtitration of medical therapy. He is not a candidate for OHT due to age or LVAD due to combination of age/comorbidities with prior sternotomy. There was concern he had significant AS that would require possible TAVR evaluation but CT scan showed mild-mod calcification and on exam, appears to have moderate AS at best. Underwent RHC which did show elevated filling pressures and low CI prior to milrinone. Was started on milrinone as palliative option with subsequent improvement in his shortness of breath and renal function.  He has expressed wishes to be DNR/DNI.     Review of studies    RHC 9/22: RA 15, RV 56/18, PA 56/30/40, PCWP: 33,. CO ([place]) 3.171, CI: 1.571, SVR 1514  TTE 9/1/20: LV 6.5 cm, LVEF 25% with LVOT VTI 14 cm, mild-moderate MR, moderate AS, mild-moderate TR  [facility] 11/2019: severe obstructive 3v CAD, non-obstructive disease in grafts 81 YO M with a history of ACC/AHA Stage D ischemic cardiomyopathy s/p ICD, CAD s/p 4v CABG in 90's and multiple subsequent PCI, at least moderate LF/LG AS, CKD III (baseline Cr 1.8), and poorly controlled DM2 (A1c 11.6%) admitted with dyspnea on exertion. He initially did not appear volume expanded and he likely has low output heart failure and he has many high risk features including borderline BP requiring downtitration of medical therapy. He is not a candidate for OHT due to age or LVAD due to combination of age/comorbidities with prior sternotomy. There was concern he had significant AS that would require possible TAVR evaluation but CT scan showed mild-mod calcification and on exam, appears to have moderate AS at best. Underwent RHC which did show elevated filling pressures and low CI prior to milrinone. Was started on milrinone as palliative option with subsequent improvement in his shortness of breath and renal function.  He has expressed wishes to be DNR/DNI.     Review of studies    RHC 9/22: RA 15, RV 56/18, PA 56/30/40, PCWP: 33,. CO ([place]) 3.171, CI: 1.571, SVR 1514  TTE 9/1/20: LV 6.5 cm, LVEF 25% with LVOT VTI 14 cm, mild-moderate MR, moderate AS, mild-moderate TR  [facility] 11/2019: severe obstructive 3v CAD, non-obstructive disease in grafts

## 2020-09-28 NOTE — PROGRESS NOTE ADULT - PROBLEM SELECTOR PLAN 1
cont lasix as ordered  cards f/u   heart failure team f/u  s/p right heart cardiac cath with MEMS 9/22  nuc amyloid scan neg   CT heart noted. mild-mod AS on CT   EP f/u.  no plan for aicd upgrade at this time  heart failure f/u.    milrinone as per heart failure team  s/p central line placement for home infusion  discharge planning

## 2020-09-28 NOTE — DISCHARGE NOTE PROVIDER - NSDCMRMEDTOKEN_GEN_ALL_CORE_FT
allopurinol 300 mg oral tablet: 1 tab(s) orally once a day  alogliptin 6.25 mg oral tablet: 1 tab(s) orally once a day  aspirin 81 mg oral delayed release tablet: 1 tab(s) orally once a day  Brilinta (ticagrelor) 90 mg oral tablet: 1 tab(s) orally 2 times a day  carvedilol 25 mg oral tablet: 0.5 tab(s) orally 2 times a day    Patient filling coreg 3.125. Unsure what dose patient takes  Centrum Silver Men&#x27;s oral tablet: 1 tab(s) orally once a day  clopidogrel 75 mg oral tablet: On patient&#x27;s list but per Dr. Romero SHOULD NOT BE TAKING AS ALREADY ON BRILINTA  furosemide 40 mg oral tablet: 1 tab(s) orally 3 times a day  Lantus 100 units/mL subcutaneous solution: 40 unit(s) subcutaneous once a day (at bedtime)  Lipitor 80 mg oral tablet: 1 tab(s) orally once a day  lisinopril 5 mg oral tablet: 1 tab(s) orally once a day    Patient took 2 doses prior to coming to the hospital, and believes this medication gave him palpitations  milrinone 200 mcg/mL-D5% intravenous solution: mirinone 200mcg in dextrose 5% 100ml  at 0.25 mcg/kg/min intravenously continously    weight 84.1kg  mirtazapine 15 mg oral tablet: 1 tab(s) orally once a day (at bedtime)  NovoLOG 100 units/mL injectable solution: 12 unit(s) injectable once a day (with dinner)  potassium chloride 10 mEq oral capsule, extended release: 2 cap(s) orally 2 times a day  ranolazine 500 mg oral tablet, extended release: 1 tab(s) orally 2 times a day  Xanax 0.5 mg oral tablet: 1 tab(s) orally 3 times a day, As Needed   allopurinol 300 mg oral tablet: 1 tab(s) orally once a day  alogliptin 6.25 mg oral tablet: 1 tab(s) orally once a day  aspirin 81 mg oral delayed release tablet: 1 tab(s) orally once a day  Brilinta (ticagrelor) 90 mg oral tablet: 1 tab(s) orally 2 times a day  carvedilol 25 mg oral tablet: 0.5 tab(s) orally 2 times a day    Patient filling coreg 3.125. Unsure what dose patient takes  cbc,cmp, magnesium and pro- bnp:   Centrum Silver Men&#x27;s oral tablet: 1 tab(s) orally once a day  clopidogrel 75 mg oral tablet: On patient&#x27;s list but per Dr. Romero SHOULD NOT BE TAKING AS ALREADY ON BRILINTA  Lantus 100 units/mL subcutaneous solution: 40 unit(s) subcutaneous once a day (at bedtime)  Lipitor 80 mg oral tablet: 1 tab(s) orally once a day  milrinone 200 mcg/mL-D5% intravenous solution: mirinone 200mcg in dextrose 5% 100ml  at 0.25 mcg/kg/min intravenously continously    weight 84.1kg  mirtazapine 15 mg oral tablet: 1 tab(s) orally once a day (at bedtime)  NovoLOG 100 units/mL injectable solution: 12 unit(s) injectable once a day (with dinner)  potassium chloride 10 mEq oral capsule, extended release: 2 cap(s) orally 2 times a day  ranolazine 500 mg oral tablet, extended release: 1 tab(s) orally 2 times a day   allopurinol 300 mg oral tablet: 1 tab(s) orally once a day  alogliptin 6.25 mg oral tablet: 1 tab(s) orally once a day  amiodarone 200 mg oral tablet: 1 tab(s) orally once a day  aspirin 81 mg oral delayed release tablet: 1 tab(s) orally once a day  carvedilol 6.25 mg oral tablet: 1 tab(s) orally every 12 hours  HOLD FOR Systolic blood pressure less than 95 and heart rate less than 60   cbc,cmp, magnesium and pro- bnp:   Centrum Silver Men&#x27;s oral tablet: 1 tab(s) orally once a day  clopidogrel 75 mg oral tablet: 1 tab(s) orally once a day  hydrALAZINE 25 mg oral tablet: 3 tab(s) orally 3 times a day  Hold for systolic blood pressure less than 90  Lantus 100 units/mL subcutaneous solution: 40 unit(s) subcutaneous once a day (at bedtime)  Lipitor 80 mg oral tablet: 1 tab(s) orally once a day  milrinone 200 mcg/mL-D5% intravenous solution: mirinone 200mcg in dextrose 5% 100ml  at 0.25 mcg/kg/min intravenously continously    weight 84.1kg  mirtazapine 15 mg oral tablet: 1 tab(s) orally once a day (at bedtime)  NovoLOG 100 units/mL injectable solution: 12 unit(s) injectable once a day (with dinner)  potassium chloride 10 mEq oral capsule, extended release: 2 cap(s) orally 2 times a day  ranolazine 500 mg oral tablet, extended release: 1 tab(s) orally 2 times a day  torsemide 20 mg oral tablet: 2 tab(s) orally once a day   allopurinol 300 mg oral tablet: 1 tab(s) orally once a day  alogliptin 6.25 mg oral tablet: 1 tab(s) orally once a day  amiodarone 200 mg oral tablet: 1 tab(s) orally once a day  aspirin 81 mg oral delayed release tablet: 1 tab(s) orally once a day  carvedilol 6.25 mg oral tablet: 1 tab(s) orally every 12 hours  HOLD FOR Systolic blood pressure less than 95 and heart rate less than 60   cbc,cmp, magnesium and pro- bnp:   Centrum Silver Men&#x27;s oral tablet: 1 tab(s) orally once a day  clopidogrel 75 mg oral tablet: 1 tab(s) orally once a day  hydrALAZINE 25 mg oral tablet: 3 tab(s) orally 3 times a day  Hold for systolic blood pressure less than 90  Lantus 100 units/mL subcutaneous solution: 40 unit(s) subcutaneous once a day (at bedtime)  Lipitor 80 mg oral tablet: 1 tab(s) orally once a day  milrinone 200 mcg/mL-D5% intravenous solution: mirinone 200mcg in dextrose 5% 100ml  at 0.25 mcg/kg/min intravenously continously    weight 84.1kg  mirtazapine 15 mg oral tablet: 1 tab(s) orally once a day (at bedtime)  NovoLOG 100 units/mL injectable solution: 12 unit(s) injectable once a day (with dinner)  potassium citrate 10 mEq oral tablet, extended release: 2 tab(s) orally 2 times a day  ranolazine 500 mg oral tablet, extended release: 1 tab(s) orally 2 times a day  torsemide 20 mg oral tablet: 2 tab(s) orally once a day

## 2020-09-28 NOTE — PROGRESS NOTE ADULT - ASSESSMENT
80 year-old man with PMH of CAD s/p multiple prior PCI's(17 stents) s/p CABG, HFrEF w/ EF 25%, s/p [employer] single chamber ICD (11/2019), CKD-3, HTN, HLD, DMT2, AS, presents from [facility] in setting of acute shortness of breath of 1 day.He took his medicationsand after he felt shortness of breath at rest along with a pain radiating down his left arm (a pain which he has never felt before).  he takes Lasix 40 mg three times a day and has not missed doses. He typically sleeps with 3 pillows at night for chronic orthopnea and has had reaction to entresto i npast      1- CKD III   2- CHF   3- cardiomyopathy   4- DM   5- anemia   6- hypokalemia   7- hx renal calculi     creatinine steady   subnephrotic proteinuria once cr stabilizes will attempt ARB   lasix 60 mg iv bid changed to torsemide 40 mg bid   milrinone drip   iron supplement   hyperglycemia control    cont  k citrate 2 tab bid

## 2020-09-28 NOTE — DISCHARGE NOTE PROVIDER - NSDCCPCAREPLAN_GEN_ALL_CORE_FT
PRINCIPAL DISCHARGE DIAGNOSIS  Diagnosis: CHF exacerbation  Assessment and Plan of Treatment: Weigh yourself daily.  If you gain 3lbs in 3 days, or 5lbs in a week call your Health Care Provider.  Do not eat or drink foods containing more than 2000mg of salt (sodium) in your diet every day.  Call your Health Care Provider if you have any swelling or increased swelling in your feet, ankles, and/or stomach.  Take all of your medication as directed.  If you become dizzy call your Health Care Provider.        SECONDARY DISCHARGE DIAGNOSES  Diagnosis: CKD (chronic kidney disease) stage 3, GFR 30-59 ml/min  Assessment and Plan of Treatment: Avoid taking (NSAIDs) - (ex: Ibuprofen, Advil, Celebrex, Naprosyn)  Avoid taking any nephrotoxic agents (can harm kidneys) - Intravenous contrast for diagnostic testing, combination cold medications.  Have all medications adjusted for your renal function by your Health Care Provider.  Blood pressure control is important.  Take all medication as prescribed.       PRINCIPAL DISCHARGE DIAGNOSIS  Diagnosis: CHF exacerbation  Assessment and Plan of Treatment: Follow up with heart failure as outpatient.  Take medications as directed,  Weigh yourself daily.  If you gain 3lbs in 3 days, or 5lbs in a week call your Health Care Provider.  Do not eat or drink foods containing more than 2000mg of salt (sodium) in your diet every day.  Call your Health Care Provider if you have any swelling or increased swelling in your feet, ankles, and/or stomach.  Take all of your medication as directed.  If you become dizzy call your Health Care Provider.        SECONDARY DISCHARGE DIAGNOSES  Diagnosis: CKD (chronic kidney disease) stage 3, GFR 30-59 ml/min  Assessment and Plan of Treatment: Follow up with neprhology as an outpatient.  Avoid taking (NSAIDs) - (ex: Ibuprofen, Advil, Celebrex, Naprosyn)  Avoid taking any nephrotoxic agents (can harm kidneys) - Intravenous contrast for diagnostic testing, combination cold medications.  Have all medications adjusted for your renal function by your Health Care Provider.  Blood pressure control is important.  Take all medication as prescribed.       PRINCIPAL DISCHARGE DIAGNOSIS  Diagnosis: CHF exacerbation  Assessment and Plan of Treatment: Follow up with heart failure as outpatient.  Take medications as directed  Discharge home with milrinone drip.  Weigh yourself daily.  If you gain 3lbs in 3 days, or 5lbs in a week call your Health Care Provider.  Do not eat or drink foods containing more than 2000mg of salt (sodium) in your diet every day.  Call your Health Care Provider if you have any swelling or increased swelling in your feet, ankles, and/or stomach.  Take all of your medication as directed.  If you become dizzy call your Health Care Provider.        SECONDARY DISCHARGE DIAGNOSES  Diagnosis: Moderate aortic stenosis  Assessment and Plan of Treatment: Follow up with cardiology as an outpatient.  - no indicaton for TAVR.    Diagnosis: CAD (coronary artery disease)  Assessment and Plan of Treatment: -continue aspirin,statin and plavix. DO NOT take brilinta.  -Coronary artery disease is a condition where the arteries the supply the heart muscle get clogges with fatty deposits & puts you at risk for a heart attack  Call your doctor if you have any new pain, pressure, or discomfort in the center of your chest, pain, tingling or discomfort in arms, back, neck, jaw, or stomach, shortness of breath, nausea, vomiting, burping or heartburn, sweating, cold and clammy skin, racing or abnormal heartbeat for more than 10 minutes or if they keep coming & going.  Call 911 and do not tr to get to hospital by care  You can help yourself with lefestyle changes (quitting smoking if you smoke), eat lots of fruits & vegetables & low fat dairy products, not a lot of meat & fatty foods, walk or some form of physical activity most days of the week, lose weight if you are overweight  Take your cardiac medication as prescribed to lower cholesterol, to lower blood pressure, aspirin to prevent blood clots, and diabetes control  Make sure to keep appointments with doctor for cardiac follow up care      Diagnosis: PVCs (premature ventricular contractions)  Assessment and Plan of Treatment: Continue amiodorone 200 mg daily.  Follow up with electrophysiology as an outpatient.    Diagnosis: CKD (chronic kidney disease) stage 3, GFR 30-59 ml/min  Assessment and Plan of Treatment: -Follow up with nephrology as an outpatient.  Avoid taking (NSAIDs) - (ex: Ibuprofen, Advil, Celebrex, Naprosyn)  Avoid taking any nephrotoxic agents (can harm kidneys) - Intravenous contrast for diagnostic testing, combination cold medications.  Have all medications adjusted for your renal function by your Health Care Provider.  Blood pressure control is important.  Take all medication as prescribed.       PRINCIPAL DISCHARGE DIAGNOSIS  Diagnosis: CHF exacerbation  Assessment and Plan of Treatment: Follow up with heart failure as outpatient.  Take medications as directed  Discharge home with milrinone drip.  Weigh yourself daily.  If you gain 3lbs in 3 days, or 5lbs in a week call your Health Care Provider.  Do not eat or drink foods containing more than 2000mg of salt (sodium) in your diet every day.  Call your Health Care Provider if you have any swelling or increased swelling in your feet, ankles, and/or stomach.  Take all of your medication as directed.  If you become dizzy call your Health Care Provider.        SECONDARY DISCHARGE DIAGNOSES  Diagnosis: Diabetes mellitus  Assessment and Plan of Treatment: HgA1C- 9.6  FOLLOW UP with your PMD/endocrinologist for management.  Make sure you get your HgA1c checked every three months.  If you take oral diabetes medications, check your blood glucose two times a day.  If you take insulin, check your blood glucose before meals and at bedtime.  It's important not to skip any meals.  Keep a log of your blood glucose results and always take it with you to your doctor appointments.  Keep a list of your current medications including injectables and over the counter medications and bring this medication list with you to all your doctor appointments.  If you have not seen your ophthalmologist this year call for appointment.  Check your feet daily for redness, sores, or openings. Do not self treat. If no improvement in two days call your primary care physician for an appointment.  Low blood sugar (hypoglycemia) is a blood sugar below 70mg/dl. Check your blood sugar if you feel signs/symptoms of hypoglycemia. If your blood sugar is below 70 take 15 grams of carbohydrates (ex 4 oz of apple juice, 3-4 glucose tablets, or 4-6 oz of regular soda) wait 15 minutes and repeat blood sugar to make sure it comes up above 70.  If your blood sugar is above 70 and you are due for a meal, have a meal.  If you are not due for a meal have a snack.  This snack helps keeps your blood sugar at a safe range.      Diagnosis: Moderate aortic stenosis  Assessment and Plan of Treatment: Follow up with cardiology as an outpatient.  - no indicaton for TAVR.    Diagnosis: CAD (coronary artery disease)  Assessment and Plan of Treatment: -continue aspirin,statin and plavix. DO NOT take brilinta.  -Coronary artery disease is a condition where the arteries the supply the heart muscle get clogges with fatty deposits & puts you at risk for a heart attack  Call your doctor if you have any new pain, pressure, or discomfort in the center of your chest, pain, tingling or discomfort in arms, back, neck, jaw, or stomach, shortness of breath, nausea, vomiting, burping or heartburn, sweating, cold and clammy skin, racing or abnormal heartbeat for more than 10 minutes or if they keep coming & going.  Call 911 and do not tr to get to hospital by care  You can help yourself with lefestyle changes (quitting smoking if you smoke), eat lots of fruits & vegetables & low fat dairy products, not a lot of meat & fatty foods, walk or some form of physical activity most days of the week, lose weight if you are overweight  Take your cardiac medication as prescribed to lower cholesterol, to lower blood pressure, aspirin to prevent blood clots, and diabetes control  Make sure to keep appointments with doctor for cardiac follow up care      Diagnosis: PVCs (premature ventricular contractions)  Assessment and Plan of Treatment: Continue amiodorone 200 mg daily.  Follow up with electrophysiology as an outpatient.    Diagnosis: CKD (chronic kidney disease) stage 3, GFR 30-59 ml/min  Assessment and Plan of Treatment: -Follow up with nephrology as an outpatient.  Avoid taking (NSAIDs) - (ex: Ibuprofen, Advil, Celebrex, Naprosyn)  Avoid taking any nephrotoxic agents (can harm kidneys) - Intravenous contrast for diagnostic testing, combination cold medications.  Have all medications adjusted for your renal function by your Health Care Provider.  Blood pressure control is important.  Take all medication as prescribed.

## 2020-09-28 NOTE — DISCHARGE NOTE PROVIDER - HOSPITAL COURSE
80 year-old man with PMH of CAD s/p multiple prior PCI's(17 stents) s/p CABG, HFrEF w/ EF 25%, s/p [employer] single chamber ICD (11/2019), CKD-3, HTN, HLD, DMT2, AS, presents from [facility] in setting of acute shortness of breath of 1 day. Patient states that he was at his normal state of health in previous days. He took his medications this morning and after he felt shortness of breath at rest along with a pain radiating down his left arm (a pain which he has never felt before). Denies associated CP, palpitations, dizziness, nausea, diaphoresis. Patient states that he takes Lasix 40 mg three times a day and has not missed doses. He typically sleeps with 3 pillows at night for chronic orthopnea. Denies any worsening of orthopnea. He denies acute worsening of the LE edema but has noticed that the left leg looked rose. Denies fevers, chills, sweats, cough, wheezing, URI symptoms in preceding days. He states that another doctor started him on Lisinopril. He took in on Saturday without issue. He took it again today with his morning meds and wonders if that caused his shortness of breath. He does endorse a reaction to Entresto in the past.     80 year-old man with PMH of CAD s/p multiple prior PCI's(17 stents) s/p CABG, HFrEF w/ EF 25%, s/p [employer] single chamber ICD (11/2019), CKD-3, HTN, HLD, DMT2, AS, presents from [facility] in setting of acute shortness of breath of 1 day. Patient states that he was at his normal state of health in previous days. He took his medications this morning and after he felt shortness of breath at rest along with a pain radiating down his left arm (a pain which he has never felt before). Denies associated CP, palpitations, dizziness, nausea, diaphoresis. Patient states that he takes Lasix 40 mg three times a day and has not missed doses. He typically sleeps with 3 pillows at night for chronic orthopnea. Denies any worsening of orthopnea. He denies acute worsening of the LE edema but has noticed that the left leg looked rose. Denies fevers, chills, sweats, cough, wheezing, URI symptoms in preceding days. He states that another doctor started him on Lisinopril. He took in on Saturday without issue. He took it again today with his morning meds and wonders if that caused his shortness of breath. He does endorse a reaction to Entresto in the past.  Pt. evaluated by heart failure and requires milrinone gtt for discharge.    80 year-old man with PMH of CAD s/p multiple prior PCI's(17 stents) s/p CABG, HFrEF w/ EF 25%, s/p [employer] single chamber ICD (11/2019), CKD-3, HTN, HLD, DMT2, AS, presents from [facility] in setting of acute shortness of breath of 1 day. Patient states that he was at his normal state of health in previous days. He took his medications this morning and after he felt shortness of breath at rest along with a pain radiating down his left arm (a pain which he has never felt before). Denies associated CP, palpitations, dizziness, nausea, diaphoresis. Patient states that he takes Lasix 40 mg three times a day and has not missed doses. He typically sleeps with 3 pillows at night for chronic orthopnea. Denies any worsening of orthopnea. He denies acute worsening of the LE edema but has noticed that the left leg looked rose. Denies fevers, chills, sweats, cough, wheezing, URI symptoms in preceding days. He states that another doctor started him on Lisinopril. He took in on Saturday without issue. He took it again today with his morning meds and wonders if that caused his shortness of breath. He does endorse a reaction to Entresto in the past.  Pt. evaluated by heart failure and requires milrinone gtt for discharge.   for CHF, discharge on PO torsemide 40 daily. Pt to follow up with HF clinic as outpatient. S/p RHC with [facility] on 9/22.  Nuclear amyloid scan negative. CT heart done showing mild-mod AS. Per EP, no plan for AICD upgrade at this time. S/p central line placement for home milrinone.  - For CAD, continue ASA, Plavix and Lipitor.   - For LE edema, doppler negative for DVT.  - For CKD, followed by nephrology.        Patient cleared by HF for discharge home with milrinone gtt on 9/29/20. 80 year-old man with PMH of CAD s/p multiple prior PCI's(17 stents) s/p CABG, HFrEF w/ EF 25%, s/p [employer] single chamber ICD (11/2019), CKD-3, HTN, HLD, DMT2, AS, presents from [facility] in setting of acute shortness of breath of 1 day. Patient states that he was at his normal state of health in previous days. He took his medications this morning and after he felt shortness of breath at rest along with a pain radiating down his left arm (a pain which he has never felt before). Denies associated CP, palpitations, dizziness, nausea, diaphoresis. Patient states that he takes Lasix 40 mg three times a day and has not missed doses. He typically sleeps with 3 pillows at night for chronic orthopnea. Denies any worsening of orthopnea. He denies acute worsening of the LE edema but has noticed that the left leg looked rose. Denies fevers, chills, sweats, cough, wheezing, URI symptoms in preceding days. He states that another doctor started him on Lisinopril. He took in on Saturday without issue. He took it again today with his morning meds and wonders if that caused his shortness of breath. He does endorse a reaction to Entresto in the past.  Pt. evaluated by heart failure and requires milrinone gtt for discharge.   for CHF, discharge on PO torsemide 40 daily. Pt to follow up with HF clinic as outpatient. S/p RHC with [facility] on 9/22.  Nuclear amyloid scan negative. CT heart done showing mild-mod AS. Per EP, no plan for AICD upgrade at this time. S/p central line placement for home milrinone.  - For CAD, continue ASA, Plavix and Lipitor.   - For LE edema, doppler negative for DVT.  - For CKD, followed by nephrology.    Patient cleared by HF for discharge home with milrinone gtt on 9/29/20. 80 year-old man with PMH of CAD s/p multiple prior PCI's(17 stents) s/p CABG, HFrEF w/ EF 25%, s/p [employer] single chamber ICD (11/2019), CKD-3, HTN, HLD, DMT2, AS, presents from [facility] in setting of acute shortness of breath of 1 day. Patient states that he was at his normal state of health in previous days. He took his medications this morning and after he felt shortness of breath at rest along with a pain radiating down his left arm (a pain which he has never felt before). Denies associated CP, palpitations, dizziness, nausea, diaphoresis. Patient states that he takes Lasix 40 mg three times a day and has not missed doses. He typically sleeps with 3 pillows at night for chronic orthopnea. Denies any worsening of orthopnea. He denies acute worsening of the LE edema but has noticed that the left leg looked rose. Denies fevers, chills, sweats, cough, wheezing, URI symptoms in preceding days. He states that another doctor started him on Lisinopril. He took in on Saturday without issue. He took it again today with his morning meds and wonders if that caused his shortness of breath. He does endorse a reaction to Entresto in the past.  Pt. evaluated by heart failure and requires milrinone gtt for discharge.   for CHF, discharge on PO torsemide 40 daily. Pt to follow up with HF clinic as outpatient. S/p RHC with [facility] on 9/22.  Nuclear amyloid scan negative. CT heart done showing mild-mod AS. Per EP, no plan for AICD upgrade at this time. S/p central line placement for home milrinone.  - For CAD, continue ASA, Plavix and Lipitor.   - For LE edema, doppler negative for DVT.  - For CKD, followed by nephrology.    Patient cleared by HF for discharge home with milrinone gtt on 9/29/20.      Advanced care planning was discussed with patient and family.  Advanced care planning forms were reviewed and discussed as appropriate.  Differential diagnosis and plan of care discussed with patient after the evaluation.   Pain assessed and judicious use of narcotics when appropriate was discussed.  Importance of Fall prevention discussed.  Counseling on Smoking and Alcohol cessation was offered when appropriate.  Counseling on Diet, exercise, and medication compliance was done.   Approx 65 minutes spent.

## 2020-09-28 NOTE — DISCHARGE NOTE PROVIDER - PROVIDER TOKENS
PROVIDER:[TOKEN:[[id number]]] PROVIDER:[TOKEN:[[id number]]],PROVIDER:[TOKEN:[[id number]]],PROVIDER:[TOKEN:[[id number]]],PROVIDER:[TOKEN:[[id number]]]

## 2020-09-28 NOTE — DISCHARGE NOTE PROVIDER - CARE PROVIDER_API CALL
Abhishek Jaquez)  Cardiology; Internal Medicine  [address] [place] [address], [address] [place] [address]  Phone: [phone]  Fax: [phone]  Follow Up Time:    Abhishek Jaquez)  Cardiology; Internal Medicine  [address], [address] [place] [address]  Phone: [phone]  Fax: [phone]  Follow Up Time:     Stuart Sarkar  CARDIOVASCULAR DISEASE  [address] [facility] [address]  [place] [address]  Phone: [phone]  Fax: [phone]  Follow Up Time:     Susan Mc  NEPHROLOGY  [address] [facility] [address]  [place] [address]  Phone: [phone]  Fax: [phone]  Follow Up Time:     Dmitry Mitchell)  Cardiac Electrophysiology; Cardiovascular Disease; Internal Medicine  [address] [place] [address]  Phone: [phone]  Fax: [phone]  Follow Up Time:

## 2020-09-28 NOTE — DISCHARGE NOTE NURSING/CASE MANAGEMENT/SOCIAL WORK - PATIENT PORTAL LINK FT
You can access the FollowMyHealth Patient Portal offered by [facility] by registering at the following website: [email or web address]. By joining [employer]’s FollowMyHealth portal, you will also be able to view your health information using other applications (apps) compatible with our system.

## 2020-09-28 NOTE — PROGRESS NOTE ADULT - PROBLEM SELECTOR PLAN 1
- s/p RHC/CardioMEMS with elevated filling pressures and low output; started on milrinone as palliative with improvement in dyspnea and renal function  - GDMT: current regimen is carvedilol 6.25 mg BID  - increase hydralazine to 75mg TID; holding parameter SBP <90  - Diuretic: c/w torsemide 40mg BID; check daily standing weight and strict i/o's  - check venous blood gas today, not checked on 9/26  - Device: s/p ICD; not clear LBBB and QRS <150 so no indication for CRT  Advance Therapies: c/w milrinone 0.25mcg/kg/min, pt. will benefit from home milrinone.    - Patient has expressed desire to be DNR/DNI on this and prior admissions

## 2020-09-28 NOTE — DISCHARGE NOTE PROVIDER - CARE PROVIDERS DIRECT ADDRESSES
,[email or web address]

## 2020-09-28 NOTE — DISCHARGE NOTE PROVIDER - NSDCFUSCHEDAPPT_GEN_ALL_CORE_FT
VERONICA DORMAN JR ; 10/06/2020 ; [facility] [place] [facility] VERONICA Sky JR ; 10/15/2020 ; [facility] [place] Cardio [address] [facility]  VERONICA DORMAN JR ; 10/28/2020 ; [facility] [place] [facility] VERONICA Sky JR ; 12/09/2020 ; [facility] [place] Cardio Electro 300 [facility]  VERONICA DORMAN ; 10/06/2020 ; [facility] HeartFail 300 [facility] VERONICA Sky ; 10/15/2020 ; [facility] Cardio [address] [facility]  VERONICA DORMAN ; 10/28/2020 ; [facility] HeartFail 300 [facility] VERONICA Sky ; 12/09/2020 ; [facility] Cardio Electro 300 [facility]

## 2020-09-28 NOTE — DISCHARGE NOTE PROVIDER - NSDCCAREPROVSEEN_GEN_ALL_CORE_FT
Chief Complaint: annual     HPI: Luh Hayes is a 36 year old female who presents today for annual exam.   Patient's last menstrual period was 2020 (approximate). Patient is premenopausal       Ever since birth of her son, she is angry and feels rage over smallthings. Denies hitting child   Very thrasher, irritable all of the time  Drop something on floor and feels rage.   Denies SI/HI  Periods are light and very short. 2 days, spotty dark brown blood    desires ortho tri cyclen OCPs     OB History    Para Term  AB Living   1 1 1 0 0 1   SAB TAB Ectopic Molar Multiple Live Births   0 0 0 0 0 1         Past Medical History:   Diagnosis Date   • Abnormal Pap smear of cervix , , ,    • HPV (human papilloma virus) infection ,        Past Surgical History:   Procedure Laterality Date   •  section, classic  2016   • Colposcopy  , ,    • Knee surgery Right 2011         Allergies: ALLERGIES:  No Known Allergies      Medications:  Current Outpatient Medications   Medication Sig Dispense Refill   • KURVELO 0.15-30 MG-MCG per tablet TAKE 1 TABLET BY MOUTH DAILY 84 tablet 0   • Ascorbic Acid (VITAMIN C) 100 MG tablet Take 100 mg by mouth daily.       No current facility-administered medications for this visit.          Family History   Problem Relation Age of Onset   • Hypertension Mother    • Cancer, Colon Maternal Grandmother        Social History     Socioeconomic History   • Marital status: /Civil Union     Spouse name: Not on file   • Number of children: Not on file   • Years of education: Not on file   • Highest education level: Not on file   Occupational History   • Not on file   Social Needs   • Financial resource strain: Not on file   • Food insecurity:     Worry: Not on file     Inability: Not on file   • Transportation needs:     Medical: Not on file     Non-medical: Not on file   Tobacco Use   • Smoking status: Never Smoker   • Smokeless  tobacco: Never Used   Substance and Sexual Activity   • Alcohol use: Yes     Frequency: Monthly or less     Drinks per session: 1 or 2     Binge frequency: Never   • Drug use: Never   • Sexual activity: Yes     Partners: Male     Birth control/protection: Pill   Lifestyle   • Physical activity:     Days per week: Not on file     Minutes per session: Not on file   • Stress: Not on file   Relationships   • Social connections:     Talks on phone: Not on file     Gets together: Not on file     Attends Buddhist service: Not on file     Active member of club or organization: Not on file     Attends meetings of clubs or organizations: Not on file     Relationship status: Not on file   • Intimate partner violence:     Fear of current or ex partner: Not on file     Emotionally abused: Not on file     Physically abused: Not on file     Forced sexual activity: Not on file   Other Topics Concern   • Not on file   Social History Narrative   • Not on file       Preventative Medicine:  Does patient desire Tdap immunization today: No    Does patient exercise?  Yes  Was counseling given:  Yes    Depression Screening:  Over the past 2 weeks, has patient felt down, depressed or hopeless?  Yes  Over the past 2 weeks, has patient felt little interest or pleasure in doing things?  Yes  Lack of motivation   Depression Screening performed.    Luh states she feels safe at home, denies abuse.    Immunization History   Administered Date(s) Administered   • Tdap 02/11/2016        GYNE ROS:   Genitourinary: denies urgency, frequency, dysuria, hematuria and nocturia  Vaginal symptoms: none  Discharge described as: normal and physiologic    Review of Systems:  See HPI.    No headaches, no unexplained chest pain, dyspnea, SOB, abdominal pain, bowel or bladder complaints. Review of all other systems negative.     Physical Exam:  Visit Vitals  /64   Pulse 68   Temp 97.3 °F (36.3 °C)   Resp 12   Ht 5' 5\" (1.651 m)   Wt 69.5 kg (153 lb 2 oz)    LMP 07/22/2020 (Approximate)   BMI 25.48 kg/m²       Viktorias BMI is Body mass index is 25.48 kg/m²., which is Abnormal    CONSTITUTIONAL:    Appearance: well-nourished, well developed, alert, in no acute distress    HEENT   Head: normocephalic, atraumatic   Face: face within normal limits, no hirsutism present   Ears: external ears within normal limits   Nose: external nose normal in appearance   Mouth: appearance normal    CHEST   Respiratory effort: breathing unlabored     CARDIOVASCULAR   Heart: regular rate, normal rhythm, no murmurs present    BREASTS   Inspection of Breasts: breasts symmetrical, no skin changes, no discharge present   Palpation of Breasts and Axillae: no masses present on palpation, no breast tenderness   Axillary Lymph Nodes: no lymphadenopathy present    GASTROINTESTINAL   Abdominal Examination: abdomen nontender to palpation, normal bowel sounds, tone normal without rigidity or guarding, no masses present, umbilicus without lesions      GENITOURINARY   External Genitalia: normal appearance for age, no discharge present, no tenderness present, no inflammatory lesions present   Vagina: normal vaginal vault without central or paravaginal defects, no discharge present, no inflammatory lesions present, no masses present  Bladder: nontender to palpation   Urethral body: urethra palpation normal, urethra structural support normal  Cervix: appearance healthy, no lesions present, nontender to palpation, no bleeding present   Uterus: nontender to palpation, no masses present, position normal, mobility: normal   Adnexa: no adnexal tenderness present, no adnexal masses present   Perineum: perineum within normal limits, no evidence of trauma, no rashes or skin lesions present   Anus: anus within normal limits   Inguinal Lymph Nodes: no lymphadenopathy present    SKIN   General Inspection: gross appearance of skin without lesions or rashes    NEUROLOGIC/PSYCHIATRIC   Orientation: grossly oriented to  person, place and time   Mood and Affect: mood normal, affect appropriate      Assessment/Plan:  Annual well woman exam  Additional Problem:   Service to  for rage/anxiety/anger to manage  Discussed all LARC and nonLARC options. Reviewed risks, benefits, use and side effects.     Desires to retry a previous OCP. OCP ordered after looking in history .   Wants to be tested for thyroid disease since it rubns in family. Wants to be tested to see if she is going into premature menopause.     Wants to establish care with PCP, referral placed.     - Health Care Maintenance:   Pap smear obtained -  No, will wait   Screening gc/chlamydia obtained - No  - Discussed calcium and vitamin D intake  - Encouraged 150 minutes of exercise or physical activity per week  - Recommended calcium fortified diet of at least 3 servings a day, Condom and safe sex discussed, Refill medications and Return for annual GYN exam in one year or earlier with any additional concerns    - Follow up visit: Return in about 1 year (around 8/1/2021), or if symptoms worsen or fail to improve.     Patient stated understanding and agreement with the plan of care.    Electronically signed by Ghulam Hawkins MD        [facility] Medicine, Advance PracticeTeam

## 2020-09-28 NOTE — DISCHARGE NOTE PROVIDER - NSDCFUADDAPPT_GEN_ALL_CORE_FT
Please f/u in the Heart failure clinic on 10/6 @ 2pm Please follow up in the Heart failure clinic on 10/6 @ 2pm Please follow up in the Heart failure clinic on 10/6/20 @ 2pm Please follow up in the Heart failure clinic on 10/6/20 @ 2pm **  Follow up with heart failure/ PMD for repeat blood work to monitor  renal function **

## 2020-09-29 NOTE — PROGRESS NOTE ADULT - PROBLEM SELECTOR PROBLEM 5
PVC (premature ventricular contraction)
PVC (premature ventricular contraction)
CKD (chronic kidney disease) stage 3, GFR 30-59 ml/min
PVC (premature ventricular contraction)

## 2020-09-29 NOTE — PROGRESS NOTE ADULT - PROBLEM SELECTOR PLAN 4
- Cr at baseline, continue to monitor.
- Cr at baseline, continue to monitor.
Hold oral agents  Continue with Novolog at dinner  Continue with Lantus 40 U qhs  Corrective SSI  Monitor FS  Hypoglycemia protocol
- Cr at baseline, continue to monitor.
- SCr uptrending today, will lower diuretics as above and repeat labs at his follow-up visit next week
Hold oral agents  Continue with Novolog at dinner  Continue with Lantus 40 U qhs  Corrective SSI  Monitor FS  Hypoglycemia protocol
on insulin  monitor fs

## 2020-09-29 NOTE — PROGRESS NOTE ADULT - PROBLEM SELECTOR PROBLEM 1
Acute on chronic systolic (congestive) heart failure
Chronic systolic heart failure

## 2020-09-29 NOTE — PROGRESS NOTE ADULT - PROBLEM SELECTOR PROBLEM 4
CKD (chronic kidney disease), stage III
CKD (chronic kidney disease), stage III
Type 2 diabetes mellitus with other specified complication, unspecified whether long term insulin use
CKD (chronic kidney disease), stage III
Type 2 diabetes mellitus with other specified complication, unspecified whether long term insulin use

## 2020-09-29 NOTE — PROGRESS NOTE ADULT - PROVIDER SPECIALTY LIST ADULT
Cardiology
Electrophysiology
Heart Failure
Internal Medicine
Nephrology
Structural Heart
Nephrology
Cardiology
Heart Failure
Internal Medicine

## 2020-09-29 NOTE — PROGRESS NOTE ADULT - SUBJECTIVE AND OBJECTIVE BOX
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  --------------------------------------------------------  IN: 600 mL / OUT: 900 mL / NET: -300 mL    09-20-20 @ 07:01  -  09-20-20 @ 20:21  --------------------------------------------------------  IN: 720 mL / OUT: 700 mL / NET: 20 mL      Physical Exam:  	  Gen: Non toxic comfortable appearing   	no  JVD  	Pulm: decrease bs  no rales or ronchi or wheezing  	CV: RRR, S1S2; no rub  	Abd: +BS, soft, nontender/nondistended  	: No suprapubic tenderness  	UE: Warm, no cyanosis  no clubbing,  no edema  	LE: Warm, no cyanosis  no clubbing, no edema  	Neuro: alert and oriented. speech coherent       LABS/STUDIES  --------------------------------------------------------------------------------              10.9   4.95  >-----------<  170      [09-20-20 @ 06:05]              33.2     139  |  101  |  63  ----------------------------<  124      [09-20-20 @ 06:04]  3.4   |  24  |  2.07        Ca     9.9     [09-20-20 @ 06:04]      Mg     2.1     [09-20-20 @ 06:04]            Creatinine Trend:  SCr 2.07 [09-20 @ 06:04]  SCr 1.99 [09-19 @ 06:06]  SCr 1.89 [09-18 @ 05:49]  SCr 2.07 [09-17 @ 06:16]  SCr 2.08 [09-16 @ 05:43]              Urinalysis - [09-15-20 @ 02:46]      Color Light Yellow / Appearance Clear / SG 1.012 / pH 5.5      Gluc Negative / Ketone Negative  / Bili Negative / Urobili <2 mg/dL       Blood Negative / Protein 30 mg/dL / Leuk Est Negative / Nitrite Negative      RBC 0 / WBC 1 / Hyaline 1 / Gran  / Sq Epi  / Non Sq Epi 0 / Bacteria Negative    Urine Creatinine 68      [09-14-20 @ 21:46]  Urine Protein 44      [09-14-20 @ 21:46]    Iron 36, TIBC 308, %sat 12      [09-15-20 @ 09:38]  Ferritin 370      [09-15-20 @ 08:33]  HbA1c 10.7      [02-14-20 @ 08:35]  TSH 1.99      [08-28-20 @ 06:28]  Lipid: chol 132, , HDL 28, LDL 74      [08-20-20 @ 11:46]    Free Light Chains: kappa 5.14, lambda 3.55, ratio = 1.45      [09-17 @ 09:09]  Immunofixation Serum:   No Monoclonal Band Identified    Reference Range: None Detected      [09-17-20 @ 09:09]    
[facility]   [phone]  RENAL FOLLOW UP NOTE  --------------------------------------------------------------------------------  Chief Complaint:    24 hour events/subjective:    seen earlier no worsening sob     PAST HISTORY  --------------------------------------------------------------------------------  No significant changes to PMH, PSH, FHx, SHx, unless otherwise noted    ALLERGIES & MEDICATIONS  --------------------------------------------------------------------------------  Allergies    No Known Allergies    Intolerances    Entresto (Other)    Standing Inpatient Medications  allopurinol 300 milliGRAM(s) Oral daily  aMIOdarone    Tablet 200 milliGRAM(s) Oral two times a day  aspirin enteric coated 81 milliGRAM(s) Oral daily  atorvastatin 80 milliGRAM(s) Oral at bedtime  carvedilol 6.25 milliGRAM(s) Oral every 12 hours  clopidogrel Tablet 75 milliGRAM(s) Oral daily  dextrose 5%. 1000 milliLiter(s) IV Continuous <Continuous>  dextrose 50% Injectable 12.5 Gram(s) IV Push once  dextrose 50% Injectable 25 Gram(s) IV Push once  dextrose 50% Injectable 25 Gram(s) IV Push once  furosemide    Tablet 40 milliGRAM(s) Oral three times a day  heparin   Injectable 5000 Unit(s) SubCutaneous every 8 hours  influenza   Vaccine 0.5 milliLiter(s) IntraMuscular once  insulin glargine Injectable (LANTUS) 40 Unit(s) SubCutaneous at bedtime  insulin lispro (HumaLOG) corrective regimen sliding scale   SubCutaneous three times a day before meals  insulin lispro (HumaLOG) corrective regimen sliding scale   SubCutaneous at bedtime  insulin lispro Injectable (HumaLOG) 8 Unit(s) SubCutaneous before dinner  melatonin 3 milliGRAM(s) Oral at bedtime  mirtazapine 15 milliGRAM(s) Oral at bedtime  potassium citrate 20 milliEquivalent(s) Oral two times a day  ranolazine 500 milliGRAM(s) Oral two times a day    PRN Inpatient Medications  dextrose 40% Gel 15 Gram(s) Oral once PRN  glucagon  Injectable 1 milliGRAM(s) IntraMuscular once PRN      REVIEW OF SYSTEMS  --------------------------------------------------------------------------------    Gen: denies fevers/chills,  CVS: denies chest pain/palpitations  Resp: denies worsening SOB/Cough  GI: Denies N/V/Abd pain  : Denies dysuria/oliguria/hematuria    All other systems were reviewed and are negative, except as noted.    VITALS/PHYSICAL EXAM  --------------------------------------------------------------------------------  T(C): 37 (09-19-20 @ 12:20), Max: 37 (09-19-20 @ 12:20)  HR: 106 (09-19-20 @ 17:20) (93 - 106)  BP: 98/68 (09-19-20 @ 17:20) (98/68 - 109/69)  RR: 18 (09-19-20 @ 12:20) (17 - 19)  SpO2: 98% (09-19-20 @ 12:20) (96% - 98%)  Wt(kg): --        09-18-20 @ 07:01  -  09-19-20 @ 07:00  --------------------------------------------------------  IN: 840 mL / OUT: 2150 mL / NET: -1310 mL    09-19-20 @ 07:01  -  09-19-20 @ 18:23  --------------------------------------------------------  IN: 600 mL / OUT: 600 mL / NET: 0 mL      Physical Exam:  Gen: Non toxic comfortable appearing   	no  JVD  	Pulm: decrease bs  no rales or ronchi or wheezing  	CV: RRR, S1S2; no rub  	Abd: +BS, soft, nontender/nondistended  	: No suprapubic tenderness  	UE: Warm, no cyanosis  no clubbing,  no edema  	LE: Warm, no cyanosis  no clubbing, no edema  	Neuro: alert and oriented. speech coherent       LABS/STUDIES  --------------------------------------------------------------------------------              10.4   5.01  >-----------<  151      [09-19-20 @ 06:06]              32.0     137  |  98  |  55  ----------------------------<  120      [09-19-20 @ 06:06]  3.4   |  23  |  1.99        Ca     9.8     [09-19-20 @ 06:06]            Creatinine Trend:  SCr 1.99 [09-19 @ 06:06]  SCr 1.89 [09-18 @ 05:49]  SCr 2.07 [09-17 @ 06:16]  SCr 2.08 [09-16 @ 05:43]  SCr 1.94 [09-15 @ 05:45]              Urinalysis - [09-15-20 @ 02:46]      Color Light Yellow / Appearance Clear / SG 1.012 / pH 5.5      Gluc Negative / Ketone Negative  / Bili Negative / Urobili <2 mg/dL       Blood Negative / Protein 30 mg/dL / Leuk Est Negative / Nitrite Negative      RBC 0 / WBC 1 / Hyaline 1 / Gran  / Sq Epi  / Non Sq Epi 0 / Bacteria Negative    Urine Creatinine 68      [09-14-20 @ 21:46]  Urine Protein 44      [09-14-20 @ 21:46]    Iron 36, TIBC 308, %sat 12      [09-15-20 @ 09:38]  Ferritin 370      [09-15-20 @ 08:33]  HbA1c 10.7      [02-14-20 @ 08:35]  TSH 1.99      [08-28-20 @ 06:28]  Lipid: chol 132, , HDL 28, LDL 74      [08-20-20 @ 11:46]    Free Light Chains: kappa 5.14, lambda 3.55, ratio = 1.45      [09-17 @ 09:09]  Immunofixation Serum:   No Monoclonal Band Identified    Reference Range: None Detected      [09-17-20 @ 09:09]    
[facility]   [phone]  RENAL FOLLOW UP NOTE  --------------------------------------------------------------------------------  Chief Complaint:    24 hour events/subjective:    seen earlier.   states still with PORTILLO     PAST HISTORY  --------------------------------------------------------------------------------  No significant changes to PMH, PSH, FHx, SHx, unless otherwise noted    ALLERGIES & MEDICATIONS  --------------------------------------------------------------------------------  Allergies    No Known Allergies    Intolerances    Entresto (Other)    Standing Inpatient Medications  allopurinol 300 milliGRAM(s) Oral daily  aMIOdarone    Tablet 200 milliGRAM(s) Oral two times a day  aspirin enteric coated 81 milliGRAM(s) Oral daily  atorvastatin 80 milliGRAM(s) Oral at bedtime  carvedilol 6.25 milliGRAM(s) Oral every 12 hours  clopidogrel Tablet 75 milliGRAM(s) Oral daily  dextrose 5%. 1000 milliLiter(s) IV Continuous <Continuous>  dextrose 50% Injectable 12.5 Gram(s) IV Push once  dextrose 50% Injectable 25 Gram(s) IV Push once  dextrose 50% Injectable 25 Gram(s) IV Push once  furosemide    Tablet 40 milliGRAM(s) Oral two times a day  heparin   Injectable 5000 Unit(s) SubCutaneous every 8 hours  hydrALAZINE 10 milliGRAM(s) Oral three times a day  influenza   Vaccine 0.5 milliLiter(s) IntraMuscular once  insulin glargine Injectable (LANTUS) 35 Unit(s) SubCutaneous at bedtime  insulin lispro (HumaLOG) corrective regimen sliding scale   SubCutaneous three times a day before meals  insulin lispro (HumaLOG) corrective regimen sliding scale   SubCutaneous at bedtime  insulin lispro Injectable (HumaLOG) 8 Unit(s) SubCutaneous before dinner  melatonin 3 milliGRAM(s) Oral at bedtime  mirtazapine 15 milliGRAM(s) Oral at bedtime  potassium citrate 20 milliEquivalent(s) Oral two times a day  ranolazine 500 milliGRAM(s) Oral two times a day    PRN Inpatient Medications  dextrose 40% Gel 15 Gram(s) Oral once PRN  glucagon  Injectable 1 milliGRAM(s) IntraMuscular once PRN      REVIEW OF SYSTEMS  --------------------------------------------------------------------------------    Gen: denies  fevers/chills,  CVS: denies chest pain/palpitations  Resp:  + PORTILLO /Cough -   GI: Denies N/V/Abd pain  : Denies dysuria/oliguria/hematuria    All other systems were reviewed and are negative, except as noted.    VITALS/PHYSICAL EXAM  --------------------------------------------------------------------------------  T(C): 36.3 (09-21-20 @ 20:13), Max: 36.6 (09-21-20 @ 05:06)  HR: 87 (09-21-20 @ 20:13) (83 - 95)  BP: 102/69 (09-21-20 @ 20:13) (94/65 - 104/73)  RR: 18 (09-21-20 @ 20:13) (18 - 18)  SpO2: 97% (09-21-20 @ 20:13) (97% - 99%)  Wt(kg): --        09-20-20 @ 07:01  -  09-21-20 @ 07:00  --------------------------------------------------------  IN: 720 mL / OUT: 1400 mL / NET: -680 mL    09-21-20 @ 07:01  -  09-21-20 @ 20:38  --------------------------------------------------------  IN: 740 mL / OUT: 600 mL / NET: 140 mL      Physical Exam:  	    Gen: Non toxic comfortable appearing   	no  JVD  	Pulm: decrease bs  no rales or ronchi or wheezing  	CV: RRR, S1S2; no rub  	Abd: +BS, soft, nontender/nondistended  	: No suprapubic tenderness  	UE: Warm, no cyanosis  no clubbing,  no edema  	LE: Warm, no cyanosis  no clubbing, no edema  	Neuro: alert and oriented. speech coherent       LABS/STUDIES  --------------------------------------------------------------------------------              10.4   6.33  >-----------<  158      [09-21-20 @ 05:40]              31.2     136  |  99  |  61  ----------------------------<  66      [09-21-20 @ 05:40]  3.8   |  24  |  2.17        Ca     9.7     [09-21-20 @ 05:40]      Mg     2.1     [09-20-20 @ 06:04]            Creatinine Trend:  SCr 2.17 [09-21 @ 05:40]  SCr 2.07 [09-20 @ 06:04]  SCr 1.99 [09-19 @ 06:06]  SCr 1.89 [09-18 @ 05:49]  SCr 2.07 [09-17 @ 06:16]              Urinalysis - [09-15-20 @ 02:46]      Color Light Yellow / Appearance Clear / SG 1.012 / pH 5.5      Gluc Negative / Ketone Negative  / Bili Negative / Urobili <2 mg/dL       Blood Negative / Protein 30 mg/dL / Leuk Est Negative / Nitrite Negative      RBC 0 / WBC 1 / Hyaline 1 / Gran  / Sq Epi  / Non Sq Epi 0 / Bacteria Negative    Urine Creatinine 68      [09-14-20 @ 21:46]  Urine Protein 44      [09-14-20 @ 21:46]    Iron 36, TIBC 308, %sat 12      [09-15-20 @ 09:38]  Ferritin 370      [09-15-20 @ 08:33]  HbA1c 10.7      [02-14-20 @ 08:35]  TSH 1.99      [08-28-20 @ 06:28]  Lipid: chol 132, , HDL 28, LDL 74      [08-20-20 @ 11:46]    Free Light Chains: kappa 5.14, lambda 3.55, ratio = 1.45      [09-17 @ 09:09]  Immunofixation Serum:   No Monoclonal Band Identified    Reference Range: None Detected      [09-17-20 @ 09:09]    
[facility]   [phone]  RENAL FOLLOW UP NOTE  --------------------------------------------------------------------------------  Chief Complaint:    24 hour events/subjective:    still with PORTILLO when seen earlier     PAST HISTORY  --------------------------------------------------------------------------------  No significant changes to PMH, PSH, FHx, SHx, unless otherwise noted    ALLERGIES & MEDICATIONS  --------------------------------------------------------------------------------  Allergies    No Known Allergies    Intolerances    Entresto (Other)    Standing Inpatient Medications  allopurinol 300 milliGRAM(s) Oral daily  aspirin enteric coated 81 milliGRAM(s) Oral daily  atorvastatin 80 milliGRAM(s) Oral at bedtime  carvedilol 6.25 milliGRAM(s) Oral every 12 hours  clopidogrel Tablet 75 milliGRAM(s) Oral daily  dextrose 5%. 1000 milliLiter(s) IV Continuous <Continuous>  dextrose 50% Injectable 12.5 Gram(s) IV Push once  dextrose 50% Injectable 25 Gram(s) IV Push once  dextrose 50% Injectable 25 Gram(s) IV Push once  furosemide    Tablet 40 milliGRAM(s) Oral three times a day  heparin   Injectable 5000 Unit(s) SubCutaneous every 8 hours  influenza   Vaccine 0.5 milliLiter(s) IntraMuscular once  insulin glargine Injectable (LANTUS) 40 Unit(s) SubCutaneous at bedtime  insulin lispro (HumaLOG) corrective regimen sliding scale   SubCutaneous three times a day before meals  insulin lispro (HumaLOG) corrective regimen sliding scale   SubCutaneous at bedtime  insulin lispro Injectable (HumaLOG) 8 Unit(s) SubCutaneous before dinner  melatonin 3 milliGRAM(s) Oral at bedtime  mirtazapine 15 milliGRAM(s) Oral at bedtime  ranolazine 500 milliGRAM(s) Oral two times a day    PRN Inpatient Medications  dextrose 40% Gel 15 Gram(s) Oral once PRN  glucagon  Injectable 1 milliGRAM(s) IntraMuscular once PRN      REVIEW OF SYSTEMS  --------------------------------------------------------------------------------    Gen: denies fevers/chills,  CVS: denies chest pain/palpitations  Resp: +  SOB/Cough  GI: Denies N/V/Abd pain  : Denies dysuria/oliguria/hematuria    All other systems were reviewed and are negative, except as noted.    VITALS/PHYSICAL EXAM  --------------------------------------------------------------------------------  T(C): 36.5 (09-17-20 @ 13:15), Max: 37.1 (09-17-20 @ 09:15)  HR: 98 (09-17-20 @ 13:15) (89 - 98)  BP: 105/75 (09-17-20 @ 13:15) (98/62 - 108/68)  RR: 18 (09-17-20 @ 13:15) (16 - 18)  SpO2: 98% (09-17-20 @ 13:15) (95% - 98%)  Wt(kg): --        09-16-20 @ 07:01  -  09-17-20 @ 07:00  --------------------------------------------------------  IN: 1020 mL / OUT: 1600 mL / NET: -580 mL    09-17-20 @ 07:01  -  09-17-20 @ 20:17  --------------------------------------------------------  IN: 660 mL / OUT: 1600 mL / NET: -940 mL      Physical Exam:  	    Gen: Non toxic comfortable appearing   	+ JVD  	Pulm: decrease bs  no rales or ronchi or wheezing  	CV: RRR, S1S2; no rub  	Abd: +BS, soft, nontender/nondistended  	: No suprapubic tenderness  	UE: Warm, no cyanosis  no clubbing,  no edema  	LE: Warm, no cyanosis  no clubbing, no edema  	Neuro: alert and oriented. speech coherent       LABS/STUDIES  --------------------------------------------------------------------------------              10.5   7.16  >-----------<  145      [09-17-20 @ 06:17]              32.0     138  |  96  |  58  ----------------------------<  114      [09-17-20 @ 06:16]  3.1   |  27  |  2.07        Ca     9.9     [09-17-20 @ 06:16]      Mg     2.0     [09-17-20 @ 06:16]      Phos  4.5     [09-17-20 @ 06:16]    TPro  7.0  /  Alb  3.7  /  TBili  0.9  /  DBili  x   /  AST  24  /  ALT  35  /  AlkPhos  78  [09-17-20 @ 06:16]          Creatinine Trend:  SCr 2.07 [09-17 @ 06:16]  SCr 2.08 [09-16 @ 05:43]  SCr 1.94 [09-15 @ 05:45]  SCr 1.77 [09-14 @ 06:29]  SCr 1.78 [09-13 @ 16:48]              Urinalysis - [09-15-20 @ 02:46]      Color Light Yellow / Appearance Clear / SG 1.012 / pH 5.5      Gluc Negative / Ketone Negative  / Bili Negative / Urobili <2 mg/dL       Blood Negative / Protein 30 mg/dL / Leuk Est Negative / Nitrite Negative      RBC 0 / WBC 1 / Hyaline 1 / Gran  / Sq Epi  / Non Sq Epi 0 / Bacteria Negative    Urine Creatinine 68      [09-14-20 @ 21:46]  Urine Protein 44      [09-14-20 @ 21:46]    Iron 36, TIBC 308, %sat 12      [09-15-20 @ 09:38]  Ferritin 370      [09-15-20 @ 08:33]  HbA1c 10.7      [02-14-20 @ 08:35]  TSH 1.99      [08-28-20 @ 06:28]  Lipid: chol 132, , HDL 28, LDL 74      [08-20-20 @ 11:46]    Free Light Chains: kappa 5.14, lambda 3.55, ratio = 1.45      [09-17 @ 09:09]  Immunofixation Serum:   No Monoclonal Band Identified    Reference Range: None Detected      [09-17-20 @ 09:09]    
Cardiology Progress Note    Interval: Pt resting comfortably in bed. Noted did not sleep well due to his neighbor but otherwise denied active chest pain, palpitations, and SOB. Urinating well on current regimen.    Tele: Sinus rhythm    Medications:  allopurinol 300 milliGRAM(s) Oral daily  aMIOdarone    Tablet 200 milliGRAM(s) Oral two times a day  aspirin enteric coated 81 milliGRAM(s) Oral daily  atorvastatin 80 milliGRAM(s) Oral at bedtime  carvedilol 6.25 milliGRAM(s) Oral every 12 hours  chlorhexidine 4% Liquid 1 Application(s) Topical <User Schedule>  clopidogrel Tablet 75 milliGRAM(s) Oral daily  dextrose 40% Gel 15 Gram(s) Oral once PRN  dextrose 5%. 1000 milliLiter(s) IV Continuous <Continuous>  dextrose 50% Injectable 12.5 Gram(s) IV Push once  dextrose 50% Injectable 25 Gram(s) IV Push once  dextrose 50% Injectable 25 Gram(s) IV Push once  furosemide   Injectable 60 milliGRAM(s) IV Push every 12 hours  glucagon  Injectable 1 milliGRAM(s) IntraMuscular once PRN  heparin   Injectable 5000 Unit(s) SubCutaneous every 8 hours  hydrALAZINE 30 milliGRAM(s) Oral three times a day  influenza   Vaccine 0.5 milliLiter(s) IntraMuscular once  insulin glargine Injectable (LANTUS) 35 Unit(s) SubCutaneous at bedtime  insulin lispro (HumaLOG) corrective regimen sliding scale   SubCutaneous three times a day before meals  insulin lispro (HumaLOG) corrective regimen sliding scale   SubCutaneous at bedtime  insulin lispro Injectable (HumaLOG) 8 Unit(s) SubCutaneous before dinner  melatonin 3 milliGRAM(s) Oral at bedtime  milrinone Infusion 0.25 MICROgram(s)/kG/Min IV Continuous <Continuous>  mirtazapine 15 milliGRAM(s) Oral at bedtime  multivitamin 1 Tablet(s) Oral daily  potassium citrate 20 milliEquivalent(s) Oral two times a day  ranolazine 500 milliGRAM(s) Oral two times a day  sodium chloride 0.9% lock flush 10 milliLiter(s) IV Push every 1 hour PRN      Review of Systems:  Constitutional: [ ] Fever [ ] Chills [ ] Fatigue [ ] Weight change   HEENT: [ ] Blurred vision [ ] Eye Pain [ ] Headache [ ] Runny nose [ ] Sore Throat   Respiratory: [ ] Cough [ ] Wheezing [ ] Shortness of breath  Cardiovascular: [ ] Chest Pain [ ] Palpitations [ ] PORTILLO [ ] PND [ ] Orthopnea  Gastrointestinal: [ ] Abdominal Pain [ ] Diarrhea [ ] Constipation [ ] Hemorrhoids [ ] Nausea [ ] Vomiting  Genitourinary: [ ] Nocturia [ ] Dysuria [ ] Incontinence  Extremities: [ ] Swelling [ ] Joint Pain  Neurologic: [ ] Focal deficit [ ] Paresthesias [ ] Syncope  Lymphatic: [ ] Swelling [ ] Lymphadenopathy   Skin: [ ] Rash [ ] Ecchymoses [ ] Wounds [ ] Lesions  Psychiatry: [ ] Depression [ ] Suicidal/Homicidal Ideation [ ] Anxiety [ ] Sleep Disturbances  [ ] 10 point review of systems is otherwise negative except as mentioned above            [ ]Unable to obtain    Vitals:  T(C): 36.6 (09-26-20 @ 05:00), Max: 36.6 (09-25-20 @ 16:31)  HR: 90 (09-26-20 @ 05:00) (83 - 92)  BP: 112/74 (09-26-20 @ 05:00) (101/65 - 118/74)  BP(mean): --  RR: 18 (09-26-20 @ 05:00) (17 - 18)  SpO2: 99% (09-26-20 @ 05:00) (97% - 99%)  Wt(kg): --  Daily     Daily   I&O's Summary    25 Sep 2020 07:01  -  26 Sep 2020 07:00  --------------------------------------------------------  IN: 674 mL / OUT: 2325 mL / NET: -1651 mL    26 Sep 2020 07:01  -  26 Sep 2020 11:12  --------------------------------------------------------  IN: 120 mL / OUT: 600 mL / NET: -480 mL        Physical Exam:  General: NAD  Eye: PERRL, EOMI  HENT: Normal oral mucosa NC/AT  CV: Normal S1/S2, RRR, No M/R/G, no edema, no elevation in JVP (6-8cm)  Resp: Normal respiratory effort, clear to auscultation bilaterally, no wheezing, no crackles  Abd: Soft, Non-tender, Non-distended, BS+  Ext: No clubbing, No joint deformity   Neuro: Non-focal, motor and sensory intact  Lymph: No lymphadenopathy  Psych: AAOx3, Mood & affect appropriate  Skin: No rashes, No ecchymoses, No cyanosis    Labs:                        10.1   6.34  )-----------( 176      ( 26 Sep 2020 06:40 )             30.5     09-26    133<L>  |  96  |  47<H>  ----------------------------<  171<H>  3.7   |  26  |  2.12<H>    Ca    9.3      26 Sep 2020 06:40  Mg     1.9     09-26                    New results/imaging:  
Cardiology Progress Note    Interval: Pt resting comfortably in bed. Slept well overnight. Urinating well and denied any chest pain, palpitations, and SOB.    Tele: Sinus rhythm 80-90s with PVC's    Medications:  allopurinol 300 milliGRAM(s) Oral daily  aMIOdarone    Tablet 200 milliGRAM(s) Oral two times a day  aspirin enteric coated 81 milliGRAM(s) Oral daily  atorvastatin 80 milliGRAM(s) Oral at bedtime  carvedilol 6.25 milliGRAM(s) Oral every 12 hours  chlorhexidine 4% Liquid 1 Application(s) Topical <User Schedule>  clopidogrel Tablet 75 milliGRAM(s) Oral daily  dextrose 40% Gel 15 Gram(s) Oral once PRN  dextrose 5%. 1000 milliLiter(s) IV Continuous <Continuous>  dextrose 50% Injectable 12.5 Gram(s) IV Push once  dextrose 50% Injectable 25 Gram(s) IV Push once  dextrose 50% Injectable 25 Gram(s) IV Push once  furosemide   Injectable 60 milliGRAM(s) IV Push every 12 hours  glucagon  Injectable 1 milliGRAM(s) IntraMuscular once PRN  heparin   Injectable 5000 Unit(s) SubCutaneous every 8 hours  hydrALAZINE 50 milliGRAM(s) Oral three times a day  influenza   Vaccine 0.5 milliLiter(s) IntraMuscular once  insulin glargine Injectable (LANTUS) 35 Unit(s) SubCutaneous at bedtime  insulin lispro (HumaLOG) corrective regimen sliding scale   SubCutaneous three times a day before meals  insulin lispro (HumaLOG) corrective regimen sliding scale   SubCutaneous at bedtime  insulin lispro Injectable (HumaLOG) 8 Unit(s) SubCutaneous before dinner  melatonin 3 milliGRAM(s) Oral at bedtime  milrinone Infusion 0.25 MICROgram(s)/kG/Min IV Continuous <Continuous>  mirtazapine 15 milliGRAM(s) Oral at bedtime  multivitamin 1 Tablet(s) Oral daily  potassium citrate 20 milliEquivalent(s) Oral two times a day  ranolazine 500 milliGRAM(s) Oral two times a day  sodium chloride 0.9% lock flush 10 milliLiter(s) IV Push every 1 hour PRN      Review of Systems:  Constitutional: [ ] Fever [ ] Chills [ ] Fatigue [ ] Weight change   HEENT: [ ] Blurred vision [ ] Eye Pain [ ] Headache [ ] Runny nose [ ] Sore Throat   Respiratory: [ ] Cough [ ] Wheezing [ ] Shortness of breath  Cardiovascular: [ ] Chest Pain [ ] Palpitations [ ] PORTILLO [ ] PND [ ] Orthopnea  Gastrointestinal: [ ] Abdominal Pain [ ] Diarrhea [ ] Constipation [ ] Hemorrhoids [ ] Nausea [ ] Vomiting  Genitourinary: [ ] Nocturia [ ] Dysuria [ ] Incontinence  Extremities: [ ] Swelling [ ] Joint Pain  Neurologic: [ ] Focal deficit [ ] Paresthesias [ ] Syncope  Lymphatic: [ ] Swelling [ ] Lymphadenopathy   Skin: [ ] Rash [ ] Ecchymoses [ ] Wounds [ ] Lesions  Psychiatry: [ ] Depression [ ] Suicidal/Homicidal Ideation [ ] Anxiety [ ] Sleep Disturbances  [ ] 10 point review of systems is otherwise negative except as mentioned above            [ ]Unable to obtain    Vitals:  T(C): 36.7 (09-26-20 @ 20:26), Max: 36.7 (09-26-20 @ 20:26)  HR: 82 (09-27-20 @ 04:59) (82 - 96)  BP: 106/65 (09-27-20 @ 04:59) (104/65 - 120/73)  BP(mean): --  RR: 18 (09-26-20 @ 20:26) (18 - 18)  SpO2: 95% (09-26-20 @ 20:26) (95% - 98%)  Wt(kg): --  Daily     Daily   I&O's Summary    26 Sep 2020 07:01  -  27 Sep 2020 07:00  --------------------------------------------------------  IN: 794.4 mL / OUT: 1800 mL / NET: -1005.6 mL        Physical Exam:  General: NAD  Eye: PERRL, EOMI  HENT: Normal oral mucosa NC/AT  CV: Normal S1/S2, RRR, No M/R/G, no edema, mild elevation in JVP (8-10cm)  Resp: Normal respiratory effort, clear to auscultation bilaterally, no wheezing, no crackles  Abd: Soft, Non-tender, Non-distended, BS+  Ext: No clubbing, No joint deformity   Neuro: Non-focal, motor and sensory intact  Lymph: No lymphadenopathy  Psych: AAOx3, Mood & affect appropriate  Skin: No rashes, No ecchymoses, No cyanosis    Labs:                        10.1   6.34  )-----------( 176      ( 26 Sep 2020 06:40 )             30.5     09-27    139  |  98  |  41<H>  ----------------------------<  128<H>  4.1   |  28  |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   15.49 )-----------( 164      ( 18 Sep 2020 05:49 )             33.7     09-18    138  |  99  |  59<H>  ----------------------------<  158<H>  3.8   |  24  |  1.89<H>    Ca    10.2      18 Sep 2020 05:49  Phos  4.5     09-17  Mg     2.0     09-17    TPro  7.0  /  Alb  3.7  /  TBili  0.9  /  DBili  x   /  AST  24  /  ALT  35  /  AlkPhos  78  09-17    Serum Pro-Brain Natriuretic Peptide: 4545 pg/mL (09-13 @ 16:48)    Interpretation of Telemetry: sinus rhythm, PVC in trigeminy, PACs  
[facility]   [phone]  RENAL FOLLOW UP NOTE  --------------------------------------------------------------------------------  Chief Complaint:    24 hour events/subjective:    seen earlier. states breathing is better today     PAST HISTORY  --------------------------------------------------------------------------------  No significant changes to PMH, PSH, FHx, SHx, unless otherwise noted    ALLERGIES & MEDICATIONS  --------------------------------------------------------------------------------  Allergies    No Known Allergies    Intolerances    Entresto (Other)    Standing Inpatient Medications  allopurinol 300 milliGRAM(s) Oral daily  aMIOdarone    Tablet 200 milliGRAM(s) Oral two times a day  aspirin enteric coated 81 milliGRAM(s) Oral daily  atorvastatin 80 milliGRAM(s) Oral at bedtime  carvedilol 6.25 milliGRAM(s) Oral every 12 hours  dextrose 5%. 1000 milliLiter(s) IV Continuous <Continuous>  dextrose 50% Injectable 12.5 Gram(s) IV Push once  dextrose 50% Injectable 25 Gram(s) IV Push once  dextrose 50% Injectable 25 Gram(s) IV Push once  furosemide    Tablet 40 milliGRAM(s) Oral three times a day  heparin   Injectable 5000 Unit(s) SubCutaneous every 8 hours  influenza   Vaccine 0.5 milliLiter(s) IntraMuscular once  insulin glargine Injectable (LANTUS) 40 Unit(s) SubCutaneous at bedtime  insulin lispro (HumaLOG) corrective regimen sliding scale   SubCutaneous three times a day before meals  insulin lispro (HumaLOG) corrective regimen sliding scale   SubCutaneous at bedtime  insulin lispro Injectable (HumaLOG) 8 Unit(s) SubCutaneous before dinner  melatonin 3 milliGRAM(s) Oral at bedtime  mirtazapine 15 milliGRAM(s) Oral at bedtime  ranolazine 500 milliGRAM(s) Oral two times a day    PRN Inpatient Medications  dextrose 40% Gel 15 Gram(s) Oral once PRN  glucagon  Injectable 1 milliGRAM(s) IntraMuscular once PRN      REVIEW OF SYSTEMS  --------------------------------------------------------------------------------    Gen: denies fevers/chills,  CVS: denies chest pain/palpitations  Resp: denies worsening  SOB/Cough  GI: Denies N/V/Abd pain  : Denies dysuria/oliguria/hematuria    All other systems were reviewed and are negative, except as noted.    VITALS/PHYSICAL EXAM  --------------------------------------------------------------------------------  T(C): 36.5 (09-18-20 @ 20:34), Max: 37.3 (09-18-20 @ 04:33)  HR: 100 (09-18-20 @ 20:34) (94 - 102)  BP: 109/69 (09-18-20 @ 20:34) (99/63 - 110/66)  RR: 19 (09-18-20 @ 20:34) (17 - 19)  SpO2: 96% (09-18-20 @ 20:34) (96% - 97%)  Wt(kg): --        09-17-20 @ 07:01  -  09-18-20 @ 07:00  --------------------------------------------------------  IN: 660 mL / OUT: 2300 mL / NET: -1640 mL    09-18-20 @ 07:01  -  09-18-20 @ 20:39  --------------------------------------------------------  IN: 840 mL / OUT: 1650 mL / NET: -810 mL      Physical Exam:  	    Gen: Non toxic comfortable appearing   	+ JVD  	Pulm: decrease bs  no rales or ronchi or wheezing  	CV: RRR, S1S2; no rub  	Abd: +BS, soft, nontender/nondistended  	: No suprapubic tenderness  	UE: Warm, no cyanosis  no clubbing,  no edema  	LE: Warm, no cyanosis  no clubbing, no edema  	Neuro: alert and oriented. speech coherent       LABS/STUDIES  --------------------------------------------------------------------------------              11.3   15.49 >-----------<  164      [09-18-20 @ 05:49]              33.7     138  |  99  |  59  ----------------------------<  158      [09-18-20 @ 05:49]  3.8   |  24  |  1.89        Ca     10.2     [09-18-20 @ 05:49]      Mg     2.0     [09-17-20 @ 06:16]      Phos  4.5     [09-17-20 @ 06:16]    TPro  7.0  /  Alb  3.7  /  TBili  0.9  /  DBili  x   /  AST  24  /  ALT  35  /  AlkPhos  78  [09-17-20 @ 06:16]          Creatinine Trend:  SCr 1.89 [09-18 @ 05:49]  SCr 2.07 [09-17 @ 06:16]  SCr 2.08 [09-16 @ 05:43]  SCr 1.94 [09-15 @ 05:45]  SCr 1.77 [09-14 @ 06:29]              Urinalysis - [09-15-20 @ 02:46]      Color Light Yellow / Appearance Clear / SG 1.012 / pH 5.5      Gluc Negative / Ketone Negative  / Bili Negative / Urobili <2 mg/dL       Blood Negative / Protein 30 mg/dL / Leuk Est Negative / Nitrite Negative      RBC 0 / WBC 1 / Hyaline 1 / Gran  / Sq Epi  / Non Sq Epi 0 / Bacteria Negative    Urine Creatinine 68      [09-14-20 @ 21:46]  Urine Protein 44      [09-14-20 @ 21:46]    Iron 36, TIBC 308, %sat 12      [09-15-20 @ 09:38]  Ferritin 370      [09-15-20 @ 08:33]  HbA1c 10.7      [02-14-20 @ 08:35]  TSH 1.99      [08-28-20 @ 06:28]  Lipid: chol 132, , HDL 28, LDL 74      [08-20-20 @ 11:46]    Free Light Chains: kappa 5.14, lambda 3.55, ratio = 1.45      [09-17 @ 09:09]  Immunofixation Serum:   No Monoclonal Band Identified    Reference Range: None Detected      [09-17-20 @ 09:09]    
Patient is a 80y old  Male who presents with a chief complaint of shortness of breath (18 Sep 2020 21:55)      SUBJECTIVE / OVERNIGHT EVENTS: overnight events noted    ROS:  Resp: No cough no sputum production  CVS: No chest pain no palpitations no orthopnea  GI: no N/V/D  : no dysuria, no hematuria  Neuro: no weakness no paresthesias  Heme: No petechiae no easy bruising          MEDICATIONS  (STANDING):  allopurinol 300 milliGRAM(s) Oral daily  aMIOdarone    Tablet 200 milliGRAM(s) Oral two times a day  aspirin enteric coated 81 milliGRAM(s) Oral daily  atorvastatin 80 milliGRAM(s) Oral at bedtime  carvedilol 6.25 milliGRAM(s) Oral every 12 hours  clopidogrel Tablet 75 milliGRAM(s) Oral daily  dextrose 5%. 1000 milliLiter(s) (50 mL/Hr) IV Continuous <Continuous>  dextrose 50% Injectable 12.5 Gram(s) IV Push once  dextrose 50% Injectable 25 Gram(s) IV Push once  dextrose 50% Injectable 25 Gram(s) IV Push once  furosemide    Tablet 40 milliGRAM(s) Oral three times a day  heparin   Injectable 5000 Unit(s) SubCutaneous every 8 hours  influenza   Vaccine 0.5 milliLiter(s) IntraMuscular once  insulin glargine Injectable (LANTUS) 40 Unit(s) SubCutaneous at bedtime  insulin lispro (HumaLOG) corrective regimen sliding scale   SubCutaneous three times a day before meals  insulin lispro (HumaLOG) corrective regimen sliding scale   SubCutaneous at bedtime  insulin lispro Injectable (HumaLOG) 8 Unit(s) SubCutaneous before dinner  melatonin 3 milliGRAM(s) Oral at bedtime  mirtazapine 15 milliGRAM(s) Oral at bedtime  potassium citrate 20 milliEquivalent(s) Oral two times a day  potassium citrate 20 milliEquivalent(s) Oral once  ranolazine 500 milliGRAM(s) Oral two times a day    MEDICATIONS  (PRN):  dextrose 40% Gel 15 Gram(s) Oral once PRN Blood Glucose LESS THAN 70 milliGRAM(s)/deciliter  glucagon  Injectable 1 milliGRAM(s) IntraMuscular once PRN Glucose LESS THAN 70 milligrams/deciliter        CAPILLARY BLOOD GLUCOSE      POCT Blood Glucose.: 175 mg/dL (19 Sep 2020 12:59)  POCT Blood Glucose.: 125 mg/dL (19 Sep 2020 08:15)  POCT Blood Glucose.: 232 mg/dL (18 Sep 2020 21:12)  POCT Blood Glucose.: 183 mg/dL (18 Sep 2020 17:17)    I&O's Summary    18 Sep 2020 07:01  -  19 Sep 2020 07:00  --------------------------------------------------------  IN: 840 mL / OUT: 2150 mL / NET: -1310 mL    19 Sep 2020 07:01  -  19 Sep 2020 14:30  --------------------------------------------------------  IN: 600 mL / OUT: 600 mL / NET: 0 mL        Vital Signs Last 24 Hrs  T(C): 37 (19 Sep 2020 12:20), Max: 37 (19 Sep 2020 12:20)  T(F): 98.6 (19 Sep 2020 12:20), Max: 98.6 (19 Sep 2020 12:20)  HR: 94 (19 Sep 2020 12:20) (93 - 101)  BP: 105/72 (19 Sep 2020 12:20) (99/63 - 109/69)  BP(mean): --  RR: 18 (19 Sep 2020 12:20) (17 - 19)  SpO2: 98% (19 Sep 2020 12:20) (96% - 98%)    PHYSICAL EXAM:  GENERAL:   well-developed. mild resp distress  HEAD:  Atraumatic, Normocephalic  EYES: EOMI, PERRLA, conjunctiva and sclera clear  NECK: Supple, No JVD  CHEST/LUNG: clear b/l. no wheeze  HEART: S1, S2;   ABDOMEN: Soft, Nontender, Nondistended; Bowel sounds present  EXTREMITIES:  2+ Peripheral Pulses, No edema  PSYCH: Normal affect  NEUROLOGY: AAOX3; non-focal  SKIN: No rashes or lesions    LABS:                        10.4   5.01  )-----------( 151      ( 19 Sep 2020 06:06 )             32.0     09-19    137  |  98  |  55<H>  ----------------------------<  120<H>  3.4<L>   |  23  |  1.99<H>    Ca    9.8      19 Sep 2020 06:06                  All consultant(s) notes reviewed and care discussed with other providers        Contact Number, Dr Vasquez [phone]
Patient is a 80y old  Male who presents with a chief complaint of shortness of breath (28 Sep 2020 12:37)      SUBJECTIVE / OVERNIGHT EVENTS: overnight events noted    ROS:  Resp: No cough no sputum production  CVS: No chest pain no palpitations no orthopnea  GI: no N/V/D  : no dysuria, no hematuria  Neuro: no weakness no paresthesias  Heme: No petechiae no easy bruising  Msk: No joint pain no swelling  Skin: No rash no itching        MEDICATIONS  (STANDING):  allopurinol 300 milliGRAM(s) Oral daily  aMIOdarone    Tablet 200 milliGRAM(s) Oral two times a day  aspirin enteric coated 81 milliGRAM(s) Oral daily  atorvastatin 80 milliGRAM(s) Oral at bedtime  carvedilol 6.25 milliGRAM(s) Oral every 12 hours  chlorhexidine 4% Liquid 1 Application(s) Topical <User Schedule>  clopidogrel Tablet 75 milliGRAM(s) Oral daily  dextrose 5%. 1000 milliLiter(s) (50 mL/Hr) IV Continuous <Continuous>  dextrose 50% Injectable 12.5 Gram(s) IV Push once  dextrose 50% Injectable 25 Gram(s) IV Push once  dextrose 50% Injectable 25 Gram(s) IV Push once  heparin   Injectable 5000 Unit(s) SubCutaneous every 8 hours  hydrALAZINE 50 milliGRAM(s) Oral three times a day  influenza   Vaccine 0.5 milliLiter(s) IntraMuscular once  insulin glargine Injectable (LANTUS) 35 Unit(s) SubCutaneous at bedtime  insulin lispro (HumaLOG) corrective regimen sliding scale   SubCutaneous three times a day before meals  insulin lispro (HumaLOG) corrective regimen sliding scale   SubCutaneous at bedtime  insulin lispro Injectable (HumaLOG) 8 Unit(s) SubCutaneous before dinner  melatonin 3 milliGRAM(s) Oral at bedtime  milrinone Infusion 0.25 MICROgram(s)/kG/Min (6.16 mL/Hr) IV Continuous <Continuous>  mirtazapine 15 milliGRAM(s) Oral at bedtime  multivitamin 1 Tablet(s) Oral daily  potassium citrate 20 milliEquivalent(s) Oral two times a day  ranolazine 500 milliGRAM(s) Oral two times a day  torsemide 40 milliGRAM(s) Oral two times a day    MEDICATIONS  (PRN):  dextrose 40% Gel 15 Gram(s) Oral once PRN Blood Glucose LESS THAN 70 milliGRAM(s)/deciliter  glucagon  Injectable 1 milliGRAM(s) IntraMuscular once PRN Glucose LESS THAN 70 milligrams/deciliter  sodium chloride 0.9% lock flush 10 milliLiter(s) IV Push every 1 hour PRN Pre/post blood products, medications, blood draw, and to maintain line patency        CAPILLARY BLOOD GLUCOSE      POCT Blood Glucose.: 193 mg/dL (28 Sep 2020 08:06)  POCT Blood Glucose.: 193 mg/dL (27 Sep 2020 21:22)  POCT Blood Glucose.: 168 mg/dL (27 Sep 2020 17:13)    I&O's Summary    27 Sep 2020 07:01  -  28 Sep 2020 07:00  --------------------------------------------------------  IN: 706.6 mL / OUT: 1750 mL / NET: -1043.4 mL    28 Sep 2020 07:01  -  28 Sep 2020 13:14  --------------------------------------------------------  IN: 314.4 mL / OUT: 850 mL / NET: -535.6 mL        Vital Signs Last 24 Hrs  T(C): 36.3 (27 Sep 2020 20:53), Max: 36.3 (27 Sep 2020 20:53)  T(F): 97.3 (27 Sep 2020 20:53), Max: 97.3 (27 Sep 2020 20:53)  HR: 90 (27 Sep 2020 20:53) (85 - 90)  BP: 116/75 (27 Sep 2020 20:53) (114/74 - 116/75)  BP(mean): --  RR: 16 (27 Sep 2020 20:53) (16 - 16)  SpO2: 95% (27 Sep 2020 20:53) (95% - 95%)    PHYSICAL EXAM:  GENERAL:   well-developed. mild resp distress  HEAD:  Atraumatic, Normocephalic  EYES: EOMI, PERRLA, conjunctiva and sclera clear  NECK: Supple, No JVD  CHEST/LUNG: clear b/l. no wheeze  HEART: S1, S2;   ABDOMEN: Soft, Nontender, Nondistended; Bowel sounds present  EXTREMITIES:   edema  PSYCH: Normal affect  NEUROLOGY: AAOX3; non-focal  SKIN: No rashes or lesions    LABS:                        10.3   6.81  )-----------( 184      ( 28 Sep 2020 06:35 )             31.6     09-28    134<L>  |  95<L>  |  43<H>  ----------------------------<  191<H>  4.1   |  28  |  1.99<H>    Ca    9.5      28 Sep 2020 06:26                  All consultant(s) notes reviewed and care discussed with other providers        Contact Number, Dr Vasquez [phone]
Patient seen and examined at bedside.    Overnight Events:   No overnight events.   Was given lasix with 750cc output  This morning states feels better than prior to being on milrinone.      Current Meds:  allopurinol 300 milliGRAM(s) Oral daily  aMIOdarone    Tablet 200 milliGRAM(s) Oral two times a day  aspirin enteric coated 81 milliGRAM(s) Oral daily  atorvastatin 80 milliGRAM(s) Oral at bedtime  carvedilol 6.25 milliGRAM(s) Oral every 12 hours  clopidogrel Tablet 75 milliGRAM(s) Oral daily  dextrose 40% Gel 15 Gram(s) Oral once PRN  dextrose 5%. 1000 milliLiter(s) IV Continuous <Continuous>  dextrose 50% Injectable 12.5 Gram(s) IV Push once  dextrose 50% Injectable 25 Gram(s) IV Push once  dextrose 50% Injectable 25 Gram(s) IV Push once  furosemide   Injectable 60 milliGRAM(s) IV Push every 12 hours  glucagon  Injectable 1 milliGRAM(s) IntraMuscular once PRN  heparin   Injectable 5000 Unit(s) SubCutaneous every 8 hours  hydrALAZINE 30 milliGRAM(s) Oral three times a day  influenza   Vaccine 0.5 milliLiter(s) IntraMuscular once  insulin glargine Injectable (LANTUS) 35 Unit(s) SubCutaneous at bedtime  insulin lispro (HumaLOG) corrective regimen sliding scale   SubCutaneous three times a day before meals  insulin lispro (HumaLOG) corrective regimen sliding scale   SubCutaneous at bedtime  insulin lispro Injectable (HumaLOG) 8 Unit(s) SubCutaneous before dinner  melatonin 3 milliGRAM(s) Oral at bedtime  milrinone Infusion 0.25 MICROgram(s)/kG/Min IV Continuous <Continuous>  mirtazapine 15 milliGRAM(s) Oral at bedtime  potassium chloride    Tablet ER 20 milliEquivalent(s) Oral once  potassium citrate 20 milliEquivalent(s) Oral two times a day  ranolazine 500 milliGRAM(s) Oral two times a day        Vitals:  T(F): 98.2 (09-25), Max: 98.6 (09-24)  HR: 84 (09-25) (77 - 84)  BP: 101/67 (09-25) (101/67 - 114/74)  RR: 18 (09-25)  SpO2: 95% (09-25)  I&O's Summary    24 Sep 2020 07:01  -  25 Sep 2020 07:00  --------------------------------------------------------  IN: 843.2 mL / OUT: 1750 mL / NET: -906.8 mL    25 Sep 2020 07:01  -  25 Sep 2020 12:22  --------------------------------------------------------  IN: 240 mL / OUT: 800 mL / NET: -560 mL        Physical Exam:  Appearance: No acute distress; well appearing  Eyes: PERRL, EOMI, pink conjunctiva  HENT: Normal oral mucosa  Cardiovascular: RRR, S1, S2, no murmurs, rubs, or gallops; no edema; elevated JVD  Respiratory: Clear to auscultation bilaterally  Gastrointestinal: soft, non-tender, non-distended with normal bowel sounds  Musculoskeletal: No clubbing; no joint deformity   Neurologic: Non-focal  Lymphatic: No lymphadenopathy  Psychiatry: AAOx3, mood & affect appropriate  Skin: No rashes, ecchymoses, or cyanosis                          9.9    6.85  )-----------( 171      ( 25 Sep 2020 06:05 )             30.6     09-25    137  |  98  |  56<H>  ----------------------------<  202<H>  3.7   |  23  |  2.13<H>    Ca    9.3      25 Sep 2020 06:05                    New ECG(s): Personally reviewed    Echo:    Stress Testing:     Cath:    Imaging:    Interpretation of Telemetry:  sinus prolonged PA 70-90 bpm
Patient seen and examined at bedside.    Overnight Events:   Overnight lasix held for hypotension  Pt. has no complaints this morning    Current Meds:  allopurinol 300 milliGRAM(s) Oral daily  aMIOdarone    Tablet 200 milliGRAM(s) Oral two times a day  aspirin enteric coated 81 milliGRAM(s) Oral daily  atorvastatin 80 milliGRAM(s) Oral at bedtime  carvedilol 6.25 milliGRAM(s) Oral every 12 hours  clopidogrel Tablet 75 milliGRAM(s) Oral daily  dextrose 40% Gel 15 Gram(s) Oral once PRN  dextrose 5%. 1000 milliLiter(s) IV Continuous <Continuous>  dextrose 50% Injectable 12.5 Gram(s) IV Push once  dextrose 50% Injectable 25 Gram(s) IV Push once  dextrose 50% Injectable 25 Gram(s) IV Push once  furosemide    Tablet 40 milliGRAM(s) Oral two times a day  glucagon  Injectable 1 milliGRAM(s) IntraMuscular once PRN  heparin   Injectable 5000 Unit(s) SubCutaneous every 8 hours  hydrALAZINE 10 milliGRAM(s) Oral three times a day  influenza   Vaccine 0.5 milliLiter(s) IntraMuscular once  insulin glargine Injectable (LANTUS) 40 Unit(s) SubCutaneous at bedtime  insulin lispro (HumaLOG) corrective regimen sliding scale   SubCutaneous three times a day before meals  insulin lispro (HumaLOG) corrective regimen sliding scale   SubCutaneous at bedtime  insulin lispro Injectable (HumaLOG) 8 Unit(s) SubCutaneous before dinner  melatonin 3 milliGRAM(s) Oral at bedtime  mirtazapine 15 milliGRAM(s) Oral at bedtime  potassium citrate 20 milliEquivalent(s) Oral two times a day  ranolazine 500 milliGRAM(s) Oral two times a day        Vitals:  T(F): 97.8 (09-21), Max: 97.9 (09-21)  HR: 87 (09-21) (83 - 99)  BP: 94/65 (09-21) (94/65 - 115/77)  RR: 18 (09-21)  SpO2: 99% (09-21)  I&O's Summary    20 Sep 2020 07:01  -  21 Sep 2020 07:00  --------------------------------------------------------  IN: 720 mL / OUT: 1400 mL / NET: -680 mL    21 Sep 2020 07:01  -  21 Sep 2020 15:48  --------------------------------------------------------  IN: 500 mL / OUT: 600 mL / NET: -100 mL        Physical Exam:  Appearance: No acute distress; well appearing  Eyes: PERRL, EOMI, pink conjunctiva  HENT: Normal oral mucosa  Cardiovascular: RRR, S1, S2, no murmurs, rubs, or gallops; no edema; no JVD  Respiratory: Clear to auscultation bilaterally  Gastrointestinal: soft, non-tender, non-distended with normal bowel sounds  Musculoskeletal: No clubbing; no joint deformity   Neurologic: Non-focal  Lymphatic: No lymphadenopathy  Psychiatry: AAOx3, mood & affect appropriate  Skin: No rashes, ecchymoses, or cyanosis                          10.4   6.33  )-----------( 158      ( 21 Sep 2020 05:40 )             31.2     09-21    136  |  99  |  61<H>  ----------------------------<  66<L>  3.8   |  24  |  2.17<H>    Ca    9.7      21 Sep 2020 05:40  Mg     2.1     09-20                    New ECG(s): Personally reviewed    Echo:        Stress Testing:     Cath:    Imaging:    Interpretation of Telemetry:  sinus 
Patient seen and examined at bedside.    Overnight Events:   Yesterday had RHC  Today no complaints.     Current Meds:  allopurinol 300 milliGRAM(s) Oral daily  aMIOdarone    Tablet 200 milliGRAM(s) Oral two times a day  aspirin enteric coated 81 milliGRAM(s) Oral daily  atorvastatin 80 milliGRAM(s) Oral at bedtime  carvedilol 6.25 milliGRAM(s) Oral every 12 hours  clopidogrel Tablet 75 milliGRAM(s) Oral daily  dextrose 40% Gel 15 Gram(s) Oral once PRN  dextrose 5%. 1000 milliLiter(s) IV Continuous <Continuous>  dextrose 50% Injectable 12.5 Gram(s) IV Push once  dextrose 50% Injectable 25 Gram(s) IV Push once  dextrose 50% Injectable 25 Gram(s) IV Push once  furosemide    Tablet 40 milliGRAM(s) Oral two times a day  glucagon  Injectable 1 milliGRAM(s) IntraMuscular once PRN  heparin   Injectable 5000 Unit(s) SubCutaneous every 8 hours  hydrALAZINE 10 milliGRAM(s) Oral three times a day  influenza   Vaccine 0.5 milliLiter(s) IntraMuscular once  insulin glargine Injectable (LANTUS) 35 Unit(s) SubCutaneous at bedtime  insulin lispro (HumaLOG) corrective regimen sliding scale   SubCutaneous three times a day before meals  insulin lispro (HumaLOG) corrective regimen sliding scale   SubCutaneous at bedtime  insulin lispro Injectable (HumaLOG) 8 Unit(s) SubCutaneous before dinner  melatonin 3 milliGRAM(s) Oral at bedtime  mirtazapine 15 milliGRAM(s) Oral at bedtime  potassium citrate 20 milliEquivalent(s) Oral two times a day  ranolazine 500 milliGRAM(s) Oral two times a day        Vitals:  T(F): 98.4 (09-23), Max: 98.7 (09-22)  HR: 80 (09-23) (80 - 92)  BP: 104/70 (09-23) (93/76 - 114/79)  RR: 18 (09-23)  SpO2: 98% (09-23)  I&O's Summary    22 Sep 2020 07:01  -  23 Sep 2020 07:00  --------------------------------------------------------  IN: 600 mL / OUT: 900 mL / NET: -300 mL        Physical Exam:  Appearance: No acute distress; well appearing  Eyes: PERRL, EOMI, pink conjunctiva  HENT: Normal oral mucosa  Cardiovascular: RRR, S1, S2, no murmurs, rubs, or gallops; no edema; elevated JVD  Respiratory: Clear to auscultation bilaterally  Gastrointestinal: soft, non-tender, non-distended with normal bowel sounds  Musculoskeletal: No clubbing; no joint deformity   Neurologic: Non-focal  Lymphatic: No lymphadenopathy  Psychiatry: AAOx3, mood & affect appropriate  Skin: No rashes, ecchymoses, or cyanosis                          10.3   6.10  )-----------( 157      ( 22 Sep 2020 05:34 )             31.0     09-23    139  |  101  |  62<H>  ----------------------------<  149<H>  3.8   |  27  |  2.06<H>    Ca    9.5      23 Sep 2020 06:05  Mg     2.1     09-22    TPro  6.5  /  Alb  3.5  /  TBili  0.5  /  DBili  x   /  AST  47<H>  /  ALT  75<H>  /  AlkPhos  96  09-22                  New ECG(s): Personally reviewed    Echo:    Stress Testing:     Cath:    Imaging:    Interpretation of Telemetry:  
Patient seen and examined at bedside.    Overnight Events:  This morning pt. feeling well.   No chest pain or shortness of breath    Current Meds:  allopurinol 300 milliGRAM(s) Oral daily  aMIOdarone    Tablet 200 milliGRAM(s) Oral two times a day  aspirin enteric coated 81 milliGRAM(s) Oral daily  atorvastatin 80 milliGRAM(s) Oral at bedtime  carvedilol 6.25 milliGRAM(s) Oral every 12 hours  clopidogrel Tablet 75 milliGRAM(s) Oral daily  dextrose 40% Gel 15 Gram(s) Oral once PRN  dextrose 5%. 1000 milliLiter(s) IV Continuous <Continuous>  dextrose 50% Injectable 12.5 Gram(s) IV Push once  dextrose 50% Injectable 25 Gram(s) IV Push once  dextrose 50% Injectable 25 Gram(s) IV Push once  furosemide   Injectable 60 milliGRAM(s) IV Push every 12 hours  glucagon  Injectable 1 milliGRAM(s) IntraMuscular once PRN  heparin   Injectable 5000 Unit(s) SubCutaneous every 8 hours  hydrALAZINE 20 milliGRAM(s) Oral three times a day  influenza   Vaccine 0.5 milliLiter(s) IntraMuscular once  insulin glargine Injectable (LANTUS) 35 Unit(s) SubCutaneous at bedtime  insulin lispro (HumaLOG) corrective regimen sliding scale   SubCutaneous three times a day before meals  insulin lispro (HumaLOG) corrective regimen sliding scale   SubCutaneous at bedtime  insulin lispro Injectable (HumaLOG) 8 Unit(s) SubCutaneous before dinner  melatonin 3 milliGRAM(s) Oral at bedtime  milrinone Infusion 0.25 MICROgram(s)/kG/Min IV Continuous <Continuous>  mirtazapine 15 milliGRAM(s) Oral at bedtime  potassium citrate 20 milliEquivalent(s) Oral two times a day  ranolazine 500 milliGRAM(s) Oral two times a day        Vitals:  T(F): 98.2 (09-24), Max: 98.5 (09-24)  HR: 79 (09-24) (79 - 92)  BP: 104/73 (09-24) (93/67 - 104/73)  RR: 18 (09-24)  SpO2: 99% (09-24)  I&O's Summary    23 Sep 2020 07:01  -  24 Sep 2020 07:00  --------------------------------------------------------  IN: 360 mL / OUT: 700 mL / NET: -340 mL    24 Sep 2020 07:01  -  24 Sep 2020 14:05  --------------------------------------------------------  IN: 0 mL / OUT: 300 mL / NET: -300 mL        Physical Exam:  Appearance: No acute distress; well appearing  Eyes: PERRL, EOMI, pink conjunctiva  HENT: Normal oral mucosa  Cardiovascular: RRR, S1, S2, no murmurs, rubs, or gallops; no edema; elevated JVD  Respiratory: Clear to auscultation bilaterally  Gastrointestinal: soft, non-tender, non-distended with normal bowel sounds  Musculoskeletal: No clubbing; no joint deformity   Neurologic: Non-focal  Lymphatic: No lymphadenopathy  Psychiatry: AAOx3, mood & affect appropriate  Skin: No rashes, ecchymoses, or cyanosis                          10.2   6.87  )-----------( 176      ( 24 Sep 2020 06:48 )             31.6     09-24    138  |  100  |  58<H>  ----------------------------<  74  3.7   |  24  |  2.32<H>    Ca    9.6      24 Sep 2020 06:48                    New ECG(s): Personally reviewed    Echo:    Stress Testing:     Cath:    Imaging:    Interpretation of Telemetry:  
[facility]   [phone]  RENAL FOLLOW UP NOTE  --------------------------------------------------------------------------------  Chief Complaint:    24 hour events/subjective:    seen earlier. states PORTILLO is improving     PAST HISTORY  --------------------------------------------------------------------------------  No significant changes to PMH, PSH, FHx, SHx, unless otherwise noted    ALLERGIES & MEDICATIONS  --------------------------------------------------------------------------------  Allergies    No Known Allergies    Intolerances    Entresto (Other)    Standing Inpatient Medications  allopurinol 300 milliGRAM(s) Oral daily  aMIOdarone    Tablet 200 milliGRAM(s) Oral two times a day  aspirin enteric coated 81 milliGRAM(s) Oral daily  atorvastatin 80 milliGRAM(s) Oral at bedtime  carvedilol 6.25 milliGRAM(s) Oral every 12 hours  chlorhexidine 4% Liquid 1 Application(s) Topical <User Schedule>  clopidogrel Tablet 75 milliGRAM(s) Oral daily  dextrose 5%. 1000 milliLiter(s) IV Continuous <Continuous>  dextrose 50% Injectable 12.5 Gram(s) IV Push once  dextrose 50% Injectable 25 Gram(s) IV Push once  dextrose 50% Injectable 25 Gram(s) IV Push once  furosemide   Injectable 60 milliGRAM(s) IV Push every 12 hours  heparin   Injectable 5000 Unit(s) SubCutaneous every 8 hours  hydrALAZINE 50 milliGRAM(s) Oral three times a day  influenza   Vaccine 0.5 milliLiter(s) IntraMuscular once  insulin glargine Injectable (LANTUS) 35 Unit(s) SubCutaneous at bedtime  insulin lispro (HumaLOG) corrective regimen sliding scale   SubCutaneous three times a day before meals  insulin lispro (HumaLOG) corrective regimen sliding scale   SubCutaneous at bedtime  insulin lispro Injectable (HumaLOG) 8 Unit(s) SubCutaneous before dinner  melatonin 3 milliGRAM(s) Oral at bedtime  milrinone Infusion 0.25 MICROgram(s)/kG/Min IV Continuous <Continuous>  mirtazapine 15 milliGRAM(s) Oral at bedtime  multivitamin 1 Tablet(s) Oral daily  potassium citrate 20 milliEquivalent(s) Oral two times a day  ranolazine 500 milliGRAM(s) Oral two times a day    PRN Inpatient Medications  dextrose 40% Gel 15 Gram(s) Oral once PRN  glucagon  Injectable 1 milliGRAM(s) IntraMuscular once PRN  sodium chloride 0.9% lock flush 10 milliLiter(s) IV Push every 1 hour PRN      REVIEW OF SYSTEMS  --------------------------------------------------------------------------------    Gen: denies fevers/chills,  CVS: denies chest pain/palpitations  Resp: denies SOB/Cough  GI: Denies N/V/Abd pain  : Denies dysuria/oliguria/hematuria    All other systems were reviewed and are negative, except as noted.    VITALS/PHYSICAL EXAM  --------------------------------------------------------------------------------  T(C): 36.6 (09-27-20 @ 13:07), Max: 36.7 (09-26-20 @ 20:26)  HR: 85 (09-27-20 @ 17:29) (79 - 85)  BP: 114/74 (09-27-20 @ 17:29) (101/70 - 114/74)  RR: 18 (09-27-20 @ 13:07) (18 - 18)  SpO2: 99% (09-27-20 @ 13:07) (95% - 99%)  Wt(kg): --        09-26-20 @ 07:01  -  09-27-20 @ 07:00  --------------------------------------------------------  IN: 794.4 mL / OUT: 1800 mL / NET: -1005.6 mL    09-27-20 @ 07:01  -  09-27-20 @ 19:57  --------------------------------------------------------  IN: 226.6 mL / OUT: 700 mL / NET: -473.4 mL      Physical Exam:  	    Gen: Non toxic comfortable appearing   	no  JVD  	Pulm: decrease bs  no rales or ronchi or wheezing  	CV: RRR, S1S2; no rub  	Abd: +BS, soft, nontender/nondistended  	: No suprapubic tenderness  	UE: Warm, no cyanosis  no clubbing,  no edema  	LE: Warm, no cyanosis  no clubbing, no edema  	Neuro: alert and oriented. speech coherent       LABS/STUDIES  --------------------------------------------------------------------------------              10.1   6.34  >-----------<  176      [09-26-20 @ 06:40]              30.5     139  |  98  |  41  ----------------------------<  128      [09-27-20 @ 05:02]  4.1   |  28  |  2.05        Ca     9.4     [09-27-20 @ 05:02]      Mg     1.9     [09-26-20 @ 06:40]            Creatinine Trend:  SCr 2.05 [09-27 @ 05:02]  SCr 2.12 [09-26 @ 06:40]  SCr 2.13 [09-25 @ 06:05]  SCr 2.32 [09-24 @ 06:48]  SCr 2.06 [09-23 @ 06:05]              Urinalysis - [09-15-20 @ 02:46]      Color Light Yellow / Appearance Clear / SG 1.012 / pH 5.5      Gluc Negative / Ketone Negative  / Bili Negative / Urobili <2 mg/dL       Blood Negative / Protein 30 mg/dL / Leuk Est Negative / Nitrite Negative      RBC 0 / WBC 1 / Hyaline 1 / Gran  / Sq Epi  / Non Sq Epi 0 / Bacteria Negative      Iron 36, TIBC 308, %sat 12      [09-15-20 @ 09:38]  Ferritin 370      [09-15-20 @ 08:33]  HbA1c 10.7      [02-14-20 @ 08:35]  TSH 1.99      [08-28-20 @ 06:28]  Lipid: chol 132, , HDL 28, LDL 74      [08-20-20 @ 11:46]    Free Light Chains: kappa 5.14, lambda 3.55, ratio = 1.45      [09-17 @ 09:09]  Immunofixation Serum:   No Monoclonal Band Identified    Reference Range: None Detected      [09-17-20 @ 09:09]    
[facility]   [phone]  RENAL FOLLOW UP NOTE  --------------------------------------------------------------------------------  Chief Complaint:    24 hour events/subjective:    states is not sob today     PAST HISTORY  --------------------------------------------------------------------------------  No significant changes to PMH, PSH, FHx, SHx, unless otherwise noted    ALLERGIES & MEDICATIONS  --------------------------------------------------------------------------------  Allergies    No Known Allergies    Intolerances    Entresto (Other)    Standing Inpatient Medications  allopurinol 300 milliGRAM(s) Oral daily  aMIOdarone    Tablet 200 milliGRAM(s) Oral two times a day  aspirin enteric coated 81 milliGRAM(s) Oral daily  atorvastatin 80 milliGRAM(s) Oral at bedtime  carvedilol 6.25 milliGRAM(s) Oral every 12 hours  clopidogrel Tablet 75 milliGRAM(s) Oral daily  dextrose 5%. 1000 milliLiter(s) IV Continuous <Continuous>  dextrose 50% Injectable 12.5 Gram(s) IV Push once  dextrose 50% Injectable 25 Gram(s) IV Push once  dextrose 50% Injectable 25 Gram(s) IV Push once  furosemide   Injectable 40 milliGRAM(s) IV Push two times a day  heparin   Injectable 5000 Unit(s) SubCutaneous every 8 hours  hydrALAZINE 20 milliGRAM(s) Oral three times a day  influenza   Vaccine 0.5 milliLiter(s) IntraMuscular once  insulin glargine Injectable (LANTUS) 35 Unit(s) SubCutaneous at bedtime  insulin lispro (HumaLOG) corrective regimen sliding scale   SubCutaneous three times a day before meals  insulin lispro (HumaLOG) corrective regimen sliding scale   SubCutaneous at bedtime  insulin lispro Injectable (HumaLOG) 8 Unit(s) SubCutaneous before dinner  melatonin 3 milliGRAM(s) Oral at bedtime  mirtazapine 15 milliGRAM(s) Oral at bedtime  potassium citrate 20 milliEquivalent(s) Oral two times a day  ranolazine 500 milliGRAM(s) Oral two times a day    PRN Inpatient Medications  dextrose 40% Gel 15 Gram(s) Oral once PRN  glucagon  Injectable 1 milliGRAM(s) IntraMuscular once PRN      REVIEW OF SYSTEMS  --------------------------------------------------------------------------------    Gen: denies fevers/chills,  CVS: denies chest pain/palpitations  Resp: denies SOB/Cough  GI: Denies N/V/Abd pain  : Denies dysuria/oliguria/hematuria    All other systems were reviewed and are negative, except as noted.    VITALS/PHYSICAL EXAM  --------------------------------------------------------------------------------  T(C): 36.6 (09-23-20 @ 21:14), Max: 37 (09-23-20 @ 12:55)  HR: 82 (09-23-20 @ 21:14) (80 - 83)  BP: 93/67 (09-23-20 @ 21:14) (93/67 - 104/70)  RR: 17 (09-23-20 @ 21:14) (17 - 18)  SpO2: 97% (09-23-20 @ 21:14) (96% - 98%)  Wt(kg): --  Height (cm): 172.7 (09-22-20 @ 10:17)  Weight (kg): 82.1 (09-22-20 @ 10:17)  BMI (kg/m2): 27.5 (09-22-20 @ 10:17)  BSA (m2): 1.96 (09-22-20 @ 10:17)      09-22-20 @ 07:01  -  09-23-20 @ 07:00  --------------------------------------------------------  IN: 600 mL / OUT: 900 mL / NET: -300 mL      Physical Exam:  	  Gen: Non toxic comfortable appearing   	no  JVD  	Pulm: decrease bs  no rales or ronchi or wheezing  	CV: RRR, S1S2; no rub  	Abd: +BS, soft, nontender/nondistended  	: No suprapubic tenderness  	UE: Warm, no cyanosis  no clubbing,  no edema  	LE: Warm, no cyanosis  no clubbing, no edema  	Neuro: alert and oriented. speech coherent       LABS/STUDIES  --------------------------------------------------------------------------------              10.3   6.10  >-----------<  157      [09-22-20 @ 05:34]              31.0     139  |  101  |  62  ----------------------------<  149      [09-23-20 @ 06:05]  3.8   |  27  |  2.06        Ca     9.5     [09-23-20 @ 06:05]      Mg     2.1     [09-22-20 @ 05:34]    TPro  6.5  /  Alb  3.5  /  TBili  0.5  /  DBili  x   /  AST  47  /  ALT  75  /  AlkPhos  96  [09-22-20 @ 05:34]          Creatinine Trend:  SCr 2.06 [09-23 @ 06:05]  SCr 2.11 [09-22 @ 05:34]  SCr 2.17 [09-21 @ 05:40]  SCr 2.07 [09-20 @ 06:04]  SCr 1.99 [09-19 @ 06:06]              Urinalysis - [09-15-20 @ 02:46]      Color Light Yellow / Appearance Clear / SG 1.012 / pH 5.5      Gluc Negative / Ketone Negative  / Bili Negative / Urobili <2 mg/dL       Blood Negative / Protein 30 mg/dL / Leuk Est Negative / Nitrite Negative      RBC 0 / WBC 1 / Hyaline 1 / Gran  / Sq Epi  / Non Sq Epi 0 / Bacteria Negative      Iron 36, TIBC 308, %sat 12      [09-15-20 @ 09:38]  Ferritin 370      [09-15-20 @ 08:33]  HbA1c 10.7      [02-14-20 @ 08:35]  TSH 1.99      [08-28-20 @ 06:28]  Lipid: chol 132, , HDL 28, LDL 74      [08-20-20 @ 11:46]    Free Light Chains: kappa 5.14, lambda 3.55, ratio = 1.45      [09-17 @ 09:09]  Immunofixation Serum:   No Monoclonal Band Identified    Reference Range: None Detected      [09-17-20 @ 09:09]    
Structural Heart Team    Mr Arenas is sitting up in a chair comfortably.  He had no complaints and denied active sob, chest pain or dizziness.  He had no acute events overnight.       REVIEW OF SYSTEMS:    CONSTITUTIONAL: No weakness, fevers or chills  EYES/ENT: No visual changes;  No vertigo or throat pain   NECK: No pain or stiffness  RESPIRATORY: No cough, wheezing, hemoptysis; No shortness of breath  CARDIOVASCULAR: No chest pain or palpitations  GASTROINTESTINAL: No abdominal or epigastric pain. No nausea, vomiting, or hematemesis; No diarrhea or constipation. No melena or hematochezia.  GENITOURINARY: No dysuria, frequency or hematuria  NEUROLOGICAL: No numbness or weakness  SKIN: No itching, rashes      Allergies    No Known Allergies    Intolerances    Entresto (Other)    Vital Signs Last 24 Hrs  T(C): 36.6 (21 Sep 2020 11:59), Max: 36.6 (21 Sep 2020 05:06)  T(F): 97.8 (21 Sep 2020 11:59), Max: 97.9 (21 Sep 2020 05:06)  HR: 87 (21 Sep 2020 11:59) (83 - 99)  BP: 94/65 (21 Sep 2020 11:59) (94/65 - 115/77)  BP(mean): --  RR: 18 (21 Sep 2020 11:59) (18 - 18)  SpO2: 99% (21 Sep 2020 11:59) (98% - 100%)    MEDICATIONS  (STANDING):  allopurinol 300 milliGRAM(s) Oral daily  aMIOdarone    Tablet 200 milliGRAM(s) Oral two times a day  aspirin enteric coated 81 milliGRAM(s) Oral daily  atorvastatin 80 milliGRAM(s) Oral at bedtime  carvedilol 6.25 milliGRAM(s) Oral every 12 hours  clopidogrel Tablet 75 milliGRAM(s) Oral daily  dextrose 5%. 1000 milliLiter(s) (50 mL/Hr) IV Continuous <Continuous>  dextrose 50% Injectable 12.5 Gram(s) IV Push once  dextrose 50% Injectable 25 Gram(s) IV Push once  dextrose 50% Injectable 25 Gram(s) IV Push once  furosemide    Tablet 40 milliGRAM(s) Oral two times a day  heparin   Injectable 5000 Unit(s) SubCutaneous every 8 hours  hydrALAZINE 10 milliGRAM(s) Oral three times a day  influenza   Vaccine 0.5 milliLiter(s) IntraMuscular once  insulin glargine Injectable (LANTUS) 40 Unit(s) SubCutaneous at bedtime  insulin lispro (HumaLOG) corrective regimen sliding scale   SubCutaneous three times a day before meals  insulin lispro (HumaLOG) corrective regimen sliding scale   SubCutaneous at bedtime  insulin lispro Injectable (HumaLOG) 8 Unit(s) SubCutaneous before dinner  melatonin 3 milliGRAM(s) Oral at bedtime  mirtazapine 15 milliGRAM(s) Oral at bedtime  potassium citrate 20 milliEquivalent(s) Oral two times a day  ranolazine 500 milliGRAM(s) Oral two times a day      Exam-  General: NAD  Cardiac: s1s2, RRR, II/VI systolic murmur  Pulmonary: CTA b/l, no w/r/r  Gastrointestinal: soft abdomen, nontender, nondistended, + bowel sounds throughout  Extremities: no edema, 1+ DP pulses  Neuro: A&Ox3, nonfocal                          10.4   6.33  )-----------( 158      ( 21 Sep 2020 05:40 )             31.2   09-21    136  |  99  |  61<H>  ----------------------------<  66<L>  3.8   |  24  |  2.17<H>    Ca    9.7      21 Sep 2020 05:40  Mg     2.1     09-20      I&O's Summary    20 Sep 2020 07:01  -  21 Sep 2020 07:00  --------------------------------------------------------  IN: 720 mL / OUT: 1400 mL / NET: -680 mL    21 Sep 2020 07:01  -  21 Sep 2020 14:36  --------------------------------------------------------  IN: 500 mL / OUT: 600 mL / NET: -100 mL              Assessment/Plan:  Mr Arenas is an 79y/o gentleman admitted with acute on chronic systolic heart failure.  He is known to the Structural Heart Team as we have evaluated him in the office.  He has severe CAD, an AICD, CKD III, DM2.  - RHC tomorrow (w Cardiomems pleacement)  - calcium score 483 on CT scan  - likely moderate AS  - no Structural Heart intervention indicated       CHARLES Velazquez  [phone]    
Structural Heart Team    Mr Arenas was seen and examined while lying in bed.  He was comfortable and said he was doing better today.  On tele he had 6 beats WCT.        REVIEW OF SYSTEMS:    CONSTITUTIONAL: No weakness, fevers or chills  EYES/ENT: No visual changes;  No vertigo or throat pain   NECK: No pain or stiffness  RESPIRATORY: No cough, wheezing, hemoptysis; No shortness of breath  CARDIOVASCULAR: No chest pain or palpitations  GASTROINTESTINAL: No abdominal or epigastric pain. No nausea, vomiting, or hematemesis; No diarrhea or constipation. No melena or hematochezia.  GENITOURINARY: No dysuria, frequency or hematuria  NEUROLOGICAL: No numbness or weakness  SKIN: No itching, rashes      Allergies    No Known Allergies    Intolerances    Entresto (Other)    Vital Signs Last 24 Hrs  T(C): 37.1 (17 Sep 2020 09:15), Max: 37.1 (17 Sep 2020 09:15)  T(F): 98.8 (17 Sep 2020 09:15), Max: 98.8 (17 Sep 2020 09:15)  HR: 97 (17 Sep 2020 09:15) (89 - 97)  BP: 101/68 (17 Sep 2020 09:15) (98/62 - 117/89)  BP(mean): --  RR: 18 (17 Sep 2020 09:15) (16 - 18)  SpO2: 97% (17 Sep 2020 09:15) (95% - 97%)    MEDICATIONS  (STANDING):  allopurinol 300 milliGRAM(s) Oral daily  aspirin enteric coated 81 milliGRAM(s) Oral daily  atorvastatin 80 milliGRAM(s) Oral at bedtime  carvedilol 6.25 milliGRAM(s) Oral every 12 hours  dextrose 5%. 1000 milliLiter(s) (50 mL/Hr) IV Continuous <Continuous>  dextrose 50% Injectable 12.5 Gram(s) IV Push once  dextrose 50% Injectable 25 Gram(s) IV Push once  dextrose 50% Injectable 25 Gram(s) IV Push once  furosemide    Tablet 40 milliGRAM(s) Oral three times a day  heparin   Injectable 5000 Unit(s) SubCutaneous every 8 hours  influenza   Vaccine 0.5 milliLiter(s) IntraMuscular once  insulin glargine Injectable (LANTUS) 40 Unit(s) SubCutaneous at bedtime  insulin lispro (HumaLOG) corrective regimen sliding scale   SubCutaneous three times a day before meals  insulin lispro (HumaLOG) corrective regimen sliding scale   SubCutaneous at bedtime  insulin lispro Injectable (HumaLOG) 8 Unit(s) SubCutaneous before dinner  melatonin 3 milliGRAM(s) Oral at bedtime  mirtazapine 15 milliGRAM(s) Oral at bedtime  potassium chloride    Tablet ER 20 milliEquivalent(s) Oral every 2 hours  ranolazine 500 milliGRAM(s) Oral two times a day  ticagrelor 90 milliGRAM(s) Oral two times a day      Exam-  General: NAD  Cardiac: s1s2, RRR, II/VI systolic murmur  Pulmonary: CTA b/l, no w/r/r  Gastrointestinal: soft abdomen, nontender, nondistended, + bowel sounds throughout  Extremities: no edema, 1+ DP pulses  Neuro: A&Ox3, nonfocal                          10.5   7.16  )-----------( 145      ( 17 Sep 2020 06:17 )             32.0   17    138  |  96  |  58<H>  ----------------------------<  114<H>  3.1<L>   |  27  |  2.07<H>    Ca    9.9      17 Sep 2020 06:16  Phos  4.5       Mg     2.0         TPro  7.0  /  Alb  3.7  /  TBili  0.9  /  DBili  x   /  AST  24  /  ALT  35  /  AlkPhos  78  09-17    I&O's Summary    16 Sep 2020 07:  -  17 Sep 2020 07:00  --------------------------------------------------------  IN: 1020 mL / OUT: 1600 mL / NET: -580 mL    17 Sep 2020 07:  -  17 Sep 2020 11:42  --------------------------------------------------------  IN: 360 mL / OUT: 700 mL / NET: -340 mL              Assessment/Plan:  Mr Arenas is an 79y/o gentleman admitted with acute on chronic systolic heart failure.  He is known to the Structural Heart Team as we have evaluated him in the office.  He has severe CAD, an AICD, CKD III, DM2.  - recommend  stress echo to clarify severity of AS  - RHC +/- cardiomems  - change ticagrelor to plavix as it may be contributing to dyspnea  - r/o amyloid in progress    CHARLES Velazquez  [phone]    
Subjective  The patient reports that he is clinically doing better today compared to prior days.  He denies any chest pain/tightness/discomfort or shortness of breath at rest or upon minimal exertion.  Denies any palpitations.  No abdominal pain.  He feels that his breathing is less labored.      Telemetry  Sinus rhythm/tachycardia with rates in the 80s to 110s with PVCs and triplets, 4 beat NSVT    Review of Systems  14 point review of systems is negative except what is described above in the history of present illness    Physical Examination  General: NAD, alert and oriented times three  PERRL, EOMI, mmm, no erythema/exudate or tonsillar hypertrophy  Supple, no lymphadenopathy, JVD is not elevated  Cardiac: s1s2, RRR, II/VI systolic murmur  Pulmonary: CTA b/l, no w/r/r  Gastrointestinal: soft abdomen, nontender, nondistended, + bowel sounds throughout  Extremities: no edema, 1+ DP pulses  No clubbing/cyanosis  Neuro: A&Ox3, nonfocal  T(C): 36.6 (09-18-20 @ 13:21), Max: 37.3 (09-18-20 @ 04:33)  HR: 101 (09-18-20 @ 17:32) (94 - 102)  BP: 99/63 (09-18-20 @ 17:32) (99/63 - 110/66)  RR: 17 (09-18-20 @ 13:21) (17 - 18)  SpO2: 97% (09-18-20 @ 13:21) (96% - 97%)  Wt(kg): --  09-17 @ 07:01  -  09-18 @ 07:00  --------------------------------------------------------  IN: 660 mL / OUT: 2300 mL / NET: -1640 mL  09-18 @ 07:01  -  09-18 @ 17:50  --------------------------------------------------------  IN: 360 mL / OUT: 1350 mL / NET: -990 mL    MEDICATIONS  (STANDING):  allopurinol 300 milliGRAM(s) Oral daily  aMIOdarone    Tablet 200 milliGRAM(s) Oral two times a day  aspirin enteric coated 81 milliGRAM(s) Oral daily  atorvastatin 80 milliGRAM(s) Oral at bedtime  carvedilol 6.25 milliGRAM(s) Oral every 12 hours  dextrose 5%. 1000 milliLiter(s) (50 mL/Hr) IV Continuous <Continuous>  dextrose 50% Injectable 12.5 Gram(s) IV Push once  dextrose 50% Injectable 25 Gram(s) IV Push once  dextrose 50% Injectable 25 Gram(s) IV Push once  furosemide    Tablet 40 milliGRAM(s) Oral three times a day  heparin   Injectable 5000 Unit(s) SubCutaneous every 8 hours  influenza   Vaccine 0.5 milliLiter(s) IntraMuscular once  insulin glargine Injectable (LANTUS) 40 Unit(s) SubCutaneous at bedtime  insulin lispro (HumaLOG) corrective regimen sliding scale   SubCutaneous three times a day before meals  insulin lispro (HumaLOG) corrective regimen sliding scale   SubCutaneous at bedtime  insulin lispro Injectable (HumaLOG) 8 Unit(s) SubCutaneous before dinner  melatonin 3 milliGRAM(s) Oral at bedtime  mirtazapine 15 milliGRAM(s) Oral at bedtime  ranolazine 500 milliGRAM(s) Oral two times a day    MEDICATIONS  (PRN):  dextrose 40% Gel 15 Gram(s) Oral once PRN Blood Glucose LESS THAN 70 milliGRAM(s)/deciliter  glucagon  Injectable 1 milliGRAM(s) IntraMuscular once PRN Glucose LESS THAN 70 milligrams/deciliter    Home Medications:  allopurinol 300 mg oral tablet: 1 tab(s) orally once a day (14 Sep 2020 15:18)  alogliptin 6.25 mg oral tablet: 1 tab(s) orally once a day (14 Sep 2020 15:18)  aspirin 81 mg oral delayed release tablet: 1 tab(s) orally once a day (14 Sep 2020 15:18)  Brilinta (ticagrelor) 90 mg oral tablet: 1 tab(s) orally 2 times a day (14 Sep 2020 15:18)  carvedilol 25 mg oral tablet: 0.5 tab(s) orally 2 times a day               11.3   15.49 )-----------( 164      ( 18 Sep 2020 05:49 )             33.7   09-18    138  |  99  |  59<H>  ----------------------------<  158<H>  3.8   |  24  |  1.89<H>    Ca    10.2      18 Sep 2020 05:49  Phos  4.5     09-17  Mg     2.0     09-17  TPro  7.0  /  Alb  3.7  /  TBili  0.9  /  DBili  x   /  AST  24  /  ALT  35  /  AlkPhos  78  09-17    Assessment/Plan  Mr Arenas is an 81y/o gentleman admitted with acute on chronic systolic heart failure.  He is known to the Structural Heart Team as we have evaluated him in the office.  He has severe CAD, an AICD, CKD III, DM2.    --Continue  plavix 75mg PO QD.  There is concern that ticagrelor may be contributing to his shortness of breath.  --It does not appear that patient has true low flow low gradient aortic valve stenosis.  To further assess a dobutamine stress echo should be performed.  Would also order a cardiac CT scan to determine aortic valve calcium score.  --Technetium pyrophosphate study was reviewed and is negative.  --He is being assessed by EP service for potential BIV upgrade (patient with LBBB, ).  The patient has been started on amiodarone.  Closely monitor telemetry/EKG.  --The patient is also being assessed for RHC with potential CardioMems implantation to help improved quality of life and decrease heart failure hospitalizations.    All questions and concerns of the patient were addressed.
Subjective:  - NAEO  - states he feels well today and would like to go home  - denies CP, palpitations, lightheadedness, and SOB    Medications:  allopurinol 300 milliGRAM(s) Oral daily  aMIOdarone    Tablet 200 milliGRAM(s) Oral daily  aspirin enteric coated 81 milliGRAM(s) Oral daily  atorvastatin 80 milliGRAM(s) Oral at bedtime  carvedilol 6.25 milliGRAM(s) Oral every 12 hours  chlorhexidine 4% Liquid 1 Application(s) Topical <User Schedule>  clopidogrel Tablet 75 milliGRAM(s) Oral daily  dextrose 40% Gel 15 Gram(s) Oral once PRN  dextrose 5%. 1000 milliLiter(s) IV Continuous <Continuous>  dextrose 50% Injectable 12.5 Gram(s) IV Push once  dextrose 50% Injectable 25 Gram(s) IV Push once  dextrose 50% Injectable 25 Gram(s) IV Push once  glucagon  Injectable 1 milliGRAM(s) IntraMuscular once PRN  heparin   Injectable 5000 Unit(s) SubCutaneous every 8 hours  hydrALAZINE 75 milliGRAM(s) Oral three times a day  influenza   Vaccine 0.5 milliLiter(s) IntraMuscular once  insulin glargine Injectable (LANTUS) 35 Unit(s) SubCutaneous at bedtime  insulin lispro (HumaLOG) corrective regimen sliding scale   SubCutaneous three times a day before meals  insulin lispro (HumaLOG) corrective regimen sliding scale   SubCutaneous at bedtime  insulin lispro Injectable (HumaLOG) 8 Unit(s) SubCutaneous before dinner  melatonin 3 milliGRAM(s) Oral at bedtime  milrinone Infusion 0.25 MICROgram(s)/kG/Min IV Continuous <Continuous>  mirtazapine 15 milliGRAM(s) Oral at bedtime  multivitamin 1 Tablet(s) Oral daily  potassium citrate 20 milliEquivalent(s) Oral two times a day  ranolazine 500 milliGRAM(s) Oral two times a day  sodium chloride 0.9% lock flush 10 milliLiter(s) IV Push every 1 hour PRN  torsemide 40 milliGRAM(s) Oral daily      ICU Vital Signs Last 24 Hrs  T(C): 36.4, Max: 36.6 (09-28 @ 21:33)  HR: 82 (82 - 102)  BP: 93/54 (93/54 - 114/71)  BP(mean): --  ABP: --  ABP(mean): --  RR: 17 (16 - 17)  SpO2: 98% (96% -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 Sep 2020 07:01  -  19 Sep 2020 07:00  --------------------------------------------------------  IN: 840 mL / OUT: 2150 mL / NET: -1310 mL    19 Sep 2020 07:01  -  19 Sep 2020 17:35  --------------------------------------------------------  IN: 600 mL / OUT: 600 mL / NET: 0 mL        Physical Exam:  Appearance: No Acute Distress  HEENT: JVP non elevated  Cardiovascular: RRR, Normal S1 S2, 2/6 midpeaking systolic murmur at RUSB, 2/6 HSM at apex   Respiratory: Clear to auscultation bilaterally  Gastrointestinal: Soft, Non-tender, non-distended	  Skin: no skin lesions  Neurologic: Non-focal  Extremities: No LE edema, warm and well perfused  Psychiatry: A & O x 3, Mood & affect appropriate      Labs:                        10.4   5.01  )-----------( 151      ( 19 Sep 2020 06:06 )             32.0         137  |  98  |  55<H>  ----------------------------<  120<H>  3.4<L>   |  23  |  1.99<H>    Ca    9.8      19 Sep 2020 06:06              Serum Pro-Brain Natriuretic Peptide: 4545 pg/mL ( @ 16:48)            
Interval History:  feels well  anxious to go home  feels much better since milrinone started     Medications:  allopurinol 300 milliGRAM(s) Oral daily  aMIOdarone    Tablet 200 milliGRAM(s) Oral two times a day  aspirin enteric coated 81 milliGRAM(s) Oral daily  atorvastatin 80 milliGRAM(s) Oral at bedtime  carvedilol 6.25 milliGRAM(s) Oral every 12 hours  chlorhexidine 4% Liquid 1 Application(s) Topical <User Schedule>  clopidogrel Tablet 75 milliGRAM(s) Oral daily  dextrose 40% Gel 15 Gram(s) Oral once PRN  dextrose 5%. 1000 milliLiter(s) IV Continuous <Continuous>  dextrose 50% Injectable 12.5 Gram(s) IV Push once  dextrose 50% Injectable 25 Gram(s) IV Push once  dextrose 50% Injectable 25 Gram(s) IV Push once  glucagon  Injectable 1 milliGRAM(s) IntraMuscular once PRN  heparin   Injectable 5000 Unit(s) SubCutaneous every 8 hours  hydrALAZINE 50 milliGRAM(s) Oral three times a day  influenza   Vaccine 0.5 milliLiter(s) IntraMuscular once  insulin glargine Injectable (LANTUS) 35 Unit(s) SubCutaneous at bedtime  insulin lispro (HumaLOG) corrective regimen sliding scale   SubCutaneous three times a day before meals  insulin lispro (HumaLOG) corrective regimen sliding scale   SubCutaneous at bedtime  insulin lispro Injectable (HumaLOG) 8 Unit(s) SubCutaneous before dinner  melatonin 3 milliGRAM(s) Oral at bedtime  milrinone Infusion 0.25 MICROgram(s)/kG/Min IV Continuous <Continuous>  mirtazapine 15 milliGRAM(s) Oral at bedtime  multivitamin 1 Tablet(s) Oral daily  potassium citrate 20 milliEquivalent(s) Oral two times a day  ranolazine 500 milliGRAM(s) Oral two times a day  sodium chloride 0.9% lock flush 10 milliLiter(s) IV Push every 1 hour PRN  torsemide 40 milliGRAM(s) Oral two times a day      Vitals:  T(C): 36.3 (09-27-20 @ 20:53), Max: 36.6 (09-27-20 @ 13:07)  HR: 90 (09-27-20 @ 20:53) (79 - 90)  BP: 116/75 (09-27-20 @ 20:53) (101/70 - 116/75)  BP(mean): --  RR: 16 (09-27-20 @ 20:53) (16 - 18)  SpO2: 95% (09-27-20 @ 20:53) (95% - 99%)    Daily     Daily         I&O's Summary    27 Sep 2020 07:01  -  28 Sep 2020 07:00  --------------------------------------------------------  IN: 706.6 mL / OUT: 1750 mL / NET: -1043.4 mL    28 Sep 2020 07:01  -  28 Sep 2020 11:42  --------------------------------------------------------  IN: 314.4 mL / OUT: 850 mL / NET: -535.6 mL        Physical Exam:  Appearance: No Acute Distress  HEENT: PERRL  Neck: JVD approx 8-10 with mild HJR  Cardiovascular: Normal S1 S2, No murmurs/rubs/gallops  Respiratory: Clear to auscultation bilaterally  Gastrointestinal: Soft, Non-tender	  Skin: No cyanosis	  Neurologic: Non-focal  Extremities: No LE edema;P  Psychiatry: A & O x 3, Mood & affect appropriate    Labs:                        10.3   6.81  )-----------( 184      ( 28 Sep 2020 06:35 )             31.6     09-28    134<L>  |  95<L>  |  43<H>  ----------------------------<  191<H>  4.1   |  28  |  1.99<H>    Ca    9.5      28 Sep 2020 06:26

## 2020-09-29 NOTE — CHART NOTE - NSCHARTNOTEFT_GEN_A_CORE
[facility] Medicine Discharge Note  Patient medically cleared for discharge today by Jackson Millan.  Discussed with HF team patient cleared for discharge. Cardiac meds for discharge confirmed with HF.  Discussed with patient his home diabetic medications and confirmed with Dr. Paz to continue home regimen.  Discharge plan and follow up instructions discussed with patient.  Outpatient HF appointment made. [facility] paperwork filled out and medications confirmed with pharmacy.    Cata Orellana PA-C  Dept of Medicine  #[phone]

## 2020-09-29 NOTE — PROGRESS NOTE ADULT - PROBLEM SELECTOR PLAN 1
cont lasix as ordered  cards f/u   heart failure team f/u  s/p right heart cardiac cath with MEMS 9/22  nuc amyloid scan neg   CT heart noted. mild-mod AS on CT   EP f/u.  no plan for aicd upgrade at this time  heart failure f/u.    milrinone as per heart failure team  s/p central line placement for home infusion  dc planning

## 2020-09-29 NOTE — PROGRESS NOTE ADULT - NSHPATTENDINGPLANDISCUSS_GEN_ALL_CORE
Abhishek Pugh MD and Andres Pena MD
Andres Pena MD and Sanjiv Vasquez MD
Abhishek Jaquez MD
Sanjiv Vasquez MD
Sanjiv Vasquez MD
geriatrics/palliative care team, heart failure and structural heart team.
cardiology/Dr. Romero, heart failure/Dr. Jaquez and electrophysiology
heart failure team

## 2020-09-29 NOTE — PROGRESS NOTE ADULT - PROBLEM SELECTOR PLAN 1
- s/p RHC/CardioMEMS with elevated filling pressures and low output; started on milrinone as palliative with improvement in dyspnea and renal function  - GDMT: current regimen is carvedilol 6.25 mg BID  - increase hydralazine to 75mg TID; holding parameter SBP <90, may consider transitioning to Entresto as an outpatient depending upon stability of renal function  - Diuretic: decrease torsemide to 40mg daily; will continue to follow daily CardioMEMS readings (PAD today 33, although appears close to euvolemic on exam); goal weight 177 lbs, should take an additional 40 mg of torsemide PRN for weight gain of 3 lbs and/or increase in PAD  - Device: s/p ICD; not clear LBBB and QRS <150 so no indication for CRT  Advanced Therapies: c/w milrinone 0.25mcg/kg/min, pt. will benefit from home milrinone.  ScvO2 66% yesterday, CI 3.1  - Patient has expressed desire to be DNR/DNI on this and prior admissions  - Stable to d/c home today when home milrinone is set up. He will follow-up in our HF clinic on 10/6 at 2pm (attending Dr. Jaquez)

## 2020-09-29 NOTE — CHART NOTE - NSCHARTNOTESELECT_GEN_ALL_CORE
Malnutrition Notification
Cards meds adjustment/Event Note
EP Tele Check/Event Note
Event Note
Nutrition Services
Nutrition Services
Off Service Note

## 2020-09-29 NOTE — PROGRESS NOTE ADULT - PROBLEM SELECTOR PLAN 5
- EP following, on amiodarone
- EP following, on amiodarone  - please decrease amiodarone maintenance dose to 200mg daily
Renal function appears to be at baseline  Renally dose medications  Avoid nephrotoxins
- EP following, on amiodarone
- EP following, on amiodarone  - please decrease amiodarone maintenance dose to 200mg daily
- EP following, on amiodarone  - please decrease amiodarone maintenance dose to 200mg daily
Renal function appears to be at baseline  Renally dose medications  Avoid nephrotoxins
8

## 2020-09-29 NOTE — PROVIDER CONTACT NOTE (OTHER) - ASSESSMENT
Patient hypotensive. Blood pressure 93/54, other VSS. Patient asymptomatic. Patient has blood pressure medications ordered.

## 2020-09-29 NOTE — PROGRESS NOTE ADULT - ATTENDING COMMENTS
Assessment/Plan  Mr Arenas is an 80 year old gentleman admitted with acute on chronic systolic heart failure.  He is known to the Structural Heart Team as we have evaluated him in the office.  He has severe CAD, an AICD, CKD III, DM2.    He is currently clinically doing well.  He does not have any cardiopulmonary complaints at rest or upon minimal exertion.  No fevers, chills or sweats.    --Plan is for patient to undergo RHC and CardioMems implantation tomorrow.  Indications and details of the procedure were reviewed.  Benefits and risks were discussed.  Risks include but are not limited to infection, bleeding, arrhythmia, TIA/stroke, vascular injury, pericardial effusion, hemodynamic instability, vascular injury and death.  --The patient may go home on palliative inotropes.  This was discussed with the patient who expressed concern that the intropes would interfere with his quality of life (ie ride his motorcycle).  --CT scan performed earlier today.  Aortic valve calcium score is 483.  Based upon TTE and CT of aortic valve findings suggestive of moderate aortic valve stenosis.  No further intervention/workup required at this time.  Recommend 6 month TTE.  --Continue aspirin 81mg daily.  He has been transitioned to plavix 75mg PO QD.  There is concern that ticagrelor may be contributing to his shortness of breath.  --Technetium pyrophosphate study was reviewed and is negative.  --He is being assessed by EP service for potential BIV upgrade (patient with LBBB, ).  The patient has been started on amiodarone.  Closely monitor telemetry/EKG.    Continue telemetry monitoring.    All questions and concerns of the patient were addressed.
No acute events reported overnight.  The patient is clinically feeling much better today than he did yesterday.  No chest pain/tightness/discomfort or shortness of breath at rest or upon minimal exertion.  No fevers, chills or sweats.  No abdominal pain is noted.  He is in better spirits.  No events of VT/VF or SVT noted on telemetry.      --Plan to discontinue ticagrelor and change to plavix 75mg PO QD.  There is concern that ticagrelor may be contributing to his shortness of breath.  --It does not appear that patient has true low flow low gradient aortic valve stenosis.  To further assess a dobutamine stress echo will be performed.  If unable to do a dobutamine stress echo a cardiac CT scan will be ordered to assess aortic valve calcium score.  --Technetium pyrophosphate study is currently being performed.  --He is being assessed by EP service for potential BIV upgrade (patient with LBBB, )  --The patient is also being assessed for RCH with potential CardioMems implantation to help improved quality of life and decrease heart failure hospitalizations.    All questions and concerns of the patient were addressed.
Ectopy may we cause worsening of AV synchrony so will restart low dose amiodarone. Dr. Mitchell will see as an outpatient unless further inpatient rhythm issues arise. DFT's may increase with amiodarone.
We had previously decided on amiodarone for PVC suppression to see if this improves LV function. However, the drug was discontinued by an outsie MD for QT prolongation which is the mechanism by which amiodarone works. Unless there is marked prolongation beyond 500 msec. he should continue. However, if it induced marked bradycardia, he will need upgrade of his ICD to dual chamber and possibly CRT. QRS is currently narrow to the point of possible detriment from a CRT device at present.    His aortic stenosis should be evaluated more carefully to exclude significant stenosis that might be causing the LV deterioration.
Advanced care planning was discussed with patient and family.  Advanced care planning forms were reviewed and discussed as appropriate.  Differential diagnosis and plan of care discussed with patient after the evaluation.   Pain assessed and judicious use of narcotics when appropriate was discussed.  Importance of Fall prevention discussed.  Counseling on Smoking and Alcohol cessation was offered when appropriate.  Counseling on Diet, exercise, and medication compliance was done.   Approx 45 minutes spent.
discharge planning in progress

## 2020-09-29 NOTE — PROGRESS NOTE ADULT - PROBLEM SELECTOR PLAN 2
- no indication for  stress or TAVR
continue with Aspirin and Brilinta   Continue with Lipitor 80
- no indication for  stress or TAVR
- structural cardiology, tentatively planned for dobutamine stress echo
- structural cardiology, tentatively planned for dobutamine stress echo and CT chest without contrast. possible AV study in cath lab
continue with Aspirin and Brilinta   Continue with Lipitor 80
- structural cardiology, tentatively planned for dobutamine stress echo and CT chest without contrast. possible AV study in cath lab

## 2020-09-29 NOTE — PROGRESS NOTE ADULT - PROBLEM SELECTOR PROBLEM 3
Coronary artery disease involving native coronary artery of native heart without angina pectoris
Lower extremity edema
Coronary artery disease involving native coronary artery of native heart without angina pectoris
Lower extremity edema
Coronary artery disease involving native coronary artery of native heart without angina pectoris

## 2020-09-29 NOTE — PROGRESS NOTE ADULT - REASON FOR ADMISSION

## 2020-09-29 NOTE — PROGRESS NOTE ADULT - PROBLEM SELECTOR PROBLEM 2
Moderate aortic stenosis
CAD (Coronary Artery Disease)
Moderate aortic stenosis

## 2020-09-29 NOTE — PROGRESS NOTE ADULT - PROBLEM SELECTOR PLAN 6
DVT ppx: HSQ

## 2020-09-29 NOTE — PROGRESS NOTE ADULT - NUTRITIONAL ASSESSMENT
This patient has been assessed with a concern for Malnutrition and has been determined to have a diagnosis/diagnoses of Moderate protein-calorie malnutrition.    This patient is being managed with:   Diet DASH/TLC-  Sodium & Cholesterol Restricted  Consistent Carbohydrate {Evening Snack} (CSTCHOSN)  Entered: Sep 25 2020  5:53PM    
This patient has been assessed with a concern for Malnutrition and has been determined to have a diagnosis/diagnoses of Moderate protein-calorie malnutrition.    This patient is being managed with:   Diet DASH/TLC-  Sodium & Cholesterol Restricted  Consistent Carbohydrate {Evening Snack} (CSTCHOSN)  No Concentrated Potassium  No Concentrated Phosphorus  Entered: Sep 13 2020  8:56PM    
This patient has been assessed with a concern for Malnutrition and has been determined to have a diagnosis/diagnoses of Moderate protein-calorie malnutrition.    This patient is being managed with:   Diet DASH/TLC-  Sodium & Cholesterol Restricted  Consistent Carbohydrate {Evening Snack} (CSTCHOSN)  Entered: Sep 25 2020  5:53PM    
This patient has been assessed with a concern for Malnutrition and has been determined to have a diagnosis/diagnoses of Moderate protein-calorie malnutrition.    This patient is being managed with:   Diet DASH/TLC-  Sodium & Cholesterol Restricted  Consistent Carbohydrate {Evening Snack} (CSTCHOSN)  No Concentrated Potassium  No Concentrated Phosphorus  Entered: Sep 13 2020  8:56PM    
This patient has been assessed with a concern for Malnutrition and has been determined to have a diagnosis/diagnoses of Moderate protein-calorie malnutrition.    This patient is being managed with:   Diet DASH/TLC-  Sodium & Cholesterol Restricted  Consistent Carbohydrate {Evening Snack} (CSTCHOSN)  Entered: Sep 25 2020  5:53PM    
This patient has been assessed with a concern for Malnutrition and has been determined to have a diagnosis/diagnoses of Moderate protein-calorie malnutrition.    This patient is being managed with:   Diet DASH/TLC-  Sodium & Cholesterol Restricted  Consistent Carbohydrate {Evening Snack} (CSTCHOSN)  No Concentrated Potassium  No Concentrated Phosphorus  Entered: Sep 13 2020  8:56PM    
This patient has been assessed with a concern for Malnutrition and has been determined to have a diagnosis/diagnoses of Moderate protein-calorie malnutrition.    This patient is being managed with:   Diet DASH/TLC-  Sodium & Cholesterol Restricted  Consistent Carbohydrate {Evening Snack} (CSTCHOSN)  Entered: Sep 25 2020  5:53PM    
This patient has been assessed with a concern for Malnutrition and has been determined to have a diagnosis/diagnoses of Moderate protein-calorie malnutrition.    This patient is being managed with:   Diet DASH/TLC-  Sodium & Cholesterol Restricted  Consistent Carbohydrate {Evening Snack} (CSTCHOSN)  Entered: Sep 25 2020  5:53PM    
This patient has been assessed with a concern for Malnutrition and has been determined to have a diagnosis/diagnoses of Moderate protein-calorie malnutrition.    This patient is being managed with:   Diet DASH/TLC-  Sodium & Cholesterol Restricted  Consistent Carbohydrate {Evening Snack} (CSTCHOSN)  No Concentrated Potassium  No Concentrated Phosphorus  Entered: Sep 13 2020  8:56PM    
This patient has been assessed with a concern for Malnutrition and has been determined to have a diagnosis/diagnoses of Moderate protein-calorie malnutrition.    This patient is being managed with:   Diet DASH/TLC-  Sodium & Cholesterol Restricted  Consistent Carbohydrate {Evening Snack} (CSTCHOSN)  No Concentrated Potassium  No Concentrated Phosphorus  Entered: Sep 13 2020  8:56PM    
This patient has been assessed with a concern for Malnutrition and has been determined to have a diagnosis/diagnoses of Moderate protein-calorie malnutrition.    This patient is being managed with:   Diet DASH/TLC-  Sodium & Cholesterol Restricted  Consistent Carbohydrate {Evening Snack} (CSTCHOSN)  Entered: Sep 25 2020  5:53PM    
This patient has been assessed with a concern for Malnutrition and has been determined to have a diagnosis/diagnoses of Moderate protein-calorie malnutrition.    This patient is being managed with:   Diet DASH/TLC-  Sodium & Cholesterol Restricted  Consistent Carbohydrate {Evening Snack} (CSTCHOSN)  No Concentrated Potassium  No Concentrated Phosphorus  Entered: Sep 13 2020  8:56PM    
This patient has been assessed with a concern for Malnutrition and has been determined to have a diagnosis/diagnoses of Moderate protein-calorie malnutrition.    This patient is being managed with:   Diet DASH/TLC-  Sodium & Cholesterol Restricted  Consistent Carbohydrate {Evening Snack} (CSTCHOSN)  No Concentrated Potassium  No Concentrated Phosphorus  Entered: Sep 13 2020  8:56PM

## 2020-09-29 NOTE — PROGRESS NOTE ADULT - ASSESSMENT
79 YO M with a history of ACC/AHA Stage D ischemic cardiomyopathy s/p ICD, CAD s/p 4v CABG in 90's and multiple subsequent PCI, at least moderate LF/LG AS, CKD III (baseline Cr 1.8), and poorly controlled DM2 (A1c 11.6%) admitted with dyspnea on exertion. He initially did not appear volume expanded and he likely has low output heart failure and he has many high risk features including borderline BP requiring downtitration of medical therapy. He is not a candidate for OHT due to age or LVAD due to combination of age/comorbidities with prior sternotomy. There was concern he had significant AS that would require possible TAVR evaluation but CT scan showed mild-mod calcification and on exam, appears to have moderate AS at best. Underwent RHC which did show elevated filling pressures and low CI prior to milrinone. Was started on milrinone as palliative option with subsequent improvement in his shortness of breath and renal function.  He has expressed wishes to be DNR/DNI.     Review of studies    RHC 9/22: RA 15, RV 56/18, PA 56/30/40, PCWP: 33,. CO ([place]) 3.171, CI: 1.571, SVR 1514  TTE 9/1/20: LV 6.5 cm, LVEF 25% with LVOT VTI 14 cm, mild-moderate MR, moderate AS, mild-moderate TR  [facility] 11/2019: severe obstructive 3v CAD, non-obstructive disease in grafts

## 2020-09-29 NOTE — PROGRESS NOTE ADULT - PROBLEM SELECTOR PROBLEM 7
Medication management

## 2020-09-29 NOTE — PROGRESS NOTE ADULT - PROBLEM SELECTOR PLAN 3
- continue ASA/statin and plavix (switched from brillinta in case there is a dyspnea side effect)
Asymetric LE edema  doppler neg for dvt
- continue ASA/statin and plavix (switched from brillinta in case there is a dyspnea side effect)
Asymetric LE edema  doppler neg for dvt
- continue ASA/statin and plavix (switched from brillinta in case there is a dyspnea side effect)

## 2020-10-01 NOTE — COUNSELING
[Fall prevention counseling provided] : Fall prevention counseling provided [Use proper foot wear] : Use proper foot wear [Use recommended devices] : Use recommended devices [FreeTextEntry1] : slow postural changes

## 2020-10-01 NOTE — HISTORY OF PRESENT ILLNESS
[FreeTextEntry1] : Follow up for hospitalization: Heart Failure [de-identified] : As per [facility]'s hospital course authored on 9/29/2020: \par \par "80 year-old man with PMH of CAD s/p multiple prior PCI's(17 stents) s/p CABG, HFrEF w/ EF 25%, s/p [employer] single chamber ICD (11/2019), CKD-3, HTN, HLD, DMT2, AS, presents from [facility] in setting of acute shortness of breath of 1 day. Patient states that he was at his normal state of health in previous days. He took his medications this morning and after he felt shortness of breath at rest along with a pain radiating down his left arm (a pain which he has never felt before). Denies associated CP, palpitations, dizziness, nausea, diaphoresis. Patient states that he takes Lasix 40 mg three times a day and has not missed doses. He typically sleeps with 3 pillows at night for chronic orthopnea. Denies any worsening of orthopnea. He denies acute worsening of the LE edema but has noticed that the left leg looked rose. Denies fevers, chills, sweats, cough, wheezing, URI symptoms in preceding days. He states that another doctor started him on Lisinopril. He took in on Saturday without issue. He took it again today with his morning meds and wonders if that caused his shortness of breath. He does endorse a reaction to Entresto in the past.  Pt. evaluated by heart failure and requires milrinone gtt for discharge. \par for CHF, discharge on PO torsemide 40 daily. Pt to follow up with HF clinic as outpatient. S/p RHC with MEMS on 9/22.\par Nuclear amyloid scan negative. CT heart done showing mild-mod AS. Per EP, no plan for AICD upgrade at this time. S/p central line placement for home milrinone.\par - For CAD, continue ASA, Plavix and Lipitor. \par - For LE edema, doppler negative for DVT.\par - For CKD, followed by nephrology.\par Patient cleared by HF for discharge home with milrinone gtt on 9/29/20."\par \par \par \par \par \par

## 2020-10-01 NOTE — ASSESSMENT
[FreeTextEntry1] : Patient seen in the home for post hospitalization follow up. Since discharge, he reports that he feels good. He is happy to be home and around his love ones. Denies shortness of breath, chest pain,ICD firing/shocks, fever/chills, poor appetite, increased edema to lower extremities/abdomen. Discharge weight: 177.6 lbs. Today's weight is 178. He is with a 1 lb weight gain since being home (9/29: 177) (9/30: 177.5). He was discharged home on Milirone infusion, carvedilol, hydrazaline and torsemide. Constant reminder to perform cardiomems daily and adhere with low sodium diet (spouse currently cooking correia.) Discussed in detail the importance of low sodium diet and moderations.\par MOLST completed in hospital: DNR/DNI. 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 Emotional support provided. Patient is concerned regarding bill for infusion pump that ~$35 per day; he is in the process of sorting out bill because it could be waived for  Status. \par Overall patient is clinically stable. \par Continue with home care and follow up with HF clinic on 10/6/2020.Advised to contact CN/TCM on the number provided  with any questions, new or worsening symptoms to facilitate coordination with medical providers. Will F/U.\par

## 2020-10-01 NOTE — PHYSICAL EXAM
[No Acute Distress] : no acute distress [No Tracheal Deviation] : the trachea was midline [Normal Rhythm/Effort] : normal respiratory rhythm and effort [Rales / Crackles Bilateral] : no rales or crackles were heard [Wheezing Bilaterally] : no wheezing was heard [Rhonchi Bilateral] : no rhonchi were heard [Decreased Breath Sounds] : breath sounds were not diminished [Normal Rate] : normal rate  [Regular Rhythm] : with a regular rhythm [Pedal Pulses Present] : the pedal pulses are present [No Extremity Clubbing/Cyanosis] : no extremity clubbing/cyanosis [Soft] : abdomen soft [Non Tender] : non-tender [Non-distended] : non-distended [Normal Bowel Sounds] : normal bowel sounds [Normal Affect] : the affect was normal [Alert and Oriented x3] : oriented to person, place, and time [Normal Insight/Judgement] : insight and judgment were intact [Right Foot Was Examined] : Right foot ~C was examined [None] : no ulcers in either foot were found [de-identified] : trace LEFT pedal edema [de-identified] : BM; 9/30/2020 [de-identified] : right anterior bui cath

## 2020-10-06 PROBLEM — I50.22 SYSTOLIC CHF, CHRONIC: Status: ACTIVE | Noted: 2019-11-13

## 2020-10-06 NOTE — REASON FOR VISIT
[Consultation] : a consultation regarding [Heart Failure] : congestive heart failure [Discharge Date: ___] : Discharge Date: [unfilled] [Admitted for Heart Failure] : patient was admitted for heart failure

## 2020-10-13 NOTE — ED PROVIDER NOTE - CLINICAL SUMMARY MEDICAL DECISION MAKING FREE TEXT BOX
79yo male several comorbidities p/w near syncope and dyspnea. Bibasilar crackles, pitting edema. Likely CHF exacerbation. Near syncope likely vasovagal, less concern for cardiac cause. R/o ACS. Low suspicion for PE. Lasix, labs, CXR, milrinone pump eval by HF team and admit.

## 2020-10-13 NOTE — ED ADULT NURSE NOTE - ED STAT RN HANDOFF DETAILS
Report given to Kristin SALDANA Report given to Kristin SALDANA, made aware of milrinone continuous drip.

## 2020-10-13 NOTE — ED ADULT NURSE NOTE - NSIMPLEMENTINTERV_GEN_ALL_ED
None Implemented All Fall Risk Interventions:  [place] to call system. Call bell, personal items and telephone within reach. Instruct patient to call for assistance. Room bathroom lighting operational. Non-slip footwear when patient is off stretcher. Physically safe environment: no spills, clutter or unnecessary equipment. Stretcher in lowest position, wheels locked, appropriate side rails in place. Provide visual cue, wrist band, yellow gown, etc. Monitor gait and stability. Monitor for mental status changes and reorient to person, place, and time. Review medications for side effects contributing to fall risk. Reinforce activity limits and safety measures with patient and family.

## 2020-10-13 NOTE — H&P ADULT - NSHPPHYSICALEXAM_GEN_ALL_CORE
Vital Signs Last 24 Hrs  T(C): 36.4 (13 Oct 2020 21:30), Max: 36.6 (13 Oct 2020 18:21)  T(F): 97.5 (13 Oct 2020 21:30), Max: 97.9 (13 Oct 2020 18:21)  HR: 88 (13 Oct 2020 21:30) (88 - 94)  BP: 107/80 (13 Oct 2020 21:30) (107/80 - 120/81)  BP(mean): --  RR: 21 (13 Oct 2020 21:30) (20 - 22)  SpO2: 98% (13 Oct 2020 21:30) (95% - 100%)    GENERAL: No acute distress, well-developed  HEAD:  Atraumatic, Normocephalic  EYES: EOMI, PERRLA, conjunctiva and sclera clear  ENT: Oral mucosa moist  NECK: Neck supple  CHEST/LUNG: Clear to auscultation bilaterally; No wheeze, rales in diffuse lung fields, no rhonchi.    HEART: Regular rate and rhythm; No murmurs, rubs, or gallops  ABDOMEN: Soft, Nontender, Nondistended; Bowel sounds present  EXTREMITIES:  No clubbing, cyanosis. 2+ pitting edema in LE b/l  VASCULAR: Posterior tibialis pulses intact bilaterally  PSYCH: Normal behavior, normal affect  NEUROLOGY: AAOx3  SKIN: grossly warm and dry

## 2020-10-13 NOTE — H&P ADULT - NSHPLABSRESULTS_GEN_ALL_CORE
Labs personally reviewed:                        10.2   6.64  )-----------( 171      ( 13 Oct 2020 18:58 )             31.5       10-13    134<L>  |  93<L>  |  60<H>  ----------------------------<  229<H>  4.6   |  28  |  2.57<H>    Ca    9.3      13 Oct 2020 18:58    TPro  6.8  /  Alb  3.8  /  TBili  0.6  /  DBili  x   /  AST  40  /  ALT  93<H>  /  AlkPhos  87  10-13            LIVER FUNCTIONS - ( 13 Oct 2020 18:58 )  Alb: 3.8 g/dL / Pro: 6.8 g/dL / ALK PHOS: 87 U/L / ALT: 93 U/L / AST: 40 U/L / GGT: x           CXR personally reviewed-likely heart failure    EKG personally reviewed

## 2020-10-13 NOTE — ED PROVIDER NOTE - OBJECTIVE STATEMENT
81 yo male CAD s/p multiple prior PCI's(17 stents) s/p CABG, HFrEF w/ EF 25%, s/p [employer] single chamber ICD (11/2019), CKD-3, HTN, HLD, DMT2, AS p/w dizziness and near syncope after going to  an object 1 hour ago. dizziness has resolved. Notes dyspnea and worsening pitting edema x 3 days. Denies chest pain, fever, abdominal pain, cough.

## 2020-10-13 NOTE — ED ADULT NURSE NOTE - OBJECTIVE STATEMENT
90 ra, nonrebreather 98, sob, 3 days increasing ascites and pedal edema  bent over at 4pm, began having difficulty breathing, couldn't get out of chair. diminished, rales at bases?  ambulates on own at home  CHF, ckd, aortic stenonis, quadruple bipass, DM 2, cad,  access in right chest  238 bg. 80y M, A&Ox3, PMH CAD s/p multiple prior PCI's(17 stents) s/p CABG, HFrEF w/ EF 25%, s/p [employer] single chamber ICD (11/2019), CKD-3, HTN, HLD, DMT2, AS p/w dizziness and near syncope after bending over to  an object 1 hour ago. Per EMS, pt satting 90 on ra, now satting 99% on 2 liters. Dizziness has resolved since then but is experiencing dyspnea and worsening pitting edema x 3 days. Pt ambulates on own at home at baseline. Gross neuro intact, lung sounds diminished, no difficulty speaking in complete sentences, pulses x 4, abdomen round, distended, nontender. Pt presenting with 20g in left ac placed by EMS, and central line present in right chest wall. Pt denies fevers/chills, numbness/tingling, headache, vision changes, cp, cough, abd pain, n/v/d, dysuria, hematuria, bloody stools, back pain. 80y M, A&Ox3, PMH CAD s/p multiple prior PCI's(17 stents) s/p CABG, HFrEF w/ EF 25%, s/p [employer] single chamber ICD (11/2019), CKD-3, HTN, HLD, DMT2, AS p/w dizziness and near syncope after bending over to  an object 1 hour ago. Per EMS, pt satting 90 on ra, now satting 99% on 2 liters nc. Dizziness has resolved since then but is experiencing dyspnea and worsening pitting edema x 3 days. Pt ambulates on own at home at baseline. Gross neuro intact, lung sounds diminished, no difficulty speaking in complete sentences, pulses x 4, abdomen round, distended, nontender. Pt presenting with 20g in left ac placed by EMS, and central line present in right chest wall. Pt on milrinone continuous infusion with pump and medicine running on arrival. Pt states medication running low, MD attending Richie made aware. Cardiology to consult and drip to be reorded and administered in ED. Pt denies fevers/chills, numbness/tingling, headache, vision changes, cp, cough, abd pain, n/v/d, dysuria, hematuria, bloody stools, back pain.

## 2020-10-13 NOTE — ED ADULT NURSE REASSESSMENT NOTE - NS ED NURSE REASSESS COMMENT FT1
pt received from [facility] in [place], pt Aox4 breathing shallow breaths, some increased effort noted, pt reports "I still feel short of breath but I feel a little better." Pt on 2L O2 NC on exam. Pt skin appears dry, pt c/o thirst. Pt received 40 mg IV push lasix prior to shift change. Pt given call bell, offered non-slip socks but declined, appropriate side rails up. Pt has pump from home administering milrinone but next dose is due. Pt does not have dose with him. MD Quiroz aware, cardiology aware, Milrinone to be ordered by ED attending physician so that pt can continue to get dose.

## 2020-10-13 NOTE — CONSULT NOTE ADULT - ASSESSMENT
80 M with PMH of ACC/AHA Class C/D ICM 2/2 CAD (s/p 4V CABG in the 90s and subsequently multiple PCI), EF 25%, s/p ICD, not candidate for OHT/LVAD due to cormobidities/sternotomy, CKD (2.4 on discharge in 9/2020), and T2DM A1c 11.2, and recent admission for ADHF in 9/2020 in [facility] received CardioMEMs implant and started on milrinone infusion as a palliative option. Discharged on 9/29/2020 and now re-admitted for ADHF likely secondary to medication non-compliance     #ACC AHA Stage C/D ICM   -LA 1.9 and extremities felt to be warm and perfused. ProBNP 4500-> 5700   -wt gain of 15 lbs since discharge, 2+ peripheral edema, PAD reading of 29 on CardioMem 10/12 range from 24-32 since discharge goal <20   -GDMT- restart on Coreg 6.25 mg BID and hydralazine 25 mg TID with holding parameters (Hold fo SBP<90). Tried on Entresto as outpatient but couldn't tolerate it 2/2 hypotension   -Diuretic- was supposed to take torsemide 40 mg qd and 40 mg PRN for wt again but was only take 20 mg BID. s/p IV lasix 40 mg in the ED, continue IV lasix 40 mg BID and monitor UO, may need IV lasix 80 mg   -Device- s/p ICD, no clear indication for CRT upgrade on last admission   -Advanced therapy- c/w milrinone as palliative option. no a candidate for OHT/LVAD   -BMP/Mg BID, goal Mg>2 and K>4   -admit to telemetry     #GOC   -as outpatient, cleared stated he wishes to be DNR/DNI     #CAD   -c/w DAPT asa/plavix and statin     Plan discussed with HF attending on call, Dr. Gipson     Thank you,     Rochelle Oshea MD  Cardiology Fellow  [phone]   All Cardiology service information can be found 24/7 on [email or web address], password: [email or web address]

## 2020-10-13 NOTE — ED PROVIDER NOTE - PHYSICAL EXAMINATION
Physical Exam:  Gen: NAD, AOx3, non-toxic appearing  Lung: bibasilar crackles, 98% on 2L NC, speaking in full sentences  CV: RRR, no murmurs, rubs or gallops, distal pulses 2+ b/l  Abd: soft, NT, distended (chronic), no guarding, no rigidity, no rebound tenderness, no CVA tenderness   Neuro: No focal sensory or motor deficits  Skin: Warm, well perfused, no rash, pitting edema 2+ b/l  Psych: normal affect, calm
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
## 2020-10-13 NOTE — H&P ADULT - HISTORY OF PRESENT ILLNESS
80 year-old man with PMH of CAD s/p multiple prior PCI's(17 stents) s/p CABG, HFrEF w/ EF 25%, s/p [employer] single chamber ICD (11/2019), CKD-3, HTN, HLD, DMT2, AS, recent admissions here for heart failure with last admission September, now presenting again with increased b/l LE edema over the last 1 week associated with significanted worsened shortness of breath and orthopnea x 3 days with near-syncopal episode today. Patient reports that he had followed up with his cardiologist after discharge and was under the impression that he was to take 20mg of torsemide BID and to discontinue hydralazine that had been uptitrated to 75mg TID here during last admission. However, this is not consistent with outpatient records, appears that patient was supposed to be taking 40mg BID of torsemide and unclear by outpatient records whether he was still supposed to be taking hydralazine. No chest pain. No fevers or chills. No coughing/diarrhea/dysuria.

## 2020-10-13 NOTE — H&P ADULT - PROBLEM SELECTOR PLAN 2
Lantus will use 32 units qhs for now given inpatient status and adjust as needed  Unclear why patient on once a day humalog dose, use ISS for now and monitor, may need premeal insulin

## 2020-10-13 NOTE — H&P ADULT - PROBLEM SELECTOR PLAN 1
Milrinone gtt  lasix 40mg iv bid for diuresis  Likely should resume hydralazine as he has not been taking at all? Will resume at 25mg TID for now and monitor for titration, reports that he had tolerated well but can clarify with Dr Sarkar in AM whether there were any other issues with 75mg tid dose  Monitor I's and O's  Cardiology/HF consult appreciated  Telemetry  Echo

## 2020-10-13 NOTE — ED PROVIDER NOTE - ATTENDING CONTRIBUTION TO CARE
RGUJRAL 79yo male hx listed presents with episode of dizziness and lightheadedness today. States he bent over and felt sudden onset of symptoms. Symptoms associated with SOB. Denies any chest pain. Pt has been eating well, tolerating po. Denies any recent illness, cough, URI, f/c.   As per EMS, patient was hypoxic to low 90s and improved with 2LNC.   On exam, Patient is awake, alert and oriented x 3.  Patient is comfortable speaking full sentences on 2L NC.  NCAT  Lungs decreased bs at bases.  Abdomen:Soft nd/nt+bs no rebound or guarding.  Extremity 2+ edema b/l   Check labs, xray chest. Eval for HF and diurese as needed.

## 2020-10-13 NOTE — H&P ADULT - ASSESSMENT
80 year-old man with PMH of CAD s/p multiple prior PCI's(17 stents) s/p CABG, HFrEF w/ EF 25%, s/p [employer] single chamber ICD (11/2019), CKD-3, HTN, HLD, DMT2, AS, recent admissions here for heart failure with last admission September, now presenting again with ADHF

## 2020-10-13 NOTE — ED ADULT TRIAGE NOTE - BANDS:
How Severe Are Your Spot(S)?: mild Have Your Spot(S) Been Treated In The Past?: has not been treated Hpi Title: Evaluation of Skin Lesions Allergy;
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
## 2020-10-14 NOTE — CONSULT NOTE ADULT - ASSESSMENT
80 year-old man with PMH of CAD s/p multiple prior PCI's(17 stents) s/p CABG, HFrEF w/ EF 25%, s/p [employer] Kvng single chamber ICD (11/2019), CKD-3, HTN, HLD, DMT2, AS, recent admissions here for heart failure now with readmission for chf in setting of non adherent to his medications as documented    1- ckd IV   2- chf   3- anemia      cr has worsened in setting of his chf and likely progressive in nature over time  lasix 40 mg iv bid/aldactone 25 mg qd   primacor to cont as per chf team   to have iron tibc ferritin and retic ct   check uric acid
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
## 2020-10-14 NOTE — PROGRESS NOTE ADULT - PROBLEM SELECTOR PLAN 1
Milrinone gtt  lasix 40mg iv bid for diuresis  heart failure team followup  aldactone added  cont other heart failure meds as ordered  strict i/o  daily wt

## 2020-10-14 NOTE — ASSESSMENT
[FreeTextEntry1] : 79 YO M with a history of ACC/AHA Stage C/D ischemic cardiomyopathy s/p ICD, CAD s/p 4v CABG in 90's and multiple subsequent PCI, moderate AS, CKD III (baseline Cr 1.8), and poorly controlled DM2 (A1c 11.6%) presenting to HF clinic for f/u after hospitalization. \par \par Today appears volume overloaded in the setting not taking diuretics as prescribed at discharge, and reporting NYHA III-IV symptoms. His blood pressure is marginal and precludes uptitration of neurohormonal therapy. He has high risk features including severe LV dysfunction with low blood pressure and frequent hospitalizations. Additionally he has competing risks of renal dysfunction, advanced age, and poorly controlled DM2. Overall his prognosis is poor and he is clear in his wishes to be DNR/DNI. He is not a candidate for LVAD/OHT.\par \par

## 2020-10-14 NOTE — PHYSICAL EXAM
[General Appearance - Well Developed] : well developed [Normal Appearance] : normal appearance [Well Groomed] : well groomed [General Appearance - Well Nourished] : well nourished [No Deformities] : no deformities [General Appearance - In No Acute Distress] : no acute distress [Normal Conjunctiva] : the conjunctiva exhibited no abnormalities [Eyelids - No Xanthelasma] : the eyelids demonstrated no xanthelasmas [Abdomen Soft] : soft [Abdomen Tenderness] : non-tender [Abdomen Mass (___ Cm)] : no abdominal mass palpated [Abnormal Walk] : normal gait [Gait - Sufficient For Exercise Testing] : the gait was sufficient for exercise testing [Nail Clubbing] : no clubbing of the fingernails [Cyanosis, Localized] : no localized cyanosis [Petechial Hemorrhages (___cm)] : no petechial hemorrhages [] : no ischemic changes [Oriented To Time, Place, And Person] : oriented to person, place, and time [Affect] : the affect was normal [Mood] : the mood was normal [No Anxiety] : not feeling anxious [FreeTextEntry1] : Right chest tunnelled double lumen CVC in place dressing C/D/I

## 2020-10-14 NOTE — CONSULT NOTE ADULT - ASSESSMENT
80 year-old with acute on chronic systolic heart failure.  Patient has been maintained on milrinone for inotrope support since recent discharge.  CardioMems per Heart Failure.  Diuresis and inotrope support as needed.  Keep O > I, K > 4.0, Mag > 2.0  Patient is DNR.

## 2020-10-14 NOTE — HISTORY OF PRESENT ILLNESS
[FreeTextEntry1] : \par Referring: Dr. Ortega, Dr. Sarkar\par \par Mr. Arenas is a 79 YO M with a history of ACC/AHA Stage C/D ischemic cardiomyopathy s/p ICD, CAD s/p 4v CABG in 90's and multiple subsequent PCI, moderate AS, CKD III (baseline Cr 1.8), and poorly controlled DM2 (A1c 11.6%), recent CardioMEMS implanted 9/22/20 PAD goal 20, presenting to HF clinic after hospital discharge.\par \par He has had recurrent hospitalizations in 2020 for ADHF and chest pain and in most recent admission on 9/13 - 9/29 for PORTILLO. Underwent RHC/CardioMEMS which did show elevated filling pressures and low CI prior to milrinone. Was started on milrinone as palliative option with subsequent improvement in his shortness of breath and renal function.  He has expressed wishes to be DNR/DNI.\par \par The patient presents today for f/u after hospital discharge. He states he was doing well for the first couple of days after leaving the hospital. Then he started to develop a SOB that has worsened for the past four days, to the point he can not walk around the house for longer than five minutes. He endorses a baseline of two pillow orthopnea however he required to sleep for the first time in a recliner last night. He has lost his appetite and feels bloated throughout the day, he has noticed b/l LE edema, bendopnea and dizziness. Denies c/p, palpitations, N/V/D.  He is not checking his BP or weight at home. His standing weight at discharge was 178 lbs today is 194 lbs in clinic. (Goal weight 177 lbs)\par Upon discharge, he was supposed to start Torsemide 40 mg once a day but he states it was not sent to the pharmacy so he kept taking Furosemide 40 mg three times a day. He did not report this to our office or the the [facility] at home NP. His CardioMEMS readings have been fluctuating from 26-34. He started to take the Torsemide 40 mg until last night his reading this morning was 29.
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
## 2020-10-14 NOTE — DISCUSSION/SUMMARY
[Chronic Systolic Heart Failure] : chronic systolic congestive heart failure [Fluid Restriction] : fluid restriction [Sodium Restriction] : sodium restriction [Patient] : the patient [FreeTextEntry2] : Spouse [FreeTextEntry1] : \par # ACC/AHA Stage C/D ICM\par - GDMT: Continue carvedilol 6.25 mg BID, Hydralazine 75 mg TID, will attempt uptitration once euvolemic\par - Diuretic: Take Torsemide 40 mg twice a day, Continue daily weights and Cardiomems  readings, with goal    PAD of 20, will adjust diuretics based on readings\par - Device: s/p ICD, no clear indication for CRT upgrade, consider deactivation in the future\par - Advanced therapies: Continue palliative Milrinone 0.25mcg/kg/min \par - Labs: will have blood drawn today at home \par \par # Moderate AS \par - followed with structural cardiology, no indications for TAVR as stenosis is not severe\par \par # CAD\par - follows with Dr. Sarkar, on DAPT and high potency statin \par - c/w Plavix, ASA/statin, Ranolazine\par \par RTC 10/28 with Dr Jaquez or sooner as needed

## 2020-10-15 NOTE — PROGRESS NOTE ADULT - ASSESSMENT
80 M with PMH of ACC/AHA Class C/D ICM 2/2 CAD (s/p 4V CABG in the 90s and subsequently multiple PCI), EF 25%, s/p ICD, not candidate for OHT/LVAD due to cormobidities/sternotomy, CKD (2.4 on discharge in 9/2020), and T2DM A1c 11.2, and recent admission for ADHF in 9/2020 in [facility] received CardioMEMs implant and started on milrinone infusion as a palliative option readmitted for ADHF in the setting of medication confusion.

## 2020-10-15 NOTE — CONSULT NOTE ADULT - PROBLEM SELECTOR RECOMMENDATION 6
The patient is hospice eligible and will try to discuss about this with him and his family.   Will continue to f/u for symptoms, GOC, and resources.         Cj Garcia MD   Geriatrics and Palliative Care (GAP) Consult Service    of Geriatric and Palliative Medicine  [facility]      Please page the following number for clinical matters between the hours of 9 am and 5 pm from Monday through Friday : [phone]    After 5pm and on weekends, please see the contact information below:    In the event of newly developing, evolving, or worsening symptoms, please contact the Palliative Medicine team via pager (if the patient is at [facility] #[phone] or if the patient is at [facility] #[phone]) The Geriatric and Palliative Medicine service has coverage 24 hours a day/ 7 days a week to provide medical recommendations regarding symptom management needs via telephone

## 2020-10-15 NOTE — PROGRESS NOTE ADULT - PROBLEM SELECTOR PLAN 1
-Remains volume overloaded, increase diuretics to bumex 2mg IV BID. Increase Spironolactone 25mg BID.  -c/w milrinone .25mcg/kg/hr   -c/w coreg 6.25mg BID  -DNR/DNI -Remains volume overloaded, increase diuretics to bumex 2mg IV BID. Increase Spironolactone 25mg BID. Pt remains with inadequate UOP, increase diuretics further to bumex gtt 2mg/hr  -c/w milrinone .25mcg/kg/hr   -c/w coreg 6.25mg BID  -DNR/DNI
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
## 2020-10-15 NOTE — CONSULT NOTE ADULT - PROBLEM SELECTOR PROBLEM 3
Coronary artery disease involving native coronary artery of native heart without angina pectoris
Insomnia, unspecified type

## 2020-10-15 NOTE — PROGRESS NOTE ADULT - ASSESSMENT
80 year-old man with PMH of CAD s/p multiple prior PCI's(17 stents) s/p CABG, HFrEF w/ EF 25%, s/p St[employer] Kvng single chamber ICD (11/2019), CKD-3, HTN, HLD, DMT2, AS, recent admissions here for heart failure now with readmission for chf in setting of non adherent to his medications as documented    1- ckd IV   2- chf   3- anemia      cr has worsened in setting of his chf and likely progressive in nature over time  bumex 2 mg/hr   trend hb   keep O> I   primacor to cont as per chf team

## 2020-10-15 NOTE — CONSULT NOTE ADULT - PROBLEM SELECTOR PROBLEM 2
Stage 3 chronic kidney disease, unspecified whether stage 3a or 3b CKD
Depression, unspecified depression type
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
## 2020-10-15 NOTE — PROGRESS NOTE ADULT - SUBJECTIVE AND OBJECTIVE BOX
Jose M Soto  [phone]  All Cardiology service information can be found 24/7 on [email or web address], password: [email or web address] [employer]    Overnight Events: No events overnight. Pt reports dyspnea still although slightly improved. Denies any chest pain or palpitations.           Current Meds:  allopurinol 300 milliGRAM(s) Oral daily  aMIOdarone    Tablet 200 milliGRAM(s) Oral daily  aspirin enteric coated 81 milliGRAM(s) Oral daily  atorvastatin 80 milliGRAM(s) Oral at bedtime  buMETAnide Injectable 2 milliGRAM(s) IV Push two times a day  carvedilol 6.25 milliGRAM(s) Oral every 12 hours  clopidogrel Tablet 75 milliGRAM(s) Oral daily  dextrose 40% Gel 15 Gram(s) Oral once PRN  dextrose 5%. 1000 milliLiter(s) IV Continuous <Continuous>  dextrose 50% Injectable 12.5 Gram(s) IV Push once  dextrose 50% Injectable 25 Gram(s) IV Push once  dextrose 50% Injectable 25 Gram(s) IV Push once  glucagon  Injectable 1 milliGRAM(s) IntraMuscular once PRN  heparin   Injectable 5000 Unit(s) SubCutaneous every 12 hours  insulin glargine Injectable (LANTUS) 32 Unit(s) SubCutaneous at bedtime  insulin lispro (HumaLOG) corrective regimen sliding scale   SubCutaneous three times a day before meals  insulin lispro (HumaLOG) corrective regimen sliding scale   SubCutaneous at bedtime  milrinone Infusion 0.25 MICROgram(s)/kG/Min IV Continuous <Continuous>  mirtazapine 15 milliGRAM(s) Oral at bedtime  potassium chloride    Tablet ER 40 milliEquivalent(s) Oral once  ranolazine 500 milliGRAM(s) Oral two times a day  spironolactone 25 milliGRAM(s) Oral two times a day      Vitals:  T(F): 97.3 (10-15), Max: 98.2 (10-15)  HR: 91 (10-15) (79 - 92)  BP: 100/66 (10-15) (98/62 - 115/80)  RR: 20 (10-15)  SpO2: 97% (10-15)  I&O's Summary    14 Oct 2020 07:01  -  15 Oct 2020 07:00  --------------------------------------------------------  IN: 0 mL / OUT: 1200 mL / NET: -1200 mL        Physical Exam:  GENERAL: No acute distress, well-developed  HEAD:  Atraumatic, Normocephalic  ENT: EOMI, PERRLA, conjunctiva and sclera clear, Neck supple, JVD 10 cm, moist mucosa  CHEST/LUNG: bibasilar crackles   BACK: No spinal tenderness  HEART: Regular rate and rhythm; No murmurs, rubs, or gallops  ABDOMEN: Soft, Nontender, Nondistended; Bowel sounds present  EXTREMITIES:  2+ edema   PSYCH: Nl behavior, nl affect  NEUROLOGY: non-focal, cranial nerves intact  SKIN: Normal color, No rashes or lesions                          9.7    5.80  )-----------( 159      ( 14 Oct 2020 06:32 )             29.7     10-15    138  |  96  |  50<H>  ----------------------------<  144<H>  3.5   |  32<H>  |  2.41<H>    Ca    9.0      15 Oct 2020 09:36  Phos  4.4     10-15  Mg     2.2     10-15    TPro  6.8  /  Alb  3.8  /  TBili  0.6  /  DBili  x   /  AST  40  /  ALT  93<H>  /  AlkPhos  87  10-13      CARDIAC MARKERS ( 13 Oct 2020 18:58 )  78 ng/L / x     / x     / x     / x     / x          Serum Pro-Brain Natriuretic Peptide: 5763 pg/mL (10-13 @ 18:58)      Interpretation of Telemetry:  SR 80-90s, PVC

## 2020-10-15 NOTE — PROGRESS NOTE ADULT - SUBJECTIVE AND OBJECTIVE BOX
[facility]   [phone]  RENAL FOLLOW UP NOTE  --------------------------------------------------------------------------------  Chief Complaint:    24 hour events/subjective:    seen earlier. states still with sob and significant edema     PAST HISTORY  --------------------------------------------------------------------------------  No significant changes to PMH, PSH, FHx, SHx, unless otherwise noted    ALLERGIES & MEDICATIONS  --------------------------------------------------------------------------------  Allergies    No Known Allergies    Intolerances    Entresto (Other)    Standing Inpatient Medications  allopurinol 300 milliGRAM(s) Oral daily  aMIOdarone    Tablet 200 milliGRAM(s) Oral daily  aspirin enteric coated 81 milliGRAM(s) Oral daily  atorvastatin 80 milliGRAM(s) Oral at bedtime  buMETAnide Infusion 2 mG/Hr IV Continuous <Continuous>  carvedilol 6.25 milliGRAM(s) Oral every 12 hours  clopidogrel Tablet 75 milliGRAM(s) Oral daily  dextrose 5%. 1000 milliLiter(s) IV Continuous <Continuous>  dextrose 50% Injectable 12.5 Gram(s) IV Push once  dextrose 50% Injectable 25 Gram(s) IV Push once  dextrose 50% Injectable 25 Gram(s) IV Push once  heparin   Injectable 5000 Unit(s) SubCutaneous every 12 hours  insulin glargine Injectable (LANTUS) 32 Unit(s) SubCutaneous at bedtime  insulin lispro (HumaLOG) corrective regimen sliding scale   SubCutaneous three times a day before meals  insulin lispro (HumaLOG) corrective regimen sliding scale   SubCutaneous at bedtime  milrinone Infusion 0.25 MICROgram(s)/kG/Min IV Continuous <Continuous>  mirtazapine 30 milliGRAM(s) Oral at bedtime  ranolazine 500 milliGRAM(s) Oral two times a day  spironolactone 25 milliGRAM(s) Oral two times a day    PRN Inpatient Medications  acetaminophen   Tablet .. 650 milliGRAM(s) Oral every 6 hours PRN  dextrose 40% Gel 15 Gram(s) Oral once PRN  glucagon  Injectable 1 milliGRAM(s) IntraMuscular once PRN  melatonin 3 milliGRAM(s) Oral at bedtime PRN      REVIEW OF SYSTEMS  --------------------------------------------------------------------------------    Gen: denies  fevers/chills,  CVS: denies chest pain/palpitations  Resp:  + SOB/Cough  GI: Denies N/V/Abd pain  : Denies dysuria/oliguria/hematuria    All other systems were reviewed and are negative, except as noted.    VITALS/PHYSICAL EXAM  --------------------------------------------------------------------------------  T(C): 36.6 (10-15-20 @ 16:39), Max: 36.8 (10-15-20 @ 05:20)  HR: 68 (10-15-20 @ 18:10) (68 - 92)  BP: 144/78 (10-15-20 @ 18:10) (98/62 - 144/78)  RR: 20 (10-15-20 @ 18:10) (18 - 20)  SpO2: 97% (10-15-20 @ 18:10) (96% - 98%)  Wt(kg): --        10-14-20 @ 07:01  -  10-15-20 @ 07:00  --------------------------------------------------------  IN: 6.5 mL / OUT: 1200 mL / NET: -1193.5 mL    10-15-20 @ 07:01  -  10-15-20 @ 20:10  --------------------------------------------------------  IN: 878.4 mL / OUT: 800 mL / NET: 78.4 mL      Physical Exam:  	  	Gen: mild tachypneic  	+  jvd   	Pulm: decrease bs  +  rales -  ronchi or wheezing  	CV: RRR, S1S2; no rub  	Abd: +BS, soft, nontender/nondistended  	: No suprapubic tenderness  	UE: Warm, no cyanosis  no clubbing,  no edema; no asterixis  	LE: Warm, no cyanosis  no clubbing, 2+  edema  	Neuro: alert and oriented. speech coherent   	Vascular access: + R I J line       LABS/STUDIES  --------------------------------------------------------------------------------              9.7    5.80  >-----------<  159      [10-14-20 @ 06:32]              29.7     138  |  96  |  50  ----------------------------<  144      [10-15-20 @ 09:36]  3.5   |  32  |  2.41        Ca     9.0     [10-15-20 @ 09:36]      Mg     2.2     [10-15-20 @ 09:36]      Phos  4.4     [10-15-20 @ 09:36]          Uric acid 6.3      [10-15-20 @ 05:43]    Creatinine Trend:  SCr 2.41 [10-15 @ 09:36]  SCr 2.34 [10-14 @ 06:32]  SCr 2.57 [10-13 @ 18:58]  SCr 2.42 [09-29 @ 06:33]  SCr 1.99 [09-28 @ 06:26]                  Iron 38, TIBC 274, %sat 14      [10-15-20 @ 14:24]  Ferritin 170      [10-15-20 @ 11:24]  HbA1c 10.7      [02-14-20 @ 08:35]  TSH 1.99      [08-28-20 @ 06:28]  Lipid: chol 132, , HDL 28, LDL 74      [08-20-20 @ 11:46]    Free Light Chains: kappa 5.14, lambda 3.55, ratio = 1.45      [09-17 @ 09:09]  Immunofixation Serum:   No Monoclonal Band Identified    Reference Range: None Detected      [09-17-20 @ 09:09]

## 2020-10-15 NOTE — PROGRESS NOTE ADULT - PROBLEM SELECTOR PLAN 1
Milrinone gtt  diuretics changed to bumex  heart failure team followup  aldactone added  cont other heart failure meds as ordered  strict i/o  daily wt

## 2020-10-15 NOTE — CONSULT NOTE ADULT - PROBLEM SELECTOR RECOMMENDATION 4
Acetaminophen 1000 mg IV x 1 and then 650 mg PO q 6 PRN Acetaminophen 1000 mg IV x 1 and then 650 mg PO q 6 PRN  Work up as per primary team.
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
## 2020-10-15 NOTE — CONSULT NOTE ADULT - ASSESSMENT
80 year-old man with PMH of CAD s/p multiple prior PCI's(17 stents) s/p CABG, HFrEF w/ EF 25%, s/p [employer] single chamber ICD (11/2019), CKD-3, HTN, HLD, DMT2, AS, recent admissions here for heart failure with last admission September, now presenting again with increased b/l LE edema over the last 1 week associated with significant worsened shortness of breath and orthopnea x 3 days and with near-syncopal episode today. He is currently being treated for HF exacerbation.

## 2020-10-15 NOTE — CHART NOTE - NSCHARTNOTEFT_GEN_A_CORE
Notified by RN of pt with a fall at 15:40. Pt seen and examined. Pt found in bed. Pt AAOX3, pt states he felt lightheaded and dizzy when he got out of bed. As per RN manager who found pt on the floor. Pt was on his right side, head on his right shoulder. CT Head, Xray of Right shoulder, pelvic and Hip STAT ordered, radiologist made aware. Dr. Paz made aware. Will continue to monitor.     Jose Duncan, RADHA  [id number]

## 2020-10-15 NOTE — CONSULT NOTE ADULT - PROBLEM SELECTOR RECOMMENDATION 2
On Remeron 15 mg hs  Will increase to 30 mg hs  Chaplaincy and palliative care  referral He indicated he used to be a , and independent person, and a great athlete and that his illness is imparing his quality of life. In fact since March due to COVID-19 and due ot his HF he has been secluded to his apartment.   On Remeron 15 mg hs. Will increase to 30 mg hs  Chaplaincy and palliative care SW referral
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
## 2020-10-16 NOTE — PROGRESS NOTE ADULT - ASSESSMENT
80 year-old man with PMH of CAD s/p multiple prior PCI's(17 stents) s/p CABG, HFrEF w/ EF 25%, s/p [employer] single chamber ICD (11/2019), CKD-3, HTN, HLD, DMT2, AS, recent admissions here for heart failure now with readmission for chf in setting of non adherent to his medications as documented    1- ckd IV   2- chf   3- anemia      cr has worsened in setting of his chf and likely progressive in nature over time  sx ? of bumex decrease bumex 1mg/hr   trend hb   keep O> I   primacor to cont as per chf team   hypokalemia supplement kcl   d/w Dr. Gipson and pt daughter

## 2020-10-16 NOTE — PROGRESS NOTE ADULT - PROBLEM SELECTOR PLAN 1
Milrinone gtt - rate increased by heart failure team  diuretics changed to bumex  aldactone added  cont other heart failure meds as ordered  strict i/o  daily wt  supp electrolytes

## 2020-10-16 NOTE — PROGRESS NOTE ADULT - PROBLEM SELECTOR PLAN 1
-Remains volume overloaded, c/w bumex gtt at 2mg/hr. Administer additional spironolactone 25mg stat dose and nncrease to 50mg BID.   -Increase milrinone .3mcg  -c/w coreg 6.25mg BID  -DNR/DNI
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
## 2020-10-16 NOTE — PROGRESS NOTE ADULT - SUBJECTIVE AND OBJECTIVE BOX
[facility]   [phone]  RENAL FOLLOW UP NOTE  --------------------------------------------------------------------------------  Chief Complaint:    24 hour events/subjective:    had profound weakness earlier.   has c/o chest pain and abd pain when seen earlier.   states is urinating well     PAST HISTORY  --------------------------------------------------------------------------------  No significant changes to PMH, PSH, FHx, SHx, unless otherwise noted    ALLERGIES & MEDICATIONS  --------------------------------------------------------------------------------  Allergies    No Known Allergies    Intolerances    Entresto (Other)    Standing Inpatient Medications  allopurinol 300 milliGRAM(s) Oral daily  aMIOdarone    Tablet 200 milliGRAM(s) Oral daily  aspirin enteric coated 81 milliGRAM(s) Oral daily  atorvastatin 80 milliGRAM(s) Oral at bedtime  buMETAnide Infusion 1 mG/Hr IV Continuous <Continuous>  carvedilol 6.25 milliGRAM(s) Oral every 12 hours  clopidogrel Tablet 75 milliGRAM(s) Oral daily  dextrose 5%. 1000 milliLiter(s) IV Continuous <Continuous>  dextrose 50% Injectable 12.5 Gram(s) IV Push once  dextrose 50% Injectable 25 Gram(s) IV Push once  dextrose 50% Injectable 25 Gram(s) IV Push once  heparin   Injectable 5000 Unit(s) SubCutaneous every 12 hours  insulin glargine Injectable (LANTUS) 25 Unit(s) SubCutaneous at bedtime  insulin lispro (HumaLOG) corrective regimen sliding scale   SubCutaneous three times a day before meals  insulin lispro (HumaLOG) corrective regimen sliding scale   SubCutaneous at bedtime  milrinone Infusion 0.3 MICROgram(s)/kG/Min IV Continuous <Continuous>  mirtazapine 30 milliGRAM(s) Oral at bedtime  ranolazine 500 milliGRAM(s) Oral two times a day  spironolactone 50 milliGRAM(s) Oral two times a day    PRN Inpatient Medications  acetaminophen   Tablet .. 650 milliGRAM(s) Oral every 6 hours PRN  dextrose 40% Gel 15 Gram(s) Oral once PRN  glucagon  Injectable 1 milliGRAM(s) IntraMuscular once PRN  melatonin 3 milliGRAM(s) Oral at bedtime PRN      REVIEW OF SYSTEMS  --------------------------------------------------------------------------------    Gen: denies  fevers/chills,  CVS: denies chest pain/palpitations  Resp: denies SOB/Cough  GI: Denies N/V/Abd pain  : Denies dysuria/oliguria/hematuria    All other systems were reviewed and are negative, except as noted.    VITALS/PHYSICAL EXAM  --------------------------------------------------------------------------------  T(C): 36.3 (10-16-20 @ 12:25), Max: 36.6 (10-15-20 @ 20:29)  HR: 98 (10-16-20 @ 17:45) (88 - 98)  BP: 116/74 (10-16-20 @ 17:45) (97/62 - 118/72)  RR: 18 (10-16-20 @ 12:25) (18 - 18)  SpO2: 95% (10-16-20 @ 12:25) (95% - 96%)  Wt(kg): --        10-15-20 @ 07:01  -  10-16-20 @ 07:00  --------------------------------------------------------  IN: 1076.4 mL / OUT: 2380 mL / NET: -1303.6 mL    10-16-20 @ 07:01  -  10-16-20 @ 20:20  --------------------------------------------------------  IN: 600 mL / OUT: 1750 mL / NET: -1150 mL      Physical Exam:  	    Gen:   	+  jvd   	Pulm: decrease bs  +  rales -  ronchi or wheezing  	CV: RRR, S1S2; no rub  	Abd: +BS, soft, nontender/nondistended  	: No suprapubic tenderness  	UE: Warm, no cyanosis  no clubbing,  no edema; no asterixis  	LE: Warm, no cyanosis  no clubbing, 2-   edema  	Neuro: alert and oriented. speech coherent   	Vascular access: + R I J line       LABS/STUDIES  --------------------------------------------------------------------------------              9.8    5.74  >-----------<  153      [10-16-20 @ 05:09]              30.3     137  |  91  |  46  ----------------------------<  131      [10-16-20 @ 17:32]  3.9   |  29  |  2.35        Ca     9.5     [10-16-20 @ 17:32]      Mg     2.2     [10-15-20 @ 09:36]      Phos  4.4     [10-15-20 @ 09:36]          Uric acid 6.3      [10-15-20 @ 05:43]    Creatinine Trend:  SCr 2.35 [10-16 @ 17:32]  SCr 2.37 [10-16 @ 05:09]  SCr 2.41 [10-15 @ 09:36]  SCr 2.34 [10-14 @ 06:32]  SCr 2.57 [10-13 @ 18:58]                  Iron 38, TIBC 274, %sat 14      [10-15-20 @ 14:24]  Ferritin 170      [10-15-20 @ 11:24]  HbA1c 10.7      [02-14-20 @ 08:35]  TSH 1.99      [08-28-20 @ 06:28]  Lipid: chol 132, , HDL 28, LDL 74      [08-20-20 @ 11:46]    Free Light Chains: kappa 5.14, lambda 3.55, ratio = 1.45      [09-17 @ 09:09]  Immunofixation Serum:   No Monoclonal Band Identified    Reference Range: None Detected      [09-17-20 @ 09:09]

## 2020-10-16 NOTE — PROGRESS NOTE ADULT - SUBJECTIVE AND OBJECTIVE BOX
Jose M Soto  [phone]  All Cardiology service information can be found 24/7 on [email or web address], password: [email or web address]    Overnight Events: Pt dizzy after standing up and suffered fall. Sore today from fall, denies any chest pain or palpitations. Dyspnea improving.     Current Meds:  acetaminophen   Tablet .. 650 milliGRAM(s) Oral every 6 hours PRN  allopurinol 300 milliGRAM(s) Oral daily  aMIOdarone    Tablet 200 milliGRAM(s) Oral daily  aspirin enteric coated 81 milliGRAM(s) Oral daily  atorvastatin 80 milliGRAM(s) Oral at bedtime  buMETAnide Infusion 2 mG/Hr IV Continuous <Continuous>  carvedilol 6.25 milliGRAM(s) Oral every 12 hours  clopidogrel Tablet 75 milliGRAM(s) Oral daily  dextrose 40% Gel 15 Gram(s) Oral once PRN  dextrose 5%. 1000 milliLiter(s) IV Continuous <Continuous>  dextrose 50% Injectable 12.5 Gram(s) IV Push once  dextrose 50% Injectable 25 Gram(s) IV Push once  dextrose 50% Injectable 25 Gram(s) IV Push once  glucagon  Injectable 1 milliGRAM(s) IntraMuscular once PRN  heparin   Injectable 5000 Unit(s) SubCutaneous every 12 hours  insulin glargine Injectable (LANTUS) 32 Unit(s) SubCutaneous at bedtime  insulin lispro (HumaLOG) corrective regimen sliding scale   SubCutaneous three times a day before meals  insulin lispro (HumaLOG) corrective regimen sliding scale   SubCutaneous at bedtime  melatonin 3 milliGRAM(s) Oral at bedtime PRN  milrinone Infusion 0.3 MICROgram(s)/kG/Min IV Continuous <Continuous>  mirtazapine 30 milliGRAM(s) Oral at bedtime  ranolazine 500 milliGRAM(s) Oral two times a day  spironolactone 50 milliGRAM(s) Oral two times a day      Vitals:  T(F): 97.8 (10-16), Max: 97.9 (10-15)  HR: 89 (10-16) (68 - 93)  BP: 118/72 (10-16) (97/62 - 144/78)  RR: 18 (10-16)  SpO2: 96% (10-16)  I&O's Summary    15 Oct 2020 07:01  -  16 Oct 2020 07:00  --------------------------------------------------------  IN: 1076.4 mL / OUT: 2380 mL / NET: -1303.6 mL    16 Oct 2020 07:01  -  16 Oct 2020 13:15  --------------------------------------------------------  IN: 0 mL / OUT: 1600 mL / NET: -1600 mL        Physical Exam:  GENERAL: No acute distress, well-developed  HEAD:  Atraumatic, Normocephalic  ENT: EOMI, PERRLA, conjunctiva and sclera clear, Neck supple, JVD 10 cm, moist mucosa  CHEST/LUNG: bibasilar crackles   BACK: No spinal tenderness  HEART: Regular rate and rhythm; No murmurs, rubs, or gallops  ABDOMEN: Soft, Nontender, Nondistended; Bowel sounds present  EXTREMITIES:  2+ edema   PSYCH: Nl behavior, nl affect  NEUROLOGY: non-focal, cranial nerves intact  SKIN: Normal color, No rashes or lesions                          9.8    5.74  )-----------( 153      ( 16 Oct 2020 05:09 )             30.3     10-16    137  |  94<L>  |  45<H>  ----------------------------<  67<L>  3.4<L>   |  30  |  2.37<H>    Ca    9.5      16 Oct 2020 05:09  Phos  4.4     10-15  Mg     2.2     10-15        CARDIAC MARKERS ( 13 Oct 2020 18:58 )  78 ng/L / x     / x     / x     / x     / x          Serum Pro-Brain Natriuretic Peptide: 5763 pg/mL (10-13 @ 18:58)

## 2020-10-16 NOTE — PROGRESS NOTE ADULT - ASSESSMENT
80 M with PMH of ACC/AHA Class C/D ICM 2/2 CAD (s/p 4V CABG in the 90s and subsequently multiple PCI), EF 25%, s/p ICD, not candidate for OHT/LVAD due to cormobidities/sternotomy, CKD (2.4 on discharge in 9/2020), and T2DM A1c 11.2, and recent admission for ADHF in 9/2020 in [facility] received CardioMEMs implant and started on milrinone infusion as a palliative option readmitted for ADHF in the setting of medication confusion.
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
## 2020-10-17 NOTE — PROGRESS NOTE ADULT - SUBJECTIVE AND OBJECTIVE BOX
[facility]   [phone]  RENAL FOLLOW UP NOTE  --------------------------------------------------------------------------------  Chief Complaint:    24 hour events/subjective:      seen earlier. states still with orthopnea no arthralgias today     PAST HISTORY  --------------------------------------------------------------------------------  No significant changes to PMH, PSH, FHx, SHx, unless otherwise noted    ALLERGIES & MEDICATIONS  --------------------------------------------------------------------------------  Allergies    No Known Allergies    Intolerances    Entresto (Other)    Standing Inpatient Medications  allopurinol 300 milliGRAM(s) Oral daily  aMIOdarone    Tablet 200 milliGRAM(s) Oral daily  aspirin enteric coated 81 milliGRAM(s) Oral daily  atorvastatin 80 milliGRAM(s) Oral at bedtime  buMETAnide Infusion 1 mG/Hr IV Continuous <Continuous>  carvedilol 6.25 milliGRAM(s) Oral every 12 hours  clopidogrel Tablet 75 milliGRAM(s) Oral daily  dextrose 5%. 1000 milliLiter(s) IV Continuous <Continuous>  dextrose 50% Injectable 12.5 Gram(s) IV Push once  dextrose 50% Injectable 25 Gram(s) IV Push once  dextrose 50% Injectable 25 Gram(s) IV Push once  heparin   Injectable 5000 Unit(s) SubCutaneous every 12 hours  insulin glargine Injectable (LANTUS) 25 Unit(s) SubCutaneous at bedtime  insulin lispro (HumaLOG) corrective regimen sliding scale   SubCutaneous three times a day before meals  insulin lispro (HumaLOG) corrective regimen sliding scale   SubCutaneous at bedtime  milrinone Infusion 0.3 MICROgram(s)/kG/Min IV Continuous <Continuous>  mirtazapine 30 milliGRAM(s) Oral at bedtime  ranolazine 500 milliGRAM(s) Oral two times a day  spironolactone 50 milliGRAM(s) Oral two times a day    PRN Inpatient Medications  acetaminophen   Tablet .. 650 milliGRAM(s) Oral every 6 hours PRN  dextrose 40% Gel 15 Gram(s) Oral once PRN  glucagon  Injectable 1 milliGRAM(s) IntraMuscular once PRN  melatonin 3 milliGRAM(s) Oral at bedtime PRN      REVIEW OF SYSTEMS  --------------------------------------------------------------------------------    Gen: denies fevers/chills,  CVS: denies chest pain/palpitations  Resp: + SOB/Cough  GI: Denies N/V/Abd pain  : Denies dysuria/oliguria/hematuria    All other systems were reviewed and are negative, except as noted.    VITALS/PHYSICAL EXAM  --------------------------------------------------------------------------------  T(C): 36.7 (10-17-20 @ 11:43), Max: 37.1 (10-16-20 @ 21:08)  HR: 95 (10-17-20 @ 17:24) (89 - 95)  BP: 110/65 (10-17-20 @ 17:24) (98/59 - 110/65)  RR: 18 (10-17-20 @ 17:24) (18 - 18)  SpO2: 96% (10-17-20 @ 17:24) (93% - 96%)  Wt(kg): --        10-16-20 @ 07:01  -  10-17-20 @ 07:00  --------------------------------------------------------  IN: 994.1 mL / OUT: 3015 mL / NET: -2020.9 mL    10-17-20 @ 07:01  -  10-17-20 @ 19:29  --------------------------------------------------------  IN: 393.6 mL / OUT: 1700 mL / NET: -1306.4 mL      Physical Exam:  	    +  jvd   	Pulm: decrease bs  +  rales -  ronchi or wheezing  	CV: RRR, S1S2; no rub  	Abd: +BS, soft, nontender/nondistended  	: No suprapubic tenderness  	UE: Warm, no cyanosis  no clubbing,  no edema; no asterixis  	LE: Warm, no cyanosis  no clubbing, 1-2-   edema  	Neuro: alert and oriented. speech coherent   	Vascular access: + R I J line       LABS/STUDIES  --------------------------------------------------------------------------------              9.8    5.74  >-----------<  153      [10-16-20 @ 05:09]              30.3     138  |  93  |  49  ----------------------------<  158      [10-17-20 @ 06:41]  3.8   |  29  |  2.24        Ca     9.4     [10-17-20 @ 06:41]            Creatinine Trend:  SCr 2.24 [10-17 @ 06:41]  SCr 2.35 [10-16 @ 17:32]  SCr 2.37 [10-16 @ 05:09]  SCr 2.41 [10-15 @ 09:36]  SCr 2.34 [10-14 @ 06:32]                  Iron 38, TIBC 274, %sat 14      [10-15-20 @ 14:24]  Ferritin 170      [10-15-20 @ 11:24]  HbA1c 10.7      [02-14-20 @ 08:35]  TSH 1.99      [08-28-20 @ 06:28]  Lipid: chol 132, , HDL 28, LDL 74      [08-20-20 @ 11:46]

## 2020-10-17 NOTE — PROGRESS NOTE ADULT - ASSESSMENT
80 year-old man with PMH of CAD s/p multiple prior PCI's(17 stents) s/p CABG, HFrEF w/ EF 25%, s/p [employer] single chamber ICD (11/2019), CKD-3, HTN, HLD, DMT2, AS, recent admissions here for heart failure now with readmission for chf in setting of non adherent to his medications as documented    1- ckd IV   2- chf   3- anemia      cr steady range   bumex 1mg/hr   trend hb   keep O> I   primacor to cont  check uric acid

## 2020-10-17 NOTE — PROGRESS NOTE ADULT - SUBJECTIVE AND OBJECTIVE BOX
Patient feeling better today; no SOB (although he hasn't done much beyond walking to bathroom).  Good diuresis, renal function improving on Bumex drip and milrinone      Vital Signs Last 24 Hrs  T(C): 37 (17 Oct 2020 05:00), Max: 37.1 (16 Oct 2020 21:08)  T(F): 98.6 (17 Oct 2020 05:00), Max: 98.7 (16 Oct 2020 21:08)  HR: 95 (17 Oct 2020 05:00) (89 - 98)  BP: 107/68 (17 Oct 2020 05:00) (98/59 - 118/72)  BP(mean): --  RR: 18 (17 Oct 2020 08:50) (18 - 18)  SpO2: 93% (17 Oct 2020 08:50) (93% - 95%)  Daily     Daily Weight in k.5 (16 Oct 2020 10:36)  I&O's Summary    16 Oct 2020 07:01  -  17 Oct 2020 07:00  --------------------------------------------------------  IN: 994.1 mL / OUT: 3015 mL / NET: -2020.9 mL        Neck: no bruits, JVD, adenopathy  Chest: scant crackles  Cor: [id number], RR, normal S1S2 with no mrght  Abd: soft, nontender, BS+ and no HSM  Ext: w/o CC 1-2+ BLE edema to mid calf  Pulses:2+->dp bilaterally    MEDICATIONS  (STANDING):  allopurinol 300 milliGRAM(s) Oral daily  aMIOdarone    Tablet 200 milliGRAM(s) Oral daily  aspirin enteric coated 81 milliGRAM(s) Oral daily  atorvastatin 80 milliGRAM(s) Oral at bedtime  buMETAnide Infusion 1 mG/Hr (5 mL/Hr) IV Continuous <Continuous>  carvedilol 6.25 milliGRAM(s) Oral every 12 hours  clopidogrel Tablet 75 milliGRAM(s) Oral daily  dextrose 5%. 1000 milliLiter(s) (50 mL/Hr) IV Continuous <Continuous>  dextrose 50% Injectable 12.5 Gram(s) IV Push once  dextrose 50% Injectable 25 Gram(s) IV Push once  dextrose 50% Injectable 25 Gram(s) IV Push once  heparin   Injectable 5000 Unit(s) SubCutaneous every 12 hours  insulin glargine Injectable (LANTUS) 25 Unit(s) SubCutaneous at bedtime  insulin lispro (HumaLOG) corrective regimen sliding scale   SubCutaneous three times a day before meals  insulin lispro (HumaLOG) corrective regimen sliding scale   SubCutaneous at bedtime  milrinone Infusion 0.3 MICROgram(s)/kG/Min (7.84 mL/Hr) IV Continuous <Continuous>  mirtazapine 30 milliGRAM(s) Oral at bedtime  ranolazine 500 milliGRAM(s) Oral two times a day  spironolactone 50 milliGRAM(s) Oral two times a day    MEDICATIONS  (PRN):  acetaminophen   Tablet .. 650 milliGRAM(s) Oral every 6 hours PRN Mild Pain (1 - 3), Moderate Pain (4 - 6)  dextrose 40% Gel 15 Gram(s) Oral once PRN Blood Glucose LESS THAN 70 milliGRAM(s)/deciliter  glucagon  Injectable 1 milliGRAM(s) IntraMuscular once PRN Glucose LESS THAN 70 milligrams/deciliter  melatonin 3 milliGRAM(s) Oral at bedtime PRN Insomnia      10-17    138  |  93<L>  |  49<H>  ----------------------------<  158<H>  3.8   |  29  |  2.24<H>    Ca    9.4      17 Oct 2020 06:41  Phos  4.4     10-15  Mg     2.2     10-15                            9.8    5.74  )-----------( 153      ( 16 Oct 2020 05:09 )             30.3         HEALTH ISSUES - PROBLEM Dx:  Acute on chronic CHF-responding to Bumex; feeling better-would increase ambulation and PT as tolerated  Stage 3 chronic kidney disease, unspecified whether stage 3a or 3b CKD-creatinine improving-continue Bumex  Type 2 diabetes, uncontrolled, with renal manifestation-sugars stable

## 2020-10-18 NOTE — PROGRESS NOTE ADULT - SUBJECTIVE AND OBJECTIVE BOX
Patient feeling well.  Diuresis continuing.      Vital Signs Last 24 Hrs  T(C): 36.4 (18 Oct 2020 05:00), Max: 36.7 (17 Oct 2020 11:43)  T(F): 97.5 (18 Oct 2020 05:00), Max: 98 (17 Oct 2020 11:43)  HR: 108 (18 Oct 2020 05:00) (91 - 108)  BP: 107/69 (18 Oct 2020 05:00) (100/66 - 110/65)  BP(mean): --  RR: 16 (18 Oct 2020 08:03) (16 - 18)  SpO2: 97% (18 Oct 2020 08:03) (93% - 97%)  Daily     Daily   I&O's Summary    17 Oct 2020 07:01  -  18 Oct 2020 07:00  --------------------------------------------------------  IN: 547.2 mL / OUT: 3450 mL / NET: -2902.8 mL    18 Oct 2020 07:01  -  18 Oct 2020 08:48  --------------------------------------------------------  IN: 25.7 mL / OUT: 0 mL / NET: 25.7 mL        Neck: no bruits, JVD, adenopathy  Chest: clear  Cor: [id number], RR, normal S1S2 with no mrght  Abd: soft, nontender, BS+ and no HSM  Ext: w/o CCE  Pulses:2+->dp bilaterally    MEDICATIONS  (STANDING):  allopurinol 300 milliGRAM(s) Oral daily  aMIOdarone    Tablet 200 milliGRAM(s) Oral daily  aspirin enteric coated 81 milliGRAM(s) Oral daily  atorvastatin 80 milliGRAM(s) Oral at bedtime  buMETAnide Infusion 1 mG/Hr (5 mL/Hr) IV Continuous <Continuous>  carvedilol 6.25 milliGRAM(s) Oral every 12 hours  clopidogrel Tablet 75 milliGRAM(s) Oral daily  dextrose 5%. 1000 milliLiter(s) (50 mL/Hr) IV Continuous <Continuous>  dextrose 50% Injectable 12.5 Gram(s) IV Push once  dextrose 50% Injectable 25 Gram(s) IV Push once  dextrose 50% Injectable 25 Gram(s) IV Push once  heparin   Injectable 5000 Unit(s) SubCutaneous every 12 hours  insulin glargine Injectable (LANTUS) 25 Unit(s) SubCutaneous at bedtime  insulin lispro (HumaLOG) corrective regimen sliding scale   SubCutaneous three times a day before meals  insulin lispro (HumaLOG) corrective regimen sliding scale   SubCutaneous at bedtime  milrinone Infusion 0.3 MICROgram(s)/kG/Min (7.84 mL/Hr) IV Continuous <Continuous>  mirtazapine 30 milliGRAM(s) Oral at bedtime  ranolazine 500 milliGRAM(s) Oral two times a day  spironolactone 50 milliGRAM(s) Oral two times a day    MEDICATIONS  (PRN):  acetaminophen   Tablet .. 650 milliGRAM(s) Oral every 6 hours PRN Mild Pain (1 - 3), Moderate Pain (4 - 6)  dextrose 40% Gel 15 Gram(s) Oral once PRN Blood Glucose LESS THAN 70 milliGRAM(s)/deciliter  glucagon  Injectable 1 milliGRAM(s) IntraMuscular once PRN Glucose LESS THAN 70 milligrams/deciliter  melatonin 3 milliGRAM(s) Oral at bedtime PRN Insomnia      10-18    139  |  93<L>  |  46<H>  ----------------------------<  168<H>  3.5   |  31  |  2.22<H>slightly higher than yesterday-continue to monitor    Ca    9.5      18 Oct 2020 06:16  Phos  3.8     10-18  Mg     1.9     10-18            HEALTH ISSUES - PROBLEM Dx:  Acute on chronic CHF-responding to Bumex; feeling better-would increase ambulation and PT as tolerated; consider changing to Bumex po in anticipation of discharge home in a few days (-or continue IV if it can be given at home that way)  Stage 3 chronic kidney disease, unspecified whether stage 3a or 3b CKD-creatinine slightly higher today-continue Bumex for now  Type 2 diabetes, uncontrolled, with renal manifestation-sugars stable

## 2020-10-18 NOTE — PROGRESS NOTE ADULT - ASSESSMENT
80 M with PMH of ACC/AHA Class C/D ICM 2/2 CAD (s/p 4V CABG in the 90s and subsequently multiple PCI), EF 25%, s/p ICD, not candidate for OHT/LVAD due to cormobidities/sternotomy, CKD (2.4 on discharge in 9/2020), and T2DM A1c 11.2, and recent admission for ADHF in 9/2020 in [facility] received CardioMEMs implant and started on milrinone infusion as a palliative option readmitted for ADHF in the setting of medication confusion.

## 2020-10-18 NOTE — PROGRESS NOTE ADULT - SUBJECTIVE AND OBJECTIVE BOX
For all Cardiology service contact information, go to [email or web address] and use "[email or web address]" to login.    SUBJECTIVE:   No events overnight. Denies CP, SOB or Dyspnea.   -------------------------------------------------------------------------------------------  ROS:  CV: chest pain (-), palpitation (-), orthopnea (-), PND (-), edema (-)  PULM: SOB (-), cough (-), wheezing (-), hemoptysis (-).   CONST: fever (-), chills (-) or fatigue (-)  GI: abdominal distension (-), abdominal pain (-) , nausea/vomiting (-), hematemesis, (-), melena (-), hematochezia (-)  : dysuria (-), frequency (-), hematuria (-).   NEURO: numbness (-), weakness (-), dizziness (-)  SKIN: itching (-), rash (-)  HEENT:  visual changes (-); vertigo or throat pain (-);  neck stiffness (-)     All other review of systems is negative unless indicated above.   -------------------------------------------------------------------------------------------  VS:  T(F): 97.5 (10-18), Max: 98 (10-17)  HR: 108 (10-18) (91 - 108)  BP: 107/69 (10-18) (100/66 - 110/65)  RR: 16 (10-18)  SpO2: 97% (10-18)  I&O's Summary    17 Oct 2020 07:01  -  18 Oct 2020 07:00  --------------------------------------------------------  IN: 547.2 mL / OUT: 3450 mL / NET: -2902.8 mL    18 Oct 2020 07:01  -  18 Oct 2020 10:41  --------------------------------------------------------  IN: 25.7 mL / OUT: 0 mL / NET: 25.7 mL      ------------------------------------------------------------------------------------------  PHYSICAL EXAM:  GENERAL: NAD  HEAD:  Atraumatic, Normocephalic.  EYES: EOMI, PERRLA, conjunctiva and sclera clear.  ENT: Moist mucous membranes.  NECK: Supple, No JVD.  CHEST/LUNG: Clear to auscultation bilaterally; No rales, rhonchi, wheezing, or rubs. Unlabored respirations.  HEART: Regular rate and rhythm; No murmurs, rubs, or gallops.  ABDOMEN: Bowel sounds present; Soft, Nontender, Nondistended.   EXTREMITIES:  2+ Peripheral Pulses, brisk capillary refill. No clubbing, cyanosis, or edema.  PSYCH: Normal affect.  SKIN: No rashes or lesions.  -------------------------------------------------------------------------------------------  LABS:      10-18    139  |  93<L>  |  46<H>  ----------------------------<  168<H>  3.5   |  31  |  2.22<H>    Ca    9.5      18 Oct 2020 06:16  Phos  3.8     10-18  Mg     1.9     10-18        CARDIAC MARKERS ( 13 Oct 2020 18:58 )  78 ng/L / x     / x     / x     / x     / x      -------------------------------------------------------------------------------------------  Meds:  acetaminophen   Tablet .. 650 milliGRAM(s) Oral every 6 hours PRN  allopurinol 300 milliGRAM(s) Oral daily  aMIOdarone    Tablet 200 milliGRAM(s) Oral daily  aspirin enteric coated 81 milliGRAM(s) Oral daily  atorvastatin 80 milliGRAM(s) Oral at bedtime  buMETAnide Infusion 1 mG/Hr IV Continuous <Continuous>  carvedilol 6.25 milliGRAM(s) Oral every 12 hours  clopidogrel Tablet 75 milliGRAM(s) Oral daily  dextrose 40% Gel 15 Gram(s) Oral once PRN  dextrose 5%. 1000 milliLiter(s) IV Continuous <Continuous>  dextrose 50% Injectable 12.5 Gram(s) IV Push once  dextrose 50% Injectable 25 Gram(s) IV Push once  dextrose 50% Injectable 25 Gram(s) IV Push once  glucagon  Injectable 1 milliGRAM(s) IntraMuscular once PRN  heparin   Injectable 5000 Unit(s) SubCutaneous every 12 hours  insulin glargine Injectable (LANTUS) 25 Unit(s) SubCutaneous at bedtime  insulin lispro (HumaLOG) corrective regimen sliding scale   SubCutaneous three times a day before meals  insulin lispro (HumaLOG) corrective regimen sliding scale   SubCutaneous at bedtime  melatonin 3 milliGRAM(s) Oral at bedtime PRN  milrinone Infusion 0.3 MICROgram(s)/kG/Min IV Continuous <Continuous>  mirtazapine 30 milliGRAM(s) Oral at bedtime  ranolazine 500 milliGRAM(s) Oral two times a day  spironolactone 50 milliGRAM(s) Oral two times a day    -------------------------------------------------------------------------------------------     For all Cardiology service contact information, go to [email or web address] and use "[email or web address]" to login.    SUBJECTIVE:   No events overnight. Denies CP, SOB or Dyspnea.   Feels more energetic this morning.   -------------------------------------------------------------------------------------------  ROS:  CV: chest pain (-), palpitation (-), orthopnea (-), PND (-), edema (-)  PULM: SOB (-), cough (-), wheezing (-), hemoptysis (-).   CONST: fever (-), chills (-) or fatigue (-)  GI: abdominal distension (-), abdominal pain (-) , nausea/vomiting (-), hematemesis, (-), melena (-), hematochezia (-)  : dysuria (-), frequency (-), hematuria (-).   NEURO: numbness (-), weakness (-), dizziness (-)  SKIN: itching (-), rash (-)  HEENT:  visual changes (-); vertigo or throat pain (-);  neck stiffness (-)     All other review of systems is negative unless indicated above.   -------------------------------------------------------------------------------------------  VS:  T(F): 97.5 (10-18), Max: 98 (10-17)  HR: 108 (10-18) (91 - 108)  BP: 107/69 (10-18) (100/66 - 110/65)  RR: 16 (10-18)  SpO2: 97% (10-18)  I&O's Summary    17 Oct 2020 07:01  -  18 Oct 2020 07:00  --------------------------------------------------------  IN: 547.2 mL / OUT: 3450 mL / NET: -2902.8 mL    18 Oct 2020 07:01  -  18 Oct 2020 10:41  --------------------------------------------------------  IN: 25.7 mL / OUT: 0 mL / NET: 25.7 mL      ------------------------------------------------------------------------------------------  PHYSICAL EXAM:  GENERAL: NAD  HEAD:  Atraumatic, Normocephalic.  EYES: EOMI, PERRLA, conjunctiva and sclera clear.  ENT: Moist mucous membranes.  NECK: Supple, JVP elevated 16 cm.   CHEST/LUNG: Clear to auscultation bilaterally; No rales, rhonchi, wheezing, or rubs. Unlabored respirations.  HEART: Regular rate and rhythm; No murmurs, rubs, or gallops.  ABDOMEN: Bowel sounds present; Soft, Nontender, Nondistended.   EXTREMITIES:  2+ Peripheral Pulses, brisk capillary refill. No clubbing, cyanosis, or edema.  PSYCH: Normal affect.  SKIN: No rashes or lesions.  -------------------------------------------------------------------------------------------  LABS:      10-18    139  |  93<L>  |  46<H>  ----------------------------<  168<H>  3.5   |  31  |  2.22<H>    Ca    9.5      18 Oct 2020 06:16  Phos  3.8     10-18  Mg     1.9     10-18        CARDIAC MARKERS ( 13 Oct 2020 18:58 )  78 ng/L / x     / x     / x     / x     / x      -------------------------------------------------------------------------------------------  Meds:  acetaminophen   Tablet .. 650 milliGRAM(s) Oral every 6 hours PRN  allopurinol 300 milliGRAM(s) Oral daily  aMIOdarone    Tablet 200 milliGRAM(s) Oral daily  aspirin enteric coated 81 milliGRAM(s) Oral daily  atorvastatin 80 milliGRAM(s) Oral at bedtime  buMETAnide Infusion 1 mG/Hr IV Continuous <Continuous>  carvedilol 6.25 milliGRAM(s) Oral every 12 hours  clopidogrel Tablet 75 milliGRAM(s) Oral daily  dextrose 40% Gel 15 Gram(s) Oral once PRN  dextrose 5%. 1000 milliLiter(s) IV Continuous <Continuous>  dextrose 50% Injectable 12.5 Gram(s) IV Push once  dextrose 50% Injectable 25 Gram(s) IV Push once  dextrose 50% Injectable 25 Gram(s) IV Push once  glucagon  Injectable 1 milliGRAM(s) IntraMuscular once PRN  heparin   Injectable 5000 Unit(s) SubCutaneous every 12 hours  insulin glargine Injectable (LANTUS) 25 Unit(s) SubCutaneous at bedtime  insulin lispro (HumaLOG) corrective regimen sliding scale   SubCutaneous three times a day before meals  insulin lispro (HumaLOG) corrective regimen sliding scale   SubCutaneous at bedtime  melatonin 3 milliGRAM(s) Oral at bedtime PRN  milrinone Infusion 0.3 MICROgram(s)/kG/Min IV Continuous <Continuous>  mirtazapine 30 milliGRAM(s) Oral at bedtime  ranolazine 500 milliGRAM(s) Oral two times a day  spironolactone 50 milliGRAM(s) Oral two times a day    -------------------------------------------------------------------------------------------

## 2020-10-18 NOTE — PROGRESS NOTE ADULT - ASSESSMENT
80 year-old man with PMH of CAD s/p multiple prior PCI's(17 stents) s/p CABG, HFrEF w/ EF 25%, s/p [employer] single chamber ICD (11/2019), CKD-3, HTN, HLD, DMT2, AS, recent admissions here for heart failure now with readmission for chf in setting of non adherent to his medications as documented    1- ckd IV   2- chf   3- anemia      cr steady range   bumex 1mg/hr . fluid status is improving slowly   trend hb   keep O> I   primacor to cont

## 2020-10-18 NOTE — PROGRESS NOTE ADULT - PROBLEM SELECTOR PLAN 1
-Remains volume overloaded, c/w bumex gtt at 2mg/hr. Administer additional spironolactone 25mg stat dose and increase to 50mg BID.   -Increase milrinone .3mcg  -c/w coreg 6.25mg BID  -DNR/DNI -Remains volume overloaded, c/w bumex gtt at 2mg/hr. Administer additional spironolactone 25mg stat dose and increase to 50mg BID.   -Continue milrinone .3 mcg/hr  -c/w coreg 6.25mg BID  -DNR/DNI  - start on K supplementation 40 BID, check BMP twice a day while on Bumex infusion.
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
## 2020-10-18 NOTE — PROGRESS NOTE ADULT - SUBJECTIVE AND OBJECTIVE BOX
[facility]   [phone]  RENAL FOLLOW UP NOTE  --------------------------------------------------------------------------------  Chief Complaint:    24 hour events/subjective:    seen earlier. states is more comfortable today and sob is improving     PAST HISTORY  --------------------------------------------------------------------------------  No significant changes to PMH, PSH, FHx, SHx, unless otherwise noted    ALLERGIES & MEDICATIONS  --------------------------------------------------------------------------------  Allergies    No Known Allergies    Intolerances    Entresto (Other)    Standing Inpatient Medications  allopurinol 300 milliGRAM(s) Oral daily  aMIOdarone    Tablet 200 milliGRAM(s) Oral daily  aspirin enteric coated 81 milliGRAM(s) Oral daily  atorvastatin 80 milliGRAM(s) Oral at bedtime  buMETAnide Infusion 1 mG/Hr IV Continuous <Continuous>  carvedilol 6.25 milliGRAM(s) Oral every 12 hours  clopidogrel Tablet 75 milliGRAM(s) Oral daily  dextrose 5%. 1000 milliLiter(s) IV Continuous <Continuous>  dextrose 50% Injectable 12.5 Gram(s) IV Push once  dextrose 50% Injectable 25 Gram(s) IV Push once  dextrose 50% Injectable 25 Gram(s) IV Push once  heparin   Injectable 5000 Unit(s) SubCutaneous every 12 hours  insulin glargine Injectable (LANTUS) 25 Unit(s) SubCutaneous at bedtime  insulin lispro (HumaLOG) corrective regimen sliding scale   SubCutaneous three times a day before meals  insulin lispro (HumaLOG) corrective regimen sliding scale   SubCutaneous at bedtime  milrinone Infusion 0.3 MICROgram(s)/kG/Min IV Continuous <Continuous>  mirtazapine 30 milliGRAM(s) Oral at bedtime  potassium chloride    Tablet ER 40 milliEquivalent(s) Oral every 2 hours  ranolazine 500 milliGRAM(s) Oral two times a day  spironolactone 50 milliGRAM(s) Oral two times a day    PRN Inpatient Medications  acetaminophen   Tablet .. 650 milliGRAM(s) Oral every 6 hours PRN  dextrose 40% Gel 15 Gram(s) Oral once PRN  glucagon  Injectable 1 milliGRAM(s) IntraMuscular once PRN  melatonin 3 milliGRAM(s) Oral at bedtime PRN      REVIEW OF SYSTEMS  --------------------------------------------------------------------------------    Gen: denies fevers/chills,  CVS: denies chest pain/palpitations  Resp: +  SOB/Cough -   GI: Denies N/V/Abd pain  : Denies dysuria/oliguria/hematuria    All other systems were reviewed and are negative, except as noted.    VITALS/PHYSICAL EXAM  --------------------------------------------------------------------------------  T(C): 36.4 (10-18-20 @ 05:00), Max: 36.6 (10-17-20 @ 20:44)  HR: 108 (10-18-20 @ 05:00) (91 - 108)  BP: 107/69 (10-18-20 @ 05:00) (100/66 - 110/65)  RR: 16 (10-18-20 @ 08:03) (16 - 18)  SpO2: 97% (10-18-20 @ 08:03) (96% - 97%)  Wt(kg): --        10-17-20 @ 07:01  -  10-18-20 @ 07:00  --------------------------------------------------------  IN: 547.2 mL / OUT: 3450 mL / NET: -2902.8 mL    10-18-20 @ 07:01  -  10-18-20 @ 17:13  --------------------------------------------------------  IN: 702.7 mL / OUT: 950 mL / NET: -247.3 mL      Physical Exam:  	     -   jvd   	Pulm: decrease bs  +  rales -  ronchi or wheezing  	CV: RRR, S1S2; no rub  	Abd: +BS, soft, nontender/nondistended  	: No suprapubic tenderness  	UE: Warm, no cyanosis  no clubbing,  no edema; no asterixis  	LE: Warm, no cyanosis  no clubbing, 1-2-   edema  	Neuro: alert and oriented. speech coherent   	Vascular access: + R I J line       LABS/STUDIES  --------------------------------------------------------------------------------    139  |  93  |  46  ----------------------------<  168      [10-18-20 @ 06:16]  3.5   |  31  |  2.22        Ca     9.5     [10-18-20 @ 06:16]      Mg     1.9     [10-18-20 @ 06:16]      Phos  3.8     [10-18-20 @ 06:16]            Creatinine Trend:  SCr 2.22 [10-18 @ 06:16]  SCr 2.21 [10-17 @ 19:24]  SCr 2.24 [10-17 @ 06:41]  SCr 2.35 [10-16 @ 17:32]  SCr 2.37 [10-16 @ 05:09]                  Iron 38, TIBC 274, %sat 14      [10-15-20 @ 14:24]  Ferritin 170      [10-15-20 @ 11:24]  HbA1c 10.7      [02-14-20 @ 08:35]  TSH 1.99      [08-28-20 @ 06:28]  Lipid: chol 132, , HDL 28, LDL 74      [08-20-20 @ 11:46]

## 2020-10-19 NOTE — PROGRESS NOTE ADULT - SUBJECTIVE AND OBJECTIVE BOX
Subjective:  - States he's tired, has not been sleeping well due to interruptions  - He has only been OOB with assistance. Denies SOB, orthopnea, PND, CP, palpitations, lightheadedness, abdominal pain/distention and LE edema    Medications:  acetaminophen   Tablet .. 650 milliGRAM(s) Oral every 6 hours PRN  allopurinol 300 milliGRAM(s) Oral daily  aMIOdarone    Tablet 200 milliGRAM(s) Oral daily  aspirin enteric coated 81 milliGRAM(s) Oral daily  atorvastatin 80 milliGRAM(s) Oral at bedtime  buMETAnide Infusion 1 mG/Hr IV Continuous <Continuous>  carvedilol 6.25 milliGRAM(s) Oral every 12 hours  clopidogrel Tablet 75 milliGRAM(s) Oral daily  dextrose 40% Gel 15 Gram(s) Oral once PRN  dextrose 5%. 1000 milliLiter(s) IV Continuous <Continuous>  dextrose 50% Injectable 12.5 Gram(s) IV Push once  dextrose 50% Injectable 25 Gram(s) IV Push once  dextrose 50% Injectable 25 Gram(s) IV Push once  glucagon  Injectable 1 milliGRAM(s) IntraMuscular once PRN  heparin   Injectable 5000 Unit(s) SubCutaneous every 12 hours  insulin glargine Injectable (LANTUS) 25 Unit(s) SubCutaneous at bedtime  insulin lispro (HumaLOG) corrective regimen sliding scale   SubCutaneous three times a day before meals  insulin lispro (HumaLOG) corrective regimen sliding scale   SubCutaneous at bedtime  melatonin 3 milliGRAM(s) Oral at bedtime PRN  milrinone Infusion 0.3 MICROgram(s)/kG/Min IV Continuous <Continuous>  mirtazapine 30 milliGRAM(s) Oral at bedtime  ranolazine 500 milliGRAM(s) Oral two times a day  spironolactone 50 milliGRAM(s) Oral two times a day      Physical Exam:    Vitals:  Vital Signs Last 24 Hours  T(C): 36.6 (10-19-20 @ 12:37), Max: 36.8 (10-19-20 @ 04:57)  HR: 109 (10-19-20 @ 12:37) (97 - 109)  BP: 107/75 (10-19-20 @ 12:37) (100/63 - 112/76)  RR: 17 (10-19-20 @ 12:37) (16 - 18)  SpO2: 96% (10-19-20 @ 12:37) (94% - 98%)    Weight in k.3 (10-19 @ 12:09)    I&O's Summary    18 Oct 2020 07:  -  19 Oct 2020 07:00  --------------------------------------------------------  IN: 1088.2 mL / OUT: 2850 mL / NET: -1761.8 mL    19 Oct 2020 07:  -  19 Oct 2020 13:16  --------------------------------------------------------  IN: 600 mL / OUT: 600 mL / NET: 0 mL    Tele: SR     General: No distress. Comfortable.  HEENT: EOM intact.  Neck: Neck supple. JVP ~10-12 cm H2O. No masses  Chest: Diminished bilateral bases otherwise clear to auscultation bilaterally  CV: Regular, Normal S1 and S2. No murmurs, rub, or gallops. Radial pulses normal. No LE edema  Abdomen: Soft, non-distended, non-tender  Skin: No rashes or skin breakdown. Warm peripherally  Neurology: Alert and oriented times three. Sensation intact  Psych: Affect normal    Labs:    10-19    135  |  90<L>  |  57<H>  ----------------------------<  261<H>  4.0   |  32<H>  |  2.26<H>    Ca    9.5      19 Oct 2020 05:46  Phos  3.8     10-18  Mg     1.9     10-18    Serum Pro-Brain Natriuretic Peptide: 5763 pg/mL (10-13 @ 18:58)   Subjective:  - States he's tired, has not been sleeping well due to interruptions  - He reports overall feeling better since admission and was able to wash himself up today without SOB. Denies SOB, orthopnea, PND, CP, palpitations, lightheadedness, abdominal pain/distention and LE edema    Medications:  acetaminophen   Tablet .. 650 milliGRAM(s) Oral every 6 hours PRN  allopurinol 300 milliGRAM(s) Oral daily  aMIOdarone    Tablet 200 milliGRAM(s) Oral daily  aspirin enteric coated 81 milliGRAM(s) Oral daily  atorvastatin 80 milliGRAM(s) Oral at bedtime  buMETAnide Infusion 1 mG/Hr IV Continuous <Continuous>  carvedilol 6.25 milliGRAM(s) Oral every 12 hours  clopidogrel Tablet 75 milliGRAM(s) Oral daily  dextrose 40% Gel 15 Gram(s) Oral once PRN  dextrose 5%. 1000 milliLiter(s) IV Continuous <Continuous>  dextrose 50% Injectable 12.5 Gram(s) IV Push once  dextrose 50% Injectable 25 Gram(s) IV Push once  dextrose 50% Injectable 25 Gram(s) IV Push once  glucagon  Injectable 1 milliGRAM(s) IntraMuscular once PRN  heparin   Injectable 5000 Unit(s) SubCutaneous every 12 hours  insulin glargine Injectable (LANTUS) 25 Unit(s) SubCutaneous at bedtime  insulin lispro (HumaLOG) corrective regimen sliding scale   SubCutaneous three times a day before meals  insulin lispro (HumaLOG) corrective regimen sliding scale   SubCutaneous at bedtime  melatonin 3 milliGRAM(s) Oral at bedtime PRN  milrinone Infusion 0.3 MICROgram(s)/kG/Min IV Continuous <Continuous>  mirtazapine 30 milliGRAM(s) Oral at bedtime  ranolazine 500 milliGRAM(s) Oral two times a day  spironolactone 50 milliGRAM(s) Oral two times a day      Physical Exam:    Vitals:  Vital Signs Last 24 Hours  T(C): 36.6 (10-19-20 @ 12:37), Max: 36.8 (10-19-20 @ 04:57)  HR: 109 (10-19-20 @ 12:37) (97 - 109)  BP: 107/75 (10-19-20 @ 12:37) (100/63 - 112/76)  RR: 17 (10-19-20 @ 12:37) (16 - 18)  SpO2: 96% (10-19-20 @ 12:37) (94% - 98%)    Weight in k.3 (10-19 @ 12:09)    I&O's Summary    18 Oct 2020 07:  -  19 Oct 2020 07:00  --------------------------------------------------------  IN: 1088.2 mL / OUT: 2850 mL / NET: -1761.8 mL    19 Oct 2020 07:  -  19 Oct 2020 13:16  --------------------------------------------------------  IN: 600 mL / OUT: 600 mL / NET: 0 mL    Tele: SR     General: No distress. Comfortable.  HEENT: EOM intact.  Neck: Neck supple. JVP ~10-12 cm H2O. No masses  Chest: Diminished bilateral bases otherwise clear to auscultation bilaterally  CV: Regular, Normal S1 and S2. No murmurs, rub, or gallops. Radial pulses normal. No LE edema  Abdomen: Soft, non-distended, non-tender  Skin: No rashes or skin breakdown. Warm peripherally  Neurology: Alert and oriented times three. Sensation intact  Psych: Affect normal    Labs:    10-19    135  |  90<L>  |  57<H>  ----------------------------<  261<H>  4.0   |  32<H>  |  2.26<H>    Ca    9.5      19 Oct 2020 05:46  Phos  3.8     10-18  Mg     1.9     1018    Serum Pro-Brain Natriuretic Peptide: 5763 pg/mL (10-13 @ 18:58)

## 2020-10-19 NOTE — PROGRESS NOTE ADULT - PROBLEM SELECTOR PLAN 1
- Continue bumex gtt at 1mg/hr.   - Continue spironolactone 50mg BID  -Continue milrinone 0.3mcg/kg/min  -c/w coreg 6.25mg BID  - Check BMP twice a day while on Bumex infusion.  -DNR/DNI - Stop bumex gtt. Please start torsemide 60mg BID, first dose now  - Continue spironolactone 50mg BID. Will closely monitor K with adjustment in diuretics  -Continue milrinone 0.3mcg/kg/min  - Please start hydralazine 10mg PO TID, hold for SBP < 90  -c/w coreg 6.25mg BID  - Plan for possible discharge Wednesday with home milrinone if stable on oral diuretics

## 2020-10-19 NOTE — DIETITIAN INITIAL EVALUATION ADULT. - PERTINENT LABORATORY DATA
10-19 Na135 mmol/L Glu 261 mg/dL<H> K+ 4.0 mmol/L Cr  2.26 mg/dL<H> BUN 57 mg/dL<H> Phos n/a   Alb n/a   PAB n/a

## 2020-10-19 NOTE — PROGRESS NOTE ADULT - SUBJECTIVE AND OBJECTIVE BOX
[facility]   [phone]  RENAL FOLLOW UP NOTE  --------------------------------------------------------------------------------  Chief Complaint:    24 hour events/subjective:    seen earlier.   states breathing is better.     PAST HISTORY  --------------------------------------------------------------------------------  No significant changes to PMH, PSH, FHx, SHx, unless otherwise noted    ALLERGIES & MEDICATIONS  --------------------------------------------------------------------------------  Allergies    No Known Allergies    Intolerances    Entresto (Other)    Standing Inpatient Medications  allopurinol 300 milliGRAM(s) Oral daily  aMIOdarone    Tablet 200 milliGRAM(s) Oral daily  aspirin enteric coated 81 milliGRAM(s) Oral daily  atorvastatin 80 milliGRAM(s) Oral at bedtime  carvedilol 6.25 milliGRAM(s) Oral every 12 hours  clopidogrel Tablet 75 milliGRAM(s) Oral daily  dextrose 5%. 1000 milliLiter(s) IV Continuous <Continuous>  dextrose 50% Injectable 12.5 Gram(s) IV Push once  dextrose 50% Injectable 25 Gram(s) IV Push once  dextrose 50% Injectable 25 Gram(s) IV Push once  heparin   Injectable 5000 Unit(s) SubCutaneous every 12 hours  hydrALAZINE 10 milliGRAM(s) Oral three times a day  insulin glargine Injectable (LANTUS) 25 Unit(s) SubCutaneous at bedtime  insulin lispro (HumaLOG) corrective regimen sliding scale   SubCutaneous three times a day before meals  insulin lispro (HumaLOG) corrective regimen sliding scale   SubCutaneous at bedtime  melatonin 3 milliGRAM(s) Oral at bedtime  milrinone Infusion 0.3 MICROgram(s)/kG/Min IV Continuous <Continuous>  mirtazapine 30 milliGRAM(s) Oral at bedtime  ranolazine 500 milliGRAM(s) Oral two times a day  spironolactone 50 milliGRAM(s) Oral two times a day  torsemide 60 milliGRAM(s) Oral two times a day    PRN Inpatient Medications  acetaminophen   Tablet .. 650 milliGRAM(s) Oral every 6 hours PRN  dextrose 40% Gel 15 Gram(s) Oral once PRN  glucagon  Injectable 1 milliGRAM(s) IntraMuscular once PRN      REVIEW OF SYSTEMS  --------------------------------------------------------------------------------    Gen: denies  fevers/chills,  CVS: denies chest pain/palpitations  Resp: denies worsening SOB/Cough  GI: Denies N/V/Abd pain  : Denies dysuria/oliguria/hematuria    All other systems were reviewed and are negative, except as noted.    VITALS/PHYSICAL EXAM  --------------------------------------------------------------------------------  T(C): 36.6 (10-19-20 @ 20:20), Max: 36.8 (10-19-20 @ 04:57)  HR: 91 (10-19-20 @ 20:20) (91 - 109)  BP: 101/68 (10-19-20 @ 20:20) (101/68 - 107/75)  RR: 17 (10-19-20 @ 20:20) (17 - 18)  SpO2: 95% (10-19-20 @ 20:20) (95% - 98%)  Wt(kg): --    Weight (kg): 79.9 (10-18-20 @ 08:03)      10-18-20 @ 07:01  -  10-19-20 @ 07:00  --------------------------------------------------------  IN: 1088.2 mL / OUT: 2850 mL / NET: -1761.8 mL    10-19-20 @ 07:01  -  10-19-20 @ 21:49  --------------------------------------------------------  IN: 960 mL / OUT: 2125 mL / NET: -1165 mL      Physical Exam:  	    Physical Exam:  	   -   jvd   	Pulm: decrease bs  +  rales -  ronchi or wheezing  	CV: RRR, S1S2; no rub  	Abd: +BS, soft, nontender/nondistended  	: No suprapubic tenderness  	UE: Warm, no cyanosis  no clubbing,  no edema; no asterixis  	LE: Warm, no cyanosis  no clubbing, 1+    edema  	Neuro: alert and oriented. speech coherent   	Vascular access: + R I J line       LABS/STUDIES  --------------------------------------------------------------------------------    135  |  90  |  57  ----------------------------<  261      [10-19-20 @ 05:46]  4.0   |  32  |  2.26        Ca     9.5     [10-19-20 @ 05:46]      Mg     1.9     [10-18-20 @ 06:16]      Phos  3.8     [10-18-20 @ 06:16]            Creatinine Trend:  SCr 2.26 [10-19 @ 05:46]  SCr 2.22 [10-18 @ 19:26]  SCr 2.22 [10-18 @ 06:16]  SCr 2.21 [10-17 @ 19:24]  SCr 2.24 [10-17 @ 06:41]                  Iron 38, TIBC 274, %sat 14      [10-15-20 @ 14:24]  Ferritin 170      [10-15-20 @ 11:24]  HbA1c 10.7      [02-14-20 @ 08:35]  TSH 1.99      [08-28-20 @ 06:28]  Lipid: chol 132, , HDL 28, LDL 74      [08-20-20 @ 11:46]

## 2020-10-19 NOTE — PROGRESS NOTE ADULT - ASSESSMENT
80 year-old man with PMH of CAD s/p multiple prior PCI's(17 stents) s/p CABG, HFrEF w/ EF 25%, s/p [employer] single chamber ICD (11/2019), CKD-3, HTN, HLD, DMT2, AS, recent admissions here for heart failure now with readmission for chf in setting of non adherent to his medications as documented    1- ckd IV   2- chf   3- anemia      cr steady range   agree to have dc bumex drip   trend hb   keep O> I   primacor to cont  daily weight   2 g na diet
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
## 2020-10-19 NOTE — DIETITIAN INITIAL EVALUATION ADULT. - ADD RECOMMEND
1. Continue current diet as ordered. 2. Diet education provided with literature presented at bedside. Pt made aware RD remains available for additional information PRN. 3. Monitor PO intake/tolerance, weights, labs, hydration status, bowels, and skin integrity.
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
## 2020-10-19 NOTE — DIETITIAN INITIAL EVALUATION ADULT. - PERTINENT MEDS FT
MEDICATIONS  (STANDING):  allopurinol 300 milliGRAM(s) Oral daily  aMIOdarone    Tablet 200 milliGRAM(s) Oral daily  aspirin enteric coated 81 milliGRAM(s) Oral daily  atorvastatin 80 milliGRAM(s) Oral at bedtime  buMETAnide Infusion 1 mG/Hr (5 mL/Hr) IV Continuous <Continuous>  carvedilol 6.25 milliGRAM(s) Oral every 12 hours  clopidogrel Tablet 75 milliGRAM(s) Oral daily  dextrose 5%. 1000 milliLiter(s) (50 mL/Hr) IV Continuous <Continuous>  dextrose 50% Injectable 12.5 Gram(s) IV Push once  dextrose 50% Injectable 25 Gram(s) IV Push once  dextrose 50% Injectable 25 Gram(s) IV Push once  heparin   Injectable 5000 Unit(s) SubCutaneous every 12 hours  insulin glargine Injectable (LANTUS) 25 Unit(s) SubCutaneous at bedtime  insulin lispro (HumaLOG) corrective regimen sliding scale   SubCutaneous three times a day before meals  insulin lispro (HumaLOG) corrective regimen sliding scale   SubCutaneous at bedtime  milrinone Infusion 0.3 MICROgram(s)/kG/Min (7.84 mL/Hr) IV Continuous <Continuous>  mirtazapine 30 milliGRAM(s) Oral at bedtime  ranolazine 500 milliGRAM(s) Oral two times a day  spironolactone 50 milliGRAM(s) Oral two times a day

## 2020-10-19 NOTE — PROGRESS NOTE ADULT - PROBLEM SELECTOR PLAN 5
Will try to d/w the patient about hospice tomorrow.   Will continue to f/u for symptoms and GOC.   Will ask for chaplachau siegel.         Cj Garcia MD   Geriatrics and Palliative Care (GAP) Consult Service    of Geriatric and Palliative Medicine  [facility]      Please page the following number for clinical matters between the hours of 9 am and 5 pm from Monday through Friday : [phone]    After 5pm and on weekends, please see the contact information below:    In the event of newly developing, evolving, or worsening symptoms, please contact the Palliative Medicine team via pager (if the patient is at [facility] #[phone] or if the patient is at [facility] #[phone]) The Geriatric and Palliative Medicine service has coverage 24 hours a day/ 7 days a week to provide medical recommendations regarding symptom management needs via telephone

## 2020-10-19 NOTE — PROGRESS NOTE ADULT - PROBLEM SELECTOR PLAN 1
Milrinone gtt - rate increased by heart failure team  diuretics changed to bumex  aldactone added  cont other heart failure meds as ordered  strict i/o  daily wt  supp electrolytes  f/u with heart failure team about transition to oral bumex for dc planning

## 2020-10-19 NOTE — DIETITIAN INITIAL EVALUATION ADULT. - ORAL INTAKE PTA/DIET HISTORY
Endorses good appetite/PO intake in-house and PTA.PTA usually consumes 2 meals/day, skips lunch. Per diet recall, inconsistent CHO consumption throughout day, avoids adding salt to foods. NKFA. Micronutrient supplementation: Multivitamin.

## 2020-10-19 NOTE — DIETITIAN INITIAL EVALUATION ADULT. - OTHER INFO
Per prior RD note 9/15 - Pt was not monitoring BG at home; upon current visit Pt notes his daughter has been making him SMBG - ranges in high 100's to mid 200's. Medication management PTA: Novolog & Lantus. Recent A1c 9.6% (9/15) indicative of poor glycemic control, however improved from 8/2020 - 11.6%.    Denies recent weight changes aside from wt loss in-house 2/2 diuresis. UBW ~185-187. Standing weight 10/15 - 194.2 pounds, 10/19 - 168.2 pounds; currently receiving bumex & per chart was taking torsemide PTA.     Denies N+V, diarrhea/constipation - last BM 10/18. No difficulties chewing/swallowing.    Reviewed CHF diet education. Discussed Na restriction, foods high in Na to avoid, reading food labels, tips for limiting Na in your diet. Reviewed daily weights, Wt gain parameters to contact MD. Discussed Na intake in relation to fluid retention, edema and Wt gain. Pt verbalized understanding and accepted Heart Failure Nutrition Therapy Handout.  RD remains available for diet education review. Discussed consistent CHO diet education.  Reviewed foods containing CHO, portion sizes of CHO, pairing CHO with proteins, monitoring blood glucose levels, not skipping meals. Reviewed S&S and treatment of hypoglycemia.

## 2020-10-20 NOTE — PROGRESS NOTE ADULT - PROBLEM SELECTOR PLAN 1
- Continue torsemide 60mg BID  - Continue spironolactone 50mg BID  - Please give KCl 40 meq x 1 today for K 3.7  - Continue milrinone 0.3mcg/kg/min.   - Continue hydralazine 10mg PO TID, hold for SBP < 90  - Continue coreg 6.25mg BID  - Plan for possible discharge tomorrow with home milrinone at current dose if remains stable on oral diuretics  - Will arrange for close outpatient follow up in HF Clinic - Continue torsemide 60mg BID  - Continue spironolactone 50mg BID  - Please give KCl 40 meq x 1 today for K 3.7  - Continue milrinone 0.3mcg/kg/min.   - Continue hydralazine 10mg PO TID, hold for SBP < 90  - Continue coreg 6.25mg BID  - Will check cardiomems reading in AM  - Plan for possible discharge tomorrow with home milrinone at current dose if remains stable on oral diuretics  - Will arrange for close outpatient follow up in HF Clinic

## 2020-10-20 NOTE — PHYSICAL THERAPY INITIAL EVALUATION ADULT - PRECAUTIONS/LIMITATIONS, REHAB EVAL
cardiac precautions/fall precautions/CTHead: Small focus of hypodensity within the dorsal midbrain on the left suspicious for a lacunar infarct, age indeterminate but likely subacute or chronic. CXR: Unchanged bilateral layering pleural effusions. No acute displaced fracture. XRayRShoulder/Hip.Knee: (-)

## 2020-10-20 NOTE — PROGRESS NOTE ADULT - PROBLEM SELECTOR PLAN 5
Will try to d/w the patient about hospice tomorrow.   Will continue to f/u for symptoms and GOC.   Will ask for chaplachau siegel.         Cj Garcia MD   Geriatrics and Palliative Care (GAP) Consult Service    of Geriatric and Palliative Medicine  [facility]      Please page the following number for clinical matters between the hours of 9 am and 5 pm from Monday through Friday : [phone]    After 5pm and on weekends, please see the contact information below:    In the event of newly developing, evolving, or worsening symptoms, please contact the Palliative Medicine team via pager (if the patient is at [facility] #[phone] or if the patient is at [facility] #[phone]) The Geriatric and Palliative Medicine service has coverage 24 hours a day/ 7 days a week to provide medical recommendations regarding symptom management needs via telephone Will sing off.   Please call us back if needing help with symptoms Rx, GOC, or ACP.,         Cj Garcia MD   Geriatrics and Palliative Care (GAP) Consult Service    of Geriatric and Palliative Medicine  [facility]      Please page the following number for clinical matters between the hours of 9 am and 5 pm from Monday through Friday : [phone]    After 5pm and on weekends, please see the contact information below:    In the event of newly developing, evolving, or worsening symptoms, please contact the Palliative Medicine team via pager (if the patient is at [facility] #[phone] or if the patient is at [facility] #[phone]) The Geriatric and Palliative Medicine service has coverage 24 hours a day/ 7 days a week to provide medical recommendations regarding symptom management needs via telephone
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
## 2020-10-20 NOTE — PROVIDER CONTACT NOTE (OTHER) - ACTION/TREATMENT ORDERED:
Awaiting further instruction  Please see KBC for further details
NP PK aware, 2 cups of Apple Juice given. Pt eating breakfast Repeat . Will continue to monitor
Provider made aware. Hold Hydralazine.
STAT EKG   As per NP, cardiology was called  Awaiting further instructions.  Please see KBC for further details.
blood cultures and CXR ordered as per RADHA Gaxiola. RN will continue to monitor.
okay to administer torsemide with PM meds. Rn will continue to monitor.

## 2020-10-20 NOTE — CHART NOTE - NSCHARTNOTEFT_GEN_A_CORE
MEDICINE NP    Notified by RN patient with temperature of 100.1 . Seen and examined patient at bedside. Patient is alert, NAD. Denies HA, CP, SOB, cough, N/V, or abd pain. pt with bui cath on R upper chest.     VITAL SIGNS:  T(C): 37.8 (10-20-20 @ 05:39), Max: 38.3 (10-20-20 @ 05:06)  HR: 117 (10-20-20 @ 05:06) (91 - 117)  BP: 113/73 (10-20-20 @ 05:06) (101/68 - 113/73)  RR: 18 (10-20-20 @ 05:06) (17 - 18)  SpO2: 95% (10-20-20 @ 05:06) (95% - 96%)  Wt(kg): --      LABORATORY:    10-19    135  |  90<L>  |  57<H>  ----------------------------<  261<H>  4.0   |  32<H>  |  2.26<H>    Ca    9.5      19 Oct 2020 05:46  Phos  3.8     10-18  Mg     1.9     10-18      Constitutional: AOx3. NAD.    Respiratory: clear lungs bilaterally. No wheezing, rhonchi, or crackles.    Cardiovascular: S1 S2. No murmurs.    Gastrointestinal: BS X4 active. soft. nontender.    Extremities/Vascular: +2 pulses bilaterally. No BLE edema.      ASSESSMENT/PLAN:   HPI:  80 year-old man with PMH of CAD s/p multiple prior PCI's(17 stents) s/p CABG, HFrEF w/ EF 25%, s/p [employer] single chamber ICD (11/2019), CKD-3, HTN, HLD, DMT2, AS, recent admissions here for heart failure with last admission September, now presenting again with increased b/l LE edema over the last 1 week associated with significanted worsened shortness of breath and orthopnea x 3 days with near-syncopal episode today. Patient reports that he had followed up with his cardiologist after discharge and was under the impression that he was to take 20mg of torsemide BID and to discontinue hydralazine that had been uptitrated to 75mg TID here during last admission. However, this is not consistent with outpatient records, appears that patient was supposed to be taking 40mg BID of torsemide and unclear by outpatient records whether he was still supposed to be taking hydralazine. No chest pain. No fevers or chills. No coughing/diarrhea/dysuria.  (13 Oct 2020 21:11)      low grade fever,   - BCx x2, UA  - hold Abx for now.  - repeat VS in 4 hours.   - chest x-ray.    - primary team in AM

## 2020-10-20 NOTE — PHYSICAL THERAPY INITIAL EVALUATION ADULT - PERTINENT HX OF CURRENT PROBLEM, REHAB EVAL
79y/o M with PMH of CAD s/p multiple prior PCI's(17 stents) s/p CABG, HFrEF w/ EF 25%, s/p [employer] single chamber ICD (11/2019), CKD-3, HTN, HLD, DMT2, AS, recent admissions here for heart failure with last admission September, now presenting again with increased b/l LE edema over the last 1 week a/w significanted worsened SOB and orthopnea x 3 days with near-syncopal episode today

## 2020-10-20 NOTE — PROGRESS NOTE ADULT - PROBLEM SELECTOR PLAN 1
Milrinone gtt - rate increased by heart failure team  now off bumex. started on torsemide   aldactone added  cont other heart failure meds as ordered  strict i/o  daily wt  supp electrolytes  f/u with heart failure team for dc planning

## 2020-10-20 NOTE — PROGRESS NOTE ADULT - SUBJECTIVE AND OBJECTIVE BOX
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 Surrogate(s)  [ ] Guardian           Name(s): Phone Number(s): Wife     BASELINE (I)ADL(s) (prior to admission):  [place]: [x ]Total  [ ] Moderate [ ]Dependent    Allergies    No Known Allergies    Intolerances    Entresto (Other)    MEDICATIONS  (STANDING):  allopurinol 300 milliGRAM(s) Oral daily  aMIOdarone    Tablet 200 milliGRAM(s) Oral daily  aspirin enteric coated 81 milliGRAM(s) Oral daily  atorvastatin 80 milliGRAM(s) Oral at bedtime  carvedilol 6.25 milliGRAM(s) Oral every 12 hours  clopidogrel Tablet 75 milliGRAM(s) Oral daily  dextrose 5%. 1000 milliLiter(s) (50 mL/Hr) IV Continuous <Continuous>  dextrose 50% Injectable 12.5 Gram(s) IV Push once  dextrose 50% Injectable 25 Gram(s) IV Push once  dextrose 50% Injectable 25 Gram(s) IV Push once  heparin   Injectable 5000 Unit(s) SubCutaneous every 12 hours  hydrALAZINE 10 milliGRAM(s) Oral three times a day  insulin glargine Injectable (LANTUS) 25 Unit(s) SubCutaneous at bedtime  insulin lispro (ADMELOG) corrective regimen sliding scale   SubCutaneous three times a day before meals  insulin lispro (ADMELOG) corrective regimen sliding scale   SubCutaneous at bedtime  melatonin 3 milliGRAM(s) Oral at bedtime  milrinone Infusion 0.3 MICROgram(s)/kG/Min (7.19 mL/Hr) IV Continuous <Continuous>  mirtazapine 30 milliGRAM(s) Oral at bedtime  ranolazine 500 milliGRAM(s) Oral two times a day  spironolactone 50 milliGRAM(s) Oral two times a day  torsemide 60 milliGRAM(s) Oral two times a day    MEDICATIONS  (PRN):  acetaminophen   Tablet .. 650 milliGRAM(s) Oral every 6 hours PRN Mild Pain (1 - 3), Moderate Pain (4 - 6)  dextrose 40% Gel 15 Gram(s) Oral once PRN Blood Glucose LESS THAN 70 milliGRAM(s)/deciliter  glucagon  Injectable 1 milliGRAM(s) IntraMuscular once PRN Glucose LESS THAN 70 milligrams/deciliter        PRESENT SYMPTOMS: [ ]Unable to obtain due to poor mentation   Source if other than patient:  [ ]Family   [ ]Team     Pain: [ ]yes [x ]no  QOL impact -   Location -                    Aggravating factors -    Quality -   Radiation -   Timing-  Severity (0-10 scale): m  Minimal acceptable level (0-10 scale):     CPOT:    https://[email or web address]/getattachment/[email or web address]/Critical-Care-Pain-Observation-Tool-(CPOT)      PAIN AD Score:     [email or web address] (press ctrl +  left click to view)    Dyspnea:                           [x ]Mild [ ]Moderate [ ]Severe Only with moderate to severe physical activity   Anxiety:                             [ ]Mild [ ]Moderate [ ]Severe  Fatigue:                             [ ]Mild [ ]Moderate [ ]Severe  Nausea:                             [ ]Mild [ ]Moderate [ ]Severe  Loss of appetite:              [ ]Mild [ ]Moderate [ ]Severe  Constipation:                    [ ]Mild [ ]Moderate [ ]Severe    Other Symptoms:  [x ]All other review of systems negative but as per HPI     Palliative Performance Status Version 2:    40     %    http://[email or web address]/news/palliative_performance_scale_[email or web address].pdf    PHYSICAL EXAM:  Vital Signs Last 24 Hrs  T(C): 36.5 (20 Oct 2020 12:31), Max: 38.3 (20 Oct 2020 05:06)  T(F): 97.7 (20 Oct 2020 12:31), Max: 100.9 (20 Oct 2020 05:06)  HR: 104 (20 Oct 2020 12:31) (91 - 117)  BP: 102/70 (20 Oct 2020 12:31) (95/57 - 113/73)  BP(mean): --  RR: 22 (20 Oct 2020 11:43) (17 - 22)  SpO2: 97% (20 Oct 2020 12:31) (93% - 97%)    GENERAL:  [x ]Alert  [x ]Oriented x 3   [ ]Lethargic  [ ]Cachexia  [ ]Unarousable  [x ]Verbal  [ ]Non-Verbal  Behavioral:   [ ] Anxiety  [ ] Delirium [ ] Agitation [ ] Other  HEENT:  [ ]Normal   [ ]Dry mouth   [ ]ET Tube/Trach  [ ]Oral lesions [x] poor dentition   PULMONARY:   [ ]Clear [ ]Tachypnea  [ ]Audible excessive secretions   [ ]Rhonchi        [ ]Right [ ]Left [ ]Bilateral  [x ]Crackles        [ ]Right [ ]Left [x ]Bilateral  [ ]Wheezing     [ ]Right [ ]Left [ ]Bilateral  [ ]Diminished breath sounds [ ]right [ ]left [ ]bilateral  CARDIOVASCULAR:    [x ]Regular [ ]Irregular [ ]Tachy  [ ]Carl [ ]Murmur [ ]Other  GASTROINTESTINAL:  [x ]Soft  [ ]Distended   [x ]+BS  [x ]Non tender [ ]Tender  [ ]PEG [ ]OGT/ NGT  Last BM: 10/18    GENITOURINARY:  [x ]Normal [ ] Incontinent   [ ]Oliguria/Anuria   [ ]Love  MUSCULOSKELETAL:   [ ]Normal   [x ]Weakness  [ ]Bed/Wheelchair bound [ ]Edema   NEUROLOGIC:   [x ]No focal deficits  [ ]Cognitive impairment  [ ]Dysphagia [ ]Dysarthria [ ]Paresis [ ]Other   SKIN:   [x ]Normal    [ ]Rash  [ ]Pressure ulcer(s)       Present on admission [ ]y [ ]n    CRITICAL CARE:  [ ] Shock Present  [ ]Septic [ ]Cardiogenic [ ]Neurologic [ ]Hypovolemic  [ ]  Vasopressors [ ]  Inotropes   [ ]Respiratory failure present [ ]Mechanical ventilation [ ]Non-invasive ventilatory support [ ]High flow    [ ]Acute  [ ]Chronic [ ]Hypoxic  [ ]Hypercarbic [ ]Other  [ ]Other organ failure     LABS:    10-20    134<L>  |  90<L>  |  64<H>  ----------------------------<  195<H>  3.7   |  28  |  2.40<H>    Ca    10.2      20 Oct 2020 05:56                    RADIOLOGY & ADDITIONAL STUDIES:  e< from: Transthoracic Echocardiogram (09.01.20 @ 10:28) >  Patient name: VERONICA ARENASCONCLUSIONS:  1. Calcified trileaflet aortic valve with decreased  opening.  After a pause, the highest peak/mean gradients= 27/14mmHg  with a peak velocity= 2.6m/s. Peak transaortic valve  gradient equals 21 mm Hg, mean transaortic valve gradient  equals 12 mm Hg, estimated aortic valve area equals 1.2  sqcm (by continuity equation), aortic valve velocity time  integral equals 52 cm, consistent with moderate aortic  stenosis. No aortic valve regurgitation seen.  2. The left ventricle is moderate-severely dilated.  3. There is severe global hypokinesis with minor regional  variation.  The LVEF= 25%.    < end of copied text >      PROTEIN CALORIE MALNUTRITION PRESENT: [ ]mild [ ]moderate [ ]severe [ ]underweight [ ]morbid obesity  https://www.andeal.org/vault/7130/web/files/ONC/Table_Clinical%20Characteristics%20to%20Document%20Malnutrition-White%20JV%20et%20al%202012.pdf    Height (cm): 172.7 (10-13-20 @ 18:21), 172.7 (09-22-20 @ 10:17), 172.7 (09-13-20 @ 13:11)  Weight (kg): 87.1 (10-13-20 @ 18:21), 82.1 (09-25-20 @ 16:31), 86 (08-29-20 @ 12:29)  BMI (kg/m2): 29.2 (10-13-20 @ 18:21), 27.5 (09-25-20 @ 16:31), 28.8 (09-22-20 @ 10:17)    [ ]PPSV2 < or = to 30% [ ]significant weight loss  [ ]poor nutritional intake  [ ]anasarca     Albumin, Serum: 3.8 g/dL (10-13-20 @ 18:58)   [ ]Artificial Nutrition      REFERRALS:   [ ]Chaplaincy  [ ]Hospice  [ ]Child Life  [ ]Social Work  [ ]Case management [ ]Holistic Therapy     Goals of Care Document:

## 2020-10-20 NOTE — PROGRESS NOTE ADULT - PROBLEM SELECTOR PLAN 3
With recurrent symptoms.   Will add Melatonin 3 mg hs Better controlled.   Remeron as above. Continue Melatonin 3 mg hs  HF Rx as per HF team.
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
## 2020-10-20 NOTE — PROGRESS NOTE ADULT - SUBJECTIVE AND OBJECTIVE BOX
Subjective:  - Walked in hallway today without dyspnea  - Denies orthopnea, PND, CP, palpitations lightheadedness, dizziness, abdominal distention/pain    Medications:  acetaminophen   Tablet .. 650 milliGRAM(s) Oral every 6 hours PRN  allopurinol 300 milliGRAM(s) Oral daily  aMIOdarone    Tablet 200 milliGRAM(s) Oral daily  aspirin enteric coated 81 milliGRAM(s) Oral daily  atorvastatin 80 milliGRAM(s) Oral at bedtime  carvedilol 6.25 milliGRAM(s) Oral every 12 hours  clopidogrel Tablet 75 milliGRAM(s) Oral daily  dextrose 40% Gel 15 Gram(s) Oral once PRN  dextrose 5%. 1000 milliLiter(s) IV Continuous <Continuous>  dextrose 50% Injectable 12.5 Gram(s) IV Push once  dextrose 50% Injectable 25 Gram(s) IV Push once  dextrose 50% Injectable 25 Gram(s) IV Push once  glucagon  Injectable 1 milliGRAM(s) IntraMuscular once PRN  heparin   Injectable 5000 Unit(s) SubCutaneous every 12 hours  hydrALAZINE 10 milliGRAM(s) Oral three times a day  insulin glargine Injectable (LANTUS) 25 Unit(s) SubCutaneous at bedtime  insulin lispro (ADMELOG) corrective regimen sliding scale   SubCutaneous three times a day before meals  insulin lispro (ADMELOG) corrective regimen sliding scale   SubCutaneous at bedtime  melatonin 3 milliGRAM(s) Oral at bedtime  milrinone Infusion 0.3 MICROgram(s)/kG/Min IV Continuous <Continuous>  mirtazapine 30 milliGRAM(s) Oral at bedtime  ranolazine 500 milliGRAM(s) Oral two times a day  spironolactone 50 milliGRAM(s) Oral two times a day  torsemide 60 milliGRAM(s) Oral two times a day      Physical Exam:    Vitals:  Vital Signs Last 24 Hours  T(C): 36.5 (10-20-20 @ 12:31), Max: 38.3 (10-20-20 @ 05:06)  HR: 104 (10-20-20 @ 12:31) (91 - 117)  BP: 102/70 (10-20-20 @ 12:31) (95/57 - 113/73)  RR: 22 (10-20-20 @ 11:43) (17 - 22)  SpO2: 97% (10-20-20 @ 12:31) (93% - 97%)    Weight in k.2 (10-20 @ 12:31)    I&O's Summary    19 Oct 2020 07:  -  20 Oct 2020 07:00  --------------------------------------------------------  IN: 1053.5 mL / OUT: 3425 mL / NET: -2371.5 mL    20 Oct 2020 07:  -  20 Oct 2020 17:38  --------------------------------------------------------  IN: 600 mL / OUT: 450 mL / NET: 150 mL    Tele: SR     General: No distress. Comfortable.  HEENT: EOM intact.  Neck: Neck supple. JVP ~6-8 cm H2O. No masses  Chest: Clear to auscultation bilaterally  CV: Regular, Normal S1 and S2. No murmurs, rub, or gallops. Radial pulses normal. No LE edema  Abdomen: Soft, non-distended, non-tender  Skin: No rashes or skin breakdown. Warm peripherally  Neurology: Alert and oriented times three. Sensation intact  Psych: Affect normal    Labs:    10-20    134<L>  |  90<L>  |  64<H>  ----------------------------<  195<H>  3.7   |  28  |  2.40<H>    Ca    10.2      20 Oct 2020 05:56    Serum Pro-Brain Natriuretic Peptide: 5763 pg/mL (10-13 @ 18:58)

## 2020-10-20 NOTE — PROGRESS NOTE ADULT - ASSESSMENT
80 year-old with acute on chronic systolic heart failure.  Patient has been maintained on milrinone for inotrope support since recent discharge.  CardioMems per Heart Failure.  Diuresis and inotrope support as needed.  Keep O > I, K > 4.0, Mag > 2.0  Patient is DNR.    Discharge planning per Heart Failure and primary team.

## 2020-10-20 NOTE — PHYSICAL THERAPY INITIAL EVALUATION ADULT - ADDITIONAL COMMENTS
Pt lives with daughter and spouse in co-op +elevator. Independent prior to admission with functional mobility. Owns straight cane (does not use).

## 2020-10-20 NOTE — PROGRESS NOTE ADULT - PROBLEM SELECTOR PLAN 4
Patient is DNR/I. Still he has an ICD Patient is DNR/I. Still he has an ICD.   d/w the patient about hospice care; however, he feels that at this point he does not need this service. He justify that based on his symptomatic improvement and the level of care he already has at home (has a visiting nurse and his wife used to be a HHA. Once his symptoms have improved he is again functional and able to participate on his care).   Just to notice that I d/w hospice care network and they confirmed that if at some point the patient decides for hospice care that he may be able to be enrolled into this service while continuing Milrinone.

## 2020-10-20 NOTE — PROGRESS NOTE ADULT - ASSESSMENT
80 M with PMH of ACC/AHA Class C/D ICM 2/2 CAD (s/p 4V CABG in the 90s and subsequently multiple PCI), EF 25%, s/p ICD, not candidate for OHT/LVAD due to cormobidities/sternotomy, CKD (2.4 on discharge in 9/2020), and T2DM A1c 11.2, and recent admission for ADHF in 9/2020 in [facility] received CardioMEMs implant and started on milrinone infusion as a palliative option readmitted for ADHF in the setting of medication confusion. He has diuresed well on a bumex gtt and currently appears euvolemic now on oral diuretics as of yesterday evening. He is normotensive with stable renal function.

## 2020-10-20 NOTE — PROVIDER CONTACT NOTE (OTHER) - ASSESSMENT
Pt lying in bed resting. Asymptomatic  BP=  HR= 98  R=18  110/72
Pt resting comfortably in bed, asymptomatic, denies any CP or SOB. A&Ox4 VSS.
Patient alert and oriented, able to state name, D.O.B, Location, /80, HR 90, Temp. 97.5, 96% 3L nasal cannula, Respiration 20.
Pt BP 82/55, HR 89. Pt asymptomatic., all other vitals WDL
Pt in bed, denies cp, sob or dizziness.  BP =98/67  HR=90  R=18   O2= 97%
pt is Aox4. VS as per flowsheet.
pt is Aox4. VS as per flowsheet. Bumex gtt stopped between 17:00-18:00.
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
## 2020-10-21 NOTE — PROGRESS NOTE ADULT - ASSESSMENT
80 M with PMH of ACC/AHA Class C/D ICM 2/2 CAD (s/p 4V CABG in the 90s and subsequently multiple PCI), EF 25%, s/p ICD, not candidate for OHT/LVAD due to cormobidities/sternotomy, CKD (2.4 on discharge in 9/2020), and T2DM A1c 11.2, and recent admission for ADHF in 9/2020 in [facility] received CardioMEMs implant and started on milrinone infusion as a palliative option readmitted for ADHF in the setting of medication confusion. He has diuresed well on a bumex gtt and currently appears mildly volume overloaded on exam. VT requiring shock overnight with plan for programing changes to ICD per EP. He is hypotensive at times, not consistently tolerating low dose vasodilators although with elevated PAD of 26 on cardiomems interrogation today. His SCr is uptrending.

## 2020-10-21 NOTE — CONSULT NOTE ADULT - ATTENDING COMMENTS
seen and examined with NP. I agree with H & P, A & P.
79 yo M with ACC/AHA Stage D, NYHA IV HF on chronic milrinone 0.25 mcg/kg/min admitted with acute on chronic systolic HF due to ICM, LVEF 25% with single lead AICD and CardioMems. Since discharge, he had not been taking the hydralazine in error and the dose of diuretic may have also been lower than recommended. He currently is feeling better in the ER after an initial dose of IV lasix, however there is no baseline weight or accurate urine output assessment.    PE notable for JVP > 16 cm H2O, +HJR, without LE edema. Abdomen is obese, softly distended and NT.    MEDICATIONS  (STANDING):  allopurinol 300 milliGRAM(s) Oral daily  aMIOdarone    Tablet 200 milliGRAM(s) Oral daily  aspirin enteric coated 81 milliGRAM(s) Oral daily  atorvastatin 80 milliGRAM(s) Oral at bedtime  carvedilol 6.25 milliGRAM(s) Oral every 12 hours  clopidogrel Tablet 75 milliGRAM(s) Oral daily  furosemide   Injectable 40 milliGRAM(s) IV Push two times a day  heparin   Injectable 5000 Unit(s) SubCutaneous every 12 hours  insulin glargine Injectable (LANTUS) 32 Unit(s) SubCutaneous at bedtime  insulin lispro (HumaLOG) corrective regimen sliding scale   SubCutaneous three times a day before meals  insulin lispro (HumaLOG) corrective regimen sliding scale   SubCutaneous at bedtime  milrinone Infusion 0.25 MICROgram(s)/kG/Min (6.53 mL/Hr) IV Continuous <Continuous>  mirtazapine 15 milliGRAM(s) Oral at bedtime  ranolazine 500 milliGRAM(s) Oral two times a day    Vital Signs Last 24 Hrs  T(C): 36.7 (14 Oct 2020 08:56), Max: 36.9 (14 Oct 2020 00:31)  T(F): 98 (14 Oct 2020 08:56), Max: 98.5 (14 Oct 2020 00:31)  HR: 89 (14 Oct 2020 08:56) (88 - 94)  BP: 99/62 (14 Oct 2020 08:56) (99/62 - 120/81)  RR: 18 (14 Oct 2020 08:56) (18 - 22)  SpO2: 95% (14 Oct 2020 08:56) (93% - 100%)    10-14    136  |  95<L>  |  53<H>  ----------------------------<  114<H>  3.7   |  29  |  2.34<H>    Ca    9.3      14 Oct 2020 06:32  Mg     2.4     10-14    I have recommended the addition of spironolactone 25 mg daily - 1st dose now given K 3.7 and need for ongoing diuresis.  Continue remaining therapies. Will need daily weights and track I/Os along with CardioMems reading, which we can do once he arrives on tele unit. A bed has been requested on 2 DSU HF Team floor. [facility] DNR/DNI.

## 2020-10-21 NOTE — PROGRESS NOTE ADULT - ASSESSMENT
`80 year-old man with PMH of CAD s/p multiple prior PCI's(17 stents) s/p CABG, HFrEF w/ EF 25%, s/p St. Kvng single chamber ICD (11/2019), CKD-3, HTN, HLD, DMT2, AS, recent admissions here for heart failure now with readmission for chf in setting of non adherent to his medications as documented    1- ckd IV with RUFINA   2- chf   3- anemia      cr worsening   at home initially as rx was to take torsemide 40 mg po bid [ pt was taking less ]  decrease torsemide to this dose after holding pm torsemide dose  aldactone decrease to 50 mg until creatinine better.   keep K >4 < 5 and mag approx 2 range  primacor dose adjustment by CHF   trend hb   keep O> I   cont allopurinol 300 mg daily   daily weight   2 g na diet

## 2020-10-21 NOTE — DISCHARGE NOTE PROVIDER - NSDCMRMEDTOKEN_GEN_ALL_CORE_FT
allopurinol 300 mg oral tablet: 1 tab(s) orally once a day  alogliptin 6.25 mg oral tablet: 1 tab(s) orally once a day  aspirin 325 mg oral tablet: 1 tab(s) orally once a day  carvedilol 6.25 mg oral tablet: 1 tab(s) orally every 12 hours  Centrum Silver Men&#x27;s oral tablet: 1 tab(s) orally once a day  clopidogrel 75 mg oral tablet: 1 tab(s) orally once a day  glimepiride 1 mg oral tablet: 1 tab(s) orally once a day  Lantus 100 units/mL subcutaneous solution: 40 unit(s) subcutaneous once a day (at bedtime)  Lipitor 80 mg oral tablet: 1 tab(s) orally once a day  milrinone 200 mcg/mL-D5% intravenous solution: mirinone 200mcg in dextrose 5% 100ml  at 0.25 mcg/kg/min intravenously continously    weight 84.1kg  mirtazapine 15 mg oral tablet: 1 tab(s) orally once a day (at bedtime)  NovoLOG 100 units/mL injectable solution: 12 unit(s) subcutaneous once a day (with dinner)  potassium citrate 10 mEq oral tablet, extended release: 2 tab(s) orally 2 times a day  ranolazine 500 mg oral tablet, extended release: 1 tab(s) orally 2 times a day  torsemide 20 mg oral tablet: 2 tab(s) orally 2 times a day   allopurinol 300 mg oral tablet: 1 tab(s) orally once a day  alogliptin 6.25 mg oral tablet: 1 tab(s) orally once a day  amiodarone 200 mg oral tablet: 1 tab(s) orally once a day  aspirin 81 mg oral delayed release tablet: 1 tab(s) orally once a day  atorvastatin 80 mg oral tablet: 1 tab(s) orally once a day (at bedtime)  Blood draws: Please have weekly BMP, proBNP, and Mg on Monday, Lab results to be sent Dr. Jaquez  [facility] Men&#x27;s oral tablet: 1 tab(s) orally once a day  clopidogrel 75 mg oral tablet: 1 tab(s) orally once a day  hydrALAZINE 10 mg oral tablet: 1 tab(s) orally 3 times a day  Lantus 100 units/mL subcutaneous solution: 30 unit(s) subcutaneous once a day (at bedtime)  metoprolol succinate 25 mg oral tablet, extended release: 1 tab(s) orally 2 times a day  milrinone 200 mcg/mL-D5% intravenous solution: 0.3 mcg/kg intravenously every minute   mirtazapine 15 mg oral tablet: 1 tab(s) orally once a day (at bedtime)  ranolazine 500 mg oral tablet, extended release: 1 tab(s) orally 2 times a day  spironolactone 25 mg oral tablet: 2 tab(s) orally 2 times a day  torsemide 20 mg oral tablet: 2 tab(s) orally 2 times a day   allopurinol 300 mg oral tablet: 1 tab(s) orally once a day  alogliptin 6.25 mg oral tablet: 1 tab(s) orally once a day  amiodarone 200 mg oral tablet: 1 tab(s) orally once a day  aspirin 81 mg oral delayed release tablet: 1 tab(s) orally once a day  atorvastatin 80 mg oral tablet: 1 tab(s) orally once a day (at bedtime)  Blood draws: Please have weekly BMP, proBNP, and Mg on Monday, Lab results to be sent Dr. Jaquez  [facility] Men&#x27;s oral tablet: 1 tab(s) orally once a day  clopidogrel 75 mg oral tablet: 1 tab(s) orally once a day  hydrALAZINE 10 mg oral tablet: 1 tab(s) orally 3 times a day  Lantus 100 units/mL subcutaneous solution: 30 unit(s) subcutaneous once a day (at bedtime)  metoprolol succinate 25 mg oral tablet, extended release: 1 tab(s) orally 2 times a day  milrinone 200 mcg/mL-D5% intravenous solution: 0.3 mcg/kg intravenously every minute   mirtazapine 15 mg oral tablet: 1 tab(s) orally once a day (at bedtime)  ranolazine 500 mg oral tablet, extended release: 1 tab(s) orally 2 times a day  spironolactone 25 mg oral tablet: 2 tab(s) orally 2 times a day  torsemide 20 mg oral tablet: 5 tab(s) orally once a day  ( 3 tabs (60mg) in the morning  ( 2tabs (40mg) in the evening.

## 2020-10-21 NOTE — PROGRESS NOTE ADULT - SUBJECTIVE AND OBJECTIVE BOX
[facility]   [phone]  RENAL FOLLOW UP NOTE  --------------------------------------------------------------------------------  Chief Complaint:    24 hour events/subjective:    states had AICD firing last night  no c/o sob     PAST HISTORY  --------------------------------------------------------------------------------  No significant changes to PMH, PSH, FHx, SHx, unless otherwise noted    ALLERGIES & MEDICATIONS  --------------------------------------------------------------------------------  Allergies    No Known Allergies    Intolerances    Entresto (Other)    Standing Inpatient Medications  allopurinol 300 milliGRAM(s) Oral daily  aMIOdarone    Tablet 200 milliGRAM(s) Oral daily  aspirin enteric coated 81 milliGRAM(s) Oral daily  atorvastatin 80 milliGRAM(s) Oral at bedtime  clopidogrel Tablet 75 milliGRAM(s) Oral daily  dextrose 5%. 1000 milliLiter(s) IV Continuous <Continuous>  dextrose 50% Injectable 12.5 Gram(s) IV Push once  dextrose 50% Injectable 25 Gram(s) IV Push once  dextrose 50% Injectable 25 Gram(s) IV Push once  heparin   Injectable 5000 Unit(s) SubCutaneous every 12 hours  hydrALAZINE 10 milliGRAM(s) Oral three times a day  insulin glargine Injectable (LANTUS) 27 Unit(s) SubCutaneous at bedtime  insulin lispro (ADMELOG) corrective regimen sliding scale   SubCutaneous three times a day before meals  insulin lispro (ADMELOG) corrective regimen sliding scale   SubCutaneous at bedtime  melatonin 3 milliGRAM(s) Oral at bedtime  metoprolol succinate ER 25 milliGRAM(s) Oral two times a day  milrinone Infusion 0.3 MICROgram(s)/kG/Min IV Continuous <Continuous>  mirtazapine 30 milliGRAM(s) Oral at bedtime  ranolazine 500 milliGRAM(s) Oral two times a day  spironolactone 50 milliGRAM(s) Oral two times a day  torsemide 60 milliGRAM(s) Oral two times a day    PRN Inpatient Medications  acetaminophen   Tablet .. 650 milliGRAM(s) Oral every 6 hours PRN  dextrose 40% Gel 15 Gram(s) Oral once PRN  glucagon  Injectable 1 milliGRAM(s) IntraMuscular once PRN      REVIEW OF SYSTEMS  --------------------------------------------------------------------------------    Gen: denies  fevers/chills,  CVS: denies chest pain/palpitations  Resp: denies SOB/Cough  GI: Denies N/V/Abd pain  : Denies dysuria/oliguria/hematuria    All other systems were reviewed and are negative, except as noted.    VITALS/PHYSICAL EXAM  --------------------------------------------------------------------------------  T(C): 36.4 (10-21-20 @ 05:53), Max: 37.3 (10-20-20 @ 11:43)  HR: 91 (10-21-20 @ 07:46) (89 - 104)  BP: 97/65 (10-21-20 @ 07:46) (82/55 - 110/72)  RR: 17 (10-21-20 @ 05:53) (17 - 22)  SpO2: 98% (10-21-20 @ 05:53) (93% - 99%)  Wt(kg): --        10-20-20 @ 07:01  -  10-21-20 @ 07:00  --------------------------------------------------------  IN: 1031.9 mL / OUT: 800 mL / NET: 231.9 mL      Physical Exam:  	     -   jvd   	Pulm: decrease bs  + coarse bs b/l which cleared mostly after deep breathing  -  ronchi or wheezing  	CV: RRR, S1S2; no rub  	Abd: +BS, soft, nontender/nondistended  	: No suprapubic tenderness  	UE: Warm, no cyanosis  no clubbing,  no edema; no asterixis  	LE: Warm, no cyanosis  no clubbing, NO   edema  	Neuro: alert and oriented. speech coherent   	Vascular access: + R I J line       LABS/STUDIES  --------------------------------------------------------------------------------              10.4   9.05  >-----------<  194      [10-21-20 @ 05:48]              31.2     132  |  89  |  75  ----------------------------<  160      [10-21-20 @ 05:48]  4.1   |  28  |  2.94        Ca     9.7     [10-21-20 @ 05:48]      Mg     1.8     [10-21-20 @ 05:48]      Phos  5.0     [10-21-20 @ 05:48]            Creatinine Trend:  SCr 2.94 [10-21 @ 05:48]  SCr 2.40 [10-20 @ 05:56]  SCr 2.26 [10-19 @ 05:46]  SCr 2.22 [10-18 @ 19:26]  SCr 2.22 [10-18 @ 06:16]              Urinalysis - [10-20-20 @ 16:53]      Color Yellow / Appearance Clear / SG 1.011 / pH 5.5      Gluc Negative / Ketone Negative  / Bili Negative / Urobili <2 mg/dL       Blood Negative / Protein 100 mg/dL / Leuk Est Negative / Nitrite Negative      RBC 1 / WBC 3 / Hyaline 2 / Gran  / Sq Epi  / Non Sq Epi 0 / Bacteria Negative      Iron 38, TIBC 274, %sat 14      [10-15-20 @ 14:24]  Ferritin 170      [10-15-20 @ 11:24]  HbA1c 10.7      [02-14-20 @ 08:35]  TSH 1.99      [08-28-20 @ 06:28]  Lipid: chol 132, , HDL 28, LDL 74      [08-20-20 @ 11:46]

## 2020-10-21 NOTE — DISCHARGE NOTE PROVIDER - NSDCFUSCHEDAPPT_GEN_ALL_CORE_FT
VERONICA DORMAN ; 10/28/2020 ; [facility] [address] VERONICA Sky ; 12/09/2020 ; [facility] [address]  VERONICA DORMAN ; 10/28/2020 ; [facility] [address] [facility] VERONICA Sky ; 11/04/2020 ; [facility] [address] [facility] VERONICA Sky ; 12/09/2020 ; [facility] [address] [facility]
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
## 2020-10-21 NOTE — DISCHARGE NOTE PROVIDER - CARE PROVIDER_API CALL
Abhishek Jaquez)  Cardiology; Internal Medicine  [address] [facility], [address] [place] [address]  Phone: [phone]  Fax: [phone]  Follow Up Time: 2 weeks    Susan Mc  NEPHROLOGY  [address] [facility], [address]  [place] [address]  Phone: [phone]  Fax: [phone]  Follow Up Time: 1 month    Dmitry Mitchell)  Cardiac Electrophysiology; Cardiovascular Disease; Internal Medicine  [address] [place] [address]  Phone: [phone]  Fax: [phone]  Follow Up Time: Routine    Shira Gipson; PhD)  Adv Heart Fail Trnsplnt Cardio; Cardiovascular Disease; Internal Medicine  [address] [place] [address]  Phone: [phone]  Fax: [phone]  Follow Up Time: 2 weeks   Abhishek Jaquez)  Cardiology; Internal Medicine  [address], [address] [place] [address]  Phone: [phone]  Fax: [phone]  Follow Up Time: 2 weeks    Susan Mc  NEPHROLOGY  [address] [facility] [address]  [place] [address]  Phone: [phone]  Fax: [phone]  Follow Up Time: 1 week    Dmitry Mitchell)  Cardiac Electrophysiology; Cardiovascular Disease; Internal Medicine  [address] [place] [address]  Phone: [phone]  Fax: [phone]  Follow Up Time: Routine

## 2020-10-21 NOTE — PROGRESS NOTE ADULT - PROBLEM SELECTOR PLAN 1
- Decrease torsemide to 40mg BID starting tonight  - Coreg discontinued and changed to Toprol XL 25mg BID  - Continue spironolactone 50mg BID  - Continue milrinone 0.3mcg/kg/min.   - Continue hydralazine 10mg PO TID, hold for SBP < 90  - Possible d/c home with home milrinone infusion tomorrow if stable overnight and Cr improving

## 2020-10-21 NOTE — PROGRESS NOTE ADULT - PROBLEM SELECTOR PLAN 1
Milrinone gtt - rate increased by heart failure team  now off bumex. started on torsemide   aldactone added  cont other heart failure meds as ordered  strict i/o  daily wt  supp electrolytes  f/u with heart failure team

## 2020-10-21 NOTE — DISCHARGE NOTE PROVIDER - NSDCQMSTAIRS_GEN_ALL_CORE
HOSPITALIST PROGRESS NOTE:    Follow-up for: copd exacerbation    *Please note the patient's past medical, past surgical, family and social histories have all been reviewed.    Subjective:  Reports feeling slightly better but not at baseline, still on oxygen      Objective/Physical Exam     Vital 24 Hour Range Last Value   Temperature Temp  Min: 97.7 °F (36.5 °C)  Max: 98.3 °F (36.8 °C) 97.9 °F (36.6 °C) (01/03/20 1112)   Pulse Pulse  Min: 80  Max: 95 95 (01/03/20 1112)   Respiratory Resp  Min: 18  Max: 24 22 (01/03/20 1112)   Non-Invasive  Blood Pressure BP  Min: 91/54  Max: 163/86 114/56 (01/03/20 1124)   Pulse Oximetry SpO2  Min: 88 %  Max: 97 % 95 % (01/03/20 1112)       Physical Exam:   GEN: NAD. AOx3. obese  SKIN: No rashes or lesions.   HEENT: Normocephalic, atraumatic. Hearing is intact and dentition is good.  NECK: Full ROM. No thyromegaly or palpable masses. Trachea is midline.    CV: RRR. S1, S2 normal. No S3, S4. No murmurs, rubs, gallops.  CHEST: Respirations are non-labored. Lungs CTA anteriorly and posteriorly. Some scaterred rhales and wheezing    ABDOMEN: Soft, nontender and non-distended. No rebound or guarding. No masses or hepatosplenomegaly.  EXT: No clubbing, cyanosis or edema. No joint effusion.  NEURO: CNs II-XII grossly intact. No gross motor or sensory deficits.  PSYCH: Affect appropriate. Mood stable. Memory intact.    Laboratory Results:    Recent Labs   Lab 01/03/20  0519 01/02/20  1406   SODIUM 137 138   POTASSIUM 4.1 3.6   CHLORIDE 101 100   CO2 25 27   BUN 22* 20   CREATININE 0.87 0.96*   GLUCOSE 299* 155*   WBC 5.1 8.1   HGB 12.9 13.0   HCT 41.1 40.6    214       Recent Labs   Lab 01/02/20  1406   RAPDTR <0.02             DIAGNOSTICS:   *Please review complete reports in imaging on EPIC              Medications/Infusions:  Scheduled:   • sodium chloride (PF)  2 mL Intracatheter 2 times per day   • albuterol-ipratropium 2.5 mg/0.5 mg  3 mL Nebulization 4x Daily Resp   •  methylPREDNISolone  40 mg Intravenous 4 times per day   • insulin lispro   Subcutaneous TID AC   • rivaroxaban  15 mg Oral Daily with breakfast       Continuous Infusions:   • dextrose 5 % infusion           ASSESSMENT AND PLAN:        Acute COPD exacerbation:  Nothing to suggest infectious process. cw with scheduled and p.r.n. bronchodilator.  Treated IV steroid.  Pulmonary hygiene   wean off oxygen  Still not at baseline but improving       Hypoxia:  Secondary to COPD exacerbation.  Supplemental oxygen to keep O2 sats 90% or above.  Wean as tolerated     Type 1 diabetes mellitus:  Check glucose before meals and bedtime.  Start correction dose insulin.  Expect more hyperglycemia with steroid.  Resume home basal insulin as soon as meds clarified.  Target glucose less than 180  Will adjust insulin as BS on higher level secondary to steroid          Stage 2-3 chronic kidney disease:  Creatinine near baseline. Monitor input, output, weight, electrolytes and creatinine. Avoid nephrotoxic agents      Morbid obesity      History of VT:  Resume chronic anticoagulation     Other medical issues reviewed                    Code Status: DNR     DVT prophylaxis: Rivaroxaban, mechanical devices            Code status: Do Not Resuscitate        -------------------------------------------------------------------------------------------------------------------    Best Practice:  Not applicable    Case discussed with:  Patient and RN    Reviewed Pertinent: Allergies, Medications, Medical History, Surgical History, Social History and Family History    Hospital Day #: Hospital Day: 2    Tentative Discharge Disposition: Home           Signed:  Kayley Noriega MD  1/3/2020  1:06 PM       Yes

## 2020-10-21 NOTE — DISCHARGE NOTE PROVIDER - PROVIDER TOKENS
PROVIDER:[TOKEN:[[id number]],FOLLOWUP:[2 weeks]],PROVIDER:[TOKEN:[[id number]],FOLLOWUP:[1 month]],PROVIDER:[TOKEN:[[id number]],FOLLOWUP:[Routine]],PROVIDER:[TOKEN:[[id number]],FOLLOWUP:[2 weeks]] PROVIDER:[TOKEN:[[id number]],FOLLOWUP:[2 weeks]],PROVIDER:[TOKEN:[[id number]],FOLLOWUP:[1 week]],PROVIDER:[TOKEN:[[id number]],FOLLOWUP:[Routine]]

## 2020-10-21 NOTE — PROCEDURE NOTE - ADDITIONAL PROCEDURE DETAILS
1) Indication for interrogation: s/p ICD shock   2) Tele: SR at 90's   3) Measured data WNL, NL ICD function, Pt is not PM dependent, Total V pace <1%  4) Stored data revealed on 10/20/20 at 6:33pm, patient had one episode of monomorphic VT falling in the VF zone (average rate at 214 bpm) which treated and terminated with ATP x 1 during charge and ICD shock x 1 at 30J.   5) Changes made: Additional VT zone added at rate 206 bpm with two rounds of ATP prior to shock.   6) See EP consult note    SVeronica Todd, RADHA  [id number]

## 2020-10-21 NOTE — CHART NOTE - NSCHARTNOTEFT_GEN_A_CORE
Cardiology called overnight for suspected ICD firing. Pt reports he was in bed watching tv/sleeping when he believed he was shocked. Telemetry reviewed, noted to have 50 Beats Vtach at 18:34 which appears to be terminated by shock therapy. Pt feels well currently, denies chest pain or palpitations.     Cont current medical therapy with Coreg 6.25 mg BID and Amiodarone 200 mg QD  Cont to monitor electrolytes   ICD interrogation in AM to eval for additional episodes/therapies     Abdulaziz Mathews MD  Cardiology Fellow  [phone]    Please check [email or web address]: [email or web address]" for cardiology service schedule and contact information. Cardiology called overnight for suspected ICD firing. Pt reports he was in bed watching tv/sleeping when he believed he was shocked. Telemetry reviewed, noted to have 50 Beats Vtach at 18:34 which appears to be terminated by shock therapy. Pt feels well currently, denies chest pain or palpitations.     Please stop Carvedilol 6.35 mg BID and start Metroprolol Succinate 25 mg BID at 12 noon 10/21/2020  Cont Amiodarone 200 mg QD  Please give 1 g Magnesium now. Cont to monitor electrolytes   Will alert EP team.  Heart Failure a team aware of recommendations.    Abdulaziz Mathews MD  Cardiology Fellow  [phone]    Please check [email or web address] password: [email or web address]" for cardiology service schedule and contact information.

## 2020-10-21 NOTE — DISCHARGE NOTE PROVIDER - HOSPITAL COURSE
80 M with PMH of ACC/AHA Class C/D ICM 2/2 CAD (s/p 4V CABG in the 90s and subsequently multiple PCI), EF 25%, s/p ICD, not candidate for OHT/LVAD due to cormobidities/sternotomy, CKD (2.4 on discharge in 9/2020), and T2DM A1c 11.2, and recent admission for ADHF in 9/2020 in [facility] received CardioMEMs implant and started on milrinone infusion as a palliative option readmitted for ADHF in the setting of medication confusion. He has diuresed well on a bumex gtt and has been transitioned to oral diuretics to which he is responding well. VT requiring shock on 10/20 now s/p reprograming of ICD per EP. No further VT on tele. His SCr is downtrending. His Cardiomems has improved to 22 today from 26. He has evidence of mild volume overload on exam but overall is doing well.     ·  Problem: ACC/AHA stage D heart failure.  Plan: - Continue torsemide 40mg BID  - Continue Toprol XL 25mg BID  - Continue spironolactone 50mg BID  - Continue milrinone 0.3mcg/kg/min.   - Continue hydralazine 10mg PO TID, hold for SBP < 90  - Possible d/c home with home milrinone infusion tomorrow if stable overnight and Cr improving.     ·  Problem: Ventricular tachycardia.  Plan: - s/p reprograming of ICD with additional VT zone added with 2 rounds of TP prior to shock per EP  - Continue amiodarone and Toprol XL.     ·  Problem: Coronary artery disease involving native coronary artery of native heart without angina pectoris.  Plan: -c/w asa, plavix, and lipitor.     ·  Problem: Stage 3 chronic kidney disease, unspecified whether stage 3a or 3b CKD.  Plan: -Diuretics as above, monitor UOP and Cr.     ·  Problem: Type 2 diabetes, uncontrolled, with renal manifestation.  Plan: -ISS, monitor FS.     Pt stable dc home with outpatient follow up with PMD, Nephrologist, HF team, and Electrophysiologist. 80 M with PMH of ACC/AHA Class C/D ICM 2/2 CAD (s/p 4V CABG in the 90s and subsequently multiple PCI), EF 25%, s/p ICD, not candidate for OHT/LVAD due to cormobidities/sternotomy, CKD (2.4 on discharge in 9/2020), and T2DM A1c 11.2, and recent admission for ADHF in 9/2020 in [facility] received CardioMEMs implant and started on milrinone infusion as a palliative option readmitted for ADHF in the setting of medication confusion. He has diuresed well on a bumex gtt and has been transitioned to oral diuretics to which he is responding well. VT requiring shock on 10/20 now s/p reprograming of ICD per EP. No further VT on tele. His SCr is downtrending. His Cardiomems has improved to 22 today from 26. He has evidence of mild volume overload on exam but overall is doing well.     ·  Problem: ACC/AHA stage D heart failure.  Plan: - Continue torsemide 40mg BID  - Continue Toprol XL 25mg BID  - Continue spironolactone 50mg BID  - Continue milrinone 0.3mcg/kg/min.   - Continue hydralazine 10mg PO TID, hold for SBP < 90  - Possible d/c home with home milrinone infusion tomorrow if stable overnight and Cr improving.     ·  Problem: Ventricular tachycardia.  Plan: - s/p reprograming of ICD with additional VT zone added with 2 rounds of TP prior to shock per EP  - Continue amiodarone and Toprol XL.     ·  Problem: Coronary artery disease involving native coronary artery of native heart without angina pectoris.  Plan: -c/w asa, plavix, and lipitor.     ·  Problem: Stage 3 chronic kidney disease, unspecified whether stage 3a or 3b CKD.  Plan: -Diuretics as above, monitor UOP and Cr.     ·  Problem: Type 2 diabetes, uncontrolled, with renal manifestation.  Plan: -ISS, monitor FS.     Pt stable dc home with outpatient follow up with PMD, Nephrologist, HF team, and Electrophysiologist.       Advanced care planning was discussed with patient and family.  Advanced care planning forms were reviewed and discussed as appropriate.  Differential diagnosis and plan of care discussed with patient after the evaluation.   Pain assessed and judicious use of narcotics when appropriate was discussed.  Importance of Fall prevention discussed.  Counseling on Smoking and Alcohol cessation was offered when appropriate.  Counseling on Diet, exercise, and medication compliance was done.   Approx 75minutes spent.

## 2020-10-21 NOTE — CONSULT NOTE ADULT - ASSESSMENT
80 year-old man with PMH of CAD s/p multiple prior PCI's(17 stents) s/p CABG, HFrEF w/ EF 25%, s/p [employer] single chamber ICD (11/2019), CKD-3, HTN, HLD, DMT2, AS, recent admission for ADHF in 9/2020 in [facility] received CardioMEMs implant and started on milrinone infusion as a palliative option, diuresing well. EP consulted for VT resulting in shock therapy       VT  Telemetry reviewed, 50 beats VT resulting in shock therapy, currently sinus with frequent PVCs  Will need ICD interrogation today, will likely need reprogramming to include ATP prior to shock therapy when VT detected   Carvedilol discontinued, transition to Toprol 25 mg BID beginning at noon today  Cont Amiadarone 200mg BID for ectopy suppression, serial EKG with QTc stable in 500s, continue to monitor      #ICM s/p ICD  - Has single chamber ICD for primary prevention  - Interrogation today    Plan discussed with DESMOND Mathews MD  Cardiology Fellow  [phone]    Please check [email or web address]: [email or web address] for cardiology service schedule and contact information. 80 year-old man with PMH of CAD s/p multiple prior PCI's(17 stents) s/p CABG, HFrEF w/ EF 25%, s/p [employer] single chamber ICD (11/2019), CKD-3, HTN, HLD, DMT2, AS, recent admission for ADHF in 9/2020 in [facility] received CardioMEMs implant and started on milrinone infusion as a palliative option, diuresing well. EP consulted for VT resulting in shock therapy       VT  Telemetry reviewed, 50 beats VT resulting in shock therapy, currently sinus with frequent PVCs  Will need ICD interrogation today, will likely need reprogramming to include ATP prior to shock therapy when VT detected   Carvedilol discontinued, transition to Toprol 25 mg BID beginning at noon today  Recc 1 g magnesium, cont monitoring electrolytes    Cont Amiodarone 200mg BID for ectopy suppression, serial EKG with QTc stable in 500s, continue to monitor      #ICM s/p ICD  - Has single chamber ICD for primary prevention  - Interrogation today    Plan discussed with DESMOND Mathews MD  Cardiology Fellow  [phone]    Please check [email or web address]: [email or web address] for cardiology service schedule and contact information. 80 year-old man with PMH of CAD s/p multiple prior PCI's(17 stents) s/p CABG, HFrEF w/ EF 25%, s/p [employer] single chamber ICD (11/2019), CKD-3, HTN, HLD, DMT2, AS, recent admission for ADHF in 9/2020 in [facility] received CardioMEMs implant and started on milrinone infusion as a palliative option, diuresing well. EP consulted for VT resulting in shock therapy       VT  Telemetry reviewed, 50 beats VT resulting in shock therapy, currently sinus with frequent PVCs  Will need ICD interrogation today, will likely need reprogramming to include ATP prior to shock therapy when VT detected   Carvedilol discontinued, transition to Toprol 25 mg BID beginning at noon today  Recc 1 g magnesium, cont monitoring electrolytes    Cont Amiodarone 200mg BID for ectopy suppression, serial EKG with QTc stable in 500s, continue to monitor      #ICM s/p ICD  - Has single chamber ICD for primary prevention  - Interrogation today    Addendum: Continue amiodarone at 200mg once daily. BBlocker changed from coreg to metoprolol. ICD reprogrammed to allow for ATP prior to shock therapy. Patient is cleared from EP perspective for discharge planning. Plan discussed with medicine ACP.       YOSEPH Todd, RADHA  [phone]

## 2020-10-21 NOTE — CONSULT NOTE ADULT - CONSULT REQUESTED DATE/TIME
14-Oct-2020 15:14
13-Oct-2020 20:54
14-Oct-2020 00:00
14-Oct-2020 22:49
15-Oct-2020 16:56
21-Oct-2020 08:47

## 2020-10-21 NOTE — DISCHARGE NOTE PROVIDER - NSDCFUADDAPPT_GEN_ALL_CORE_FT
please follow up with your PMD on 10/28/2020 at 3pm.  follow up with EP on 12/9/2020 at 3:15pm.   please follow up with Dr. Jaquez on 10/28/20 at 3pm and on 11/4/20 at 1:30pm.  follow up with EP on 12/9/2020 at 3:15pm.  Follow up with your Nephrologist as an outpatient.

## 2020-10-21 NOTE — PROGRESS NOTE ADULT - SUBJECTIVE AND OBJECTIVE BOX
Subjective:  - 15 seconds of VT overnight terminated by shock. Pt asymptomatic from VT  - Feeling well this morning. Walked in hallways without dyspnea. Denies orthopnea, PND, CP, palpitations, lightheadedness, dizziness, abdominal distention/pain    Medications:  acetaminophen   Tablet .. 650 milliGRAM(s) Oral every 6 hours PRN  allopurinol 300 milliGRAM(s) Oral daily  aMIOdarone    Tablet 200 milliGRAM(s) Oral daily  aspirin enteric coated 81 milliGRAM(s) Oral daily  atorvastatin 80 milliGRAM(s) Oral at bedtime  clopidogrel Tablet 75 milliGRAM(s) Oral daily  dextrose 40% Gel 15 Gram(s) Oral once PRN  dextrose 5%. 1000 milliLiter(s) IV Continuous <Continuous>  dextrose 50% Injectable 12.5 Gram(s) IV Push once  dextrose 50% Injectable 25 Gram(s) IV Push once  dextrose 50% Injectable 25 Gram(s) IV Push once  glucagon  Injectable 1 milliGRAM(s) IntraMuscular once PRN  heparin   Injectable 5000 Unit(s) SubCutaneous every 12 hours  hydrALAZINE 10 milliGRAM(s) Oral three times a day  insulin glargine Injectable (LANTUS) 27 Unit(s) SubCutaneous at bedtime  insulin lispro (ADMELOG) corrective regimen sliding scale   SubCutaneous three times a day before meals  insulin lispro (ADMELOG) corrective regimen sliding scale   SubCutaneous at bedtime  melatonin 3 milliGRAM(s) Oral at bedtime  metoprolol succinate ER 25 milliGRAM(s) Oral two times a day  milrinone Infusion 0.3 MICROgram(s)/kG/Min IV Continuous <Continuous>  mirtazapine 30 milliGRAM(s) Oral at bedtime  ranolazine 500 milliGRAM(s) Oral two times a day  spironolactone 50 milliGRAM(s) Oral two times a day  torsemide 60 milliGRAM(s) Oral two times a day      Physical Exam:    Vitals:  Vital Signs Last 24 Hours  T(C): 36.4 (10-21-20 @ 13:09), Max: 36.5 (10-20-20 @ 20:51)  HR: 94 (10-21-20 @ 13:09) (89 - 94)  BP: 98/66 (10-21-20 @ 13:09) (82/55 - 102/64)  RR: 17 (10-21-20 @ 13:09) (17 - 18)  SpO2: 98% (10-21-20 @ 13:09) (98% - 99%)    Weight in k.1 (10-21 @ 08:25)    I&O's Summary    20 Oct 2020 07:  -  21 Oct 2020 07:00  --------------------------------------------------------  IN: 1031.9 mL / OUT: 800 mL / NET: 231.9 mL    21 Oct 2020 07:  -  21 Oct 2020 15:27  --------------------------------------------------------  IN: 489 mL / OUT: 300 mL / NET: 189 mL    Tele: SR 1st deg AVB 80-90s, VT as noted above    General: No distress. Comfortable.  HEENT: EOM intact.  Neck: Neck supple. JVP ~10-12 cm H2O. No masses  Chest: Clear to auscultation bilaterally  CV: Regular, Normal S1 and S2. No murmurs, rub, or gallops. Radial pulses normal. No LE edema  Abdomen: Soft, non-distended, non-tender  Skin: No rashes or skin breakdown. Warm peripherally  Neurology: Alert and oriented times three. Sensation intact  Psych: Affect normal    Labs:                        10.4   9.05  )-----------( 194      ( 21 Oct 2020 05:48 )             31.2     1021    132<L>  |  89<L>  |  75<H>  ----------------------------<  160<H>  4.1   |  28  |  2.94<H>    Ca    9.7      21 Oct 2020 05:48  Phos  5.0     10-21  Mg     1.8     10-21

## 2020-10-21 NOTE — DISCHARGE NOTE PROVIDER - NSDCCPCAREPLAN_GEN_ALL_CORE_FT
PRINCIPAL DISCHARGE DIAGNOSIS  Diagnosis: CHF exacerbation  Assessment and Plan of Treatment: continue with milirone drip as directed.  contineu with Toprol, and Torsemide as directed.   Weigh yourself daily.  If you gain 3lbs in 3 days, or 5lbs in a week call your Health Care Provider.  Do not eat or drink foods containing more than 2000mg of salt (sodium) in your diet every day.  Call your Health Care Provider if you have any swelling or increased swelling in your feet, ankles, and/or stomach.  Take all of your medication as directed.  If you become dizzy call your Health Care Provider.        SECONDARY DISCHARGE DIAGNOSES  Diagnosis: CKD (chronic kidney disease)  Assessment and Plan of Treatment: Avoid taking (NSAIDs) - (ex: Ibuprofen, Advil, Celebrex, Naprosyn)  Avoid taking any nephrotoxic agents (can harm kidneys) - Intravenous contrast for diagnostic testing, combination cold medications.  Have all medications adjusted for your renal function by your Health Care Provider.  Blood pressure control is important.  Take all medication as prescribed.      Diagnosis: DM2 (diabetes mellitus, type 2)  Assessment and Plan of Treatment:   Make sure you get your HgA1c checked every three months.  If you take oral diabetes medications, check your blood glucose two times a day.  If you take insulin, check your blood glucose before meals and at bedtime.  It's important not to skip any meals.  Keep a log of your blood glucose results and always take it with you to your doctor appointments.  Keep a list of your current medications including injectables and over the counter medications and bring this medication list with you to all your doctor appointments.  If you have not seen your ophthalmologist this year call for appointment.  Check your feet daily for redness, sores, or openings. Do not self treat. If no improvement in two days call your primary care physician for an appointment.  Low blood sugar (hypoglycemia) is a blood sugar below 70mg/dl. Check your blood sugar if you feel signs/symptoms of hypoglycemia. If your blood sugar is below 70 take 15 grams of carbohydrates (ex 4 oz of apple juice, 3-4 glucose tablets, or 4-6 oz of regular soda) wait 15 minutes and repeat blood sugar to make sure it comes up above 70.  If your blood sugar is above 70 and you are due for a meal, have a meal.  If you are not due for a meal have a snack.  This snack helps keeps your blood sugar at a safe range.      Diagnosis: Moderate aortic stenosis  Assessment and Plan of Treatment: Follow up with cardiology as an outpatient.  - no indicaton for TAVR.      Diagnosis: CAD (coronary artery disease)  Assessment and Plan of Treatment:     Diagnosis: Ventricular tachycardia  Assessment and Plan of Treatment: s/p AICD fire after VT.  seen by EP, changed AICD setting.  continue with amiodarone 200mg daily, continue with toprol xl 25mg twice a day.       PRINCIPAL DISCHARGE DIAGNOSIS  Diagnosis: CHF exacerbation  Assessment and Plan of Treatment: continue with milirone drip as directed.  contineu with Toprol, and Torsemide as directed.   Weigh yourself daily.  If you gain 3lbs in 3 days, or 5lbs in a week call your Health Care Provider.  Do not eat or drink foods containing more than 2000mg of salt (sodium) in your diet every day.  Call your Health Care Provider if you have any swelling or increased swelling in your feet, ankles, and/or stomach.  Take all of your medication as directed.  If you become dizzy call your Health Care Provider.        SECONDARY DISCHARGE DIAGNOSES  Diagnosis: CKD (chronic kidney disease)  Assessment and Plan of Treatment: Avoid taking (NSAIDs) - (ex: Ibuprofen, Advil, Celebrex, Naprosyn)  Avoid taking any nephrotoxic agents (can harm kidneys) - Intravenous contrast for diagnostic testing, combination cold medications.  Have all medications adjusted for your renal function by your Health Care Provider.  Blood pressure control is important.  Take all medication as prescribed.      Diagnosis: DM2 (diabetes mellitus, type 2)  Assessment and Plan of Treatment: Make sure you get your HgA1c checked every three months.  If you take oral diabetes medications, check your blood glucose two times a day.  If you take insulin, check your blood glucose before meals and at bedtime.  It's important not to skip any meals.  Keep a log of your blood glucose results and always take it with you to your doctor appointments.  Keep a list of your current medications including injectables and over the counter medications and bring this medication list with you to all your doctor appointments.  If you have not seen your ophthalmologist this year call for appointment.  Check your feet daily for redness, sores, or openings. Do not self treat. If no improvement in two days call your primary care physician for an appointment.  Low blood sugar (hypoglycemia) is a blood sugar below 70mg/dl. Check your blood sugar if you feel signs/symptoms of hypoglycemia. If your blood sugar is below 70 take 15 grams of carbohydrates (ex 4 oz of apple juice, 3-4 glucose tablets, or 4-6 oz of regular soda) wait 15 minutes and repeat blood sugar to make sure it comes up above 70.  If your blood sugar is above 70 and you are due for a meal, have a meal.  If you are not due for a meal have a snack.  This snack helps keeps your blood sugar at a safe range.      Diagnosis: Moderate aortic stenosis  Assessment and Plan of Treatment: Follow up with cardiology as an outpatient.  - no indicaton for TAVR.      Diagnosis: CAD (coronary artery disease)  Assessment and Plan of Treatment: Coronary artery disease is a condition where the arteries the supply the heart muscle get clogges with fatty deposits & puts you at risk for a heart attack  Call your doctor if you have any new pain, pressure, or discomfort in the center of your chest, pain, tingling or discomfort in arms, back, neck, jaw, or stomach, shortness of breath, nausea, vomiting, burping or heartburn, sweating, cold and clammy skin, racing or abnormal heartbeat for more than 10 minutes or if they keep coming & going.  Call 911 and do not tr to get to hospital by care  You can help yourself with lefestyle changes (quitting smoking if you smoke), eat lots of fruits & vegetables & low fat dairy products, not a lot of meat & fatty foods, walk or some form of physical activity most days of the week, lose weight if you are overweight  Take your cardiac medication as prescribed to lower cholesterol, to lower blood pressure, aspirin to prevent blood clots, and diabetes control  Make sure to keep appointments with doctor for cardiac follow up care      Diagnosis: Ventricular tachycardia  Assessment and Plan of Treatment: s/p AICD fire after VT.  seen by EP, changed AICD setting.  continue with amiodarone 200mg daily, continue with toprol xl 25mg twice a day.

## 2020-10-21 NOTE — DISCHARGE NOTE PROVIDER - CARE PROVIDERS DIRECT ADDRESSES
[email or web address]

## 2020-10-22 NOTE — PROGRESS NOTE ADULT - ASSESSMENT
80 M with PMH of ACC/AHA Class C/D ICM 2/2 CAD (s/p 4V CABG in the 90s and subsequently multiple PCI), EF 25%, s/p ICD, not candidate for OHT/LVAD due to cormobidities/sternotomy, CKD (2.4 on discharge in 9/2020), and T2DM A1c 11.2, and recent admission for ADHF in 9/2020 in [facility] received CardioMEMs implant and started on milrinone infusion as a palliative option readmitted for ADHF in the setting of medication confusion. He has diuresed well on a bumex gtt and has been transitioned to oral diuretics to which he is responding well. VT requiring shock on 10/20 now s/p reprograming of ICD per EP. No further VT on tele. His SCr is downtrending. His Cardiomems has improved to 22 today from 26. He has evidence of mild volume overload on exam but overall is doing well.

## 2020-10-22 NOTE — PROGRESS NOTE ADULT - ASSESSMENT
`80 year-old man with PMH of CAD s/p multiple prior PCI's(17 stents) s/p CABG, HFrEF w/ EF 25%, s/p [employer] single chamber ICD (11/2019), CKD-3, HTN, HLD, DMT2, AS, recent admissions here for heart failure now with readmission for chf in setting of non adherent to his medications as documented    1- ckd IV with RUFINA   2- chf   3- anemia      cr seems to be stabilizing at this level   torsemide 40 mg po bid   aldactone decrease to 50 mg qd if k keeps rising   keep K >4 < 5 and mag approx 2 range  primacor dose adjustment by CHF   trend hb   keep O> I   cont allopurinol 300 mg daily   daily weight   2 g na diet

## 2020-10-22 NOTE — PHARMACOTHERAPY INTERVENTION NOTE - COMMENTS
Patient with DM on Lantus 40 units at home, currently on 32 units QHS, with hypoglycemic episode with BG 50 this morning. Recommended reducing Lantus dose to 25 units QHS to help prevent further hypoglycemia.    Anabela Lopez, PharmD, BCPS  [phone]
Patient with DM, currently receiving Lantus 27 units QHS and moderate intensity sliding scale. Recommended adding Admelog 2 units TID AC to improve BG control.    Anabela Lopez, PharmD, BCPS  [phone]
Patient with DM, on Lantus 40 units at home. Had hypoglycemic episode on 10/16 while on Lantus 32 units and dose was reduced to 25 units. Now with uptrending BG. Recommended increasing Lantus dose to 27 units QHS to improve BG control.    Anabela Lopez, PharmD, BCPS  [phone]

## 2020-10-22 NOTE — PROGRESS NOTE ADULT - SUBJECTIVE AND OBJECTIVE BOX
Subjective:  - Feeling well today. No events overnight  - Denies SOB, orthopnea, PND, CP, palpitations, lightheadedness, dizziness, abdominal distention/pain    Medications:  acetaminophen   Tablet .. 650 milliGRAM(s) Oral every 6 hours PRN  allopurinol 300 milliGRAM(s) Oral daily  aMIOdarone    Tablet 200 milliGRAM(s) Oral daily  aspirin enteric coated 81 milliGRAM(s) Oral daily  atorvastatin 80 milliGRAM(s) Oral at bedtime  clopidogrel Tablet 75 milliGRAM(s) Oral daily  dextrose 40% Gel 15 Gram(s) Oral once PRN  dextrose 5%. 1000 milliLiter(s) IV Continuous <Continuous>  dextrose 50% Injectable 12.5 Gram(s) IV Push once  dextrose 50% Injectable 25 Gram(s) IV Push once  dextrose 50% Injectable 25 Gram(s) IV Push once  glucagon  Injectable 1 milliGRAM(s) IntraMuscular once PRN  heparin   Injectable 5000 Unit(s) SubCutaneous every 12 hours  hydrALAZINE 10 milliGRAM(s) Oral three times a day  insulin glargine Injectable (LANTUS) 27 Unit(s) SubCutaneous at bedtime  insulin lispro (ADMELOG) corrective regimen sliding scale   SubCutaneous three times a day before meals  insulin lispro (ADMELOG) corrective regimen sliding scale   SubCutaneous at bedtime  melatonin 3 milliGRAM(s) Oral at bedtime  metoprolol succinate ER 25 milliGRAM(s) Oral two times a day  milrinone Infusion 0.3 MICROgram(s)/kG/Min IV Continuous <Continuous>  mirtazapine 30 milliGRAM(s) Oral at bedtime  ranolazine 500 milliGRAM(s) Oral two times a day  spironolactone 50 milliGRAM(s) Oral two times a day  torsemide 40 milliGRAM(s) Oral two times a day      Physical Exam:    Vitals:  Vital Signs Last 24 Hours  T(C): 36.7 (10-22-20 @ 04:56), Max: 36.7 (10-21-20 @ 20:38)  HR: 91 (10-22-20 @ 04:56) (74 - 91)  BP: 110/70 (10-22-20 @ 04:56) (93/63 - 110/70)  RR: 18 (10-22-20 @ 04:56) (17 - 18)  SpO2: 99% (10-22-20 @ 04:56) (95% - 99%)    Weight in k.3 (10-22 @ 08:56)    I&O's Summary    21 Oct 2020 07:  -  22 Oct 2020 07:00  --------------------------------------------------------  IN: 935.3 mL / OUT: 1325 mL / NET: -389.7 mL    22 Oct 2020 07:  -  22 Oct 2020 13:59  --------------------------------------------------------  IN: 240 mL / OUT: 500 mL / NET: -260 mL    Tele: SR 80-90s    General: No distress. Comfortable.  HEENT: EOM intact.  Neck: Neck supple. JVP ~10-12 cm H2O. No masses  Chest: Clear to auscultation bilaterally  CV: Regular, Normal S1 and S2. No murmurs, rub, or gallops. Radial pulses normal. No LE edema  Abdomen: Soft, non-distended, non-tender  Skin: No rashes or skin breakdown. Warm peripherally  Neurology: Alert and oriented times three. Sensation intact  Psych: Affect normal    Labs:                        10.4   9.05  )-----------( 194      ( 21 Oct 2020 05:48 )             31.2     10-22    131<L>  |  89<L>  |  83<H>  ----------------------------<  194<H>  4.2   |  27  |  2.81<H>    Ca    9.8      22 Oct 2020 06:07  Phos  4.7     10-22  Mg     2.2     10-22

## 2020-10-22 NOTE — PROGRESS NOTE ADULT - SUBJECTIVE AND OBJECTIVE BOX
[facility]   [phone]  RENAL FOLLOW UP NOTE  --------------------------------------------------------------------------------  Chief Complaint:    24 hour events/subjective:    seen earlier.   states breathing is better     PAST HISTORY  --------------------------------------------------------------------------------  No significant changes to PMH, PSH, FHx, SHx, unless otherwise noted    ALLERGIES & MEDICATIONS  --------------------------------------------------------------------------------  Allergies    No Known Allergies    Intolerances    Entresto (Other)    Standing Inpatient Medications  allopurinol 300 milliGRAM(s) Oral daily  aMIOdarone    Tablet 200 milliGRAM(s) Oral daily  aspirin enteric coated 81 milliGRAM(s) Oral daily  atorvastatin 80 milliGRAM(s) Oral at bedtime  clopidogrel Tablet 75 milliGRAM(s) Oral daily  dextrose 5%. 1000 milliLiter(s) IV Continuous <Continuous>  dextrose 50% Injectable 12.5 Gram(s) IV Push once  dextrose 50% Injectable 25 Gram(s) IV Push once  dextrose 50% Injectable 25 Gram(s) IV Push once  heparin   Injectable 5000 Unit(s) SubCutaneous every 12 hours  hydrALAZINE 10 milliGRAM(s) Oral three times a day  insulin glargine Injectable (LANTUS) 27 Unit(s) SubCutaneous at bedtime  insulin lispro (ADMELOG) corrective regimen sliding scale   SubCutaneous three times a day before meals  insulin lispro (ADMELOG) corrective regimen sliding scale   SubCutaneous at bedtime  melatonin 3 milliGRAM(s) Oral at bedtime  metoprolol succinate ER 25 milliGRAM(s) Oral two times a day  milrinone Infusion 0.3 MICROgram(s)/kG/Min IV Continuous <Continuous>  mirtazapine 30 milliGRAM(s) Oral at bedtime  ranolazine 500 milliGRAM(s) Oral two times a day  spironolactone 50 milliGRAM(s) Oral two times a day  torsemide 40 milliGRAM(s) Oral two times a day    PRN Inpatient Medications  acetaminophen   Tablet .. 650 milliGRAM(s) Oral every 6 hours PRN  dextrose 40% Gel 15 Gram(s) Oral once PRN  glucagon  Injectable 1 milliGRAM(s) IntraMuscular once PRN      REVIEW OF SYSTEMS  --------------------------------------------------------------------------------    Gen: denies  fevers/chills,  CVS: denies chest pain/palpitations  Resp: denies worsening SOB/Cough  GI: Denies N/V/Abd pain  : Denies dysuria/oliguria/hematuria    All other systems were reviewed and are negative, except as noted.    VITALS/PHYSICAL EXAM  --------------------------------------------------------------------------------  T(C): 36.5 (10-22-20 @ 14:15), Max: 36.7 (10-21-20 @ 20:38)  HR: 85 (10-22-20 @ 17:07) (85 - 91)  BP: 93/64 (10-22-20 @ 17:07) (93/63 - 110/70)  RR: 18 (10-22-20 @ 04:56) (17 - 18)  SpO2: 96% (10-22-20 @ 14:15) (95% - 99%)  Wt(kg): --        10-21-20 @ 07:01  -  10-22-20 @ 07:00  --------------------------------------------------------  IN: 935.3 mL / OUT: 1325 mL / NET: -389.7 mL    10-22-20 @ 07:01  -  10-22-20 @ 20:08  --------------------------------------------------------  IN: 480 mL / OUT: 800 mL / NET: -320 mL      Physical Exam:  	no jvd   	Pulm: decrease bs  + coarse bs b/l which cleared mostly after deep breathing  -  ronchi or wheezing  	CV: RRR, S1S2; no rub  	Abd: +BS, soft, nontender/nondistended  	: No suprapubic tenderness  	UE: Warm, no cyanosis  no clubbing,  no edema; no asterixis  	LE: Warm, no cyanosis  no clubbing, NO   edema  	Neuro: alert and oriented. speech coherent   	Vascular access: + R I J line       LABS/STUDIES  --------------------------------------------------------------------------------              10.4   9.05  >-----------<  194      [10-21-20 @ 05:48]              31.2     131  |  89  |  83  ----------------------------<  194      [10-22-20 @ 06:07]  4.2   |  27  |  2.81        Ca     9.8     [10-22-20 @ 06:07]      Mg     2.2     [10-22-20 @ 06:07]      Phos  4.7     [10-22-20 @ 06:07]            Creatinine Trend:  SCr 2.81 [10-22 @ 06:07]  SCr 2.94 [10-21 @ 05:48]  SCr 2.40 [10-20 @ 05:56]  SCr 2.26 [10-19 @ 05:46]  SCr 2.22 [10-18 @ 19:26]              Urinalysis - [10-20-20 @ 16:53]      Color Yellow / Appearance Clear / SG 1.011 / pH 5.5      Gluc Negative / Ketone Negative  / Bili Negative / Urobili <2 mg/dL       Blood Negative / Protein 100 mg/dL / Leuk Est Negative / Nitrite Negative      RBC 1 / WBC 3 / Hyaline 2 / Gran  / Sq Epi  / Non Sq Epi 0 / Bacteria Negative      Iron 38, TIBC 274, %sat 14      [10-15-20 @ 14:24]  Ferritin 170      [10-15-20 @ 11:24]  HbA1c 10.7      [02-14-20 @ 08:35]  TSH 1.99      [08-28-20 @ 06:28]  Lipid: chol 132, , HDL 28, LDL 74      [08-20-20 @ 11:46]

## 2020-10-22 NOTE — PROGRESS NOTE ADULT - PROBLEM SELECTOR PLAN 1
Milrinone gtt - rate increased by heart failure team  now off bumex. resumed on torsemide   aldactone added  cont other heart failure meds as ordered  strict i/o  daily wt  supp electrolytes  f/u with heart failure team
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
## 2020-10-23 NOTE — DISCHARGE NOTE NURSING/CASE MANAGEMENT/SOCIAL WORK - NSDCVIVACCINE_GEN_ALL_CORE_FT
Influenza , 2014/10/28 13:52 , Lizet Jin (RN)  Influenza , 2016/9/2 11:32 , Andra Pimentel (RN)  Influenza , 2020/9/29 14:33 , Clara Carter (RN)

## 2020-10-23 NOTE — PROGRESS NOTE ADULT - PROBLEM SELECTOR PROBLEM 1
ACC/AHA stage D heart failure
Acute decompensated heart failure
Dyspnea on minimal exertion
Dyspnea on minimal exertion
ACC/AHA stage D heart failure

## 2020-10-23 NOTE — PROGRESS NOTE ADULT - ASSESSMENT
80 M with PMH of ACC/AHA Class C/D ICM 2/2 CAD (s/p 4V CABG in the 90s and subsequently multiple PCI), EF 25%, s/p ICD, not candidate for OHT/LVAD due to cormobidities/sternotomy, CKD (2.4 on discharge in 9/2020), and T2DM A1c 11.2, and recent admission for ADHF in 9/2020 in [facility] received CardioMEMs implant and started on milrinone infusion as a palliative option readmitted for ADHF in the setting of medication confusion. He has diuresed well on a bumex gtt and has been transitioned to oral diuretics to which he is responding well. VT requiring shock on 10/20 now s/p reprograming of ICD per EP. No further VT on tele. He is overall doing well with downtrending SCr. Mild rise in CardioMEMs PAD today to 25 from 22. Will adjust his oral diuretics and plan for discharge today.

## 2020-10-23 NOTE — PROGRESS NOTE ADULT - SUBJECTIVE AND OBJECTIVE BOX
Subjective:    Medications:  acetaminophen   Tablet .. 650 milliGRAM(s) Oral every 6 hours PRN  allopurinol 300 milliGRAM(s) Oral daily  aMIOdarone    Tablet 200 milliGRAM(s) Oral daily  aspirin enteric coated 81 milliGRAM(s) Oral daily  atorvastatin 80 milliGRAM(s) Oral at bedtime  clopidogrel Tablet 75 milliGRAM(s) Oral daily  dextrose 40% Gel 15 Gram(s) Oral once PRN  dextrose 5%. 1000 milliLiter(s) IV Continuous <Continuous>  dextrose 50% Injectable 12.5 Gram(s) IV Push once  dextrose 50% Injectable 25 Gram(s) IV Push once  dextrose 50% Injectable 25 Gram(s) IV Push once  glucagon  Injectable 1 milliGRAM(s) IntraMuscular once PRN  heparin   Injectable 5000 Unit(s) SubCutaneous every 12 hours  hydrALAZINE 10 milliGRAM(s) Oral three times a day  insulin glargine Injectable (LANTUS) 27 Unit(s) SubCutaneous at bedtime  insulin lispro (ADMELOG) corrective regimen sliding scale   SubCutaneous at bedtime  insulin lispro (ADMELOG) corrective regimen sliding scale   SubCutaneous three times a day before meals  melatonin 3 milliGRAM(s) Oral at bedtime  metoprolol succinate ER 25 milliGRAM(s) Oral two times a day  milrinone Infusion 0.3 MICROgram(s)/kG/Min IV Continuous <Continuous>  mirtazapine 30 milliGRAM(s) Oral at bedtime  ranolazine 500 milliGRAM(s) Oral two times a day  spironolactone 50 milliGRAM(s) Oral two times a day  torsemide 40 milliGRAM(s) Oral two times a day      Physical Exam:    Vitals:  Vital Signs Last 24 Hours  T(C): 36.5 (10-23-20 @ 05:43), Max: 37.1 (10-22-20 @ 20:58)  HR: 85 (10-23-20 @ 05:43) (84 - 91)  BP: 99/66 (10-23-20 @ 05:43) (93/64 - 103/64)  RR: 18 (10-23-20 @ 05:43) (18 - 18)  SpO2: 97% (10-23-20 @ 05:43) (96% - 97%)        I&O's Summary    22 Oct 2020 07:01  -  23 Oct 2020 07:00  --------------------------------------------------------  IN: 480 mL / OUT: 1350 mL / NET: -870 mL        Tele:    General: No distress. Comfortable.  HEENT: EOM intact.  Neck: Neck supple. JVP not elevated. No masses  Chest: Clear to auscultation bilaterally  CV: Normal S1 and S2. No murmurs, rub, or gallops. Radial pulses normal.  Abdomen: Soft, non-distended, non-tender  Skin: No rashes or skin breakdown  Neurology: Alert and oriented times three. Sensation intact  Psych: Affect normal    Labs:    10-23    134<L>  |  91<L>  |  85<H>  ----------------------------<  146<H>  3.8   |  28  |  2.57<H>    Ca    10.3      23 Oct 2020 06:44  Phos  5.3     10-23  Mg     2.2     10-23                     Subjective:  - Walked halls this morning without LH/dizziness and SOB. Denies orthopnea, PND, cough, CP, palpitations. No Events on telemetry overnight    Medications:  acetaminophen   Tablet .. 650 milliGRAM(s) Oral every 6 hours PRN  allopurinol 300 milliGRAM(s) Oral daily  aMIOdarone    Tablet 200 milliGRAM(s) Oral daily  aspirin enteric coated 81 milliGRAM(s) Oral daily  atorvastatin 80 milliGRAM(s) Oral at bedtime  clopidogrel Tablet 75 milliGRAM(s) Oral daily  dextrose 40% Gel 15 Gram(s) Oral once PRN  dextrose 5%. 1000 milliLiter(s) IV Continuous <Continuous>  dextrose 50% Injectable 12.5 Gram(s) IV Push once  dextrose 50% Injectable 25 Gram(s) IV Push once  dextrose 50% Injectable 25 Gram(s) IV Push once  glucagon  Injectable 1 milliGRAM(s) IntraMuscular once PRN  heparin   Injectable 5000 Unit(s) SubCutaneous every 12 hours  hydrALAZINE 10 milliGRAM(s) Oral three times a day  insulin glargine Injectable (LANTUS) 27 Unit(s) SubCutaneous at bedtime  insulin lispro (ADMELOG) corrective regimen sliding scale   SubCutaneous at bedtime  insulin lispro (ADMELOG) corrective regimen sliding scale   SubCutaneous three times a day before meals  melatonin 3 milliGRAM(s) Oral at bedtime  metoprolol succinate ER 25 milliGRAM(s) Oral two times a day  milrinone Infusion 0.3 MICROgram(s)/kG/Min IV Continuous <Continuous>  mirtazapine 30 milliGRAM(s) Oral at bedtime  ranolazine 500 milliGRAM(s) Oral two times a day  spironolactone 50 milliGRAM(s) Oral two times a day  torsemide 40 milliGRAM(s) Oral two times a day      Physical Exam:    Vitals:  Vital Signs Last 24 Hours  T(C): 36.5 (10-23-20 @ 05:43), Max: 37.1 (10-22-20 @ 20:58)  HR: 85 (10-23-20 @ 05:43) (84 - 91)  BP: 99/66 (10-23-20 @ 05:43) (93/64 - 103/64)  RR: 18 (10-23-20 @ 05:43) (18 - 18)  SpO2: 97% (10-23-20 @ 05:43) (96% - 97%)        I&O's Summary    22 Oct 2020 07:01  -  23 Oct 2020 07:00  --------------------------------------------------------  IN: 480 mL / OUT: 1350 mL / NET: -870 mL    Tele: SR 80-100s PVCs    General: No distress. Comfortable.  HEENT: EOM intact.  Neck: Neck supple. JVP ~8 cm H2O. No masses  Chest: Clear to auscultation bilaterally  CV: Regular, Normal S1 and S2. No murmurs, rub, or gallops. Radial pulses normal. No LE edema  Abdomen: Soft, non-distended, non-tender  Skin: No rashes or skin breakdown. Warm peripherally  Neurology: Alert and oriented times three. Sensation intact  Psych: Affect normal    Labs:    10-23    134<L>  |  91<L>  |  85<H>  ----------------------------<  146<H>  3.8   |  28  |  2.57<H>    Ca    10.3      23 Oct 2020 06:44  Phos  5.3     10-23  Mg     2.2     10-23

## 2020-10-23 NOTE — PROGRESS NOTE ADULT - NSHPATTENDINGPLANDISCUSS_GEN_ALL_CORE
Shira Gipson MD PhD
primary team

## 2020-10-23 NOTE — PROGRESS NOTE ADULT - PROBLEM SELECTOR PROBLEM 3
Insomnia, unspecified type
Coronary artery disease involving native coronary artery of native heart without angina pectoris
Coronary artery disease involving native coronary artery of native heart without angina pectoris
Insomnia, unspecified type
Stage 3 chronic kidney disease, unspecified whether stage 3a or 3b CKD
Type 2 diabetes, uncontrolled, with renal manifestation
Coronary artery disease involving native coronary artery of native heart without angina pectoris

## 2020-10-23 NOTE — PROGRESS NOTE ADULT - PROBLEM SELECTOR PLAN 2
- Plan for reprograming of ICD per EP  - Continue amiodarone and Toprol XL
- s/p reprograming of ICD with additional VT zone added with 2 rounds of TP prior to shock per EP  - Continue amiodarone and Toprol XL
-c/w asa, plavix, and lipitor
Improving with HF Rx.   Now on Remeron 30 mg PO hs.
Lantus will use 32 units qhs for now given inpatient status and adjust as needed  Unclear why patient on once a day humalog dose, use ISS for now and monitor, may need premeal insulin
insulin as ordered  endo f/u  monitor fs
s/p aicd firing   EP f/u  cont amio  change coreg to metoprolol  supp electrolytes
- s/p reprograming of ICD with additional VT zone added with 2 rounds of TP prior to shock per EP  - Continue amiodarone and Toprol XL
Improving with HF Rx.   Now on Remeron 30 mg PO hs.
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
## 2020-10-23 NOTE — PROGRESS NOTE ADULT - REASON FOR ADMISSION
CHF exacerbation

## 2020-10-23 NOTE — DISCHARGE NOTE NURSING/CASE MANAGEMENT/SOCIAL WORK - PATIENT PORTAL LINK FT
You can access the FollowMyHealth Patient Portal offered by [facility] by registering at the following website: [email or web address]. By joining [employer]’s FollowMyHealth portal, you will also be able to view your health information using other applications (apps) compatible with our system.

## 2020-10-23 NOTE — PROGRESS NOTE ADULT - PROBLEM SELECTOR PLAN 1
- Increase torsemide to 60mg AM and 40mg PM  - Continue Toprol XL 25mg BID  - Continue spironolactone 50mg BID  - Continue milrinone 0.3mcg/kg/min.   - Continue hydralazine 10mg PO TID, hold for SBP < 90  - Clinically stable for d/c home today with milrinone infusion and weekly labs on Monday (BMP, pro-BNP and Mg). He has an existing follow-up appointment with Dr. Jaquez on 10/28 at 3PM which we will keep.

## 2020-10-23 NOTE — PROGRESS NOTE ADULT - PROBLEM SELECTOR PROBLEM 2
Depression, unspecified depression type
Coronary artery disease involving native coronary artery of native heart without angina pectoris
Depression, unspecified depression type
Type 2 diabetes, uncontrolled, with renal manifestation
V tach
Ventricular tachycardia

## 2020-10-28 NOTE — HISTORY OF PRESENT ILLNESS
[FreeTextEntry1] : \par Referring: Dr. Ortega, Dr. Sarkar\par \par Mr. Arenas is a 79 YO M with a history of ACC/AHA Stage D ischemic cardiomyopathy on milrinone and s/p CardioMEMS and ICD, CAD s/p 4v CABG in 90's and multiple subsequent PCI, moderate AS, CKD III (baseline Cr 1.8), and poorly controlled DM2 (A1c 11.6%) presenting to HF clinic for further management. \par \par He has had recurrent hospitalizations in 2020. In 8/2020 he was admitted for symptomatic hypotension likely related to medications for which he was briefly on pressors. He was admitted 9/2020 with ADHF and on RHC was found to have elevated filling pressures with low cardiac output for which he was started on milrinone 0.25 mcg/kg/min and underwent implantation of CardioMEMS. He was readmitted 10/2020 with ADHF in setting of medication confusion and on this admission had ICD shock for MMVT for which additional ATP zone was added\par \par He presents today for followup. He feels well overall and denies dyspnea during household activities but can only walk 1/2 block before having to stop to catch his breath. Notes stable orthopnea and using 2 pillows at night. Denies lower extremity edema. Denies dizziness/LH/syncope. Weights have been stable at home.

## 2020-10-28 NOTE — DISCUSSION/SUMMARY
[FreeTextEntry1] : \par # ACC/AHA Stage C/D ICM\par - GDMT: current regimen is toprol 25 daily, hydralizine 10 TID, and spironolactone 50 mg BID. BP precludes uptitration, may need to back off if renal function worsening. importantly, mortality benefit is not being sought\par - Diuretic: current regimen is torsemide 60 mg qAM and 40 mg qPM. Will continue to follow CardioMEMS readings. last reading 10/27 was 21 and will target ~18-20\par - Device: s/p ICD, no clear indication for CRT upgrade. discussed we can turn of tachy therapies at any time \par - Advanced therapies: continue palliative milrinone. he is not a candidate for LVAD/OHT and is DNR/DNI. I have discussed that we can transition to hospice care if quality of life worsens \par - Labs: check today \par \par # Moderate AS \par - previously evaluated and there is no indication for TAVR \par \par # CAD\par - follows with Dr. Sarkar, on DAPT and high potency statin \par \par # CKD IV\par - continue to monitor \par \par RTC in 1 month with NP and me in 2 months

## 2020-10-28 NOTE — ASSESSMENT
[FreeTextEntry1] : 79 YO M with a history of ACC/AHA Stage D ischemic cardiomyopathy on milrinone and s/p CardioMEMS and ICD, CAD s/p 4v CABG in 90's and multiple subsequent PCI, moderate AS, CKD III (baseline Cr 1.8), and poorly controlled DM2 (A1c 11.6%) presenting to HF clinic for further management. \par \par Today he reports NYHA III symptoms and appears euvolemic on exam with CardioMEMS PAD near goal. He is not a candidate for LVAD/OHT due to combination of age, advanced renal dysfunction, and prior sternotomy. \par

## 2020-10-28 NOTE — PHYSICAL EXAM
[Normal Conjunctiva] : the conjunctiva exhibited no abnormalities [] : no respiratory distress [Respiration, Rhythm And Depth] : normal respiratory rhythm and effort [Heart Rate And Rhythm] : heart rate and rhythm were normal [Heart Sounds] : normal S1 and S2 [Bowel Sounds] : normal bowel sounds [Abdomen Soft] : soft [Nail Clubbing] : no clubbing of the fingernails [Cyanosis, Localized] : no localized cyanosis [FreeTextEntry1] : Trace edema L > R

## 2020-11-05 PROBLEM — I95.1 ORTHOSTATIC HYPOTENSION: Status: ACTIVE | Noted: 2019-11-19

## 2020-11-09 PROBLEM — R79.89 AZOTEMIA: Status: ACTIVE | Noted: 2020-01-01

## 2020-11-09 PROBLEM — N18.9 CHRONIC RENAL INSUFFICIENCY: Status: ACTIVE | Noted: 2020-01-01

## 2020-11-09 PROBLEM — I50.9 CHF (CONGESTIVE HEART FAILURE): Status: ACTIVE | Noted: 2020-01-01

## 2020-11-12 NOTE — PHYSICAL EXAM
[Normal Conjunctiva] : the conjunctiva exhibited no abnormalities [] : no respiratory distress [Respiration, Rhythm And Depth] : normal respiratory rhythm and effort [Heart Rate And Rhythm] : heart rate and rhythm were normal [Heart Sounds] : normal S1 and S2 [Bowel Sounds] : normal bowel sounds [Abdomen Soft] : soft [Nail Clubbing] : no clubbing of the fingernails [Cyanosis, Localized] : no localized cyanosis [General Appearance - Well Developed] : well developed [Normal Appearance] : normal appearance [Edema] : no peripheral edema present [Abnormal Walk] : normal gait [Oriented To Time, Place, And Person] : oriented to person, place, and time [FreeTextEntry1] : multiple eccymotic areas to upper extremities

## 2020-11-12 NOTE — DISCUSSION/SUMMARY
[FreeTextEntry1] : # ACC/AHA Stage C/D ICM\par - GDMT: current regimen is toprol 25 daily, hydralizine 10 TID BP precludes uptitration, may need to back off if renal function worsening. importantly, mortality benefit is not being sought\par - Diuretic: current regimen is torsemide 60 mg qAM and 40 mg qPM. Will continue to follow CardioMEMS readings. last reading 11/3 22 and will target ~18-20\par - Device: s/p ICD, no clear indication for CRT upgrade. discussed we can turn of tachy therapies at any time \par - Advanced therapies: continue palliative milrinone. he is not a candidate for LVAD/OHT and is DNR/DNI. I have discussed that we can transition to hospice care if quality of life worsens \par - Labs: via infusion company\par \par # Moderate AS \par - previously evaluated and there is no indication for TAVR \par \par # CAD\par - follows with Dr. Sarkar, on DAPT and high potency statin \par \par # CKD IV\par - continue to monitor \par \par RTC in 6 weeks with Dr Kenyon

## 2020-11-12 NOTE — HISTORY OF PRESENT ILLNESS
[FreeTextEntry1] : \par Referring: Dr. Ortega, Dr. Sarkar\par \par Mr. Arenas is a 81 YO M with a history of ACC/AHA Stage D ischemic cardiomyopathy on milrinone and s/p CardioMEMS and ICD, CAD s/p 4v CABG in 90's and multiple subsequent PCI, moderate AS, CKD III (baseline Cr 1.8), and poorly controlled DM2 (A1c 11.6%) presenting to HF clinic for further management. \par \par He has had recurrent hospitalizations in 2020. In 8/2020 he was admitted for symptomatic hypotension likely related to medications for which he was briefly on pressors. He was admitted 9/2020 with ADHF and on RHC was found to have elevated filling pressures with low cardiac output for which he was started on milrinone 0.25 mcg/kg/min and underwent implantation of CardioMEMS. He was readmitted 10/2020 with ADHF in setting of medication confusion and on this admission had ICD shock for MMVT for which additional ATP zone was added\par \par He presents today for routine followup. He states feeing well overall. He denies any sob at rest, or PND, baseline 2 pillow orthopnea. Denies dyspnea during household activities but can only walk 1/2 block before having to stop to catch his breath. No chest pain, palpitations, dizziness, syncope or ICD discharges. Weight has been stable, he has not noted any LE edema.  No issues with milrinone infusion, catheter site C&D, no redness or swelling. K last week 6.8, alyssa 50mg bid held

## 2020-11-12 NOTE — ASSESSMENT
[FreeTextEntry1] : 81 YO M with a history of ACC/AHA Stage D ischemic cardiomyopathy on milrinone and s/p CardioMEMS and ICD, CAD s/p 4v CABG in 90's and multiple subsequent PCI, moderate AS, CKD III (baseline Cr 1.8), and poorly controlled DM2 (A1c 11.6%) presenting to HF clinic for follow up. \par \par Today he reports NYHA III symptoms and appears euvolemic on exam with CardioMEMS PAD near goal. He is not a candidate for LVAD/OHT due to combination of age, advanced renal dysfunction, and prior sternotomy. \par

## 2020-11-13 NOTE — CONSULT NOTE ADULT - ASSESSMENT
Mr Arenas is an 80 year old male with PMH of CAD s/p CABG 1990s and prior PCI, HFrEF EF 25%, ischemic cardiomyopathy, s/p [employer] single chamber ICD (11/2019) for primary prevention, AVNRT s/p ablation 2017, CKD3, HTN, HLD, DMT2, AS, recent admission for ADHF in 9/2020, s/p CardioMEMs implant and started on milrinone infusion as a palliative option (not candidate for OHS/LVAD due to comorbidities/sternotomy), readmitted 10/2020 for ADHF and recent ICD shock (10/20/20) for VT in the setting of medication confusion. Patient is presenting after ICD fired once this AM while laying in bed watching TV.  He was found to be hypervolemic with relative     Cardiomems    11/11 PAD: 27 (goal 20)    TTE (9/1/20): EF 25%, LVIDd 6.5, mod AS, RH normal, mild-mod TR, large LA       Mr Arenas is an 80 year old male with PMH of CAD s/p CABG 1990s and prior PCI, HFrEF EF 25%, ischemic cardiomyopathy, s/p [employer] single chamber ICD (11/2019) for primary prevention, AVNRT s/p ablation 2017, CKD3, HTN, HLD, DMT2, AS, recent admission for ADHF in 9/2020, s/p CardioMEMs implant and started on milrinone infusion as a palliative option (not candidate for OHS/LVAD due to comorbidities/sternotomy), readmitted 10/2020 for ADHF and recent ICD shock (10/20/20) for VT in the setting of medication confusion. Patient is presenting after ICD fired once this AM while laying in bed watching TV.  He was found to be hypervolemic with relative hypokalemia that may have predisposed him to his arrythmia.      Cardiomems    11/11 PAD: 27 (goal 20)    TTE (9/1/20): EF 25%, LVIDd 6.5, mod AS, RH normal, mild-mod TR, large LA

## 2020-11-13 NOTE — ED PROVIDER NOTE - CLINICAL SUMMARY MEDICAL DECISION MAKING FREE TEXT BOX
1 M with PMH of ACC/AHA Class C/D ICM 2/2 CAD (s/p 4V CABG in the 90s and subsequently multiple PCI), EF 25%, s/p ICD, not candidate for OHT/LVAD due to cormobidities/sternotomy, CKD, and T2DM presents after ICD fired 1x this morning. will getting device interrogate. basic labs, trop, bnp, ekg cxr Ep consult 1 M with PMH of ACC/AHA Class C/D ICM 2/2 CAD (s/p 4V CABG in the 90s and subsequently multiple PCI), EF 25%, s/p ICD, not candidate for OHT/LVAD due to cormobidities/sternotomy, CKD, and T2DM presents after ICD fired 1x this morning. will getting device interrogate. basic labs, trop, bnp, ekg cxr Ep consult  ATTG: : AICD fire, EKG, check labs, interrogate device, EP eval. cardiac monitoring. re eval for dispo
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
## 2020-11-13 NOTE — H&P ADULT - NSHPPHYSICALEXAM_GEN_ALL_CORE
PHYSICAL EXAM:  Vital Signs Last 24 Hrs  T(C): 36.6 (13 Nov 2020 16:50), Max: 36.9 (13 Nov 2020 10:06)  T(F): 97.9 (13 Nov 2020 16:50), Max: 98.5 (13 Nov 2020 10:06)  HR: 92 (13 Nov 2020 19:16) (84 - 103)  BP: 100/67 (13 Nov 2020 19:16) (88/61 - 100/67)  BP(mean): 70 (13 Nov 2020 16:50) (70 - 70)  RR: 18 (13 Nov 2020 19:16) (16 - 20)  SpO2: 98% (13 Nov 2020 19:16) (98% - 99%)    CONSTITUTIONAL: NAD, well-developed, well-groomed  EYES: PERRL; conjunctiva and sclera clear  ENMT: Moist oral mucosa, no pharyngeal injection or exudates; normal dentition  NECK: Supple, no palpable masses; no thyromegaly  RESPIRATORY: Normal respiratory effort; lungs are clear to auscultation bilaterally  CARDIOVASCULAR: Regular rate and rhythm, normal S1 and S2, no murmur  ABDOMEN: Nontender to palpation, normoactive bowel sounds, no rebound/guarding  MUSCULOSKELETAL:  no clubbing or cyanosis of digits; no joint swelling or tenderness to palpation. + chest port, clean without discharge  PSYCH: A+O to person, place, and time; affect appropriate  NEUROLOGY: CN 2-12 are intact and symmetric; no gross sensory deficits   SKIN: No rashes; no palpable lesions

## 2020-11-13 NOTE — ED PROVIDER NOTE - ATTENDING CONTRIBUTION TO CARE
79 y/o m with pmhx HF, s/p AICD/ PPM [employer], CAD s/p CABG x 4, CKD, DM type 2, presents for episode of my ICD fired this morning. occurred at around 8:45 am while at rest and laying in bed. felt once before and feels the same. no pre or post heart palp. no weakness or numbness. no abd pain. no chest pain. arrived by [facility] with no meds / interventions.   PMD Dr. Sarkar.   Gen.  no acute distress, well appearing male.   HEENT:  perrl eomi  Lungs:  b/l bs  CVS: S1S2   Abd;  soft non tender no distention  Ext: no pitting edema / erythema  Neuro: aaox3 no focal deficits  MSK: strength 5/5 b/l upper and lower ext.
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
## 2020-11-13 NOTE — ED ADULT NURSE REASSESSMENT NOTE - NS ED NURSE REASSESS COMMENT FT1
MD Olivo and MD Hicks made aware pt on Milrinone infusion, and pt states infusion will run out at about 1730.

## 2020-11-13 NOTE — ED PROVIDER NOTE - OBJECTIVE STATEMENT
80 M with PMH of ACC/AHA Class C/D ICM 2/2 CAD (s/p 4V CABG in the 90s and subsequently multiple PCI), EF 25%, s/p ICD, not candidate for OHT/LVAD due to cormobidities/sternotomy, CKD, and T2DM presents after ICD fired 1x this morning. Pt states that he was just laying in bed watching TV when his device fired. Pt denies CP, SoB, abd pain, palpitation.    Dr. Manriquez Cards

## 2020-11-13 NOTE — CONSULT NOTE ADULT - ATTENDING COMMENTS
Patient well known to me from the outpatient setting.    He is a 81 YO M with ACC/AHA Stage D NYHA IV heart failure due to an ischemic cardiomyopathy on palliative milrinone and s/p CardioMEMS in place. He presented with appropriate ICD shock for VF. His K was 3.6 and Mg 1.7 on arrival. He also appears volume overloaded on exam and CardioMEMS readings had been elevated as an outpatient.    I discussed with patient that given volume overload and ICD shock, we would favor admitting for IV diuretics and monitoring for recurrent ventricular events. However I also understand that his primary goals are palliative at this point and improving quality of life. His grandson's birthday is on [birth year] and this is an important event for him personally. Given goals of care, I would monitor for 24 hours in observation for recurrent events and if electrically quiet he can be discharged on higher dose of torsemide while we follow CardioMEMS as outpatient. I discussed that if quality of life worsens or frequency of shocks increase we can deactivate tachy therapies.     Start spironolactone 25 mg daily (previously hyperkalemic with alyssa 50 BID) and PO magnesium supplementation. Increase torsemide to 80 mg BID. Will follow weekly labs and CardioMEMS as outpatient. Can be discharged 11/14 if no further ICD shocks.

## 2020-11-13 NOTE — H&P ADULT - NSHPREVIEWOFSYSTEMS_GEN_ALL_CORE
Review of Systems:   CONSTITUTIONAL: No fever, weight loss  EYES: No eye pain, visual disturbances, or discharge  ENMT:  No difficulty hearing, tinnitus, vertigo; No sinus or throat pain  RESPIRATORY: No SOB. No cough, wheezing, chills or hemoptysis  CARDIOVASCULAR: No chest pain, palpitations, dizziness, or leg swelling  GASTROINTESTINAL: No abdominal or epigastric pain. No nausea, vomiting, or hematemesis; No diarrhea or constipation. No melena or hematochezia.  GENITOURINARY: No dysuria, frequency, hematuria, or incontinence  NEUROLOGICAL: No headaches, memory loss, loss of strength, numbness, or tremors  SKIN: No itching, burning, rashes, or lesions   LYMPH NODES: No enlarged glands  ENDOCRINE: No heat or cold intolerance; No hair loss  MUSCULOSKELETAL: No joint pain or swelling; No muscle, back pain  PSYCHIATRIC: No depression, anxiety, mood swings, or difficulty sleeping  HEME/LYMPH: No easy bruising, or bleeding gums

## 2020-11-13 NOTE — H&P ADULT - HISTORY OF PRESENT ILLNESS
This is a 80 year old male with PMH of CAD s/p CABG 1990s and prior PCI, HFrEF EF 25%, ischemic cardiomyopathy, s/p [employer] Kvng single chamber ICD (11/2019) for primary prevention, AVNRT s/p ablation 2017, CKD3, HTN, HLD, DMT2, AS, recent admission for ADHF in 9/2020, s/p CardioMEMs implant and started on milrinone infusion as a palliative option (not candidate for OHS/LVAD due to comorbidities/sternotomy), readmitted 10/2020 for ADHF and recent ICD shock (10/20/20) for VT in the setting of medication confusion. Patient is presenting after ICD fired once this AM while laying in bed watching TV. Denies CP, SOB, dizziness, HA. Currently feels well.      This is a 80 year old male with PMH of CAD s/p CABG 1990s and prior PCI, HFrEF EF 25%, ischemic cardiomyopathy, s/p [employer] Kvng single chamber ICD (11/2019) for primary prevention, AVNRT s/p ablation 2017, CKD3, HTN, HLD, DMT2, AS, recent admission for ADHF in 9/2020, s/p CardioMEMs implant and started on milrinone infusion as a palliative option (not candidate for OHS/LVAD due to comorbidities/sternotomy), readmitted 10/2020 for ADHF and recent ICD shock (10/20/20) for VT in the setting of medication confusion. Patient is presenting after ICD fired once this AM while laying in bed watching TV. Denies CP, SOB, dizziness, HA. Currently feels well.     In the ED, SBP 80-90s (at baseline), afebrile, given Mg and K in the ED.

## 2020-11-13 NOTE — PROCEDURE NOTE - ADDITIONAL PROCEDURE DETAILS
1. Indication for interrogation: s/p ICD shock for Vfib   2. Tele/Presenting rhythm: Sinus rhythm at 80-90's  3. Measured data WNL, NL ICD function, Pt is not PM dependent, V pace <1%  4. Since last interrogation from 10/21/20, stored data revealed patient was in Vfib at rate 250 bpm this morning around 8:47am which treated with ATP x 1 and terminated with ICD shock x 1 at 30J.   5. CorVue Impedance revealed patient has fluid retention  6. Changes made: none  7. See [place]  [id number]

## 2020-11-13 NOTE — ED PROVIDER NOTE - NS ED ROS FT
GENERAL: No fever or chills, EYES: no change in vision, HEENT: no trouble speaking, CARDIAC: no chest pain, palpitation PULMONARY: no cough or SOB, GI: no abdominal pain, no nausea, no vomiting, no diarrhea or constipation, : No changes in urination, SKIN: no rashes, NEURO: no headache,  MSK: No muscle pain ~Baljit Etienne MD

## 2020-11-13 NOTE — H&P ADULT - PROBLEM SELECTOR PLAN 2
- On milrinone   - c/w asa, plavix  - on atorvastatin   - c/w hydralazine 10mg TID  - c/w ranolazine - On milrinone   - c/w asa, plavix  - on atorvastatin   - c/w hydralazine 10mg TID  - c/w ranolazine  - HF recs appreciated: d/w cardiology fellow, torsemide 80mg BID and spironolactone 25mg daily  - consider palliative cs in the AM

## 2020-11-13 NOTE — H&P ADULT - ATTENDING COMMENTS
Pretty Benitez DO  [facility]  [phone]    Confirmed DNR/DNI from last admission, MOLST completed.

## 2020-11-13 NOTE — ED PROVIDER NOTE - CARE PLAN
Principal Discharge DX:	Ventricular fibrillation  Secondary Diagnosis:	AICD (automatic cardioverter/defibrillator) present  Secondary Diagnosis:	Chronic systolic heart failure  Secondary Diagnosis:	Hypokalemia  Secondary Diagnosis:	Hypomagnesemia

## 2020-11-13 NOTE — CONSULT NOTE ADULT - ASSESSMENT
80 year old man with PMH of CAD s/p CABG 1990's and multiple prior PCI's(17 stents), HFrEF w/ EF 25%, ischemic cardiomyopathy, s/p [employer] single chamber ICD (11/2019) for primary prevention, AVNRT s/p ablation 2017, CKD3, HTN, HLD, DMT2, AS, recent admission for ADHF in 9/2020, s/p CardioMEMs implant and started on milrinone infusion as a palliative option (not candidate for OHS/LVAD due to comorbidities/sternotomy), readmitted 10/2020 for ADHF in the setting of medication confusion. Pt now presents after ICD fired once this morning. ICD interrogation confirmed patient was in Vfib with heart rate at 250 bpm which treated with ATP x 1 and terminated with ICD shock x 1 at 30J. 80 year old man with PMH of CAD s/p CABG 1990's and multiple prior PCI's(17 stents), HFrEF w/ EF 25%, ischemic cardiomyopathy, s/p [employer] single chamber ICD (11/2019) for primary prevention, AVNRT s/p ablation 2017, CKD3, HTN, HLD, DMT2, AS, recent admission for ADHF in 9/2020, s/p CardioMEMs implant and started on milrinone infusion as a palliative option (not candidate for OHS/LVAD due to comorbidities/sternotomy), readmitted 10/2020 for ADHF and recent ICD shock (10/20/20) for VT in the setting of medication confusion. Pt now presents after ICD fired once this morning. ICD interrogation confirmed patient was in Vfib with heart rate at 250 bpm which treated with ATP x 1 and terminated with ICD shock x 1 at 30J. 80 year old man with PMH of CAD s/p CABG 1990's and multiple prior PCI's(17 stents), HFrEF w/ EF 25%, ischemic cardiomyopathy, s/p [employer] single chamber ICD (11/2019) for primary prevention, AVNRT s/p ablation 2017, CKD3, HTN, HLD, DMT2, AS, recent admission for ADHF in 9/2020, s/p CardioMEMs implant and started on milrinone infusion as a palliative option (not candidate for OHS/LVAD due to comorbidities/sternotomy), readmitted 10/2020 for ADHF and recent ICD shock (10/20/20) for VT in the setting of medication confusion. Pt now presents after ICD fired once this morning. ICD interrogation confirmed patient was in Vfib with heart rate at 250 bpm which treated with ATP x 1 and terminated with ICD shock x 1 at 30J.      1. CAD s/p CABG/multiple stents  2. HFrEF w/ LVEF 25%  3. ICMP s/p ICD implant  4. VT/VF     -Consider ischemia eval  -Increase amiodarone to 200mg BID  -HF consult  -Uptitrate BBlocker if okay with HF team   -Supplement to maintain K+ > 4.0, Mg++ > 2.0  -Monitor telemetry  -Plan discussed with ED team and HF fellow     YOSEPH Todd NP  [id number]

## 2020-11-13 NOTE — CONSULT NOTE ADULT - PROBLEM SELECTOR RECOMMENDATION 2
- ACC/AHA Stage D, on home milrinone  - appears hypervolemic on exam, recent cardiomems elevated  - increase to torsemide 80 mg bid oral  - fluid restrict, strict I/Os, daily wts, low salt diet  - labs bid, replete K and Mg  - monitor on Tele  - gdmt: increase spironolactone to 25 mg qday

## 2020-11-13 NOTE — ED PROVIDER NOTE - PROGRESS NOTE DETAILS
ATTG: : I did a bedside interrogation of device and consulted EP for eval of device. patient is pain free and has no repeat episodes. EP called at 10:35 am. Dr. Dewayne Hicks, PGY-3: received sign out on this pt. Mg and K repleted. Spoke w/ heart failure team who agrees w/ admission. Awaiting callback from prior accepting hospitalist Dr. Alba. Dr. Dewayne Hicks, PGY-3: spoke w/ Dr. Alba who accepted pt.

## 2020-11-13 NOTE — ED ADULT NURSE REASSESSMENT NOTE - NS ED NURSE REASSESS COMMENT FT1
[facility] Patient updated on plan of care, made aware abnormal labs and awaiting EP team eval. Will continue to monitor.

## 2020-11-13 NOTE — ED PROVIDER NOTE - CRITICAL CARE PROVIDED
consultation with other physicians/additional history taking/interpretation of diagnostic studies/direct patient care (not related to procedure)/documentation consultation with other physicians/documentation/interpretation of diagnostic studies/direct patient care (not related to procedure)/additional history taking/conducted a detailed discussion of DNR status

## 2020-11-13 NOTE — CONSULT NOTE ADULT - PROBLEM SELECTOR RECOMMENDATION 9
- developed vfib in the setting of relative hypokalemia and heart failure exacerbation  - EP consulted  - amio changed to 200 mg bid  - continue toprol 25 mg xl

## 2020-11-13 NOTE — ED PROVIDER NOTE - SECONDARY DIAGNOSIS.
AICD (automatic cardioverter/defibrillator) present Chronic systolic heart failure Hypokalemia Hypomagnesemia

## 2020-11-13 NOTE — H&P ADULT - NSHPLABSRESULTS_GEN_ALL_CORE
LABS:                        10.5   7.35  )-----------( 111      ( 13 Nov 2020 10:31 )             31.1     11-13    138  |  98  |  40<H>  ----------------------------<  267<H>  3.6   |  27  |  1.70<H>    Ca    9.2      13 Nov 2020 10:31  Mg     1.7     11-13    TPro  6.6  /  Alb  3.5  /  TBili  0.6  /  DBili  x   /  AST  26  /  ALT  51<H>  /  AlkPhos  66  11-13    PT/INR - ( 13 Nov 2020 10:31 )   PT: 14.8 sec;   INR: 1.25 ratio         PTT - ( 13 Nov 2020 10:31 )  PTT:30.1 sec            RADIOLOGY & ADDITIONAL TESTS:  Results Reviewed:   Imaging Personally Reviewed: CXR without consolidation or effusion   Electrocardiogram Personally Reviewed: NSR, TW changes present on prior EKG    COORDINATION OF CARE:  Care Discussed with Consultants/Other Providers [Y/N]:  Prior or Outpatient Records Reviewed [Y/N]: Admitted 10/13-10/23 for acute decompensated heart failure, with device also re-programed for ICD shock.

## 2020-11-13 NOTE — H&P ADULT - PROBLEM SELECTOR PLAN 1
- EP recommendations appreciated, ICD interrogation confirmed patient was in Vfib with heart rate at 250 bpm which treated with ATP x 1 and terminated with ICD shock x 1   - Increase amiodarone dose to 200mg BID  - Telemetry monitoring - EP recommendations appreciated, ICD interrogation confirmed patient was in Vfib with heart rate at 250 bpm which treated with ATP x 1 and terminated with ICD shock x 1   - Increase amiodarone dose to 200mg BID  - Telemetry monitoring  - Troponin likely elevated after shock, continue to trend to peak  - c/w BB

## 2020-11-13 NOTE — H&P ADULT - ASSESSMENT
This is a 80 year old male with extensive cardiac history including ischemic cardiomyopathy EF 25%, with [employer] ICD, admission 10/2020 for ADHF and recent ICD shock (10/20/20) for VT in the setting of medication confusion who presents to the ED for ICD shock. Evaluated by EP and HF teams with plans to increase amiodarone and torsemide dosages. Was evaluated by Palliative care on a prior admission, DNR/DNI This is a 80 year old male with extensive cardiac history including ischemic cardiomyopathy EF 25%, with [employer] ICD, admission 10/2020 for ADHF and recent ICD shock (10/20/20) for VT in the setting of medication confusion who presents to the ED for ICD shock. Evaluated by EP and HF teams with plans to increase amiodarone and torsemide dosages. Was evaluated by Palliative care on a prior admission, DNR/DNI, not currently hospice.    To follow up:  - Final EP and HF recommendations This is a 80 year old male with extensive cardiac history including ischemic cardiomyopathy EF 25%, with [employer] ICD, admission 10/2020 for ADHF and recent ICD shock (10/20/20) for VT in the setting of medication confusion who presents to the ED for ICD shock. Evaluated by EP and HF teams with plans to increase amiodarone and torsemide dosages. Was evaluated by Palliative care on a prior admission, DNR/DNI, not currently hospice.    To follow up:  - Final EP and HF recommendations  - trend troponin, ordered for now This is a 80 year old male with extensive cardiac history including ischemic cardiomyopathy EF 25%, with [employer] ICD, admission 10/2020 for ADHF and recent ICD shock (10/20/20) for VT in the setting of medication confusion who presents to the ED for ICD shock. Evaluated by EP and HF teams with plans to increase amiodarone and torsemide dosages. Was evaluated by Palliative care on a prior admission, DNR/DNI, not currently hospice.

## 2020-11-13 NOTE — ED ADULT NURSE NOTE - OBJECTIVE STATEMENT
81 y/o male PMHx AICD, CAD, CHF, DM, MI, Anxiety, Chronic Gout, CKD, Hypercholesteremia arrives to [facility] ED by [facility] from home with c/o AICD shock. Pt states d/c from [facility] 3 weeks ago, was laying in bed this morning and AICD shocked at 0845. Milrinone infusion running through port. Patient denies chest pain, palpitations, SOB at this time.  Patient is A&Ox4, agitated. Respirations spontaneous and unlabored. EKG done, placed on CM. No abdominal pain, soft NT/ND. No n/v/d. Denies urinary symptoms. Denies fever/chills. No sick contacts. Skin is warm/dry and normal for race.

## 2020-11-13 NOTE — ED PROVIDER NOTE - PHYSICAL EXAMINATION
Gen: AAOx3, non-toxic  Head: NCAT  HEENT: EOMI, PERRLA, oral mucosa moist, normal conjunctiva  Lung:  no respiratory distress, no wheezes/rhonchi/ +rales B/L, speaking in full sentences  CV: RRR, no murmurs, rubs or gallops  Abd: soft, NTND, no guarding, no CVA tenderness, no rebound tenderness  MSK: no visible deformities, full range of motion of all 4 exts  Neuro: No focal sensory or motor deficits  Skin: Warm, well perfused, no rash  Psych: normal affect.   ~Baljit Etienne MD

## 2020-11-14 NOTE — PROGRESS NOTE ADULT - ASSESSMENT
This is a 80 year old male with extensive cardiac history including ischemic cardiomyopathy EF 25%, with [employer] ICD, admission 10/2020 for ADHF and recent ICD shock (10/20/20) for VT  who presents to the ED for ICD shock. Evaluated by EP and HF teams with plans to increase amiodarone and torsemide dosages. Was evaluated by Palliative care on a prior admission, DNR/DNI, not currently hospice.

## 2020-11-14 NOTE — PROGRESS NOTE ADULT - PROBLEM SELECTOR PLAN 1
- EP recommendations appreciated, ICD interrogation confirmed patient was in Vfib with heart rate at 250 bpm which treated with ATP x 1 and terminated with ICD shock x 1   - Increase amiodarone dose to 200mg BID  - Telemetry monitoring  - Troponin likely elevated after shock, continue to trend to peak  - c/w BB

## 2020-11-14 NOTE — PROGRESS NOTE ADULT - ATTENDING COMMENTS
Advanced care planning was discussed with patient and family.    pt dnr/i  Advanced care planning forms were reviewed and discussed as appropriate.  Differential diagnosis and plan of care discussed with patient after the evaluation.   Pain assessed and judicious use of narcotics when appropriate was discussed.  Importance of Fall prevention discussed.  Counseling on Smoking and Alcohol cessation was offered when appropriate.  Counseling on Diet, exercise, and medication compliance was done.   Approx 45 minutes spent.

## 2020-11-14 NOTE — PROGRESS NOTE ADULT - PROBLEM SELECTOR PLAN 2
- On milrinone   - c/w asa, plavix  - on atorvastatin   - c/w hydralazine 10mg TID  - c/w ranolazine  - HF recs appreciated: d/w cardiology fellow, torsemide 80mg BID and spironolactone 25mg daily
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
## 2020-11-14 NOTE — PROGRESS NOTE ADULT - SUBJECTIVE AND OBJECTIVE BOX
24H hour events: No new events     MEDICATIONS:  aMIOdarone    Tablet 200 milliGRAM(s) Oral every 12 hours  aspirin enteric coated 81 milliGRAM(s) Oral daily  clopidogrel Tablet 75 milliGRAM(s) Oral daily  heparin   Injectable 5000 Unit(s) SubCutaneous every 12 hours  hydrALAZINE 10 milliGRAM(s) Oral every 8 hours  metoprolol succinate ER 50 milliGRAM(s) Oral two times a day  milrinone Infusion 0.3 MICROgram(s)/kG/Min IV Continuous <Continuous>  ranolazine 500 milliGRAM(s) Oral two times a day  spironolactone 25 milliGRAM(s) Oral daily  torsemide 80 milliGRAM(s) Oral every 12 hours            allopurinol 300 milliGRAM(s) Oral daily  atorvastatin 80 milliGRAM(s) Oral at bedtime  dextrose 40% Gel 15 Gram(s) Oral once  dextrose 50% Injectable 25 Gram(s) IV Push once  dextrose 50% Injectable 12.5 Gram(s) IV Push once  dextrose 50% Injectable 25 Gram(s) IV Push once  glucagon  Injectable 1 milliGRAM(s) IntraMuscular once  insulin glargine Injectable (LANTUS) 40 Unit(s) SubCutaneous at bedtime  insulin lispro (ADMELOG) corrective regimen sliding scale   SubCutaneous three times a day before meals  insulin lispro (ADMELOG) corrective regimen sliding scale   SubCutaneous at bedtime    dextrose 5%. 1000 milliLiter(s) IV Continuous <Continuous>  dextrose 5%. 1000 milliLiter(s) IV Continuous <Continuous>  potassium chloride    Tablet ER 40 milliEquivalent(s) Oral every 4 hours      REVIEW OF SYSTEMS:  See HPI, otherwise ROS negative.    PHYSICAL EXAM:  T(C): 36.9 (11-14-20 @ 07:48), Max: 36.9 (11-14-20 @ 07:48)  HR: 112 (11-14-20 @ 07:48) (92 - 113)  BP: 106/69 (11-14-20 @ 07:48) (88/57 - 109/51)  RR: 18 (11-14-20 @ 07:48) (16 - 18)  SpO2: 97% (11-14-20 @ 07:48) (96% - 99%)  Wt(kg): --  I&O's Summary    13 Nov 2020 07:01  -  14 Nov 2020 07:00  --------------------------------------------------------  IN: 0 mL / OUT: 460 mL / NET: -460 mL        Appearance: Alert. NAD	  Cardiovascular: +S1S2 RRR no m/g/r  Respiratory: CTA B/L	  Psychiatry: A & O x 3, Mood & affect appropriate  Gastrointestinal:  Soft, NT. ND. +BS	  Skin: No rashes	  Neurologic: Non-focal  Extremities: No edema BLE  Vascular: Peripheral pulses palpable 2+ bilaterally      LABS:	 	    CBC Full  -  ( 14 Nov 2020 06:01 )  WBC Count : 8.40 K/uL  Hemoglobin : 10.8 g/dL  Hematocrit : 32.8 %  Platelet Count - Automated : 129 K/uL  Mean Cell Volume : 94.5 fl  Mean Cell Hemoglobin : 31.1 pg  Mean Cell Hemoglobin Concentration : 32.9 gm/dL  Auto Neutrophil # : 6.92 K/uL  Auto Lymphocyte # : 0.64 K/uL  Auto Monocyte # : 0.68 K/uL  Auto Eosinophil # : 0.11 K/uL  Auto Basophil # : 0.02 K/uL  Auto Neutrophil % : 82.4 %  Auto Lymphocyte % : 7.6 %  Auto Monocyte % : 8.1 %  Auto Eosinophil % : 1.3 %  Auto Basophil % : 0.2 %    11-14    136  |  95<L>  |  38<H>  ----------------------------<  180<H>  3.4<L>   |  26  |  1.65<H>  11-13    138  |  98  |  40<H>  ----------------------------<  267<H>  3.6   |  27  |  1.70<H>    Ca    9.7      14 Nov 2020 06:01  Ca    9.2      13 Nov 2020 10:31  Phos  3.1     11-14  Mg     2.1     11-14  Mg     1.7     11-13    TPro  6.6  /  Alb  3.5  /  TBili  0.6  /  DBili  x   /  AST  26  /  ALT  51<H>  /  AlkPhos  66  11-13      proBNP: Serum Pro-Brain Natriuretic Peptide: 73946 pg/mL (11-13 @ 10:31)      TELEMETRY: ST 90- 110 bpm  	        	  ASSESSMENT/PLAN: 	    80 year old man with PMH of CAD s/p CABG 1990's and multiple prior PCI's(17 stents), HFrEF w/ EF 25%, ischemic cardiomyopathy, s/p [employer] Kvng single chamber ICD (11/2019) for primary prevention, AVNRT s/p ablation 2017, CKD3, HTN, HLD, DMT2, AS, recent admission for ADHF in 9/2020, s/p CardioMEMs implant and started on milrinone infusion as a palliative option (not candidate for OHS/LVAD due to comorbidities/sternotomy), readmitted 10/2020 for ADHF and recent ICD shock (10/20/20) for VT in the setting of medication confusion. Pt now presents after ICD fired once this morning. ICD interrogation confirmed patient was in Vfib with heart rate at 250 bpm which treated with ATP x 1 and terminated with ICD shock x 1 at 30J.      1. VF s/p ICD shock  -cont amio 200 q 12  -increase BB to 50 q 12  - consider ischemia eval  -Rec HF consult  -Replete electrolytes  Lesly SOTO  [phone] 24H hour events: No new events     MEDICATIONS:  aMIOdarone    Tablet 200 milliGRAM(s) Oral every 12 hours  aspirin enteric coated 81 milliGRAM(s) Oral daily  clopidogrel Tablet 75 milliGRAM(s) Oral daily  heparin   Injectable 5000 Unit(s) SubCutaneous every 12 hours  hydrALAZINE 10 milliGRAM(s) Oral every 8 hours  metoprolol succinate ER 50 milliGRAM(s) Oral two times a day  milrinone Infusion 0.3 MICROgram(s)/kG/Min IV Continuous <Continuous>  ranolazine 500 milliGRAM(s) Oral two times a day  spironolactone 25 milliGRAM(s) Oral daily  torsemide 80 milliGRAM(s) Oral every 12 hours            allopurinol 300 milliGRAM(s) Oral daily  atorvastatin 80 milliGRAM(s) Oral at bedtime  dextrose 40% Gel 15 Gram(s) Oral once  dextrose 50% Injectable 25 Gram(s) IV Push once  dextrose 50% Injectable 12.5 Gram(s) IV Push once  dextrose 50% Injectable 25 Gram(s) IV Push once  glucagon  Injectable 1 milliGRAM(s) IntraMuscular once  insulin glargine Injectable (LANTUS) 40 Unit(s) SubCutaneous at bedtime  insulin lispro (ADMELOG) corrective regimen sliding scale   SubCutaneous three times a day before meals  insulin lispro (ADMELOG) corrective regimen sliding scale   SubCutaneous at bedtime    dextrose 5%. 1000 milliLiter(s) IV Continuous <Continuous>  dextrose 5%. 1000 milliLiter(s) IV Continuous <Continuous>  potassium chloride    Tablet ER 40 milliEquivalent(s) Oral every 4 hours      REVIEW OF SYSTEMS:  See HPI, otherwise ROS negative.    PHYSICAL EXAM:  T(C): 36.9 (11-14-20 @ 07:48), Max: 36.9 (11-14-20 @ 07:48)  HR: 112 (11-14-20 @ 07:48) (92 - 113)  BP: 106/69 (11-14-20 @ 07:48) (88/57 - 109/51)  RR: 18 (11-14-20 @ 07:48) (16 - 18)  SpO2: 97% (11-14-20 @ 07:48) (96% - 99%)  Wt(kg): --  I&O's Summary    13 Nov 2020 07:01  -  14 Nov 2020 07:00  --------------------------------------------------------  IN: 0 mL / OUT: 460 mL / NET: -460 mL        Appearance: Alert. NAD	  Cardiovascular: +S1S2 RRR no m/g/r  Respiratory: CTA B/L	  Psychiatry: A & O x 3, Mood & affect appropriate  Gastrointestinal:  Soft, NT. ND. +BS	  Skin: No rashes	  Neurologic: Non-focal  Extremities: No edema BLE  Vascular: Peripheral pulses palpable 2+ bilaterally      LABS:	 	    CBC Full  -  ( 14 Nov 2020 06:01 )  WBC Count : 8.40 K/uL  Hemoglobin : 10.8 g/dL  Hematocrit : 32.8 %  Platelet Count - Automated : 129 K/uL  Mean Cell Volume : 94.5 fl  Mean Cell Hemoglobin : 31.1 pg  Mean Cell Hemoglobin Concentration : 32.9 gm/dL  Auto Neutrophil # : 6.92 K/uL  Auto Lymphocyte # : 0.64 K/uL  Auto Monocyte # : 0.68 K/uL  Auto Eosinophil # : 0.11 K/uL  Auto Basophil # : 0.02 K/uL  Auto Neutrophil % : 82.4 %  Auto Lymphocyte % : 7.6 %  Auto Monocyte % : 8.1 %  Auto Eosinophil % : 1.3 %  Auto Basophil % : 0.2 %    11-14    136  |  95<L>  |  38<H>  ----------------------------<  180<H>  3.4<L>   |  26  |  1.65<H>  11-13    138  |  98  |  40<H>  ----------------------------<  267<H>  3.6   |  27  |  1.70<H>    Ca    9.7      14 Nov 2020 06:01  Ca    9.2      13 Nov 2020 10:31  Phos  3.1     11-14  Mg     2.1     11-14  Mg     1.7     11-13    TPro  6.6  /  Alb  3.5  /  TBili  0.6  /  DBili  x   /  AST  26  /  ALT  51<H>  /  AlkPhos  66  11-13      proBNP: Serum Pro-Brain Natriuretic Peptide: 94514 pg/mL (11-13 @ 10:31)      TELEMETRY: ST 90- 110 bpm  	        	  ASSESSMENT/PLAN: 	    80 year old man with PMH of CAD s/p CABG 1990's and multiple prior PCI's(17 stents), HFrEF w/ EF 25%, ischemic cardiomyopathy, s/p [employer] Kvng single chamber ICD (11/2019) for primary prevention, AVNRT s/p ablation 2017, CKD3, HTN, HLD, DMT2, AS, recent admission for ADHF in 9/2020, s/p CardioMEMs implant and started on milrinone infusion as a palliative option (not candidate for OHS/LVAD due to comorbidities/sternotomy), readmitted 10/2020 for ADHF and recent ICD shock (10/20/20) for VT in the setting of medication confusion. Pt now presents after ICD fired once this morning. ICD interrogation confirmed patient was in Vfib with heart rate at 250 bpm which treated with ATP x 1 and terminated with ICD shock x 1 at 30J.      1. VF s/p ICD shock  -cont amio 200 q 12  -increase BB to 50 q 12  - consider ischemia eval  -Rec HF consult  -Replete electrolytes  -Monitor on tele for 24 hours    Lesly SOTO  [phone]

## 2020-11-15 NOTE — DISCHARGE NOTE NURSING/CASE MANAGEMENT/SOCIAL WORK - PATIENT PORTAL LINK FT
You can access the FollowMyHealth Patient Portal offered by [facility] by registering at the following website: [email or web address]. By joining [employer]’s FollowMyHealth portal, you will also be able to view your health information using other applications (apps) compatible with our system.

## 2020-11-15 NOTE — DISCHARGE NOTE PROVIDER - HOSPITAL COURSE
80 year old man with PMH of CAD s/p CABG 1990's and multiple prior PCI's(17 stents), HFrEF w/ EF 25%, ischemic cardiomyopathy, s/p [employer] Kvng single chamber ICD (11/2019) for primary prevention, AVNRT s/p ablation 2017, CKD3, HTN, HLD, DMT2, AS, recent admission for ADHF in 9/2020, s/p CardioMEMs implant and started on milrinone infusion as a palliative option (not candidate for OHS/LVAD due to comorbidities/sternotomy), readmitted 10/2020 for ADHF and recent ICD shock (10/20/20) for VT in the setting of medication confusion. Pt now presents after ICD fired once this morning. ICD interrogation confirmed patient was in Vfib with heart rate at 250 bpm which treated with ATP x 1 and terminated with ICD shock x 1 at 30J.      Problem: VF s/p ICD shock  -cont amio 200 q 12  -increase BB to 50 q 12  - consider ischemia eval  -Replete electrolytes  -Monitor on tele for 24 hours  Problem: Acute on chronic heart failure with reduced ejection fraction and diastolic dysfunction.  Recommendation: - ACC/AHA Stage D, on home milrinone  - appears hypervolemic on exam, recent cardiomems elevated  - increase to torsemide 80 mg bid oral  - fluid restrict, strict I/Os, daily wts, low salt diet  - labs bid, replete K and Mg  - monitor on Tele  - gdmt: increase spironolactone to 25 mg qday.   Problem: Coronary artery disease involving coronary bypass graft of native heart without angina pectoris.  Recommendation: - continue asa, statin, plavix.   Problem: Essential hypertension.  Recommendation: - meds as above.     D/C to home on milrinone gtt. Follow up with HF in 1 week.    80 year old man with PMH of CAD s/p CABG 1990's and multiple prior PCI's(17 stents), HFrEF w/ EF 25%, ischemic cardiomyopathy, s/p [employer] Kvng single chamber ICD (11/2019) for primary prevention, AVNRT s/p ablation 2017, CKD3, HTN, HLD, DMT2, AS, recent admission for ADHF in 9/2020, s/p CardioMEMs implant and started on milrinone infusion as a palliative option (not candidate for OHS/LVAD due to comorbidities/sternotomy), readmitted 10/2020 for ADHF and recent ICD shock (10/20/20) for VT in the setting of medication confusion. Pt now presents after ICD fired once this morning. ICD interrogation confirmed patient was in Vfib with heart rate at 250 bpm which treated with ATP x 1 and terminated with ICD shock x 1 at 30J.      Problem: VF s/p ICD shock  -cont amio 200 q 12  -increase BB to 50 q 12  - consider ischemia eval  -Replete electrolytes  -Monitor on tele for 24 hours  Problem: Acute on chronic heart failure with reduced ejection fraction and diastolic dysfunction.  Recommendation: - ACC/AHA Stage D, on home milrinone  - appears hypervolemic on exam, recent cardiomems elevated  - increase to torsemide 80 mg bid oral  - fluid restrict, strict I/Os, daily wts, low salt diet  - labs bid, replete K and Mg  - monitor on Tele  - gdmt: increase spironolactone to 25 mg qday.   Problem: Coronary artery disease involving coronary bypass graft of native heart without angina pectoris.  Recommendation: - continue asa, statin, plavix.   Problem: Essential hypertension.  Recommendation: - meds as above.     D/C to home on milrinone gtt. Follow up with HF in 1 week.       Advanced care planning was discussed with patient and family.    pt dnr/i  Advanced care planning forms were reviewed and discussed as appropriate.  Differential diagnosis and plan of care discussed with patient after the evaluation.   Pain assessed and judicious use of narcotics when appropriate was discussed.  Importance of Fall prevention discussed.  Counseling on Smoking and Alcohol cessation was offered when appropriate.  Counseling on Diet, exercise, and medication compliance was done.   Approx 75 minutes spent.

## 2020-11-15 NOTE — DISCHARGE NOTE PROVIDER - NSDCMRMEDTOKEN_GEN_ALL_CORE_FT
allopurinol 300 mg oral tablet: 1 tab(s) orally once a day  alogliptin 6.25 mg oral tablet: 1 tab(s) orally once a day  amiodarone 200 mg oral tablet: 1 tab(s) orally once a day  aspirin 81 mg oral delayed release tablet: 1 tab(s) orally once a day  atorvastatin 80 mg oral tablet: 1 tab(s) orally once a day (at bedtime)  clopidogrel 75 mg oral tablet: 1 tab(s) orally once a day  hydrALAZINE 10 mg oral tablet: 1 tab(s) orally every 8 hours  Lantus 100 units/mL subcutaneous solution: 40 unit(s) subcutaneous once a day (at bedtime)  metoprolol succinate 50 mg oral tablet, extended release: 1 tab(s) orally 2 times a day  milrinone 200 mcg/mL-D5% intravenous solution: 0.3 mcg/kg intravenously every minute   ranolazine 500 mg oral tablet, extended release: 1 tab(s) orally 2 times a day  spironolactone 25 mg oral tablet: 1 tab(s) orally once a day  torsemide 20 mg oral tablet: 4 tab(s) orally every 12 hours

## 2020-11-15 NOTE — DISCHARGE NOTE PROVIDER - NSDCCPCAREPLAN_GEN_ALL_CORE_FT
PRINCIPAL DISCHARGE DIAGNOSIS  Diagnosis: Ventricular fibrillation  Assessment and Plan of Treatment: Continue current medication  Follow up with heart failure in 1 week      SECONDARY DISCHARGE DIAGNOSES  Diagnosis: AICD (automatic cardioverter/defibrillator) present  Assessment and Plan of Treatment: Continue current medications    Diagnosis: Chronic systolic heart failure  Assessment and Plan of Treatment: Weigh yourself daily.  If you gain 3lbs in 3 days, or 5lbs in a week call your Health Care Provider.  Do not eat or drink foods containing more than 2000mg of salt (sodium) in your diet every day.  Call your Health Care Provider if you have any swelling or increased swelling in your feet, ankles, and/or stomach.  The Pt was provided with CHF diet instruction (low sodium diet, daily weights, label reading, Heart Healthy Cooking Tips & Heart Healthy shopping Tips).  Take all of your medication as directed.  If you become dizzy call your Health Care Provider.    Diagnosis: Hypokalemia  Assessment and Plan of Treatment: Resolved     PRINCIPAL DISCHARGE DIAGNOSIS  Diagnosis: Ventricular fibrillation  Assessment and Plan of Treatment: Continue current medication  Follow up with heart failure in 1 week      SECONDARY DISCHARGE DIAGNOSES  Diagnosis: AICD (automatic cardioverter/defibrillator) present  Assessment and Plan of Treatment: Continue current medications    Diagnosis: Chronic systolic heart failure  Assessment and Plan of Treatment: Weigh yourself daily.  If you gain 3lbs in 3 days, or 5lbs in a week call your Health Care Provider.  Do not eat or drink foods containing more than 2000mg of salt (sodium) in your diet every day.  Call your Health Care Provider if you have any swelling or increased swelling in your feet, ankles, and/or stomach.  The Pt was provided with CHF diet instruction (low sodium diet, daily weights, label reading, Heart Healthy Cooking Tips & Heart Healthy shopping Tips).  Take all of your medication as directed.  If you become dizzy call your Health Care Provider.    Diagnosis: Hypokalemia  Assessment and Plan of Treatment: Resolved    Diagnosis: HTN (hypertension)  Assessment and Plan of Treatment: Low salt diet  Activity as tolerated.  Take all medication as prescribed.  Follow up with your medical doctor for routine blood pressure monitoring at your next visit.  Notify your doctor if you have any of the following symptoms:   Dizziness, Lightheadedness, Blurry vision, Headache, Chest pain, Shortness of breath

## 2020-11-15 NOTE — DISCHARGE NOTE PROVIDER - CARE PROVIDER_API CALL
Abhishek Jaquez)  Cardiology; Internal Medicine  [address] [place] [address], [address] [place] [address]  Phone: [phone]  Fax: [phone]  Follow Up Time:

## 2020-11-15 NOTE — DISCHARGE NOTE PROVIDER - NSDCFUSCHEDAPPT_GEN_ALL_CORE_FT
VERONICA DORMAN ; 12/09/2020 ; \[facility] [place] [facility]\"" Cardio Electro [address] [facility] VERONICA Sky ; 12/23/2020 ; \[facility] [place] [facility]\"" HeartFail [address] [facility] VERONICA Sky ; 01/11/2021 ; \[facility] [place] [facility]\"" Cardio [address] [facility]

## 2020-11-15 NOTE — DISCHARGE NOTE PROVIDER - YES NO FOR MLM POSITIVE OR NEGATIVE COVID RESULT
, Chang Sultana), Clinical Neurophysiology; EEGEpilepsy; Pediatric Neurology  [address] [facility] [address]  [place] [address]  Phone: [phone]  Fax: [phone]

## 2020-12-23 NOTE — DISCUSSION/SUMMARY
[FreeTextEntry1] : # ACC/AHA Stage C/D ICM\par - GDMT: current regimen is toprol 50 mg BID, hydralizine 10 TID, and spironolactone 25 mg daily. importantly, mortality benefit is not being sought\par - Diuretic: current regimen is torsemide 80 mg BID. Will continue to follow CardioMEMS readings. reading today was 13 with goal of 18-20. will decrease to torsemide 60 mg BID to avoid hypovolemia with his renal function\par - Device: s/p ICD, no clear indication for CRT upgrade. discussed we can turn of tachy therapies at any time \par - Advanced therapies: continue palliative milrinone. he is not a candidate for LVAD/OHT and is DNR/DNI. I have discussed that we can transition to hospice care if quality of life worsens \par - Labs: via infusion company, last I see is from 12/7 and will followup repeat labs when available\par \par # Moderate AS \par - previously evaluated and there is no indication for TAVR \par \par # CAD\par - follows with Dr. Sarkar, on DAPT and high potency statin \par \par # CKD IV\par - continue to monitor \par \par RTC in 2 months

## 2020-12-23 NOTE — ASSESSMENT
[FreeTextEntry1] : 81 YO M with a history of ACC/AHA Stage D ischemic cardiomyopathy on milrinone and s/p CardioMEMS and ICD, CAD s/p 4v CABG in 90's and multiple subsequent PCI, moderate AS, CKD III (baseline Cr 1.8), and poorly controlled DM2 (A1c 11.6%) presenting to HF clinic for follow up. \par \par Today he reports NYHA II-III symptoms and appears euvolemic on exam with CardioMEMS PAD below goal. He is not a candidate for LVAD/OHT due to combination of age, advanced renal dysfunction, and prior sternotomy. Will continue managing his heart failure with palliative inotropes and CardioMEMS. His quality of life is reasonable at this time. \par

## 2020-12-23 NOTE — HISTORY OF PRESENT ILLNESS
[FreeTextEntry1] : \par Referring: Dr. Ortega, Dr. Sarkar\par \par Mr. Arenas is a 79 YO M with a history of ACC/AHA Stage D ischemic cardiomyopathy on milrinone and s/p CardioMEMS and ICD, CAD s/p 4v CABG in 90's and multiple subsequent PCI, moderate AS, CKD III (baseline Cr 1.8), and poorly controlled DM2 (A1c 11.6%) presenting to HF clinic for further management. \par \par He has had recurrent hospitalizations in 2020. In 8/2020 he was admitted for symptomatic hypotension likely related to medications for which he was briefly on pressors. He was admitted 9/2020 with ADHF and on RHC was found to have elevated filling pressures with low cardiac output for which he was started on milrinone 0.25 mcg/kg/min and underwent implantation of CardioMEMS. He was readmitted 10/2020 with ADHF in setting of medication confusion and on this admission had ICD shock for MMVT for which additional ATP zone was added. He is DNR/DNI. \par \par He presents today for routine followup. He denies worsening symptoms since last visit. He is ambulating with a cane or walker and does have to reduce his speed to get around. Denies lower extremity edema. 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 Denies ICD shocks. Denies drainage from [place]. Reports stable quality of life.

## 2020-12-23 NOTE — PHYSICAL EXAM
[Normal Conjunctiva] : the conjunctiva exhibited no abnormalities [] : no respiratory distress [Respiration, Rhythm And Depth] : normal respiratory rhythm and effort [Heart Rate And Rhythm] : heart rate and rhythm were normal [Heart Sounds] : normal S1 and S2 [Edema] : no peripheral edema present [Bowel Sounds] : normal bowel sounds [Abdomen Soft] : soft [Abnormal Walk] : normal gait [Oriented To Time, Place, And Person] : oriented to person, place, and time [FreeTextEntry1] : multiple eccymotic areas to upper extremities

## 2021-01-01 ENCOUNTER — RX CHANGE (OUTPATIENT)
Age: 81
End: 2021-01-01

## 2021-01-01 ENCOUNTER — NON-APPOINTMENT (OUTPATIENT)
Age: 81
End: 2021-01-01

## 2021-01-01 ENCOUNTER — INPATIENT (INPATIENT)
Facility: HOSPITAL | Age: 81
LOS: 0 days | DRG: 951 | End: 2021-01-27
Attending: INTERNAL MEDICINE | Admitting: INTERNAL MEDICINE
Payer: MEDICARE

## 2021-01-01 ENCOUNTER — RX RENEWAL (OUTPATIENT)
Age: 81
End: 2021-01-01

## 2021-01-01 ENCOUNTER — EMERGENCY (EMERGENCY)
Facility: HOSPITAL | Age: 81
LOS: 1 days | Discharge: LEFT BEFORE DISCHARGE | End: 2021-01-01
Attending: EMERGENCY MEDICINE
Payer: MEDICARE

## 2021-01-01 ENCOUNTER — APPOINTMENT (OUTPATIENT)
Dept: CARDIOLOGY | Facility: CLINIC | Age: 81
End: 2021-01-01
Payer: MEDICARE

## 2021-01-01 VITALS
RESPIRATION RATE: 24 BRPM | HEART RATE: 112 BPM | SYSTOLIC BLOOD PRESSURE: 66 MMHG | HEIGHT: 68 IN | WEIGHT: 149.91 LBS | DIASTOLIC BLOOD PRESSURE: 43 MMHG

## 2021-01-01 VITALS
HEIGHT: 68 IN | TEMPERATURE: 98 F | HEART RATE: 88 BPM | OXYGEN SATURATION: 97 % | RESPIRATION RATE: 20 BRPM | SYSTOLIC BLOOD PRESSURE: 100 MMHG | DIASTOLIC BLOOD PRESSURE: 72 MMHG

## 2021-01-01 VITALS
TEMPERATURE: 98.2 F | OXYGEN SATURATION: 96 % | HEART RATE: 82 BPM | BODY MASS INDEX: 24.34 KG/M2 | HEIGHT: 70 IN | WEIGHT: 170 LBS | SYSTOLIC BLOOD PRESSURE: 111 MMHG | DIASTOLIC BLOOD PRESSURE: 71 MMHG

## 2021-01-01 VITALS — DIASTOLIC BLOOD PRESSURE: 161 MMHG | SYSTOLIC BLOOD PRESSURE: 177 MMHG | HEART RATE: 101 BPM

## 2021-01-01 VITALS — TEMPERATURE: 99 F | DIASTOLIC BLOOD PRESSURE: 43 MMHG | HEART RATE: 88 BPM | SYSTOLIC BLOOD PRESSURE: 70 MMHG

## 2021-01-01 DIAGNOSIS — I10 ESSENTIAL (PRIMARY) HYPERTENSION: ICD-10-CM

## 2021-01-01 DIAGNOSIS — Z96.611 PRESENCE OF RIGHT ARTIFICIAL SHOULDER JOINT: Chronic | ICD-10-CM

## 2021-01-01 DIAGNOSIS — Z51.5 ENCOUNTER FOR PALLIATIVE CARE: ICD-10-CM

## 2021-01-01 DIAGNOSIS — Z71.89 OTHER SPECIFIED COUNSELING: ICD-10-CM

## 2021-01-01 DIAGNOSIS — R57.0 CARDIOGENIC SHOCK: ICD-10-CM

## 2021-01-01 DIAGNOSIS — I25.5 ISCHEMIC CARDIOMYOPATHY: ICD-10-CM

## 2021-01-01 DIAGNOSIS — R53.2 FUNCTIONAL QUADRIPLEGIA: ICD-10-CM

## 2021-01-01 DIAGNOSIS — R06.00 DYSPNEA, UNSPECIFIED: ICD-10-CM

## 2021-01-01 LAB
ALBUMIN SERPL ELPH-MCNC: 3.1 G/DL — LOW (ref 3.3–5)
ALP SERPL-CCNC: 101 U/L — SIGNIFICANT CHANGE UP (ref 40–120)
ALT FLD-CCNC: 52 U/L — HIGH (ref 10–45)
ANION GAP SERPL CALC-SCNC: 16 MMOL/L — SIGNIFICANT CHANGE UP (ref 5–17)
APTT BLD: 28.2 SEC — SIGNIFICANT CHANGE UP (ref 27.5–35.5)
AST SERPL-CCNC: 38 U/L — SIGNIFICANT CHANGE UP (ref 10–40)
BASOPHILS # BLD AUTO: 0 K/UL — SIGNIFICANT CHANGE UP (ref 0–0.2)
BASOPHILS NFR BLD AUTO: 0 % — SIGNIFICANT CHANGE UP (ref 0–2)
BILIRUB SERPL-MCNC: 0.9 MG/DL — SIGNIFICANT CHANGE UP (ref 0.2–1.2)
BUN SERPL-MCNC: 88 MG/DL — HIGH (ref 7–23)
CALCIUM SERPL-MCNC: 9.5 MG/DL — SIGNIFICANT CHANGE UP (ref 8.4–10.5)
CHLORIDE SERPL-SCNC: 93 MMOL/L — LOW (ref 96–108)
CO2 SERPL-SCNC: 23 MMOL/L — SIGNIFICANT CHANGE UP (ref 22–31)
CREAT SERPL-MCNC: 2.84 MG/DL — HIGH (ref 0.5–1.3)
EOSINOPHIL # BLD AUTO: 0 K/UL — SIGNIFICANT CHANGE UP (ref 0–0.5)
EOSINOPHIL NFR BLD AUTO: 0 % — SIGNIFICANT CHANGE UP (ref 0–6)
GLUCOSE SERPL-MCNC: 216 MG/DL — HIGH (ref 70–99)
HCT VFR BLD CALC: 30 % — LOW (ref 39–50)
HGB BLD-MCNC: 10.2 G/DL — LOW (ref 13–17)
INR BLD: 1.29 RATIO — HIGH (ref 0.88–1.16)
LYMPHOCYTES # BLD AUTO: 0.21 K/UL — LOW (ref 1–3.3)
LYMPHOCYTES # BLD AUTO: 1.7 % — LOW (ref 13–44)
MCHC RBC-ENTMCNC: 32.7 PG — SIGNIFICANT CHANGE UP (ref 27–34)
MCHC RBC-ENTMCNC: 34 GM/DL — SIGNIFICANT CHANGE UP (ref 32–36)
MCV RBC AUTO: 96.2 FL — SIGNIFICANT CHANGE UP (ref 80–100)
MONOCYTES # BLD AUTO: 0.1 K/UL — SIGNIFICANT CHANGE UP (ref 0–0.9)
MONOCYTES NFR BLD AUTO: 0.8 % — LOW (ref 2–14)
NEUTROPHILS # BLD AUTO: 12.16 K/UL — HIGH (ref 1.8–7.4)
NEUTROPHILS NFR BLD AUTO: 97.5 % — HIGH (ref 43–77)
NT-PROBNP SERPL-SCNC: HIGH PG/ML (ref 0–300)
PLATELET # BLD AUTO: 193 K/UL — SIGNIFICANT CHANGE UP (ref 150–400)
POTASSIUM SERPL-MCNC: 4 MMOL/L — SIGNIFICANT CHANGE UP (ref 3.5–5.3)
POTASSIUM SERPL-SCNC: 4 MMOL/L — SIGNIFICANT CHANGE UP (ref 3.5–5.3)
PROT SERPL-MCNC: 6.8 G/DL — SIGNIFICANT CHANGE UP (ref 6–8.3)
PROTHROM AB SERPL-ACNC: 15.3 SEC — HIGH (ref 10.6–13.6)
RBC # BLD: 3.12 M/UL — LOW (ref 4.2–5.8)
RBC # FLD: 19.4 % — HIGH (ref 10.3–14.5)
SARS-COV-2 RNA SPEC QL NAA+PROBE: SIGNIFICANT CHANGE UP
SODIUM SERPL-SCNC: 132 MMOL/L — LOW (ref 135–145)
TROPONIN T, HIGH SENSITIVITY RESULT: 120 NG/L — HIGH (ref 0–51)
WBC # BLD: 12.47 K/UL — HIGH (ref 3.8–10.5)
WBC # FLD AUTO: 12.47 K/UL — HIGH (ref 3.8–10.5)

## 2021-01-01 PROCEDURE — 71045 X-RAY EXAM CHEST 1 VIEW: CPT | Mod: 26

## 2021-01-01 PROCEDURE — 99283 EMERGENCY DEPT VISIT LOW MDM: CPT

## 2021-01-01 PROCEDURE — 99291 CRITICAL CARE FIRST HOUR: CPT | Mod: GC

## 2021-01-01 PROCEDURE — 99233 SBSQ HOSP IP/OBS HIGH 50: CPT | Mod: GC

## 2021-01-01 PROCEDURE — 99233 SBSQ HOSP IP/OBS HIGH 50: CPT

## 2021-01-01 PROCEDURE — 71045 X-RAY EXAM CHEST 1 VIEW: CPT

## 2021-01-01 PROCEDURE — 93010 ELECTROCARDIOGRAM REPORT: CPT

## 2021-01-01 PROCEDURE — 99215 OFFICE O/P EST HI 40 MIN: CPT

## 2021-01-01 PROCEDURE — 99223 1ST HOSP IP/OBS HIGH 75: CPT

## 2021-01-01 PROCEDURE — 99283 EMERGENCY DEPT VISIT LOW MDM: CPT | Mod: 25

## 2021-01-01 PROCEDURE — 93282 PRGRMG EVAL IMPLANTABLE DFB: CPT | Mod: 26

## 2021-01-01 PROCEDURE — 82962 GLUCOSE BLOOD TEST: CPT

## 2021-01-01 PROCEDURE — 93000 ELECTROCARDIOGRAM COMPLETE: CPT

## 2021-01-01 RX ORDER — ROBINUL 0.2 MG/ML
0.4 INJECTION INTRAMUSCULAR; INTRAVENOUS EVERY 6 HOURS
Refills: 0 | Status: DISCONTINUED | OUTPATIENT
Start: 2021-01-01 | End: 2021-01-01

## 2021-01-01 RX ORDER — MILRINONE LACTATE 1 MG/ML
0.03 INJECTION, SOLUTION INTRAVENOUS
Qty: 20 | Refills: 0 | Status: DISCONTINUED | OUTPATIENT
Start: 2021-01-01 | End: 2021-01-01

## 2021-01-01 RX ORDER — NOREPINEPHRINE BITARTRATE/D5W 8 MG/250ML
0.05 PLASTIC BAG, INJECTION (ML) INTRAVENOUS
Qty: 8 | Refills: 0 | Status: DISCONTINUED | OUTPATIENT
Start: 2021-01-01 | End: 2021-01-01

## 2021-01-01 RX ORDER — HYDROMORPHONE HYDROCHLORIDE 2 MG/ML
0.2 INJECTION INTRAMUSCULAR; INTRAVENOUS; SUBCUTANEOUS
Refills: 0 | Status: DISCONTINUED | OUTPATIENT
Start: 2021-01-01 | End: 2021-01-01

## 2021-01-01 RX ORDER — FLUCONAZOLE 150 MG/1
100 TABLET ORAL ONCE
Refills: 0 | Status: DISCONTINUED | OUTPATIENT
Start: 2021-01-01 | End: 2021-01-01

## 2021-01-01 RX ORDER — MORPHINE SULFATE 50 MG/1
2 CAPSULE, EXTENDED RELEASE ORAL ONCE
Refills: 0 | Status: DISCONTINUED | OUTPATIENT
Start: 2021-01-01 | End: 2021-01-01

## 2021-01-01 RX ORDER — FENTANYL CITRATE 50 UG/ML
70 INJECTION INTRAVENOUS ONCE
Refills: 0 | Status: DISCONTINUED | OUTPATIENT
Start: 2021-01-01 | End: 2021-01-01

## 2021-01-01 RX ORDER — MORPHINE SULFATE 50 MG/1
4 CAPSULE, EXTENDED RELEASE ORAL ONCE
Refills: 0 | Status: DISCONTINUED | OUTPATIENT
Start: 2021-01-01 | End: 2021-01-01

## 2021-01-01 RX ORDER — MILRINONE LACTATE 1 MG/ML
0.3 INJECTION, SOLUTION INTRAVENOUS
Qty: 20 | Refills: 0 | Status: DISCONTINUED | OUTPATIENT
Start: 2021-01-01 | End: 2021-01-01

## 2021-01-01 RX ORDER — LISINOPRIL 5 MG/1
5 TABLET ORAL DAILY
Qty: 1 | Refills: 3 | Status: DISCONTINUED | COMMUNITY
Start: 2020-01-01 | End: 2020-01-01

## 2021-01-01 RX ORDER — DOBUTAMINE HCL 250MG/20ML
1 VIAL (ML) INTRAVENOUS
Qty: 500 | Refills: 0 | Status: DISCONTINUED | OUTPATIENT
Start: 2021-01-01 | End: 2021-01-01

## 2021-01-01 RX ORDER — DOBUTAMINE HCL 250MG/20ML
2.5 VIAL (ML) INTRAVENOUS
Qty: 500 | Refills: 0 | Status: DISCONTINUED | OUTPATIENT
Start: 2021-01-01 | End: 2021-01-01

## 2021-01-01 RX ADMIN — MILRINONE LACTATE 6.12 MICROGRAM(S)/KG/MIN: 1 INJECTION, SOLUTION INTRAVENOUS at 17:55

## 2021-01-01 RX ADMIN — Medication 6.38 MICROGRAM(S)/KG/MIN: at 18:30

## 2021-01-01 RX ADMIN — Medication 5.1 MICROGRAM(S)/KG/MIN: at 18:02

## 2021-01-01 RX ADMIN — MILRINONE LACTATE 6.12 MICROGRAM(S)/KG/MIN: 1 INJECTION, SOLUTION INTRAVENOUS at 19:49

## 2021-01-01 RX ADMIN — MORPHINE SULFATE 4 MILLIGRAM(S): 50 CAPSULE, EXTENDED RELEASE ORAL at 21:16

## 2021-01-01 RX ADMIN — Medication 0.2 MILLIGRAM(S): at 23:20

## 2021-01-01 RX ADMIN — MILRINONE LACTATE 6.12 MICROGRAM(S)/KG/MIN: 1 INJECTION, SOLUTION INTRAVENOUS at 12:00

## 2021-01-01 RX ADMIN — FENTANYL CITRATE 70 MICROGRAM(S): 50 INJECTION INTRAVENOUS at 20:49

## 2021-01-01 RX ADMIN — Medication 2 MILLIGRAM(S): at 00:13

## 2021-01-01 RX ADMIN — MILRINONE LACTATE 6.12 MICROGRAM(S)/KG/MIN: 1 INJECTION, SOLUTION INTRAVENOUS at 02:45

## 2021-01-01 RX ADMIN — Medication 2 MILLIGRAM(S): at 21:10

## 2021-01-01 RX ADMIN — MORPHINE SULFATE 2 MILLIGRAM(S): 50 CAPSULE, EXTENDED RELEASE ORAL at 02:45

## 2021-01-01 RX ADMIN — MILRINONE LACTATE 0.61 MICROGRAM(S)/KG/MIN: 1 INJECTION, SOLUTION INTRAVENOUS at 18:59

## 2021-01-01 NOTE — H&P ADULT - NSTELEHEALTH_GEN_ALL_CORE
Admission huddle with Yudy Jones (Charge RN) and myself.    Reviewed the following:  • CBC & Differential  • ABG/CBG  • Glucose  • Maternal HIV, hepatitis B, and RPR/treponema results   • Vital signs     Plan: Continue to monitor.           No

## 2021-01-12 PROBLEM — I25.5 ISCHEMIC CARDIOMYOPATHY: Status: ACTIVE | Noted: 2020-01-01

## 2021-01-19 NOTE — ED ADULT NURSE NOTE - OBJECTIVE STATEMENT
Patient presents to ED with c/o chills. As per patient he started shaking, denies nausea, vomiting, patient states he was unable to get comfortable. Patient's daughter witnessed episode and called ambulance. At present time patient is A&Ox4, lying on stretcher, in no acute distress. Denies pain/discomfort. Patient has lumen central line with continuous Milrinone infusion. dressing is clean/dry/intact, no redness or swelling at the site.

## 2021-01-19 NOTE — ED ADULT NURSE NOTE - NSIMPLEMENTINTERV_GEN_ALL_ED
Implemented All Fall Risk Interventions:  [place] to call system. Call bell, personal items and telephone within reach. Instruct patient to call for assistance. Room bathroom lighting operational. Non-slip footwear when patient is off stretcher. Physically safe environment: no spills, clutter or unnecessary equipment. Stretcher in lowest position, wheels locked, appropriate side rails in place. Provide visual cue, wrist band, yellow gown, etc. Monitor gait and stability. Monitor for mental status changes and reorient to person, place, and time. Review medications for side effects contributing to fall risk. Reinforce activity limits and safety measures with patient and family.

## 2021-01-19 NOTE — ED PROVIDER NOTE - PHYSICAL EXAMINATION
Afebrile, hemodynamically stable, saturating well  NAD, nontoxic appearing, no WOB, speaking full sentences  Head NCAT  EOMI grossly, anicteric  MMM  No JVD  RRR, nml S1/S2, no m/r/g  Lungs diffuse rales  Abd soft, NT, ND, nml BS, no rebound or guarding  AAO, CN's 3-12 grossly intact  PERRIN spontaneously, no leg cyanosis or edema  Skin warm, dry

## 2021-01-19 NOTE — ED ADULT NURSE NOTE - HIV OFFER
Refer To    REFER TO:  OBSTETRICS AND GYNECOLOGY REFER TO:      [facility] [place]. Nii Blackman MD; [facility], [address] [phone]; [facility], [address] [place] [address]; [phone]     [facility] Prasanna Royal MD; [facility], [address] [phone]         Reason for Referral: EVAL AND TREAT  # of Visits: 2  DX: VAGINAL BLEEDING  CURRENTLY INPT AT [facility]  ORDERED BY DR. DECKER/ GERIATRIC MD     Signatures   Electronically signed by : Yamilet Galdamez L.P.N.; Aug 17 2018  2:08PM CST    
Previously Declined (within the last year)

## 2021-01-19 NOTE — ED PROVIDER NOTE - CLINICAL SUMMARY MEDICAL DECISION MAKING FREE TEXT BOX
Concern for worsening cardiomyopathy/CHF vs. MI vs. infection such as COVID/UTI. Noted increased pulm congestion on exam and CXR. Pt does not want to stay for any testing or therapy, requests I call his wife to take him home. Declines even ECG, labs, COVID test, or any medication. Has daily nurse at his home, saw his cardiologist 1 wk ago and given [facility] of health and can call him again today or tomorrow. D/w wife on the phone who wants him to stay however pt adamant and wife will come pick him up. I had extensive discussion of risks and benefits of pursuing further medical evaluation and care with patient; patient still electing to leave against medical advice. Patient is awake, alert, oriented and demonstrates full capacity and insight into illness. He is aware of concern for worsening heart failure, MI, respiratory distress, untreated infection, and death. He has appropriate insight into his end-stage disease and does not appear suicidal. Patient aware and encouraged to return immediately to ED or nearest ED if patient decides to change mind regarding care or if patient experiences any new, worsening, or concerning symptoms.

## 2021-01-19 NOTE — ED PROVIDER NOTE - PATIENT PORTAL LINK FT
You can access the FollowMyHealth Patient Portal offered by [facility] by registering at the following website: [email or web address]. By joining [employer]’s FollowMyHealth portal, you will also be able to view your health information using other applications (apps) compatible with our system.

## 2021-01-19 NOTE — ED PROVIDER NOTE - NSFOLLOWUPINSTRUCTIONS_ED_ALL_ED_FT
Please follow up with your primary care doctor and cardiologist tomorrow.  Please return to the emergency department if you have worsening shortness of breath, chest pain, dizziness, vomiting, fever, or any other symptoms.    Shortness of Breath    WHAT YOU NEED TO KNOW:    Shortness of breath is a feeling that you cannot get enough air when you breathe in. You may have this feeling only during activity, or all the time. Your symptoms can range from mild to severe. Shortness of breath may be a sign of a serious health condition that needs immediate care.    DISCHARGE INSTRUCTIONS:    Return to the emergency department if:   •Your signs and symptoms are the same or worse within 24 hours of treatment.       •The skin over your ribs or on your neck sinks in when you breathe.       •You feel confused or dizzy.      Contact your healthcare provider if:   •You have new or worsening symptoms.      •You have questions or concerns about your condition or care.      Medicines:   •Medicines may be used to treat the cause of your symptoms. You may need medicine to treat a bacterial infection or reduce anxiety. Other medicines may be used to open your airway, reduce swelling, or remove extra fluid. If you have a heart condition, you may need medicine to help your heart beat more strongly or regularly.      •Take your medicine as directed. Contact your healthcare provider if you think your medicine is not helping or if you have side effects. Tell him or her if you are allergic to any medicine. Keep a list of the medicines, vitamins, and herbs you take. Include the amounts, and when and why you take them. Bring the list or the pill bottles to follow-up visits. Carry your medicine list with you in case of an emergency.      Manage shortness of breath:   •Create an action plan. You and your healthcare provider can work together to create a plan for how to handle shortness of breath. The plan can include daily activities, treatment changes, and what to do if you have severe breathing problems.      •Lean forward on your elbows when you sit. This helps your lungs expand and may make it easier to breathe.      •Use pursed-lip breathing any time you feel short of breath. Breathe in through your nose and then slowly breathe out through your mouth with your lips slightly puckered. It should take you twice as long to breathe out as it did to breathe in.  Breathe in Breathe out           •Do not smoke. Nicotine and other chemicals in cigarettes and cigars can cause lung damage and make shortness of breath worse. Ask your healthcare provider for information if you currently smoke and need help to quit. E-cigarettes or smokeless tobacco still contain nicotine. Talk to your healthcare provider before you use these products.      •Reach or maintain a healthy weight. Your healthcare provider can help you create a safe weight loss plan if you are overweight.      •Exercise as directed. Exercise can help your lungs work more easily. Exercise can also help you lose weight if needed. Try to get at least 30 minutes of exercise most days of the week.      Follow up with your healthcare provider or specialist as directed: Write down your questions so you remember to ask them during your visits.

## 2021-01-19 NOTE — ED PROVIDER NOTE - OBJECTIVE STATEMENT
80yoM with h/o ischemic cardiomyopathy EF 20-25% on milrinone drip, CKD, HTN, HLD, DM, presents with feeling shaking/cold/chills and he felt so cold that he started feeling SOB. This lasted for approx 45 minutes then resolved. Did not take any medication in the meantime. Noted hyperglycemia, states he runs in the 300's usually and just had lunch. States took all of his morning meds. Denies CP, leg pain or swelling, cough, fever, rash, dysuria, abd pain, vomiting, diarrhea, and all other symptoms. States he wants to go home, his aide called EMS but does not want to be here. States he is DNR and is living on borrowed time anyway and does not want to risk being in the hospital for any reason and especially to be here at all and especially due to current COVID pandemic.

## 2021-01-26 NOTE — ED ADULT NURSE REASSESSMENT NOTE - NS ED NURSE REASSESS COMMENT FT1
NP Chambers at bedside. Pt. requesting to stop all drips/pressors and aware of what this means. Pt. medicated as per EMAR. RADHA Mas at bedside. Pt. requesting to stop all drips/pressors except for his home milrinone infusion and patient is aware of what this means going forward. Pt. medicated as per EMAR.

## 2021-01-26 NOTE — ED PROVIDER NOTE - ATTENDING CONTRIBUTION TO CARE
Private Physician Stuart Sarkar.  80y male pmh DNR/DNI Cardiomyopathy w AICD On [place], Cad, CHf, Gout, CKD, Depression, HTN,HLD, MI, Renal colic DM, PT comes to ed w c/o syncope at home. EMS responded pt initially declined transport, then agreed. PE pt looking acutely chronically ill, NCAT neck supple chest clear anterior & posterior cv no rubs, gallops or murmurs neuro gcs 15 speech slow weak. Moves all extr  Tate Corbett MD, Facep

## 2021-01-26 NOTE — ED ADULT NURSE REASSESSMENT NOTE - NS ED NURSE REASSESS COMMENT FT1
Pt. with family friend at bedside. Pt. reporting it is getting harder to breathe and he would like all drips discontinued. DNR/DNI present in chart. ACP Chace called and made aware, states she is currently with another patient and will come down as soon as possible. ER MD Corbett and MD Fuchs made aware of patient situation. EULALIO Branham made aware. RADHA Mas contacted again by MD Corbett. Pt. with family friend at bedside. Pt. reporting it is getting harder to breathe and he would like all drips discontinued. Placed on 6 lpm via NC for comfort, denies use of NRB mask. DNR/DNI present in chart. ACP Chace called and made aware, states she is currently with another patient and will come down as soon as possible. ER MD Corbett and MD Fuchs made aware of patient situation. EULALIO Branham made aware. RADHA Mas contacted again by MD Corbett.

## 2021-01-26 NOTE — ED PROVIDER NOTE - OBJECTIVE STATEMENT
80M w/ h/o CHF, anxiety, cad, CKD, HTN, HLD, DM presents s/p syncope. States he was at home, had an episode of chills. States he syncopized. No bowel/bladder incontinence. Found to be profoundly hypotensive at home despite milrinone drip. Denies fevers, sob, cough, uri symptoms, n/v/d, dysuria.

## 2021-01-26 NOTE — ED ADULT NURSE NOTE - OBJECTIVE STATEMENT
79 yo male from home A&OX3 BIB EMS from home s/p near syncopal episode. Pt has hx end stage CHF, central line placed R chx wall in sept 2020 w/ home Milrinone infusion. Pt presents pale, weak, lethargic and hypotensive w/ MAP in 50's. Pt denies chx pain, SOB, abd pn, n/v/d. Pt endorses dizziness. IV access obtained in bilat AC via 18G catheters, labs drawn and sent. EKG obtained, pt presents in sinus tach w/ frequent PVC's. 79 yo male from home A&OX3 BIB EMS from home s/p near syncopal episode. Pt has hx end stage CHF, central line placed R chx wall in sept 2020 w/ home Milrinone infusion. Pt presents pale, weak, lethargic and hypotensive w/ MAP in 50's. Pt denies chx pain, SOB, abd pn, n/v/d. Pt endorses dizziness. IV access obtained in bilat AC via 18G catheters, labs drawn and sent. EKG obtained, pt presents in sinus tach w/ frequent PVC's. Dobutamine infusion administered. Pt remains hypotensive, infusion increased from 2.5 to 5 mck/kg/hr (see ED flowsheet). Pt not responsive to medication, levophed added at 0.05mcg/kg.hr. Cardiology bedside, pt DNR/DNI. End of life discussion performed w/ pt w/ ED md and cardiology, pt wishes to be placed on palliative care, comfortable as possible and to DC life sustaining medications once wife and  arrive.

## 2021-01-26 NOTE — H&P ADULT - ASSESSMENT
79 yo male h/o chf, ckd, cad, htn, chol, dm, here with syncope and cardiogenic shock    pt was on home hospice program   pt dnr/i  declined icu care  decline pressors  wants to be comfortable   palliaitive consulted    full comfort care  pt actively dying      plan d/w ER       Advanced care planning was discussed with patient and family.  Advanced care planning forms were reviewed and discussed as appropriate.  Differential diagnosis and plan of care discussed with patient after the evaluation.   Pain assessed and judicious use of narcotics when appropriate was discussed.  Importance of Fall prevention discussed.  Counseling on Smoking and Alcohol cessation was offered when appropriate.  Counseling on Diet, exercise, and medication compliance was done.   Approx 45 minutes spent.

## 2021-01-26 NOTE — ED ADULT NURSE REASSESSMENT NOTE - NS ED NURSE REASSESS COMMENT FT1
1900: Bedside report received from Erasmo Momin RN in Green room 26. 1905: Patient AOx4, lethargic, verbalizing he no longer wants to be on pressors/drips. Levophed being stopped at this time as per MD Corbett and MD Fuchs. Dobutamine remains infusing. 1900: Bedside report received from Erasmo Momin RN in Green room 26. 1905: Patient AOx4, lethargic, verbalizing he no longer wants to be on pressors/drips. DNR/DNI present in chart. Levophed being stopped at this time as per MD Corbett and MD Fuchs. Dobutamine remains infusing. Patient's home milrinone infusing via right upper chest PICC. Okay per patient. Awaiting arrival of family as per patient.

## 2021-01-26 NOTE — ED PROVIDER NOTE - PHYSICAL EXAMINATION
CONSTITUTIONAL: NAD, awake, alert  HEAD: Normocephalic; atraumatic  ENMT: External appears normal, MMM  CARD: Normal Sl, S2; no audible murmurs, + picc line in place   RESP: normal wob, lungs ctab  ABD: soft, non-distended; non-tender  MSK: bilateral pedal edema, normal ROM in all four extremities  SKIN: Warm, dry, no rashes  NEURO: aaox3, moving all extremities spontaneously

## 2021-01-26 NOTE — CONSULT NOTE ADULT - ASSESSMENT
80 year-old with inotrope dependent stage D heart failure. Suspect disease progression, patient now with cardiogenic shock that would require ICU level care to attempt to treat.   Patient is DNR/DNI.  He does not want to be maintained on pressor support in an ICU setting.    With patient's wife at bedside, would provide comfort measures for end of life care in accordance with patient wishes.

## 2021-01-26 NOTE — ED PROVIDER NOTE - CLINICAL SUMMARY MEDICAL DECISION MAKING FREE TEXT BOX
80M w/ h/o CHF on milrinone, presents s/p syncope, hypotensive, started on dobutamine, mentating well, no focal deficits, ekg w/o acs, patient denies chest pain, cards consult

## 2021-01-26 NOTE — H&P ADULT - NSHPPHYSICALEXAM_GEN_ALL_CORE
Vital Signs Last 24 Hrs  T(C): --  T(F): --  HR: 103 (26 Jan 2021 19:01) (101 - 112)  BP: 85/66 (26 Jan 2021 19:01) (55/41 - 85/66)  BP(mean): 71 (26 Jan 2021 19:01) (44 - 71)  RR: 22 (26 Jan 2021 19:01) (22 - 24)  SpO2: 99% (26 Jan 2021 19:01) (98% - 100%)    PHYSICAL EXAM:  GENERAL: NAD, well-developed  HEAD:  Atraumatic, Normocephalic  EYES: EOMI, PERRLA, conjunctiva and sclera clear  NECK: Supple,    CHEST/LUNG: Clear to auscultation bilaterally; No wheeze  HEART: Regular rate and rhythm; No murmurs, rubs, or gallops  ABDOMEN: Soft, Nontender, Nondistended; Bowel sounds present  EXTREMITIES:  2+ Peripheral Pulses, No clubbing, cyanosis,+ edema  PSYCH: AAOx3  NEUROLOGY: non-focal  SKIN: No rashes or lesions

## 2021-01-26 NOTE — ED ADULT NURSE REASSESSMENT NOTE - NS ED NURSE REASSESS COMMENT FT1
Family reporting patient starting to look more uncomfortable. NP Chace contacted at [id number], states she will put in another order for ativan.

## 2021-01-26 NOTE — CHART NOTE - NSCHARTNOTEFT_GEN_A_CORE
Cardiology Fellow Note    79 yo gentleman PMHx of CAD s/p CABG, ischemic cardiomyopathy with HFrEF 25% on 09/02/20 TTE s/p single chamber ICD Nov/2019 and s/p cardiomems (13 on 12/23/20), AVNRT s/p ablation 2017, CKD III, and IDDM type 2. Followed by Dr. Abhishek Jaquez at the advanced heart failure clinic, last seen on 12/23/20, on home milrinone (access: Plascencia) at 0.3 mcg/kg/min, with prior discussion regarding possible home hospice. Presented to the ED after feeling weak and lightheaded x 1 day but without associated chest pain, dyspnea at rest or with exertion, syncope, or ICD shock.     Was called by ED to evaluate the patient for cardiogenic shock.     Initial vitals - afebrile, , BP 66/43, RR 20, and SpO2 99% on RA. On exam, A&O x 3, appearing lethargic, +JVD, without significant cardiopulmonary exam findings, and 1+ LE edema bilaterally with cool extremities. Cardiology team spoke to the patient at the bedside for 10-15 minutes. The patient already had DNR/DNI order, which he agreed to this time as well. The patient stated that he wished no further invasive interventions or critical level care to support his blood pressure and sustain his life (it was clearly explained to the patient that he was in cardiogenic shock and needed advanced support with vasopressors, inotropes, and central venous access). The patient stated that he wished to die comfortably with dignity. The patient was okay with continuing with home milrinone for palliative purpose. The patient was deemed to have full capacity to make decisions (A&O x 3 and fully conversant) and did not want his next of kin (wife) to have input on this decision. The patient's decision was communicated to the ED provider, to admit the patient for comfort care with palliative milrinone, with overnight palliative care consult. This decision was also communicated to Dr. Jaquez.     Discussed with Dr. Jaquez

## 2021-01-26 NOTE — ED PROVIDER NOTE - NS ED ROS FT
General: denies fever, chills  HENT: denies nasal congestion, rhinorrhea  CV: denies chest pain, palpitations, + syncope  Resp: denies difficulty breathing, cough  Abdominal: denies nausea, vomiting, diarrhea, abdominal pain  MSK: denies muscle aches, leg swelling  Neuro: denies headaches, numbness, tingling  Skin: denies rashes, bruises  Heme: denies petechia, abnormal bleeding

## 2021-01-26 NOTE — H&P ADULT - HISTORY OF PRESENT ILLNESS
80M w/ h/o CHF, anxiety, cad, CKD, HTN, HLD, DM presents s/p syncope. States he was at home, had an episode of chills. States he syncopized. No bowel/bladder incontinence. Found to be profoundly hypotensive at home despite milrinone drip. Denies fevers, sob, cough, uri symptoms, n/v/d, dysuria.    was started on  in er and seen by icu  pt requested dnr/i  declined icu care  declined pressors  wants to be comfortable

## 2021-01-26 NOTE — ED PROVIDER NOTE - PROGRESS NOTE DETAILS
Discussed with Dr Sarkar req cards cs and if no ccu req dr Jackson Paz, Cards already cs  Tate Corbett MD, Facep Jef: discussed w/ patient and cards fellow, patient declines invasive lines or drips for his blood pressure, ok with milrinone for now, requests to be comfortable, will stop levo/ for now Discussed with PT, competent to make decisions declining pressure support. Admitted to Dr Paz. Family called to see pt enroute. Pressors dc and pt became unresponsive hypotensinve to 40s systolic. Pressure support resumed and b/p improved, Dw Dr Paz.   Tate Corbett MD, Facep

## 2021-01-27 NOTE — ED ADULT NURSE REASSESSMENT NOTE - NS ED NURSE REASSESS COMMENT FT1
Patient's home milrinone infusion complete. NP Chambers aware. Patient repositioned, medicated, and made comfortable.
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
## 2021-01-27 NOTE — CONSULT NOTE ADULT - PROBLEM SELECTOR RECOMMENDATION 4
- Goals of Care Discussion as above - MOLST completed on 9/21/2020 by patient himself: DNR/ DNI.  - Goals of Care Discussion as above: confirmed DNR/DNI status with Gina and states patient had expressed he wanted symptom directed care. Wife agreeable to Deactivate ICD to be aligned with goals of symptom directed care.

## 2021-01-27 NOTE — PROGRESS NOTE ADULT - ASSESSMENT
79 yo male h/o chf, ckd, cad, htn, chol, dm, here with syncope and cardiogenic shock    pt was on home hospice program   pt dnr/i  declined icu care  decline pressors  wants to be comfortable   palliaitive consulted    full comfort care          Advanced care planning was discussed with patient and family.  Advanced care planning forms were reviewed and discussed as appropriate.  Differential diagnosis and plan of care discussed with patient after the evaluation.   Pain assessed and judicious use of narcotics when appropriate was discussed.  Importance of Fall prevention discussed.  Counseling on Smoking and Alcohol cessation was offered when appropriate.  Counseling on Diet, exercise, and medication compliance was done.   Approx 45 minutes spent.

## 2021-01-27 NOTE — CHART NOTE - NSCHARTNOTEFT_GEN_A_CORE
D/W Dr Waters that indicated she d/w the patient's surrogate, Gina Arenas (wife), that has a good understanding about the patient's advanced illness and very poor prognosis. Based on GOC, his wife agreed with deactivating the patient's AICD. EP was called.         Cj Garcia MD   Geriatrics and Palliative Care ([facility]AP) Consult Service    of Geriatric and Palliative Medicine  [facility]      Please page the following number for clinical matters between the hours of 9 am and 5 pm from Monday through Friday : [phone]    After 5pm and on weekends, please see the contact information below:    In the event of newly developing, evolving, or worsening symptoms, please contact the Palliative Medicine team via pager (if the patient is at [facility] #[phone] or if the patient is at [facility] #[phone]) The Geriatric and Palliative Medicine service has coverage 24 hours a day/ 7 days a week to provide medical recommendations regarding symptom management needs via telephone

## 2021-01-27 NOTE — PROCEDURE NOTE - ADDITIONAL PROCEDURE DETAILS
Indication for interrogation: Turn of Tachy therapies to align with goals of care. Order and progress note reflecting this plan.  Changes made: Tachy therapies disabled.

## 2021-01-27 NOTE — CONSULT NOTE ADULT - PROBLEM SELECTOR RECOMMENDATION 5
- Symptom management medications ordered for focus on symptom directed care: IV Dilaudid 0.2mg q1 PRN pain, dyspnea, IV Ativan 0.2mg q4 PRN anxiety/agitation, refractory dyspnea, IV Robinul 0.4mg q6 PRN, bowel regimen PRN for constipation  - Transfer to PCU for further care  - EP contacted to deactivate ICD - Patient lethargic on exam. Goals of care discussion with patient's wife Gina as above.   - Symptom management medications ordered for focus on symptom directed care: IV Dilaudid 0.2mg q1 PRN pain, dyspnea, IV Ativan 0.2mg q4 PRN anxiety/agitation, refractory dyspnea, IV Robinul 0.4mg q6 PRN, bowel regimen PRN for constipation  - EP contacted to deactivate ICD  - Transfer to PCU for further care  - Family dynamic issues addressed with assistance of CHERYL Wray and Nurse Manager Jess.

## 2021-01-27 NOTE — CONSULT NOTE ADULT - ASSESSMENT
79 yo gentleman PMHx of CAD s/p CABG, ischemic cardiomyopathy with HFrEF 25% on 09/02/20 TTE s/p single chamber ICD Nov/2019 and s/p cardiomems (13 on 12/23/20), on home milrinone (access: Plascencia) at 0.3 mcg/kg/min, AVNRT s/p ablation 2017, CKD III, and IDDM type 2 presented for feeling weak and light-headed.   Palliative consulted for goals of care and Palliative Care Unit evaluation.         GOALS OF CARE:   SYMPTOM DIRECTED CARE   DNR/DNI  Deactivate ICD     NOTE TO BE COMPLETED!!!!!!!!!!!!!!!!! 79 yo gentleman PMHx of CAD s/p CABG, ischemic cardiomyopathy with HFrEF 25% on 09/02/20 TTE s/p single chamber ICD Nov/2019 and s/p cardiomems (13 on 12/23/20), on home milrinone (access: Plascencia) at 0.3 mcg/kg/min, AVNRT s/p ablation 2017, CKD III, and IDDM type 2 presented for feeling weak and light-headed. Admitted for cardiogenic shock.   Palliative consulted for goals of care and Palliative Care Unit evaluation.         GOALS OF CARE:   SYMPTOM DIRECTED CARE   DNR/DNI  Deactivate ICD     NOTE TO BE COMPLETED!!!!!!!!!!!!!!!!!  79 yo gentleman PMHx of CAD s/p CABG, ischemic cardiomyopathy with HFrEF 25% on 09/02/20 TTE s/p single chamber ICD Nov/2019 and s/p cardiomems (13 on 12/23/20), on home milrinone (access: Plascencia) at 0.3 mcg/kg/min, AVNRT s/p ablation 2017, CKD III, and IDDM type 2 presented for feeling weak and light-headed. Admitted for cardiogenic shock.   Palliative consulted for goals of care and Palliative Care Unit evaluation.

## 2021-01-27 NOTE — PROGRESS NOTE ADULT - ASSESSMENT
80 year-old with inotrope dependent stage D heart failure.   Suspect disease progression, patient now with cardiogenic shock that would require ICU level care to attempt to treat.   Patient is DNR/DNI.  He does not want to be maintained on pressor support in an ICU setting.    Recommend Palliative Care Consult for PCU evaluation.

## 2021-01-27 NOTE — CONSULT NOTE ADULT - PROBLEM SELECTOR RECOMMENDATION 2
Patient requires total support for all ADL's.  PPSV2   30 %.  Skin care as per protocol - per cardiology's discussion with patient yesterday: patient declined further invasive interventions or critical level care to support his blood pressure and sustain his life with his understanding that he is currently in cardiogenic shock. per cardio's discussion: patient was okay with continuing with home milrinone for palliative purpose.  - continue with milrinone gtt@ 0.3 mcg/kg/min
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
## 2021-01-27 NOTE — CONSULT NOTE ADULT - PROBLEM SELECTOR RECOMMENDATION 3
- per cardiology's discussion with patient yesterday: patient declined further invasive interventions or critical level care to support his blood pressure and sustain his life with his understanding that he is currently in cardiogenic shock. per cardio's discussion: patient was okay with continuing with home milrinone for palliative purpose.  - continue with milrinone gtt@ 0.3 mcg/kg/min Patient requires total support for all ADL's.  PPSV2   30 %.  Skin care as per protocol

## 2021-01-27 NOTE — CONSULT NOTE ADULT - PROBLEM SELECTOR RECOMMENDATION 9
- patient without symptoms of dyspnea presently, but may develop symptoms in setting of cardiogenic shock   - start Dilaudid 0.2mg q1 PRN for dyspnea

## 2021-01-28 PROCEDURE — 80053 COMPREHEN METABOLIC PANEL: CPT

## 2021-01-28 PROCEDURE — 87040 BLOOD CULTURE FOR BACTERIA: CPT

## 2021-01-28 PROCEDURE — 85025 COMPLETE CBC W/AUTO DIFF WBC: CPT

## 2021-01-28 PROCEDURE — U0005: CPT

## 2021-01-28 PROCEDURE — U0003: CPT

## 2021-01-28 PROCEDURE — 82962 GLUCOSE BLOOD TEST: CPT

## 2021-01-28 PROCEDURE — 85730 THROMBOPLASTIN TIME PARTIAL: CPT

## 2021-01-28 PROCEDURE — 96375 TX/PRO/DX INJ NEW DRUG ADDON: CPT

## 2021-01-28 PROCEDURE — 99291 CRITICAL CARE FIRST HOUR: CPT | Mod: 25

## 2021-01-28 PROCEDURE — 93005 ELECTROCARDIOGRAM TRACING: CPT

## 2021-01-28 PROCEDURE — 96374 THER/PROPH/DIAG INJ IV PUSH: CPT

## 2021-01-28 PROCEDURE — 84484 ASSAY OF TROPONIN QUANT: CPT

## 2021-01-28 PROCEDURE — 71045 X-RAY EXAM CHEST 1 VIEW: CPT

## 2021-01-28 PROCEDURE — 83880 ASSAY OF NATRIURETIC PEPTIDE: CPT

## 2021-01-28 PROCEDURE — 85610 PROTHROMBIN TIME: CPT

## 2021-01-28 NOTE — PROVIDER CONTACT NOTE (OTHER) - ASSESSMENT
Patient is in bed, lethargic on 6L nasal cannula for comfort. BP 54/34 heart rate 78.
No response to external stimuli, No Spontaneous respirations, No apical heart rate, Negative papillary response to stimuli

## 2021-01-28 NOTE — PHYSICAL THERAPY INITIAL EVALUATION ADULT - WEIGHT-BEARING RESTRICTIONS: STAND/SIT, REHAB EVAL
full weight-bearing Consent: Verbal and written consent were obtained from the patient. The risks and benefits to the procedure were discussed in detail. Specifically, the risks of infection, scarring, alteration in appearance, bleeding, prolonged wound healing, incomplete removal, allergy to anesthesia, nerve injury and recurrence were addressed. Prior to the procedure, the treatment site was clearly identified and confirmed by the patient.

## 2021-01-28 NOTE — DISCHARGE NOTE FOR THE EXPIRED PATIENT - REASON AUTOPSY NOT OFFERED
Chief Complaint   Patient presents with     Cough       Initial Pulse 117  Temp 97  F (36.1  C) (Axillary)  Wt 19 lb 1.5 oz (8.661 kg)  SpO2 99% There is no height or weight on file to calculate BMI.  Medication Reconciliation: complete   ALBINO Gillespie       other

## 2021-01-28 NOTE — DISCHARGE NOTE FOR THE EXPIRED PATIENT - HOSPITAL COURSE
79 yo gentleman PMHx of CAD s/p CABG, ischemic cardiomyopathy with HFrEF 25% on 20 TTE s/p single chamber ICD  and s/p cardiomems (13 on 20), on home milrinone (access: Plascencia) at 0.3 mcg/kg/min, AVNRT s/p ablation , CKD III, and IDDM type 2 presented to [facility] for feeling weak and light-headed. Admitted for cardiogenic shock. Palliative consulted for goals of care and Palliative Care Unit evaluation. Patient transferred to PCU for symptom directed care at end of life. AICD was deactivated. Patient  on 21 at 23:28. Family notified. Emotional support provided.

## 2021-01-28 NOTE — PROVIDER CONTACT NOTE (OTHER) - RECOMMENDATIONS
Patient pronounced dead at 23:30, family, Catherine, Gina notified,, Catherine declined autopsy
NP made aware

## 2021-01-28 NOTE — PROVIDER CONTACT NOTE (OTHER) - BACKGROUND
Patient is admitted for end of life care, patient is DNR/DNI. Comfort measures only. Patient refusing treatment/ICU care.
Patient has DNR order

## 2021-02-01 LAB
CULTURE RESULTS: SIGNIFICANT CHANGE UP
CULTURE RESULTS: SIGNIFICANT CHANGE UP
SPECIMEN SOURCE: SIGNIFICANT CHANGE UP
SPECIMEN SOURCE: SIGNIFICANT CHANGE UP

## 2021-02-10 ENCOUNTER — RX CHANGE (OUTPATIENT)
Age: 81
End: 2021-02-10

## 2021-02-25 NOTE — ED PROVIDER NOTE - ADMIT DISPOSITION PRESENT ON ADMISSION SEPSIS
Ears: no ear pain and no hearing problems. Nose: no nasal congestion and no nasal drainage. Mouth/Throat: no dysphagia, no hoarseness and no throat pain. Neck: no lumps, no pain, no stiffness and no swollen glands. No

## 2021-03-11 ENCOUNTER — APPOINTMENT (OUTPATIENT)
Dept: ELECTROPHYSIOLOGY | Facility: CLINIC | Age: 81
End: 2021-03-11

## 2021-03-17 ENCOUNTER — APPOINTMENT (OUTPATIENT)
Dept: HEART FAILURE | Facility: CLINIC | Age: 81
End: 2021-03-17

## 2021-05-10 NOTE — ED ADULT NURSE NOTE - IN THE PAST 12 MONTHS HAVE YOU USED DRUGS OTHER THAN THOSE REQUIRED FOR MEDICAL REASON?
Procedure Note      Patient Name: Ernesto Ward   Patient ID: [id number]   Sex: Male   YOB: 1986        Visit Date: September 24, 2020    Provider: Thuy De Santiago MD   Location: [facility]   Location Address: [address] [place] [address]  [id number]   Location Phone: [phone]          Cystoscopy Procedure:  PROCEDURE: Flexible cystoscope was passed per urethra into the bladder without difficulty after proper consent. The bladder was inspected in a systematic meridian fashion. There were no tumors, lesions, stones, or other abnormalities noted within the bladder. Of note, there was no increased vascularity as well. Both ureteral orifices were identified and were normal in appearance. The flexible cystoscope was removed. The patient tolerated the procedure well.   FOLLOW UP OFFICE NOTE: The CT scan of the Abdomen/Pelvis was bilateral nephrolithiasis 3 mm calculus in the distal right ureter, no hydronephrosis. Reports concentrated urine in the morning with questionable hematuria. Denies pain or dysuria.           Assessment  · Hematuria     599.70/R31.9    Problems Reconciled  Plan  · Orders  o Cystoscopy (59674) - 599.70/R31.9 - 09/24/2020  · Medications  o Medications have been Reconciled  o Transition of Care or Provider Policy  · Instructions  o Electronically Identified Patient Education Materials Provided Electronically     Tolerated procedure well.  Instructions provided.  Cystoscopic findings discussed with patient.  No identifiable etiology to explain patient's hematuria.  Encouraged ample hydration.  Generalized stone prevention methods discussed with patient including ample hydration, normal calcium protein diet, decreasing sodium intake.    Plan for follow-up with Felipa Ann in 1 year with urinalysis prior  All questions addressed             Electronically Signed by: Thuy De Santiago MD -Author on September 24, 2020 10:00:36 AM  
No

## 2021-05-11 ENCOUNTER — APPOINTMENT (OUTPATIENT)
Dept: CARDIOLOGY | Facility: CLINIC | Age: 81
End: 2021-05-11

## 2021-06-29 NOTE — ED PROVIDER NOTE - OBJECTIVE STATEMENT
80 y.o male with a PMHx of AICD, CAD, CHF, CKD, HTN, DM and a PSHx of multiple stents reports to the ED c.o left sided chest pain x1 day. Patient was admitted and monitored for chest pain after a x40 lb weight fell on his chest several days ago. Patient states the pain is in the same area. patient endorses pain with deep inspiration. Patient denies any nausea, vomiting, fever, cough, or any other acute complaints. Allergies: Entresto
29-Jun-2021 10:29

## 2021-10-01 NOTE — ED PROVIDER NOTE - NS_EDPROVIDERDISPOUSERTYPE_ED_A_ED
That is fine we can schedule colonoscopy at the time of the office visit it does not sound like it is urgent thanks Attending Attestation (For Attendings USE Only)...

## 2021-10-22 NOTE — ED PROVIDER NOTE - CLINICAL SUMMARY MEDICAL DECISION MAKING FREE TEXT BOX
show
Patient presenting with chest pain, reproducible, appears msk in nature but given cardiac history. will obtain lab, trop, r.o acs, pain control, cxr. ed obs and reassess

## 2021-10-25 NOTE — PHYSICAL THERAPY INITIAL EVALUATION ADULT - DIAGNOSIS, PT EVAL
Addended by: Kaelyn Amor on: 10/25/2021 10:58 AM     Modules accepted: Level of Service deconditioning

## 2021-11-09 NOTE — ED ADULT NURSE NOTE - NS PRO AD PATIENT TYPE ON CHART
Pt has a physical appt. on 11/29/2021.
Pt requesting refill of:    ALLOPURINOL 100 MG Oral Tab    Be sent to:    [facility] [address] [facility], [address] [facility]  [address] [facility], [phone], [phone]    Pt declined to make a med check appt.  Pt has physical scheduled 11
Medical Orders for Life-Sustaining Treatment (MOLST)/Do Not Resuscitate (DNR)

## 2022-01-16 NOTE — ED PROVIDER NOTE - MDM PATIENT STATEMENT FOR ADDL TREATMENT
PAST SURGICAL HISTORY:  H/O knee surgery 2008 2009 2015    H/O ovarian cystectomy 2003 2017    H/O unilateral salpingectomy right    History of cholecystectomy 2016    History of lumbar laminectomy 6/2021     Patient with one or more new problems requiring additional work-up/treatment.

## 2022-04-19 NOTE — PROGRESS NOTE ADULT - ASSESSMENT
Called patient to review pathology results. Left VM on identified VM for patient to return my call at her convenience.    Bridgette Dash MD     80 M with PMH of ACC/AHA Class C/D ICM 2/2 CAD (s/p 4V CABG in the 90s and subsequently multiple PCI), EF 25%, s/p ICD, not candidate for OHT/LVAD due to cormobidities/sternotomy, CKD (2.4 on discharge in 9/2020), and T2DM A1c 11.2, and recent admission for ADHF in 9/2020 in [facility] received CardioMEMs implant and started on milrinone infusion as a palliative option readmitted for ADHF in the setting of medication confusion. He is diuresing well on bumex gtt with 2.9L UOP over the past 24 hours however continues to appear volume overloaded with elevated JVP. He is normotensive with stable renal function. 80 M with PMH of ACC/AHA Class C/D ICM 2/2 CAD (s/p 4V CABG in the 90s and subsequently multiple PCI), EF 25%, s/p ICD, not candidate for OHT/LVAD due to cormobidities/sternotomy, CKD (2.4 on discharge in 9/2020), and T2DM A1c 11.2, and recent admission for ADHF in 9/2020 in [facility] received CardioMEMs implant and started on milrinone infusion as a palliative option readmitted for ADHF in the setting of medication confusion. He is diuresing well on bumex gtt with 2.9L UOP over the past 24 hours and currently appears  close to euvolemic on exam. He is normotensive with stable renal function.

## 2022-05-02 NOTE — ED ADULT TRIAGE NOTE - HEART RATE (BEATS/MIN)
56 Brow Lift Text: A midfrontal incision was made medially to the defect to allow access to the tissues just superior to the left eyebrow. Following careful dissection inferiorly in a supraperiosteal plane to the level of the left eyebrow, several 3-0 monocryl sutures were used to resuspend the eyebrow orbicularis oculi muscular unit to the superior frontal bone periosteum. This resulted in an appropriate reapproximation of static eyebrow symmetry and correction of the left brow ptosis.

## 2022-05-05 NOTE — ED ADULT NURSE NOTE - CAS TRG GENERAL AIRWAY, MLM
[facility] [place] [address] Patient Status:  Inpatient    1936 MRN [id number]   Location [address] [address] Attending Maxwell Su MD   [facility] Day # 1 PCP Yoav eG MD         PATIENT NAME: Alicia Santiago  Jessika Draper MD Patent

## 2022-05-24 NOTE — ED ADULT NURSE NOTE - NSSEPSISSUSPECTED_ED_A_ED
No Constitutional:  No fevers or chills.  Eyes:  No visual changes, eye pain, or discharge.  ENT:  No hearing changes. No sore throat.  Neck:  No neck pain or stiffness.  Cardiac:  No CP or edema.  Resp:  No cough or SOB. No hemoptysis.   GI:  No nausea, vomiting, diarrhea, or abdominal pain.  :  No dysuria, frequency, or hematuria.  MSK:  No myalgias or joint pain/swelling.  (+) shoulder pain.  Neuro:  No headache, dizziness, or weakness.  Skin:  No skin rash.

## 2022-07-30 NOTE — PATIENT PROFILE ADULT - JOB HELP
Subjective:  Doing well but confused.  Eating some    Objective:  Visit Vitals  /81 (BP Location: RUE - Right upper extremity, Patient Position: Sitting)   Pulse 71   Temp 97.5 °F (36.4 °C)   Resp 15   Ht 5' 3\" (1.6 m)   Wt 75.2 kg (165 lb 12.6 oz)   SpO2 99%   BMI 29.37 kg/m²        No intake or output data in the 24 hours ending 07/30/22 1528     Gen: NAD  Resp: CTAB  CV: RRR  GI: Soft, +BS  Ext: No edema    Current Facility-Administered Medications   Medication Dose Route Frequency Provider Last Rate Last Admin   • apixaBAN (ELIQUIS) tablet 10 mg  10 mg Oral 2 times per day Diann Burns PA-C   10 mg at 07/30/22 1027   • amLODIPine (NORVASC) tablet 10 mg  10 mg Oral Daily Shakeema A Deondre, CNP   10 mg at 07/30/22 1027   • brimonidine (ALPHAGAN) 0.2 % ophthalmic solution 1 drop  1 drop Both Eyes BID Shakeema A Deondre, CNP   1 drop at 07/30/22 1026   • carvedilol (COREG) tablet 12.5 mg  12.5 mg Oral BID WC Shakeema A Deondre, CNP   12.5 mg at 07/30/22 1027   • ferrous sulfate (65 mg Fe per 325 mg) tablet 325 mg  325 mg Oral Daily Shakeema A Deondre, CNP   325 mg at 07/30/22 1027   • pantoprazole (PROTONIX) EC tablet 40 mg  40 mg Oral QAM AC Shakeema A Deondre, CNP   40 mg at 07/30/22 0523   • timolol (TIMOPTIC) 0.5 % ophthalmic solution 1 drop  1 drop Both Eyes BID Shakeema A Deondre, CNP   1 drop at 07/30/22 1026   • latanoprost (XALATAN) 0.005 % ophthalmic solution 1 drop  1 drop Right Eye Nightly Shakeema A Deondre, CNP   1 drop at 07/29/22 2121   • sodium chloride (PF) 0.9 % injection 2 mL  2 mL Intracatheter 2 times per day Fatoumata Hoffman DO   2 mL at 07/29/22 2003   • ipratropium-albuterol (DUONEB) 0.5-2.5 (3) MG/3ML nebulizer solution 3 mL  3 mL Nebulization 4x Daily Resp Shakeema A Deondre, CNP   3 mL at 07/30/22 1111   • methylPREDNISolone (SOLU-Medrol) PF injection 60 mg  60 mg Intravenous 3 times per day Shakeema A Deondre, CNP   60 mg at 07/30/22 0524           Recent Labs   Lab 07/30/22  2242  07/29/22  1554 07/29/22  0340 07/28/22  2116 07/25/22  0533 07/24/22  1856   WBC 12.8*  --  13.8* 11.7* 8.6 10.6   HGB 11.7* 11.5* 11.9* 12.1 11.4* 12.0     --  147 151 167 205   INR  --   --   --  1.1  --  1.0     Recent Labs   Lab 07/30/22  0608 07/30/22  0539 07/29/22  0340 07/28/22  0542 07/27/22  1504 07/27/22  1200 07/27/22  0542 07/26/22  0621 07/25/22  0533 07/24/22  1856   SODIUM 131* 131* 129* 133*  --   --  134* 136 135 132*   POTASSIUM  --  4.8 5.1 4.9 5.0 5.4* 5.3* 5.1 4.7 4.9   CHLORIDE  --  97 94* 95*  --   --  98 101 99 96*   CO2  --  24 27 24  --   --  26 25 27 27   BUN  --  63* 64* 56*  --   --  44* 34* 27* 24*   CREATININE  --  1.77* 2.23* 2.12*  --   --  1.85* 1.76* 1.65* 1.71*   CALCIUM  --  9.6 9.7 9.8  --   --  9.9 9.8 9.5 9.5   MG  --   --   --   --   --   --   --   --  1.7 1.7           A/P:  Pt is a 89 y.o female with PMH of HTN, anemia, CKD 3/4, interstitial lung disease, acute respiratory failure with hypoxia, polymyalgia rheumatica, oxygen dependent typically on 2 to 3 L,  who presented to the ED on (7/24) by ambulance from [facility] with complaints of worsening shortness of breath. Nephrology is consulted for Hyponatremia and MAYRA.         1.  MAYRA on CKD 3/4  likely pre-renal from dehydration  -Creatinine improving with IV fluids  -Renal ultrasound noted, no obstructive pathology     2.  Hypovolemic Hyponatremia   Slowly improving with isotonic fluids  Check urine studies     3.  Hyperkalemia-resolved     4.  Anemia of CKD  Iron stores pending    5.  Essential Hypertension  -controlled on Coreg and amlodipine   no

## 2022-08-27 NOTE — ED ADULT NURSE NOTE - CCCP TRG CHIEF CMPLNT
Labor & Delivery Progress Note    SUBJECTIVE:  Chief Complaint   Patient presents with   • Scheduled Induction       Reports contraction pain, requesting analgesia. Would like epidural.   OBJECTIVE:  Visit Vitals  /55   Pulse 81   Temp 98.2 °F (36.8 °C) (Oral)   Resp 18   Ht 5' 1.5\" (1.562 m)   Wt 92.3 kg   LMP 11/16/2021 (Exact Date) Comment: Nexplanon   SpO2 97%   BMI 37.83 kg/m²       SVE: Not performed   Dilation    Effacement    Station    Consistency    Position  Presentation      GBS negative, no need for antibiotics.    ASSESSMENT:  40w3d IUP  Patient Active Problem List   Diagnosis   • Bilateral carpal tunnel syndrome   • Orthostatic syncope   • Left knee pain   • S/P left knee surgery   • Hypothyroidism   • Encounter for planned induction of labor     Adequate uterine activity - intensity and frequency.  Fetal Heart Rate Interpretation: Category I (08/26/22 1915)    PLAN:  Anesthesia notified  IV bolus started per RN   Will wait to check cervix after comfortable with epidural    Sean Sanchez CNM   difficulty breathing

## 2022-09-20 NOTE — PATIENT PROFILE ADULT - FAMILY NEEDS
no Qbrexza Pregnancy And Lactation Text: There is no available data on Qbrexza use in pregnant women.  There is no available data on Qbrexza use in lactation.

## 2023-04-07 NOTE — PHYSICAL THERAPY INITIAL EVALUATION ADULT - LIVES WITH, PROFILE
----------Daily Progress Note----------    HISTORY OF PRESENT ILLNESS:  Patient is a 59y old Female who presents with a chief complaint of B/L Blurry Vision (06 Apr 2023 12:32)    Currently admitted to medicine with the primary diagnosis of Acute diverticulitis       Today is hospital day 11d.     INTERVAL HOSPITAL COURSE / OVERNIGHT EVENTS:    No overnight events    Review of Systems: Otherwise unremarkable     <<<<<PAST MEDICAL & SURGICAL HISTORY>>>>>  COPD (chronic obstructive pulmonary disease)    HTN (hypertension)    Diverticulitis    Pulmonary nodules/lesions, multiple    Nasopharyngeal cancer    Hypothyroid    Cervical disc disease  sequelae of radtion for nasopharyngeal cancer    Left ear hearing loss    Hemorrhoids    Anxiety    History of cholecystectomy      ALLERGIES  ciprofloxacin (Urticaria; Other)      Home Medications:  albuterol 90 mcg/inh inhalation aerosol: 2 puff(s) inhaled every 6 hours, As needed, Shortness of Breath and/or Wheezing (28 Mar 2023 13:59)  buPROPion 300 mg/24 hours (XL) oral tablet, extended release: 1 tab(s) orally every 24 hours (28 Mar 2023 13:59)  clonazePAM 1 mg oral tablet: 1 tab(s) orally 2 times a day (28 Mar 2023 13:59)  escitalopram 5 mg oral tablet: 1 tab(s) orally once a day (28 Mar 2023 13:59)  metoprolol succinate 50 mg oral tablet, extended release: 1 tab(s) orally once a day (28 Mar 2023 13:59)  MONTELUKAST SODIUM 10 MG TABS: 1 dose(s) orally once a day (28 Mar 2023 13:59)  Trelegy Ellipta inhalation powder: 1 puff(s) inhaled once a day (28 Mar 2023 13:59)  ZOLPIDEM TARTRATE 10 MG TABS: orally once a day (at bedtime) (28 Mar 2023 13:59)        MEDICATIONS  STANDING MEDICATIONS  aspirin  chewable 81 milliGRAM(s) Oral daily  atorvastatin 80 milliGRAM(s) Oral at bedtime  budesonide  80 MICROgram(s)/formoterol 4.5 MICROgram(s) Inhaler 2 Puff(s) Inhalation two times a day  buPROPion XL (24-Hour) . 300 milliGRAM(s) Oral daily  cefpodoxime 200 milliGRAM(s) Oral every 12 hours  chlorhexidine 2% Cloths 1 Application(s) Topical <User Schedule>  clonazePAM  Tablet 1 milliGRAM(s) Oral <User Schedule>  enoxaparin Injectable 30 milliGRAM(s) SubCutaneous every 12 hours  escitalopram 5 milliGRAM(s) Oral daily  influenza   Vaccine 0.5 milliLiter(s) IntraMuscular once  lactobacillus acidophilus 1 Tablet(s) Oral daily  levothyroxine 125 MICROGram(s) Oral daily  losartan 25 milliGRAM(s) Oral daily  melatonin 5 milliGRAM(s) Oral at bedtime  metoprolol succinate  milliGRAM(s) Oral daily  metroNIDAZOLE    Tablet 500 milliGRAM(s) Oral every 8 hours  montelukast 10 milliGRAM(s) Oral daily  pantoprazole    Tablet 40 milliGRAM(s) Oral two times a day  saccharomyces boulardii 250 milliGRAM(s) Oral two times a day  sodium chloride 0.65% Nasal 1 Spray(s) Nasal three times a day  ticagrelor 90 milliGRAM(s) Oral every 12 hours  tiotropium 2.5 MICROgram(s) Inhaler 2 Puff(s) Inhalation daily    PRN MEDICATIONS  acetaminophen     Tablet .. 975 milliGRAM(s) Oral every 6 hours PRN  albuterol    90 MICROgram(s) HFA Inhaler 2 Puff(s) Inhalation every 6 hours PRN  baclofen 5 milliGRAM(s) Oral every 12 hours PRN  HYDROmorphone   Tablet 1 milliGRAM(s) Oral every 8 hours PRN  hydrOXYzine hydrochloride 25 milliGRAM(s) Oral three times a day PRN    VITALS:  T(F): 96.3  HR: 63  BP: 104/59  RR: 19  SpO2: --    <<<<<LABS>>>>>                        9.9    7.16  )-----------( 278      ( 07 Apr 2023 06:00 )             32.0     04-07    140  |  108  |  13  ----------------------------<  105<H>  4.1   |  23  |  0.9    Ca    8.5      07 Apr 2023 06:00  Phos  2.8     04-06  Mg     1.9     04-07    TPro  6.2  /  Alb  3.6  /  TBili  <0.2  /  DBili  x   /  AST  23  /  ALT  25  /  AlkPhos  83  04-06            [id number]        <<<<<RADIOLOGY>>>>>    < from: CT Head No Cont (04.06.23 @ 11:21) >  PROCEDURE DATE:  04/06/2023          INTERPRETATION:  CLINICAL INDICATION: Rule out CVA.    Technique: CT of the head was performed without contrast.    Multiple contiguous axial images were acquired from the skullbase to the   vertex without the administration of intravenous contrast.  Coronal and   sagittal reformations were made.    COMPARISON:  prior head CT dated 3/27/2022.    FINDINGS:    The ventricles and sulci are unremarkable in appearance.     There is no intraparenchymal hematoma, mass effect or midline shift. No   abnormal extra-axial fluid collections are present.    The calvarium is intact. The visualized intraorbital compartments,   paranasal sinuses and mastoid complexes appear free of acute disease.    IMPRESSION:  No acute intracranial pathology. No evidence of midline shift, mass   effect or intracranial hemorrhage.    < end of copied text >      <<<<<PHYSICAL EXAM>>>>>  GENERAL: NAD, well-groomed, well-developed  NERVOUS SYSTEM:  AAOX3, Good concentration; Motor Strength 5/5 B/L upper and lower extremities; DTRs 2+ intact and symmetric  CHEST/LUNG: Clear to auscultation bilaterally; No rales, rhonchi, wheezing, or rubs  HEART: Regular rate and rhythm; No murmurs, rubs, or gallops  ABDOMEN: Soft, Nontender, Nondistended        ----------------------------------------------------------------------------------------------------------------------------------------------------------------------------------------------- significant other/spouse

## 2023-04-19 NOTE — PROCEDURE NOTE - INTERROGATION NOTE: R-WAVE AMPLITUDE (MV)
Called patient and scheduled with Dr. Haylee Calloway for [facility] office per patient preference.
11.4
No

## 2023-05-18 NOTE — ED ADULT NURSE NOTE - NSHOSCREENINGQ1_ED_ALL_ED
This would be for MRSA swab and orders have been entered on 5/25 and appear to still be active. Thank you   No Health Care Proxy (HCP)

## 2023-06-11 NOTE — ED PROVIDER NOTE - PMH
AICD (automatic cardioverter/defibrillator) present    Anxiety    Arthritis    CAD (Coronary Artery Disease)    CHF (congestive heart failure)  HFeEF (20-25%)  Chronic Gout    CKD (chronic kidney disease) stage 3, GFR 30-59 ml/min    Depression    Diverticula, Colon    Erectile dysfunction    HTN (Hypertension)    Hypercholesteremia    MI (myocardial infarction)  x2- 2013/2014  Renal Stone    Type 2 diabetes, uncontrolled, with renal manifestation     Medicine

## 2023-11-01 NOTE — DISCHARGE NOTE PROVIDER - NSDCCPCAREPLAN_GEN_ALL_CORE_FT
PRINCIPAL DISCHARGE DIAGNOSIS  Diagnosis: Chest pain  Assessment and Plan of Treatment: HOME CARE INSTRUCTIONS  For the next few days, avoid physical activities that bring on chest pain. Continue physical activities as directed.  Do not smoke.  Avoid drinking alcohol.   Only take over-the-counter or prescription medicine for pain, discomfort, or fever as directed by your caregiver.  Follow your caregiver's suggestions for further testing if your chest pain does not go away.  Keep any follow-up appointments you made. If you do not go to an appointment, you could develop lasting (chronic) problems with pain. If there is any problem keeping an appointment, you must call to reschedule.   SEEK MEDICAL CARE IF:  You think you are having problems from the medicine you are taking. Read your medicine instructions carefully.  Your chest pain does not go away, even after treatment.  You develop a rash with blisters on your chest.  SEEK IMMEDIATE MEDICAL CARE IF:  You have increased chest pain or pain that spreads to your arm, neck, jaw, back, or abdomen.   You develop shortness of breath, an increasing cough, or you are coughing up blood.  You have severe back or abdominal pain, feel nauseous, or vomit.  You develop severe weakness, fainting, or chills.  You have a fever.  THIS IS AN EMERGENCY. Do not wait to see if the pain will go away. Get medical help at once. Call your local emergency services ([phone]). Do not drive yourself to the hospital.      SECONDARY DISCHARGE DIAGNOSES  Diagnosis: DM (diabetes mellitus), type 2  Assessment and Plan of Treatment: HgA1C this admission.  Make sure you get your HgA1c checked every three months.  If you take oral diabetes medications, check your blood glucose two times a day.  If you take insulin, check your blood glucose before meals and at bedtime.  It's important not to skip any meals.  Keep a log of your blood glucose results and always take it with you to your doctor appointments.  Keep a list of your current medications including injectables and over the counter medications and bring this medication list with you to all your doctor appointments.  If you have not seen your opthalmologist this year call for appointment.  Check your feet daily for redness, sores, or openings. Do not self treat. If no improvement in two days call your primary care physician for an appointment.  Low blood sugar (hypoglycemia) is a blood sugar below 70mg/dl. Check your blood sugar if you feel signs/symptoms of hypoglycemia. If your blood sugar is below 70 take 15 grams of carbohydrates (ex 4 oz of apple juice, 3-4 glucosr tablets, or 4-6 oz of regular soda) wait 15 minutes and repeat blood sugar to make sure it comes up above 70.  If your blood sugar is above 70 and you are due for a meal, have a meal.  If you are not due for a meal have a snack.  This snack helps keeps your blood sugar at a safe range.
pain, abdominal

## 2024-01-02 NOTE — PROGRESS NOTE ADULT - PROBLEM SELECTOR PLAN 1
Bed: H07  Expected date:   Expected time:   Means of arrival:   Comments:  CHARGE   - Continue torsemide 40mg BID  - Continue Toprol XL 25mg BID  - Continue spironolactone 50mg BID  - Continue milrinone 0.3mcg/kg/min.   - Continue hydralazine 10mg PO TID, hold for SBP < 90  - Possible d/c home with home milrinone infusion tomorrow if stable overnight and Cr improving

## 2024-03-13 NOTE — PATIENT PROFILE ADULT - NSPROMUTANXFEARFT_GEN_A_NUR
US - no testicular torsion  EKG - +anterior BROOKE (not concave) w/ reciprocal depressions, D/w cardiology fellow - no STEMI activation Patient said none

## 2024-03-18 NOTE — DISCHARGE NOTE NURSING/CASE MANAGEMENT/SOCIAL WORK - NSDPDISTO_GEN_ALL_CORE
Detail Level: Detailed Depth Of Biopsy: dermis Home Was A Bandage Applied: Yes Size Of Lesion In Cm: 0 Biopsy Type: H and E Biopsy Method: Dermablade Anesthesia Type: 1% lidocaine with epinephrine Anesthesia Volume In Cc: 1 Hemostasis: Aluminum Chloride Wound Care: Petrolatum Dressing: bandage Type Of Destruction Used: Cryotherapy Curettage Text: The wound bed was treated with curettage after the biopsy was performed. Cryotherapy Text: The wound bed was treated with cryotherapy after the biopsy was performed. Electrodesiccation Text: The wound bed was treated with electrodesiccation after the biopsy was performed. Electrodesiccation And Curettage Text: The wound bed was treated with electrodesiccation and curettage after the biopsy was performed. Silver Nitrate Text: The wound bed was treated with silver nitrate after the biopsy was performed. Lab: [id number] Lab Facility: [id number] Notes In Note?: No Consent: Written consent was obtained and risks were reviewed including but not limited to scarring, infection, bleeding, scabbing, incomplete removal, nerve damage and allergy to anesthesia. Post-Care Instructions: I reviewed with the patient in detail post-care instructions. Patient is to keep the biopsy site dry overnight, and then apply bacitracin twice daily until healed. Patient may apply hydrogen peroxide soaks to remove any crusting. Notification Instructions: Patient will be notified of biopsy results. However, patient instructed to call the office if not contacted within 2 weeks. Billing Type: Third-Party Bill Information: Selecting Yes will display possible errors in your note based on the variables you have selected. This validation is only offered as a suggestion for you. PLEASE NOTE THAT THE VALIDATION TEXT WILL BE REMOVED WHEN YOU FINALIZE YOUR NOTE. IF YOU WANT TO FAX A PRELIMINARY NOTE YOU WILL NEED TO TOGGLE THIS TO 'NO' IF YOU DO NOT WANT IT IN YOUR FAXED NOTE.

## 2024-04-30 NOTE — PROGRESS NOTE ADULT - PROBLEM SELECTOR PLAN 3
Abdominal and Chest x-ray results called to Dr. Boland. Dr. Boland stated patient can be discharged home. Abdominal pain has subsided per patient only slight cramping/gas remains.   
Discharged via wheelchair no signs or symptoms of distress noted. IV d/sangeetha gauze and bandaid applied. Discharge instructions reviewed without questions, cousin in attendance.   
Suggest to continue medications, monitoring, FU primary team recommendations.
Suggest to continue medications, monitoring, FU primary team recommendations.

## 2024-06-18 NOTE — ED ADULT TRIAGE NOTE - IDEAL BODY WEIGHT(KG)
Detail Level: Zone Detail Level: Detailed Cleanser Recommendations: Dove for Men soap Moisturizer Recommendations: Cerave lotion qd 73

## 2025-03-14 NOTE — DISCHARGE NOTE PROVIDER - HOSPITAL COURSE
[facility] Provider Note     Date:   2025  Name: Sloan Yee  :   1967  PCP: Diego Sawyer MD    Chief Complaint   Patient presents with    Follow-up     6 mo SVT follow up       Subjective     History of Present Illness  Sloan Yee is a 57 y.o. male who presents today for follow up SVT.    Patient states he has been doing well.  He reports that he is working every day.  He states that he is trying to stay very active.  He has no significant CV complaints today.  He denies any exertional chest pain, shortness of breath, edema, palpitations, presyncope, or syncope.  Patient states that he did not get his lab work done that was ordered back in September and if I would print those lab slips out for him he would be glad to get those done before his next appointment.        Cardiology and sleep related problem list    1.  Nonsustained SVT  2.  Trach/T-tube  3.  Stop smoking in May 2022  4.  Hypertension  5.  History of right-sided heart failure  6.  Secondary pulmonary hypertension R/T COPD    ECHO 24 Left ventricular systolic function is normal. Left ventricular ejection fraction appears to be 56 - 60%.  Left ventricular diastolic function was normal.     Estimated right ventricular systolic pressure from tricuspid regurgitation is mildly elevated (35-45 mmHg).     24 EKG sinus rhythm    2023 EKG for surgery at     2022 echo from -EF 51% with grade 1 diastolic dysfunction.    2022 CCTA.  Mild coronary calcification with Goode score of 63.  Vascular age 69.  Mild stenosis in the proximal LAD due to calcified plaque.  Minimal stenosis in the mid LAD.  CAD RADS 2.  Dilated right ventricle and right atrium.  The main pulmonary artery is dilated 4.5 x 4.2 cm.  Left peribronchial calcified lymph nodes.    8/3/2022 Holter     Reports Denies   Chest Pain [] [x]   Shortness of Air [] [x]   Palpitations [] [x]   Edema [] [x]   Dizziness [] [x]    Syncope [] [x]       Current Outpatient Medications:     atorvastatin (LIPITOR) 20 MG tablet, Take 1 tablet by mouth once daily, Disp: 90 tablet, Rfl: 0    metFORMIN (GLUCOPHAGE) 500 MG tablet, Take 1 tablet by mouth., Disp: , Rfl:     metoprolol succinate XL (TOPROL-XL) 50 MG 24 hr tablet, Take 1 tablet by mouth Daily., Disp: 90 tablet, Rfl: 1    potassium chloride (KLOR-CON M20) 20 MEQ CR tablet, Take 2 tablets by mouth Daily., Disp: 180 tablet, Rfl: 1    spironolactone (ALDACTONE) 25 MG tablet, Take 1 tablet by mouth once daily, Disp: 90 tablet, Rfl: 0    torsemide (DEMADEX) 20 MG tablet, TAKE 2 TABLETS BY MOUTH IN THE MORNING, Disp: 180 tablet, Rfl: 0    Past Medical History:   Diagnosis Date    Chronic systolic (congestive) heart failure     Cirrhosis     COPD (chronic obstructive pulmonary disease)     Essential (primary) hypertension     Hiatal hernia     Hypotension, unspecified     Localized swelling, mass and lump, lower limb, bilateral     Nicotine dependence, unspecified, uncomplicated     Other emphysema     Other long term (current) drug therapy     Polycythemia     Pulmonary hypertension, unspecified     Right-sided heart failure     Secondary pulmonary arterial hypertension     SCONDARY TO COPD    Ventricular tachycardia       Past Surgical History:   Procedure Laterality Date    TRACHELECTOMY       Family History   Problem Relation Age of Onset    Brain cancer Mother      Social History     Socioeconomic History    Marital status: Single    Number of children: 2   Tobacco Use    Smoking status: Former     Current packs/day: 0.00     Average packs/day: 1 pack/day for 37.0 years (37.0 ttl pk-yrs)     Types: Cigarettes     Start date: 1985     Quit date: 2022     Years since quittin.8     Passive exposure: Past    Smokeless tobacco: Never   Vaping Use    Vaping status: Never Used   Substance and Sexual Activity    Alcohol use: Not Currently     Comment: RARE PER WEEK    Drug use: Not  "Currently    Sexual activity: Defer       Objective     Vital Signs:  /64 (BP Location: Right arm, Patient Position: Sitting, Cuff Size: Large Adult)   Pulse 87   Ht 177.8 cm (70\")   Wt 117 kg (259 lb)   SpO2 93%   BMI 37.16 kg/m²   Estimated body mass index is 37.16 kg/m² as calculated from the following:    Height as of this encounter: 177.8 cm (70\").    Weight as of this encounter: 117 kg (259 lb).             Physical Exam  Constitutional:       Appearance: Normal appearance. He is obese.   Cardiovascular:      Rate and Rhythm: Normal rate and regular rhythm.      Pulses: Normal pulses.      Heart sounds: Normal heart sounds.   Pulmonary:      Effort: Pulmonary effort is normal.      Breath sounds: Normal breath sounds.   Musculoskeletal:         General: Normal range of motion.   Skin:     General: Skin is warm and dry.      Capillary Refill: Capillary refill takes less than 2 seconds.   Neurological:      General: No focal deficit present.      Mental Status: He is alert and oriented to person, place, and time.   Psychiatric:         Mood and Affect: Mood normal.         Behavior: Behavior normal.         Thought Content: Thought content normal.         Judgment: Judgment normal.                       Assessment and Plan     Diagnoses and all orders for this visit:    1. Pulmonary hypertension (Primary)    2. Hyperlipidemia LDL goal <100        Recommendations: ER if symptoms increase, Report if any new/changing symptoms immediately, and Limit salt          Follow Up  Return in about 6 months (around 9/14/2025) for Next scheduled follow up.  Patient was given instructions and counseling regarding his condition or for health maintenance advice. Please see specific information pulled into the AVS if appropriate.  " To be done. 80 y/o M w/ hx of CAD with stent (mLAD x1 SHAISTA), CABG 4V  ([place]) DM (A1c 11.7 Jan 2019, recent NSTEMI 5/2019 s/p PCI prox SVG to D1, HLD, anxiety here for chest pain for 1 day.  PT states he was in his USOH when he developed acute onset of epigastric burning CP last night while sitting on couch.  Pt states he took an addl baby ASA and nitro SL at this time which reduced the pain significantly but did not going away.  Pt woke up in the middle of the night with addl episode of epigastric CP which he said recurred, requiring an additional nitro SL which did not help as much.  Pt called EMS and received ASA load, brought in to ED.  Pt reports similar CP on prior PCI in 5/2019.  States is only on ASA (unclear when stopped taking Plavix). Denies palpitations, orthopnea, worsening edema.    Cardiology was called to consult.  Pt is an NSTEMI.  s/p cath. no intervention. He will continue with Aspirin and Plavix.      echo report noted with dec EF.  Pt was seen by his Cardiologist, Dr. Sarkar.  Medications were adjusted.  Sugget for pt to start Entresto.  Howerver, creatinine is elevated at 1.37.  Medication will requiere preauthorization.  He will follow up with Cardiologist in 1 week to check repeat BMP and in 6 weeks to have an Echo to evaluate LVEF.  If less than 30% will need to be evaluated for an AICD.  ARB remains on hold.  AIC: 11%.  Pt will continue on insulin.  Pt is hemodynamically stable for discharge to home today. Pt verbalize understanding of discharge plan and medication reconciliation.

## 2025-03-17 NOTE — ED ADULT NURSE REASSESSMENT NOTE - NS ED NURSE REASSESS COMMENT FT1
received pt from [place], admitted to tele, Milrinone infusion going pre order, VSS, NAD, comfort and safety provided. Awake/Alert